# Patient Record
Sex: MALE | Race: WHITE | NOT HISPANIC OR LATINO | Employment: OTHER | URBAN - METROPOLITAN AREA
[De-identification: names, ages, dates, MRNs, and addresses within clinical notes are randomized per-mention and may not be internally consistent; named-entity substitution may affect disease eponyms.]

---

## 2017-02-16 ENCOUNTER — ALLSCRIPTS OFFICE VISIT (OUTPATIENT)
Dept: OTHER | Facility: OTHER | Age: 72
End: 2017-02-16

## 2017-02-16 DIAGNOSIS — M19.90 OSTEOARTHRITIS: ICD-10-CM

## 2017-02-16 DIAGNOSIS — D49.2 NEOPLASM OF UNSPECIFIED BEHAVIOR OF BONE, SOFT TISSUE, AND SKIN: ICD-10-CM

## 2017-02-16 DIAGNOSIS — G56.00 CARPAL TUNNEL SYNDROME: ICD-10-CM

## 2017-02-16 DIAGNOSIS — G89.29 OTHER CHRONIC PAIN: ICD-10-CM

## 2017-02-16 DIAGNOSIS — J30.9 ALLERGIC RHINITIS: ICD-10-CM

## 2017-02-16 DIAGNOSIS — M54.50 LOW BACK PAIN: ICD-10-CM

## 2017-02-16 DIAGNOSIS — R53.83 OTHER FATIGUE: ICD-10-CM

## 2017-02-16 DIAGNOSIS — L20.9 ATOPIC DERMATITIS: ICD-10-CM

## 2017-02-16 DIAGNOSIS — I10 ESSENTIAL (PRIMARY) HYPERTENSION: ICD-10-CM

## 2017-02-16 DIAGNOSIS — R73.9 HYPERGLYCEMIA: ICD-10-CM

## 2017-02-16 DIAGNOSIS — Z79.899 OTHER LONG TERM (CURRENT) DRUG THERAPY: ICD-10-CM

## 2017-02-16 DIAGNOSIS — G47.00 INSOMNIA: ICD-10-CM

## 2017-02-16 DIAGNOSIS — Z51.81 ENCOUNTER FOR THERAPEUTIC DRUG LEVEL MONITORING: ICD-10-CM

## 2017-02-16 DIAGNOSIS — E78.5 HYPERLIPIDEMIA: ICD-10-CM

## 2017-02-16 DIAGNOSIS — M25.50 PAIN IN JOINT: ICD-10-CM

## 2017-02-16 DIAGNOSIS — M76.60 ACHILLES TENDINITIS: ICD-10-CM

## 2017-02-16 DIAGNOSIS — Z47.89 ENCOUNTER FOR OTHER ORTHOPEDIC AFTERCARE: ICD-10-CM

## 2017-02-21 ENCOUNTER — TRANSCRIBE ORDERS (OUTPATIENT)
Dept: ADMINISTRATIVE | Facility: HOSPITAL | Age: 72
End: 2017-02-21

## 2017-02-21 DIAGNOSIS — R01.1 MURMUR: Primary | ICD-10-CM

## 2017-03-03 ENCOUNTER — GENERIC CONVERSION - ENCOUNTER (OUTPATIENT)
Dept: OTHER | Facility: OTHER | Age: 72
End: 2017-03-03

## 2017-03-08 ENCOUNTER — HOSPITAL ENCOUNTER (OUTPATIENT)
Dept: NON INVASIVE DIAGNOSTICS | Facility: HOSPITAL | Age: 72
Discharge: HOME | End: 2017-03-08
Payer: COMMERCIAL

## 2017-03-08 ENCOUNTER — TRANSCRIBE ORDERS (OUTPATIENT)
Dept: ADMINISTRATIVE | Facility: HOSPITAL | Age: 72
End: 2017-03-08

## 2017-03-08 ENCOUNTER — LAB (OUTPATIENT)
Dept: LAB | Facility: HOSPITAL | Age: 72
End: 2017-03-08
Payer: COMMERCIAL

## 2017-03-08 ENCOUNTER — HOSPITAL ENCOUNTER (OUTPATIENT)
Dept: RADIOLOGY | Facility: HOSPITAL | Age: 72
Discharge: HOME | End: 2017-03-08
Payer: COMMERCIAL

## 2017-03-08 DIAGNOSIS — Z79.899 OTHER LONG TERM (CURRENT) DRUG THERAPY: ICD-10-CM

## 2017-03-08 DIAGNOSIS — Z51.81 ENCOUNTER FOR THERAPEUTIC DRUG LEVEL MONITORING: ICD-10-CM

## 2017-03-08 DIAGNOSIS — R05.9 COUGH: Primary | ICD-10-CM

## 2017-03-08 DIAGNOSIS — R05.9 COUGH: ICD-10-CM

## 2017-03-08 DIAGNOSIS — M76.60 ACHILLES TENDINITIS: ICD-10-CM

## 2017-03-08 DIAGNOSIS — I10 ESSENTIAL (PRIMARY) HYPERTENSION: ICD-10-CM

## 2017-03-08 DIAGNOSIS — G47.00 INSOMNIA: ICD-10-CM

## 2017-03-08 DIAGNOSIS — L20.9 ATOPIC DERMATITIS: ICD-10-CM

## 2017-03-08 DIAGNOSIS — D49.2 NEOPLASM OF UNSPECIFIED BEHAVIOR OF BONE, SOFT TISSUE, AND SKIN: ICD-10-CM

## 2017-03-08 DIAGNOSIS — R73.9 HYPERGLYCEMIA: ICD-10-CM

## 2017-03-08 DIAGNOSIS — Z47.89 ENCOUNTER FOR OTHER ORTHOPEDIC AFTERCARE: ICD-10-CM

## 2017-03-08 DIAGNOSIS — M25.50 PAIN IN JOINT: ICD-10-CM

## 2017-03-08 DIAGNOSIS — R53.83 OTHER FATIGUE: ICD-10-CM

## 2017-03-08 DIAGNOSIS — M54.50 LOW BACK PAIN: ICD-10-CM

## 2017-03-08 DIAGNOSIS — M19.90 OSTEOARTHRITIS: ICD-10-CM

## 2017-03-08 DIAGNOSIS — G56.00 CARPAL TUNNEL SYNDROME: ICD-10-CM

## 2017-03-08 DIAGNOSIS — G89.29 OTHER CHRONIC PAIN: ICD-10-CM

## 2017-03-08 DIAGNOSIS — J30.9 ALLERGIC RHINITIS: ICD-10-CM

## 2017-03-08 DIAGNOSIS — R01.1 MURMUR: ICD-10-CM

## 2017-03-08 DIAGNOSIS — E78.5 HYPERLIPIDEMIA: ICD-10-CM

## 2017-03-08 LAB
ALBUMIN SERPL BCP-MCNC: 4 G/DL (ref 3.5–5)
ALP SERPL-CCNC: 57 U/L (ref 46–116)
ALT SERPL W P-5'-P-CCNC: 32 U/L (ref 12–78)
ANION GAP SERPL CALCULATED.3IONS-SCNC: 8 MMOL/L (ref 4–13)
AST SERPL W P-5'-P-CCNC: 18 U/L (ref 5–45)
BASOPHILS # BLD AUTO: 0.03 THOUSANDS/ΜL (ref 0–0.1)
BASOPHILS NFR BLD AUTO: 0 % (ref 0–1)
BILIRUB SERPL-MCNC: 0.4 MG/DL (ref 0.2–1)
BILIRUB UR QL STRIP: NEGATIVE
BUN SERPL-MCNC: 13 MG/DL (ref 5–25)
CALCIUM SERPL-MCNC: 8.7 MG/DL (ref 8.3–10.1)
CHLORIDE SERPL-SCNC: 101 MMOL/L (ref 100–108)
CHOLEST SERPL-MCNC: 182 MG/DL (ref 50–200)
CLARITY UR: CLEAR
CO2 SERPL-SCNC: 28 MMOL/L (ref 21–32)
COLOR UR: COLORLESS
CREAT SERPL-MCNC: 0.95 MG/DL (ref 0.6–1.3)
EOSINOPHIL # BLD AUTO: 0.34 THOUSAND/ΜL (ref 0–0.61)
EOSINOPHIL NFR BLD AUTO: 4 % (ref 0–6)
ERYTHROCYTE [DISTWIDTH] IN BLOOD BY AUTOMATED COUNT: 13.1 % (ref 11.6–15.1)
EST. AVERAGE GLUCOSE BLD GHB EST-MCNC: 131 MG/DL
GFR SERPL CREATININE-BSD FRML MDRD: >60 ML/MIN/1.73SQ M
GLUCOSE SERPL-MCNC: 102 MG/DL (ref 65–140)
GLUCOSE UR STRIP-MCNC: NEGATIVE MG/DL
HBA1C MFR BLD: 6.2 % (ref 4.2–6.3)
HCT VFR BLD AUTO: 42.7 % (ref 36.5–49.3)
HDLC SERPL-MCNC: 52 MG/DL (ref 40–60)
HGB BLD-MCNC: 14.2 G/DL (ref 12–17)
HGB UR QL STRIP.AUTO: NEGATIVE
KETONES UR STRIP-MCNC: NEGATIVE MG/DL
LDLC SERPL CALC-MCNC: 109 MG/DL (ref 0–100)
LEUKOCYTE ESTERASE UR QL STRIP: NEGATIVE
LYMPHOCYTES # BLD AUTO: 2.96 THOUSANDS/ΜL (ref 0.6–4.47)
LYMPHOCYTES NFR BLD AUTO: 35 % (ref 14–44)
MCH RBC QN AUTO: 30 PG (ref 26.8–34.3)
MCHC RBC AUTO-ENTMCNC: 33.3 G/DL (ref 31.4–37.4)
MCV RBC AUTO: 90 FL (ref 82–98)
MONOCYTES # BLD AUTO: 0.77 THOUSAND/ΜL (ref 0.17–1.22)
MONOCYTES NFR BLD AUTO: 9 % (ref 4–12)
NEUTROPHILS # BLD AUTO: 4.46 THOUSANDS/ΜL (ref 1.85–7.62)
NEUTS SEG NFR BLD AUTO: 52 % (ref 43–75)
NITRITE UR QL STRIP: NEGATIVE
PH UR STRIP.AUTO: 5.5 [PH] (ref 4.5–8)
PLATELET # BLD AUTO: 283 THOUSANDS/UL (ref 149–390)
PMV BLD AUTO: 9.1 FL (ref 8.9–12.7)
POTASSIUM SERPL-SCNC: 4.6 MMOL/L (ref 3.5–5.3)
PROT SERPL-MCNC: 6.8 G/DL (ref 6.4–8.2)
PROT UR STRIP-MCNC: NEGATIVE MG/DL
PSA SERPL-MCNC: 0.3 NG/ML (ref 0–4)
RBC # BLD AUTO: 4.73 MILLION/UL (ref 3.88–5.62)
SODIUM SERPL-SCNC: 137 MMOL/L (ref 136–145)
SP GR UR STRIP.AUTO: <=1.005 (ref 1–1.03)
TESTOST SERPL-MCNC: 179.1 NG/DL (ref 241–827)
TRIGL SERPL-MCNC: 107 MG/DL
UROBILINOGEN UR QL STRIP.AUTO: 0.2 E.U./DL
WBC # BLD AUTO: 8.56 THOUSAND/UL (ref 4.31–10.16)

## 2017-03-08 PROCEDURE — 93306 TTE W/DOPPLER COMPLETE: CPT

## 2017-03-08 PROCEDURE — G0103 PSA SCREENING: HCPCS

## 2017-03-08 PROCEDURE — 80061 LIPID PANEL: CPT

## 2017-03-08 PROCEDURE — 84403 ASSAY OF TOTAL TESTOSTERONE: CPT

## 2017-03-08 PROCEDURE — 36415 COLL VENOUS BLD VENIPUNCTURE: CPT

## 2017-03-08 PROCEDURE — 80053 COMPREHEN METABOLIC PANEL: CPT

## 2017-03-08 PROCEDURE — 81003 URINALYSIS AUTO W/O SCOPE: CPT

## 2017-03-08 PROCEDURE — 71020 HB CHEST X-RAY 2VW FRONTAL&LATL: CPT

## 2017-03-08 PROCEDURE — 85025 COMPLETE CBC W/AUTO DIFF WBC: CPT

## 2017-03-08 PROCEDURE — 83036 HEMOGLOBIN GLYCOSYLATED A1C: CPT

## 2017-03-10 ENCOUNTER — GENERIC CONVERSION - ENCOUNTER (OUTPATIENT)
Dept: OTHER | Facility: OTHER | Age: 72
End: 2017-03-10

## 2017-03-17 ENCOUNTER — ALLSCRIPTS OFFICE VISIT (OUTPATIENT)
Dept: OTHER | Facility: OTHER | Age: 72
End: 2017-03-17

## 2017-07-19 ENCOUNTER — ALLSCRIPTS OFFICE VISIT (OUTPATIENT)
Dept: OTHER | Facility: OTHER | Age: 72
End: 2017-07-19

## 2017-09-07 ENCOUNTER — GENERIC CONVERSION - ENCOUNTER (OUTPATIENT)
Dept: OTHER | Facility: OTHER | Age: 72
End: 2017-09-07

## 2017-09-11 ENCOUNTER — ALLSCRIPTS OFFICE VISIT (OUTPATIENT)
Dept: OTHER | Facility: OTHER | Age: 72
End: 2017-09-11

## 2017-09-11 DIAGNOSIS — I35.0 NONRHEUMATIC AORTIC VALVE STENOSIS: ICD-10-CM

## 2017-09-11 DIAGNOSIS — R06.09 OTHER FORMS OF DYSPNEA: ICD-10-CM

## 2017-09-12 ENCOUNTER — GENERIC CONVERSION - ENCOUNTER (OUTPATIENT)
Dept: OTHER | Facility: OTHER | Age: 72
End: 2017-09-12

## 2017-09-13 ENCOUNTER — APPOINTMENT (OUTPATIENT)
Dept: LAB | Facility: MEDICAL CENTER | Age: 72
End: 2017-09-13
Payer: COMMERCIAL

## 2017-09-13 ENCOUNTER — TRANSCRIBE ORDERS (OUTPATIENT)
Dept: LAB | Facility: MEDICAL CENTER | Age: 72
End: 2017-09-13

## 2017-09-13 ENCOUNTER — APPOINTMENT (OUTPATIENT)
Dept: RADIOLOGY | Facility: MEDICAL CENTER | Age: 72
End: 2017-09-13
Payer: COMMERCIAL

## 2017-09-13 DIAGNOSIS — L40.53 PSORIATIC SPONDYLITIS (HCC): Primary | ICD-10-CM

## 2017-09-13 DIAGNOSIS — L40.53 PSORIATIC SPONDYLITIS (HCC): ICD-10-CM

## 2017-09-13 DIAGNOSIS — I35.0 NONRHEUMATIC AORTIC VALVE STENOSIS: ICD-10-CM

## 2017-09-13 DIAGNOSIS — M54.5 LOW BACK PAIN, UNSPECIFIED BACK PAIN LATERALITY, UNSPECIFIED CHRONICITY, WITH SCIATICA PRESENCE UNSPECIFIED: ICD-10-CM

## 2017-09-13 DIAGNOSIS — R06.09 OTHER FORMS OF DYSPNEA: ICD-10-CM

## 2017-09-13 LAB
ALBUMIN SERPL BCP-MCNC: 4 G/DL (ref 3.5–5)
ALP SERPL-CCNC: 72 U/L (ref 46–116)
ALT SERPL W P-5'-P-CCNC: 34 U/L (ref 12–78)
ANION GAP SERPL CALCULATED.3IONS-SCNC: 8 MMOL/L (ref 4–13)
AST SERPL W P-5'-P-CCNC: 21 U/L (ref 5–45)
BASOPHILS # BLD AUTO: 0.03 THOUSANDS/ΜL (ref 0–0.1)
BASOPHILS NFR BLD AUTO: 0 % (ref 0–1)
BILIRUB SERPL-MCNC: 0.22 MG/DL (ref 0.2–1)
BUN SERPL-MCNC: 12 MG/DL (ref 5–25)
CALCIUM SERPL-MCNC: 9.1 MG/DL (ref 8.3–10.1)
CHLORIDE SERPL-SCNC: 105 MMOL/L (ref 100–108)
CO2 SERPL-SCNC: 26 MMOL/L (ref 21–32)
CREAT SERPL-MCNC: 1.01 MG/DL (ref 0.6–1.3)
EOSINOPHIL # BLD AUTO: 0.27 THOUSAND/ΜL (ref 0–0.61)
EOSINOPHIL NFR BLD AUTO: 3 % (ref 0–6)
ERYTHROCYTE [DISTWIDTH] IN BLOOD BY AUTOMATED COUNT: 13.4 % (ref 11.6–15.1)
ERYTHROCYTE [SEDIMENTATION RATE] IN BLOOD: 6 MM/HOUR (ref 0–10)
GFR SERPL CREATININE-BSD FRML MDRD: 75 ML/MIN/1.73SQ M
GLUCOSE P FAST SERPL-MCNC: 102 MG/DL (ref 65–99)
HCT VFR BLD AUTO: 44.7 % (ref 36.5–49.3)
HGB BLD-MCNC: 15 G/DL (ref 12–17)
INR PPP: 0.95 (ref 0.86–1.16)
LYMPHOCYTES # BLD AUTO: 2.78 THOUSANDS/ΜL (ref 0.6–4.47)
LYMPHOCYTES NFR BLD AUTO: 30 % (ref 14–44)
MCH RBC QN AUTO: 30.4 PG (ref 26.8–34.3)
MCHC RBC AUTO-ENTMCNC: 33.6 G/DL (ref 31.4–37.4)
MCV RBC AUTO: 91 FL (ref 82–98)
MONOCYTES # BLD AUTO: 0.96 THOUSAND/ΜL (ref 0.17–1.22)
MONOCYTES NFR BLD AUTO: 10 % (ref 4–12)
NEUTROPHILS # BLD AUTO: 5.32 THOUSANDS/ΜL (ref 1.85–7.62)
NEUTS SEG NFR BLD AUTO: 57 % (ref 43–75)
NRBC BLD AUTO-RTO: 0 /100 WBCS
PLATELET # BLD AUTO: 289 THOUSANDS/UL (ref 149–390)
PMV BLD AUTO: 9.1 FL (ref 8.9–12.7)
POTASSIUM SERPL-SCNC: 4.8 MMOL/L (ref 3.5–5.3)
PROT SERPL-MCNC: 7.6 G/DL (ref 6.4–8.2)
PROTHROMBIN TIME: 12.7 SECONDS (ref 12.1–14.4)
RBC # BLD AUTO: 4.93 MILLION/UL (ref 3.88–5.62)
SODIUM SERPL-SCNC: 139 MMOL/L (ref 136–145)
WBC # BLD AUTO: 9.38 THOUSAND/UL (ref 4.31–10.16)

## 2017-09-13 PROCEDURE — 36415 COLL VENOUS BLD VENIPUNCTURE: CPT

## 2017-09-13 PROCEDURE — 72100 X-RAY EXAM L-S SPINE 2/3 VWS: CPT

## 2017-09-13 PROCEDURE — 85610 PROTHROMBIN TIME: CPT

## 2017-09-13 PROCEDURE — 85652 RBC SED RATE AUTOMATED: CPT

## 2017-09-13 PROCEDURE — 80053 COMPREHEN METABOLIC PANEL: CPT

## 2017-09-13 PROCEDURE — 85025 COMPLETE CBC W/AUTO DIFF WBC: CPT

## 2017-09-28 ENCOUNTER — TELEPHONE (OUTPATIENT)
Dept: INPATIENT UNIT | Facility: HOSPITAL | Age: 72
End: 2017-09-28

## 2017-09-28 DIAGNOSIS — I35.0 NONRHEUMATIC AORTIC VALVE STENOSIS: ICD-10-CM

## 2017-09-28 DIAGNOSIS — Z79.899 OTHER LONG TERM (CURRENT) DRUG THERAPY: ICD-10-CM

## 2017-09-28 DIAGNOSIS — Z82.49 FAMILY HISTORY OF ISCHEMIC HEART DISEASE AND OTHER DISEASES OF THE CIRCULATORY SYSTEM: ICD-10-CM

## 2017-09-28 DIAGNOSIS — L40.9 PSORIASIS: ICD-10-CM

## 2017-09-28 DIAGNOSIS — R73.9 HYPERGLYCEMIA: ICD-10-CM

## 2017-09-28 RX ORDER — SODIUM CHLORIDE 9 MG/ML
100 INJECTION, SOLUTION INTRAVENOUS CONTINUOUS
Status: CANCELLED | OUTPATIENT
Start: 2017-09-28

## 2017-09-28 RX ORDER — ASPIRIN 81 MG/1
324 TABLET, CHEWABLE ORAL ONCE
Status: CANCELLED | OUTPATIENT
Start: 2017-09-28 | End: 2017-09-28

## 2017-09-29 ENCOUNTER — GENERIC CONVERSION - ENCOUNTER (OUTPATIENT)
Dept: OTHER | Facility: OTHER | Age: 72
End: 2017-09-29

## 2017-09-29 ENCOUNTER — ANESTHESIA (OUTPATIENT)
Dept: NON INVASIVE DIAGNOSTICS | Facility: HOSPITAL | Age: 72
End: 2017-09-29
Payer: COMMERCIAL

## 2017-09-29 ENCOUNTER — HOSPITAL ENCOUNTER (OUTPATIENT)
Dept: NON INVASIVE DIAGNOSTICS | Facility: HOSPITAL | Age: 72
Discharge: HOME | End: 2017-09-29
Attending: INTERNAL MEDICINE | Admitting: INTERNAL MEDICINE
Payer: COMMERCIAL

## 2017-09-29 ENCOUNTER — ANESTHESIA EVENT (OUTPATIENT)
Dept: NON INVASIVE DIAGNOSTICS | Facility: HOSPITAL | Age: 72
End: 2017-09-29
Payer: COMMERCIAL

## 2017-09-29 ENCOUNTER — APPOINTMENT (OUTPATIENT)
Dept: NON INVASIVE DIAGNOSTICS | Facility: HOSPITAL | Age: 72
End: 2017-09-29
Attending: INTERNAL MEDICINE
Payer: COMMERCIAL

## 2017-09-29 VITALS
DIASTOLIC BLOOD PRESSURE: 63 MMHG | HEIGHT: 68 IN | TEMPERATURE: 97.4 F | BODY MASS INDEX: 30.31 KG/M2 | RESPIRATION RATE: 18 BRPM | WEIGHT: 200 LBS | OXYGEN SATURATION: 96 % | HEART RATE: 57 BPM | SYSTOLIC BLOOD PRESSURE: 136 MMHG

## 2017-09-29 DIAGNOSIS — I35.0 NONRHEUMATIC AORTIC VALVE STENOSIS: ICD-10-CM

## 2017-09-29 DIAGNOSIS — R06.09 OTHER FORMS OF DYSPNEA: ICD-10-CM

## 2017-09-29 LAB
ALBUMIN SERPL BCP-MCNC: 3.8 G/DL (ref 3.5–5)
ALP SERPL-CCNC: 60 U/L (ref 46–116)
ALT SERPL W P-5'-P-CCNC: 30 U/L (ref 12–78)
ANION GAP SERPL CALCULATED.3IONS-SCNC: 8 MMOL/L (ref 4–13)
APTT PPP: 29 SECONDS (ref 23–35)
AST SERPL W P-5'-P-CCNC: 21 U/L (ref 5–45)
BASOPHILS # BLD AUTO: 0.02 THOUSANDS/ΜL (ref 0–0.1)
BASOPHILS NFR BLD AUTO: 0 % (ref 0–1)
BILIRUB SERPL-MCNC: 0.31 MG/DL (ref 0.2–1)
BUN SERPL-MCNC: 18 MG/DL (ref 5–25)
CALCIUM SERPL-MCNC: 8.8 MG/DL (ref 8.3–10.1)
CHLORIDE SERPL-SCNC: 109 MMOL/L (ref 100–108)
CHOLEST SERPL-MCNC: 145 MG/DL (ref 50–200)
CO2 SERPL-SCNC: 25 MMOL/L (ref 21–32)
CREAT SERPL-MCNC: 1.06 MG/DL (ref 0.6–1.3)
EOSINOPHIL # BLD AUTO: 0.22 THOUSAND/ΜL (ref 0–0.61)
EOSINOPHIL NFR BLD AUTO: 3 % (ref 0–6)
ERYTHROCYTE [DISTWIDTH] IN BLOOD BY AUTOMATED COUNT: 13.5 % (ref 11.6–15.1)
GFR SERPL CREATININE-BSD FRML MDRD: 71 ML/MIN/1.73SQ M
GLUCOSE P FAST SERPL-MCNC: 107 MG/DL (ref 65–99)
GLUCOSE SERPL-MCNC: 107 MG/DL (ref 65–140)
HCT VFR BLD AUTO: 40.6 % (ref 36.5–49.3)
HDLC SERPL-MCNC: 50 MG/DL (ref 40–60)
HGB BLD-MCNC: 13.6 G/DL (ref 12–17)
INR PPP: 1.05 (ref 0.86–1.16)
LDLC SERPL CALC-MCNC: 78 MG/DL (ref 0–100)
LYMPHOCYTES # BLD AUTO: 3.05 THOUSANDS/ΜL (ref 0.6–4.47)
LYMPHOCYTES NFR BLD AUTO: 39 % (ref 14–44)
MAGNESIUM SERPL-MCNC: 2.4 MG/DL (ref 1.6–2.6)
MCH RBC QN AUTO: 29.8 PG (ref 26.8–34.3)
MCHC RBC AUTO-ENTMCNC: 33.5 G/DL (ref 31.4–37.4)
MCV RBC AUTO: 89 FL (ref 82–98)
MONOCYTES # BLD AUTO: 0.76 THOUSAND/ΜL (ref 0.17–1.22)
MONOCYTES NFR BLD AUTO: 10 % (ref 4–12)
NEUTROPHILS # BLD AUTO: 3.74 THOUSANDS/ΜL (ref 1.85–7.62)
NEUTS SEG NFR BLD AUTO: 48 % (ref 43–75)
NRBC BLD AUTO-RTO: 0 /100 WBCS
PLATELET # BLD AUTO: 250 THOUSANDS/UL (ref 149–390)
PMV BLD AUTO: 8.8 FL (ref 8.9–12.7)
POTASSIUM SERPL-SCNC: 4.1 MMOL/L (ref 3.5–5.3)
PROT SERPL-MCNC: 7.5 G/DL (ref 6.4–8.2)
PROTHROMBIN TIME: 13.7 SECONDS (ref 12.1–14.4)
RBC # BLD AUTO: 4.56 MILLION/UL (ref 3.88–5.62)
SODIUM SERPL-SCNC: 142 MMOL/L (ref 136–145)
TRIGL SERPL-MCNC: 84 MG/DL
WBC # BLD AUTO: 7.81 THOUSAND/UL (ref 4.31–10.16)

## 2017-09-29 PROCEDURE — 85610 PROTHROMBIN TIME: CPT | Performed by: INTERNAL MEDICINE

## 2017-09-29 PROCEDURE — 80053 COMPREHEN METABOLIC PANEL: CPT | Performed by: INTERNAL MEDICINE

## 2017-09-29 PROCEDURE — 83735 ASSAY OF MAGNESIUM: CPT | Performed by: INTERNAL MEDICINE

## 2017-09-29 PROCEDURE — 76376 3D RENDER W/INTRP POSTPROCES: CPT

## 2017-09-29 PROCEDURE — C1769 GUIDE WIRE: HCPCS | Performed by: INTERNAL MEDICINE

## 2017-09-29 PROCEDURE — C1894 INTRO/SHEATH, NON-LASER: HCPCS | Performed by: INTERNAL MEDICINE

## 2017-09-29 PROCEDURE — 85730 THROMBOPLASTIN TIME PARTIAL: CPT | Performed by: INTERNAL MEDICINE

## 2017-09-29 PROCEDURE — C1887 CATHETER, GUIDING: HCPCS | Performed by: INTERNAL MEDICINE

## 2017-09-29 PROCEDURE — 93458 L HRT ARTERY/VENTRICLE ANGIO: CPT | Performed by: INTERNAL MEDICINE

## 2017-09-29 PROCEDURE — 93312 ECHO TRANSESOPHAGEAL: CPT

## 2017-09-29 PROCEDURE — 99152 MOD SED SAME PHYS/QHP 5/>YRS: CPT | Performed by: INTERNAL MEDICINE

## 2017-09-29 PROCEDURE — 80061 LIPID PANEL: CPT | Performed by: INTERNAL MEDICINE

## 2017-09-29 PROCEDURE — 85025 COMPLETE CBC W/AUTO DIFF WBC: CPT | Performed by: INTERNAL MEDICINE

## 2017-09-29 PROCEDURE — 99153 MOD SED SAME PHYS/QHP EA: CPT | Performed by: INTERNAL MEDICINE

## 2017-09-29 RX ORDER — TRAZODONE HYDROCHLORIDE 100 MG/1
50 TABLET ORAL
COMMUNITY
End: 2018-02-21 | Stop reason: SDUPTHER

## 2017-09-29 RX ORDER — SODIUM CHLORIDE 9 MG/ML
100 INJECTION, SOLUTION INTRAVENOUS CONTINUOUS
Status: DISCONTINUED | OUTPATIENT
Start: 2017-09-29 | End: 2017-09-29

## 2017-09-29 RX ORDER — ACETAMINOPHEN 325 MG/1
650 TABLET ORAL EVERY 4 HOURS PRN
Status: DISCONTINUED | OUTPATIENT
Start: 2017-09-29 | End: 2017-09-29 | Stop reason: HOSPADM

## 2017-09-29 RX ORDER — MIDAZOLAM HYDROCHLORIDE 1 MG/ML
INJECTION INTRAMUSCULAR; INTRAVENOUS CODE/TRAUMA/SEDATION MEDICATION
Status: COMPLETED | OUTPATIENT
Start: 2017-09-29 | End: 2017-09-29

## 2017-09-29 RX ORDER — LIDOCAINE HYDROCHLORIDE 10 MG/ML
INJECTION, SOLUTION INFILTRATION; PERINEURAL CODE/TRAUMA/SEDATION MEDICATION
Status: COMPLETED | OUTPATIENT
Start: 2017-09-29 | End: 2017-09-29

## 2017-09-29 RX ORDER — PROPOFOL 10 MG/ML
INJECTION, EMULSION INTRAVENOUS AS NEEDED
Status: DISCONTINUED | OUTPATIENT
Start: 2017-09-29 | End: 2017-09-29 | Stop reason: SURG

## 2017-09-29 RX ORDER — FUROSEMIDE 40 MG/1
40 TABLET ORAL DAILY
Qty: 30 TABLET | Refills: 5 | Status: SHIPPED | OUTPATIENT
Start: 2017-09-29 | End: 2017-10-20 | Stop reason: HOSPADM

## 2017-09-29 RX ORDER — ATORVASTATIN CALCIUM 80 MG/1
40 TABLET, FILM COATED ORAL DAILY
COMMUNITY
End: 2017-10-16 | Stop reason: ALTCHOICE

## 2017-09-29 RX ORDER — SODIUM CHLORIDE 9 MG/ML
100 INJECTION, SOLUTION INTRAVENOUS CONTINUOUS
Status: DISCONTINUED | OUTPATIENT
Start: 2017-09-29 | End: 2017-09-29 | Stop reason: HOSPADM

## 2017-09-29 RX ORDER — ASPIRIN 81 MG/1
324 TABLET, CHEWABLE ORAL ONCE
Status: COMPLETED | OUTPATIENT
Start: 2017-09-29 | End: 2017-09-29

## 2017-09-29 RX ORDER — TRAMADOL HYDROCHLORIDE 50 MG/1
50 TABLET ORAL EVERY 6 HOURS PRN
COMMUNITY
End: 2018-09-14 | Stop reason: ALTCHOICE

## 2017-09-29 RX ORDER — FUROSEMIDE 40 MG/1
40 TABLET ORAL DAILY
Qty: 30 TABLET | Refills: 5 | Status: CANCELLED | OUTPATIENT
Start: 2017-09-29

## 2017-09-29 RX ORDER — FENTANYL CITRATE 50 UG/ML
INJECTION, SOLUTION INTRAMUSCULAR; INTRAVENOUS CODE/TRAUMA/SEDATION MEDICATION
Status: COMPLETED | OUTPATIENT
Start: 2017-09-29 | End: 2017-09-29

## 2017-09-29 RX ORDER — ONDANSETRON 2 MG/ML
4 INJECTION INTRAMUSCULAR; INTRAVENOUS EVERY 8 HOURS PRN
Status: DISCONTINUED | OUTPATIENT
Start: 2017-09-29 | End: 2017-09-29 | Stop reason: HOSPADM

## 2017-09-29 RX ORDER — FLUOCINONIDE 0.5 MG/G
OINTMENT TOPICAL 2 TIMES DAILY
COMMUNITY

## 2017-09-29 RX ORDER — ASPIRIN 325 MG
325 TABLET ORAL DAILY
COMMUNITY
End: 2017-10-16 | Stop reason: ALTCHOICE

## 2017-09-29 RX ORDER — OXYCODONE AND ACETAMINOPHEN 10; 325 MG/1; MG/1
1 TABLET ORAL EVERY 4 HOURS PRN
COMMUNITY
End: 2018-02-21 | Stop reason: SDUPTHER

## 2017-09-29 RX ORDER — VERAPAMIL HYDROCHLORIDE 2.5 MG/ML
INJECTION, SOLUTION INTRAVENOUS CODE/TRAUMA/SEDATION MEDICATION
Status: COMPLETED | OUTPATIENT
Start: 2017-09-29 | End: 2017-09-29

## 2017-09-29 RX ORDER — OXYCODONE HCL 10 MG/1
10 TABLET, FILM COATED, EXTENDED RELEASE ORAL EVERY 12 HOURS SCHEDULED
COMMUNITY
End: 2018-02-21

## 2017-09-29 RX ORDER — LOPERAMIDE HYDROCHLORIDE 2 MG/1
2 CAPSULE ORAL 4 TIMES DAILY PRN
COMMUNITY
End: 2018-09-14 | Stop reason: ALTCHOICE

## 2017-09-29 RX ORDER — CARISOPRODOL 350 MG/1
350 TABLET ORAL 4 TIMES DAILY
COMMUNITY
End: 2018-02-01 | Stop reason: SDUPTHER

## 2017-09-29 RX ORDER — HEPARIN SODIUM 1000 [USP'U]/ML
INJECTION, SOLUTION INTRAVENOUS; SUBCUTANEOUS CODE/TRAUMA/SEDATION MEDICATION
Status: COMPLETED | OUTPATIENT
Start: 2017-09-29 | End: 2017-09-29

## 2017-09-29 RX ORDER — EPHEDRINE SULFATE 50 MG/ML
INJECTION, SOLUTION INTRAVENOUS AS NEEDED
Status: DISCONTINUED | OUTPATIENT
Start: 2017-09-29 | End: 2017-09-29 | Stop reason: SURG

## 2017-09-29 RX ORDER — NITROGLYCERIN 20 MG/100ML
INJECTION INTRAVENOUS CODE/TRAUMA/SEDATION MEDICATION
Status: COMPLETED | OUTPATIENT
Start: 2017-09-29 | End: 2017-09-29

## 2017-09-29 RX ADMIN — FENTANYL CITRATE 50 MCG: 50 INJECTION, SOLUTION INTRAMUSCULAR; INTRAVENOUS at 12:06

## 2017-09-29 RX ADMIN — EPHEDRINE SULFATE 10 MG: 50 INJECTION, SOLUTION INTRAMUSCULAR; INTRAVENOUS; SUBCUTANEOUS at 11:09

## 2017-09-29 RX ADMIN — VERAPAMIL HYDROCHLORIDE 2.5 MG: 2.5 INJECTION, SOLUTION INTRAVENOUS at 12:08

## 2017-09-29 RX ADMIN — FENTANYL CITRATE 50 MCG: 50 INJECTION, SOLUTION INTRAMUSCULAR; INTRAVENOUS at 12:13

## 2017-09-29 RX ADMIN — PROPOFOL 20 MG: 10 INJECTION, EMULSION INTRAVENOUS at 11:04

## 2017-09-29 RX ADMIN — PROPOFOL 30 MG: 10 INJECTION, EMULSION INTRAVENOUS at 11:02

## 2017-09-29 RX ADMIN — ASPIRIN 81 MG 324 MG: 81 TABLET ORAL at 09:12

## 2017-09-29 RX ADMIN — FENTANYL CITRATE 50 MCG: 50 INJECTION, SOLUTION INTRAMUSCULAR; INTRAVENOUS at 12:24

## 2017-09-29 RX ADMIN — MIDAZOLAM 1 MG: 1 INJECTION INTRAMUSCULAR; INTRAVENOUS at 12:13

## 2017-09-29 RX ADMIN — NITROGLYCERIN 200 MCG: 20 INJECTION INTRAVENOUS at 12:08

## 2017-09-29 RX ADMIN — TOPICAL ANESTHETIC 1 SPRAY: 200 SPRAY DENTAL; PERIODONTAL at 10:57

## 2017-09-29 RX ADMIN — FENTANYL CITRATE 50 MCG: 50 INJECTION, SOLUTION INTRAMUSCULAR; INTRAVENOUS at 12:36

## 2017-09-29 RX ADMIN — LIDOCAINE HYDROCHLORIDE 1 ML: 10 INJECTION, SOLUTION INFILTRATION; PERINEURAL at 12:06

## 2017-09-29 RX ADMIN — IOHEXOL 100 ML: 350 INJECTION, SOLUTION INTRAVENOUS at 12:41

## 2017-09-29 RX ADMIN — MIDAZOLAM 1 MG: 1 INJECTION INTRAMUSCULAR; INTRAVENOUS at 12:24

## 2017-09-29 RX ADMIN — MIDAZOLAM 1 MG: 1 INJECTION INTRAMUSCULAR; INTRAVENOUS at 12:06

## 2017-09-29 RX ADMIN — MIDAZOLAM 1 MG: 1 INJECTION INTRAMUSCULAR; INTRAVENOUS at 12:36

## 2017-09-29 RX ADMIN — HEPARIN SODIUM 4000 UNITS: 1000 INJECTION INTRAVENOUS; SUBCUTANEOUS at 12:07

## 2017-09-29 RX ADMIN — PROPOFOL 40 MG: 10 INJECTION, EMULSION INTRAVENOUS at 10:59

## 2017-09-29 RX ADMIN — PROPOFOL 100 MG: 10 INJECTION, EMULSION INTRAVENOUS at 10:58

## 2017-09-29 RX ADMIN — PROPOFOL 20 MG: 10 INJECTION, EMULSION INTRAVENOUS at 11:06

## 2017-09-29 RX ADMIN — FENTANYL CITRATE 50 MCG: 50 INJECTION, SOLUTION INTRAMUSCULAR; INTRAVENOUS at 12:02

## 2017-09-29 RX ADMIN — SODIUM CHLORIDE 100 ML/HR: 0.9 INJECTION, SOLUTION INTRAVENOUS at 09:13

## 2017-09-29 RX ADMIN — MIDAZOLAM 2 MG: 1 INJECTION INTRAMUSCULAR; INTRAVENOUS at 12:02

## 2017-09-29 NOTE — DISCHARGE INSTRUCTIONS
1  Please see the post cardiac catheterization dishcarge instructions  No heavy lifting, greater than 10 lbs  or strenuous  activity for 48 hrs  2 Remove band aid tomorrow  Shower and wash area- wrist gently with soap and water- beginning tomorrow  Rinse and pat dry  Apply new water seal band aid  Repeat this process for 5 days  No powders, creams lotions or antibiotic ointments  for 5 days  No tub baths, hot tubs or swimming for 5 days  3  Please call our office ([phone]) if you have any fever, redness, swelling, discharge from your wrist access site  4 No driving for 2 days    5  none       After Heart Catheterization   AMBULATORY CARE:   Call 911 for any of the following:   · You have any of the following signs of a heart attack:      ¨ Squeezing, pressure, or pain in your chest that lasts longer than 5 minutes or returns    ¨ Discomfort or pain in your back, neck, jaw, stomach, or arm     ¨ Trouble breathing    ¨ Nausea or vomiting    ¨ Lightheadedness or a sudden cold sweat, especially with chest pain or trouble breathing    · You have any of the following signs of a stroke:      ¨ Numbness or drooping on one side of your face     ¨ Weakness in an arm or leg    ¨ Confusion or difficulty speaking    ¨ Dizziness, a severe headache, or vision loss    · You feel lightheaded, short of breath, and have chest pain  · You cough up blood  · You have trouble breathing  · You cannot stop the bleeding from your wound even after you hold firm pressure for 10 minutes  Seek care immediately if:   · Blood soaks through your bandage  · Your stitches come apart  · Your arm or leg feels numb, cool, or looks pale  · Your wound gets swollen quickly  Contact your healthcare provider if:   · You have a fever or chills  · Your wound is red, swollen, or draining pus  · Your wound looks more bruised or you have new bruising on the side of your leg or arm       · You have nausea or are vomiting  · Your skin is itchy, swollen, or you have a rash  · You have questions or concerns about your condition or care  Medicines: You may need any of the following:  · Blood thinners    help prevent blood clots  Examples of blood thinners include heparin and warfarin  Clots can cause strokes, heart attacks, and death  The following are general safety guidelines to follow while you are taking a blood thinner:    ¨ Watch for bleeding and bruising while you take blood thinners  Watch for bleeding from your gums or nose  Watch for blood in your urine and bowel movements  Use a soft washcloth on your skin, and a soft toothbrush to brush your teeth  This can keep your skin and gums from bleeding  If you shave, use an electric shaver  Do not play contact sports  ¨ Tell your dentist and other healthcare providers that you take anticoagulants  Wear a bracelet or necklace that says you take this medicine  ¨ Do not start or stop any medicines unless your healthcare provider tells you to  Many medicines cannot be used with blood thinners  ¨ Tell your healthcare provider right away if you forget to take the medicine, or if you take too much  ¨ Warfarin  is a blood thinner that you may need to take  The following are things you should be aware of if you take warfarin  § Foods and medicines can affect the amount of warfarin in your blood  Do not make major changes to your diet while you take warfarin  Warfarin works best when you eat about the same amount of vitamin K every day  Vitamin K is found in green leafy vegetables and certain other foods  Ask for more information about what to eat when you are taking warfarin  § You will need to see your healthcare provider for follow-up visits when you are on warfarin  You will need regular blood tests  These tests are used to decide how much medicine you need  · Acetaminophen  helps decrease pain and fever   This medicine is available without a doctor's order  Ask how much medicine is safe to take, and how often to take it  Acetaminophen can cause liver damage if not taken correctly  · Take your medicine as directed  Contact your healthcare provider if you think your medicine is not helping or if you have side effects  Tell him or her if you are allergic to any medicine  Keep a list of the medicines, vitamins, and herbs you take  Include the amounts, and when and why you take them  Bring the list or the pill bottles to follow-up visits  Carry your medicine list with you in case of an emergency  Bathing: You may be able to shower the day after your procedure  Remove your pressure bandage before you shower  Do not take baths or go in hot tubs or pools  Carefully wash the wound with soap and water  Pat the area dry  Care for your wound as directed:  Change your bandage when it gets wet or dirty  A small bandage can be placed on your wound after you remove the pressure bandage  Do not put powders, lotions, or creams on your wound  They may cause your wound to get infected  Monitor your wound every day for signs of infection, such as redness, swelling, or pus  Mild bruising is normal and expected  If bleeding from your wound occurs:  Apply firm, steady pressure to stop the bleeding  Apply pressure with a clean gauze or towel for 5 to 10 minutes  Call 911 if bleeding becomes heavy or does not stop  Activity:  Do not lift anything heavier than 5 pounds until directed by your healthcare provider  Heavy lifting can put stress on your wound and cause bleeding  Do not push or pull with the arm that was used for the procedure  Do not do vigorous activity for at least 48 hours  Vigorous activity may cause bleeding from your wound  Rest and do quiet activities  Short walks to the bathroom and around the house are okay  Limit your stair climbing to prevent bleeding  Ask your healthcare provider when you can return to your normal activities     Do not strain when you have a bowel movement:  Your wound may bleed if you strain to have a bowel movement  Keep your legs flat on the floor and your hips at a 90° angle  Talk to your healthcare provider if you are constipated  You may need medicine to make it easier for you to have a bowel movement and to prevent straining  Drink liquids as directed:  Liquids will help flush the contrast liquid from your body  Ask how much liquid to drink each day and which liquids are best for you  Driving:  Ask your healthcare provider when it is okay for you to drive  He may tell you to wait 48 hours before you drive to decrease your risk for bleeding  Returning to work: You may not be able to return to work for at least 2 days after your procedure if your job involves heavy lifting  Ask your healthcare provider when it is okay for you to return to work  © 2017 [address] [facility] Information is for End User's use only and may not be sold, redistributed or otherwise used for commercial purposes  All illustrations and images included in CareNotes® are the copyrighted property of [employer]  or [address]  The above information is an  only  It is not intended as medical advice for individual conditions or treatments  Talk to your doctor, nurse or pharmacist before following any medical regimen to see if it is safe and effective for you

## 2017-10-05 ENCOUNTER — GENERIC CONVERSION - ENCOUNTER (OUTPATIENT)
Dept: OTHER | Facility: OTHER | Age: 72
End: 2017-10-05

## 2017-10-06 ENCOUNTER — TRANSCRIBE ORDERS (OUTPATIENT)
Dept: ADMINISTRATIVE | Facility: HOSPITAL | Age: 72
End: 2017-10-06

## 2017-10-06 ENCOUNTER — GENERIC CONVERSION - ENCOUNTER (OUTPATIENT)
Dept: OTHER | Facility: OTHER | Age: 72
End: 2017-10-06

## 2017-10-06 DIAGNOSIS — I35.0 NODULAR CALCIFIC AORTIC VALVE STENOSIS: Primary | ICD-10-CM

## 2017-10-10 ENCOUNTER — HOSPITAL ENCOUNTER (OUTPATIENT)
Dept: RADIOLOGY | Facility: HOSPITAL | Age: 72
Discharge: HOME | End: 2017-10-10
Payer: COMMERCIAL

## 2017-10-10 ENCOUNTER — APPOINTMENT (OUTPATIENT)
Dept: LAB | Facility: HOSPITAL | Age: 72
End: 2017-10-10
Payer: COMMERCIAL

## 2017-10-10 ENCOUNTER — LAB REQUISITION (OUTPATIENT)
Dept: LAB | Facility: HOSPITAL | Age: 72
End: 2017-10-10
Payer: COMMERCIAL

## 2017-10-10 ENCOUNTER — HOSPITAL ENCOUNTER (OUTPATIENT)
Dept: ULTRASOUND IMAGING | Facility: HOSPITAL | Age: 72
Discharge: HOME | End: 2017-10-10
Payer: COMMERCIAL

## 2017-10-10 DIAGNOSIS — I35.0 NONRHEUMATIC AORTIC VALVE STENOSIS: ICD-10-CM

## 2017-10-10 DIAGNOSIS — R73.9 HYPERGLYCEMIA: ICD-10-CM

## 2017-10-10 DIAGNOSIS — Z79.899 OTHER LONG TERM (CURRENT) DRUG THERAPY: ICD-10-CM

## 2017-10-10 DIAGNOSIS — Z82.49 FAMILY HISTORY OF ISCHEMIC HEART DISEASE AND OTHER DISEASES OF THE CIRCULATORY SYSTEM: ICD-10-CM

## 2017-10-10 DIAGNOSIS — I35.0 NODULAR CALCIFIC AORTIC VALVE STENOSIS: ICD-10-CM

## 2017-10-10 DIAGNOSIS — L40.9 PSORIASIS: ICD-10-CM

## 2017-10-10 LAB
ABO GROUP BLD: NORMAL
BILIRUB UR QL STRIP: NEGATIVE
BLD GP AB SCN SERPL QL: NEGATIVE
CLARITY UR: CLEAR
COLOR UR: NORMAL
EST. AVERAGE GLUCOSE BLD GHB EST-MCNC: 123 MG/DL
GLUCOSE UR STRIP-MCNC: NEGATIVE MG/DL
HBA1C MFR BLD: 5.9 % (ref 4.2–6.3)
HGB UR QL STRIP.AUTO: NEGATIVE
KETONES UR STRIP-MCNC: NEGATIVE MG/DL
LEUKOCYTE ESTERASE UR QL STRIP: NEGATIVE
NITRITE UR QL STRIP: NEGATIVE
PH UR STRIP.AUTO: 5.5 [PH] (ref 4.5–8)
PROT UR STRIP-MCNC: NEGATIVE MG/DL
RH BLD: NEGATIVE
SP GR UR STRIP.AUTO: <=1.005 (ref 1–1.03)
SPECIMEN EXPIRATION DATE: NORMAL
UROBILINOGEN UR QL STRIP.AUTO: 0.2 E.U./DL

## 2017-10-10 PROCEDURE — 36415 COLL VENOUS BLD VENIPUNCTURE: CPT

## 2017-10-10 PROCEDURE — 86901 BLOOD TYPING SEROLOGIC RH(D): CPT | Performed by: THORACIC SURGERY (CARDIOTHORACIC VASCULAR SURGERY)

## 2017-10-10 PROCEDURE — 71020 HB CHEST X-RAY 2VW FRONTAL&LATL: CPT

## 2017-10-10 PROCEDURE — 81003 URINALYSIS AUTO W/O SCOPE: CPT

## 2017-10-10 PROCEDURE — 86900 BLOOD TYPING SEROLOGIC ABO: CPT | Performed by: THORACIC SURGERY (CARDIOTHORACIC VASCULAR SURGERY)

## 2017-10-10 PROCEDURE — 87081 CULTURE SCREEN ONLY: CPT

## 2017-10-10 PROCEDURE — 83036 HEMOGLOBIN GLYCOSYLATED A1C: CPT

## 2017-10-10 PROCEDURE — 86850 RBC ANTIBODY SCREEN: CPT | Performed by: THORACIC SURGERY (CARDIOTHORACIC VASCULAR SURGERY)

## 2017-10-10 PROCEDURE — 93880 EXTRACRANIAL BILAT STUDY: CPT

## 2017-10-11 LAB — MRSA NOSE QL CULT: NORMAL

## 2017-10-16 ENCOUNTER — ANESTHESIA EVENT (INPATIENT)
Dept: PERIOP | Facility: HOSPITAL | Age: 72
DRG: 221 | End: 2017-10-16
Payer: COMMERCIAL

## 2017-10-16 RX ORDER — ASPIRIN 81 MG/1
81 TABLET ORAL DAILY
COMMUNITY
End: 2017-10-20 | Stop reason: HOSPADM

## 2017-10-16 RX ORDER — GABAPENTIN 600 MG/1
300 TABLET ORAL 3 TIMES DAILY
COMMUNITY
End: 2018-09-14 | Stop reason: SDUPTHER

## 2017-10-16 RX ORDER — ATORVASTATIN CALCIUM 40 MG/1
40 TABLET, FILM COATED ORAL DAILY
COMMUNITY
End: 2018-04-16 | Stop reason: SDUPTHER

## 2017-10-17 ENCOUNTER — ANESTHESIA (INPATIENT)
Dept: PERIOP | Facility: HOSPITAL | Age: 72
DRG: 221 | End: 2017-10-17
Payer: COMMERCIAL

## 2017-10-17 ENCOUNTER — HOSPITAL ENCOUNTER (INPATIENT)
Facility: HOSPITAL | Age: 72
LOS: 3 days | Discharge: HOME HEALTH | DRG: 221 | End: 2017-10-20
Attending: THORACIC SURGERY (CARDIOTHORACIC VASCULAR SURGERY) | Admitting: THORACIC SURGERY (CARDIOTHORACIC VASCULAR SURGERY)
Payer: COMMERCIAL

## 2017-10-17 ENCOUNTER — APPOINTMENT (INPATIENT)
Dept: NON INVASIVE DIAGNOSTICS | Facility: HOSPITAL | Age: 72
DRG: 221 | End: 2017-10-17
Attending: THORACIC SURGERY (CARDIOTHORACIC VASCULAR SURGERY)
Payer: COMMERCIAL

## 2017-10-17 ENCOUNTER — GENERIC CONVERSION - ENCOUNTER (OUTPATIENT)
Dept: OTHER | Facility: OTHER | Age: 72
End: 2017-10-17

## 2017-10-17 ENCOUNTER — APPOINTMENT (INPATIENT)
Dept: RADIOLOGY | Facility: HOSPITAL | Age: 72
DRG: 221 | End: 2017-10-17
Attending: THORACIC SURGERY (CARDIOTHORACIC VASCULAR SURGERY)
Payer: COMMERCIAL

## 2017-10-17 DIAGNOSIS — I35.0 AORTIC STENOSIS, SEVERE: ICD-10-CM

## 2017-10-17 DIAGNOSIS — I35.9 AORTIC VALVE DISORDER: ICD-10-CM

## 2017-10-17 DIAGNOSIS — I35.0 NONRHEUMATIC AORTIC VALVE STENOSIS: Primary | ICD-10-CM

## 2017-10-17 LAB
ANION GAP SERPL CALCULATED.3IONS-SCNC: 7 MMOL/L (ref 4–13)
ATRIAL RATE: 65 BPM
BASE EXCESS BLDA CALC-SCNC: 0 MMOL/L (ref -2–3)
BASE EXCESS BLDA CALC-SCNC: 2 MMOL/L (ref -2–3)
BASE EXCESS BLDA CALC-SCNC: 2 MMOL/L (ref -2–3)
BASE EXCESS BLDA CALC-SCNC: 3 MMOL/L (ref -2–3)
BUN SERPL-MCNC: 12 MG/DL (ref 5–25)
CA-I BLD-SCNC: 1 MMOL/L (ref 1.12–1.32)
CA-I BLD-SCNC: 1.04 MMOL/L (ref 1.12–1.32)
CA-I BLD-SCNC: 1.08 MMOL/L (ref 1.12–1.32)
CA-I BLD-SCNC: 1.09 MMOL/L (ref 1.12–1.32)
CA-I BLD-SCNC: 1.17 MMOL/L (ref 1.12–1.32)
CA-I BLD-SCNC: 1.2 MMOL/L (ref 1.12–1.32)
CALCIUM SERPL-MCNC: 7.5 MG/DL (ref 8.3–10.1)
CHLORIDE SERPL-SCNC: 108 MMOL/L (ref 100–108)
CO2 SERPL-SCNC: 25 MMOL/L (ref 21–32)
CREAT SERPL-MCNC: 0.84 MG/DL (ref 0.6–1.3)
DS:DELIVERY SYSTEM: AC
FIO2 GAS DIL.REBREATH: 50 L
GFR SERPL CREATININE-BSD FRML MDRD: 89 ML/MIN/1.73SQ M
GLUCOSE SERPL-MCNC: 117 MG/DL (ref 65–140)
GLUCOSE SERPL-MCNC: 118 MG/DL (ref 65–140)
GLUCOSE SERPL-MCNC: 119 MG/DL (ref 65–140)
GLUCOSE SERPL-MCNC: 125 MG/DL (ref 65–140)
GLUCOSE SERPL-MCNC: 127 MG/DL (ref 65–140)
GLUCOSE SERPL-MCNC: 128 MG/DL (ref 65–140)
GLUCOSE SERPL-MCNC: 133 MG/DL (ref 65–140)
GLUCOSE SERPL-MCNC: 144 MG/DL (ref 65–140)
GLUCOSE SERPL-MCNC: 148 MG/DL (ref 65–140)
GLUCOSE SERPL-MCNC: 163 MG/DL (ref 65–140)
GLUCOSE SERPL-MCNC: 167 MG/DL (ref 65–140)
GLUCOSE SERPL-MCNC: 172 MG/DL (ref 65–140)
GLUCOSE SERPL-MCNC: 204 MG/DL (ref 65–140)
HCO3 BLDA-SCNC: 25.4 MMOL/L (ref 22–28)
HCO3 BLDA-SCNC: 25.5 MMOL/L (ref 22–28)
HCO3 BLDA-SCNC: 26 MMOL/L (ref 22–28)
HCO3 BLDA-SCNC: 26.4 MMOL/L (ref 22–28)
HCO3 BLDA-SCNC: 27 MMOL/L (ref 22–28)
HCO3 BLDA-SCNC: 28.5 MMOL/L (ref 24–30)
HCT VFR BLD AUTO: 32.2 % (ref 36.5–49.3)
HCT VFR BLD AUTO: 32.9 % (ref 36.5–49.3)
HCT VFR BLD CALC: 26 % (ref 36.5–49.3)
HCT VFR BLD CALC: 27 % (ref 36.5–49.3)
HCT VFR BLD CALC: 29 % (ref 36.5–49.3)
HCT VFR BLD CALC: 29 % (ref 36.5–49.3)
HCT VFR BLD CALC: 30 % (ref 36.5–49.3)
HCT VFR BLD CALC: 35 % (ref 36.5–49.3)
HGB BLD-MCNC: 11 G/DL (ref 12–17)
HGB BLD-MCNC: 11 G/DL (ref 12–17)
HGB BLDA-MCNC: 10.2 G/DL (ref 12–17)
HGB BLDA-MCNC: 11.9 G/DL (ref 12–17)
HGB BLDA-MCNC: 8.8 G/DL (ref 12–17)
HGB BLDA-MCNC: 9.2 G/DL (ref 12–17)
HGB BLDA-MCNC: 9.9 G/DL (ref 12–17)
HGB BLDA-MCNC: 9.9 G/DL (ref 12–17)
KCT BLD-ACNC: 104 SEC (ref 89–137)
KCT BLD-ACNC: 121 SEC (ref 89–137)
KCT BLD-ACNC: 418 SEC (ref 89–137)
KCT BLD-ACNC: 437 SEC (ref 89–137)
KCT BLD-ACNC: 455 SEC (ref 89–137)
KCT BLD-ACNC: 491 SEC (ref 89–137)
P AXIS: 88 DEGREES
PCO2 BLD: 27 MMOL/L (ref 21–32)
PCO2 BLD: 28 MMOL/L (ref 21–32)
PCO2 BLD: 28 MMOL/L (ref 21–32)
PCO2 BLD: 30 MMOL/L (ref 21–32)
PCO2 BLD: 40.4 MM HG (ref 36–44)
PCO2 BLD: 41.2 MM HG (ref 36–44)
PCO2 BLD: 44.4 MM HG (ref 36–44)
PCO2 BLD: 45.1 MM HG (ref 36–44)
PCO2 BLD: 45.7 MM HG (ref 36–44)
PCO2 BLD: 56.2 MM HG (ref 42–50)
PH BLD: 7.31 [PH] (ref 7.3–7.4)
PH BLD: 7.35 [PH] (ref 7.35–7.45)
PH BLD: 7.37 [PH] (ref 7.35–7.45)
PH BLD: 7.37 [PH] (ref 7.35–7.45)
PH BLD: 7.41 [PH] (ref 7.35–7.45)
PH BLD: 7.43 [PH] (ref 7.35–7.45)
PLATELET # BLD AUTO: 137 THOUSANDS/UL (ref 149–390)
PLATELET # BLD AUTO: 218 THOUSANDS/UL (ref 149–390)
PMV BLD AUTO: 8.7 FL (ref 8.9–12.7)
PMV BLD AUTO: 8.8 FL (ref 8.9–12.7)
PO2 BLD: 114 MM HG (ref 75–129)
PO2 BLD: 248 MM HG (ref 75–129)
PO2 BLD: 305 MM HG (ref 75–129)
PO2 BLD: 330 MM HG (ref 75–129)
PO2 BLD: 47 MM HG (ref 35–45)
PO2 BLD: 71 MM HG (ref 75–129)
POTASSIUM BLD-SCNC: 4.1 MMOL/L (ref 3.5–5.3)
POTASSIUM BLD-SCNC: 4.1 MMOL/L (ref 3.5–5.3)
POTASSIUM BLD-SCNC: 4.3 MMOL/L (ref 3.5–5.3)
POTASSIUM BLD-SCNC: 4.7 MMOL/L (ref 3.5–5.3)
POTASSIUM BLD-SCNC: 4.7 MMOL/L (ref 3.5–5.3)
POTASSIUM BLD-SCNC: 4.8 MMOL/L (ref 3.5–5.3)
POTASSIUM SERPL-SCNC: 3.8 MMOL/L (ref 3.5–5.3)
POTASSIUM SERPL-SCNC: 4 MMOL/L (ref 3.5–5.3)
POTASSIUM SERPL-SCNC: 4.1 MMOL/L (ref 3.5–5.3)
PR INTERVAL: 142 MS
QRS AXIS: 84 DEGREES
QRSD INTERVAL: 83 MS
QT INTERVAL: 446 MS
QTC INTERVAL: 464 MS
RESPIRATORY RATE: 154
SAO2 % BLD FROM PO2: 100 % (ref 95–98)
SAO2 % BLD FROM PO2: 77 % (ref 95–98)
SAO2 % BLD FROM PO2: 93 % (ref 95–98)
SAO2 % BLD FROM PO2: 98 % (ref 95–98)
SODIUM BLD-SCNC: 137 MMOL/L (ref 136–145)
SODIUM BLD-SCNC: 138 MMOL/L (ref 136–145)
SODIUM BLD-SCNC: 138 MMOL/L (ref 136–145)
SODIUM BLD-SCNC: 139 MMOL/L (ref 136–145)
SODIUM BLD-SCNC: 141 MMOL/L (ref 136–145)
SODIUM BLD-SCNC: 141 MMOL/L (ref 136–145)
SODIUM SERPL-SCNC: 140 MMOL/L (ref 136–145)
SPECIMEN SOURCE: ABNORMAL
SPECIMEN SOURCE: NORMAL
SPECIMEN SOURCE: NORMAL
T WAVE AXIS: 61 DEGREES
VENT TYPE: 50
VENTILATION VALUE: 5
VENTRICULAR RATE: 65 BPM

## 2017-10-17 PROCEDURE — 88311 DECALCIFY TISSUE: CPT | Performed by: THORACIC SURGERY (CARDIOTHORACIC VASCULAR SURGERY)

## 2017-10-17 PROCEDURE — 85049 AUTOMATED PLATELET COUNT: CPT | Performed by: THORACIC SURGERY (CARDIOTHORACIC VASCULAR SURGERY)

## 2017-10-17 PROCEDURE — 02RF08Z REPLACEMENT OF AORTIC VALVE WITH ZOOPLASTIC TISSUE, OPEN APPROACH: ICD-10-PCS | Performed by: THORACIC SURGERY (CARDIOTHORACIC VASCULAR SURGERY)

## 2017-10-17 PROCEDURE — 80048 BASIC METABOLIC PNL TOTAL CA: CPT | Performed by: PHYSICIAN ASSISTANT

## 2017-10-17 PROCEDURE — 76376 3D RENDER W/INTRP POSTPROCES: CPT

## 2017-10-17 PROCEDURE — 93005 ELECTROCARDIOGRAM TRACING: CPT | Performed by: PHYSICIAN ASSISTANT

## 2017-10-17 PROCEDURE — 86920 COMPATIBILITY TEST SPIN: CPT

## 2017-10-17 PROCEDURE — 84295 ASSAY OF SERUM SODIUM: CPT

## 2017-10-17 PROCEDURE — 94002 VENT MGMT INPAT INIT DAY: CPT

## 2017-10-17 PROCEDURE — 85049 AUTOMATED PLATELET COUNT: CPT | Performed by: PHYSICIAN ASSISTANT

## 2017-10-17 PROCEDURE — 30243N0 TRANSFUSION OF AUTOLOGOUS RED BLOOD CELLS INTO CENTRAL VEIN, PERCUTANEOUS APPROACH: ICD-10-PCS | Performed by: THORACIC SURGERY (CARDIOTHORACIC VASCULAR SURGERY)

## 2017-10-17 PROCEDURE — 85347 COAGULATION TIME ACTIVATED: CPT

## 2017-10-17 PROCEDURE — 36600 WITHDRAWAL OF ARTERIAL BLOOD: CPT

## 2017-10-17 PROCEDURE — 82803 BLOOD GASES ANY COMBINATION: CPT

## 2017-10-17 PROCEDURE — 88305 TISSUE EXAM BY PATHOLOGIST: CPT | Performed by: THORACIC SURGERY (CARDIOTHORACIC VASCULAR SURGERY)

## 2017-10-17 PROCEDURE — 71010 HB CHEST X-RAY 1 VIEW FRONTAL: CPT

## 2017-10-17 PROCEDURE — 85018 HEMOGLOBIN: CPT | Performed by: PHYSICIAN ASSISTANT

## 2017-10-17 PROCEDURE — 85014 HEMATOCRIT: CPT | Performed by: PHYSICIAN ASSISTANT

## 2017-10-17 PROCEDURE — 84132 ASSAY OF SERUM POTASSIUM: CPT

## 2017-10-17 PROCEDURE — 82948 REAGENT STRIP/BLOOD GLUCOSE: CPT

## 2017-10-17 PROCEDURE — 93312 ECHO TRANSESOPHAGEAL: CPT

## 2017-10-17 PROCEDURE — 82330 ASSAY OF CALCIUM: CPT

## 2017-10-17 PROCEDURE — 94760 N-INVAS EAR/PLS OXIMETRY 1: CPT

## 2017-10-17 PROCEDURE — 88313 SPECIAL STAINS GROUP 2: CPT | Performed by: THORACIC SURGERY (CARDIOTHORACIC VASCULAR SURGERY)

## 2017-10-17 PROCEDURE — 5A1221Z PERFORMANCE OF CARDIAC OUTPUT, CONTINUOUS: ICD-10-PCS | Performed by: THORACIC SURGERY (CARDIOTHORACIC VASCULAR SURGERY)

## 2017-10-17 PROCEDURE — C1768 GRAFT, VASCULAR: HCPCS | Performed by: THORACIC SURGERY (CARDIOTHORACIC VASCULAR SURGERY)

## 2017-10-17 PROCEDURE — 5A1223Z PERFORMANCE OF CARDIAC PACING, CONTINUOUS: ICD-10-PCS | Performed by: THORACIC SURGERY (CARDIOTHORACIC VASCULAR SURGERY)

## 2017-10-17 PROCEDURE — 84132 ASSAY OF SERUM POTASSIUM: CPT | Performed by: PHYSICIAN ASSISTANT

## 2017-10-17 PROCEDURE — 82947 ASSAY GLUCOSE BLOOD QUANT: CPT

## 2017-10-17 PROCEDURE — 85014 HEMATOCRIT: CPT

## 2017-10-17 DEVICE — VALVE AORTIC MAGNA EASE 23MM: Type: IMPLANTABLE DEVICE | Site: HEART | Status: FUNCTIONAL

## 2017-10-17 RX ORDER — MAGNESIUM SULFATE HEPTAHYDRATE 40 MG/ML
2 INJECTION, SOLUTION INTRAVENOUS ONCE
Status: COMPLETED | OUTPATIENT
Start: 2017-10-17 | End: 2017-10-18

## 2017-10-17 RX ORDER — TRAZODONE HYDROCHLORIDE 50 MG/1
150 TABLET ORAL
Status: DISCONTINUED | OUTPATIENT
Start: 2017-10-18 | End: 2017-10-17

## 2017-10-17 RX ORDER — TRAZODONE HYDROCHLORIDE 50 MG/1
50 TABLET ORAL
Status: DISCONTINUED | OUTPATIENT
Start: 2017-10-17 | End: 2017-10-17

## 2017-10-17 RX ORDER — SODIUM CHLORIDE, SODIUM LACTATE, POTASSIUM CHLORIDE, CALCIUM CHLORIDE 600; 310; 30; 20 MG/100ML; MG/100ML; MG/100ML; MG/100ML
INJECTION, SOLUTION INTRAVENOUS CONTINUOUS PRN
Status: DISCONTINUED | OUTPATIENT
Start: 2017-10-17 | End: 2017-10-17 | Stop reason: SURG

## 2017-10-17 RX ORDER — SODIUM CHLORIDE 9 MG/ML
INJECTION, SOLUTION INTRAVENOUS CONTINUOUS PRN
Status: DISCONTINUED | OUTPATIENT
Start: 2017-10-17 | End: 2017-10-17 | Stop reason: SURG

## 2017-10-17 RX ORDER — GABAPENTIN 300 MG/1
300 CAPSULE ORAL 3 TIMES DAILY
Status: DISCONTINUED | OUTPATIENT
Start: 2017-10-17 | End: 2017-10-17

## 2017-10-17 RX ORDER — OXYCODONE HYDROCHLORIDE 10 MG/1
10 TABLET ORAL EVERY 4 HOURS PRN
Status: DISCONTINUED | OUTPATIENT
Start: 2017-10-17 | End: 2017-10-20 | Stop reason: HOSPADM

## 2017-10-17 RX ORDER — FONDAPARINUX SODIUM 2.5 MG/.5ML
2.5 INJECTION SUBCUTANEOUS DAILY
Status: DISCONTINUED | OUTPATIENT
Start: 2017-10-18 | End: 2017-10-20 | Stop reason: HOSPADM

## 2017-10-17 RX ORDER — FENTANYL CITRATE 50 UG/ML
INJECTION, SOLUTION INTRAMUSCULAR; INTRAVENOUS AS NEEDED
Status: DISCONTINUED | OUTPATIENT
Start: 2017-10-17 | End: 2017-10-17 | Stop reason: SURG

## 2017-10-17 RX ORDER — TRAZODONE HYDROCHLORIDE 100 MG/1
400 TABLET ORAL
Status: DISCONTINUED | OUTPATIENT
Start: 2017-10-18 | End: 2017-10-20 | Stop reason: HOSPADM

## 2017-10-17 RX ORDER — CHLORHEXIDINE GLUCONATE 0.12 MG/ML
15 RINSE ORAL ONCE
Status: COMPLETED | OUTPATIENT
Start: 2017-10-17 | End: 2017-10-17

## 2017-10-17 RX ORDER — ONDANSETRON 2 MG/ML
4 INJECTION INTRAMUSCULAR; INTRAVENOUS EVERY 6 HOURS PRN
Status: DISCONTINUED | OUTPATIENT
Start: 2017-10-17 | End: 2017-10-20 | Stop reason: HOSPADM

## 2017-10-17 RX ORDER — VANCOMYCIN HYDROCHLORIDE 1 G/200ML
1000 INJECTION, SOLUTION INTRAVENOUS ONCE
Status: COMPLETED | OUTPATIENT
Start: 2017-10-17 | End: 2017-10-17

## 2017-10-17 RX ORDER — CARISOPRODOL 350 MG/1
350 TABLET ORAL 4 TIMES DAILY
Status: DISCONTINUED | OUTPATIENT
Start: 2017-10-17 | End: 2017-10-18

## 2017-10-17 RX ORDER — ACETAMINOPHEN 325 MG/1
650 TABLET ORAL EVERY 4 HOURS PRN
Status: DISCONTINUED | OUTPATIENT
Start: 2017-10-17 | End: 2017-10-17

## 2017-10-17 RX ORDER — ROCURONIUM BROMIDE 10 MG/ML
INJECTION, SOLUTION INTRAVENOUS AS NEEDED
Status: DISCONTINUED | OUTPATIENT
Start: 2017-10-17 | End: 2017-10-17 | Stop reason: SURG

## 2017-10-17 RX ORDER — VANCOMYCIN HYDROCHLORIDE 1 G/200ML
1000 INJECTION, SOLUTION INTRAVENOUS EVERY 12 HOURS
Status: COMPLETED | OUTPATIENT
Start: 2017-10-17 | End: 2017-10-18

## 2017-10-17 RX ORDER — PROTAMINE SULFATE 10 MG/ML
INJECTION, SOLUTION INTRAVENOUS AS NEEDED
Status: DISCONTINUED | OUTPATIENT
Start: 2017-10-17 | End: 2017-10-17 | Stop reason: SURG

## 2017-10-17 RX ORDER — POTASSIUM CHLORIDE 14.9 MG/ML
20 INJECTION INTRAVENOUS
Status: DISCONTINUED | OUTPATIENT
Start: 2017-10-17 | End: 2017-10-18

## 2017-10-17 RX ORDER — CIPROFLOXACIN 2 MG/ML
400 INJECTION, SOLUTION INTRAVENOUS ONCE
Status: COMPLETED | OUTPATIENT
Start: 2017-10-17 | End: 2017-10-17

## 2017-10-17 RX ORDER — MAGNESIUM HYDROXIDE 1200 MG/15ML
LIQUID ORAL AS NEEDED
Status: DISCONTINUED | OUTPATIENT
Start: 2017-10-17 | End: 2017-10-17 | Stop reason: HOSPADM

## 2017-10-17 RX ORDER — PROPOFOL 10 MG/ML
INJECTION, EMULSION INTRAVENOUS CONTINUOUS PRN
Status: DISCONTINUED | OUTPATIENT
Start: 2017-10-17 | End: 2017-10-17 | Stop reason: SURG

## 2017-10-17 RX ORDER — FENTANYL CITRATE 50 UG/ML
50 INJECTION, SOLUTION INTRAMUSCULAR; INTRAVENOUS
Status: DISCONTINUED | OUTPATIENT
Start: 2017-10-17 | End: 2017-10-18

## 2017-10-17 RX ORDER — OXYCODONE HYDROCHLORIDE AND ACETAMINOPHEN 5; 325 MG/1; MG/1
1 TABLET ORAL EVERY 4 HOURS PRN
Status: DISCONTINUED | OUTPATIENT
Start: 2017-10-17 | End: 2017-10-17

## 2017-10-17 RX ORDER — POLYETHYLENE GLYCOL 3350 17 G/17G
17 POWDER, FOR SOLUTION ORAL DAILY
Status: DISCONTINUED | OUTPATIENT
Start: 2017-10-17 | End: 2017-10-20 | Stop reason: HOSPADM

## 2017-10-17 RX ORDER — CIPROFLOXACIN 2 MG/ML
400 INJECTION, SOLUTION INTRAVENOUS EVERY 12 HOURS
Status: COMPLETED | OUTPATIENT
Start: 2017-10-17 | End: 2017-10-18

## 2017-10-17 RX ORDER — HEPARIN SODIUM 1000 [USP'U]/ML
INJECTION, SOLUTION INTRAVENOUS; SUBCUTANEOUS AS NEEDED
Status: DISCONTINUED | OUTPATIENT
Start: 2017-10-17 | End: 2017-10-17 | Stop reason: SURG

## 2017-10-17 RX ORDER — AMIODARONE HYDROCHLORIDE 200 MG/1
200 TABLET ORAL EVERY 8 HOURS SCHEDULED
Status: DISCONTINUED | OUTPATIENT
Start: 2017-10-17 | End: 2017-10-18

## 2017-10-17 RX ORDER — OXYCODONE HYDROCHLORIDE AND ACETAMINOPHEN 5; 325 MG/1; MG/1
2 TABLET ORAL EVERY 6 HOURS PRN
Status: DISCONTINUED | OUTPATIENT
Start: 2017-10-17 | End: 2017-10-17

## 2017-10-17 RX ORDER — SODIUM CHLORIDE 450 MG/100ML
20 INJECTION, SOLUTION INTRAVENOUS CONTINUOUS
Status: DISCONTINUED | OUTPATIENT
Start: 2017-10-17 | End: 2017-10-18

## 2017-10-17 RX ORDER — CALCIUM CHLORIDE 100 MG/ML
1 INJECTION INTRAVENOUS; INTRAVENTRICULAR ONCE
Status: DISCONTINUED | OUTPATIENT
Start: 2017-10-17 | End: 2017-10-18

## 2017-10-17 RX ORDER — BISACODYL 10 MG
10 SUPPOSITORY, RECTAL RECTAL DAILY PRN
Status: DISCONTINUED | OUTPATIENT
Start: 2017-10-17 | End: 2017-10-20 | Stop reason: HOSPADM

## 2017-10-17 RX ORDER — ATORVASTATIN CALCIUM 40 MG/1
40 TABLET, FILM COATED ORAL DAILY
Status: DISCONTINUED | OUTPATIENT
Start: 2017-10-17 | End: 2017-10-20 | Stop reason: HOSPADM

## 2017-10-17 RX ORDER — ALBUMIN, HUMAN INJ 5% 5 %
SOLUTION INTRAVENOUS CONTINUOUS PRN
Status: DISCONTINUED | OUTPATIENT
Start: 2017-10-17 | End: 2017-10-17 | Stop reason: SURG

## 2017-10-17 RX ORDER — POTASSIUM CHLORIDE 14.9 MG/ML
20 INJECTION INTRAVENOUS
Status: COMPLETED | OUTPATIENT
Start: 2017-10-17 | End: 2017-10-18

## 2017-10-17 RX ORDER — CHLORHEXIDINE GLUCONATE 0.12 MG/ML
15 RINSE ORAL 2 TIMES DAILY
Status: DISCONTINUED | OUTPATIENT
Start: 2017-10-17 | End: 2017-10-17

## 2017-10-17 RX ORDER — ACETAMINOPHEN 325 MG/1
650 TABLET ORAL EVERY 6 HOURS SCHEDULED
Status: DISCONTINUED | OUTPATIENT
Start: 2017-10-17 | End: 2017-10-20 | Stop reason: HOSPADM

## 2017-10-17 RX ORDER — FENTANYL CITRATE 50 UG/ML
50 INJECTION, SOLUTION INTRAMUSCULAR; INTRAVENOUS ONCE
Status: COMPLETED | OUTPATIENT
Start: 2017-10-17 | End: 2017-10-17

## 2017-10-17 RX ORDER — PANTOPRAZOLE SODIUM 40 MG/1
40 TABLET, DELAYED RELEASE ORAL DAILY
Status: DISCONTINUED | OUTPATIENT
Start: 2017-10-17 | End: 2017-10-20 | Stop reason: HOSPADM

## 2017-10-17 RX ORDER — MIDAZOLAM HYDROCHLORIDE 1 MG/ML
INJECTION INTRAMUSCULAR; INTRAVENOUS AS NEEDED
Status: DISCONTINUED | OUTPATIENT
Start: 2017-10-17 | End: 2017-10-17 | Stop reason: SURG

## 2017-10-17 RX ORDER — FUROSEMIDE 10 MG/ML
40 INJECTION INTRAMUSCULAR; INTRAVENOUS EVERY 6 HOURS PRN
Status: DISCONTINUED | OUTPATIENT
Start: 2017-10-17 | End: 2017-10-18

## 2017-10-17 RX ORDER — POTASSIUM CHLORIDE 14.9 MG/ML
20 INJECTION INTRAVENOUS ONCE AS NEEDED
Status: DISCONTINUED | OUTPATIENT
Start: 2017-10-17 | End: 2017-10-18

## 2017-10-17 RX ORDER — ASPIRIN 325 MG
325 TABLET ORAL DAILY
Status: DISCONTINUED | OUTPATIENT
Start: 2017-10-17 | End: 2017-10-20 | Stop reason: HOSPADM

## 2017-10-17 RX ADMIN — CIPROFLOXACIN: 2 INJECTION INTRAVENOUS at 09:45

## 2017-10-17 RX ADMIN — ACETAMINOPHEN 650 MG: 325 TABLET, FILM COATED ORAL at 23:24

## 2017-10-17 RX ADMIN — SODIUM CHLORIDE: 0.9 INJECTION, SOLUTION INTRAVENOUS at 09:38

## 2017-10-17 RX ADMIN — SODIUM CHLORIDE 2 UNITS/HR: 9 INJECTION, SOLUTION INTRAVENOUS at 10:30

## 2017-10-17 RX ADMIN — ROCURONIUM BROMIDE 100 MG: 10 INJECTION, SOLUTION INTRAVENOUS at 09:13

## 2017-10-17 RX ADMIN — MIDAZOLAM HYDROCHLORIDE 3 MG: 1 INJECTION, SOLUTION INTRAMUSCULAR; INTRAVENOUS at 09:04

## 2017-10-17 RX ADMIN — FENTANYL CITRATE 750 MCG: 50 INJECTION, SOLUTION INTRAMUSCULAR; INTRAVENOUS at 09:13

## 2017-10-17 RX ADMIN — MUPIROCIN 1 APPLICATION: 20 OINTMENT TOPICAL at 21:07

## 2017-10-17 RX ADMIN — HEPARIN SODIUM 36000 UNITS: 1000 INJECTION INTRAVENOUS; SUBCUTANEOUS at 10:10

## 2017-10-17 RX ADMIN — SODIUM CHLORIDE, SODIUM LACTATE, POTASSIUM CHLORIDE, AND CALCIUM CHLORIDE: .6; .31; .03; .02 INJECTION, SOLUTION INTRAVENOUS at 09:38

## 2017-10-17 RX ADMIN — PROPOFOL 50 MCG/KG/MIN: 10 INJECTION, EMULSION INTRAVENOUS at 12:15

## 2017-10-17 RX ADMIN — OXYCODONE HYDROCHLORIDE AND ACETAMINOPHEN 2 TABLET: 5; 325 TABLET ORAL at 14:43

## 2017-10-17 RX ADMIN — HYDROMORPHONE HYDROCHLORIDE 1 MG: 1 INJECTION, SOLUTION INTRAMUSCULAR; INTRAVENOUS; SUBCUTANEOUS at 16:00

## 2017-10-17 RX ADMIN — ALBUMIN HUMAN: 0.05 INJECTION, SOLUTION INTRAVENOUS at 12:00

## 2017-10-17 RX ADMIN — MIDAZOLAM HYDROCHLORIDE 7 MG: 1 INJECTION, SOLUTION INTRAMUSCULAR; INTRAVENOUS at 09:13

## 2017-10-17 RX ADMIN — ACETAMINOPHEN 650 MG: 325 TABLET, FILM COATED ORAL at 18:23

## 2017-10-17 RX ADMIN — CHLORHEXIDINE GLUCONATE 15 ML: 1.2 RINSE ORAL at 06:26

## 2017-10-17 RX ADMIN — FENTANYL CITRATE 50 MCG: 50 INJECTION INTRAMUSCULAR; INTRAVENOUS at 14:45

## 2017-10-17 RX ADMIN — PROTAMINE SULFATE 300 MG: 10 INJECTION, SOLUTION INTRAVENOUS at 11:50

## 2017-10-17 RX ADMIN — VANCOMYCIN HYDROCHLORIDE 1000 MG: 1 INJECTION, SOLUTION INTRAVENOUS at 21:46

## 2017-10-17 RX ADMIN — SODIUM CHLORIDE 4 MG/HR: 0.9 INJECTION, SOLUTION INTRAVENOUS at 10:04

## 2017-10-17 RX ADMIN — VANCOMYCIN HYDROCHLORIDE 1000 MG: 1 INJECTION, SOLUTION INTRAVENOUS at 09:45

## 2017-10-17 RX ADMIN — AMIODARONE HYDROCHLORIDE 200 MG: 200 TABLET ORAL at 21:06

## 2017-10-17 RX ADMIN — ALBUMIN HUMAN: 0.05 INJECTION, SOLUTION INTRAVENOUS at 11:58

## 2017-10-17 RX ADMIN — HYDROMORPHONE HYDROCHLORIDE 1 MG: 1 INJECTION, SOLUTION INTRAMUSCULAR; INTRAVENOUS; SUBCUTANEOUS at 12:00

## 2017-10-17 RX ADMIN — CIPROFLOXACIN 400 MG: 2 INJECTION, SOLUTION INTRAVENOUS at 23:24

## 2017-10-17 RX ADMIN — SODIUM CHLORIDE 20 ML/HR: 0.45 INJECTION, SOLUTION INTRAVENOUS at 13:15

## 2017-10-17 RX ADMIN — MUPIROCIN 1 APPLICATION: 20 OINTMENT TOPICAL at 06:24

## 2017-10-17 RX ADMIN — METOPROLOL TARTRATE 12.5 MG: 25 TABLET ORAL at 06:25

## 2017-10-17 RX ADMIN — SODIUM CHLORIDE 3 UNITS/HR: 9 INJECTION, SOLUTION INTRAVENOUS at 16:00

## 2017-10-17 RX ADMIN — ROCURONIUM BROMIDE 50 MG: 10 INJECTION, SOLUTION INTRAVENOUS at 11:05

## 2017-10-17 RX ADMIN — FENTANYL CITRATE 50 MCG: 50 INJECTION INTRAMUSCULAR; INTRAVENOUS at 14:15

## 2017-10-17 RX ADMIN — HEPARIN SODIUM 5000 UNITS: 1000 INJECTION INTRAVENOUS; SUBCUTANEOUS at 10:28

## 2017-10-17 RX ADMIN — FENTANYL CITRATE 100 MCG: 50 INJECTION, SOLUTION INTRAMUSCULAR; INTRAVENOUS at 12:39

## 2017-10-17 RX ADMIN — HYDROMORPHONE HYDROCHLORIDE 1 MG: 1 INJECTION, SOLUTION INTRAMUSCULAR; INTRAVENOUS; SUBCUTANEOUS at 21:43

## 2017-10-17 RX ADMIN — CARISOPRODOL 350 MG: 350 TABLET ORAL at 18:55

## 2017-10-17 RX ADMIN — GABAPENTIN 1800 MG: 400 CAPSULE ORAL at 21:12

## 2017-10-17 RX ADMIN — ALBUMIN HUMAN: 0.05 INJECTION, SOLUTION INTRAVENOUS at 11:53

## 2017-10-17 RX ADMIN — SODIUM CHLORIDE 7.5 MG/HR: 0.9 INJECTION, SOLUTION INTRAVENOUS at 22:16

## 2017-10-17 RX ADMIN — FENTANYL CITRATE 50 MCG: 50 INJECTION INTRAMUSCULAR; INTRAVENOUS at 17:35

## 2017-10-17 RX ADMIN — TRAZODONE HYDROCHLORIDE 350 MG: 100 TABLET ORAL at 23:24

## 2017-10-17 RX ADMIN — SODIUM CHLORIDE, SODIUM LACTATE, POTASSIUM CHLORIDE, AND CALCIUM CHLORIDE: .6; .31; .03; .02 INJECTION, SOLUTION INTRAVENOUS at 07:30

## 2017-10-17 RX ADMIN — SODIUM CHLORIDE 5 MG/HR: 0.9 INJECTION, SOLUTION INTRAVENOUS at 16:10

## 2017-10-17 RX ADMIN — OXYCODONE HYDROCHLORIDE 10 MG: 10 TABLET ORAL at 18:23

## 2017-10-17 RX ADMIN — TRAZODONE HYDROCHLORIDE 50 MG: 50 TABLET ORAL at 21:06

## 2017-10-17 RX ADMIN — MAGNESIUM SULFATE HEPTAHYDRATE 2 G: 40 INJECTION, SOLUTION INTRAVENOUS at 13:30

## 2017-10-17 RX ADMIN — FENTANYL CITRATE 250 MCG: 50 INJECTION, SOLUTION INTRAMUSCULAR; INTRAVENOUS at 09:48

## 2017-10-17 RX ADMIN — SODIUM CHLORIDE 12.5 MG/HR: 0.9 INJECTION, SOLUTION INTRAVENOUS at 19:22

## 2017-10-17 NOTE — CONSULTS
Consult - Cardiothoracic Surgery Kj 81 y o  male MRN: [id number]  Unit/Bed#: [facility] 418-01 Encounter: [id number]      Physician Requesting Consult: Alanna Vasques MD    Reason for Consult / Principal Problem: Nonrheumatic aortic valve stenosis    HPI: Keely Islas is a 81 y o  male who presented to outpatient cardiology with progressive shortness of breath and occasional lightheadedness after a murmur was detected 6 months ago  ECHO showed progressive of AS to severe  He was referred to CT surgery who recommended AVR  PMHx: Testicular hypogonadism, BPH, psoriasis, Rheumatoid arthritis, osteoarthritis, carpal tunnel syndrome, chronic pain, HTN, insomnia  History obtained from chart review due to patient being intubated and sedated  PMH:   Past Medical History:   Diagnosis Date    Aortic stenosis     Aortic stenosis, severe     Arthritis     Carpal tunnel syndrome     Chronic pain     Chronic, continuous use of opioids     Hyperlipidemia     Hypertension     Insomnia     Peripheral neuropathy     [facility])        PSH:   Past Surgical History:   Procedure Laterality Date    BASAL CELL CARCINOMA EXCISION      CARPAL TUNNEL RELEASE      left    HERNIA REPAIR      inguinal bilateral -umbilical     LAMINECTOMY      TONSILLECTOMY         Family History: No family history on file  Social History:   History   Smoking Status    Never Smoker   Smokeless Tobacco    Never Used      History   Alcohol Use No      Marital Status: /Civil Union    ROS: ROS unable to be obtained secondary to intubation and sedation  Allergies: Allergies   Allergen Reactions    Penicillins Anaphylaxis    Quinine Derivatives Other (See Comments)     High fever       Home Medications:   Prior to Admission medications    Medication Sig Start Date End Date Taking?  Authorizing Provider   adalimumab (HUMIRA) 40 mg/0 8 mL PSKT Inject 40 mg under the skin once Biweekly stopped sept 3rd      Yes Historical Provider, MD   aspirin (ECOTRIN LOW STRENGTH) 81 mg EC tablet Take 81 mg by mouth daily   Yes Historical Provider, MD   atorvastatin (LIPITOR) 40 mg tablet Take 40 mg by mouth daily   Yes Historical Provider, MD   carisoprodol (SOMA) 350 mg tablet Take 350 mg by mouth 4 (four) times a day   Yes Historical Provider, MD   fluocinonide (LIDEX) 0 05 % ointment Apply topically 2 (two) times a day   Yes Historical Provider, MD   furosemide (LASIX) 40 mg tablet Take 1 tablet by mouth daily 9/29/17  Yes Toña Gayle MD   gabapentin (NEURONTIN) 600 MG tablet Take 300 mg by mouth 3 (three) times a day   Yes Historical Provider, MD   oxyCODONE (OxyCONTIN) 10 mg 12 hr tablet Take 10 mg by mouth every 12 (twelve) hours   Yes Historical Provider, MD   oxyCODONE-acetaminophen (PERCOCET)  mg per tablet Take 1 tablet by mouth every 4 (four) hours as needed for moderate pain   Yes Historical Provider, MD   traMADol (ULTRAM) 50 mg tablet Take 50 mg by mouth every 6 (six) hours as needed for moderate pain   Yes Historical Provider, MD   traZODone (DESYREL) 100 mg tablet Take 50 mg by mouth daily at bedtime   Yes Historical Provider, MD   loperamide (IMODIUM) 2 mg capsule Take 2 mg by mouth 4 (four) times a day as needed for diarrhea    Historical Provider, MD       Inpatient Medications:  Scheduled Meds:    amiodarone 200 mg Oral Q8H [address]   aspirin 325 mg Oral Daily   atorvastatin 40 mg Oral Daily   calcium chloride 1 g Intravenous Once   chlorhexidine 15 mL Swish & Spit BID   ciprofloxacin 400 mg Intravenous Q12H   [START ON 10/18/2017] fondaparinux 2 5 mg Subcutaneous Daily   gabapentin 300 mg Oral TID   magnesium sulfate 2 g Intravenous Once   mupirocin 1 application Nasal B96T [address]   pantoprazole 40 mg Oral Daily   polyethylene glycol 17 g Oral Daily   traZODone 50 mg Oral HS   vancomycin 1,000 mg Intravenous Q12H     Continuous Infusions:    insulin regular (HumuLIN R,NovoLIN R) infusion 0 3-21 Units/hr Last Rate: 1 5 Units/hr (10/17/17 1400)   niCARdipine 1-15 mg/hr Last Rate: 2 5 mg/hr (10/17/17 1400)   sodium chloride 20 mL/hr Last Rate: 20 mL/hr (10/17/17 1400)     PRN Meds:    acetaminophen 650 mg Q4H PRN   acetaminophen 650 mg Q4H PRN   bisacodyl 10 mg Daily PRN   fentanyl citrate (PF) 50 mcg Q1H PRN   furosemide 40 mg Q6H PRN   HYDROmorphone 0 5 mg Q1H PRN   HYDROmorphone 1 mg Q1H PRN   lactated ringers 500 mL Q30 Min PRN   lidocaine (cardiac) 100 mg Q30 Min PRN   ondansetron 4 mg Q6H PRN   oxyCODONE-acetaminophen 1 tablet Q4H PRN   oxyCODONE-acetaminophen 2 tablet Q6H PRN   potassium chloride 20 mEq Once PRN   potassium chloride 20 mEq Q1H PRN   potassium chloride 20 mEq Q30 Min PRN       VTE Pharmacologic Prophylaxis: To start tomorrow   VTE Mechanical Prophylaxis: sequential compression device    Invasive lines and devices: Invasive Devices     Central Venous Catheter Line            CVC Central Lines 10/17/17 Triple less than 1 day    Introducer 10/17/17 less than 1 day          Peripheral Intravenous Line            Peripheral IV 10/17/17 Left Hand less than 1 day          Arterial Line            Arterial Line 10/17/17 Right Radial less than 1 day          Line            Pacer Wires less than 1 day    Pacer Wires less than 1 day          Drain            Chest Tube 1 Posterior Mediastinal 32 Fr  less than 1 day    Chest Tube 2 Anterior Mediastinal 32 Fr  less than 1 day    NG/OG/Enteral Tube Orogastric Center mouth less than 1 day    Urethral Catheter Non-latex; Temperature probe 16 Fr  less than 1 day                Vitals:   Vitals:    10/17/17 1325 10/17/17 1330 10/17/17 1345 10/17/17 1424   BP:       Pulse: 56 (!) 52 (!) 50    Resp: 19 15 15    Temp:       TempSrc:       SpO2: 95% 96% 96% 96%   Weight:       Height:         Arterial Line BP: 124/44  Arterial Line MAP (mmHg): 62 mmHg    Temperature: Temp (24hrs), Av 1 °F (36 2 °C), Min:96 8 °F (36 °C), Max:97 4 °F (36 3 °C)  Current: Temperature: (!) 96 8 °F (36 °C)    Weights: IBW: 68 4 kg  Body mass index is 30 41 kg/m²  Hemodynamic Monitoring:  PAP: PAP: 25/11    CVP: CVP (mean): 10 mmHg   CO: CO (L/min): 4 3 L/min    CI: CI (L/min/m2): 2 1 L/min/m2   SVR: SVR (dyne*sec)/cm5: 1152 (dyne*sec)/cm5    Ventilator Settings:  Ventilator Settings:   Vent Mode: CPAP/PS Spont  Resp Rate (BPM): 15 BPM  Vt (mL): 450 mL  FIO2 (%): 50 %  PEEP (cmH2O): 5 cmH2O  SpO2: 96 %    Physical Exam:    Constitutional: Appears NAD  HENT: Atraumatic  Intubated  Neck: Neck supple  Patient intubated  Cardiovascular: Bradycardic rate and regular rhythm  No murmur heard  Exam reveals No rub  Pulmonary/Chest: Breath sounds CTA bilaterally  Abdominal: Soft  No distension  Musculoskeletal: No edema  Neurological: Patient is sedated  Skin: Skin is warm and dry  Psychiatric: Patient is sedated and intubated  Labs/Imaging:     Results from last 7 days  Lab Units 10/17/17  1324 10/17/17  1306 10/17/17  1219  10/17/17  1129   HEMOGLOBIN g/dL  --  11 0*  --   --   --    I STAT HEMOGLOBIN g/dl 10 2*  --  9 2*  < >  --    HEMATOCRIT %  --  32 2*  --   --   --    PLATELETS Thousands/uL  --  137*  --   --  218   < > = values in this interval not displayed  Results from last 7 days  Lab Units 10/17/17  1324 10/17/17  1306 10/17/17  1219   SODIUM mmol/L  --  140  --    POTASSIUM mmol/L  --  4 1  --    CHLORIDE mmol/L  --  108  --    CO2 mmol/L  --  25  --    BUN mg/dL  --  12  --    CREATININE mg/dL  --  0 84  --    CALCIUM mg/dL  --  7 5*  --    GLUCOSE RANDOM mg/dL  --  128  --    GLUCOSE, ISTAT mg/dl 125  --  133               Post-op AB 36/44/71/25 5/93%  Post-op CXR: Tubes/lines in good position, no PTX I have personally reviewed pertinent films in PACS  Post-op EKG: NSR This was personally reviewed by myself       Impression:  Patient Active Problem List    Diagnosis Date Noted    Chronic pain      Priority: High    Chronic, continuous use of opioids      Priority: High    Nonrheumatic aortic valve stenosis 09/29/2017     Priority: High    Aortic stenosis, severe      Priority: Medium    Hyperlipidemia      Priority: Medium    Hypertension      Priority: Medium    Arthritis      Priority: Low    Insomnia      Priority: Low    Peripheral neuropathy      Priority: Low    RA (rheumatoid arthritis) ([facility] [address] )      Priority: Low         Plan:    Neuro: D/C sedation  PRN dilaudid and percocet for pain  CV: MAP goal >65  SBP goal <130  CI>2 2  Post-op medications: None  Volume resuscitation as needed  Monitor rhythm  Epicardial pacing wires in place  Will pace as needed for bradycardia  Intra-op SHANDRA LVEF 65%    Lung: Check STAT post-op ABG and CXR  Wean vent to extubate  GI: GI prophylaxis/PPI  Bowel regimen  FEN: NPO  Replete K >4 0 and Mag >2 0  Replete calcium as needed  : Ambriz  Monitor UOP with goal >40/hour  Lasix prn  Volume resuscitate as needed  ID: Prophylactic post-op abx  Maintain normothermia  Heme: Check STAT post-op H/H and platelets  Send coags if CT outputs high  Monitor incision site, invasive lines, and chest tube outputs for bleeding  Endo: Insulin gtt for blood sugar control  Disposition: ICU Care    Counseling / Coordination of Care  Total Critical Care time spent 0 minutes excluding procedures, teaching and family updates            SIGNATURE: Liam Young PA-C  DATE: October 17, 2017  TIME: 3:27 PM

## 2017-10-17 NOTE — ANESTHESIA POSTPROCEDURE EVALUATION
Post-Op Assessment Note    Airway: intubated    Post Op Vitals Reviewed: Yes          Staff: Anesthesiologist       Comments: see intra-op quick note for details of ICU hand off          BP      Temp     Pulse     Resp      SpO2

## 2017-10-17 NOTE — ANESTHESIA PREPROCEDURE EVALUATION
Review of Systems/Medical History      No history of anesthetic complications     Cardiovascular  Hyperlipidemia, Hypertension , Valvular heart disease , aortic valve stenosis, CAD, ,   Comment: sev AS, mild AI, EF 60, RV nml systolic function    Non-obstructive CAD (pLAD 25%), LVEDP 32 mmHg    No significant GABBY,  Pulmonary  Negative pulmonary ROS ,   Comment: Non-smoker     GI/Hepatic  Negative GI/hepatic ROS          Negative  ROS        Endo/Other  Arthritis     GYN       Hematology  Negative hematology ROS      Musculoskeletal  Rheumatoid arthritis ,        Neurology      Comment: Peripheral neuropathy Psychology           Physical Exam    Airway    Mallampati score: II  TM Distance: >3 FB  Neck ROM: full     Dental       Cardiovascular      Pulmonary      Other Findings  No neck issues from RA      Anesthesia Plan  ASA Score- 4       Anesthesia Type- general with ASA Monitors  Additional Monitors: arterial line, central venous line and pulmonary artery catheter  Airway Plan: ETT  Comment: General anesthesia, endotracheal Intubation  Standard ASA monitors  Large bore peripheral IV  Pre-induction arterial line  CVP (Triple Lumen) and Cordis with PAC  SHANDRA for perioperative monitoring and diagnosis  Post-op ICU/vent  Risks and benefits discussed with patient; patient consented and agrees to proceed          Induction- intravenous  Informed Consent- Anesthetic plan and risks discussed with patient

## 2017-10-17 NOTE — CASE MANAGEMENT
[address] [place] [address] in the [facility] by [address] for 2017  Network Utilization Review Department  Phone: [phone]; Fax [phone]  ATTENTION: The Network Utilization Review Department is now centralized for our 7 Facilities  Make a note that we have a new phone and fax numbers for our Department  Please call with any questions or concerns to [phone] and carefully follow the prompts so that you are directed to the right person  All voicemails are confidential  Fax any determinations, approvals, denials, and requests for initial or continue stay review clinical to [phone]  Due to HIGH CALL volume, it would be easier if you could please send faxed requests to expedite your requests and in part, help us provide discharge notifications faster     =========================================================================    Initial Clinical Review    Age/Sex: 81 y o  male    Surgery Date: 10/17/2017    Procedure: Aortic valve replacement with a 23 mm Garcia Magna Ease bioprosthetic valve    Anesthesia: General endotracheal anesthesia with transesophageal echocardiogram guidance, Dr Edgar Rivas     Admission Orders: Date/Time/Statement: 10/17/17 @ [phone]  Orders Placed This Encounter   Procedures    Inpatient Admission     Standing Status:   Standing     Number of Occurrences:   1     Order Specific Question:   Admitting Physician     Answer: Obdulio Thompson [[id number]]     Order Specific Question:   Level of Care     Answer:   Critical Care [15]     Order Specific Question:   Estimated length of stay     Answer:   More than 2 Midnights     Order Specific Question:   Certification     Answer:   I certify that inpatient services are medically necessary for this patient for a duration of greater than two midnights  See H&P and MD Progress Notes for additional information about the patient's course of treatment         Vital Signs: /81   Pulse 66   Temp (!) 97 2 °F (36 2 °C) (Probe)   Resp (!) 10   Ht 5' 8" (1 727 m)   Wt 90 7 kg (200 lb)   SpO2 98%   BMI 30 41 kg/m²     Diet:        Diet Orders            Start     Ordered    10/18/17 0000  Diet Clear Liquid  Diet effective 0500     Question Answer Comment   Diet Type Clear Liquid    RD to adjust diet per protocol? Yes        10/17/17 1255    10/17/17 1256  Diet NPO; Sips with meds  Diet effective now     Question Answer Comment   Diet Type NPO    NPO Except: Sips with meds    RD to adjust diet per protocol? Yes        10/17/17 1255      A  Line right radial / Triple CVC / Introducer  Chest Tubes #1,2, (-20cm)  Epicardial pacing wires A/V  Accuchecks  Neurovascular checks q4h    Mobility: Up with assistance / Up as tolerated / POD#1 OOB to chair and for all meals    DVT Prophylaxis: Enrique sequential compression devices    Pain Control:   Pain Medications             adalimumab (HUMIRA) 40 mg/0 8 mL PSKT Inject 40 mg under the skin once Biweekly stopped sept 3rd       aspirin (ECOTRIN LOW STRENGTH) 81 mg EC tablet Take 81 mg by mouth daily    carisoprodol (SOMA) 350 mg tablet Take 350 mg by mouth 4 (four) times a day    gabapentin (NEURONTIN) 600 MG tablet Take 300 mg by mouth 3 (three) times a day    oxyCODONE (OxyCONTIN) 10 mg 12 hr tablet Take 10 mg by mouth every 12 (twelve) hours    oxyCODONE-acetaminophen (PERCOCET)  mg per tablet Take 1 tablet by mouth every 4 (four) hours as needed for moderate pain    traMADol (ULTRAM) 50 mg tablet Take 50 mg by mouth every 6 (six) hours as needed for moderate pain    traZODone (DESYREL) 100 mg tablet Take 50 mg by mouth daily at bedtime          Scheduled Meds:  amiodarone 200 mg Oral Q8H ANDREA   aspirin 325 mg Oral Daily   atorvastatin 40 mg Oral Daily   calcium chloride 1 g Intravenous Once   ciprofloxacin 400 mg Intravenous Q12H   [START ON 10/18/2017] fondaparinux 2 5 mg Subcutaneous Daily   gabapentin 300 mg Oral TID   mupirocin 1 application Nasal X[address]S [address] pantoprazole 40 mg Oral Daily   polyethylene glycol 17 g Oral Daily   traZODone 50 mg Oral HS   vancomycin 1,000 mg Intravenous Q12H     Continuous Infusions:  insulin regular (HumuLIN R,NovoLIN R) infusion 0 3-21 Units/hr Last Rate: 3 Units/hr (10/17/17 1600)   niCARdipine 1-15 mg/hr Last Rate: 5 mg/hr (10/17/17 1610)   sodium chloride 20 mL/hr Last Rate: 20 mL/hr (10/17/17 1500)     PRN Meds:   acetaminophen    bisacodyl    fentanyl citrate (PF)    furosemide   IV  HYDROmorphone x1    lactated ringers    lidocaine (cardiac)    ondansetron    PO oxyCODONE-acetaminophen x1    potassium chloride

## 2017-10-17 NOTE — ANESTHESIA PROCEDURE NOTES
Procedure Performed: SHANDRA Anesthesia  Start Time: 10/17/2017 9:40 AM  End Time:        Preanesthesia Checklist    Patient identified, IV assessed, risks and benefits discussed, monitors and equipment assessed, procedure being performed at surgeon's request and anesthesia consent obtained  Procedure    Diagnostic Indications for SHANDRA:  assessment of ascending aorta, assessment of surgical repair and hemodynamic monitoring  Type of SHANDRA: complete SHANDRA with interpretation  Images Saved: ultrasound permanent image saved  Physician Requesting Echo: Norm Javed Location performed: OR  Intubated  Bite block not placed  Heart visualized  Insertion of SHANDRA Probe:  Atraumatic  Probe Type:  Multiplane  Modalities:  3D, color flow mapping, continuous wave Doppler and pulse wave Doppler  Echocardiographic and Doppler Measurements    PREPROCEDURE    LEFT VENTRICLE:  Systolic Function: normal  Ejection Fraction: 60%  Cavity size: normal      RIGHT VENTRICLE:  Systolic Function: normal   Cavity size normal              AORTIC VALVE:  Leaflets: trileaflet  Leaflet motions restricted and calcified  Mean Gradient: 23 (cw off axis) mmHg  Peak Gradient: 36 mmHg  Maximum Velocity: 3 m/s  Area: 0 68 (AV VTI 84, LVOT VTI 11 7, LVOT area 4 87 cm^2) cm²  Regurgitation: mild  MITRAL VALVE:  Leaflets: normal  Leaflet Motions: normal  Regurgitation: trace  TRICUSPID VALVE:  Leaflets: normal  Leaflet Motions: normal  Regurgitation: trace  PULMONIC VALVE:  Leaflets: normal  Regurgitation: trace  ASCENDING AORTA:  Size:  normal  Diameter: 2 8 cm  AORTIC ARCH:  Size:  normal  Grade 2: severe intimal thickening without protruding atheroma  DESCENDING AORTA:  Size: normal  Grade 2: severe intimal thickening without protruding atheroma  LEFT ATRIAL APPENDAGE:  Size: normal  No spontaneous echo contrast Appendage velocity (cm/s): over 50          ATRIAL SEPTUM:  Intra-atrial septal morphology: normal              EPIAORTIC:  Plaque Thickness: 0-5 mm  POSTPROCEDURE    LEFT VENTRICLE: Unchanged   Systolic Function: normal  Ejection Fraction: 60 %  Cavity Size: normal      RIGHT VENTRICLE:   Systolic Function: normal  Cavity Size: normal         AORTIC VALVE:   Leaflets: bioprosthetic  Mean Gradient: 10 mmHg  Regurgitation: none  MITRAL VALVE:   Leaflets: native  Regurgitation: mild  Stenosis: none  TRICUSPID VALVE:   Leaflets: native  Regurgitation: none  Stenosis: mild  AORTA: Unchanged   Dissection: Dissection not present  REMOVAL:  Probe Removal: atraumatic  ECHOCARDIOGRAM COMMENTS:  Initial attempt to place probe difficult; jaw thrust and neck flexion, not successful; dl x1 with mac 3 to push oral contents to the side, joyce probe passed easily - I did not push against resistance at any point  Samy Dotson

## 2017-10-17 NOTE — RESPIRATORY THERAPY NOTE
RT Protocol Note  Tamy Ashley 81 y o  male MRN: [id number]  Unit/Bed#: [facility] 418-01 Encounter: [id number]    Assessment     Principal Problem:    Nonrheumatic aortic valve stenosis  Active Problems: Aortic stenosis, severe    Arthritis    Chronic pain    Chronic, continuous use of opioids    Hyperlipidemia    Hypertension    Insomnia    Peripheral neuropathy    RA (rheumatoid arthritis) ([facility] )      Home Pulmonary Medications: None      Past Medical History:   Diagnosis Date    Aortic stenosis     Aortic stenosis, severe     Arthritis     Carpal tunnel syndrome     Chronic pain     Chronic, continuous use of opioids     Hyperlipidemia     Hypertension     Insomnia     Peripheral neuropathy     RA (rheumatoid arthritis) ([facility] )      Social History     Social History    Marital status: /Civil Union     Spouse name: N/A    Number of children: N/A    Years of education: N/A     Social History Main Topics    Smoking status: Never Smoker    Smokeless tobacco: Never Used    Alcohol use No    Drug use: No    Sexual activity: Not Asked     Other Topics Concern    None     Social History Narrative    None       Subjective No c/o dyspnea/SOB  Objective Pt w/o pulmonary hx  S/P AVR  BS clear, strong dry cough  SpO2 98% on 6L NC  Physical Exam:   Assessment Type: Assess only  General Appearance: Alert, Awake  Respiratory Pattern: Normal  Chest Assessment: Chest expansion symmetrical  Bilateral Breath Sounds: Clear, Diminished  Cough: Strong    Vitals:  Blood pressure 157/81, pulse 62, temperature (!) 97 2 °F (36 2 °C), temperature source Probe, resp  rate 12, height 5' 8" (1 727 m), weight 90 7 kg (200 lb), SpO2 96 %  Imaging and other studies: I have personally reviewed pertinent reports  Plan Incentive spirometry       Airway Clearance Plan: Incentive Spirometer     Resp Comments: Protocols done  IS ordered

## 2017-10-17 NOTE — CONSULTS
Consultation - Anesthesia Acute Pain Management   Jone Solorio 81 y o  male MRN: [id number]  Unit/Bed#: [facility] 418-01 Encounter: [id number]               Admission Diagnosis:  Nonrheumatic aortic valve stenosis [I35 0]     Consult Time:  15 minutes    Consult for pain management per surgeon's request     Advanced Care Plan; Patient aged 72 years & older:  [address] [facility] was not discussed  DOS: 10/17/17    Assessment/Plan     Assessment:   81y o  year old male Status Post AVR on POD # 0 with uncontrolled postop pain in the setting of chronic opioid use  Plan:   1  PDMP reviewed and I spoke with the pt's PCP (Dr Geni Sharma, [phone]) to confirm  2  Recommend:   - Start Soma 350mg PO QID   - Change Gabapentin to 1800mg PO qHS   - Continue Trazodone   - D/C all Percocet orders   - Change Tylenol to scheduled   - Start Oxycodone 10-15mg PO q4 hours prn moderate - severe pain, respectively   - Start Dilaudid 1mg IV q3 hours prn breakthrough pain only  3  Continuous pulse oximetry if not in the ICU  4  APS will continue to follow  5  D/w cardiac surgery team    History of Present Illness    Admit Date:  10/17/2017  Hospital Day:  0 days  Primary Service:  Cardiothoracic Surgery  Attending Provider:  Elena Martell MD  Physician Requesting Consult: Elena Martell MD  Reason for Consult / Principal Problem: ACUTE POSTOPERATIVE PAIN CONTROL  Hx and PE limited by: None, wife present    HPI  80 yo man with a h/o chronic neck and back pain on chronic opioids now s/p AVR with acute postop pain that is currently not well controlled  He was just recently extubated  Pt reports uncontrolled pain in his neck and back; this pain is chronic, however, it is acutely worse than usual  Pt now also has new incisional pain in his chest from the surgery  He also c/o neuropathic type pain/numbness in his B/L hands that, while chronic in nature, is also acutely worsened postop      Review of Systems  Denies    Historical Information   Past Medical History:   Diagnosis Date    Aortic stenosis     Aortic stenosis, severe     Arthritis     Carpal tunnel syndrome     Chronic pain     Chronic, continuous use of opioids     Hyperlipidemia     Hypertension     Insomnia     Peripheral neuropathy     RA (rheumatoid arthritis) (HCC)      Past Surgical History:   Procedure Laterality Date    BASAL CELL CARCINOMA EXCISION      CARPAL TUNNEL RELEASE      left    HERNIA REPAIR      inguinal bilateral -umbilical     LAMINECTOMY      TONSILLECTOMY       Social History   History   Alcohol Use No     History   Drug Use No     History   Smoking Status    Never Smoker   Smokeless Tobacco    Never Used       Meds/Allergies   all current active meds have been reviewed    Allergies   Allergen Reactions    Penicillins Anaphylaxis    Quinine Derivatives Other (See Comments)     High fever       Objective   /81   Pulse 62   Temp (!) 97 2 °F (36 2 °C) (Probe)   Resp 12   Ht 5' 8" (1 727 m)   Wt 90 7 kg (200 lb)   SpO2 96%   BMI 30 41 kg/m²   Temp:  [96 8 °F (36 °C)-97 4 °F (36 3 °C)] 97 2 °F (36 2 °C)  HR:  [50-86] 62  Resp:  [8-22] 12  BP: (157)/(81) 157/81  Arterial Line BP: (108-156)/(44-60) 130/48  FiO2 (%):  [40-50] 50    Intake/Output Summary (Last 24 hours) at 10/17/17 1547  Last data filed at 10/17/17 1400   Gross per 24 hour   Intake             3506 ml   Output             1750 ml   Net             1756 ml       Physical Exam  Gen: NAD  CV: REg rate  Pulm: nonlabored  ABd: deferred  Ext: No C/C/E  Neuro: A&Ox3, CNs II-XII grossly intact, no focal deficits  Psych; Appropriate    Lab Results: I have personally reviewed pertinent labs  Imaging Studies: I have personally reviewed pertinent reports  EKG, Pathology, and Other Studies: I have personally reviewed pertinent reports  Counseling / Coordination of Care  Total floor / unit time spent today 15 minutes minutes   Greater than 50% of total time was spent with the patient and / or family counseling and / or coordination of care  A description of the counseling / coordination of care: Pain Medication      SIGNATURE: Penny Gregorio MD  DATE: October 17, 2017  TIME: 3:47 PM

## 2017-10-17 NOTE — ANESTHESIA PROCEDURE NOTES
Arterial Line Insertion  Date/Time: 10/17/2017 9:07 AM  Performed by: Jacob Hernández  Authorized by: Jacob Hernández   Consent: Verbal consent obtained  Written consent obtained  Risks and benefits: risks, benefits and alternatives were discussed  Consent given by: patient  Patient understanding: patient states understanding of the procedure being performed  Patient identity confirmed: verbally with patient  Time out: Immediately prior to procedure a "time out" was called to verify the correct patient, procedure, equipment, support staff and site/side marked as required  Preparation: Patient was prepped and draped in the usual sterile fashion    Indications: multiple ABGs and hemodynamic monitoring  Orientation:  RightLocation: radial artery  Anesthesia: local infiltration    Anesthesia:  Local Anesthetic: lidocaine 1% without epinephrine  Anesthetic total: 0 4 mL    Sedation:  Patient sedated: yes  Analgesia: see MAR for details  Needle gauge: 20  Seldinger technique: Seldinger technique used  Number of attempts: 2  Post-procedure: chlorhexidine patch applied and dressing applied  Post-procedure CNS: normal and unchanged  Patient tolerance: Patient tolerated the procedure well with no immediate complications  Comments: Pre-induction, 3 minutea

## 2017-10-17 NOTE — ADDENDUM NOTE
Addendum  created 10/17/17 1403 by Mari Gonzalez MD    Anesthesia Intra Blocks edited, Sign clinical note

## 2017-10-17 NOTE — ANESTHESIA PROCEDURE NOTES
Central Line Insertion  Performed by: Nadine Logan  Authorized by: Nadine Logan   Date/Time: 10/17/2017 9:30 AM  Catheter Type:  triple lumen  Indications: vascular access and central pressure monitoring  Location details: right internal jugular  Catheter size: 7 Fr  Patient position: flat  Anesthesia method: ga  Assessment: blood return through all ports and free fluid flow  Preparation: skin prepped with ChloraPrep  Skin prep agent dried: skin prep agent completely dried prior to procedure  Sterile barriers: all five maximum sterile barriers used - cap, mask, sterile gown, sterile gloves, and large sterile sheet  Hand hygiene: hand hygiene performed prior to central venous catheter insertion  Ultrasound guidance: yes  sterile gel and probe cover used in ultrasound-guided central venous catheter insertionultrasound permanent image saved  Consent: Verbal consent obtained  Written consent obtained  Risks and benefits: risks, benefits and alternatives were discussed  Consent given by: patient  Patient understanding: patient states understanding of the procedure being performed  Required items: required blood products, implants, devices, and special equipment available  Patient identity confirmed: arm band  Time out: Immediately prior to procedure a "time out" was called to verify the correct patient, procedure, equipment, support staff and site/side marked as required    Site selection rationale: avr  Vessel of Catheter Tip End: svc  Number of attempts: 1  Successful placement: yes  Post-procedure: chlorhexidine patch applied,  dressing applied and line sutured  Patient tolerance: Patient tolerated the procedure well with no immediate complications

## 2017-10-17 NOTE — OP NOTE
OPERATIVE REPORT  PATIENT NAME: Trish Christina    :  [birth year]  MRN: [id number]  Pt Location: BE OR ROOM 16    SURGERY DATE: 10/17/2017    SURGEON: Roselyn Zepeda MD    ASSISTANT: Cristiane Anglin PA-C    ADDITIONAL ASSISTANT: David Carter MD    PREOPERATIVE DIAGNOSIS: Symptomatic severe aortic stenosis    POSTOPERATIVE DIAGNOSIS:  Symptomatic severe aortic stenosis    NYHA Class: 2    CCS Class: 2    PROCEDURE: Aortic valve replacement with a 23 mm [facility] Ease bioprosthetic valve  ANESTHESIA: General endotracheal anesthesia with transesophageal echocardiogram guidance, Dr Bertrand Kearns: 72 minutes  CROSSCLAMP TIME: 57 minutes  PACKS/TUBES/DRAINS: Chest tubes x 2  MATERIALS: Pacing wires: A x1  V x1  TRANSFUSION: None  SPECIMENS: Aortic Valve  ESTIMATED BLOOD LOSS: 200 mL    OPERATIVE TECHNIQUE:    The patient was taken to the operating room and placed supine on the operating table  Following the satisfactory induction of general anesthesia and placement of monitoring lines, the patient was prepped and draped in the usual sterile fashion  A time-out procedure was performed  The patient underwent median sternotomy, pericardiotomy, and systemic heparinization  Epiaortic ultrasound was used to evaluate the ascending aorta, which was found to be free of significant atheromatous disease  The patient underwent aortic and right atrial cannulation and was initiated on bypass  An LV vent was placed through the right superior pulmonary vein  The ascending aorta was crossclamped  [place]te[place] cardioplegia was delivered with an excellent arrest      Aortotomy was performed and the aortic valve was exposed  It was found to be heavily calcified and severely stenotic  The leaflets were excised and the annulus was debrided of calcium  The annulus was sized to a 23 mm [facility] Ease bioprosthetic valve    Valve sutures were placed with pledgets on the ventricular side  The valve was brought to the field  The sutures were passed through the sewing ring of the valve, the valve was seated and the sutures were tied down  Clearance of the coronary ostia was confirmed  While de-airing the heart, the aortotomy was closed with two layers of running 4-0 Prolene suture  The heart was extensively  de-aired and the crossclamp was removed  Atrial and ventricular pacing wires were placed  Following a period of reperfusion, the patient was weaned from cardiopulmonary bypass and decannulated  Protamine was administered with normalization of the ACT  Hemostasis was confirmed in all fields  Thoracostomy tubes were placed  The sternum was closed with stainless steel wires  The fascia, subdermis and skin were closed with multiple layers of running absorbable suture  As the attending surgeon, I was present and scrubbed for all critical portions of this procedure  There was no qualified surgical resident available  Sponge, needle and instrument counts were reported as correct by the nursing staff  Final transesophageal echocardiogram demonstrated normal biventricular function, normal bioAVR function, and no PVL  The assistance of a PA was required to complete this case, specifically for assistance with cannulation, decannulation, and exposure of the aortic valve         Fernando Rudolph MD  DATE: October 17, 2017  TIME: 12:29 PM

## 2017-10-17 NOTE — ANESTHESIA PROCEDURE NOTES
Introducer/Cabot-Shirin  Performed by: Mariia Blas  Authorized by: Mariia Blas   Date/Time: 10/17/2017 9:31 AM  Indications: vascular access and central pressure monitoring  Catheter size: 9 Fr  Patient position: flat  Anesthesia method: ga  Assessment: blood return through all ports and free fluid flow  Preparation: skin prepped with ChloraPrep  Skin prep agent dried: skin prep agent completely dried prior to procedure  Sterile barriers: all five maximum sterile barriers used - cap, mask, sterile gown, sterile gloves, and large sterile sheet  Hand hygiene: hand hygiene performed prior to central venous catheter insertion  Ultrasound guidance: yes  ultrasound permanent image saved  Consent: Verbal consent obtained  Written consent obtained  Risks and benefits: risks, benefits and alternatives were discussed  Consent given by: patient  Patient understanding: patient states understanding of the procedure being performed  Required items: required blood products, implants, devices, and special equipment available  Patient identity confirmed: arm band  Time out: Immediately prior to procedure a "time out" was called to verify the correct patient, procedure, equipment, support staff and site/side marked as required    Site selection rationale: avr  Number of attempts: 1  Successful placement: yes  Post-procedure: line sutured and dressing applied  Patient tolerance: Patient tolerated the procedure well with no immediate complications

## 2017-10-18 ENCOUNTER — APPOINTMENT (INPATIENT)
Dept: RADIOLOGY | Facility: HOSPITAL | Age: 72
DRG: 221 | End: 2017-10-18
Payer: COMMERCIAL

## 2017-10-18 PROBLEM — J95.89 ACUTE POSTOPERATIVE RESPIRATORY INSUFFICIENCY: Status: ACTIVE | Noted: 2017-10-18

## 2017-10-18 LAB
ANION GAP SERPL CALCULATED.3IONS-SCNC: 8 MMOL/L (ref 4–13)
ATRIAL RATE: 78 BPM
BUN SERPL-MCNC: 10 MG/DL (ref 5–25)
CALCIUM SERPL-MCNC: 7.3 MG/DL (ref 8.3–10.1)
CHLORIDE SERPL-SCNC: 106 MMOL/L (ref 100–108)
CO2 SERPL-SCNC: 24 MMOL/L (ref 21–32)
CREAT SERPL-MCNC: 0.63 MG/DL (ref 0.6–1.3)
ERYTHROCYTE [DISTWIDTH] IN BLOOD BY AUTOMATED COUNT: 13 % (ref 11.6–15.1)
GFR SERPL CREATININE-BSD FRML MDRD: 100 ML/MIN/1.73SQ M
GLUCOSE SERPL-MCNC: 117 MG/DL (ref 65–140)
GLUCOSE SERPL-MCNC: 120 MG/DL (ref 65–140)
GLUCOSE SERPL-MCNC: 127 MG/DL (ref 65–140)
GLUCOSE SERPL-MCNC: 129 MG/DL (ref 65–140)
GLUCOSE SERPL-MCNC: 134 MG/DL (ref 65–140)
GLUCOSE SERPL-MCNC: 135 MG/DL (ref 65–140)
GLUCOSE SERPL-MCNC: 139 MG/DL (ref 65–140)
GLUCOSE SERPL-MCNC: 141 MG/DL (ref 65–140)
HCT VFR BLD AUTO: 31.2 % (ref 36.5–49.3)
HGB BLD-MCNC: 10.6 G/DL (ref 12–17)
MAGNESIUM SERPL-MCNC: 2.5 MG/DL (ref 1.6–2.6)
MCH RBC QN AUTO: 30.2 PG (ref 26.8–34.3)
MCHC RBC AUTO-ENTMCNC: 34 G/DL (ref 31.4–37.4)
MCV RBC AUTO: 89 FL (ref 82–98)
P AXIS: 26 DEGREES
PLATELET # BLD AUTO: 151 THOUSANDS/UL (ref 149–390)
PMV BLD AUTO: 9.1 FL (ref 8.9–12.7)
POTASSIUM SERPL-SCNC: 3.8 MMOL/L (ref 3.5–5.3)
PR INTERVAL: 138 MS
QRS AXIS: 61 DEGREES
QRSD INTERVAL: 83 MS
QT INTERVAL: 458 MS
QTC INTERVAL: 522 MS
RBC # BLD AUTO: 3.51 MILLION/UL (ref 3.88–5.62)
SODIUM SERPL-SCNC: 138 MMOL/L (ref 136–145)
T WAVE AXIS: 6 DEGREES
VENTRICULAR RATE: 78 BPM
WBC # BLD AUTO: 12.06 THOUSAND/UL (ref 4.31–10.16)

## 2017-10-18 PROCEDURE — 85027 COMPLETE CBC AUTOMATED: CPT | Performed by: THORACIC SURGERY (CARDIOTHORACIC VASCULAR SURGERY)

## 2017-10-18 PROCEDURE — 71010 HB CHEST X-RAY 1 VIEW FRONTAL: CPT

## 2017-10-18 PROCEDURE — 93005 ELECTROCARDIOGRAM TRACING: CPT | Performed by: PHYSICIAN ASSISTANT

## 2017-10-18 PROCEDURE — 80048 BASIC METABOLIC PNL TOTAL CA: CPT | Performed by: THORACIC SURGERY (CARDIOTHORACIC VASCULAR SURGERY)

## 2017-10-18 PROCEDURE — 94760 N-INVAS EAR/PLS OXIMETRY 1: CPT

## 2017-10-18 PROCEDURE — 83735 ASSAY OF MAGNESIUM: CPT | Performed by: THORACIC SURGERY (CARDIOTHORACIC VASCULAR SURGERY)

## 2017-10-18 PROCEDURE — 82948 REAGENT STRIP/BLOOD GLUCOSE: CPT

## 2017-10-18 RX ORDER — POTASSIUM CHLORIDE 20 MEQ/1
20 TABLET, EXTENDED RELEASE ORAL DAILY
Status: DISCONTINUED | OUTPATIENT
Start: 2017-10-18 | End: 2017-10-20 | Stop reason: HOSPADM

## 2017-10-18 RX ORDER — FUROSEMIDE 10 MG/ML
40 INJECTION INTRAMUSCULAR; INTRAVENOUS DAILY
Status: DISCONTINUED | OUTPATIENT
Start: 2017-10-18 | End: 2017-10-20 | Stop reason: HOSPADM

## 2017-10-18 RX ORDER — DOCUSATE SODIUM 100 MG/1
100 CAPSULE, LIQUID FILLED ORAL 2 TIMES DAILY
Status: DISCONTINUED | OUTPATIENT
Start: 2017-10-18 | End: 2017-10-20 | Stop reason: HOSPADM

## 2017-10-18 RX ORDER — CARISOPRODOL 350 MG/1
350 TABLET ORAL 4 TIMES DAILY
Status: DISCONTINUED | OUTPATIENT
Start: 2017-10-18 | End: 2017-10-20 | Stop reason: HOSPADM

## 2017-10-18 RX ORDER — TEMAZEPAM 7.5 MG/1
15 CAPSULE ORAL
Status: DISCONTINUED | OUTPATIENT
Start: 2017-10-18 | End: 2017-10-18

## 2017-10-18 RX ORDER — CARISOPRODOL 350 MG/1
350 TABLET ORAL ONCE
Status: COMPLETED | OUTPATIENT
Start: 2017-10-18 | End: 2017-10-18

## 2017-10-18 RX ADMIN — HYDROMORPHONE HYDROCHLORIDE 0.5 MG: 1 INJECTION, SOLUTION INTRAMUSCULAR; INTRAVENOUS; SUBCUTANEOUS at 03:34

## 2017-10-18 RX ADMIN — CARISOPRODOL 350 MG: 350 TABLET ORAL at 08:08

## 2017-10-18 RX ADMIN — OXYCODONE HYDROCHLORIDE 15 MG: 5 TABLET ORAL at 13:29

## 2017-10-18 RX ADMIN — ACETAMINOPHEN 650 MG: 325 TABLET, FILM COATED ORAL at 05:17

## 2017-10-18 RX ADMIN — AMIODARONE HYDROCHLORIDE 200 MG: 200 TABLET ORAL at 05:17

## 2017-10-18 RX ADMIN — OXYCODONE HYDROCHLORIDE 15 MG: 5 TABLET ORAL at 22:31

## 2017-10-18 RX ADMIN — FONDAPARINUX SODIUM 2.5 MG: 2.5 INJECTION, SOLUTION SUBCUTANEOUS at 08:08

## 2017-10-18 RX ADMIN — PANTOPRAZOLE SODIUM 40 MG: 40 TABLET, DELAYED RELEASE ORAL at 08:08

## 2017-10-18 RX ADMIN — OXYCODONE HYDROCHLORIDE 15 MG: 5 TABLET ORAL at 09:04

## 2017-10-18 RX ADMIN — MUPIROCIN 1 APPLICATION: 20 OINTMENT TOPICAL at 21:46

## 2017-10-18 RX ADMIN — CARISOPRODOL 350 MG: 350 TABLET ORAL at 17:53

## 2017-10-18 RX ADMIN — MUPIROCIN 1 APPLICATION: 20 OINTMENT TOPICAL at 08:07

## 2017-10-18 RX ADMIN — TRAZODONE HYDROCHLORIDE 400 MG: 100 TABLET ORAL at 21:42

## 2017-10-18 RX ADMIN — SODIUM CHLORIDE 5 MG/HR: 0.9 INJECTION, SOLUTION INTRAVENOUS at 08:09

## 2017-10-18 RX ADMIN — POLYETHYLENE GLYCOL 3350 17 G: 17 POWDER, FOR SOLUTION ORAL at 08:07

## 2017-10-18 RX ADMIN — ATORVASTATIN CALCIUM 40 MG: 40 TABLET, FILM COATED ORAL at 08:08

## 2017-10-18 RX ADMIN — FUROSEMIDE 40 MG: 10 INJECTION, SOLUTION INTRAMUSCULAR; INTRAVENOUS at 11:13

## 2017-10-18 RX ADMIN — HYDROMORPHONE HYDROCHLORIDE 1 MG: 1 INJECTION, SOLUTION INTRAMUSCULAR; INTRAVENOUS; SUBCUTANEOUS at 23:46

## 2017-10-18 RX ADMIN — DOCUSATE SODIUM 100 MG: 100 CAPSULE, LIQUID FILLED ORAL at 17:54

## 2017-10-18 RX ADMIN — HYDROMORPHONE HYDROCHLORIDE 1 MG: 1 INJECTION, SOLUTION INTRAMUSCULAR; INTRAVENOUS; SUBCUTANEOUS at 02:13

## 2017-10-18 RX ADMIN — GABAPENTIN 1800 MG: 400 CAPSULE ORAL at 21:44

## 2017-10-18 RX ADMIN — VANCOMYCIN HYDROCHLORIDE 1000 MG: 1 INJECTION, SOLUTION INTRAVENOUS at 11:18

## 2017-10-18 RX ADMIN — OXYCODONE HYDROCHLORIDE 10 MG: 10 TABLET ORAL at 00:15

## 2017-10-18 RX ADMIN — CIPROFLOXACIN 400 MG: 2 INJECTION, SOLUTION INTRAVENOUS at 11:23

## 2017-10-18 RX ADMIN — METOPROLOL TARTRATE 25 MG: 25 TABLET ORAL at 21:45

## 2017-10-18 RX ADMIN — CARISOPRODOL 350 MG: 350 TABLET ORAL at 11:13

## 2017-10-18 RX ADMIN — HYDROMORPHONE HYDROCHLORIDE 1 MG: 1 INJECTION, SOLUTION INTRAMUSCULAR; INTRAVENOUS; SUBCUTANEOUS at 19:01

## 2017-10-18 RX ADMIN — CARISOPRODOL 350 MG: 350 TABLET ORAL at 21:44

## 2017-10-18 RX ADMIN — DOCUSATE SODIUM 100 MG: 100 CAPSULE, LIQUID FILLED ORAL at 11:13

## 2017-10-18 RX ADMIN — ACETAMINOPHEN 650 MG: 325 TABLET, FILM COATED ORAL at 11:13

## 2017-10-18 RX ADMIN — ACETAMINOPHEN 650 MG: 325 TABLET, FILM COATED ORAL at 17:53

## 2017-10-18 RX ADMIN — HYDROMORPHONE HYDROCHLORIDE 1 MG: 1 INJECTION, SOLUTION INTRAMUSCULAR; INTRAVENOUS; SUBCUTANEOUS at 06:26

## 2017-10-18 RX ADMIN — HYDROMORPHONE HYDROCHLORIDE 1 MG: 1 INJECTION, SOLUTION INTRAMUSCULAR; INTRAVENOUS; SUBCUTANEOUS at 11:38

## 2017-10-18 RX ADMIN — METOPROLOL TARTRATE 25 MG: 25 TABLET ORAL at 08:08

## 2017-10-18 RX ADMIN — HYDROMORPHONE HYDROCHLORIDE 1 MG: 1 INJECTION, SOLUTION INTRAMUSCULAR; INTRAVENOUS; SUBCUTANEOUS at 15:09

## 2017-10-18 RX ADMIN — OXYCODONE HYDROCHLORIDE 15 MG: 5 TABLET ORAL at 04:32

## 2017-10-18 RX ADMIN — POTASSIUM CHLORIDE 20 MEQ: 200 INJECTION, SOLUTION INTRAVENOUS at 06:19

## 2017-10-18 RX ADMIN — OXYCODONE HYDROCHLORIDE 15 MG: 5 TABLET ORAL at 18:01

## 2017-10-18 RX ADMIN — ACETAMINOPHEN 650 MG: 325 TABLET, FILM COATED ORAL at 23:43

## 2017-10-18 RX ADMIN — ASPIRIN 325 MG: 325 TABLET ORAL at 08:08

## 2017-10-18 RX ADMIN — POTASSIUM CHLORIDE 20 MEQ: 1500 TABLET, EXTENDED RELEASE ORAL at 11:18

## 2017-10-18 NOTE — PROGRESS NOTES
Progress Note - Cardiothoracic Surgery   Junie Alexandre 81 y o  male MRN: [id number]  Unit/Bed#: [facility] 418-01 Encounter: [id number]      POD # 1 s/p AVR    Pt seen/examined  Interval history and data reviewed with critical care team   Pt doing well  No specific complaints  Nicardipine gtt  Medications:   Scheduled Meds:  acetaminophen 650 mg Oral Q6H [address]   amiodarone 200 mg Oral Q8H [address]   aspirin 325 mg Oral Daily   atorvastatin 40 mg Oral Daily   calcium chloride 1 g Intravenous Once   carisoprodol 350 mg Oral 4x Daily   ciprofloxacin 400 mg Intravenous Q12H   fondaparinux 2 5 mg Subcutaneous Daily   gabapentin 1,800 mg Oral HS   metoprolol tartrate 25 mg Oral Q12H [address]   mupirocin 1 application Nasal Q08R [address]   pantoprazole 40 mg Oral Daily   polyethylene glycol 17 g Oral Daily   traZODone 400 mg Oral HS   vancomycin 1,000 mg Intravenous Q12H     Continuous Infusions:  insulin regular (HumuLIN R,NovoLIN R) infusion 0 3-21 Units/hr Last Rate: 1 5 Units/hr (10/18/17 0000)   niCARdipine 1-15 mg/hr Last Rate: 5 mg/hr (10/18/17 0809)   sodium chloride 20 mL/hr Last Rate: 20 mL/hr (10/17/17 1800)     PRN Meds: bisacodyl    fentanyl citrate (PF)    furosemide    HYDROmorphone    lactated ringers    lidocaine (cardiac)    ondansetron    oxyCODONE    oxyCODONE    potassium chloride    potassium chloride    Vitals: Blood pressure 147/50, pulse 68, temperature 99 3 °F (37 4 °C), temperature source Probe, resp  rate 17, height 5' 8" (1 727 m), weight 76 2 kg (167 lb 15 9 oz), SpO2 96 %  ,Body mass index is 25 54 kg/m²  I/O last 24 hours:   In: 5064 3 [I V :2964 3; IV Piggyback:1350]  Out: 9857 [Urine:2005; Emesis/NG output:100; Blood:750; Chest Tube:710]  Invasive Devices     Central Venous Catheter Line            CVC Central Lines 10/17/17 Triple less than 1 day          Peripheral Intravenous Line            Peripheral IV 10/17/17 Left Hand 1 day          Arterial Line            Arterial Line 10/17/17 Right Radial less than 1 day          Line            Pacer Wires less than 1 day    Pacer Wires less than 1 day          Drain            Chest Tube 1 Posterior Mediastinal 32 Fr  less than 1 day    Chest Tube 2 Anterior Mediastinal 32 Fr  less than 1 day    Urethral Catheter Non-latex; Temperature probe 16 Fr  less than 1 day                  Lab, Imaging and other studies:     Results from last 7 days  Lab Units 10/18/17  0401 10/17/17  2116 10/17/17  1324 10/17/17  1306  10/17/17  1129   WBC Thousand/uL 12 06*  --   --   --   --   --    HEMOGLOBIN g/dL 10 6* 11 0*  --  11 0*  --   --    I STAT HEMOGLOBIN g/dl  --   --  10 2*  --   < >  --    HEMATOCRIT % 31 2* 32 9*  --  32 2*  --   --    PLATELETS Thousands/uL 151  --   --  137*  --  218   < > = values in this interval not displayed  Results from last 7 days  Lab Units 10/18/17  0401 10/17/17  2116 10/17/17  1748  10/17/17  1306   SODIUM mmol/L 138  --   --   --  140   POTASSIUM mmol/L 3 8 4 0 3 8  --  4 1   CHLORIDE mmol/L 106  --   --   --  108   CO2 mmol/L 24  --   --   --  25   BUN mg/dL 10  --   --   --  12   CREATININE mg/dL 0 63  --   --   --  0 84   GLUCOSE RANDOM mg/dL 120  --   --   --  128   GLUCOSE, ISTAT   --   --   --   < >  --    CALCIUM mg/dL 7 3*  --   --   --  7 5*   < > = values in this interval not displayed  No results for input(s): PHART, CDQ2OEG, PO2ART, DUW2QQX, A0EYVQLW, BEART in the last 72 hours  Plan:    DC [place]/Ambriz  Continue chest tubes, pacing wires  Transfer to floor  Ambulate  Incentive spirometry  Diuresis  PO ASA/Statin/B blocker  Wean Nicardipine off  Pain service to assist with acute pain mgmt          Britni Perez MD  DATE: October 18, 2017  TIME: 8:26 AM

## 2017-10-18 NOTE — PROGRESS NOTES
Progress Note - Anesthesia Acute Pain Management    Zaheer Esrtada 81 y o  male MRN: [id number]  Unit/Bed#: [facility] 418-01 Encounter: [id number]      Assessment:   Principal Problem:    Nonrheumatic aortic valve stenosis  Active Problems: Aortic stenosis, severe    Arthritis    Chronic pain    Chronic, continuous use of opioids    Hyperlipidemia    Hypertension    Insomnia    Peripheral neuropathy    RA (rheumatoid arthritis) ([address] )    Acute postoperative respiratory insufficiency      Plan:   - Pt is reporting very high pain scores, however, he notes that it's his chronic neck/back/hand pain that is bothering him  His surgical pain is not the problem  He also understands that we will not be able to fix his chronic pain  - Pt also agrees with the plan that he be d/c'd with his usual chronic pain regimen, no more  He knows that the breakthrough pain medication will be weaned in the near future    - Having said that, pt is essentially on his home pain regimen and has received very little breakthrough pain medication overnight   - He did request premed for pain prior to PT and line removal   - No change to regimen at this time  - APS will sign off; please call with questions or re-consult or changes in pt status    Pain History  Current pain location(s): neck/back/B/L hands  Pain Scale: 8/10    Meds/Allergies   all current active meds have been reviewed    Allergies   Allergen Reactions    Penicillins Anaphylaxis    Quinine Derivatives Other (See Comments)     High fever       Objective     Temp:  [96 8 °F (36 °C)-99 3 °F (37 4 °C)] 97 5 °F (36 4 °C)  HR:  [50-86] 60  Resp:  [8-26] 20  BP: (113-150)/(40-57) 116/52  Arterial Line BP: (108-160)/(40-60) 120/50  FiO2 (%):  [40-50] 50      Intake/Output Summary (Last 24 hours) at 10/18/17 1105  Last data filed at 10/18/17 1000   Gross per 24 hour   Intake          4047 33 ml   Output             3540 ml   Net           507 33 ml                 Physical Exam: /52 Pulse 60   Temp 97 5 °F (36 4 °C) (Oral)   Resp 20   Ht 5' 8" (1 727 m)   Wt 76 2 kg (167 lb 15 9 oz)   SpO2 98%   BMI 25 54 kg/m²     Gen: NAD, OOBTC  HEENT:  PER, EOMI  CV: Reg rate  Pul: Nonlabored  Abd: Deferred  Ext:  No cyanosis; + B/L hand edema  Neuro: AAOx3; CN II-XII grossly intact; moving all extremities   Psych: Appropriate    Lab Results:  Lab Results   Component Value Date    WBC 12 06 (H) 10/18/2017    HGB 10 6 (L) 10/18/2017    HCT 31 2 (L) 10/18/2017    MCV 89 10/18/2017     10/18/2017     Lab Results   Component Value Date    GLUCOSE 120 10/18/2017    CALCIUM 7 3 (L) 10/18/2017     10/18/2017    K 3 8 10/18/2017    CO2 24 10/18/2017     10/18/2017    BUN 10 10/18/2017    CREATININE 0 63 10/18/2017     Imaging Studies: I have personally reviewed pertinent reports  EKG, Pathology, and Other Studies: I have personally reviewed pertinent reports        SIGNATURE: Cesar Colon MD  DATE: October 18, 2017  TIME: 11:05 AM

## 2017-10-18 NOTE — CONSULTS
Consultation - Cardiology Team One  Ravinder Hernandez 81 y o  male MRN: [id number]  Unit/Bed#: [facility] 423-01 Encounter: [id number]    Consults    Physician Requesting Consult: Kelly Nj MD     Reason for Consult / Principal Problem:  s/p AVR    History of Present Illness      HPI: Ravinder Hernandez is a 81y o  year old male who has a history of aortic valve stenosis, hypertension, hyperlipidemia, rheumatoid arthritis, chronic pain  He fells of cardiologist Dr Lynda Augustin  Patient has a history of aortic stenosis; followed by Dr Lynda Augustin  Last seen 09/02 017 with complaints of progressive exertional dyspnea associated with some lightheadedness  Patient consulted with CT surgery for aortic valve replacement  Preop workup included SHANDRA that showed normal LV function with moderate to severe aortic stenosis with aortic valve area 0 96 centimeters squared with mean gradient of 27 mmHg  Preoperative cardiac catheterization without occlusive CAD  Patient underwent aortic valve replacement with 23 mm [place] ease bioprosthetic valve on 10/17  Procedure was uncomplicated per operative notes  He was extubated yesterday without difficulty  Recurrent Cardene for blood pressure control which has since been weaned off  He was transferred out of the intensive care unit today  Cardiology consultation requested for further management  Patient's only complaint is diffuse pain related to his arthritis  He denies any dizziness lightheadedness or palpitations  Was able to ambulate in the halls today without difficulty  Review of Systems   Constitution: Negative for decreased appetite, fever and weakness  Cardiovascular: Negative for chest pain, dyspnea on exertion, irregular heartbeat, leg swelling, orthopnea, palpitations and syncope  Respiratory: Negative for cough, shortness of breath and wheezing  Musculoskeletal: Positive for arthritis and joint pain     Gastrointestinal: Negative for abdominal pain, nausea and vomiting  Genitourinary: Negative for dysuria  Neurological: Negative for dizziness and light-headedness  Psychiatric/Behavioral: Negative for altered mental status  Historical Information   Past Medical History:   Diagnosis Date    Aortic stenosis     Aortic stenosis, severe     Arthritis     Carpal tunnel syndrome     Chronic pain     Chronic, continuous use of opioids     Hyperlipidemia     Hypertension     Insomnia     Peripheral neuropathy     RA [facility]rheumatoid arthritis) ([facility] [address] )      Past Surgical History:   Procedure Laterality Date    BASAL CELL CARCINOMA EXCISION      CARPAL TUNNEL RELEASE      left    HERNIA REPAIR      inguinal bilateral -umbilical     LAMINECTOMY      AR RPLCMT AORTIC VALVE OPN W/STENTLESS TISSUE VALVE N/A 10/17/2017    Procedure: AORTIC VALVE REPLACEMENT with 23MM MAGNAEASE TISSUE VALVE; INTRA-OP SHANDRA;  Surgeon: Kinga Branch MD;  Location: [facility];  Service: Cardiac Surgery    TONSILLECTOMY       History   Alcohol Use No     History   Drug Use No     History   Smoking Status    Never Smoker   Smokeless Tobacco    Never Used     Family History: No family history on file      Meds/Allergies   current meds:   Current Facility-Administered Medications   Medication Dose Route Frequency    acetaminophen (TYLENOL) tablet 650 mg  650 mg Oral Q6H [address]    aspirin tablet 325 mg  325 mg Oral Daily    atorvastatin (LIPITOR) tablet 40 mg  40 mg Oral Daily    bisacodyl (DULCOLAX) rectal suppository 10 mg  10 mg Rectal Daily PRN    carisoprodol (SOMA) tablet 350 mg  350 mg Oral 4x Daily    docusate sodium (COLACE) capsule 100 mg  100 mg Oral BID    fondaparinux (ARIXTRA) subcutaneous injection 2 5 mg  2 5 mg Subcutaneous Daily    furosemide (LASIX) injection 40 mg  40 mg Intravenous Daily    gabapentin (NEURONTIN) capsule 1,800 mg  1,800 mg Oral HS    HYDROmorphone (DILAUDID) 1 mg/mL injection 1 mg  1 mg Intravenous Q3H PRN    insulin lispro (HumaLOG) 100 units/mL subcutaneous injection 1-5 Units  1-5 Units Subcutaneous TID AC    insulin lispro (HumaLOG) 100 units/mL subcutaneous injection 1-5 Units  1-5 Units Subcutaneous HS    metoprolol tartrate (LOPRESSOR) tablet 25 mg  25 mg Oral Q12H [address]    mupirocin (BACTROBAN) 2 % nasal ointment 1 application  1 application Nasal T43W [address]    ondansetron (ZOFRAN) injection 4 mg  4 mg Intravenous Q6H PRN    oxyCODONE (ROXICODONE) immediate release tablet 10 mg  10 mg Oral Q4H PRN    oxyCODONE (ROXICODONE) IR tablet 15 mg  15 mg Oral Q4H PRN    pantoprazole (PROTONIX) EC tablet 40 mg  40 mg Oral Daily    polyethylene glycol (MIRALAX) packet 17 g  17 g Oral Daily    potassium chloride (K-DUR,KLOR-CON) CR tablet 20 mEq  20 mEq Oral Daily    traZODone (DESYREL) tablet 400 mg  400 mg Oral HS       Allergies   Allergen Reactions    Penicillins Anaphylaxis    Quinine Derivatives Other (See Comments)     High fever     Objective   Vitals: Blood pressure 124/65, pulse 74, temperature 97 8 °F (36 6 °C), temperature source Oral, resp  rate 20, height 5' 8" (1 727 m), weight 76 2 kg (167 lb 15 9 oz), SpO2 96 %  ,     Body mass index is 25 54 kg/m²  ,     Systolic (08QKG), ISW:433 , Min:113 , KVF:788     Diastolic (43RJE), JASSON:46, Min:40, Max:65      Intake/Output Summary (Last 24 hours) at 10/18/17 1534  Last data filed at 10/18/17 1401   Gross per 24 hour   Intake          1924 83 ml   Output             2405 ml   Net          -480 17 ml     Weight (last 2 days)     Date/Time   Weight    10/18/17 0600  76 2 (167 99)    10/17/17 0558  90 7 (200)            Invasive Devices     Central Venous Catheter Line            CVC Central Lines 10/17/17 Triple 1 day          Peripheral Intravenous Line            Peripheral IV 10/17/17 Left Hand 1 day          Line            Pacer Wires 1 day    Pacer Wires 1 day          Drain            Chest Tube 1 Posterior Mediastinal 32 Fr  1 day    Chest Tube 2 Anterior Mediastinal 32 Fr  1 day              Physical Exam   Constitutional: He is oriented to person, place, and time  He appears well-developed and well-nourished  No distress  Pt sitting up in chair in NAD   HENT:   Head: Atraumatic  Neck: Normal range of motion  RIJ TLC intact   Cardiovascular: Normal rate, regular rhythm, S1 normal and S2 normal     No murmur heard  Mild NP generalized edema   Pulmonary/Chest: Effort normal and breath sounds normal  No respiratory distress  He has no wheezes  Decreased at bases   Abdominal: Soft  Musculoskeletal: He exhibits no edema  Neurological: He is alert and oriented to person, place, and time  Skin: Skin is warm and dry  He is not diaphoretic  Psychiatric: He has a normal mood and affect  His behavior is normal    Nursing note and vitals reviewed      LABORATORY RESULTS:      CBC with diff:   Results from last 7 days  Lab Units 10/18/17  0401 10/17/17  2116 10/17/17  1324 10/17/17  1306 10/17/17  1219 10/17/17  1133 10/17/17  1129 10/17/17  1110   WBC Thousand/uL 12 06*  --   --   --   --   --   --   --    HEMOGLOBIN g/dL 10 6* 11 0*  --  11 0*  --   --   --   --    I STAT HEMOGLOBIN g/dl  --   --  10 2*  --  9 2* 9 9*  --  9 9*   HEMATOCRIT % 31 2* 32 9*  --  32 2*  --   --   --   --    MCV fL 89  --   --   --   --   --   --   --    PLATELETS Thousands/uL 151  --   --  137*  --   --  218  --    MCH pg 30 2  --   --   --   --   --   --   --    MCHC g/dL 34 0  --   --   --   --   --   --   --    RDW % 13 0  --   --   --   --   --   --   --    MPV fL 9 1  --   --  8 8*  --   --  8 7*  --      CMP:  Results from last 7 days  Lab Units 10/18/17  0401 10/17/17  2116 10/17/17  1748 10/17/17  1324 10/17/17  1306 10/17/17  1219 10/17/17  1133 10/17/17  1110 10/17/17  1052   SODIUM mmol/L 138  --   --   --  140  --   --   --   --    POTASSIUM mmol/L 3 8 4 0 3 8  --  4 1  --   --   --   --    CHLORIDE mmol/L 106  --   --   --  108  --   --   --   --    CO2 mmol/L 24  --   --   --  25 --   --   --   --    ANION GAP mmol/L 8  --   --   --  7  --   --   --   --    BUN mg/dL 10  --   --   --  12  --   --   --   --    CREATININE mg/dL 0 63  --   --   --  0 84  --   --   --   --    GLUCOSE RANDOM mg/dL 120  --   --   --  128  --   --   --   --    GLUCOSE, ISTAT mg/dl  --   --   --  125  --  133 163* 167* 172*   CALCIUM mg/dL 7 3*  --   --   --  7 5*  --   --   --   --    EGFR ml/min/1 73sq m 100  --   --   --  89  --   --   --   --      BMP:  Results from last 7 days  Lab Units 10/18/17  0401 10/17/17  2116 10/17/17  1748  10/17/17  1306   SODIUM mmol/L 138  --   --   --  140   POTASSIUM mmol/L 3 8 4 0 3 8  --  4 1   CHLORIDE mmol/L 106  --   --   --  108   CO2 mmol/L 24  --   --   --  25   BUN mg/dL 10  --   --   --  12   CREATININE mg/dL 0 63  --   --   --  0 84   GLUCOSE RANDOM mg/dL 120  --   --   --  128   GLUCOSE, ISTAT   --   --   --   < >  --    CALCIUM mg/dL 7 3*  --   --   --  7 5*   < > = values in this interval not displayed       Results from last 7 days  Lab Units 10/18/17  0401   MAGNESIUM mg/dL 2 5         Lipid Profile:   Lab Results   Component Value Date    CHOL 145 2017    CHOL 182 2017     Lab Results   Component Value Date    HDL 50 2017    HDL 52 2017     Lab Results   Component Value Date    LDLCALC 78 2017    LDLCALC 109 (H) 2017     Lab Results   Component Value Date    TRIG 84 2017    TRIG 107 2017         Cardiac testing:   Results for orders placed during the hospital encounter of 17   Echo complete with contrast if indicated    Narrative [address] [facility] [address] [facility], [facility] [address]  [phone]    Transthoracic Echocardiogram  2D, M-mode, Doppler, and Color Doppler    Study date:  08-Mar-2017    Patient: Joby William  MR number: [id number]  Account number: [de-identified]  : 1945  Age: 73 years  Gender: Male  Status: Outpatient  Location: Echo lab  Height: 67 in  Weight: 214 5 lb  BP: 146/ 80 mmHg    Diagnoses: 785 2 - CARDIAC MURMURS NEC    Sonographer:  Vida Serrano RDCS  Primary Physician:  Bogdan Leone DO  Referring Physician:  Bogdan Leone DO  Group:  [id number]  Interpreting Physician:  Corinne Oh MD    SUMMARY    LEFT VENTRICLE:  Size was normal   Systolic function was normal  Ejection fraction was estimated to be 60 %  There were no regional wall motion abnormalities  Wall thickness was increased  There was mild concentric hypertrophy  LEFT ATRIUM:  The atrium was mildly dilated  MITRAL VALVE:  There was mild annular calcification  There was mild regurgitation  AORTIC VALVE:  The valve was probably trileaflet  Leaflets exhibited moderate calcification and markedly reduced cuspal separation  There was severe stenosis  There was mild regurgitation  Valve peak gradient was 62 mmHg  Valve mean gradient was 35 mmHg  Aortic valve area was 0 6 cm squared by the continuity equation  TRICUSPID VALVE:  There was mild regurgitation  Estimated peak PA pressure was 40 mmHg  HISTORY: PRIOR HISTORY: HYPERLIPIDEMIA, CHRONIC BACK PAIN, MURMUR, AORTIC STENOSIS    PROCEDURE: The procedure was performed in the echo lab  This was a routine study  The transthoracic approach was used  The study included complete 2D imaging, M-mode, complete spectral Doppler, and color Doppler  Images were obtained from  the parasternal, apical, subcostal, and suprasternal notch acoustic windows  Image quality was adequate  LEFT VENTRICLE: Size was normal  Systolic function was normal  Ejection fraction was estimated to be 60 %  There were no regional wall motion abnormalities  Wall thickness was increased  There was mild concentric hypertrophy  RIGHT VENTRICLE: The size was normal  Systolic function was normal  Wall thickness was normal     LEFT ATRIUM: The atrium was mildly dilated      RIGHT ATRIUM: Size was normal     MITRAL VALVE: There was mild annular calcification  DOPPLER: There was mild regurgitation  AORTIC VALVE: The valve was probably trileaflet  Leaflets exhibited moderate calcification and markedly reduced cuspal separation  DOPPLER: There was severe stenosis  There was mild regurgitation  TRICUSPID VALVE: DOPPLER: There was mild regurgitation  Estimated peak PA pressure was 40 mmHg  PULMONIC VALVE: Not well visualized  PERICARDIUM: There was no pericardial effusion  The pericardium was normal in appearance  AORTA: The root exhibited normal size      MEASUREMENT TABLES    DOPPLER MEASUREMENTS  Aortic valve   (Reference normals)  Peak gradient   62 mmHg   (--)  Mean gradient   35 mmHg   (--)  Valve area, cont   0 6 cm squared   (--)    SYSTEM MEASUREMENT TABLES    2D  %FS: 41 41 %  AV Diam: 3 19 cm  EDV(Teich): 154 63 ml  EF(Teich): 71 67 %  ESV(Teich): 43 81 ml  IVSd: 0 99 cm  LA Area: 21 71 cm2  LA Diam: 4 42 cm  LVEDV MOD A4C: 133 34 ml  LVEF MOD A4C: 33 55 %  LVESV MOD A4C: 88 61 ml  LVIDd: 5 62 cm  LVIDs: 3 29 cm  LVLd A4C: 7 54 cm  LVLs A4C: 6 64 cm  LVOT Diam: 2 06 cm  LVPWd: 1 15 cm  RA Area: 11 96 cm2  RV DIAM: 3 35 cm  SV MOD A4C: 44 74 ml  SV(Teich): 110 82 ml    CW  AR Dec [place]: 2 21 m/s2  AR Dec Time: 2293 88 ms  AR PHT: 665 23 ms  AR Vmax: 4 61 m/s  AR maxP 11 mmHg  AV VTI: 86 81 cm  AV Vmax: 3 81 m/s  AV Vmax: 3 94 m/s  AV Vmean: 2 8 m/s  AV maxP 99 mmHg  AV maxP 23 mmHg  AV meanP 69 mmHg  PV Vmax: 1 19 m/s  PV maxP 67 mmHg  TR Vmax: 3 07 m/s  TR maxP 58 mmHg    MM  TAPSE: 2 41 cm    PW  PHILLY (VTI): 0 61 cm2  PHILLY Vmax: 0 55 cm2  PHILLY Vmax: 0 57 cm2  PHILLY Vmax, Pt: 0 54 cm2  PHILLY Vmax, Pt: 0 56 cm2  E': 0 09 m/s  E/E': 9 8  LVOT VTI: 15 74 cm  LVOT Vmax: 0 64 m/s  LVOT Vmax: 0 65 m/s  LVOT Vmean: 0 48 m/s  LVOT maxP 65 mmHg  LVOT maxP 71 mmHg  LVOT meanP 02 mmHg  MV A Anirudh: 0 69 m/s  MV Dec [place]: 5 17 m/s2  MV DecT: 178 9 ms  MV E Anirudh: 0 93 m/s  MV E/A Ratio: 1 35  RVOT Vmax: 0 49 m/s  RVOT maxP 98 mmHg    [facility]    Prepared and electronically signed by    Masoud Valadez MD  Signed 08-Mar-2017 14:11:26       No results found for this or any previous visit  No procedure found  No results found for this or any previous visit  Imaging: I have personally reviewed pertinent reports  Xr Chest Portable    Result Date: 10/18/2017  Narrative: CHEST INDICATION:  Open heart surgery  COMPARISON:  10/17/2017 VIEWS:   AP frontal IMAGES:  1 FINDINGS:     Cardiomediastinal silhouette appears unremarkable  A median sternotomy has been performed  Prosthetic aortic valve  Right IJ line extends to the junction of the SVC with the right atrium  No infiltrates  No evidence of heart failure  Minimal blunting of the costophrenic angles  No pneumothorax  Visualized osseous structures appear within normal limits for the patient's age  Impression: Right IJ line as described  No pneumothorax  Minimal blunting of the costophrenic angles  Workstation performed: [id number]     Xr Chest Pa & Lateral    Result Date: 10/10/2017  Narrative: CHEST INDICATION: Preop, open heart surgery  Psoriasis [L40 9 (ICD-10-CM)] COMPARISON: 2017 VIEWS:  PA and lateral IMAGES:  2 FINDINGS: The cardiomediastinal silhouette is unremarkable  The lungs are clear  No pleural effusions  Mild to moderate degenerative changes, thoracic spine  Impression: No active pulmonary disease    Workstation performed: [id number]     Vas Carotid Complete Study    Result Date: 10/10/2017  Narrative:  [facility] REPORT CLINICAL: Indications:  Pre-op for valve replacement  Patient is asymptomatic  Risk Factors The patient has no history of HTN, Diabetes or hyperlipidemia  Clinical Right Brachial Pressure:  119/80 mmHg, Left Brachial Pressure:  119/79 mmHg    FINDINGS:  Right        Impression  PSV  EDV (cm/s)  Direction of Flow  Ratio  Dist  ICA                 50 15                      0 65  Mid  ICA                  50          14                      0 65  Prox  ICA    Normal       73          12                      0 95  Dist CCA                  77          14                            Mid CCA                   77          16                      1 13  Prox CCA                  68           9                            Ext Carotid               77           0                      1 00  Prox Vert                 30           8  Antegrade                 Subclavian                97           0                             Left         Impression  PSV  EDV (cm/s)  Direction of Flow  Ratio  Dist  ICA                 53          16                      0 64  Mid  ICA                  59          16                      0 70  Prox  ICA    1 - 49%      57          12                      0 68  Dist CCA                  79           9                            Mid CCA                   83          18                      0 90  Prox CCA                  93          21                            Ext Carotid               56           0                      0 68  Prox Vert                 52          17  Antegrade                 Subclavian                96           0                               CONCLUSION: Impression RIGHT: There is no evidence of significant stenosis noted  Vertebral artery flow is antegrade  There is no significant subclavian artery disease  LEFT: There is <50% stenosis noted in the internal carotid artery  Plaque is heterogenous and smooth  Vertebral artery flow is antegrade  There is no significant subclavian artery disease  There is no previous exam for comparison  Internal carotid artery stenosis determination by consensus criteria from: Raquel Sharif , et al  Carotid Artery Stenosis: Gray-Scale and Doppler US Diagnosis - Society of Radiologists in [address] [place] [address], Radiology 2003; [phone]    SIGNATURE: Electronically Signed by: Barbara Matson MD, RPVI on 2017-10-10 02:38:32 PM    Xr Chest Portable Icu    Result Date: 10/17/2017  Narrative: CHEST portable INDICATION: Status post open heart surgery  COMPARISON: None VIEWS:  AP semierect; IMAGES:  1 FINDINGS: The patient is rotated to the left  Endotracheal tube tip 3 5 cm above the kal  Right internal jugular catheter tip terminates overlying the atrial caval junction  Right internal jugular [place] catheter overlies the pulmonary outflow tract  Nasogastric tube tip in stomach  Midline sternotomy wires are present from previous cardiac valve replacement surgery  The cardiomediastinal silhouette is unremarkable  Minimal density at the medial right lung base likely atelectatic change  Remainder of the lungs are well aerated without opacities  No pleural effusions  No pneumothorax  Bony thorax is otherwise unremarkable  Impression: Lines and tubes, as described above  Medial right lung bases likely postop atelectasis (versus early infiltrate)  No pneumothorax    Workstation performed: [id number]       EKG reviewed personally:   10/17/17  Sinus rhythm  Nonspecific T-wave abnormality  Prolonged QT    Telemetry reviewed personally:   Sinus rhythm, no significant events    Assessment  Principal Problem:    Nonrheumatic aortic valve stenosis  Active Problems: Aortic stenosis, severe    Arthritis    Chronic pain    Chronic, continuous use of opioids    Hyperlipidemia    Hypertension    Insomnia    Peripheral neuropathy    RA (rheumatoid arthritis) (HCC)    Acute postoperative respiratory insufficiency      Assessment/ Plan    Severe symptomatic aortic stenosis:  Status post bioprosthetic AVR  POD #1  Continue routine postoperative care per CT surgery    Patient maintaining sinus rhythm  On aspirin, statin, beta-blocker  Lasix 40 mg IV daily for postop volume overload  Pain control is being addressed  Encourage IS, ambulation  Chest tubes and pacing wires remain  Hypertension:  BP control adequate on current regimen    Hyperlipidemia:  Continue statin    Acute pain  Rheumatoid arthritis  Patient's pain is being managed by pain management services  Thank you for allowing us to participate in this patient's care  This pt will follow up with Dr Luisito Ignacio  once discharged  Counseling / Coordination of Care  Total floor / unit time spent today 30 minutes  Greater than 50% of total time was spent with the patient and / or family counseling and / or coordination of care  A description of the counseling / coordination of care: Review of history, current assessment, development of a plan  Code Status: Level 1 - Full Code    ** Please Note: Dragon 360 Dictation voice to text software may have been used in the creation of this document   **

## 2017-10-18 NOTE — SOCIAL WORK
71yo male is POD#1 AVR  Has rheumatoid arthritis  He is alert and oriented, oob chair, in severe pain from RA  He is normally independent ADLs, retired, drives  He resides in [facility] [place] [facility] with his wife Grant Velarde, phone [phone] or cell [phone]  They live in one story home with ramp  Only DME is cane  No hx HHC or rehab  Pt  Has no preference for HHC in [place], referral sent to [facility] in [place]  Uses [employer] on [address]  In [place] [facility] [place], [phone]  Wife will transport home at discharge  Anticipate d/c approx  10/22/17  CM reviewed d/c planning process including the following: identifying help at home, patient preference for d/c planning needs, Discharge Lounge, [facility] Meds to Bed program, availability of treatment team to discuss questions or concerns patient and/or family may have regarding understanding medications and recognizing signs and symptoms once discharged  CM also encouraged patient to follow up with all recommended appointments after discharge  Patient advised of importance for patient and family to participate in managing patients medical well being

## 2017-10-18 NOTE — PROGRESS NOTES
Progress Note - Cardiothoracic Surgery Critical Care   Prudence Gentle 81 y o  male MRN: [id number]  Unit/Bed#: [facility] 418-01 Encounter: [id number]      Attending Physician: Roselyn Zepeda MD    24 Hour Events: POD # 1 s/p AVR with bioprosthetic valve  Cardene started overnight for elevated BP and is being weaned to off  Acute pain services consulted  Dumped 110cc from mediastinal chest tube when getting out of bed this AM      Allergies:    Allergies   Allergen Reactions    Penicillins Anaphylaxis    Quinine Derivatives Other (See Comments)     High fever       Medications:   Scheduled Meds:  acetaminophen 650 mg Oral Q6H [address]   amiodarone 200 mg Oral Q8H [address]   aspirin 325 mg Oral Daily   atorvastatin 40 mg Oral Daily   calcium chloride 1 g Intravenous Once   carisoprodol 350 mg Oral 4x Daily   ciprofloxacin 400 mg Intravenous Q12H   fondaparinux 2 5 mg Subcutaneous Daily   gabapentin 1,800 mg Oral HS   mupirocin 1 application Nasal E01N [address]   pantoprazole 40 mg Oral Daily   polyethylene glycol 17 g Oral Daily   traZODone 400 mg Oral HS   vancomycin 1,000 mg Intravenous Q12H       VTE Pharmacologic Prophylaxis: Fondaparinux (Arixtra)  VTE Mechanical Prophylaxis: sequential compression device    Continuous Infusions:  insulin regular (HumuLIN R,NovoLIN R) infusion 0 3-21 Units/hr Last Rate: 1 5 Units/hr (10/18/17 0000)   niCARdipine 1-15 mg/hr Last Rate: 2 5 mg/hr (10/18/17 0340)   sodium chloride 20 mL/hr Last Rate: 20 mL/hr (10/17/17 1800)     PRN Meds:    bisacodyl 10 mg Daily PRN   fentanyl citrate (PF) 50 mcg Q1H PRN   furosemide 40 mg Q6H PRN   HYDROmorphone 1 mg Q3H PRN   lactated ringers 500 mL Q30 Min PRN   lidocaine (cardiac) 100 mg Q30 Min PRN   ondansetron 4 mg Q6H PRN   oxyCODONE 10 mg Q4H PRN   oxyCODONE 15 mg Q4H PRN   potassium chloride 20 mEq Once PRN   potassium chloride 20 mEq Q1H PRN   potassium chloride 20 mEq Q30 Min PRN       Home Medications:   Prior to Admission medications    Medication Sig Start Date End Date Taking? Authorizing Provider   adalimumab (HUMIRA) 40 mg/0 8 mL PSKT Inject 40 mg under the skin once Biweekly stopped sept 3rd  Yes Historical Provider, MD   aspirin (ECOTRIN LOW STRENGTH) 81 mg EC tablet Take 81 mg by mouth daily   Yes Historical Provider, MD   atorvastatin (LIPITOR) 40 mg tablet Take 40 mg by mouth daily   Yes Historical Provider, MD   carisoprodol (SOMA) 350 mg tablet Take 350 mg by mouth 4 (four) times a day   Yes Historical Provider, MD   fluocinonide (LIDEX) 0 05 % ointment Apply topically 2 (two) times a day   Yes Historical Provider, MD   furosemide (LASIX) 40 mg tablet Take 1 tablet by mouth daily 9/29/17  Yes Lashawn Irwin MD   gabapentin (NEURONTIN) 600 MG tablet Take 300 mg by mouth 3 (three) times a day   Yes Historical Provider, MD   oxyCODONE (OxyCONTIN) 10 mg 12 hr tablet Take 10 mg by mouth every 12 (twelve) hours   Yes Historical Provider, MD   oxyCODONE-acetaminophen (PERCOCET)  mg per tablet Take 1 tablet by mouth every 4 (four) hours as needed for moderate pain   Yes Historical Provider, MD   traMADol (ULTRAM) 50 mg tablet Take 50 mg by mouth every 6 (six) hours as needed for moderate pain   Yes Historical Provider, MD   traZODone (DESYREL) 100 mg tablet Take 50 mg by mouth daily at bedtime   Yes Historical Provider, MD   loperamide (IMODIUM) 2 mg capsule Take 2 mg by mouth 4 (four) times a day as needed for diarrhea    Historical Provider, MD       Vitals:   [place]:    10/18/17 0100 10/18/17 0300 10/18/17 0400 10/18/17 0500   BP: 127/51 141/57 (!) 130/49 (!) 133/49   Pulse: 66 68 70 68   Resp: (!) 11 18 21 19   Temp: 98 8 °F (37 1 °C) 99 1 °F (37 3 °C) 99 3 °F (37 4 °C)    TempSrc: Probe Probe Probe    SpO2: 97% 96% 95% 95%   Weight:       Height:         Arterial Line BP: 134/44  Arterial Line MAP (mmHg): 66 mmHg    Tele Rhythm: NSR This was personally reviewed by myself      Respiratory:  SpO2: SpO2: 96 %  O2 Flow Rate (L/min): 2 L/min    Temperature: Temp (24hrs), Av 2 °F (36 8 °C), Min:96 8 °F (36 °C), Max:99 3 °F (37 4 °C)  Current: Temperature: 99 3 °F (37 4 °C)    Weights:   Weight (last 2 days)     Date/Time   Weight    10/17/17 0558  90 7 (200)            IBW: 68 4 kg  Body mass index is 30 41 kg/m²  Hemodynamic Monitoring:  SVR: SVR (dyne*sec)/cm5: 749 (dyne*sec)/cm5  PAP: (25-51)/(7-24) 26/9  CO:  [4 3 L/min-7 9 L/min] 6 4 L/min  CI:  [2 1 L/min/m2-3 9 L/min/m2] 3 1 L/min/m2    Intake and Outputs:  Intake/Output Summary (Last 24 hours) at 10/18/17 0603  Last data filed at 10/18/17 0400   Gross per 24 hour   Intake          4971 33 ml   Output             3365 ml   Net         + 1606 33 ml     I/O last 24 hours: In: 4971 3 [I V :2871 3; IV Piggyback:1350]  Out: 3365 [IDQPC:0588; Emesis/NG output:100; Blood:750; Chest Tube:600]    UOP: /hour     Chest tube Output:  Mediastinal tubes: 300 mL/8 hours  710 mL/24 hours          Labs:    Results from last 7 days  Lab Units 10/18/17  0401 10/17/17  2116 10/17/17  1324 10/17/17  1306  10/17/17  1129   WBC Thousand/uL 12 06*  --   --   --   --   --    HEMOGLOBIN g/dL 10 6* 11 0*  --  11 0*  --   --    I STAT HEMOGLOBIN g/dl  --   --  10 2*  --   < >  --    HEMATOCRIT % 31 2* 32 9*  --  32 2*  --   --    PLATELETS Thousands/uL 151  --   --  137*  --  218   < > = values in this interval not displayed      Results from last 7 days  Lab Units 10/18/17  0401 10/17/17  2116 10/17/17  1748 10/17/17  1324 10/17/17  1306   SODIUM mmol/L 138  --   --   --  140   POTASSIUM mmol/L 3 8 4 0 3 8  --  4 1   CHLORIDE mmol/L 106  --   --   --  108   CO2 mmol/L 24  --   --   --  25   BUN mg/dL 10  --   --   --  12   CREATININE mg/dL 0 63  --   --   --  0 84   CALCIUM mg/dL 7 3*  --   --   --  7 5*   GLUCOSE RANDOM mg/dL 120  --   --   --  128   GLUCOSE, ISTAT mg/dl  --   --   --  125  --        Results from last 7 days  Lab Units 10/18/17  0401   MAGNESIUM mg/dL 2 5         CXR: Lines good position, no PTX This was personally reviewed by myself in PACS  EKG: NSR This was personally reviewed by myself  Physical Exam:     Neck:  Supple  Cardiac: Regular rate and rhythm   + rubs  No murmur  Pulmonary:  Breath sounds CTA  Abdomen:  Soft  Non distension  Nontender to palpation  Incisions: Sternum is stable  Incision is clean, dry, and intact           Lower extremities: Trace edema B/L  Neuro: Alert  Follows commands  Screening neurologic exam reveals no obvious deficits  Skin: Warm and dry  Invasive lines and devices: Invasive Devices     Central Venous Catheter Line            CVC Central Lines 10/17/17 Triple less than 1 day    Introducer 10/17/17 less than 1 day          Peripheral Intravenous Line            Peripheral IV 10/17/17 Left Hand less than 1 day          Arterial Line            Arterial Line 10/17/17 Right Radial less than 1 day          Line            Pacer Wires less than 1 day    Pacer Wires less than 1 day          Drain            Chest Tube 1 Posterior Mediastinal 32 Fr  less than 1 day    Chest Tube 2 Anterior Mediastinal 32 Fr  less than 1 day    Urethral Catheter Non-latex; Temperature probe 16 Fr  less than 1 day                Assessment:  Patient Active Problem List    Diagnosis Date Noted    Chronic pain      Priority: High    Chronic, continuous use of opioids      Priority: High    Nonrheumatic aortic valve stenosis 09/29/2017     Priority: High    Acute postoperative respiratory insufficiency 10/18/2017     Priority: Medium    Aortic stenosis, severe      Priority: Medium    Hyperlipidemia      Priority: Medium    Hypertension      Priority: Medium    Arthritis      Priority: Low    Insomnia      Priority: Low    Peripheral neuropathy      Priority: Low    RA (rheumatoid arthritis) ([facility])      Priority: Low       Plan:    Cardiac:   Cardiac infusions: Cardene, 4 mg/hour  Hemodynamic goals:   MAP >65     D/C as able arterial line  NSR; HR/BP well-controlled  Start lopressor 25 mg PO BID  Continue ASA, PO amiodarone, and statin therapy  Maintain epicardial pacing wires for    Continue DVT prophylaxis  Consult cardiology for postoperative medical management    Pulmonary:   Extubated  Chest tube output remains persistently high; Continue chest tubes to suction today  Acute post-op hypoxic respiratory insufficiency:  Continue incentive spirometry/coughing/deep breathing exercises  Wean supplemental oxygen as tolerated  Renal:   Discontinue IV potassium replacement scales  Post-op volume overload: Add Lasix 40 mg IV q D  Add K-dur 20 mEq q D  D/C song catheter     Neuro:  Neurologically intact with no active issues  Incisional pain well-controlled  Continue acute pain care service recommendations     GI:  Initiate TLC 2 3 gm sodium diet with consistent carbohydrate modifier and 1800 mL fluid restriction  Continue stool softeners and prn suppository  Continue GI prophylaxis with PPI          Endo: No history of diabetes: Glucose well-controlled  Discontinue continuous insulin infusion and add Insulin sliding scale coverage    Hematology:   Post-operative acute blood loss anemia:   Trend hemoglobin and platelets  Transfuse as needed       Disposition:   Transfer from ICU to telemetry today    SIGNATURE: Kt Alexandre PA-C  DATE: October 18, 2017  TIME: 6:03 AM

## 2017-10-19 LAB
ABO GROUP BLD BPU: NORMAL
ANION GAP SERPL CALCULATED.3IONS-SCNC: 7 MMOL/L (ref 4–13)
BPU ID: NORMAL
BUN SERPL-MCNC: 14 MG/DL (ref 5–25)
CALCIUM SERPL-MCNC: 8.2 MG/DL (ref 8.3–10.1)
CHLORIDE SERPL-SCNC: 105 MMOL/L (ref 100–108)
CO2 SERPL-SCNC: 26 MMOL/L (ref 21–32)
CREAT SERPL-MCNC: 0.86 MG/DL (ref 0.6–1.3)
CROSSMATCH: NORMAL
ERYTHROCYTE [DISTWIDTH] IN BLOOD BY AUTOMATED COUNT: 13.5 % (ref 11.6–15.1)
GFR SERPL CREATININE-BSD FRML MDRD: 88 ML/MIN/1.73SQ M
GLUCOSE SERPL-MCNC: 126 MG/DL (ref 65–140)
GLUCOSE SERPL-MCNC: 127 MG/DL (ref 65–140)
GLUCOSE SERPL-MCNC: 127 MG/DL (ref 65–140)
GLUCOSE SERPL-MCNC: 140 MG/DL (ref 65–140)
GLUCOSE SERPL-MCNC: 141 MG/DL (ref 65–140)
HCT VFR BLD AUTO: 30.1 % (ref 36.5–49.3)
HGB BLD-MCNC: 9.8 G/DL (ref 12–17)
MAGNESIUM SERPL-MCNC: 2.5 MG/DL (ref 1.6–2.6)
MCH RBC QN AUTO: 29.8 PG (ref 26.8–34.3)
MCHC RBC AUTO-ENTMCNC: 32.6 G/DL (ref 31.4–37.4)
MCV RBC AUTO: 92 FL (ref 82–98)
PLATELET # BLD AUTO: 154 THOUSANDS/UL (ref 149–390)
PMV BLD AUTO: 9.4 FL (ref 8.9–12.7)
POTASSIUM SERPL-SCNC: 4.3 MMOL/L (ref 3.5–5.3)
RBC # BLD AUTO: 3.29 MILLION/UL (ref 3.88–5.62)
SODIUM SERPL-SCNC: 138 MMOL/L (ref 136–145)
UNIT DISPENSE STATUS: NORMAL
UNIT PRODUCT CODE: NORMAL
UNIT RH: NORMAL
WBC # BLD AUTO: 12.81 THOUSAND/UL (ref 4.31–10.16)

## 2017-10-19 PROCEDURE — 83735 ASSAY OF MAGNESIUM: CPT | Performed by: PHYSICIAN ASSISTANT

## 2017-10-19 PROCEDURE — 82948 REAGENT STRIP/BLOOD GLUCOSE: CPT

## 2017-10-19 PROCEDURE — 85027 COMPLETE CBC AUTOMATED: CPT | Performed by: PHYSICIAN ASSISTANT

## 2017-10-19 PROCEDURE — 80048 BASIC METABOLIC PNL TOTAL CA: CPT | Performed by: PHYSICIAN ASSISTANT

## 2017-10-19 RX ADMIN — POTASSIUM CHLORIDE 20 MEQ: 1500 TABLET, EXTENDED RELEASE ORAL at 08:52

## 2017-10-19 RX ADMIN — TRAZODONE HYDROCHLORIDE 400 MG: 100 TABLET ORAL at 22:15

## 2017-10-19 RX ADMIN — OXYCODONE HYDROCHLORIDE 15 MG: 5 TABLET ORAL at 08:51

## 2017-10-19 RX ADMIN — ATORVASTATIN CALCIUM 40 MG: 40 TABLET, FILM COATED ORAL at 08:52

## 2017-10-19 RX ADMIN — HYDROMORPHONE HYDROCHLORIDE 1 MG: 1 INJECTION, SOLUTION INTRAMUSCULAR; INTRAVENOUS; SUBCUTANEOUS at 11:24

## 2017-10-19 RX ADMIN — CARISOPRODOL 350 MG: 350 TABLET ORAL at 16:13

## 2017-10-19 RX ADMIN — ACETAMINOPHEN 650 MG: 325 TABLET, FILM COATED ORAL at 18:08

## 2017-10-19 RX ADMIN — DOCUSATE SODIUM 100 MG: 100 CAPSULE, LIQUID FILLED ORAL at 18:08

## 2017-10-19 RX ADMIN — METOPROLOL TARTRATE 25 MG: 25 TABLET ORAL at 20:26

## 2017-10-19 RX ADMIN — OXYCODONE HYDROCHLORIDE 15 MG: 5 TABLET ORAL at 14:07

## 2017-10-19 RX ADMIN — PANTOPRAZOLE SODIUM 40 MG: 40 TABLET, DELAYED RELEASE ORAL at 08:51

## 2017-10-19 RX ADMIN — METOPROLOL TARTRATE 25 MG: 25 TABLET ORAL at 08:52

## 2017-10-19 RX ADMIN — GABAPENTIN 1800 MG: 400 CAPSULE ORAL at 22:16

## 2017-10-19 RX ADMIN — POLYETHYLENE GLYCOL 3350 17 G: 17 POWDER, FOR SOLUTION ORAL at 08:51

## 2017-10-19 RX ADMIN — CARISOPRODOL 350 MG: 350 TABLET ORAL at 22:15

## 2017-10-19 RX ADMIN — ASPIRIN 325 MG: 325 TABLET ORAL at 08:52

## 2017-10-19 RX ADMIN — OXYCODONE HYDROCHLORIDE 15 MG: 5 TABLET ORAL at 18:08

## 2017-10-19 RX ADMIN — MUPIROCIN 1 APPLICATION: 20 OINTMENT TOPICAL at 09:47

## 2017-10-19 RX ADMIN — CARISOPRODOL 350 MG: 350 TABLET ORAL at 11:51

## 2017-10-19 RX ADMIN — HYDROMORPHONE HYDROCHLORIDE 1 MG: 1 INJECTION, SOLUTION INTRAMUSCULAR; INTRAVENOUS; SUBCUTANEOUS at 06:28

## 2017-10-19 RX ADMIN — CARISOPRODOL 350 MG: 350 TABLET ORAL at 06:27

## 2017-10-19 RX ADMIN — FONDAPARINUX SODIUM 2.5 MG: 2.5 INJECTION, SOLUTION SUBCUTANEOUS at 08:51

## 2017-10-19 RX ADMIN — OXYCODONE HYDROCHLORIDE 15 MG: 5 TABLET ORAL at 04:52

## 2017-10-19 RX ADMIN — ACETAMINOPHEN 650 MG: 325 TABLET, FILM COATED ORAL at 06:27

## 2017-10-19 RX ADMIN — MUPIROCIN 1 APPLICATION: 20 OINTMENT TOPICAL at 20:26

## 2017-10-19 RX ADMIN — OXYCODONE HYDROCHLORIDE 15 MG: 5 TABLET ORAL at 22:14

## 2017-10-19 RX ADMIN — DOCUSATE SODIUM 100 MG: 100 CAPSULE, LIQUID FILLED ORAL at 08:52

## 2017-10-19 RX ADMIN — ACETAMINOPHEN 650 MG: 325 TABLET, FILM COATED ORAL at 11:51

## 2017-10-19 RX ADMIN — HYDROMORPHONE HYDROCHLORIDE 1 MG: 1 INJECTION, SOLUTION INTRAMUSCULAR; INTRAVENOUS; SUBCUTANEOUS at 19:58

## 2017-10-19 RX ADMIN — FUROSEMIDE 40 MG: 10 INJECTION, SOLUTION INTRAMUSCULAR; INTRAVENOUS at 08:52

## 2017-10-19 NOTE — PROGRESS NOTES
Attempted to have pt walk in [place]  Pt refused and stated that he could not walk today due to pain  Pt has been receiving scheduled pain medication and PRN pain medication  Educated on the benefits of walking post op  Pt still refusing walks at this time  Will continue to educate and encourage walking

## 2017-10-19 NOTE — PROGRESS NOTES
Cardiology Progress Note - Roger Anne 81 y o  male MRN: [id number]    Unit/Bed#: [facility] 423-01 Encounter: [id number]    Assessment and plan  1  Status post bioprosthetic AVR  2  Hypertension  3  Rheumatoid arthritis  4  Hyperlipidemia    Recommendations:  Patient currently doing well postop AVR  Blood pressure well controlled  No significant events on telemetry  Continue current cardiac medications  Continue diuresis  Low-dose beta-blocker  Subjective:    No significant events overnight  Chest pain well controlled  He does have chronic back pain which seems to be worse than his chest pain  Breathing is comfortable  ROS    Objective:   Vitals: Blood pressure 111/51, pulse 77, temperature 98 7 °F (37 1 °C), temperature source Oral, resp  rate 18, height 5' 8" (1 727 m), weight 94 8 kg (208 lb 15 9 oz), SpO2 91 % , Body mass index is 31 78 kg/m² , Orthostatic Blood Pressures    Flowsheet Row Most Recent Value   Blood Pressure  111/51 filed at 10/19/2017 0851   Patient Position - Orthostatic VS  Sitting filed at 10/19/2017 0649         Systolic (56BEV), REC:647 , Min:111 , CGK:549     Diastolic (69NFQ), BZT:33, Min:44, Max:68      Intake/Output Summary (Last 24 hours) at 10/19/17 0958  Last data filed at 10/19/17 0909   Gross per 24 hour   Intake            901 5 ml   Output             3110 ml   Net          -2208 5 ml     Weight (last 2 days)     Date/Time   Weight    10/19/17 0657  94 8 (209)    10/18/17 0600  76 2 (167 99)    10/17/17 0558  90 7 (200)                Telemetry Review: No significant arrhythmias seen on telemetry review  EKG personally reviewed by Lam Weiss DO  Physical Exam   Constitutional: He is oriented to person, place, and time  He appears well-nourished  No distress  HENT:   Head: Normocephalic and atraumatic  Eyes: Conjunctivae are normal  Pupils are equal, round, and reactive to light  Neck: Neck supple  Cardiovascular: Normal rate      No murmur heard   Pulmonary/Chest: Effort normal and breath sounds normal  No respiratory distress  He has no wheezes  He has no rales  Abdominal: Soft  Bowel sounds are normal  He exhibits no distension  There is no tenderness  There is no rebound  Musculoskeletal: He exhibits edema  Neurological: He is oriented to person, place, and time  No cranial nerve deficit  Skin: Skin is warm and dry  No erythema           Laboratory Results:        CBC with diff:   Results from last 7 days  Lab Units 10/19/17  0451 10/18/17  0401 10/17/17  2116 10/17/17  1324 10/17/17  1306 10/17/17  1219 10/17/17  1133 10/17/17  1129   WBC Thousand/uL 12 81* 12 06*  --   --   --   --   --   --    HEMOGLOBIN g/dL 9 8* 10 6* 11 0*  --  11 0*  --   --   --    I STAT HEMOGLOBIN g/dl  --   --   --  10 2*  --  9 2* 9 9*  --    HEMATOCRIT % 30 1* 31 2* 32 9*  --  32 2*  --   --   --    MCV fL 92 89  --   --   --   --   --   --    PLATELETS Thousands/uL 154 151  --   --  137*  --   --  218   MCH pg 29 8 30 2  --   --   --   --   --   --    MCHC g/dL 32 6 34 0  --   --   --   --   --   --    RDW % 13 5 13 0  --   --   --   --   --   --    MPV fL 9 4 9 1  --   --  8 8*  --   --  8 7*         CMP:  Results from last 7 days  Lab Units 10/19/17  0451 10/18/17  0401 10/17/17  2116 10/17/17  1748 10/17/17  1324 10/17/17  1306 10/17/17  1219 10/17/17  1133 10/17/17  1110   SODIUM mmol/L 138 138  --   --   --  140  --   --   --    POTASSIUM mmol/L 4 3 3 8 4 0 3 8  --  4 1  --   --   --    CHLORIDE mmol/L 105 106  --   --   --  108  --   --   --    CO2 mmol/L 26 24  --   --   --  25  --   --   --    ANION GAP mmol/L 7 8  --   --   --  7  --   --   --    BUN mg/dL 14 10  --   --   --  12  --   --   --    CREATININE mg/dL 0 86 0 63  --   --   --  0 84  --   --   --    GLUCOSE RANDOM mg/dL 127 120  --   --   --  128  --   --   --    GLUCOSE, ISTAT mg/dl  --   --   --   --  125  --  133 163* 167*   CALCIUM mg/dL 8 2* 7 3*  --   --   --  7 5*  --   --   --    EGFR ml/min/1 73sq m 88 100  --   --   --  89  --   --   --          BMP:  Results from last 7 days  Lab Units 10/19/17  0451 10/18/17  0401 10/17/17  2116 10/17/17  1748  10/17/17  1306   SODIUM mmol/L 138 138  --   --   --  140   POTASSIUM mmol/L 4 3 3 8 4 0 3 8  --  4 1   CHLORIDE mmol/L 105 106  --   --   --  108   CO2 mmol/L 26 24  --   --   --  25   BUN mg/dL 14 10  --   --   --  12   CREATININE mg/dL 0 86 0 63  --   --   --  0 84   GLUCOSE RANDOM mg/dL 127 120  --   --   --  128   GLUCOSE, ISTAT   --   --   --   --   < >  --    CALCIUM mg/dL 8 2* 7 3*  --   --   --  7 5*   < > = values in this interval not displayed  BNP: No results for input(s): BNP in the last 72 hours  Magnesium:   Results from last 7 days  Lab Units 10/19/17  0451 10/18/17  0401   MAGNESIUM mg/dL 2 5 2 5       Coags:       TSH:       Invalid input(s): TSH     Hemoglobin A1C       Lipid Profile:       Cardiac testing:   Results for orders placed during the hospital encounter of 17   Echo complete with contrast if indicated    Narrative [address] [facility] [address] [facility], [facility]  [phone]    Transthoracic Echocardiogram  2D, M-mode, Doppler, and Color Doppler    Study date:  08-Mar-2017    Patient: Jody Lawson  MR number: [id number]  Account number: [de-identified]  : [id number]  Age: 73 years  Gender: Male  Status: Outpatient  Location: Echo lab  Height: 67 in  Weight: 214 5 lb  BP: 146/ 80 mmHg    Diagnoses: 785 2 - CARDIAC MURMURS NEC    Sonographer:  Leann Castillo RDCS  Primary Physician:  Elmira Webb DO  Referring Physician:  Elmira Webb DO  Group:  [id number]  Interpreting Physician:  Mary Ann Taylor MD    SUMMARY    LEFT VENTRICLE:  Size was normal   Systolic function was normal  Ejection fraction was estimated to be 60 %  There were no regional wall motion abnormalities  Wall thickness was increased  There was mild concentric hypertrophy      LEFT ATRIUM:  The atrium was mildly dilated  MITRAL VALVE:  There was mild annular calcification  There was mild regurgitation  AORTIC VALVE:  The valve was probably trileaflet  Leaflets exhibited moderate calcification and markedly reduced cuspal separation  There was severe stenosis  There was mild regurgitation  Valve peak gradient was 62 mmHg  Valve mean gradient was 35 mmHg  Aortic valve area was 0 6 cm squared by the continuity equation  TRICUSPID VALVE:  There was mild regurgitation  Estimated peak PA pressure was 40 mmHg  HISTORY: PRIOR HISTORY: HYPERLIPIDEMIA, CHRONIC BACK PAIN, MURMUR, AORTIC STENOSIS    PROCEDURE: The procedure was performed in the echo lab  This was a routine study  The transthoracic approach was used  The study included complete 2D imaging, M-mode, complete spectral Doppler, and color Doppler  Images were obtained from  the parasternal, apical, subcostal, and suprasternal notch acoustic windows  Image quality was adequate  LEFT VENTRICLE: Size was normal  Systolic function was normal  Ejection fraction was estimated to be 60 %  There were no regional wall motion abnormalities  Wall thickness was increased  There was mild concentric hypertrophy  RIGHT VENTRICLE: The size was normal  Systolic function was normal  Wall thickness was normal     LEFT ATRIUM: The atrium was mildly dilated  RIGHT ATRIUM: Size was normal     MITRAL VALVE: There was mild annular calcification  DOPPLER: There was mild regurgitation  AORTIC VALVE: The valve was probably trileaflet  Leaflets exhibited moderate calcification and markedly reduced cuspal separation  DOPPLER: There was severe stenosis  There was mild regurgitation  TRICUSPID VALVE: DOPPLER: There was mild regurgitation  Estimated peak PA pressure was 40 mmHg  PULMONIC VALVE: Not well visualized  PERICARDIUM: There was no pericardial effusion  The pericardium was normal in appearance      AORTA: The root exhibited normal size     MEASUREMENT TABLES    DOPPLER MEASUREMENTS  Aortic valve   (Reference normals)  Peak gradient   62 mmHg   (--)  Mean gradient   35 mmHg   (--)  Valve area, cont   0 6 cm squared   (--)    SYSTEM MEASUREMENT TABLES    2D  %FS: 41 41 %  AV Diam: 3 19 cm  EDV(Teich): 154 63 ml  EF(Teich): 71 67 %  ESV(Teich): 43 81 ml  IVSd: 0 99 cm  LA Area: 21 71 cm2  LA Diam: 4 42 cm  LVEDV MOD A4C: 133 34 ml  LVEF MOD A4C: 33 55 %  LVESV MOD A4C: 88 61 ml  LVIDd: 5 62 cm  LVIDs: 3 29 cm  LVLd A4C: 7 54 cm  LVLs A4C: 6 64 cm  LVOT Diam: 2 06 cm  LVPWd: 1 15 cm  RA Area: 11 96 cm2  RV DIAM: 3 35 cm  SV MOD A4C: 44 74 ml  SV(Teich): 110 82 ml    CW  AR Dec [place]: 2 21 m/s2  AR Dec Time: 2293 88 ms  AR PHT: 665 23 ms  AR Vmax: 4 61 m/s  AR maxP 11 mmHg  AV VTI: 86 81 cm  AV Vmax: 3 81 m/s  AV Vmax: 3 94 m/s  AV Vmean: 2 8 m/s  AV maxP 99 mmHg  AV maxP 23 mmHg  AV meanP 69 mmHg  PV Vmax: 1 19 m/s  PV maxP 67 mmHg  TR Vmax: 3 07 m/s  TR maxP 58 mmHg    MM  TAPSE: 2 41 cm    PW  PHILLY (VTI): 0 61 cm2  PHILLY Vmax: 0 55 cm2  PHILLY Vmax: 0 57 cm2  PHILLY Vmax, Pt: 0 54 cm2  PHILLY Vmax, Pt: 0 56 cm2  E': 0 09 m/s  E/E': 9 8  LVOT VTI: 15 74 cm  LVOT Vmax: 0 64 m/s  LVOT Vmax: 0 65 m/s  LVOT Vmean: 0 48 m/s  LVOT maxP 65 mmHg  LVOT maxP 71 mmHg  LVOT meanP 02 mmHg  MV A Anirudh: 0 69 m/s  MV Dec [place]: 5 17 m/s2  MV DecT: 178 9 ms  MV E Anirudh: 0 93 m/s  MV E/A Ratio: 1 35  RVOT Vmax: 0 49 m/s  RVOT maxP 98 mmHg    [facility] Accredited [facility] Laboratory    Prepared and electronically signed by    Court Davalos MD  Signed 08-Mar-2017 14:11:26       No results found for this or any previous visit  No results found for this or any previous visit  No results found for this or any previous visit      Meds/Allergies   all current active meds have been reviewed  Prescriptions Prior to Admission   Medication    adalimumab (HUMIRA) 40 mg/0 8 mL PSKT    aspirin (ECOTRIN LOW STRENGTH) 81 mg EC tablet    atorvastatin (LIPITOR) 40 mg tablet    carisoprodol (SOMA) 350 mg tablet    fluocinonide (LIDEX) 0 05 % ointment    furosemide (LASIX) 40 mg tablet    gabapentin (NEURONTIN) 600 MG tablet    oxyCODONE (OxyCONTIN) 10 mg 12 hr tablet    oxyCODONE-acetaminophen (PERCOCET)  mg per tablet    traMADol (ULTRAM) 50 mg tablet    traZODone (DESYREL) 100 mg tablet    loperamide (IMODIUM) 2 mg capsule          Assessment:  Principal Problem:    Nonrheumatic aortic valve stenosis  Active Problems:     Aortic stenosis, severe    Arthritis    Chronic pain    Chronic, continuous use of opioids    Hyperlipidemia    Hypertension    Insomnia    Peripheral neuropathy    [facility]    Acute postoperative respiratory insufficiency

## 2017-10-19 NOTE — PROGRESS NOTES
Progress Note - Cardiothoracic Surgery   Macie Soares 81 y o  male MRN: [id number]  Unit/Bed#: [facility] 423-01 Encounter: [id number]  Aortic stenosis  S/P aortic valve replacement; POD # 2      24 Hour Events: C/O moderate pain  Weaned to room air  No events overnight  Medications:   Scheduled Meds:  acetaminophen 650 mg Oral Q6H [address]   aspirin 325 mg Oral Daily   atorvastatin 40 mg Oral Daily   carisoprodol 350 mg Oral 4x Daily   docusate sodium 100 mg Oral BID   fondaparinux 2 5 mg Subcutaneous Daily   furosemide 40 mg Intravenous Daily   gabapentin 1,800 mg Oral HS   insulin lispro 1-5 Units Subcutaneous TID AC   insulin lispro 1-5 Units Subcutaneous HS   metoprolol tartrate 25 mg Oral Q12H [address]   mupirocin 1 application Nasal S11O [address]   pantoprazole 40 mg Oral Daily   polyethylene glycol 17 g Oral Daily   potassium chloride 20 mEq Oral Daily   traZODone 400 mg Oral HS     Continuous Infusions:   PRN Meds: bisacodyl    HYDROmorphone    ondansetron    oxyCODONE    oxyCODONE    Vitals:   Vitals:    10/18/17 2340 10/19/17 0310 10/19/17 0657 10/19/17 0827   BP: 153/68 121/56     Pulse: 77 78  76   Resp: 18 18  18   Temp: 97 7 °F (36 5 °C) 98 7 °F (37 1 °C)     TempSrc: Oral Oral     SpO2: 90% 92%  95%   Weight:   94 8 kg (208 lb 15 9 oz)    Height:           Telemetry: NSR; Heart Rate: 76    Respiratory:   SpO2: SpO2: 95 %, SpO2 Activity: SpO2 Activity: At Rest; Room Air    Intake/Output:   I/O       10/17 0701 - 10/18 0700 10/18 0701 - 10/19 0700 10/19 0701 - 10/20 0700    P  O   660     I V  (mL/kg) 2964 3 (38 9) 133 (1 4)     NG/GT 0      IV Piggyback 1350 300     Cell Saver 750      Total Intake(mL/kg) 5064 3 (66 5) 1093 (11 5)     Urine (mL/kg/hr) 2005 (1 1) 2650 (1 2) 425 (2 1)    Emesis/NG output 100 (0 1)      Stool 0 (0)      Blood 750 (0 4)      Chest Tube 710 (0 4) 160 (0 1)     Total Output 3565 2810 425    Net +1499 3 -1717 -425           Unmeasured Stool Occurrence 0 x      Unmeasured Emesis Occurrence 0 x            Chest tube Output:    Mediastinal tubes: 50 mL/8 hours  160 mL/24 hours          Weights:   Weight (last 2 days)     Date/Time   Weight    10/19/17 0657  94 8 (209)    10/18/17 0600  76 2 (167 99)    10/17/17 0558  90 7 (200)            Results:     Results from last 7 days  Lab Units 10/19/17  0451 10/18/17  0401 10/17/17  2116  10/17/17  1306   WBC Thousand/uL 12 81* 12 06*  --   --   --    HEMOGLOBIN g/dL 9 8* 10 6* 11 0*  --  11 0*   I STAT HEMOGLOBIN   --   --   --   < >  --    HEMATOCRIT % 30 1* 31 2* 32 9*  --  32 2*   PLATELETS Thousands/uL 154 151  --   --  137*   < > = values in this interval not displayed  Results from last 7 days  Lab Units 10/19/17  0451 10/18/17  0401 10/17/17  2116  10/17/17  1306   SODIUM mmol/L 138 138  --   --  140   POTASSIUM mmol/L 4 3 3 8 4 0  < > 4 1   CHLORIDE mmol/L 105 106  --   --  108   CO2 mmol/L 26 24  --   --  25   BUN mg/dL 14 10  --   --  12   CREATININE mg/dL 0 86 0 63  --   --  0 84   GLUCOSE RANDOM mg/dL 127 120  --   --  128   GLUCOSE, ISTAT   --   --   --   < >  --    CALCIUM mg/dL 8 2* 7 3*  --   --  7 5*   < > = values in this interval not displayed  Point of care glucose: 140-141      Invasive Lines/Tubes:  Invasive Devices     Central Venous Catheter Line            CVC Central Lines 10/17/17 Triple 1 day          Peripheral Intravenous Line            Peripheral IV 10/17/17 Left Hand 2 days          Line            Pacer Wires 1 day    Pacer Wires 1 day          Drain            Chest Tube 1 Posterior Mediastinal 32 Fr  1 day    Chest Tube 2 Anterior Mediastinal 32 Fr  1 day                Physical Exam:    HEENT/NECK:  PERRLA  No jugular venous distention  Cardiac: Regular rate and rhythm  No rubs/murmurs/gallops  Pulmonary:  Breath sounds slightly diminished at the bases bilaterally  Abdomen:  Non-tender, Non-distended  Positive bowel sounds  Incisions: Sternum is stable  Incision dressed with Acticoat    No erythema or drainage    Lower extremities: Extremities warm/dry  Radial/PT/DP pulses 2+ bilaterally  Trace edema B/L  Neuro: Alert and oriented X 3  Sensation is grossly intact  No focal deficits  Skin: Warm/Dry, without rashes or lesions  Assessment:  Patient Active Problem List   Diagnosis    Nonrheumatic aortic valve stenosis    Aortic stenosis, severe    Arthritis    Chronic pain    Chronic, continuous use of opioids    Hyperlipidemia    Hypertension    Insomnia    Peripheral neuropathy    RA (rheumatoid arthritis) ([address] )    Acute postoperative respiratory insufficiency       Aortic stenosis  S/P aortic valve replacement; POD # 2    Plan:    1  Cardiac:   NSR; HR/BP well-controlled  Lopressor 25 mg PO BID  Continue ASA and Statin therapy  Epicardial pacing wires no longer required  Remove today  Maintain central IV access today for medications  Continue DVT prophylaxis    2  Pulmonary:   Good Room air oxygen saturation: Continue incentive spirometry/Coughing/Deep breathing exercises  Chest tube drainage diminished; D/C today    3  Renal:   Intake/Output net: -1700 mL/24 hours  Continue diuresis   Lasix 40 mg IV Daily  Potassium Chloride 20 mEq PO Daily  Post op Creatinine stable; Follow up labs prn    4  Neuro:  Neurologically intact; No active issues  Incisional pain well-controlled; Continue prn Percocet    5  GI:  Tolerating TLC 2 3 gm sodium diet  Maintain 1800 mL daily fluid restriction   Continue stool softeners and prn suppository  Continue GI prophylaxis    6  Endo:    No history of diabetes; Glucose well-controlled with sliding scale coverage    7  Hematology:   Post-operative acute blood loss anemia; Hemoglobin and hematocrit stable; trend prn    8  Disposition:  Ambulating independently, Anticipate discharge to home tomorrow       VTE Pharmacologic Prophylaxis: Sequential compression device (Venodyne)  and Fondaparinux (Arixtra)  VTE Mechanical Prophylaxis: sequential compression device    Collaborative rounds completed with JULIA Briseno , and Angelina Elaine RN    SIGNATURE: Claude Apley, PA-C  DATE: October 19, 2017  TIME: 9:09 AM

## 2017-10-19 NOTE — PROGRESS NOTES
10/19/17    Procedure: Chest tube removal    Chest tubes removed in routine fashion without incident  Michelle Saldana tolerated the procedure well  Nurse notified  Osmani Mcclain PA-C  DATE: October 19, 2017  TIME: 12:03 PM  10/19/17    Procedure: Epicardial Pacing Wire removal    Al Carrera was returned to bed and informed of mandatory one hour post-procedure bed rest   The assigned nurse was notified  Epicardial pacing wires removed in routine fashion, without incident  The patient tolerated the procedure well  Vital signs ordered  q 15 minutes for one hour, as per protocol      Osmani Mcclain PA-C  DATE: October 19, 2017  TIME: 12:04 PM

## 2017-10-20 VITALS
SYSTOLIC BLOOD PRESSURE: 117 MMHG | HEIGHT: 68 IN | DIASTOLIC BLOOD PRESSURE: 53 MMHG | RESPIRATION RATE: 18 BRPM | WEIGHT: 206.35 LBS | TEMPERATURE: 98.7 F | HEART RATE: 66 BPM | OXYGEN SATURATION: 96 % | BODY MASS INDEX: 31.27 KG/M2

## 2017-10-20 LAB
GLUCOSE SERPL-MCNC: 127 MG/DL (ref 65–140)
GLUCOSE SERPL-MCNC: 131 MG/DL (ref 65–140)

## 2017-10-20 PROCEDURE — G8978 MOBILITY CURRENT STATUS: HCPCS

## 2017-10-20 PROCEDURE — 82948 REAGENT STRIP/BLOOD GLUCOSE: CPT

## 2017-10-20 PROCEDURE — 97165 OT EVAL LOW COMPLEX 30 MIN: CPT

## 2017-10-20 PROCEDURE — 97162 PT EVAL MOD COMPLEX 30 MIN: CPT

## 2017-10-20 PROCEDURE — G8979 MOBILITY GOAL STATUS: HCPCS

## 2017-10-20 PROCEDURE — 97530 THERAPEUTIC ACTIVITIES: CPT

## 2017-10-20 RX ORDER — POTASSIUM CHLORIDE 20 MEQ/1
20 TABLET, EXTENDED RELEASE ORAL DAILY
Qty: 10 TABLET | Refills: 2 | Status: SHIPPED | OUTPATIENT
Start: 2017-10-20 | End: 2018-02-27 | Stop reason: ALTCHOICE

## 2017-10-20 RX ORDER — POLYETHYLENE GLYCOL 3350 17 G/17G
17 POWDER, FOR SOLUTION ORAL DAILY
Qty: 510 G | Refills: 0 | Status: SHIPPED | OUTPATIENT
Start: 2017-10-20 | End: 2018-02-27 | Stop reason: ALTCHOICE

## 2017-10-20 RX ORDER — TORSEMIDE 20 MG/1
20 TABLET ORAL DAILY
Qty: 10 TABLET | Refills: 2 | Status: SHIPPED | OUTPATIENT
Start: 2017-10-20 | End: 2018-02-27 | Stop reason: ALTCHOICE

## 2017-10-20 RX ORDER — OMEPRAZOLE 20 MG/1
20 CAPSULE, DELAYED RELEASE ORAL DAILY
Qty: 30 CAPSULE | Refills: 0 | Status: SHIPPED | OUTPATIENT
Start: 2017-10-20 | End: 2018-02-27 | Stop reason: ALTCHOICE

## 2017-10-20 RX ORDER — ASPIRIN 325 MG
325 TABLET ORAL DAILY
Qty: 30 TABLET | Refills: 2 | Status: SHIPPED | OUTPATIENT
Start: 2017-10-21 | End: 2018-02-27 | Stop reason: SDUPTHER

## 2017-10-20 RX ORDER — SENNOSIDES 8.6 MG
650 CAPSULE ORAL EVERY 8 HOURS PRN
Qty: 30 TABLET | Refills: 0 | Status: SHIPPED | OUTPATIENT
Start: 2017-10-20 | End: 2017-11-19

## 2017-10-20 RX ORDER — DOCUSATE SODIUM 100 MG/1
100 CAPSULE, LIQUID FILLED ORAL 2 TIMES DAILY
Qty: 10 CAPSULE | Refills: 0 | Status: SHIPPED | OUTPATIENT
Start: 2017-10-20 | End: 2018-09-14 | Stop reason: ALTCHOICE

## 2017-10-20 RX ADMIN — CARISOPRODOL 350 MG: 350 TABLET ORAL at 05:59

## 2017-10-20 RX ADMIN — FUROSEMIDE 40 MG: 10 INJECTION, SOLUTION INTRAMUSCULAR; INTRAVENOUS at 08:58

## 2017-10-20 RX ADMIN — OXYCODONE HYDROCHLORIDE 15 MG: 5 TABLET ORAL at 05:58

## 2017-10-20 RX ADMIN — FONDAPARINUX SODIUM 2.5 MG: 2.5 INJECTION, SOLUTION SUBCUTANEOUS at 08:58

## 2017-10-20 RX ADMIN — POTASSIUM CHLORIDE 20 MEQ: 1500 TABLET, EXTENDED RELEASE ORAL at 08:58

## 2017-10-20 RX ADMIN — METOPROLOL TARTRATE 25 MG: 25 TABLET ORAL at 08:58

## 2017-10-20 RX ADMIN — MUPIROCIN 1 APPLICATION: 20 OINTMENT TOPICAL at 08:58

## 2017-10-20 RX ADMIN — ACETAMINOPHEN 650 MG: 325 TABLET, FILM COATED ORAL at 05:59

## 2017-10-20 RX ADMIN — ACETAMINOPHEN 650 MG: 325 TABLET, FILM COATED ORAL at 12:01

## 2017-10-20 RX ADMIN — PANTOPRAZOLE SODIUM 40 MG: 40 TABLET, DELAYED RELEASE ORAL at 08:58

## 2017-10-20 RX ADMIN — CARISOPRODOL 350 MG: 350 TABLET ORAL at 12:01

## 2017-10-20 RX ADMIN — OXYCODONE HYDROCHLORIDE 10 MG: 10 TABLET ORAL at 12:31

## 2017-10-20 RX ADMIN — DOCUSATE SODIUM 100 MG: 100 CAPSULE, LIQUID FILLED ORAL at 08:58

## 2017-10-20 RX ADMIN — ACETAMINOPHEN 650 MG: 325 TABLET, FILM COATED ORAL at 00:05

## 2017-10-20 RX ADMIN — ATORVASTATIN CALCIUM 40 MG: 40 TABLET, FILM COATED ORAL at 08:58

## 2017-10-20 RX ADMIN — POLYETHYLENE GLYCOL 3350 17 G: 17 POWDER, FOR SOLUTION ORAL at 08:58

## 2017-10-20 RX ADMIN — ASPIRIN 325 MG: 325 TABLET ORAL at 08:58

## 2017-10-20 RX ADMIN — HYDROMORPHONE HYDROCHLORIDE 1 MG: 1 INJECTION, SOLUTION INTRAMUSCULAR; INTRAVENOUS; SUBCUTANEOUS at 09:12

## 2017-10-20 NOTE — PROGRESS NOTES
Progress Note - Cardiothoracic Surgery   Gt Crowley 81 y o  male MRN: [id number]  Unit/Bed#: [facility] 423-01 Encounter: [id number]  Aortic stenosis  S/P aortic valve replacement; POD # 3      24 Hour Events: Pain control better with CT out  Weaned to room air  No events overnight  Medications:   Scheduled Meds:    acetaminophen 650 mg Oral Q6H [address]   aspirin 325 mg Oral Daily   atorvastatin 40 mg Oral Daily   carisoprodol 350 mg Oral 4x Daily   docusate sodium 100 mg Oral BID   fondaparinux 2 5 mg Subcutaneous Daily   furosemide 40 mg Intravenous Daily   gabapentin 1,800 mg Oral HS   insulin lispro 1-5 Units Subcutaneous TID AC   insulin lispro 1-5 Units Subcutaneous HS   metoprolol tartrate 25 mg Oral Q12H [address]   mupirocin 1 application Nasal L25R [address]   pantoprazole 40 mg Oral Daily   polyethylene glycol 17 g Oral Daily   potassium chloride 20 mEq Oral Daily   traZODone 400 mg Oral HS     Continuous Infusions:   PRN Meds: bisacodyl    HYDROmorphone    ondansetron    oxyCODONE    oxyCODONE    Vitals:   Vitals:    10/19/17 2257 10/20/17 0349 10/20/17 0631 10/20/17 0738   BP: 102/50 104/57  109/53   Pulse: 65 72  68   Resp: 17 18  18   Temp: 99 °F (37 2 °C) 98 6 °F (37 °C)  98 6 °F (37 °C)   TempSrc: Oral Oral  Oral   SpO2: 91% 94%  96%   Weight:   93 6 kg (206 lb 5 6 oz)    Height:           Telemetry: NSR; Heart Rate: 71    Respiratory:   SpO2: SpO2: 96 %, SpO2 Activity: SpO2 Activity: At Rest; Room Air    Intake/Output:   I/O       10/17 0701 - 10/18 0700 10/18 0701 - 10/19 0700 10/19 0701 - 10/20 0700    P  O   660     I V  (mL/kg) 2964 3 (38 9) 133 (1 4)     NG/GT 0      IV Piggyback 1350 300     Cell Saver 750      Total Intake(mL/kg) 5064 3 (66 5) 1093 (11 5)     Urine (mL/kg/hr) 2005 (1 1) 2650 (1 2) 425 (2 1)    Emesis/NG output 100 (0 1)      Stool 0 (0)      Blood 750 (0 4)      Chest Tube 710 (0 4) 160 (0 1)     Total Output 3565 2810 425    Net +1499 3 -1717 -425           Unmeasured Stool Occurrence 0 x      Unmeasured Emesis Occurrence 0 x          I/O: -1 3 L/24 hours        Weights:   Weight (last 2 days)     Date/Time   Weight    10/20/17 0631  93 6 (206 35)    10/19/17 0657  94 8 (209)    10/18/17 0600  76 2 (167 99)            Results:     Results from last 7 days  Lab Units 10/19/17  0451 10/18/17  0401 10/17/17  2116  10/17/17  1306   WBC Thousand/uL 12 81* 12 06*  --   --   --    HEMOGLOBIN g/dL 9 8* 10 6* 11 0*  --  11 0*   I STAT HEMOGLOBIN   --   --   --   < >  --    HEMATOCRIT % 30 1* 31 2* 32 9*  --  32 2*   PLATELETS Thousands/uL 154 151  --   --  137*   < > = values in this interval not displayed  Results from last 7 days  Lab Units 10/19/17  0451 10/18/17  0401 10/17/17  2116  10/17/17  1306   SODIUM mmol/L 138 138  --   --  140   POTASSIUM mmol/L 4 3 3 8 4 0  < > 4 1   CHLORIDE mmol/L 105 106  --   --  108   CO2 mmol/L 26 24  --   --  25   BUN mg/dL 14 10  --   --  12   CREATININE mg/dL 0 86 0 63  --   --  0 84   GLUCOSE RANDOM mg/dL 127 120  --   --  128   GLUCOSE, ISTAT   --   --   --   < >  --    CALCIUM mg/dL 8 2* 7 3*  --   --  7 5*   < > = values in this interval not displayed  Invasive Lines/Tubes:  Invasive Devices     Central Venous Catheter Line            CVC Central Lines 10/17/17 Triple 2 days          Peripheral Intravenous Line            Peripheral IV 10/17/17 Left Hand 3 days                Physical Exam:    HEENT/NECK:  PERRLA  No jugular venous distention  Cardiac: Regular rate and rhythm  No rubs/murmurs/gallops  Pulmonary:  Breath sounds slightly diminished at the bases bilaterally  Abdomen:  Non-tender, Non-distended  Positive bowel sounds  Incisions: Sternum is stable  Incision dressed with Acticoat  No erythema or drainage    Lower extremities: Extremities warm/dry  Radial/PT/DP pulses 2+ bilaterally  Trace edema B/L  Neuro: Alert and oriented X 3  Sensation is grossly intact  No focal deficits    Skin: Warm/Dry, without rashes or lesions  Assessment:  Patient Active Problem List   Diagnosis    Nonrheumatic aortic valve stenosis    Aortic stenosis, severe    Arthritis    Chronic pain    Chronic, continuous use of opioids    Hyperlipidemia    Hypertension    Insomnia    Peripheral neuropathy    RA (rheumatoid arthritis) ([address] )    Acute postoperative respiratory insufficiency       Aortic stenosis  S/P aortic valve replacement; POD # 3    Plan:    1  Cardiac:   NSR; HR/BP well-controlled  Lopressor 25 mg PO BID  Continue ASA and Statin therapy  D/C central IV access today   Continue DVT prophylaxis    2  Pulmonary:   Good Room air oxygen saturation: Continue incentive spirometry/Coughing/Deep breathing exercises    3  Renal:   Intake/Output net: -1300 mL/24 hours  Continue diuresis   Lasix 40 mg IV Daily  Potassium Chloride 20 mEq PO Daily  Post op Creatinine stable; Follow up labs prn    4  Neuro:  Neurologically intact; No active issues  Incisional pain well-controlled; Continue prn Percocet    5  GI:  Tolerating TLC 2 3 gm sodium diet  Maintain 1800 mL daily fluid restriction   Continue stool softeners and prn suppository  Continue GI prophylaxis    6  Endo:    No history of diabetes; Glucose well-controlled with sliding scale coverage    7  Hematology:   Post-operative acute blood loss anemia; Hemoglobin and hematocrit stable; trend prn    8  Disposition:  Ambulating independently, Anticipate discharge to home today       VTE Pharmacologic Prophylaxis: Sequential compression device (Venodyne)  and Fondaparinux (Arixtra)  VTE Mechanical Prophylaxis: sequential compression device    Collaborative rounds completed with JULIA Wallace , and RONNA Frank    SIGNATURE: Emily Helton PA-C  DATE: October 20, 2017  TIME: 8:11 AM

## 2017-10-20 NOTE — DISCHARGE INSTRUCTIONS
Sternal Precautions   AMBULATORY CARE:   Sternal precautions  are used to help protect your sternum (breastbone) after open chest surgery  Wires are placed during surgery to hold the sternum together as it heals  Sternal precautions help prevent the wires from cutting through the sternum  The precautions also help prevent the sternum from coming apart from an injury, and prevent pain and bleeding  You may need to use the precautions for up to 12 weeks after surgery  Your surgeon will give you specific instructions based on the type of surgery you had  It is important to follow the instructions carefully  An injury to the healing sternum can be life-threatening  General sternal precautions:  Start slowly and do more as you get stronger  Pain medicine might make it harder for you to know when to slow down or be careful  Stop immediately if you hear a crunch or pop in your sternum  · Protect your sternum  Hug a pillow to your chest or cross your arms over your chest when you laugh, sneeze, or cough  · Be careful when you get into or out of a chair or bed  Hug a pillow or cross your arms when you stand or sit  Do not twist as you move  Use only your legs to sit and stand  You may need to use a raised toilet seat if you have trouble standing up without using your arms  Your healthcare provider may teach you to use your elbow for support as you move from lying to sitting  · Ask when you may take a bath or shower  You may need to use a bath chair if you have trouble getting into or out of the tub  Do not use a grab bar  · Do not lift or carry anything heavier than 10 pounds  For example, a gallon of milk weighs 8 pounds  · Keep your arms down as much as possible  Do not let anyone pull your arms to help you move or dress  Do not reach for items  · Do not push or pull anything  Examples include a car door or a vacuum   · Do not drive while you are healing    Your surgeon will tell you when it is safe for you to start driving again  Call 911 for any of the following:   · You have sudden pain in your sternum and hear a crunch or pop  · You have bleeding that does not stop even after you apply pressure for 5 minutes  Seek care immediately if:   · You hear crunching or grinding in your sternum  · You have signs of an infection, such as a fever, red or warm skin, or pus in the surgery wound  Contact your healthcare provider if:   · You continue to feel pain, even after you take your pain medicine  · You have new or worsening pain, or any pain with movement  · You have questions or concerns about your condition or care  Care for your surgery wound:  Always wash your hands before you care for your wound  Wash your wound as directed  Do not rub the wound as you wash or dry the area  Pat the area dry with a clean towel  Change the bandages as directed and when they get wet or dirty  Do not smoke:  Nicotine can damage blood vessels and make it more difficult to heal  Do not use e-cigarettes or smokeless tobacco in place of cigarettes or to help you quit  They still contain nicotine  Ask your healthcare provider for information if you currently smoke and need help quitting  Follow up with your healthcare provider as directed:  Write down your questions so you remember to ask them during your visits  © 2017 [facility] Information is for End User's use only and may not be sold, redistributed or otherwise used for commercial purposes  All illustrations and images included in CareNotes® are the copyrighted property of [employer]  or [employer]  The above information is an  only  It is not intended as medical advice for individual conditions or treatments  Talk to your doctor, nurse or pharmacist before following any medical regimen to see if it is safe and effective for you    Aortic Valve Replacement   WHAT YOU NEED TO KNOW:   Aortic valve replacement is surgery to put a new aortic valve in your heart  Your aortic valve separates the lower section of your heart from your aorta  The aorta is the large blood vessel that carries blood from your heart to your body  Your aortic valve opens and closes to let blood flow from your heart  When your aortic valve does not open or close as it should, the amount of blood that your heart can pump to your body decreases  DISCHARGE INSTRUCTIONS:   Medicines:   · Antiplatelets  help prevent blood clots  This medicine makes it more likely for you to bleed or bruise  · Blood thinners    help prevent blood clots  Examples of blood thinners include heparin and warfarin  Clots can cause strokes, heart attacks, and death  The following are general safety guidelines to follow while you are taking a blood thinner:    ¨ Watch for bleeding and bruising while you take blood thinners  Watch for bleeding from your gums or nose  Watch for blood in your urine and bowel movements  Use a soft washcloth on your skin, and a soft toothbrush to brush your teeth  This can keep your skin and gums from bleeding  If you shave, use an electric shaver  Do not play contact sports  ¨ Tell your dentist and other healthcare providers that you take anticoagulants  Wear a bracelet or necklace that says you take this medicine  ¨ Do not start or stop any medicines unless your healthcare provider tells you to  Many medicines cannot be used with blood thinners  ¨ Tell your healthcare provider right away if you forget to take the medicine, or if you take too much  ¨ Warfarin  is a blood thinner that you may need to take  The following are things you should be aware of if you take warfarin  § Foods and medicines can affect the amount of warfarin in your blood  Do not make major changes to your diet while you take warfarin  Warfarin works best when you eat about the same amount of vitamin K every day   Vitamin K is found in green leafy vegetables and certain other foods  Ask for more information about what to eat when you are taking warfarin  § You will need to see your healthcare provider for follow-up visits when you are on warfarin  You will need regular blood tests  These tests are used to decide how much medicine you need  · Heart medicine: This medicine is given to strengthen or regulate your heartbeat  It also may help your heart in other ways  Talk with your caregiver to find out what your heart medicine is and why you are taking it  · Blood pressure medicine: This is given to lower your blood pressure  A controlled blood pressure helps protect your organs, such as your heart, lungs, brain, and kidneys  Take your blood pressure medicine exactly as directed  · Antibiotics: This medicine will help kill germs that may get into your blood and cause an infection in your heart  Healthcare providers may tell you to take antibiotics before and after dental work, surgery, and some procedures  This is important after heart valve disease  · Take your medicine as directed  Contact your healthcare provider if you think your medicine is not helping or if you have side effects  Tell him or her if you are allergic to any medicine  Keep a list of the medicines, vitamins, and herbs you take  Include the amounts, and when and why you take them  Bring the list or the pill bottles to follow-up visits  Carry your medicine list with you in case of an emergency  Follow up with your healthcare provider as directed:  Write down your questions so you remember to ask them during your visits  Care for your wound:  Ask healthcare providers how to take care of your incision wound  Check your wound, clean it, and change the bandages each day  If the bandage gets dirty or falls off, put on a new one  Mouth care: Take care of your teeth and gums  Brush and floss your teeth, and see your dentist regularly   You may help prevent an infection in your heart if you do this  Tell your dentist that you have had heart valve surgery  Cardiac rehabilitation:  Your cardiologist or heart surgeon may recommend that you attend cardiac rehabilitation (rehab)  This is a program run by specialists who will help you safely strengthen your heart and prevent more heart disease  The plan includes exercise, relaxation, stress management, and heart-healthy nutrition  Healthcare providers will also check to make sure any medicines you are taking are working  The plan may also include instructions for when you can drive, return to work, and do other normal daily activities  Good nutrition for your heart:  Get enough calories, protein, vitamins, and minerals to help prevent poor nutrition and muscle wasting  You may be told to eat foods low in cholesterol or sodium (salt)  You also may be told to limit saturated and trans fats  Do eat foods that contain healthy fats, such as walnuts, salmon, and canola and soybean oils  Eat foods that help protect the heart, including plenty of fruits and vegetables, nuts, and sources of fiber  Ask what a healthy weight is for you  Set goals to reach and stay at that weight  Contact your healthcare provider if:   · You have a fever  · Your wound area is painful, red, or oozing fluid  · You feel too tired for normal activities weeks after your surgery  · Your hands, ankles, or feet are swollen  · You urinate less, or not at all  · You feel tired or weak and are short of breath  · Your arm or leg feels warm, tender, and painful  It may look swollen and red  · You have questions or concerns about your condition or care  Seek care immediately or call 911 if:   · You have blood in your bowel movements or urine  You are bleeding from your nose, mouth, or incision wound  · You have a severe headache  This may feel like the worst headache of your life  · You have weakness or numbness in your arm, leg, or face   This may happen on only 1 side of your body  · You feel lightheaded, dizzy, or sick to your stomach  You may have cold sweats and bluish skin, lips, or nail beds  · You have trouble breathing or are coughing up blood  You may have more pain when you take deep breaths or cough  · You have chest pain, or discomfort that spreads to your arms, jaw, or back, or sudden back pain  · You are confused or have problems speaking or understanding speech  You have vision changes or loss of vision  © 2017 [facility] Information is for End User's use only and may not be sold, redistributed or otherwise used for commercial purposes  All illustrations and images included in CareNotes® are the copyrighted property of [employer]  or [employer]  The above information is an  only  It is not intended as medical advice for individual conditions or treatments  Talk to your doctor, nurse or pharmacist before following any medical regimen to see if it is safe and effective for you

## 2017-10-20 NOTE — PROGRESS NOTES
Cardiology Progress Note - Roger Anne 81 y o  male MRN: [id number]    Unit/Bed#: [facility] 423-01 Encounter: [id number]    Assessment and plan  1  Status post bioprosthetic AVR  2  Hypertension  3  Rheumatoid arthritis  4  Hyperlipidemia     Recommendations:  Patient currently doing well postop AVR  Blood pressure well controlled  No significant events on telemetry  Continue current cardiac medications  Continue diuresis  Low-dose beta-blocker  F/U with Linda Sparks on Tuesday  Subjective:    No significant events overnight  Doing well pain issues have been better controlled  No events on telemetry  ROS    Objective:   Vitals: Blood pressure 109/53, pulse 68, temperature 98 6 °F (37 °C), temperature source Oral, resp  rate 18, height 5' 8" (1 727 m), weight 93 6 kg (206 lb 5 6 oz), SpO2 96 %  , Body mass index is 31 38 kg/m² , Orthostatic Blood Pressures    Flowsheet Row Most Recent Value   Blood Pressure  109/53 [Map76] filed at 10/20/2017 0738   Patient Position - Orthostatic VS  Lying filed at 10/20/2017 5730         Systolic (42XND), PETER:576 , Min:92 , YQH:077     Diastolic (54MTN), AMBROSIO:35, Min:50, Max:63      Intake/Output Summary (Last 24 hours) at 10/20/17 0904  Last data filed at 10/20/17 0631   Gross per 24 hour   Intake             1010 ml   Output             2325 ml   Net            -1315 ml     Weight (last 2 days)     Date/Time   Weight    10/20/17 0631  93 6 (206 35)    10/19/17 0657  94 8 (209)    10/18/17 0600  76 2 (167 99)                Telemetry Review: No significant arrhythmias seen on telemetry review  EKG personally reviewed by Lma Weiss DO  Physical Exam   Constitutional: He is oriented to person, place, and time  He appears well-nourished  No distress  HENT:   Head: Atraumatic  Eyes: Conjunctivae are normal  Pupils are equal, round, and reactive to light  Neck: Neck supple  Cardiovascular: Normal rate and regular rhythm      No murmur heard   Pulmonary/Chest: Effort normal  No respiratory distress  He has no wheezes  He has no rales  Abdominal: Soft  Bowel sounds are normal  He exhibits no distension  There is no tenderness  There is no rebound  Musculoskeletal: He exhibits no edema  Neurological: He is alert and oriented to person, place, and time  No cranial nerve deficit  Skin: Skin is warm and dry  No erythema           Laboratory Results:        CBC with diff:   Results from last 7 days  Lab Units 10/19/17  0451 10/18/17  0401 10/17/17  2116 10/17/17  1324 10/17/17  1306 10/17/17  1219 10/17/17  1133 10/17/17  1129   WBC Thousand/uL 12 81* 12 06*  --   --   --   --   --   --    HEMOGLOBIN g/dL 9 8* 10 6* 11 0*  --  11 0*  --   --   --    I STAT HEMOGLOBIN g/dl  --   --   --  10 2*  --  9 2* 9 9*  --    HEMATOCRIT % 30 1* 31 2* 32 9*  --  32 2*  --   --   --    MCV fL 92 89  --   --   --   --   --   --    PLATELETS Thousands/uL 154 151  --   --  137*  --   --  218   MCH pg 29 8 30 2  --   --   --   --   --   --    MCHC g/dL 32 6 34 0  --   --   --   --   --   --    RDW % 13 5 13 0  --   --   --   --   --   --    MPV fL 9 4 9 1  --   --  8 8*  --   --  8 7*         CMP:  Results from last 7 days  Lab Units 10/19/17  0451 10/18/17  0401 10/17/17  2116 10/17/17  1748 10/17/17  1324 10/17/17  1306 10/17/17  1219 10/17/17  1133 10/17/17  1110   SODIUM mmol/L 138 138  --   --   --  140  --   --   --    POTASSIUM mmol/L 4 3 3 8 4 0 3 8  --  4 1  --   --   --    CHLORIDE mmol/L 105 106  --   --   --  108  --   --   --    CO2 mmol/L 26 24  --   --   --  25  --   --   --    ANION GAP mmol/L 7 8  --   --   --  7  --   --   --    BUN mg/dL 14 10  --   --   --  12  --   --   --    CREATININE mg/dL 0 86 0 63  --   --   --  0 84  --   --   --    GLUCOSE RANDOM mg/dL 127 120  --   --   --  128  --   --   --    GLUCOSE, ISTAT mg/dl  --   --   --   --  125  --  133 163* 167*   CALCIUM mg/dL 8 2* 7 3*  --   --   --  7 5*  --   --   --    EGFR ml/min/1 73sq m 88 100  --   --   --  89  --   --   --          BMP:  Results from last 7 days  Lab Units 10/19/17  0451 10/18/17  0401 10/17/17  2116 10/17/17  1748  10/17/17  1306   SODIUM mmol/L 138 138  --   --   --  140   POTASSIUM mmol/L 4 3 3 8 4 0 3 8  --  4 1   CHLORIDE mmol/L 105 106  --   --   --  108   CO2 mmol/L 26 24  --   --   --  25   BUN mg/dL 14 10  --   --   --  12   CREATININE mg/dL 0 86 0 63  --   --   --  0 84   GLUCOSE RANDOM mg/dL 127 120  --   --   --  128   GLUCOSE, ISTAT   --   --   --   --   < >  --    CALCIUM mg/dL 8 2* 7 3*  --   --   --  7 5*   < > = values in this interval not displayed  BNP: No results for input(s): BNP in the last 72 hours  Magnesium:   Results from last 7 days  Lab Units 10/19/17  0451 10/18/17  0401   MAGNESIUM mg/dL 2 5 2 5       Coags:       TSH:       Invalid input(s): TSH     Hemoglobin A1C       Lipid Profile:       Cardiac testing:   Results for orders placed during the hospital encounter of 17   Echo complete with contrast if indicated    Narrative  [facility]  [address]  [phone]    Transthoracic Echocardiogram  2D, M-mode, Doppler, and Color Doppler    Study date:  08-Mar-2017    Patient: Demarcus Manley  MR number: [id number]  Account number: [de-identified]  : [id number]  Age: 73 years  Gender: Male  Status: Outpatient  Location: Echo lab  Height: 67 in  Weight: 214 5 lb  BP: 146/ 80 mmHg    Diagnoses: 785 2 - CARDIAC MURMURS NEC    Sonographer:  Andreea Cates RDCS  Primary Physician:  Maureen Srivastava DO  Referring Physician:  Maureen Srivastava DO  Group:  [id number]  Interpreting Physician:  Aubrie Domínguez MD    SUMMARY    LEFT VENTRICLE:  Size was normal   Systolic function was normal  Ejection fraction was estimated to be 60 %  There were no regional wall motion abnormalities  Wall thickness was increased  There was mild concentric hypertrophy      LEFT ATRIUM:  The atrium was mildly dilated  MITRAL VALVE:  There was mild annular calcification  There was mild regurgitation  AORTIC VALVE:  The valve was probably trileaflet  Leaflets exhibited moderate calcification and markedly reduced cuspal separation  There was severe stenosis  There was mild regurgitation  Valve peak gradient was 62 mmHg  Valve mean gradient was 35 mmHg  Aortic valve area was 0 6 cm squared by the continuity equation  TRICUSPID VALVE:  There was mild regurgitation  Estimated peak PA pressure was 40 mmHg  HISTORY: PRIOR HISTORY: HYPERLIPIDEMIA, CHRONIC BACK PAIN, MURMUR, AORTIC STENOSIS    PROCEDURE: The procedure was performed in the echo lab  This was a routine study  The transthoracic approach was used  The study included complete 2D imaging, M-mode, complete spectral Doppler, and color Doppler  Images were obtained from  the parasternal, apical, subcostal, and suprasternal notch acoustic windows  Image quality was adequate  LEFT VENTRICLE: Size was normal  Systolic function was normal  Ejection fraction was estimated to be 60 %  There were no regional wall motion abnormalities  Wall thickness was increased  There was mild concentric hypertrophy  RIGHT VENTRICLE: The size was normal  Systolic function was normal  Wall thickness was normal     LEFT ATRIUM: The atrium was mildly dilated  RIGHT ATRIUM: Size was normal     MITRAL VALVE: There was mild annular calcification  DOPPLER: There was mild regurgitation  AORTIC VALVE: The valve was probably trileaflet  Leaflets exhibited moderate calcification and markedly reduced cuspal separation  DOPPLER: There was severe stenosis  There was mild regurgitation  TRICUSPID VALVE: DOPPLER: There was mild regurgitation  Estimated peak PA pressure was 40 mmHg  PULMONIC VALVE: Not well visualized  PERICARDIUM: There was no pericardial effusion  The pericardium was normal in appearance      AORTA: The root exhibited normal size     MEASUREMENT TABLES    DOPPLER MEASUREMENTS  Aortic valve   (Reference normals)  Peak gradient   62 mmHg   (--)  Mean gradient   35 mmHg   (--)  Valve area, cont   0 6 cm squared   (--)    SYSTEM MEASUREMENT TABLES    2D  %FS: 41 41 %  AV Diam: 3 19 cm  EDV(Teich): 154 63 ml  EF(Teich): 71 67 %  ESV(Teich): 43 81 ml  IVSd: 0 99 cm  LA Area: 21 71 cm2  LA Diam: 4 42 cm  LVEDV MOD A4C: 133 34 ml  LVEF MOD A4C: 33 55 %  LVESV MOD A4C: 88 61 ml  LVIDd: 5 62 cm  LVIDs: 3 29 cm  LVLd A4C: 7 54 cm  LVLs A4C: 6 64 cm  LVOT Diam: 2 06 cm  LVPWd: 1 15 cm  RA Area: 11 96 cm2  RV DIAM: 3 35 cm  SV MOD A4C: 44 74 ml  SV(Teich): 110 82 ml    CW  AR Dec Turner: 2 21 m/s2  AR Dec Time: 2293 88 ms  AR PHT: 665 23 ms  AR Vmax: 4 61 m/s  AR maxP 11 mmHg  AV VTI: 86 81 cm  AV Vmax: 3 81 m/s  AV Vmax: 3 94 m/s  AV Vmean: 2 8 m/s  AV maxP 99 mmHg  AV maxP 23 mmHg  AV meanP 69 mmHg  PV Vmax: 1 19 m/s  PV maxP 67 mmHg  TR Vmax: 3 07 m/s  TR maxP 58 mmHg    MM  TAPSE: 2 41 cm    PW  PHILLY (VTI): 0 61 cm2  PHILLY Vmax: 0 55 cm2  PHILLY Vmax: 0 57 cm2  PHILLY Vmax, Pt: 0 54 cm2  PHILLY Vmax, Pt: 0 56 cm2  E': 0 09 m/s  E/E': 9 8  LVOT VTI: 15 74 cm  LVOT Vmax: 0 64 m/s  LVOT Vmax: 0 65 m/s  LVOT Vmean: 0 48 m/s  LVOT maxP 65 mmHg  LVOT maxP 71 mmHg  LVOT meanP 02 mmHg  MV A Anirudh: 0 69 m/s  MV Dec Turner: 5 17 m/s2  MV DecT: 178 9 ms  MV E Anirudh: 0 93 m/s  MV E/A Ratio: 1 35  RVOT Vmax: 0 49 m/s  RVOT maxP 98 mmHg    [facility] Accredited Echocardiography Laboratory    Prepared and electronically signed by    Mikki De Anda MD  Signed 08-Mar-2017 14:11:26       No results found for this or any previous visit  No results found for this or any previous visit  No results found for this or any previous visit      Meds/Allergies   all current active meds have been reviewed  Prescriptions Prior to Admission   Medication    adalimumab (HUMIRA) 40 mg/0 8 mL PSKT    aspirin (ECOTRIN LOW STRENGTH) 81 mg EC tablet    atorvastatin (LIPITOR) 40 mg tablet    carisoprodol (SOMA) 350 mg tablet    fluocinonide (LIDEX) 0 05 % ointment    furosemide (LASIX) 40 mg tablet    gabapentin (NEURONTIN) 600 MG tablet    oxyCODONE (OxyCONTIN) 10 mg 12 hr tablet    oxyCODONE-acetaminophen (PERCOCET)  mg per tablet    traMADol (ULTRAM) 50 mg tablet    traZODone (DESYREL) 100 mg tablet    loperamide (IMODIUM) 2 mg capsule          Assessment:  Principal Problem:    Nonrheumatic aortic valve stenosis  Active Problems:     Aortic stenosis, severe    Arthritis    Chronic pain    Chronic, continuous use of opioids    Hyperlipidemia    Hypertension    Insomnia    Peripheral neuropathy    [facility]    Acute postoperative respiratory insufficiency

## 2017-10-20 NOTE — PHYSICAL THERAPY NOTE
Physical Therapy Evaluation    Patient's Name: Jannette Kinney    Admitting Diagnosis  Nonrheumatic aortic valve stenosis [I35 0]    Problem List  Patient Active Problem List   Diagnosis    Nonrheumatic aortic valve stenosis    Aortic stenosis, severe    Arthritis    Chronic pain    Chronic, continuous use of opioids    Hyperlipidemia    Hypertension    Insomnia    Peripheral neuropathy    [facility]he[facility]) [facility] )    Acute postoperative respiratory insufficiency       Past Medical History  Past Medical History:   Diagnosis Date    Aortic stenosis     Aortic stenosis, severe     Arthritis     Carpal tunnel syndrome     Chronic pain     Chronic, continuous use of opioids     Hyperlipidemia     Hypertension     Insomnia     Peripheral neuropathy     RA (rheumatoid arthritis) ([facility] )        Past Surgical History  Past Surgical History:   Procedure Laterality Date    BASAL CELL CARCINOMA EXCISION      CARPAL TUNNEL RELEASE      left    HERNIA REPAIR      inguinal bilateral -umbilical     LAMINECTOMY      SD RPLCMT AORTIC VALVE OPN W/STENTLESS TISSUE VALVE N/A 10/17/2017    Procedure: AORTIC VALVE REPLACEMENT with 23MM MAGNAEASE TISSUE VALVE; INTRA-OP SHANDRA;  Surgeon: Jacek Chavez MD;  [facility]: [facility];  Service: Cardiac Surgery    TONSILLECTOMY            10/20/17 1130   Note Type   Note type Eval only   Pain Assessment   Pain Assessment FLACC   Pain Location Incision   Pain Descriptors Aching   Pain Frequency Constant/continuous   Pain Onset Ongoing   Clinical Progression Gradually improving   Effect of Pain on Daily Activities no increased discomfort during the session   Patient's Stated Pain Goal No pain   Hospital Pain Intervention(s) Repositioned; Ambulation/increased activity; Distraction; Emotional support   Response to Interventions appears to be comfortable post session   Pain Rating: FLACC (Rest) - Face 0   Pain Rating: FLACC (Rest) - Legs 0   Pain Rating: FLACC (Rest) - Activity 0   Pain Rating: FLACC (Rest) - Cry 0   Pain Rating: FLACC (Rest) - Consolability 0   Score: FLACC (Rest) 0   Pain Rating: FLACC (Activity) - Face 1   Pain Rating: FLACC (Activity) - Legs 0   Pain Rating: FLACC (Activity) - Activity 0   Pain Rating: FLACC (Activity) - Cry 0   Pain Rating: FLACC (Activity) - Consolability 1   Score: FLACC (Activity) 2   Home Living   Type of Home House   Home Layout One level;Ramped entrance   [address]   Prior Function   Level of [place] Independent with ADLs and functional mobility  (amb w/ occasional use of SPC)   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)   [address] Help From Family   Restrictions/Precautions   Braces or Orthoses (denies)   Other Precautions Cardiac/sternal;Fall Risk;Impulsive   General   Additional Pertinent History cleared for assessment (spoke to nsg)   Family/Caregiver Present Yes  (spouse)   Cognition   Overall Cognitive Status WFL   Arousal/Participation Alert   Attention Attends with cues to redirect   Orientation Level Oriented to person;Oriented to place;Oriented to situation   Memory Decreased recall of precautions   Following Commands Follows one step commands without difficulty   Comments Pt is in the chair; reports he will be going home soon today; agreeable to demonstrate mobility skills     RUE Assessment   RUE Assessment WFL  (AROM w/in sternal precautions)   LUE Assessment   LUE Assessment WFL  (AROM w/in sternal precautions)   RLE Assessment   RLE Assessment WFL  (AROM)   Strength RLE   RLE Overall Strength 4-/5   LLE Assessment   LLE Assessment WFL  (AROM)   Strength LLE   LLE Overall Strength 3+/5  (reports premorbid issues of (L) LE)   Transfers   Sit to Stand 6  Modified independent   Additional items Impulsive;Verbal cues  (reviewed appropriate technique and pacing)   Stand to Sit 6  Modified independent   Additional items Verbal cues  (reviewed appropriate technique and pacing)   Ambulation/Elevation Gait pattern Inconsistent pao  (increased pao; reviewed proper pacing and safety )   Gait Assistance 5  Supervision   Additional items Assist x 1   Assistive Device Rolling walker   Distance 340 ft   Balance   Static Sitting Good   Static Standing Fair +  (supported)   Ambulatory Fair  (w/ rw)   Activity Tolerance   Activity Tolerance Patient tolerated treatment well   Medical Staff Made Aware spoke to Sofía Nearing w/ CT sx   Nurse Made Aware spoke to Monika RN and aDvid Lau RN   Assessment   Prognosis Good   Problem List Decreased endurance;Decreased safety awareness   Assessment Pt is 81 y o  admitted with Dx of Symptomatic severe aortic stenosis and underwent AVR on 10/17/2017  Pt 's comorbidities affecting POC include: RA, peripheral neuropathy, HTN, CTS, and spinal cord issues (reported by the pt) and personal factors of: occasional use of SPC for amb  Pt's clinical presentation is currently evolving which is evident in need for input for task focus and mobility technique/safety in light of postop precautions, noted occasional impulsivity, and use of rw which is new to the pt  Pt presents w/ min generalized weakness, min decreased functional endurance, inconsistent gait patterns and inconsistent safety awareness/patterns during mobilization  Will cont to follow pt in PT for progressive mobilization to max level of (I), endurance, and safety  Otherwise, pt demonstrates mod (I)/(S) level w/ observed mobility skills on level surfaces  --> anticipate pt will return home w/ available family support upon D/C when medically cleared; home PT follow up is recommended; rw for amb at this time  This was discussed w/ pt and family; CM aware  Will cont to follow until then  Goals   Patient Goals to go home   STG Expiration Date 10/23/17   Short Term Goal #1 1-3 days  Pt will amb 400 ft w/ rw <--> SPC, mod (I)  Pt will demonstrate (I) level w/ bed mob  Pt will sustain good standing supported balance   Pt will be (I) w/ HEP    Plan   Treatment/Interventions LE strengthening/ROM; Therapeutic exercise; Endurance training;Equipment eval/education; Bed mobility;Gait training;Spoke to nursing;Spoke to case management;Spoke to advanced practitioner;OT   PT Frequency 5x/wk   Recommendation   Recommendation Home PT; Home with family support   Equipment Recommended Walker   Modified [place] Scale   Modified [place] Scale 2   Barthel Index   Feeding 10   Bathing 0   Grooming Score 5   Dressing Score 10   Bladder Score 10   Bowels Score 10   Toilet Use Score 10   Transfers (Bed/Chair) Score 15   Mobility (Level Surface) Score 10   Stairs Score 0   Barthel Index Score 80     Abraham Gonzalez, PT

## 2017-10-20 NOTE — OCCUPATIONAL THERAPY NOTE
[address]  Evaluation     Patient Name: Ravinder Hernandez  SMVRR'K Date: 10/20/2017  Problem List  Patient Active Problem List   Diagnosis    Nonrheumatic aortic valve stenosis    Aortic stenosis, severe    Arthritis    Chronic pain    Chronic, continuous use of opioids    Hyperlipidemia    Hypertension    Insomnia    Peripheral neuropathy    [facility]) [facility] )    Acute postoperative respiratory insufficiency     Past Medical History  Past Medical History:   Diagnosis Date    Aortic stenosis     Aortic stenosis, severe     Arthritis     Carpal tunnel syndrome     Chronic pain     Chronic, continuous use of opioids     Hyperlipidemia     Hypertension     Insomnia     Peripheral neuropathy     RA (rheumatoid arthritis) ([facility] )      Past Surgical History  Past Surgical History:   Procedure Laterality Date    BASAL CELL CARCINOMA EXCISION      CARPAL TUNNEL RELEASE      left    HERNIA REPAIR      inguinal bilateral -umbilical     LAMINECTOMY      IN RPLCMT AORTIC VALVE OPN W/STENTLESS TISSUE VALVE N/A 10/17/2017    Procedure: AORTIC VALVE REPLACEMENT with 23MM MAGNAEASE TISSUE VALVE; INTRA-OP SHANDRA;  Surgeon: Kelly Nj MD;  [facility];  Service: Cardiac Surgery    TONSILLECTOMY           10/20/17 1202   Note Type   Note type Eval/Treat   Restrictions/Precautions   Other Precautions Cardiac/sternal   Pain Assessment   Pain Score 7   Pain Location Chest   Home Living   Type of Home House   Home Layout Multi-level;Performs ADLs on one level;Ramped entrance   Bathroom Shower/Tub Walk-in shower   Bathroom Toilet Raised   Bathroom Equipment Grab bars in shower   Bathroom Accessibility Accessible   Home Equipment Cane   Additional Comments Pt reports use of cane for community mobility    Prior Function   Level of [place] Independent with ADLs and functional mobility   Lives With Spouse   Receives Help From Family   ADL Assistance Independent   IADLs Independent Vocational Retired   Lifestyle   Autonomy At baseline pt reports independence in ADLS/IADLS/driving    Reciprocal Relationships Pt lives with supportive wife    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   Intrinsic Gratification Pt enjoys being outside    [facility] (WDL) [address] "I am doing well, all things considered"    ADL   Eating Assistance 7  Independent   Grooming Assistance 7  Independent   UB Bathing Assistance 7  Independent   LB [address] 6  Modified Independent   LB Bathing Deficit Increased time to complete   [address] 7  Independent   LB Dressing Assistance 6  Modified independent   LB Dressing Deficit Increased time to complete   [address]  7  [address] 6  Modified independent   Functional Deficit Increased time to complete   Bed Mobility   Additional Comments Pt OOB upon arrival    Transfers   Sit to Stand 7  Independent   Stand to Sit 7  Independent   Toilet transfer 6  Modified independent   Additional items Increased time required;Standard toilet   Functional Mobility   Functional Mobility 7  Independent   Additional Comments Pt completes functional mobility within room/bathroom independently   Pt able to complete high/low reaching during simulated item retrieval with no LOB   Balance   Static Sitting Normal   Static Standing Good   Activity Tolerance   Activity Tolerance Patient tolerated treatment well   Medical Staff Made Aware Pt appropriate to see per RN    RUE Assessment   RUE Assessment WFL   LUE Assessment   LUE Assessment WFL   Hand Function   Gross Motor Coordination Functional   Fine Motor Coordination Functional   Cognition   Overall Cognitive Status WFL   Arousal/Participation Alert   Attention Within functional limits   Orientation Level Oriented X4   Memory Within functional limits   Following Commands Follows all commands and directions without difficulty   Comments Pt engages in appropriate conversation  Assessment   Prognosis Good   Assessment Pt is a 81 y o  male who was [facility] to [facility] 10/17/2017 for AVR  Pt's current problem list includes: severe aortic stenosis  At baseline pt was completing ADLS/IADLS independently and lives at home with supportive wife  Currently pt completing overall ADLS with mod independence and functional mobility/transfers independently  Pt currently presents with expected limitations s/p cardiac sx, ie: pain, decreased activity tolerance  Anticipate pt will continue to progress and OT recommends D/C home with family support when medically stable  OT will continue to follow 1x to address the below stated goal     Goals   Patient Goals to go home    STG Time Frame 1-3   Short Term Goal #1 1  Pt will be attentive 100% of the time during cardiac education to increase safe participation in ADLS/IADLS s/p sx    Plan   Treatment Interventions Cardiac education   Goal Expiration Date 10/23/17   OT Frequency Other (comment)  (1x)   Additional Treatment Session   Start Time 1153   End Time 1201   Treatment Assessment Pt seen for skilled OT tx session focused on cardiac education  Provided pt with cardiac education packet  Reviewed energy conservation strategies, cardiac/sternal precautions, when to resume in normal activities s/p cardiac sx, deep breathing, management of stress/emotions, introduction to cardiac rehab  Pt and pt's wife verbalize understanding of the above information and present with no further questions from OT standpoint  Recommend D/C home with family when medically stable  No further immediate, skilled OT needs at this time  D/C OT      Additional Treatment Day 1   Recommendation   OT Discharge Recommendation Home with family support   Barthel Index   Feeding 10   Bathing 5   Grooming Score 5   Dressing Score 10   Bladder Score 10   Bowels Score 10   Toilet Use Score 10   Transfers (Bed/Chair) Score 15   Mobility (Level Surface) Score 15   Stairs Score 0   Barthel Index Score 90   Modified [place] Scale   Modified [place] Scale 2     Lila Alexander MS, OTR/L , CBIS

## 2017-10-20 NOTE — PLAN OF CARE
Problem: PHYSICAL THERAPY ADULT  Goal: Performs mobility at highest level of function for planned discharge setting  See evaluation for individualized goals  Treatment/Interventions: LE strengthening/ROM, Therapeutic exercise, Endurance training, Equipment eval/education, Bed mobility, Gait training, Spoke to nursing, Spoke to case management, Spoke to advanced practitioner, OT  Equipment Recommended: Obie Castleman       See flowsheet documentation for full assessment, interventions and recommendations  Prognosis: Good  Problem List: Decreased endurance, Decreased safety awareness  Assessment: Pt is 81 y o  admitted with Dx of Symptomatic severe aortic stenosis and underwent AVR on 10/17/2017  Pt 's comorbidities affecting POC include: RA, peripheral neuropathy, HTN, CTS, and spinal cord issues (reported by the pt) and personal factors of: occasional use of SPC for amb  Pt's clinical presentation is currently evolving which is evident in need for input for task focus and mobility technique/safety in light of postop precautions, noted occasional impulsivity, and use of rw which is new to the pt  Pt presents w/ min generalized weakness, min decreased functional endurance, inconsistent gait patterns and inconsistent safety awareness/patterns during mobilization  Will cont to follow pt in PT for progressive mobilization to max level of (I), endurance, and safety  Otherwise, pt demonstrates mod (I)/(S) level w/ observed mobility skills on level surfaces  --> anticipate pt will return home w/ available family support upon D/C when medically cleared; home PT follow up is recommended; rw for amb at this time  This was discussed w/ pt and family; CM aware  Will cont to follow until then  Recommendation: Home PT, Home with family support          See flowsheet documentation for full assessment

## 2017-10-20 NOTE — DISCHARGE SUMMARY
Discharge Summary - Cardiothoracic Surgery   Tamy Ashley 81 y o  male MRN: [id number]  Unit/Bed#: [facility] 423-01 Encounter: [id number]    Admission Date: 10/17/2017     Discharge Date: 10/20/17    Admitting Diagnosis: Nonrheumatic aortic valve stenosis [I35 0]    Primary Discharge Diagnosis:   Aortic stenosis  S/P aortic valve replacement;    Secondary Discharge Diagnosis:   Rheumatoid arthritis (on Humira- held pre-op), chronic back pan s/p laminectomy on chronic opioids (Oxycontin XR 10 mg q 12, and Percocet 10/325 q 4), peripheral neuropathy, Insomnia, HTN, HLD    Attending: NEIDA Bustos  Consulting Physician(s):   Cardiology  Medical/Critical Care  Pain management    Procedures Performed:   10/17: AVR (#23 [facility] Course: The patient was seen in consultation prior to this admission for evaluation of Aortic stenosis  Risks and benefits of aortic valve replacement were discussed in detail, and patient was agreeable  Routine preoperative evaluation was completed and informed consent was obtained prior to admission  10/17: AVR (#23 Magna Ease)  Patient tolerated procedure well  Transferred to ICU hemodynamically stable  Not bleeding  Wean to Extubate  Consult Acute pain service for hx of Opiod use at home (Oxycontin XR 10 mg q 12, and Percocet 10/325 q 4)  PM: Extubated  Pain services on board  10/18: Started on cardene for HTN, on 5 this AM, start Lopressor 25mg BID  Delined  On 2LNC, wean as tolerated  (+) pericardial rub  Start Lasix 40mg IV QDay, discontinue song catheter  Disconitnue insulin gtt, transition to [facility]  Transfer to telem  Amiodarone held for prolonged QTc   10/19: Chest tubes removed  Maintain central IV today  Continue pain medications, per pain management recommendations  10/20: Pain control improved  Ambulating independently  Fit for discharge to home  Condition at Discharge:   good     Discharge Physical Exam:  HEENT/NECK:  PERRLA    No jugular venous distention  Cardiac: Regular rate and rhythm  No rubs/murmurs/gallops  Pulmonary:  Breath sounds slightly diminished at the bases bilaterally  Abdomen:  Non-tender, Non-distended  Positive bowel sounds  Incisions: Sternum is stable  Incision is clean, dry, and intact  Lower extremities: Extremities warm/dry  Radial/PT/DP pulses 2+ bilaterally  Trace edema B/L  Neuro: Alert and oriented X 3  Sensation is grossly intact  No focal deficits  Skin: Warm/Dry, without rashes or lesions  Discharge Data:    Results from last 7 days  Lab Units 10/19/17  0451 10/18/17  0401 10/17/17  2116  10/17/17  1306   WBC Thousand/uL 12 81* 12 06*  --   --   --    HEMOGLOBIN g/dL 9 8* 10 6* 11 0*  --  11 0*   I STAT HEMOGLOBIN   --   --   --   < >  --    HEMATOCRIT % 30 1* 31 2* 32 9*  --  32 2*   PLATELETS Thousands/uL 154 151  --   --  137*   < > = values in this interval not displayed  Results from last 7 days  Lab Units 10/19/17  0451 10/18/17  0401 10/17/17  2116  10/17/17  1306   SODIUM mmol/L 138 138  --   --  140   POTASSIUM mmol/L 4 3 3 8 4 0  < > 4 1   CHLORIDE mmol/L 105 106  --   --  108   CO2 mmol/L 26 24  --   --  25   BUN mg/dL 14 10  --   --  12   CREATININE mg/dL 0 86 0 63  --   --  0 84   GLUCOSE RANDOM mg/dL 127 120  --   --  128   GLUCOSE, ISTAT   --   --   --   < >  --    CALCIUM mg/dL 8 2* 7 3*  --   --  7 5*   < > = values in this interval not displayed  Discharge instructions/Information to patient and family:   See after visit summary for information provided to patient and family  Chuyita Flores was educated on restrictions regarding driving and lifting, and techniques of proper incisional care  They were specifically counselled on signs and symptoms of a sternal wound infection, and advised to contact our service immediately should they develop fevers, sweats, chill, redness or drainage at the site of any incisions      Provisions for Follow-Up Care:  See after visit summary for information related to follow-up care and any pertinent home health orders  Disposition:  Home    Planned Readmission:   No    Discharge Medications:  See after visit summary for reconciled discharge medications provided to patient and family  Marcha Hamman was provided contact information and scheduled a follow up appointment with NEIDA Choi  Additionally, they were advised to schedule follow up appointments to be seen by their primary care physician within 7-10 days, and their cardiologist within 2-3 weeks  Contact information was provided  The patient was discharged on ongoing diuretic therapy with Torsemide 20 mg, PO Daily and Potassium Chloride 20 mEq, PO Daily  They were advised to continue these medications for 10 days, unless otherwise directed  Narcotic pain medication was advised to be resumed, as he took prior to admission for chronic pain  No new narcotics were prescribed  The patient was informed that following their postoperative surgical evaluation, they will be referred to outpatient cardiac rehabilitation  They were counseled that this program is run by specialists who will help them safely strengthen their heart and prevent more heart disease  Cardiac rehabilitation will include exercise, relaxation, stress management, and heart-healthy nutrition  Caregivers will also check to make sure their medication regimen is working  I spent 20 minutes discharging the patient  This time was spent on the day of discharge  I had direct contact with the patient on the day of discharge  Additional documentation is required if more than 30 minutes were spent on discharge       SIGNATURE: Epifanio Pabon PA-C  DATE: October 20, 2017  TIME: 9:56 AM

## 2017-10-20 NOTE — NURSING NOTE
Pt  Left unit via wheelchair with pca and spouse  Discharge instructions reviewed with pt  And wife-each verbalize understanding  Pt  Stable

## 2017-10-24 ENCOUNTER — GENERIC CONVERSION - ENCOUNTER (OUTPATIENT)
Dept: OTHER | Facility: OTHER | Age: 72
End: 2017-10-24

## 2017-10-30 ENCOUNTER — ALLSCRIPTS OFFICE VISIT (OUTPATIENT)
Dept: OTHER | Facility: OTHER | Age: 72
End: 2017-10-30

## 2017-10-30 ENCOUNTER — GENERIC CONVERSION - ENCOUNTER (OUTPATIENT)
Dept: OTHER | Facility: OTHER | Age: 72
End: 2017-10-30

## 2017-11-17 ENCOUNTER — GENERIC CONVERSION - ENCOUNTER (OUTPATIENT)
Dept: OTHER | Facility: OTHER | Age: 72
End: 2017-11-17

## 2017-11-21 ENCOUNTER — GENERIC CONVERSION - ENCOUNTER (OUTPATIENT)
Dept: OTHER | Facility: OTHER | Age: 72
End: 2017-11-21

## 2017-12-04 ENCOUNTER — GENERIC CONVERSION - ENCOUNTER (OUTPATIENT)
Dept: CARDIOLOGY CLINIC | Facility: CLINIC | Age: 72
End: 2017-12-04

## 2017-12-04 ENCOUNTER — GENERIC CONVERSION - ENCOUNTER (OUTPATIENT)
Dept: CARDIAC SURGERY | Facility: CLINIC | Age: 72
End: 2017-12-04

## 2018-01-11 NOTE — MISCELLANEOUS
Assessment    1  Aortic stenosis (424 1) (I35 0)   2  Arthralgia (719 40) (M25 50)   3  Pain syndrome, chronic (338 4) (G89 4)    Plan  Lower back pain    · Oxycodone-Acetaminophen  MG Oral Tablet (Percocet); TAKE 1 TABLET 4  TIMES DAILY   Rx By: Dionisio Mcdermott; Dispense: 30 Days ; #:120 Tablet; Refill: 0; For: Lower back pain; LULY = N; Print Rx    Discussion/Summary  Discussion Summary:   Recovering from recent open heart surgery to replace aortic valve  Recovering very well    wounds are healing well  Needs to follow up with Rheumatology for Humira as this is his biggest issue at this point with the pain from his arthritis  Refilled his pain medicine today  Follow up with me in 3 months  History of Present Illness  TCM Communication [place]: The patient is being contacted for follow-up after hospitalization  Hospital records were reviewed  He was hospitalized at [address] [place] [address]  The date of admission: 10/17/17, date of discharge: 10/20/17  Diagnosis: Aortic Stenosis: S/P Aortic Valve Replacement  He was discharged to home, with home health services, VNA services are checking patient's dressings and taking vital signs and weights  Medications reviewed and updated today  He scheduled a follow up appointment  Follow-up appointments with other specialists: Seeing Dr Nellie Marshall on 10/30/17 and Dr Juan Miguel Robertson on 11/17/17  Will start cardiac rehab after sees Dr Juan Miguel Robertson  Wife and patient state he is feeling quite well  Denies any pain or discomfort  Counseling was provided to the patient and patient's family  Spoke with both patient and his wife Raymond Flatten  Topics counseled included instructions for management and importance of compliance with treatment  Communication performed and completed by ASTRID Salinas    HPI: here for sally from aortic heart valve replacement  having pain issues with arthritis and not being humira    no other complaints   feels heart is healing well  wounds are healing well       Review of Systems  Complete-Male:   Constitutional: No fever or chills, feels well, no tiredness, no recent weight gain or weight loss  Eyes: No complaints of eye pain, no red eyes, no discharge from eyes, no itchy eyes  ENT: no complaints of earache, no hearing loss, no nosebleeds, no nasal discharge, no sore throat, no hoarseness  Cardiovascular: No complaints of slow heart rate, no fast heart rate, no chest pain, no palpitations, no leg claudication, no lower extremity  Respiratory: No complaints of shortness of breath, no wheezing, no cough, no SOB on exertion, no orthopnea or PND  Gastrointestinal: No complaints of abdominal pain, no constipation, no nausea or vomiting, no diarrhea or bloody stools  Genitourinary: No complaints of dysuria, no incontinence, no hesitancy, no nocturia, no genital lesion, no testicular pain  Musculoskeletal: No complaints of arthralgia, no myalgias, no joint swelling or stiffness, no limb pain or swelling  Integumentary: No complaints of skin rash or skin lesions, no itching, no skin wound, no dry skin  Neurological: No compliants of headache, no confusion, no convulsions, no numbness or tingling, no dizziness or fainting, no limb weakness, no difficulty walking  Psychiatric: Is not suicidal, no sleep disturbances, no anxiety or depression, no change in personality, no emotional problems  Endocrine: No complaints of proptosis, no hot flashes, no muscle weakness, no erectile dysfunction, no deepening of the voice, no feelings of weakness  Hematologic/Lymphatic: No complaints of swollen glands, no swollen glands in the neck, does not bleed easily, no easy bruising  Active Problems     1  Achilles tendinitis, unspecified laterality (726 71) (M76 60)   2  Allergic rhinitis (477 9) (J30 9)   3  Arthralgia (719 40) (M25 50)   4  Atopic dermatitis (691 8) (L20 9)   5  Carpal tunnel syndrome (354 0) (G56 00)   6   Chronic pain (338 29) (G89 29)   7  Hyperglycemia (790 29) (R73 9)   8  Insomnia (780 52) (G47 00)   9  Localized Primary Osteoarthritis Of Left Shoulder Glenohumeral Joint (715 11)   10  Lower back pain (724 2) (M54 5)   11  Neoplasm of skin (239 2) (D49 2)   12  Osteoarthritis (715 90) (M19 90)   13  Pain syndrome, chronic (338 4) (G89 4)   14  Peripheral neuropathy (356 9) (G62 9)   15  Psoriasis (696 1) (L40 9)   16  Rheumatoid arthritis (714 0) (M06 9)   17  S/P AVR (V43 3) (Z95 2)   18  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)   19  Trigger index finger of left hand (727 03) (M65 322)    Hyperlipidemia (272 4) (E78 5)       Hypertension (401 9) (I10)       High risk medication use (V58 69) (Z79 899)       Fatigue (780 79) (R53 83)       Aortic stenosis (424 1) (I35 0)       JOHNSON (dyspnea on exertion) (786 09) (R06 09)          Past Medical History    1  History of acute bronchitis (V12 69) (Z87 09)   2  History of acute sinusitis (V12 69) (Z87 09)   3  History of benign prostatic hypertrophy (V13 89) (Z87 438)   4  History of Screen for colon cancer (V76 51) (Z12 11)   5  History of Screen for colon cancer (V76 51) (Z12 11)   6  History of Strain Of Biceps Tendon (840 8)   7  History of Umbilical hernia (996 9) (K42 9)    Surgical History     1  History of Cardiac Cath Procedure Summary   2  History of Cataract Surgery   3  History of Hand Surgery   4  History of Hernia Repair Inguinal Bilateral   5  History of Laminectomy Decompress, Facetectomy, Foraminotomy Lumbar Seg   6  History of Neuroplasty Decompression Median Nerve At Carpal Tunnel   7  History of Tonsillectomy With Adenoidectomy   8  History of Umbilical Hernia Repair  Surgical History Reviewed: The surgical history was reviewed and updated today  History of Aortic Valve Replacement             Family History  Mother    1  Family history of cerebrovascular accident (CVA) (V17 1) (Z82 3)   2  Family history of diabetes mellitus (V18 0) (Z83 3)   3   Family history of hypertension (V17 49) (Z82 49)  Father    4  Family history of cardiac disorder (V17 49) (Z82 49)   5  Family history of hypertension (V17 49) (Z82 49)   6  Family history of myocardial infarction (V17 3) (Z82 49)  Family History    7  Family history of Coronary Artery Disease (V17 49)  Family History Reviewed: The family history was reviewed and updated today  Social History    · Does not use illicit drugs (C88 36) (Z78 9)   ·    · Never A Smoker   · Retired  Social History Reviewed: The social history was reviewed and updated today  The social history was reviewed and is unchanged  Current Meds   1  Aspirin 325 MG Oral Tablet; Take 1 tablet daily Recorded   2  Carisoprodol 350 MG Oral Tablet; TAKE 1 TABLET 4 TIMES DAILY; Therapy: 06XXK7505 to (Evaluate:53Dlm4519)  Requested for: 24Oct2017; Last   Rx:24Oct2017 Ordered   3  Colace 100 MG Oral Capsule; TAKE 1 CAPSULE TWICE DAILY; Therapy: (YKUFNTJN:97QWE8596) to Recorded   4  Fluocinonide 0 05 % External Cream;   Therapy: (Recorded:06Oct2017) to Recorded   5  Gralise 600 MG Oral Tablet; 3 tab at night Recorded   6  Humira 40 MG/0 8ML Subcutaneous Prefilled Syringe Kit; 1 pen every other week   Recorded   7  Lasix 40 MG Oral Tablet; TAKE 1 TABLET DAILY; Therapy: (Recorded:06Oct2017) to Recorded   8  Lipitor 20 MG Oral Tablet; TAKE 1 TABLET EVERY DAY; Therapy: 83UHL9321 to (Evaluate:77Egw6324)  Requested for: 22OJI8837; Last   Rx:95Hyz7977 Ordered   9  Lipitor 40 MG Oral Tablet; TAKE 1 TABLET AT BEDTIME; Therapy: (CKYDXNCE:67UPR9244) to Recorded   10  Metoprolol Tartrate 25 MG Oral Tablet; TAKE 1 TABLET TWICE DAILY; Therapy: (CTDOQNES:46TSG2048) to Recorded   11  Milk Thistle CAPS; take 1 capsule daily; Therapy: (Recorded:06Oct2017) to Recorded   12  OxyCODONE HCl - 10 MG Oral Tablet; Take 1 tablet 4 times daily; Therapy: 24MYD0159 to (Evaluate:19Nov2017); Last Rx:20Oct2017 Ordered   13   Oxycodone-Acetaminophen  MG Oral Tablet; TAKE 1 TABLET 4 TIMES DAILY; Therapy: 81LJE3910 to (Evaluate:01Nov2017); Last Rx:02Oct2017 Ordered   14  TraZODone HCl - 100 MG Oral Tablet; TAKE 4 TABLET Bedtime; Therapy: 91XKU1184 to ()  Requested for: 66RVI1569; Last    Rx:02Oct2017 Ordered   15  Vitamin D3 5000 UNIT Oral Tablet; Take 1 tablet daily; Therapy: (Recorded:06Oct2017) to Recorded  Medication List Reviewed: The medication list was reviewed and updated today  Allergies    1  Penicillins   2  Quinine Derivatives    Vitals  Signs   Recorded: 95ZAQ3951 11:41AM   Temperature: 97 2 F, Tympanic  Heart Rate: 55  Respiration: 16  Systolic: 101, RUE, Sitting  Diastolic: 76, RUE, Sitting  Height: 5 ft 7 in  Weight: 194 lb   BMI Calculated: 30 38  BSA Calculated: 2    Physical Exam    Constitutional   General appearance: No acute distress, well appearing and well nourished  Eyes   Conjunctiva and lids: No swelling, erythema, or discharge  Ears, Nose, Mouth, and Throat   Otoscopic examination: Tympanic membrance translucent with normal light reflex  Canals patent without erythema  Oropharynx: Normal with no erythema, edema, exudate or lesions  Pulmonary   Respiratory effort: No increased work of breathing or signs of respiratory distress  Auscultation of lungs: Clear to auscultation, equal breath sounds bilaterally, no wheezes, no rales, no rhonci  Cardiovascular   Auscultation of heart: Normal rate and rhythm, normal S1 and S2, without murmurs  Carotid pulses: Normal     Abdomen   Abdomen: Non-tender, no masses  Liver and spleen: No hepatomegaly or splenomegaly  Musculoskeletal   Gait and station: Abnormal   Uses cane  Inspection/palpation of joints, bones, and muscles: Abnormal   Arthritic changes  Skin   Skin and subcutaneous tissue: Normal without rashes or lesions  Well healing surgical wounds on chest    Neurologic   Cranial nerves: Cranial nerves 2-12 intact           Health Management  History of Screen for colon cancer   COLONOSCOPY; every 10 years; Next Due: 35QSY1918; Overdue  Health Maintenance   Medicare Annual Wellness Visit; every 1 year; Next Due: 92VJM4764; Overdue    Future Appointments    Date/Time Provider Specialty Site   11/17/2017 01:30 PM NEIDA Byrne   Cardiac Surgery CT SURGERY [facility]   01/23/2018 11:30 AM Harsh Sharma DO [facility] Medicine [facility], [place], operated by [facility]     Signatures   Electronically signed by : Catherine Doe DO; Oct 30 2017  4:45PM EST                       (Author)

## 2018-01-11 NOTE — MISCELLANEOUS
History of Present Illness  Cardiac Surgery Phone Follow-up:    This phone call was in regards to post-operative care  Procedure: AVR  10/17/17   Hospital Discharge Date: 10/20/17  Surgeon: Milagro Rodriguez MD    Office Visit Pending: Yes   11/17/17  The patient has an appointment with their cardiologist on: 10/30/17  Date of Call: 10/24/2017, follow-up call after discharge  Symptom review with the patient is as follows: no shortness of breath, no chest pain, no leg swelling, pain is controlled, incision stable, no bowel problems, stable appetite, no lightheaded/dizziness, blood pressure stable, heart rate stable, activity: tolerating frequent short walks, patient's reported pre-op weight was 200 lbs, patient's reported weight at discharge was 206 lbs, patient's reported current weight is 195 5 lbs and medication review  Comments: Spoke to patient today  He states he is doing well  Tolerating activity  Weight decreasing and no edema  No Angina, SOB or lightheadedness  Incisions dry and no redness  Seen by VNA twice who report his VS are stable  Active Problems    1  Achilles tendinitis, unspecified laterality (726 71) (M76 60)   2  Allergic rhinitis (477 9) (J30 9)   3  Aortic stenosis (424 1) (I35 0)   4  Arthralgia (719 40) (M25 50)   5  Atopic dermatitis (691 8) (L20 9)   6  Carpal tunnel syndrome (354 0) (G56 00)   7  Chronic pain (338 29) (G89 29)   8  JOHNSON (dyspnea on exertion) (786 09) (R06 09)   9  Fatigue (780 79) (R53 83)   10  High risk medication use (V58 69) (Z79 899)   11  Hyperglycemia (790 29) (R73 9)   12  Hyperlipidemia (272 4) (E78 5)   13  Hypertension (401 9) (I10)   14  Insomnia (780 52) (G47 00)   15  Localized Primary Osteoarthritis Of Left Shoulder Glenohumeral Joint (715 11)   16  Lower back pain (724 2) (M54 5)   17  Neoplasm of skin (239 2) (D49 2)   18  Osteoarthritis (715 90) (M19 90)   19  Pain syndrome, chronic (338 4) (G89 4)   20   Peripheral neuropathy (356 9) (G62 9)   21  Psoriasis (696 1) (L40 9)   22  Rheumatoid arthritis (714 0) (M06 9)   23  S/P AVR (V43 3) (Z95 2)   24  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)   25  Trigger index finger of left hand (727 03) (F54 082)    Past Medical History    1  History of acute bronchitis (V12 69) (Z87 09)   2  History of acute sinusitis (V12 69) (Z87 09)   3  History of benign prostatic hypertrophy (V13 89) (Z87 438)   4  History of Screen for colon cancer (V76 51) (Z12 11)   5  History of Screen for colon cancer (V76 51) (Z12 11)   6  History of Strain Of Biceps Tendon (840 8)   7  History of Umbilical hernia (838 6) (K42 9)    Surgical History    1  History of Aortic Valve Replacement   2  History of Cardiac Cath Procedure Summary   3  History of Cataract Surgery   4  History of Hand Surgery   5  History of Hernia Repair Inguinal Bilateral   6  History of Laminectomy Decompress, Facetectomy, Foraminotomy Lumbar Seg   7  History of Neuroplasty Decompression Median Nerve At Carpal Tunnel   8  History of Tonsillectomy With Adenoidectomy   9  History of Umbilical Hernia Repair    Family History  Problems    1  Family history of Coronary Artery Disease (V17 49)   2  Family history of cardiac disorder (V17 49) (Z82 49) : Father   3  Family history of cerebrovascular accident (CVA) (V17 1) (Z82 3) : Mother   4  Family history of diabetes mellitus (V18 0) (Z83 3) : Mother   5  Family history of hypertension (V17 49) (Z82 49) : Mother, Father   10  Family history of myocardial infarction (V17 3) (Z82 49) : Father    Social History    · Does not use illicit drugs (V06 90) (Z78 9)   ·    · Never A Smoker   · Retired    Current Meds    1  Mupirocin 2 % External Ointment; 1 application each nares BID for 5 days before surgery; Therapy: 92WJH9636 to (Last Rx:06Oct2017)  Requested for: 59WNU6883 Ordered    2  Gralise 600 MG Oral Tablet; 3 tab at night Recorded    3  OxyCODONE HCl - 10 MG Oral Tablet;  Take 1 tablet 4 times daily; Therapy: 98GFC4010 to (Evaluate:19Nov2017); Last Rx:20Oct2017 Ordered    4  Aspirin 325 MG Oral Tablet; Take 1 tablet daily Recorded    5  Lipitor 20 MG Oral Tablet (Atorvastatin Calcium); TAKE 1 TABLET EVERY DAY; Therapy: 27UFE8922 to (Ovidiojeff Dumas)  Requested for: 44KSM8015; Last   Rx:36Rwo1848 Ordered    6  TraZODone HCl - 100 MG Oral Tablet; TAKE 4 TABLET Bedtime; Therapy: 11EJK2124 to [phone])  Requested for: 36UVQ2550; Last   Rx:02Oct2017 Ordered    7  Oxycodone-Acetaminophen  MG Oral Tablet (Percocet); TAKE 1 TABLET 4 TIMES   DAILY; Therapy: 01KKO4604 to (Evaluate:01Nov2017); Last Rx:02Oct2017 Ordered    8  Humira 40 MG/0 8ML Subcutaneous Prefilled Syringe Kit; 1 pen every other week   Recorded    9  Fluocinonide 0 05 % External Cream;   Therapy: (Recorded:06Oct2017) to Recorded   10  Lasix 40 MG Oral Tablet (Furosemide); TAKE 1 TABLET DAILY; Therapy: (Recorded:06Oct2017) to Recorded   11  Milk Thistle CAPS; take 1 capsule daily; Therapy: (Recorded:06Oct2017) to Recorded   12  Vitamin D3 5000 UNIT Oral Tablet; Take 1 tablet daily;     Therapy: (Recorded:06Oct2017) to Recorded    Signatures   Electronically signed by : PEDRO Nobles; Oct 24 2017 12:14PM EST                       (Author)

## 2018-01-12 VITALS
WEIGHT: 208.44 LBS | HEIGHT: 67 IN | BODY MASS INDEX: 32.72 KG/M2 | SYSTOLIC BLOOD PRESSURE: 136 MMHG | HEART RATE: 60 BPM | DIASTOLIC BLOOD PRESSURE: 78 MMHG

## 2018-01-12 VITALS
BODY MASS INDEX: 32.96 KG/M2 | WEIGHT: 210 LBS | DIASTOLIC BLOOD PRESSURE: 72 MMHG | SYSTOLIC BLOOD PRESSURE: 140 MMHG | HEART RATE: 59 BPM | HEIGHT: 67 IN

## 2018-01-12 NOTE — MISCELLANEOUS
Message  called and discussed   labs are good  echo shows severe as  will send to cardiology for eval      Signatures   Electronically signed by : Caterina Bolton DO; Mar 10 2017  3:31PM EST                       (Author)

## 2018-01-13 VITALS
RESPIRATION RATE: 16 BRPM | WEIGHT: 194 LBS | SYSTOLIC BLOOD PRESSURE: 128 MMHG | BODY MASS INDEX: 30.45 KG/M2 | HEART RATE: 55 BPM | HEIGHT: 67 IN | TEMPERATURE: 97.2 F | DIASTOLIC BLOOD PRESSURE: 76 MMHG

## 2018-01-14 VITALS
HEIGHT: 67 IN | DIASTOLIC BLOOD PRESSURE: 90 MMHG | SYSTOLIC BLOOD PRESSURE: 160 MMHG | BODY MASS INDEX: 33.27 KG/M2 | WEIGHT: 212 LBS | HEART RATE: 72 BPM

## 2018-01-14 VITALS
SYSTOLIC BLOOD PRESSURE: 140 MMHG | HEART RATE: 72 BPM | HEIGHT: 67 IN | BODY MASS INDEX: 32.49 KG/M2 | WEIGHT: 207 LBS | DIASTOLIC BLOOD PRESSURE: 80 MMHG

## 2018-01-17 NOTE — MISCELLANEOUS
Message  patient notified of labs  all stable  f/u as directed      Signatures   Electronically signed by : Gemma Lamb DO; May 24 2016  5:10PM EST                       (Author)

## 2018-01-22 VITALS
HEIGHT: 67 IN | WEIGHT: 199 LBS | HEART RATE: 60 BPM | BODY MASS INDEX: 31.23 KG/M2 | DIASTOLIC BLOOD PRESSURE: 66 MMHG | SYSTOLIC BLOOD PRESSURE: 126 MMHG

## 2018-01-22 VITALS
DIASTOLIC BLOOD PRESSURE: 68 MMHG | BODY MASS INDEX: 32.65 KG/M2 | RESPIRATION RATE: 14 BRPM | WEIGHT: 208 LBS | HEART RATE: 76 BPM | HEIGHT: 67 IN | OXYGEN SATURATION: 94 % | TEMPERATURE: 98.6 F | SYSTOLIC BLOOD PRESSURE: 128 MMHG

## 2018-01-22 VITALS
TEMPERATURE: 96.7 F | RESPIRATION RATE: 12 BRPM | HEART RATE: 62 BPM | HEIGHT: 67 IN | BODY MASS INDEX: 31.91 KG/M2 | OXYGEN SATURATION: 96 % | WEIGHT: 203.31 LBS | DIASTOLIC BLOOD PRESSURE: 58 MMHG | SYSTOLIC BLOOD PRESSURE: 118 MMHG

## 2018-01-22 VITALS — DIASTOLIC BLOOD PRESSURE: 58 MMHG | SYSTOLIC BLOOD PRESSURE: 118 MMHG

## 2018-01-22 VITALS — DIASTOLIC BLOOD PRESSURE: 52 MMHG | SYSTOLIC BLOOD PRESSURE: 112 MMHG

## 2018-02-01 DIAGNOSIS — M54.9 CHRONIC BACK PAIN, UNSPECIFIED BACK LOCATION, UNSPECIFIED BACK PAIN LATERALITY: Primary | ICD-10-CM

## 2018-02-01 DIAGNOSIS — G89.29 CHRONIC BACK PAIN, UNSPECIFIED BACK LOCATION, UNSPECIFIED BACK PAIN LATERALITY: Primary | ICD-10-CM

## 2018-02-01 RX ORDER — CARISOPRODOL 350 MG/1
350 TABLET ORAL 4 TIMES DAILY
Qty: 120 TABLET | Refills: 3 | Status: SHIPPED | OUTPATIENT
Start: 2018-02-01 | End: 2018-02-21 | Stop reason: SDUPTHER

## 2018-02-13 ENCOUNTER — TELEPHONE (OUTPATIENT)
Dept: FAMILY MEDICINE CLINIC | Facility: CLINIC | Age: 73
End: 2018-02-13

## 2018-02-19 DIAGNOSIS — G89.29 CHRONIC BACK PAIN, UNSPECIFIED BACK LOCATION, UNSPECIFIED BACK PAIN LATERALITY: ICD-10-CM

## 2018-02-19 DIAGNOSIS — M54.9 CHRONIC BACK PAIN, UNSPECIFIED BACK LOCATION, UNSPECIFIED BACK PAIN LATERALITY: ICD-10-CM

## 2018-02-19 RX ORDER — CARISOPRODOL 350 MG/1
TABLET ORAL
Qty: 120 TABLET | Refills: 1 | OUTPATIENT
Start: 2018-02-19

## 2018-02-21 ENCOUNTER — OFFICE VISIT (OUTPATIENT)
Dept: FAMILY MEDICINE CLINIC | Facility: CLINIC | Age: 73
End: 2018-02-21
Payer: COMMERCIAL

## 2018-02-21 VITALS
DIASTOLIC BLOOD PRESSURE: 88 MMHG | BODY MASS INDEX: 31.16 KG/M2 | HEART RATE: 60 BPM | HEIGHT: 67 IN | SYSTOLIC BLOOD PRESSURE: 150 MMHG | WEIGHT: 198.5 LBS

## 2018-02-21 DIAGNOSIS — M54.9 CHRONIC BACK PAIN, UNSPECIFIED BACK LOCATION, UNSPECIFIED BACK PAIN LATERALITY: ICD-10-CM

## 2018-02-21 DIAGNOSIS — G89.29 CHRONIC BACK PAIN, UNSPECIFIED BACK LOCATION, UNSPECIFIED BACK PAIN LATERALITY: ICD-10-CM

## 2018-02-21 DIAGNOSIS — M06.00 RHEUMATOID ARTHRITIS WITH NEGATIVE RHEUMATOID FACTOR, INVOLVING UNSPECIFIED SITE (HCC): Primary | ICD-10-CM

## 2018-02-21 DIAGNOSIS — G89.4 CHRONIC PAIN SYNDROME: ICD-10-CM

## 2018-02-21 DIAGNOSIS — G47.00 INSOMNIA, UNSPECIFIED TYPE: ICD-10-CM

## 2018-02-21 DIAGNOSIS — M48.061 SPINAL STENOSIS OF LUMBAR REGION, UNSPECIFIED WHETHER NEUROGENIC CLAUDICATION PRESENT: ICD-10-CM

## 2018-02-21 PROCEDURE — 99214 OFFICE O/P EST MOD 30 MIN: CPT | Performed by: FAMILY MEDICINE

## 2018-02-21 RX ORDER — TRAZODONE HYDROCHLORIDE 100 MG/1
TABLET ORAL
Qty: 120 TABLET | Refills: 1 | Status: SHIPPED | OUTPATIENT
Start: 2018-02-21 | End: 2018-04-16 | Stop reason: SDUPTHER

## 2018-02-21 RX ORDER — OXYCODONE AND ACETAMINOPHEN 10; 325 MG/1; MG/1
1 TABLET ORAL EVERY 4 HOURS PRN
Qty: 120 TABLET | Refills: 0 | Status: SHIPPED | OUTPATIENT
Start: 2018-02-21 | End: 2018-04-06 | Stop reason: SDUPTHER

## 2018-02-21 RX ORDER — CARISOPRODOL 350 MG/1
350 TABLET ORAL 4 TIMES DAILY
Qty: 120 TABLET | Refills: 1 | Status: SHIPPED | OUTPATIENT
Start: 2018-02-21 | End: 2018-04-16 | Stop reason: SDUPTHER

## 2018-02-21 RX ORDER — OXYCODONE HYDROCHLORIDE 10 MG/1
10 TABLET ORAL EVERY 4 HOURS PRN
Qty: 120 TABLET | Refills: 0 | Status: SHIPPED | OUTPATIENT
Start: 2018-02-21 | End: 2018-03-20 | Stop reason: SDUPTHER

## 2018-02-21 NOTE — PROGRESS NOTES
Assessment/Plan:    Rheumatoid arthritis with negative rheumatoid factor, involving unspecified site [facility])  -     Ambulatory referral to Rheumatology; Future    Chronic back pain, unspecified back location, unspecified back pain laterality  -     carisoprodol (SOMA) 350 mg tablet; Take 1 tablet (350 mg total) by mouth 4 (four) times a day    Spinal stenosis of lumbar region, unspecified whether neurogenic claudication present  -     MRI lumbar spine wo contrast; Future  -     oxyCODONE (ROXICODONE) 10 MG TABS; Take 1 tablet (10 mg total) by mouth every 4 (four) hours as needed for moderate pain Max Daily Amount: 60 mg  -     oxyCODONE-acetaminophen (PERCOCET)  mg per tablet; Take 1 tablet by mouth every 4 (four) hours as needed for moderate pain Max Daily Amount: 6 tablets    Insomnia, unspecified type  -     traZODone (DESYREL) 100 mg tablet; Take 4 tablets at bedtime     f/u cardiology   Will need to see if can have mri for back from cardiothoracic surgery  F/u in 4 months for awv or  As needed          Subjective:     Chief Complaint   Patient presents with    Follow-up     Check up 3 months Hypertension/Hyperlipidemia    Back Pain     C/O lower back pain radiates into B/L legs  Patient ID: Gt Crowley is a 81 y o  male  Here for checkup of chrinic condtions  States has been having worsening back pain down both legs  Its better with sitting   But has been getting worse over the past few days  Also can no longer see his rheum due to insurance issues and will need new rheum  Discussed recent heart valve replacemnt -- and this is going well  And recovery has been uneventful  The following portions of the patient's history were reviewed and updated as appropriate: allergies, current medications, past family history, past medical history, past social history, past surgical history and problem list     Review of Systems   Constitutional: Negative  HENT: Negative  Eyes: Negative  Respiratory: Negative  Cardiovascular: Negative  Gastrointestinal: Negative  Endocrine: Negative  Genitourinary: Negative  Musculoskeletal: Positive for arthralgias, back pain, gait problem, joint swelling and myalgias  Skin: Negative  Allergic/Immunologic: Negative  Hematological: Negative  Psychiatric/Behavioral: Negative  All other systems reviewed and are negative  Objective:    Vitals:    02/21/18 1036   BP: 150/88   Pulse: 60        Physical Exam   Constitutional: He is oriented to person, place, and time  He appears well-developed and well-nourished  HENT:   Head: Normocephalic  Right Ear: External ear normal    Left Ear: External ear normal    Nose: Nose normal    Mouth/Throat: Oropharynx is clear and moist    Eyes: EOM are normal  Pupils are equal, round, and reactive to light  Neck: Normal range of motion  Cardiovascular: Normal rate, regular rhythm and normal heart sounds  Pulmonary/Chest: Effort normal and breath sounds normal    Abdominal: Soft  Bowel sounds are normal  He exhibits no distension  There is no tenderness  Musculoskeletal: Normal range of motion  Arthritic changes throughout all joints  Neurological: He is alert and oriented to person, place, and time  No cranial nerve deficit  Skin: Skin is warm and dry  No rash noted  Psychiatric: He has a normal mood and affect  His behavior is normal  Judgment and thought content normal    Nursing note and vitals reviewed

## 2018-02-27 ENCOUNTER — OFFICE VISIT (OUTPATIENT)
Dept: CARDIOLOGY CLINIC | Facility: CLINIC | Age: 73
End: 2018-02-27
Payer: COMMERCIAL

## 2018-02-27 VITALS
HEART RATE: 60 BPM | SYSTOLIC BLOOD PRESSURE: 138 MMHG | WEIGHT: 198 LBS | BODY MASS INDEX: 31.01 KG/M2 | DIASTOLIC BLOOD PRESSURE: 72 MMHG

## 2018-02-27 DIAGNOSIS — I10 ESSENTIAL HYPERTENSION: ICD-10-CM

## 2018-02-27 DIAGNOSIS — E78.49 OTHER HYPERLIPIDEMIA: Primary | ICD-10-CM

## 2018-02-27 DIAGNOSIS — Z09 FOLLOW-UP VISIT FOR AORTIC VALVE REPLACEMENT WITH BIOPROSTHETIC VALVE: ICD-10-CM

## 2018-02-27 DIAGNOSIS — Z95.3 FOLLOW-UP VISIT FOR AORTIC VALVE REPLACEMENT WITH BIOPROSTHETIC VALVE: ICD-10-CM

## 2018-02-27 PROBLEM — I35.0 NONRHEUMATIC AORTIC VALVE STENOSIS: Status: RESOLVED | Noted: 2017-09-29 | Resolved: 2018-02-27

## 2018-02-27 PROCEDURE — 99214 OFFICE O/P EST MOD 30 MIN: CPT | Performed by: INTERNAL MEDICINE

## 2018-02-27 RX ORDER — BIOTIN 1 MG
1 TABLET ORAL DAILY
COMMUNITY

## 2018-02-27 NOTE — PATIENT INSTRUCTIONS
Take BP 2 x weekly for the next 2-3 weeks and if less than 130/80 consistently, then stop metoprolol and continune assessing BP for another couple weeks  If BP rises, then call me and we will discuss a better BP medication than metoprolol

## 2018-02-27 NOTE — PROGRESS NOTES
Follow-up - Cardiology   Kamlesh Roberts 81 y o  male MRN: [id number]        Problems    Problem List Items Addressed This Visit        Cardiovascular and Mediastinum    Hypertension       Other    Hyperlipidemia - Primary    Relevant Medications    aspirin 81 MG tablet    Follow-up visit for aortic valve replacement with bioprosthetic valve          Plan     doing quite well following aortic valve replacement  He is safe to undergo MRI for his back and possible stenosis  His hypertension is a little uncontrolled today, my repeat blood pressure 138/72  Currently on low-dose metoprolol which is not an ideal agent but this was started post AVR  Recommended checking blood pressures a couple times a week for the next few weeks and if less than 130/80 he can stop taking the metoprolol, but will require further surveillance blood pressure checks following that  If they start to elevate, call my office and we will initiate losartan 25 mg daily  Hyperlipidemia -  Well controlled on atorvastatin  Preop cardiac catheterization proved normal coronary arteries  But he does have rheumatoid arthritis, and therefore at risk for vascular disease  HPI: Kamlesh Roberts is a 81y o  year old male  With a history of bioprosthetic AVR done last year, this was done for symptomatic severe aortic stenosis  He has done well, has occasional shortness of the left chest, slightly decreased range of motion of the left arm  But no actual chest discomfort, no exertional discomfort, he has hypertension with blood pressure slightly elevated today  Blood pressures were never really elevated in did not have a prior history of hypertension but beta-blocker therapy was started empirically following his aortic valve replacement  He has a long history of hyperlipidemia, continues on atorvastatin with good control          Review of Systems      Past Medical History:   Diagnosis Date    Aortic stenosis     Aortic stenosis, severe  Arthritis     Carpal tunnel syndrome     Chronic pain     Chronic, continuous use of opioids     Hyperlipidemia     Hypertension     Hypertrophy of prostate     Insomnia     Peripheral neuropathy     [facility] [address] [facility]     Umbilical hernia      History   Alcohol Use No     History   Drug Use No     History   Smoking Status    Never Smoker   Smokeless Tobacco    Never Used       Allergies:   Allergies   Allergen Reactions    Penicillins Anaphylaxis    Quinine Derivatives Other (See Comments)     High fever       Medications:     Current Outpatient Prescriptions:     adalimumab (HUMIRA) 40 mg/0 8 mL PSKT, Inject 40 mg under the skin once Biweekly stopped sept 3rd  , Disp: , Rfl:     aspirin 81 MG tablet, Take 1 tablet (81 mg total) by mouth daily, Disp: 90 tablet, Rfl: 2    atorvastatin (LIPITOR) 40 mg tablet, Take 40 mg by mouth daily, Disp: , Rfl:     carisoprodol (SOMA) 350 mg tablet, Take 1 tablet (350 mg total) by mouth 4 (four) times a day, Disp: 120 tablet, Rfl: 1    Cholecalciferol (VITAMIN D3) 1000 units CAPS, Take 1 tablet by mouth daily, Disp: , Rfl:     fluocinonide (LIDEX) 0 05 % ointment, Apply topically 2 (two) times a day, Disp: , Rfl:     gabapentin (NEURONTIN) 600 MG tablet, Take 300 mg by mouth 3 (three) times a day, Disp: , Rfl:     loperamide (IMODIUM) 2 mg capsule, Take 2 mg by mouth 4 (four) times a day as needed for diarrhea, Disp: , Rfl:     metoprolol tartrate (LOPRESSOR) 25 mg tablet, Take 1 tablet by mouth every 12 (twelve) hours (Patient taking differently: Take 12 5 mg by mouth every 12 (twelve) hours  ), Disp: 60 tablet, Rfl: 2    Milk Thistle 1000 MG CAPS, Take 1 capsule by mouth daily, Disp: , Rfl:     oxyCODONE (ROXICODONE) 10 MG TABS, Take 1 tablet (10 mg total) by mouth every 4 (four) hours as needed for moderate pain Max Daily Amount: 60 mg, Disp: 120 tablet, Rfl: 0    oxyCODONE-acetaminophen (PERCOCET)  mg per tablet, Take 1 tablet by mouth every 4 (four) hours as needed for moderate pain Max Daily Amount: 6 tablets, Disp: 120 tablet, Rfl: 0    traMADol (ULTRAM) 50 mg tablet, Take 50 mg by mouth every 6 (six) hours as needed for moderate pain, Disp: , Rfl:     traZODone (DESYREL) 100 mg tablet, Take 4 tablets at bedtime, Disp: 120 tablet, Rfl: 1    docusate sodium (COLACE) 100 mg capsule, Take 1 capsule by mouth 2 (two) times a day for 31 days, Disp: 10 capsule, Rfl: 0      Physical Exam   Constitutional: He is oriented to person, place, and time  He appears well-developed and well-nourished  No distress  HENT:   Head: Normocephalic and atraumatic  Mouth/Throat: Oropharynx is clear and moist    Eyes: Conjunctivae and EOM are normal  Pupils are equal, round, and reactive to light  No scleral icterus  Neck: Normal range of motion  Neck supple  No JVD present  No thyromegaly present  Cardiovascular: Normal rate, regular rhythm, normal heart sounds and intact distal pulses  Exam reveals no gallop and no friction rub  No murmur heard  Pulmonary/Chest: Effort normal and breath sounds normal  No respiratory distress  He has no wheezes  He has no rales  Abdominal: Soft  Bowel sounds are normal  He exhibits no distension  There is no tenderness  Musculoskeletal: Normal range of motion  He exhibits no edema or deformity  Neurological: He is alert and oriented to person, place, and time  No cranial nerve deficit  Skin: Skin is warm and dry  No rash noted  He is not diaphoretic  No erythema  Psychiatric: He has a normal mood and affect           Laboratory Studies:  CMP:  Lab Results   Component Value Date    CREATININE 0 86 10/19/2017    BUN 14 10/19/2017     10/19/2017    K 4 3 10/19/2017     10/19/2017    CO2 26 10/19/2017    GLUCOSE 127 10/19/2017    GLUCOSE 125 10/17/2017    PROT 7 5 09/29/2017    ALKPHOS 60 09/29/2017    ALT 30 09/29/2017    AST 21 09/29/2017     Lipid Profile:   Lab Results   Component Value Date    CHOL 145 09/29/2017    CHOL 182 03/08/2017     Lab Results   Component Value Date    HDL 50 09/29/2017    HDL 52 03/08/2017     Lab Results   Component Value Date    LDLCALC 78 09/29/2017    LDLCALC 109 (H) 03/08/2017     Lab Results   Component Value Date    TRIG 84 09/29/2017    TRIG 107 03/08/2017         Cardiac testing:     EKG reviewed personally:       Sujata Crowley MD    Portions of the record may have been created with voice recognition software   Occasional wrong word or "sound a like" substitutions may have occurred due to the inherent limitations of voice recognition software   Read the chart carefully and recognize, using context, where substitutions have occurred

## 2018-03-06 ENCOUNTER — TELEPHONE (OUTPATIENT)
Dept: CARDIOLOGY CLINIC | Facility: CLINIC | Age: 73
End: 2018-03-06

## 2018-03-08 ENCOUNTER — TELEPHONE (OUTPATIENT)
Dept: CARDIOLOGY CLINIC | Facility: CLINIC | Age: 73
End: 2018-03-08

## 2018-03-08 NOTE — TELEPHONE ENCOUNTER
Patient called on 3/6/18 stating his BP was increasingly getting higher and he was feeling tired  He did not have any symptoms except he states he was tired and onrma as though he was getting a cold  Patient was seen in [facility] office on  2/27/18 at 11:00am   He was taking Metoprolol Tartrate 12 5 BID and in encounter notes there are instructions: if BP starts to elevate to call office and we will initiate losartan 25 mg daily        3/5/18: BP - 182/90  3/6/18: BP - 190/98    Patient today stated he had open heart surgery with a new valve replacement in October and was on Metoprolol Tartrate 25 BID  Since the visit on 2/27/18 he has been taking 12 5 mg of the Metoprolol BID  He states since we spoke on 3/6/18 his blood pressure has been running:  160/80 in the am  182/90 afternoon  190/98 in pm  Patient states he has no symptoms but is feeling anxious about this  Patient took 25 mg of the Metoprolol in the am yesterday 3/7/18 and 12 5 mg in the pm   Doing this brought his BP in the afternoon to 165/80 but the evening reading went back up  Patient stated he is going to take 25 mg BID starting today or until he gets instructions  A nurse visit was offered however, he stated it would take him 50 mins just to get to office and he would rather not       Please advise

## 2018-03-09 NOTE — TELEPHONE ENCOUNTER
Spoke with patient and relayed instructions to continue with taking the Metoprolol 25 mg BID and to start Losartan 25 mg daily  Prescription was called in to [facility]

## 2018-03-09 NOTE — TELEPHONE ENCOUNTER
Metoprolol 25mg BID fur sure, and add Losartan 25mg please as metoprolol will likely not solve his BP

## 2018-03-20 DIAGNOSIS — M48.061 SPINAL STENOSIS OF LUMBAR REGION, UNSPECIFIED WHETHER NEUROGENIC CLAUDICATION PRESENT: ICD-10-CM

## 2018-03-20 RX ORDER — OXYCODONE HYDROCHLORIDE 10 MG/1
TABLET ORAL
Qty: 120 TABLET | Refills: 0 | Status: SHIPPED | OUTPATIENT
Start: 2018-03-20 | End: 2018-04-16 | Stop reason: SDUPTHER

## 2018-04-04 ENCOUNTER — HOSPITAL ENCOUNTER (OUTPATIENT)
Dept: MRI IMAGING | Facility: HOSPITAL | Age: 73
Discharge: HOME | End: 2018-04-04
Payer: COMMERCIAL

## 2018-04-04 DIAGNOSIS — M48.061 SPINAL STENOSIS OF LUMBAR REGION, UNSPECIFIED WHETHER NEUROGENIC CLAUDICATION PRESENT: ICD-10-CM

## 2018-04-04 PROCEDURE — 72148 MRI LUMBAR SPINE W/O DYE: CPT

## 2018-04-06 DIAGNOSIS — M48.061 SPINAL STENOSIS OF LUMBAR REGION, UNSPECIFIED WHETHER NEUROGENIC CLAUDICATION PRESENT: ICD-10-CM

## 2018-04-06 RX ORDER — OXYCODONE AND ACETAMINOPHEN 10; 325 MG/1; MG/1
1 TABLET ORAL EVERY 4 HOURS PRN
Qty: 120 TABLET | Refills: 0 | Status: SHIPPED | OUTPATIENT
Start: 2018-04-06 | End: 2018-04-16 | Stop reason: SDUPTHER

## 2018-04-09 DIAGNOSIS — Z95.2 S/P AVR: ICD-10-CM

## 2018-04-09 DIAGNOSIS — I25.10 CORONARY ARTERY DISEASE INVOLVING NATIVE CORONARY ARTERY OF NATIVE HEART WITHOUT ANGINA PECTORIS: Primary | ICD-10-CM

## 2018-04-12 ENCOUNTER — TELEPHONE (OUTPATIENT)
Dept: FAMILY MEDICINE CLINIC | Facility: CLINIC | Age: 73
End: 2018-04-12

## 2018-04-13 DIAGNOSIS — Z95.2 S/P AVR: ICD-10-CM

## 2018-04-13 RX ORDER — LOSARTAN POTASSIUM 25 MG/1
TABLET ORAL
Refills: 0 | COMMUNITY
Start: 2018-04-08 | End: 2018-04-13 | Stop reason: SDUPTHER

## 2018-04-16 DIAGNOSIS — G89.29 CHRONIC BACK PAIN, UNSPECIFIED BACK LOCATION, UNSPECIFIED BACK PAIN LATERALITY: ICD-10-CM

## 2018-04-16 DIAGNOSIS — E78.5 HYPERLIPIDEMIA, UNSPECIFIED HYPERLIPIDEMIA TYPE: Primary | ICD-10-CM

## 2018-04-16 DIAGNOSIS — M54.9 CHRONIC BACK PAIN, UNSPECIFIED BACK LOCATION, UNSPECIFIED BACK PAIN LATERALITY: ICD-10-CM

## 2018-04-16 DIAGNOSIS — M48.061 SPINAL STENOSIS OF LUMBAR REGION, UNSPECIFIED WHETHER NEUROGENIC CLAUDICATION PRESENT: ICD-10-CM

## 2018-04-16 DIAGNOSIS — G47.00 INSOMNIA, UNSPECIFIED TYPE: ICD-10-CM

## 2018-04-16 RX ORDER — CARISOPRODOL 350 MG/1
350 TABLET ORAL 4 TIMES DAILY
Qty: 120 TABLET | Refills: 0 | Status: SHIPPED | OUTPATIENT
Start: 2018-04-16 | End: 2018-04-16 | Stop reason: SDUPTHER

## 2018-04-16 RX ORDER — LOSARTAN POTASSIUM 25 MG/1
25 TABLET ORAL DAILY
Qty: 30 TABLET | Refills: 0 | Status: SHIPPED | OUTPATIENT
Start: 2018-04-16 | End: 2018-04-26

## 2018-04-16 RX ORDER — OXYCODONE AND ACETAMINOPHEN 10; 325 MG/1; MG/1
1 TABLET ORAL EVERY 4 HOURS PRN
Qty: 120 TABLET | Refills: 0 | Status: SHIPPED | OUTPATIENT
Start: 2018-04-16 | End: 2018-04-16 | Stop reason: SDUPTHER

## 2018-04-16 RX ORDER — OXYCODONE HYDROCHLORIDE 10 MG/1
TABLET ORAL
Qty: 120 TABLET | Refills: 0 | Status: SHIPPED | OUTPATIENT
Start: 2018-04-16 | End: 2018-04-16 | Stop reason: SDUPTHER

## 2018-04-16 RX ORDER — TRAZODONE HYDROCHLORIDE 100 MG/1
TABLET ORAL
Qty: 360 TABLET | Refills: 1 | Status: SHIPPED | OUTPATIENT
Start: 2018-04-16 | End: 2018-04-20 | Stop reason: SDUPTHER

## 2018-04-16 RX ORDER — OXYCODONE HYDROCHLORIDE 10 MG/1
TABLET ORAL
Qty: 120 TABLET | Refills: 0 | Status: SHIPPED | OUTPATIENT
Start: 2018-04-16 | End: 2018-06-26 | Stop reason: SDUPTHER

## 2018-04-16 RX ORDER — CARISOPRODOL 350 MG/1
350 TABLET ORAL 4 TIMES DAILY
Qty: 120 TABLET | Refills: 0 | Status: SHIPPED | OUTPATIENT
Start: 2018-04-16 | End: 2018-06-26 | Stop reason: SDUPTHER

## 2018-04-16 RX ORDER — OXYCODONE AND ACETAMINOPHEN 10; 325 MG/1; MG/1
1 TABLET ORAL EVERY 4 HOURS PRN
Qty: 120 TABLET | Refills: 0 | Status: SHIPPED | OUTPATIENT
Start: 2018-04-16 | End: 2018-06-26 | Stop reason: SDUPTHER

## 2018-04-16 RX ORDER — ATORVASTATIN CALCIUM 40 MG/1
40 TABLET, FILM COATED ORAL DAILY
Qty: 90 TABLET | Refills: 1 | Status: SHIPPED | OUTPATIENT
Start: 2018-04-16 | End: 2018-06-26 | Stop reason: SDUPTHER

## 2018-04-19 DIAGNOSIS — G47.00 INSOMNIA, UNSPECIFIED TYPE: ICD-10-CM

## 2018-04-19 RX ORDER — TRAZODONE HYDROCHLORIDE 100 MG/1
TABLET ORAL
Qty: 120 TABLET | Refills: 1 | OUTPATIENT
Start: 2018-04-19

## 2018-04-20 DIAGNOSIS — G47.00 INSOMNIA, UNSPECIFIED TYPE: ICD-10-CM

## 2018-04-20 RX ORDER — TRAZODONE HYDROCHLORIDE 100 MG/1
TABLET ORAL
Qty: 16 TABLET | Refills: 0 | Status: SHIPPED | OUTPATIENT
Start: 2018-04-20 | End: 2018-07-11 | Stop reason: SDUPTHER

## 2018-04-20 NOTE — PROGRESS NOTES
The patient called the office  He did not receive his written RX's in the mail yet and he is out of the Trazodone- I will send in a 4 day RX for the medication to the pharmacy for him

## 2018-04-26 ENCOUNTER — OFFICE VISIT (OUTPATIENT)
Dept: CARDIOLOGY CLINIC | Facility: CLINIC | Age: 73
End: 2018-04-26
Payer: COMMERCIAL

## 2018-04-26 VITALS
HEIGHT: 68 IN | SYSTOLIC BLOOD PRESSURE: 128 MMHG | WEIGHT: 203.3 LBS | OXYGEN SATURATION: 97 % | BODY MASS INDEX: 30.81 KG/M2 | DIASTOLIC BLOOD PRESSURE: 62 MMHG | HEART RATE: 60 BPM

## 2018-04-26 DIAGNOSIS — E78.49 OTHER HYPERLIPIDEMIA: Primary | ICD-10-CM

## 2018-04-26 DIAGNOSIS — Z95.3 FOLLOW-UP VISIT FOR AORTIC VALVE REPLACEMENT WITH BIOPROSTHETIC VALVE: ICD-10-CM

## 2018-04-26 DIAGNOSIS — I10 BENIGN ESSENTIAL HYPERTENSION: ICD-10-CM

## 2018-04-26 DIAGNOSIS — Z95.2 S/P AVR: ICD-10-CM

## 2018-04-26 DIAGNOSIS — Z09 FOLLOW-UP VISIT FOR AORTIC VALVE REPLACEMENT WITH BIOPROSTHETIC VALVE: ICD-10-CM

## 2018-04-26 PROCEDURE — 99213 OFFICE O/P EST LOW 20 MIN: CPT | Performed by: INTERNAL MEDICINE

## 2018-04-26 RX ORDER — LOSARTAN POTASSIUM AND HYDROCHLOROTHIAZIDE 12.5; 5 MG/1; MG/1
1 TABLET ORAL DAILY
Qty: 90 TABLET | Refills: 3 | Status: SHIPPED | OUTPATIENT
Start: 2018-04-26 | End: 2018-09-14

## 2018-04-26 NOTE — PROGRESS NOTES
Follow-up - Cardiology   Deborah Louise 81 y o  male MRN: [id number]        Problems    1  Other hyperlipidemia     2  Follow-up visit for aortic valve replacement with bioprosthetic valve     3  Benign essential hypertension       Impression:    While blood pressure is normal in my office, he brings in multiple home recordings that does show significant fluctuation with blood pressures as high as 181 at times  Clearly blood pressure needs some work  At this time I think a low-dose diuretic regimen would be ideal considering he is a salt avid individual        Plan     stop losartan  Start losartan / HCTZ 50/12 5 mg, BMP in 1 week   Stop metoprolol for now as I am not sure it is benefiting her blood pressure, take her blood pressure with you to [place], please call and let me know if the blood pressure is persistently above 150/90 in which case I will probably have you restart the metoprolol at that point  I will ask my nurses/ medical assistance to reach out to me if you do call back with any questions      HPI: Deborah Louise is a 81y o  year old male  With a recent history of bioprosthetic aortic valve replacement for severe aortic stenosis, with no real prior hypertension history has developed significant hypertension as of late, blood pressure is quite uncontrolled despite recent introduction of losartan 25 mg on top of his metoprolol tartrate 25 mg twice a day  His blood pressure recordings at home range anywhere from 91/34 up to 663 systolic  He has associated headaches at times  He has rheumatoid arthritis, he takes Humira  He has some ambulatory dysfunction, he ambulates with a cane  He does have chronic pain which may influence his blood pressures  Review of Systems   Musculoskeletal: Positive for arthralgias  All other systems reviewed and are negative          Past Medical History:   Diagnosis Date    Aortic stenosis     Aortic stenosis, severe     Arthritis     Carpal tunnel syndrome     Chronic pain     Chronic, continuous use of opioids     Hyperlipidemia     Hypertension     Hypertrophy of prostate     Insomnia     Peripheral neuropathy     RA [facility]he[facility]oid [facility]) ([facility] [address]     Umbilical hernia      History   Alcohol Use No     History   Drug Use No     History   Smoking Status    Never Smoker   Smokeless Tobacco    Never Used       Allergies:   Allergies   Allergen Reactions    Penicillins Anaphylaxis    Quinine Derivatives Other (See Comments)     High fever       Medications:     Current Outpatient Prescriptions:     adalimumab (HUMIRA) 40 mg/0 8 mL PSKT, Inject 40 mg under the skin once Biweekly stopped sept 3rd  , Disp: , Rfl:     aspirin 81 MG tablet, Take 1 tablet (81 mg total) by mouth daily, Disp: 90 tablet, Rfl: 2    atorvastatin (LIPITOR) 40 mg tablet, Take 1 tablet (40 mg total) by mouth daily (Patient taking differently: Take 20 mg by mouth daily  ), Disp: 90 tablet, Rfl: 1    carisoprodol (SOMA) 350 mg tablet, Take 1 tablet (350 mg total) by mouth 4 (four) times a day, Disp: 120 tablet, Rfl: 0    Cholecalciferol (VITAMIN D3) 1000 units CAPS, Take 1 tablet by mouth daily, Disp: , Rfl:     fluocinonide (LIDEX) 0 05 % ointment, Apply topically 2 (two) times a day, Disp: , Rfl:     gabapentin (NEURONTIN) 600 MG tablet, Take 300 mg by mouth 3 (three) times a day, Disp: , Rfl:     losartan (COZAAR) 25 mg tablet, Take 1 tablet (25 mg total) by mouth daily, Disp: 30 tablet, Rfl: 0    metoprolol tartrate (LOPRESSOR) 25 mg tablet, Take 1 tablet (25 mg total) by mouth 2 (two) times a day, Disp: 30 tablet, Rfl: 0    Milk Thistle 1000 MG CAPS, Take 1 capsule by mouth daily, Disp: , Rfl:     oxyCODONE-acetaminophen (PERCOCET)  mg per tablet, Take 1 tablet by mouth every 4 (four) hours as needed for moderate pain Max Daily Amount: 6 tablets, Disp: 120 tablet, Rfl: 0    traMADol (ULTRAM) 50 mg tablet, Take 50 mg by mouth every 6 (six) hours as needed for moderate pain, Disp: , Rfl:     traZODone (DESYREL) 100 mg tablet, Take 4 tablets at bedtime, Disp: 16 tablet, Rfl: 0    docusate sodium (COLACE) 100 mg capsule, Take 1 capsule by mouth 2 (two) times a day for 31 days, Disp: 10 capsule, Rfl: 0    loperamide (IMODIUM) 2 mg capsule, Take 2 mg by mouth 4 (four) times a day as needed for diarrhea, Disp: , Rfl:     oxyCODONE (ROXICODONE) 10 MG TABS, Take 1 tablet 4 times daily, Disp: 120 tablet, Rfl: 0      Vitals:    04/26/18 1258   BP: 128/62   Pulse: 60   SpO2: 97%     Weight (last 2 days)     Date/Time   Weight    04/26/18 1258  92 2 (203 3)            Physical Exam   Constitutional: He is oriented to person, place, and time  He appears well-developed and well-nourished  No distress  HENT:   Head: Normocephalic and atraumatic  Mouth/Throat: Oropharynx is clear and moist    Eyes: Conjunctivae and EOM are normal  Pupils are equal, round, and reactive to light  No scleral icterus  Neck: Normal range of motion  Neck supple  No JVD present  No thyromegaly present  Cardiovascular: Normal rate, regular rhythm, normal heart sounds and intact distal pulses  Exam reveals no gallop and no friction rub  No murmur heard  Pulmonary/Chest: Effort normal and breath sounds normal  No respiratory distress  He has no wheezes  He has no rales  Abdominal: Soft  Bowel sounds are normal  He exhibits no distension  There is no tenderness  Musculoskeletal: Normal range of motion  He exhibits no edema or deformity  Neurological: He is alert and oriented to person, place, and time  No cranial nerve deficit  Skin: Skin is warm and dry  No rash noted  He is not diaphoretic  No erythema  Psychiatric: He has a normal mood and affect           Laboratory Studies:  CMP:  Lab Results   Component Value Date    CREATININE 0 86 10/19/2017    BUN 14 10/19/2017     10/19/2017    K 4 3 10/19/2017     10/19/2017    CO2 26 10/19/2017    GLUCOSE 127 10/19/2017    GLUCOSE 125 10/17/2017    PROT 7 5 09/29/2017    ALKPHOS 60 09/29/2017    ALT 30 09/29/2017    AST 21 09/29/2017     Lipid Profile:   Lab Results   Component Value Date    CHOL 145 09/29/2017    CHOL 182 03/08/2017     Lab Results   Component Value Date    HDL 50 09/29/2017    HDL 52 03/08/2017     Lab Results   Component Value Date    LDLCALC 78 09/29/2017    LDLCALC 109 (H) 03/08/2017     Lab Results   Component Value Date    TRIG 84 09/29/2017    TRIG 107 03/08/2017           Janine Valentine MD    Portions of the record may have been created with voice recognition software   Occasional wrong word or "sound a like" substitutions may have occurred due to the inherent limitations of voice recognition software   Read the chart carefully and recognize, using context, where substitutions have occurred

## 2018-04-26 NOTE — PATIENT INSTRUCTIONS
stop losartan  Start losartan / HCTZ 50/12 5 mg, BMP in 1 week   Stop metoprolol for now as I am not sure it is benefiting her blood pressure, take her blood pressure with you to [place], please call and let me know if the blood pressure is persistently above 150/90 in which case I will probably have you restart the metoprolol at that point

## 2018-05-24 ENCOUNTER — TELEPHONE (OUTPATIENT)
Dept: FAMILY MEDICINE CLINIC | Facility: CLINIC | Age: 73
End: 2018-05-24

## 2018-06-07 DIAGNOSIS — I10 ESSENTIAL HYPERTENSION: Primary | ICD-10-CM

## 2018-06-07 DIAGNOSIS — Z12.5 SCREENING PSA (PROSTATE SPECIFIC ANTIGEN): ICD-10-CM

## 2018-06-07 DIAGNOSIS — E78.5 DYSLIPIDEMIA: ICD-10-CM

## 2018-06-26 ENCOUNTER — OFFICE VISIT (OUTPATIENT)
Dept: FAMILY MEDICINE CLINIC | Facility: CLINIC | Age: 73
End: 2018-06-26
Payer: COMMERCIAL

## 2018-06-26 VITALS
HEIGHT: 68 IN | OXYGEN SATURATION: 96 % | HEART RATE: 69 BPM | BODY MASS INDEX: 30.16 KG/M2 | WEIGHT: 199 LBS | SYSTOLIC BLOOD PRESSURE: 136 MMHG | DIASTOLIC BLOOD PRESSURE: 82 MMHG

## 2018-06-26 DIAGNOSIS — M54.9 CHRONIC BACK PAIN, UNSPECIFIED BACK LOCATION, UNSPECIFIED BACK PAIN LATERALITY: ICD-10-CM

## 2018-06-26 DIAGNOSIS — M48.061 SPINAL STENOSIS OF LUMBAR REGION, UNSPECIFIED WHETHER NEUROGENIC CLAUDICATION PRESENT: ICD-10-CM

## 2018-06-26 DIAGNOSIS — B36.9 FUNGAL DERMATITIS: ICD-10-CM

## 2018-06-26 DIAGNOSIS — Z00.00 MEDICARE ANNUAL WELLNESS VISIT, SUBSEQUENT: Primary | ICD-10-CM

## 2018-06-26 DIAGNOSIS — G89.29 CHRONIC BACK PAIN, UNSPECIFIED BACK LOCATION, UNSPECIFIED BACK PAIN LATERALITY: ICD-10-CM

## 2018-06-26 DIAGNOSIS — E78.5 HYPERLIPIDEMIA, UNSPECIFIED HYPERLIPIDEMIA TYPE: ICD-10-CM

## 2018-06-26 PROCEDURE — G0439 PPPS, SUBSEQ VISIT: HCPCS | Performed by: FAMILY MEDICINE

## 2018-06-26 RX ORDER — KETOCONAZOLE 20 MG/G
CREAM TOPICAL 2 TIMES DAILY
Qty: 30 G | Refills: 0 | Status: SHIPPED | OUTPATIENT
Start: 2018-06-26 | End: 2020-06-05 | Stop reason: SDUPTHER

## 2018-06-26 RX ORDER — CARISOPRODOL 350 MG/1
350 TABLET ORAL 4 TIMES DAILY
Qty: 120 TABLET | Refills: 0 | Status: SHIPPED | OUTPATIENT
Start: 2018-06-26 | End: 2018-07-11 | Stop reason: SDUPTHER

## 2018-06-26 RX ORDER — ATORVASTATIN CALCIUM 20 MG/1
20 TABLET, FILM COATED ORAL DAILY
Start: 2018-06-26 | End: 2019-01-16 | Stop reason: SDUPTHER

## 2018-06-26 RX ORDER — OXYCODONE HYDROCHLORIDE 10 MG/1
TABLET ORAL
Qty: 120 TABLET | Refills: 0 | Status: SHIPPED | OUTPATIENT
Start: 2018-06-26 | End: 2018-07-11 | Stop reason: SDUPTHER

## 2018-06-26 RX ORDER — ATORVASTATIN CALCIUM 40 MG/1
20 TABLET, FILM COATED ORAL DAILY
Qty: 90 TABLET | Refills: 1
Start: 2018-06-26 | End: 2018-06-26 | Stop reason: SDUPTHER

## 2018-06-26 RX ORDER — OXYCODONE AND ACETAMINOPHEN 10; 325 MG/1; MG/1
1 TABLET ORAL EVERY 4 HOURS PRN
Qty: 120 TABLET | Refills: 0 | Status: SHIPPED | OUTPATIENT
Start: 2018-06-26 | End: 2018-07-11 | Stop reason: SDUPTHER

## 2018-06-26 NOTE — PROGRESS NOTES
Assessment/Plan:     Diagnoses and all orders for this visit:    Medicare annual wellness visit, subsequent    Hyperlipidemia, unspecified hyperlipidemia type  -     Discontinue: atorvastatin (LIPITOR) 40 mg tablet; Take 0 5 tablets (20 mg total) by mouth daily  -     atorvastatin (LIPITOR) 20 mg tablet; Take 1 tablet (20 mg total) by mouth daily    Spinal stenosis of lumbar region, unspecified whether neurogenic claudication present  -     oxyCODONE-acetaminophen (PERCOCET)  mg per tablet; Take 1 tablet by mouth every 4 (four) hours as needed for moderate pain Max Daily Amount: 6 tablets  -     oxyCODONE (ROXICODONE) 10 MG TABS; Take 1 tablet 4 times daily    Chronic back pain, unspecified back location, unspecified back pain laterality  -     carisoprodol (SOMA) 350 mg tablet; Take 1 tablet (350 mg total) by mouth 4 (four) times a day    Fungal dermatitis  -     ketoconazole (NIZORAL) 2 % cream; Apply topically 2 (two) times a day      meds refilled  Patient up-to-date on health maintenance  Patient declined Prevnar vaccine today  Will do ketoconazole for rash on neck  Patient has follow-up with Rheumatology  Follow up with me in 6 months or as needed      Subjective:     Chief Complaint   Patient presents with    Medicare Wellness Visit     72year old (sub)        Patient ID: Isaac Villegas is a 72 y o  male  Here for Medicare wellness visit  No acute physical complaints  States his pain is chronically getting worse since seeing a new rheumatologist in his going back to his old rheumatologist in the very near future  Needs refills of his current medications        The following portions of the patient's history were reviewed and updated as appropriate: allergies, current medications, past family history, past medical history, past social history, past surgical history and problem list     Review of Systems   Constitutional: Negative  HENT: Negative  Eyes: Negative  Respiratory: Negative  Cardiovascular: Negative  Gastrointestinal: Negative  Endocrine: Negative  Genitourinary: Negative  Musculoskeletal: Negative  Skin: Negative  Allergic/Immunologic: Negative  Neurological: Negative  Hematological: Negative  Psychiatric/Behavioral: Negative  All other systems reviewed and are negative  Objective:    Vitals:    06/26/18 1314   BP: 136/82   BP Location: Right arm   Patient Position: Sitting   Cuff Size: Standard   Pulse: 69   SpO2: 96%   Weight: 90 3 kg (199 lb)   Height: 5' 8" (1 727 m)          Physical Exam   Constitutional: He is oriented to person, place, and time  He appears well-developed and well-nourished  No distress  HENT:   Head: Normocephalic  Right Ear: External ear normal    Left Ear: External ear normal    Nose: Nose normal    Mouth/Throat: Oropharynx is clear and moist    Eyes: Conjunctivae and EOM are normal  Pupils are equal, round, and reactive to light  Right eye exhibits no discharge  Left eye exhibits no discharge  Neck: Normal range of motion  Cardiovascular: Normal rate, regular rhythm and normal heart sounds  Pulmonary/Chest: Effort normal and breath sounds normal    Abdominal: Soft  Bowel sounds are normal  He exhibits no distension  There is no tenderness  Musculoskeletal: Normal range of motion  Uses cane  Severe arthritic changes to the hands and major joints   Neurological: He is alert and oriented to person, place, and time  No cranial nerve deficit  Skin: Skin is warm and dry  No rash noted  Psychiatric: He has a normal mood and affect  His behavior is normal  Judgment and thought content normal    Nursing note and vitals reviewed

## 2018-07-11 DIAGNOSIS — M54.9 CHRONIC BACK PAIN, UNSPECIFIED BACK LOCATION, UNSPECIFIED BACK PAIN LATERALITY: ICD-10-CM

## 2018-07-11 DIAGNOSIS — G47.00 INSOMNIA, UNSPECIFIED TYPE: ICD-10-CM

## 2018-07-11 DIAGNOSIS — M48.061 SPINAL STENOSIS OF LUMBAR REGION, UNSPECIFIED WHETHER NEUROGENIC CLAUDICATION PRESENT: ICD-10-CM

## 2018-07-11 DIAGNOSIS — G89.29 CHRONIC BACK PAIN, UNSPECIFIED BACK LOCATION, UNSPECIFIED BACK PAIN LATERALITY: ICD-10-CM

## 2018-07-11 RX ORDER — OXYCODONE HYDROCHLORIDE 10 MG/1
TABLET ORAL
Qty: 120 TABLET | Refills: 0 | Status: SHIPPED | OUTPATIENT
Start: 2018-07-11 | End: 2018-07-12 | Stop reason: SDUPTHER

## 2018-07-11 RX ORDER — CARISOPRODOL 350 MG/1
350 TABLET ORAL 4 TIMES DAILY
Qty: 360 TABLET | Refills: 1 | Status: SHIPPED | OUTPATIENT
Start: 2018-07-11 | End: 2018-07-12 | Stop reason: SDUPTHER

## 2018-07-11 RX ORDER — TRAZODONE HYDROCHLORIDE 100 MG/1
TABLET ORAL
Qty: 360 TABLET | Refills: 1 | Status: SHIPPED | OUTPATIENT
Start: 2018-07-11 | End: 2018-10-05 | Stop reason: SDUPTHER

## 2018-07-11 RX ORDER — OXYCODONE AND ACETAMINOPHEN 10; 325 MG/1; MG/1
1 TABLET ORAL EVERY 4 HOURS PRN
Qty: 120 TABLET | Refills: 0 | Status: SHIPPED | OUTPATIENT
Start: 2018-07-11 | End: 2018-07-12 | Stop reason: SDUPTHER

## 2018-07-12 DIAGNOSIS — M54.9 CHRONIC BACK PAIN, UNSPECIFIED BACK LOCATION, UNSPECIFIED BACK PAIN LATERALITY: ICD-10-CM

## 2018-07-12 DIAGNOSIS — G89.29 CHRONIC BACK PAIN, UNSPECIFIED BACK LOCATION, UNSPECIFIED BACK PAIN LATERALITY: ICD-10-CM

## 2018-07-12 DIAGNOSIS — M48.061 SPINAL STENOSIS OF LUMBAR REGION, UNSPECIFIED WHETHER NEUROGENIC CLAUDICATION PRESENT: ICD-10-CM

## 2018-07-12 RX ORDER — OXYCODONE AND ACETAMINOPHEN 10; 325 MG/1; MG/1
1 TABLET ORAL EVERY 4 HOURS PRN
Qty: 120 TABLET | Refills: 0 | Status: SHIPPED | OUTPATIENT
Start: 2018-07-12 | End: 2018-10-05 | Stop reason: SDUPTHER

## 2018-07-12 RX ORDER — CARISOPRODOL 350 MG/1
350 TABLET ORAL 4 TIMES DAILY
Qty: 360 TABLET | Refills: 0 | Status: SHIPPED | OUTPATIENT
Start: 2018-07-12 | End: 2018-10-05 | Stop reason: SDUPTHER

## 2018-07-12 RX ORDER — OXYCODONE HYDROCHLORIDE 10 MG/1
TABLET ORAL
Qty: 120 TABLET | Refills: 0 | Status: SHIPPED | OUTPATIENT
Start: 2018-07-12 | End: 2018-10-05 | Stop reason: SDUPTHER

## 2018-08-10 ENCOUNTER — TELEPHONE (OUTPATIENT)
Dept: FAMILY MEDICINE CLINIC | Facility: CLINIC | Age: 73
End: 2018-08-10

## 2018-08-10 NOTE — TELEPHONE ENCOUNTER
[facility] [place] CALL TO VERIFY RX FOR Brittny ALEX FOR PERCOSET SINCE IT IS OUT OF STATE AND A CONTROL SUBSTANCE CALL [facility] [address] [phone]

## 2018-09-14 ENCOUNTER — OFFICE VISIT (OUTPATIENT)
Dept: CARDIOLOGY CLINIC | Facility: CLINIC | Age: 73
End: 2018-09-14
Payer: COMMERCIAL

## 2018-09-14 VITALS
DIASTOLIC BLOOD PRESSURE: 68 MMHG | HEIGHT: 67 IN | HEART RATE: 61 BPM | WEIGHT: 197.4 LBS | BODY MASS INDEX: 30.98 KG/M2 | SYSTOLIC BLOOD PRESSURE: 118 MMHG

## 2018-09-14 DIAGNOSIS — R53.82 CHRONIC FATIGUE: ICD-10-CM

## 2018-09-14 DIAGNOSIS — E78.49 OTHER HYPERLIPIDEMIA: ICD-10-CM

## 2018-09-14 DIAGNOSIS — I10 BENIGN ESSENTIAL HYPERTENSION: ICD-10-CM

## 2018-09-14 DIAGNOSIS — Z95.3 STATUS POST AORTIC VALVE REPLACEMENT WITH BIOPROSTHETIC VALVE: ICD-10-CM

## 2018-09-14 DIAGNOSIS — R25.2 LEG CRAMPS: ICD-10-CM

## 2018-09-14 DIAGNOSIS — I10 HYPERTENSION, UNSPECIFIED TYPE: Primary | ICD-10-CM

## 2018-09-14 PROCEDURE — 99214 OFFICE O/P EST MOD 30 MIN: CPT | Performed by: INTERNAL MEDICINE

## 2018-09-14 PROCEDURE — 4040F PNEUMOC VAC/ADMIN/RCVD: CPT | Performed by: INTERNAL MEDICINE

## 2018-09-14 PROCEDURE — 93000 ELECTROCARDIOGRAM COMPLETE: CPT | Performed by: INTERNAL MEDICINE

## 2018-09-14 RX ORDER — BETAMETHASONE DIPROPIONATE 0.5 MG/G
CREAM TOPICAL
COMMUNITY

## 2018-09-14 RX ORDER — CARISOPRODOL 350 MG/1
TABLET ORAL
COMMUNITY
End: 2018-09-14 | Stop reason: SDUPTHER

## 2018-09-14 RX ORDER — GABAPENTIN 600 MG/1
3 TABLET, FILM COATED ORAL
Refills: 2 | COMMUNITY
Start: 2018-09-05 | End: 2019-07-02

## 2018-09-14 RX ORDER — LOSARTAN POTASSIUM 50 MG/1
25 TABLET ORAL DAILY
Qty: 30 TABLET | Refills: 11 | Status: SHIPPED | OUTPATIENT
Start: 2018-09-14 | End: 2018-10-04 | Stop reason: SDUPTHER

## 2018-09-14 NOTE — PATIENT INSTRUCTIONS
STOP  LOSARTAN/HCTZ  START LOSARTAN 50 MG DAILY  IF FATIGUE/CRAMPS/ LIBIDO DO NOT IMPROVED, MAY NOT BE RELATED TO YOUR ANTIHYPERTENSIVE REGIMEN

## 2018-09-14 NOTE — PROGRESS NOTES
[facility]  Follow up note  Ewa Brower 72 y o  male MRN: [id number]        Problems    1  Hypertension, unspecified type  POCT ECG   2  Benign essential hypertension     3  Other hyperlipidemia     4  Leg cramps     5  Chronic fatigue     6  Status post aortic valve replacement with bioprosthetic valve         Impression:    Bioprosthetic AVR   normal functioning by examination, no concerns about relationship to current symptoms     fatigue/decreased libido   seems to have occurred following starting HCTZ,  But with rheumatoid arthritis, recent trip to [place], other factors may be playing a role  He does have back pain that awakens him during the night  Question the  Efficiency of his sleep    Plan:     will stop HCTZ for now  Resume losartan 50 mg daily  HPI:   Ewa Brower is a 72y o  year old male  With rheumatoid arthritis, recent bioprosthetic AVR for severe aortic stenosis, and as of April this year significant fluctuation of his blood pressures, with multiple hypertensive blood pressures  HCTZ was added to his losartan, in the form of losartan / HCTZ 50/12 5 mg daily  Metoprolol was stopped  Blood pressure today under excellent control  He has been complaining of significant fatigue, exhaustion, decreased libido since we added the HCTZ  He does have rheumatoid arthritis, does awaken during the night due to back pain on occasion  No significant snoring per his significant other  Review of Systems   All other systems reviewed and are negative          Past Medical History:   Diagnosis Date    Aortic stenosis     Aortic stenosis, severe     Arthritis     Carpal tunnel syndrome     Chronic pain     Chronic, continuous use of opioids     Hyperlipidemia     Hypertension     Hypertrophy of prostate     Insomnia     Peripheral neuropathy     RA [facility]rhe[facility]oid [facility]) ([facility] [address] [facility]     Umbilical hernia      History   Alcohol Use No     History   Drug Use No History   Smoking Status    Never Smoker   Smokeless Tobacco    Never Used       Allergies: Allergies   Allergen Reactions    Penicillins Anaphylaxis    Quinine Derivatives Other (See Comments)     High fever       Medications:     Current Outpatient Prescriptions:     adalimumab (HUMIRA) 40 mg/0 8 mL PSKT, Inject 40 mg under the skin once Biweekly stopped sept 3rd  , Disp: , Rfl:     aspirin 81 MG tablet, Take 1 tablet (81 mg total) by mouth daily, Disp: 90 tablet, Rfl: 2    atorvastatin (LIPITOR) 20 mg tablet, Take 1 tablet (20 mg total) by mouth daily, Disp: , Rfl:     betamethasone dipropionate (DIPROSONE) 0 05 % cream, Apply topically, Disp: , Rfl:     carisoprodol (SOMA) 350 mg tablet, Take 1 tablet (350 mg total) by mouth 4 (four) times a day, Disp: 360 tablet, Rfl: 0    Cholecalciferol (VITAMIN D3) 1000 units CAPS, Take 1 tablet by mouth daily, Disp: , Rfl:     fluocinonide (LIDEX) 0 05 % ointment, Apply topically 2 (two) times a day, Disp: , Rfl:     GRALISE 600 MG TABS, Take 3 tablets by mouth daily at bedtime, Disp: , Rfl: 2    ketoconazole (NIZORAL) 2 % cream, Apply topically 2 (two) times a day, Disp: 30 g, Rfl: 0    losartan-hydrochlorothiazide (HYZAAR) 50-12 5 mg per tablet, Take 1 tablet by mouth daily, Disp: 90 tablet, Rfl: 3    Milk Thistle 1000 MG CAPS, Take 1 capsule by mouth daily, Disp: , Rfl:     oxyCODONE (ROXICODONE) 10 MG TABS, Take 1 tablet 4 times daily, Disp: 120 tablet, Rfl: 0    oxyCODONE-acetaminophen (PERCOCET)  mg per tablet, Take 1 tablet by mouth every 4 (four) hours as needed for moderate pain Max Daily Amount: 6 tablets, Disp: 120 tablet, Rfl: 0    traZODone (DESYREL) 100 mg tablet, Take 4 tablets at bedtime, Disp: 360 tablet, Rfl: 1      Vitals:    09/14/18 1021   BP: 118/68   Pulse: 61     Weight (last 2 days)     Date/Time   Weight    09/14/18 1021  89 5 (197 4)            Physical Exam   Constitutional: He is oriented to person, place, and time  No distress  HENT:   Head: Normocephalic and atraumatic  Eyes: Conjunctivae are normal  No scleral icterus  Neck: Normal range of motion  No JVD present  Cardiovascular: Normal rate, regular rhythm, normal heart sounds and intact distal pulses  No murmur heard  Pulmonary/Chest: Effort normal and breath sounds normal  He has no wheezes  He has no rales  Musculoskeletal: He exhibits no edema  Neurological: He is alert and oriented to person, place, and time  Skin: Skin is warm and dry  He is not diaphoretic           Laboratory Studies:  Lab Results   Component Value Date    HGBA1C 5 9 10/10/2017    HGBA1C 6 2 03/08/2017     10/19/2017     10/18/2017     10/17/2017    K 4 3 10/19/2017    K 3 8 10/18/2017    K 4 0 10/17/2017     10/19/2017     10/18/2017     10/17/2017    CO2 26 10/19/2017    CO2 24 10/18/2017    CO2 25 10/17/2017    GLUCOSE 125 10/17/2017    GLUCOSE 133 10/17/2017    GLUCOSE 163 (H) 10/17/2017    CREATININE 0 86 10/19/2017    CREATININE 0 63 10/18/2017    CREATININE 0 84 10/17/2017    BUN 14 10/19/2017    BUN 10 10/18/2017    BUN 12 10/17/2017    MG 2 5 10/19/2017    MG 2 5 10/18/2017    MG 2 4 09/29/2017     Lab Results   Component Value Date    WBC 12 81 (H) 10/19/2017    RBC 3 29 (L) 10/19/2017    HGB 9 8 (L) 10/19/2017    HCT 30 1 (L) 10/19/2017    MCV 92 10/19/2017    MCH 29 8 10/19/2017    RDW 13 5 10/19/2017     10/19/2017     Lipid Profile:   No results found for: CHOL  Lab Results   Component Value Date    HDL 50 09/29/2017    HDL 52 03/08/2017     Lab Results   Component Value Date    LDLCALC 78 09/29/2017    LDLCALC 109 (H) 03/08/2017     Lab Results   Component Value Date    TRIG 84 09/29/2017    TRIG 107 03/08/2017         Cardiac testing:     EKG reviewed personally:   Normal sinus rhythm, normal EKG      Job Officer, MD    Portions of the record may have been created with voice recognition software   Occasional wrong word or "sound a like" substitutions may have occurred due to the inherent limitations of voice recognition software   Read the chart carefully and recognize, using context, where substitutions have occurred

## 2018-09-20 ENCOUNTER — APPOINTMENT (OUTPATIENT)
Dept: LAB | Facility: HOSPITAL | Age: 73
End: 2018-09-20
Payer: COMMERCIAL

## 2018-09-20 ENCOUNTER — TRANSCRIBE ORDERS (OUTPATIENT)
Dept: ADMINISTRATIVE | Facility: HOSPITAL | Age: 73
End: 2018-09-20

## 2018-09-20 DIAGNOSIS — Z12.5 SPECIAL SCREENING FOR MALIGNANT NEOPLASM OF PROSTATE: ICD-10-CM

## 2018-09-20 DIAGNOSIS — I10 ESSENTIAL HYPERTENSION, MALIGNANT: Primary | ICD-10-CM

## 2018-09-20 DIAGNOSIS — E78.5 HYPERLIPIDEMIA, UNSPECIFIED HYPERLIPIDEMIA TYPE: ICD-10-CM

## 2018-09-20 DIAGNOSIS — I10 HYPERTENSION, ESSENTIAL: ICD-10-CM

## 2018-09-20 DIAGNOSIS — Z12.5 SPECIAL SCREENING FOR MALIGNANT NEOPLASM OF PROSTATE: Primary | ICD-10-CM

## 2018-09-20 LAB
ALBUMIN SERPL BCP-MCNC: 4.1 G/DL (ref 3.5–5)
ALP SERPL-CCNC: 80 U/L (ref 46–116)
ALT SERPL W P-5'-P-CCNC: 36 U/L (ref 12–78)
ANION GAP SERPL CALCULATED.3IONS-SCNC: 7 MMOL/L (ref 4–13)
AST SERPL W P-5'-P-CCNC: 22 U/L (ref 5–45)
BILIRUB SERPL-MCNC: 0.2 MG/DL (ref 0.2–1)
BUN SERPL-MCNC: 13 MG/DL (ref 5–25)
CALCIUM SERPL-MCNC: 9.4 MG/DL (ref 8.3–10.1)
CHLORIDE SERPL-SCNC: 103 MMOL/L (ref 100–108)
CHOLEST SERPL-MCNC: 164 MG/DL (ref 50–200)
CO2 SERPL-SCNC: 27 MMOL/L (ref 21–32)
CREAT SERPL-MCNC: 1.2 MG/DL (ref 0.6–1.3)
GFR SERPL CREATININE-BSD FRML MDRD: 60 ML/MIN/1.73SQ M
GLUCOSE P FAST SERPL-MCNC: 98 MG/DL (ref 65–99)
HDLC SERPL-MCNC: 52 MG/DL (ref 40–60)
LDLC SERPL CALC-MCNC: 88 MG/DL (ref 0–100)
POTASSIUM SERPL-SCNC: 5.2 MMOL/L (ref 3.5–5.3)
PROT SERPL-MCNC: 7.5 G/DL (ref 6.4–8.2)
SODIUM SERPL-SCNC: 137 MMOL/L (ref 136–145)
TRIGL SERPL-MCNC: 122 MG/DL

## 2018-09-20 PROCEDURE — 80061 LIPID PANEL: CPT

## 2018-09-20 PROCEDURE — 36415 COLL VENOUS BLD VENIPUNCTURE: CPT

## 2018-09-20 PROCEDURE — 84153 ASSAY OF PSA TOTAL: CPT

## 2018-09-20 PROCEDURE — 80053 COMPREHEN METABOLIC PANEL: CPT

## 2018-09-21 LAB — PSA SERPL DL<=0.01 NG/ML-MCNC: 0.42 NG/ML (ref 0–4)

## 2018-10-04 ENCOUNTER — TELEPHONE (OUTPATIENT)
Dept: FAMILY MEDICINE CLINIC | Facility: CLINIC | Age: 73
End: 2018-10-04

## 2018-10-04 DIAGNOSIS — I10 BENIGN ESSENTIAL HYPERTENSION: ICD-10-CM

## 2018-10-04 RX ORDER — LOSARTAN POTASSIUM 50 MG/1
50 TABLET ORAL DAILY
Qty: 90 TABLET | Refills: 3 | Status: SHIPPED | OUTPATIENT
Start: 2018-10-04 | End: 2019-01-28 | Stop reason: SDUPTHER

## 2018-10-04 NOTE — TELEPHONE ENCOUNTER
oxyCODONE (ROXICODONE) 10 MG TABS  #120    WRITTEN FOR OCT, NOV & DEC      GOING TO [place] AGAIN        carisoprodol (SOMA) 350 mg tablet  #360  WRITTEN  oxyCODONE-acetaminophen (PERCOCET)  mg per tablet    NOV & DEC  WRITTEN    traZODone (DESYREL) 100 mg tablet #360  WRITTEN    [phone] (C)  [phone]

## 2018-10-05 DIAGNOSIS — G89.29 CHRONIC BACK PAIN, UNSPECIFIED BACK LOCATION, UNSPECIFIED BACK PAIN LATERALITY: ICD-10-CM

## 2018-10-05 DIAGNOSIS — M54.9 CHRONIC BACK PAIN, UNSPECIFIED BACK LOCATION, UNSPECIFIED BACK PAIN LATERALITY: ICD-10-CM

## 2018-10-05 DIAGNOSIS — G47.00 INSOMNIA, UNSPECIFIED TYPE: ICD-10-CM

## 2018-10-05 DIAGNOSIS — M48.061 SPINAL STENOSIS OF LUMBAR REGION, UNSPECIFIED WHETHER NEUROGENIC CLAUDICATION PRESENT: ICD-10-CM

## 2018-10-05 RX ORDER — TRAZODONE HYDROCHLORIDE 100 MG/1
TABLET ORAL
Qty: 360 TABLET | Refills: 3 | Status: SHIPPED | OUTPATIENT
Start: 2018-10-05 | End: 2018-10-05 | Stop reason: SDUPTHER

## 2018-10-05 RX ORDER — OXYCODONE HYDROCHLORIDE 10 MG/1
TABLET ORAL
Qty: 120 TABLET | Refills: 0 | Status: SHIPPED | OUTPATIENT
Start: 2018-10-05 | End: 2018-10-05 | Stop reason: SDUPTHER

## 2018-10-05 RX ORDER — OXYCODONE AND ACETAMINOPHEN 10; 325 MG/1; MG/1
1 TABLET ORAL EVERY 4 HOURS PRN
Qty: 120 TABLET | Refills: 0 | Status: SHIPPED | OUTPATIENT
Start: 2018-10-05 | End: 2019-01-16 | Stop reason: SDUPTHER

## 2018-10-05 RX ORDER — OXYCODONE AND ACETAMINOPHEN 10; 325 MG/1; MG/1
1 TABLET ORAL EVERY 4 HOURS PRN
Qty: 120 TABLET | Refills: 0 | Status: SHIPPED | OUTPATIENT
Start: 2018-10-05 | End: 2018-10-05 | Stop reason: SDUPTHER

## 2018-10-05 RX ORDER — OXYCODONE HYDROCHLORIDE 10 MG/1
TABLET ORAL
Qty: 120 TABLET | Refills: 0 | Status: SHIPPED | OUTPATIENT
Start: 2018-10-05 | End: 2019-01-16 | Stop reason: SDUPTHER

## 2018-10-05 RX ORDER — CARISOPRODOL 350 MG/1
350 TABLET ORAL 4 TIMES DAILY
Qty: 360 TABLET | Refills: 1 | Status: SHIPPED | OUTPATIENT
Start: 2018-10-05 | End: 2019-01-16 | Stop reason: SDUPTHER

## 2018-10-05 RX ORDER — TRAZODONE HYDROCHLORIDE 100 MG/1
TABLET ORAL
Qty: 360 TABLET | Refills: 1 | Status: SHIPPED | OUTPATIENT
Start: 2018-10-05 | End: 2019-03-27 | Stop reason: SDUPTHER

## 2018-10-05 RX ORDER — CARISOPRODOL 350 MG/1
350 TABLET ORAL 4 TIMES DAILY
Qty: 360 TABLET | Refills: 1 | Status: SHIPPED | OUTPATIENT
Start: 2018-10-05 | End: 2018-10-05 | Stop reason: SDUPTHER

## 2018-10-31 ENCOUNTER — EVALUATION (OUTPATIENT)
Dept: PHYSICAL THERAPY | Facility: CLINIC | Age: 73
End: 2018-10-31
Payer: COMMERCIAL

## 2018-10-31 ENCOUNTER — TRANSCRIBE ORDERS (OUTPATIENT)
Dept: PHYSICAL THERAPY | Facility: CLINIC | Age: 73
End: 2018-10-31

## 2018-10-31 DIAGNOSIS — M19.071 ARTHRITIS OF RIGHT FOOT: Primary | ICD-10-CM

## 2018-10-31 DIAGNOSIS — M19.072 ARTHRITIS OF LEFT ANKLE: ICD-10-CM

## 2018-10-31 DIAGNOSIS — R29.898 WEAKNESS OF LEFT LOWER EXTREMITY: ICD-10-CM

## 2018-10-31 PROCEDURE — 97140 MANUAL THERAPY 1/> REGIONS: CPT | Performed by: PHYSICAL THERAPIST

## 2018-10-31 PROCEDURE — 97110 THERAPEUTIC EXERCISES: CPT | Performed by: PHYSICAL THERAPIST

## 2018-10-31 PROCEDURE — G8979 MOBILITY GOAL STATUS: HCPCS | Performed by: PHYSICAL THERAPIST

## 2018-10-31 PROCEDURE — 97162 PT EVAL MOD COMPLEX 30 MIN: CPT | Performed by: PHYSICAL THERAPIST

## 2018-10-31 PROCEDURE — G8978 MOBILITY CURRENT STATUS: HCPCS | Performed by: PHYSICAL THERAPIST

## 2018-10-31 NOTE — PROGRESS NOTES
PT Evaluation     Today's date: 10/31/2018  Patient name: Kenn Bettencourt  : 2074  MRN: [id number]  Referring provider: Rl Gómez DPM  Dx:   Encounter Diagnosis     ICD-10-CM    1  Arthritis of right foot M19 071    2  Arthritis of left ankle M19 072    3  Weakness of left lower extremity R29 898        Start Time: 1030  Stop Time: 1135  Total time in clinic (min): 65 minutes    Assessment    Assessment details: Kenn Bettencourt 72 y o  male  presents a chronic history of bilateral foot and ankle pain related to moderate to severe arthritis  Patient notes limitations in weight bearing and ambulation due to bilateral foot and ankle pain, but also demonstrates left dorsiflexion weakness secondary to lumbar radiculopathy  Right foot pain rated 4/10 with weight bearing  Left foot pain rated 2/41 but complicated with apparent neurogenic neuropathy 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 and DJD  Right Ankle AROM: DF 12  PF 50  IV 30 EV 15   Left Ankle AROM: DF -2  PF 40  IV 20 EV 12  Right Ankle Strength: DF= 5 PF= 4, IV=5, EV= +4  Left Ankle Strength: DF= -3, PF= -4, IV=4, EV= 4  Left gastrocnemius and heel cord tightness   No significant swelling in both feet and ankles  Gait pattern is painful cautious with decreased step length and speed  He has experienced lack of heel strike on left        Barriers to therapy: Chronic left L/S sciatica with left foot dorsiflexion weakness  RA    Goals  STG   Reduce bilateral foot pain 20% during weight bearing  Increase left ankle flexibility - day and ongoing  Increase left ankle dorsiflexion 1/2 grade 3-4 weeks  LTG  Establish effective HEP  Maximize ROM and strength both LE and ankle  Establish base core stability exercises to address chronic lumbar DDD/DJD  Improve gait pattern and ambulation tolerance      Plan  Plan details: 2 X per week 4-6 weeks  MT ankle joint mobs, passive stretching  TE- stretching strengthening, HEP        Subjective Patient presents chronic ankle and foot pain     Objective   DF= 5, PF= 4, IV= 5, EV= +4       Manual  10/31       LE stretching                    Exercise Diary  10/31       Stap stretch (seated)        Pelvic tilt        Supine march        Supine Rotation        Quad sets        SAQ / LAQ        T-band hams        T-band DF                T-band ROWS        T-band CARLNEE                                                    Flowsheet Rows      Most Recent Value   PT/OT G-Codes   FOTO information reviewed  No   Assessment Type  Evaluation   G code set  Mobility: Walking & Moving Around   Mobility: Walking and Moving Around Current Status ()  CK   Mobility: Walking and Moving Around Goal Status ()  CI

## 2018-10-31 NOTE — LETTER
2018    [address] [place] [id number]    Patient: Roseanne Barth   YOB: 1945   Date of Visit: 10/31/2018     Encounter Diagnosis     ICD-10-CM    1  Arthritis of right foot M19 071    2  Arthritis of left ankle M19 072    3  Weakness of left lower extremity R29 898        Dear Dr De La Garza Mediate:    Please review the attached Plan of Care from [facility] recent visit  Please verify that you agree therapy should continue by signing the attached document and sending it back to our office  If you have any questions or concerns, please don't hesitate to call  Sincerely,    Shelli Torres, PT      Referring Provider:      I certify that I have read the below Plan of Care and certify the need for these services furnished under this plan of treatment while under my care  [address] [place] [id number]  VIA Facsimile: [phone]          PT Evaluation     Today's date: 10/31/2018  Patient name: Roseanne Barth  : [birth year]  MRN: [id number]  Referring provider: Corrine Ayers DPM  Dx:   Encounter Diagnosis     ICD-10-CM    1  Arthritis of right foot M19 071    2  Arthritis of left ankle M19 072    3  Weakness of left lower extremity R29 898        Start Time: 1030  Stop Time: 1135  Total time in clinic (min): 65 minutes    Assessment    Assessment details: Roseanne Barth 72 y o  male  presents a chronic history of bilateral foot and ankle pain related to moderate to severe arthritis  Patient notes limitations in weight bearing and ambulation due to bilateral foot and ankle pain, but also demonstrates left dorsiflexion weakness secondary to lumbar radiculopathy  Right foot pain rated 4/10 with weight bearing  Left foot pain rated 4/89 but complicated with apparent neurogenic neuropathy 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 and DJD  Right Ankle AROM: DF 12  PF 50  IV 30 EV 15   Left Ankle AROM: DF -2  PF 40  IV 20 EV 12     Right Ankle Strength: DF= 5 PF= 4, IV=5, EV= +4  Left Ankle Strength: DF= -3, PF= -4, IV=4, EV= 4  Left gastrocnemius and heel cord tightness   No significant swelling in both feet and ankles  Gait pattern is painful cautious with decreased step length and speed  He has experienced lack of heel strike on left  Barriers to therapy: Chronic left L/S sciatica with left foot dorsiflexion weakness  RA    Goals  STG   Reduce bilateral foot pain 20% during weight bearing  Increase left ankle flexibility - day and ongoing  Increase left ankle dorsiflexion 1/2 grade 3-4 weeks  LTG  Establish effective HEP  Maximize ROM and strength both LE and ankle  Establish base core stability exercises to address chronic lumbar DDD/DJD  Improve gait pattern and ambulation tolerance      Plan  Plan details: 2 X per week 4-6 weeks  MT ankle joint mobs, passive stretching  TE- stretching strengthening, HEP        Subjective Patient presents chronic ankle and foot pain      Objective   DF= 5, PF= 4, IV= 5, EV= +4       Flowsheet Rows      Most Recent Value   PT/OT G-Codes   FOTO information reviewed  No   Assessment Type  Evaluation   G code set  Mobility: Walking & Moving Around   Mobility: Walking and Moving Around Current Status ()  CK   Mobility: Walking and Moving Around Goal Status ()  CI

## 2018-11-05 ENCOUNTER — APPOINTMENT (OUTPATIENT)
Dept: PHYSICAL THERAPY | Facility: CLINIC | Age: 73
End: 2018-11-05
Payer: COMMERCIAL

## 2018-11-07 RX ORDER — ATORVASTATIN CALCIUM 40 MG/1
TABLET, FILM COATED ORAL
Qty: 90 TABLET | Refills: 1 | OUTPATIENT
Start: 2018-11-07

## 2018-11-08 ENCOUNTER — OFFICE VISIT (OUTPATIENT)
Dept: PHYSICAL THERAPY | Facility: CLINIC | Age: 73
End: 2018-11-08
Payer: COMMERCIAL

## 2018-11-08 DIAGNOSIS — M19.072 ARTHRITIS OF LEFT ANKLE: ICD-10-CM

## 2018-11-08 DIAGNOSIS — M19.071 ARTHRITIS OF RIGHT FOOT: Primary | ICD-10-CM

## 2018-11-08 DIAGNOSIS — R29.898 WEAKNESS OF LEFT LOWER EXTREMITY: ICD-10-CM

## 2018-11-08 PROCEDURE — 97110 THERAPEUTIC EXERCISES: CPT

## 2018-11-08 PROCEDURE — 97140 MANUAL THERAPY 1/> REGIONS: CPT

## 2018-11-08 NOTE — PROGRESS NOTES
Daily Note     Today's date: 2018  Patient name: Trish Christina  :   MRN: [id number]  Referring provider: Nona Lennox, DPM  Dx:   Encounter Diagnosis     ICD-10-CM    1  Arthritis of right foot M19 071    2  Arthritis of left ankle M19 072    3  Weakness of left lower extremity R29 898        Start Time: 1000  Stop Time: 1050  Total time in clinic (min): 50 minutes    Subjective: Patient reported test results from his MD shows gaut in bilateral anterior feet, causing increased discomfort wearing shoes while walking  Objective: PTA reviewed HEP and added to TE program  See treatment diary below  Assessment: Tolerated treatment fair  Patient had increased pain in LB with seated and supine TE, frequent standing breaks required to complete TE  Plan: Will resume program when patient returns from family trip,  Continue per plan of care         Manual  10/31 11/8      LE stretching  10                  Exercise Diary  10/31 11/8      Stap stretch (seated)  5x 10"      Pelvic tilt  2/10      Supine march        Supine Rotation        Quad sets        SAQ / LAQ  LAQ 2/10      T-band hams  L2 2/10      T-band DF  L2 R 2/10  No res L 2/10      Seated ankle EV  2/10      T-band ROWS  L2 2/10      T-band CARLENE  L2 2/10              Runners stretch   5x 10"

## 2018-12-07 ENCOUNTER — TELEPHONE (OUTPATIENT)
Dept: FAMILY MEDICINE CLINIC | Facility: CLINIC | Age: 73
End: 2018-12-07

## 2019-01-16 ENCOUNTER — OFFICE VISIT (OUTPATIENT)
Dept: FAMILY MEDICINE CLINIC | Facility: CLINIC | Age: 74
End: 2019-01-16
Payer: COMMERCIAL

## 2019-01-16 VITALS
HEIGHT: 67 IN | BODY MASS INDEX: 32.77 KG/M2 | WEIGHT: 208.8 LBS | TEMPERATURE: 96.9 F | HEART RATE: 97 BPM | SYSTOLIC BLOOD PRESSURE: 120 MMHG | DIASTOLIC BLOOD PRESSURE: 80 MMHG

## 2019-01-16 DIAGNOSIS — M48.061 SPINAL STENOSIS OF LUMBAR REGION, UNSPECIFIED WHETHER NEUROGENIC CLAUDICATION PRESENT: Primary | ICD-10-CM

## 2019-01-16 DIAGNOSIS — G89.29 CHRONIC BACK PAIN, UNSPECIFIED BACK LOCATION, UNSPECIFIED BACK PAIN LATERALITY: ICD-10-CM

## 2019-01-16 DIAGNOSIS — E78.5 HYPERLIPIDEMIA, UNSPECIFIED HYPERLIPIDEMIA TYPE: ICD-10-CM

## 2019-01-16 DIAGNOSIS — M54.9 CHRONIC BACK PAIN, UNSPECIFIED BACK LOCATION, UNSPECIFIED BACK PAIN LATERALITY: ICD-10-CM

## 2019-01-16 DIAGNOSIS — M05.9 RHEUMATOID ARTHRITIS WITH POSITIVE RHEUMATOID FACTOR, INVOLVING UNSPECIFIED SITE (HCC): ICD-10-CM

## 2019-01-16 PROCEDURE — 3008F BODY MASS INDEX DOCD: CPT | Performed by: FAMILY MEDICINE

## 2019-01-16 PROCEDURE — 99214 OFFICE O/P EST MOD 30 MIN: CPT | Performed by: FAMILY MEDICINE

## 2019-01-16 PROCEDURE — 1160F RVW MEDS BY RX/DR IN RCRD: CPT | Performed by: FAMILY MEDICINE

## 2019-01-16 RX ORDER — OXYCODONE AND ACETAMINOPHEN 10; 325 MG/1; MG/1
1 TABLET ORAL EVERY 4 HOURS PRN
Qty: 120 TABLET | Refills: 0 | Status: SHIPPED | OUTPATIENT
Start: 2019-01-16 | End: 2019-02-12 | Stop reason: SDUPTHER

## 2019-01-16 RX ORDER — ATORVASTATIN CALCIUM 20 MG/1
20 TABLET, FILM COATED ORAL DAILY
Qty: 90 TABLET | Refills: 1 | Status: SHIPPED | OUTPATIENT
Start: 2019-01-16 | End: 2019-07-02 | Stop reason: SDUPTHER

## 2019-01-16 RX ORDER — CARISOPRODOL 350 MG/1
350 TABLET ORAL 4 TIMES DAILY
Qty: 360 TABLET | Refills: 0 | Status: SHIPPED | OUTPATIENT
Start: 2019-01-16 | End: 2019-02-12 | Stop reason: SDUPTHER

## 2019-01-16 RX ORDER — OXYCODONE HYDROCHLORIDE 10 MG/1
TABLET ORAL
Qty: 120 TABLET | Refills: 0 | Status: SHIPPED | OUTPATIENT
Start: 2019-01-16 | End: 2019-02-15 | Stop reason: SDUPTHER

## 2019-01-16 NOTE — PROGRESS NOTES
Assessment/Plan:     Diagnoses and all orders for this visit:    Spinal stenosis of lumbar region, unspecified whether neurogenic claudication present  -     oxyCODONE-acetaminophen (PERCOCET)  mg per tablet; Take 1 tablet by mouth every 4 (four) hours as needed for moderate pain Max Daily Amount: 6 tablets  -     oxyCODONE (ROXICODONE) 10 MG TABS; Take 1 tablet 4 times daily    Chronic back pain, unspecified back location, unspecified back pain laterality  -     carisoprodol (SOMA) 350 mg tablet; Take 1 tablet (350 mg total) by mouth 4 (four) times a day    Hyperlipidemia, unspecified hyperlipidemia type  -     atorvastatin (LIPITOR) 20 mg tablet; Take 1 tablet (20 mg total) by mouth daily    Rheumatoid arthritis with positive rheumatoid factor, involving unspecified site ([facility] )       overall patient is doing well and is stable  We refilled his medications  He will follow up with Cardiology  He will follow up with Dermatology  He will follow up with me in 6 months or sooner if need      Subjective:     Chief Complaint   Patient presents with    Follow-up     Check up 6 months Hyperlipidemia/Hypertension        Patient ID: Kenn Bettencourt is a 72 y o  male  Patient here for six-month checkup chronic conditions  He has no acute physical complaints today other than his chronic musculoskeletal joint issues  He had stop Humira due to side effects  He is appointment upcoming with Rheumatology  He sees cardiology next week for his recent valve replacement  But overall he is stable and doing well  He he needs refills        The following portions of the patient's history were reviewed and updated as appropriate: allergies, current medications, past family history, past medical history, past social history, past surgical history and problem list     Review of Systems   Constitutional: Negative  HENT: Negative  Eyes: Negative  Respiratory: Negative  Cardiovascular: Negative      Gastrointestinal: Negative  Endocrine: Negative  Genitourinary: Negative  Musculoskeletal: Positive for arthralgias, back pain and myalgias  Skin: Negative  Allergic/Immunologic: Negative  Neurological: Negative  Hematological: Negative  Psychiatric/Behavioral: Negative  All other systems reviewed and are negative  Objective:    Vitals:    01/16/19 0948   BP: 120/80   BP Location: Right arm   Patient Position: Sitting   Cuff Size: Standard   Pulse: 97   Temp: (!) 96 9 °F (36 1 °C)   TempSrc: Tympanic   Weight: 94 7 kg (208 lb 12 8 oz)   Height: 5' 7" (1 702 m)          Physical Exam   Constitutional: He is oriented to person, place, and time  He appears well-developed and well-nourished  No distress  HENT:   Head: Normocephalic  Right Ear: External ear normal    Left Ear: External ear normal    Nose: Nose normal    Mouth/Throat: Oropharynx is clear and moist    Eyes: Pupils are equal, round, and reactive to light  Conjunctivae and EOM are normal  Right eye exhibits no discharge  Left eye exhibits no discharge  Neck: Normal range of motion  Cardiovascular: Normal rate, regular rhythm and normal heart sounds  Pulmonary/Chest: Effort normal and breath sounds normal    Abdominal: Soft  Bowel sounds are normal  He exhibits no distension  There is no tenderness  Musculoskeletal: Normal range of motion  Arthritic changes to all joints   Neurological: He is alert and oriented to person, place, and time  No cranial nerve deficit  Skin: Skin is warm and dry  No rash noted  Psychiatric: He has a normal mood and affect  His behavior is normal  Judgment and thought content normal    Nursing note and vitals reviewed

## 2019-01-28 ENCOUNTER — OFFICE VISIT (OUTPATIENT)
Dept: CARDIOLOGY CLINIC | Facility: CLINIC | Age: 74
End: 2019-01-28
Payer: COMMERCIAL

## 2019-01-28 VITALS
SYSTOLIC BLOOD PRESSURE: 130 MMHG | WEIGHT: 207 LBS | HEART RATE: 64 BPM | DIASTOLIC BLOOD PRESSURE: 74 MMHG | BODY MASS INDEX: 32.49 KG/M2 | HEIGHT: 67 IN

## 2019-01-28 DIAGNOSIS — Z95.3 STATUS POST AORTIC VALVE REPLACEMENT WITH BIOPROSTHETIC VALVE: ICD-10-CM

## 2019-01-28 DIAGNOSIS — E78.49 OTHER HYPERLIPIDEMIA: Primary | ICD-10-CM

## 2019-01-28 DIAGNOSIS — I10 BENIGN ESSENTIAL HYPERTENSION: ICD-10-CM

## 2019-01-28 DIAGNOSIS — M05.9 RHEUMATOID ARTHRITIS WITH POSITIVE RHEUMATOID FACTOR, INVOLVING UNSPECIFIED SITE (HCC): ICD-10-CM

## 2019-01-28 DIAGNOSIS — I25.10 ATHEROSCLEROSIS OF NATIVE CORONARY ARTERY OF NATIVE HEART WITHOUT ANGINA PECTORIS: ICD-10-CM

## 2019-01-28 PROCEDURE — 99214 OFFICE O/P EST MOD 30 MIN: CPT | Performed by: INTERNAL MEDICINE

## 2019-01-28 RX ORDER — LOSARTAN POTASSIUM 50 MG/1
50 TABLET ORAL DAILY
Qty: 90 TABLET | Refills: 3 | Status: SHIPPED | OUTPATIENT
Start: 2019-01-28 | End: 2020-03-09 | Stop reason: SDUPTHER

## 2019-01-28 RX ORDER — CLINDAMYCIN HYDROCHLORIDE 300 MG/1
CAPSULE ORAL
Refills: 0 | COMMUNITY
Start: 2018-12-13 | End: 2020-01-07

## 2019-01-28 NOTE — PROGRESS NOTES
[facility]  Follow up note  Sheridan Sandra 72 y o  male MRN: [id number]        Problems    1  Other hyperlipidemia     2  Benign essential hypertension     3  Status post aortic valve replacement with bioprosthetic valve     4  Rheumatoid arthritis with positive rheumatoid factor, involving unspecified site [facility])         Impression:    Bioprosthetic AVR  Normal functioning valve by symptoms and clinical exam   No need for additional imaging at this time    Hyperlipidemia  Continues on atorvastatin 20 mg daily  LDL 88, HDL 52, triglycerides 122, very well controlled    Fatigue/decreased libido  Hydrochlorothiazide was discontinued, losartan was resumed    Hypertension  On losartan, we had discontinued his hydrochlorothiazide for the above reason    CAD  Nonobstructive, 25% disease of the LAD at the time of his catheterization pre AVR  No active cardiac symptoms currently  Considering rheumatoid arthritis, aggressive risk factor modification needed, intermittent ischemic assessments will be needed    Plan: Will need follow-up cholesterol, already ordered  Will need to monitor this closely with the addition of Rl Gonzales improved symptoms following discontinuation of HCTZ  Tolerating losartan, although does notice his blood pressures are probably little bit higher since being off of HCTZ  We will continue to monitor  Can follow up on a 6 month interval basis  HPI:   Sheridan Sandra is a 72y o  year old male  With rheumatoid arthritis, bioprosthetic AVR for severe aortic stenosis, hypertension, hyperlipidemia, decreased libido/fatigue who follows up in my office  We discontinued his HCTZ due to decreased libido, and fatigue, started him on losartan 50 mg daily, he is currently tolerating it  His blood pressure is well controlled    His hyperlipidemia is under excellent control currently, he is taking atorvastatin 20 mg daily, continues to need aggressive risk factor modification from a CAD standpoint due to his rheumatoid arthritis  Denies any chest pain, ischemic symptoms  He is doing well from an AVR standpoint, denies any lightheadedness or shortness of breath  He is being started on [place], he has reported issues with lipid increases, but he is already on atorvastatin with well-controlled cholesterol currently  He already has surveillance labs ordered post starting [place]  Review of Systems   Musculoskeletal: Positive for arthralgias and back pain  All other systems reviewed and are negative  Past Medical History:   Diagnosis Date    Aortic stenosis     Aortic stenosis, severe     Arthritis     Carpal tunnel syndrome     Chronic pain     Chronic, continuous use of opioids     Hyperlipidemia     Hypertension     Hypertrophy of prostate     Insomnia     Peripheral neuropathy     RA [facility]rheumatoid arthritis) ([facility] [address] )     Umbilical hernia      History   Alcohol Use No     History   Drug Use No     History   Smoking Status    Never Smoker   Smokeless Tobacco    Never Used       Allergies:   Allergies   Allergen Reactions    Penicillins Anaphylaxis    Quinine Derivatives Other (See Comments)     High fever       Medications:     Current Outpatient Prescriptions:     adalimumab (HUMIRA) 40 mg/0 8 mL PSKT, Inject 40 mg under the skin once Biweekly stopped sept 3rd  , Disp: , Rfl:     aspirin 81 MG tablet, Take 1 tablet (81 mg total) by mouth daily, Disp: 90 tablet, Rfl: 2    atorvastatin (LIPITOR) 20 mg tablet, Take 1 tablet (20 mg total) by mouth daily, Disp: 90 tablet, Rfl: 1    betamethasone dipropionate (DIPROSONE) 0 05 % cream, Apply topically, Disp: , Rfl:     carisoprodol (SOMA) 350 mg tablet, Take 1 tablet (350 mg total) by mouth 4 (four) times a day, Disp: 360 tablet, Rfl: 0    Cholecalciferol (VITAMIN D3) 1000 units CAPS, Take 1 tablet by mouth daily, Disp: , Rfl:     fluocinonide (LIDEX) 0 05 % ointment, Apply topically 2 (two) times a day, Disp: , Rfl:   GRALISE 600 MG TABS, Take 3 tablets by mouth daily at bedtime, Disp: , Rfl: 2    ketoconazole (NIZORAL) 2 % cream, Apply topically 2 (two) times a day (Patient not taking: Reported on 1/16/2019 ), Disp: 30 g, Rfl: 0    losartan (COZAAR) 50 mg tablet, Take 1 tablet (50 mg total) by mouth daily, Disp: 90 tablet, Rfl: 3    Milk Thistle 1000 MG CAPS, Take 1 capsule by mouth daily, Disp: , Rfl:     oxyCODONE (ROXICODONE) 10 MG TABS, Take 1 tablet 4 times daily, Disp: 120 tablet, Rfl: 0    oxyCODONE-acetaminophen (PERCOCET)  mg per tablet, Take 1 tablet by mouth every 4 (four) hours as needed for moderate pain Max Daily Amount: 6 tablets, Disp: 120 tablet, Rfl: 0    traZODone (DESYREL) 100 mg tablet, Take 4 tablets at bedtime (Patient not taking: Reported on 1/16/2019 ), Disp: 360 tablet, Rfl: 1      There were no vitals filed for this visit  Weight (last 2 days)     None        Physical Exam   Constitutional: He is oriented to person, place, and time  No distress  HENT:   Head: Normocephalic and atraumatic  Eyes: Conjunctivae are normal  No scleral icterus  Neck: Normal range of motion  No JVD present  Cardiovascular: Normal rate, regular rhythm, normal heart sounds and intact distal pulses  No murmur heard  Pulmonary/Chest: Effort normal and breath sounds normal  He has no wheezes  He has no rales  Musculoskeletal: He exhibits no edema  Neurological: He is alert and oriented to person, place, and time  Skin: Skin is warm and dry  He is not diaphoretic           Laboratory Studies:  Lab Results   Component Value Date    HGBA1C 5 9 10/10/2017    HGBA1C 6 2 03/08/2017    K 5 2 09/20/2018    K 4 3 10/19/2017    K 3 8 10/18/2017     09/20/2018     10/19/2017     10/18/2017    CO2 27 09/20/2018    CO2 26 10/19/2017    CO2 24 10/18/2017    CO2 27 10/17/2017    CO2 27 10/17/2017    CO2 28 10/17/2017    GLUCOSE 125 10/17/2017    GLUCOSE 133 10/17/2017    GLUCOSE 163 (H) 10/17/2017    CREATININE 1 20 09/20/2018    CREATININE 0 86 10/19/2017    CREATININE 0 63 10/18/2017    BUN 13 09/20/2018    BUN 14 10/19/2017    BUN 10 10/18/2017    MG 2 5 10/19/2017    MG 2 5 10/18/2017    MG 2 4 09/29/2017     Lab Results   Component Value Date    WBC 12 81 (H) 10/19/2017    RBC 3 29 (L) 10/19/2017    HGB 9 8 (L) 10/19/2017    HCT 30 1 (L) 10/19/2017    MCV 92 10/19/2017    MCH 29 8 10/19/2017    RDW 13 5 10/19/2017     10/19/2017     Lipid Profile:   No results found for: CHOL  Lab Results   Component Value Date    HDL 52 09/20/2018    HDL 50 09/29/2017    HDL 52 03/08/2017     Lab Results   Component Value Date    LDLCALC 88 09/20/2018    LDLCALC 78 09/29/2017    LDLCALC 109 (H) 03/08/2017     Lab Results   Component Value Date    TRIG 122 09/20/2018    TRIG 84 09/29/2017    TRIG 107 03/08/2017         Cardiac testing:     EKG reviewed personally:         Vandana Almanzar MD    Portions of the record may have been created with voice recognition software   Occasional wrong word or "sound a like" substitutions may have occurred due to the inherent limitations of voice recognition software   Read the chart carefully and recognize, using context, where substitutions have occurred

## 2019-02-12 DIAGNOSIS — M48.061 SPINAL STENOSIS OF LUMBAR REGION, UNSPECIFIED WHETHER NEUROGENIC CLAUDICATION PRESENT: ICD-10-CM

## 2019-02-12 DIAGNOSIS — M54.9 CHRONIC BACK PAIN, UNSPECIFIED BACK LOCATION, UNSPECIFIED BACK PAIN LATERALITY: ICD-10-CM

## 2019-02-12 DIAGNOSIS — G47.00 INSOMNIA, UNSPECIFIED TYPE: ICD-10-CM

## 2019-02-12 DIAGNOSIS — G89.29 CHRONIC BACK PAIN, UNSPECIFIED BACK LOCATION, UNSPECIFIED BACK PAIN LATERALITY: ICD-10-CM

## 2019-02-12 RX ORDER — CARISOPRODOL 350 MG/1
350 TABLET ORAL 4 TIMES DAILY
Qty: 360 TABLET | Refills: 0 | Status: SHIPPED | OUTPATIENT
Start: 2019-02-12 | End: 2019-02-13 | Stop reason: SDUPTHER

## 2019-02-12 RX ORDER — OXYCODONE AND ACETAMINOPHEN 10; 325 MG/1; MG/1
1 TABLET ORAL EVERY 4 HOURS PRN
Qty: 120 TABLET | Refills: 0 | Status: SHIPPED | OUTPATIENT
Start: 2019-02-12 | End: 2019-02-14 | Stop reason: SDUPTHER

## 2019-02-13 DIAGNOSIS — G89.29 CHRONIC BACK PAIN, UNSPECIFIED BACK LOCATION, UNSPECIFIED BACK PAIN LATERALITY: ICD-10-CM

## 2019-02-13 DIAGNOSIS — M54.9 CHRONIC BACK PAIN, UNSPECIFIED BACK LOCATION, UNSPECIFIED BACK PAIN LATERALITY: ICD-10-CM

## 2019-02-14 DIAGNOSIS — M48.061 SPINAL STENOSIS OF LUMBAR REGION, UNSPECIFIED WHETHER NEUROGENIC CLAUDICATION PRESENT: ICD-10-CM

## 2019-02-14 RX ORDER — OXYCODONE AND ACETAMINOPHEN 10; 325 MG/1; MG/1
1 TABLET ORAL EVERY 4 HOURS PRN
Qty: 120 TABLET | Refills: 0 | Status: SHIPPED | OUTPATIENT
Start: 2019-02-14 | End: 2019-03-12 | Stop reason: SDUPTHER

## 2019-02-14 RX ORDER — CARISOPRODOL 350 MG/1
350 TABLET ORAL 4 TIMES DAILY
Qty: 360 TABLET | Refills: 0 | Status: SHIPPED | OUTPATIENT
Start: 2019-02-14 | End: 2019-05-07 | Stop reason: SDUPTHER

## 2019-02-15 ENCOUNTER — TELEPHONE (OUTPATIENT)
Dept: FAMILY MEDICINE CLINIC | Facility: CLINIC | Age: 74
End: 2019-02-15

## 2019-02-15 DIAGNOSIS — M48.061 SPINAL STENOSIS OF LUMBAR REGION, UNSPECIFIED WHETHER NEUROGENIC CLAUDICATION PRESENT: ICD-10-CM

## 2019-02-15 NOTE — TELEPHONE ENCOUNTER
[facility] in [place] called and said the script for patient's Parvez Quant needs a prior Ottis Bosworth

## 2019-02-15 NOTE — TELEPHONE ENCOUNTER
Patient is aware his script is not up until 2/24/18, but is leaving Saturday for [place] to care for father in law and just wanted to make sure everything was sent to the correct pharmacy before leaving as there was a mix up in the past  MG

## 2019-02-20 RX ORDER — OXYCODONE HYDROCHLORIDE 10 MG/1
TABLET ORAL
Qty: 120 TABLET | Refills: 0 | Status: SHIPPED | OUTPATIENT
Start: 2019-02-20 | End: 2019-03-21 | Stop reason: SDUPTHER

## 2019-03-12 DIAGNOSIS — M48.061 SPINAL STENOSIS OF LUMBAR REGION, UNSPECIFIED WHETHER NEUROGENIC CLAUDICATION PRESENT: ICD-10-CM

## 2019-03-12 RX ORDER — OXYCODONE AND ACETAMINOPHEN 10; 325 MG/1; MG/1
1 TABLET ORAL EVERY 4 HOURS PRN
Qty: 120 TABLET | Refills: 0 | Status: SHIPPED | OUTPATIENT
Start: 2019-03-12 | End: 2019-04-10 | Stop reason: SDUPTHER

## 2019-03-21 DIAGNOSIS — M48.061 SPINAL STENOSIS OF LUMBAR REGION, UNSPECIFIED WHETHER NEUROGENIC CLAUDICATION PRESENT: ICD-10-CM

## 2019-03-21 RX ORDER — OXYCODONE HYDROCHLORIDE 10 MG/1
TABLET ORAL
Qty: 120 TABLET | Refills: 0 | Status: SHIPPED | OUTPATIENT
Start: 2019-03-21 | End: 2019-04-22 | Stop reason: SDUPTHER

## 2019-03-21 NOTE — TELEPHONE ENCOUNTER
[id number], [address], [facility] [address] [place]

## 2019-03-27 DIAGNOSIS — G47.00 INSOMNIA, UNSPECIFIED TYPE: ICD-10-CM

## 2019-03-27 RX ORDER — TRAZODONE HYDROCHLORIDE 100 MG/1
TABLET ORAL
Qty: 360 TABLET | Refills: 1 | Status: SHIPPED | OUTPATIENT
Start: 2019-03-27 | End: 2019-06-20 | Stop reason: SDUPTHER

## 2019-04-10 DIAGNOSIS — M48.061 SPINAL STENOSIS OF LUMBAR REGION, UNSPECIFIED WHETHER NEUROGENIC CLAUDICATION PRESENT: ICD-10-CM

## 2019-04-10 RX ORDER — OXYCODONE AND ACETAMINOPHEN 10; 325 MG/1; MG/1
1 TABLET ORAL EVERY 4 HOURS PRN
Qty: 120 TABLET | Refills: 0 | Status: SHIPPED | OUTPATIENT
Start: 2019-04-10 | End: 2019-05-07 | Stop reason: SDUPTHER

## 2019-04-22 DIAGNOSIS — M48.061 SPINAL STENOSIS OF LUMBAR REGION, UNSPECIFIED WHETHER NEUROGENIC CLAUDICATION PRESENT: ICD-10-CM

## 2019-04-22 RX ORDER — OXYCODONE HYDROCHLORIDE 10 MG/1
TABLET ORAL
Qty: 120 TABLET | Refills: 0 | Status: SHIPPED | OUTPATIENT
Start: 2019-04-22 | End: 2019-05-20 | Stop reason: SDUPTHER

## 2019-05-07 DIAGNOSIS — G89.29 CHRONIC BACK PAIN, UNSPECIFIED BACK LOCATION, UNSPECIFIED BACK PAIN LATERALITY: ICD-10-CM

## 2019-05-07 DIAGNOSIS — M54.9 CHRONIC BACK PAIN, UNSPECIFIED BACK LOCATION, UNSPECIFIED BACK PAIN LATERALITY: ICD-10-CM

## 2019-05-07 DIAGNOSIS — M48.061 SPINAL STENOSIS OF LUMBAR REGION, UNSPECIFIED WHETHER NEUROGENIC CLAUDICATION PRESENT: ICD-10-CM

## 2019-05-07 RX ORDER — CARISOPRODOL 350 MG/1
350 TABLET ORAL 4 TIMES DAILY
Qty: 360 TABLET | Refills: 0 | Status: SHIPPED | OUTPATIENT
Start: 2019-05-07 | End: 2019-08-01 | Stop reason: SDUPTHER

## 2019-05-07 RX ORDER — OXYCODONE AND ACETAMINOPHEN 10; 325 MG/1; MG/1
1 TABLET ORAL EVERY 4 HOURS PRN
Qty: 120 TABLET | Refills: 0 | Status: SHIPPED | OUTPATIENT
Start: 2019-05-07 | End: 2019-06-07 | Stop reason: SDUPTHER

## 2019-05-20 DIAGNOSIS — M48.061 SPINAL STENOSIS OF LUMBAR REGION, UNSPECIFIED WHETHER NEUROGENIC CLAUDICATION PRESENT: ICD-10-CM

## 2019-05-20 RX ORDER — OXYCODONE HYDROCHLORIDE 10 MG/1
TABLET ORAL
Qty: 120 TABLET | Refills: 0 | Status: SHIPPED | OUTPATIENT
Start: 2019-05-20 | End: 2019-06-19 | Stop reason: SDUPTHER

## 2019-06-07 DIAGNOSIS — M48.061 SPINAL STENOSIS OF LUMBAR REGION, UNSPECIFIED WHETHER NEUROGENIC CLAUDICATION PRESENT: ICD-10-CM

## 2019-06-08 RX ORDER — OXYCODONE AND ACETAMINOPHEN 10; 325 MG/1; MG/1
1 TABLET ORAL EVERY 4 HOURS PRN
Qty: 120 TABLET | Refills: 0 | Status: SHIPPED | OUTPATIENT
Start: 2019-06-08 | End: 2019-07-02 | Stop reason: SDUPTHER

## 2019-06-19 ENCOUNTER — TELEPHONE (OUTPATIENT)
Dept: CARDIOLOGY CLINIC | Facility: CLINIC | Age: 74
End: 2019-06-19

## 2019-06-19 DIAGNOSIS — M48.061 SPINAL STENOSIS OF LUMBAR REGION, UNSPECIFIED WHETHER NEUROGENIC CLAUDICATION PRESENT: ICD-10-CM

## 2019-06-19 RX ORDER — OXYCODONE HYDROCHLORIDE 10 MG/1
TABLET ORAL
Qty: 120 TABLET | Refills: 0 | Status: SHIPPED | OUTPATIENT
Start: 2019-06-19 | End: 2019-07-16 | Stop reason: SDUPTHER

## 2019-06-20 DIAGNOSIS — G47.00 INSOMNIA, UNSPECIFIED TYPE: ICD-10-CM

## 2019-06-20 RX ORDER — TRAZODONE HYDROCHLORIDE 100 MG/1
TABLET ORAL
Qty: 360 TABLET | Refills: 1 | Status: SHIPPED | OUTPATIENT
Start: 2019-06-20 | End: 2019-12-16 | Stop reason: SDUPTHER

## 2019-07-02 ENCOUNTER — OFFICE VISIT (OUTPATIENT)
Dept: FAMILY MEDICINE CLINIC | Facility: CLINIC | Age: 74
End: 2019-07-02
Payer: COMMERCIAL

## 2019-07-02 VITALS
HEART RATE: 71 BPM | HEIGHT: 67 IN | TEMPERATURE: 97.9 F | DIASTOLIC BLOOD PRESSURE: 76 MMHG | WEIGHT: 201 LBS | OXYGEN SATURATION: 97 % | SYSTOLIC BLOOD PRESSURE: 140 MMHG | BODY MASS INDEX: 31.55 KG/M2 | RESPIRATION RATE: 15 BRPM

## 2019-07-02 DIAGNOSIS — Z12.11 SCREENING FOR COLON CANCER: ICD-10-CM

## 2019-07-02 DIAGNOSIS — Z23 NEED FOR VACCINATION AGAINST STREPTOCOCCUS PNEUMONIAE: ICD-10-CM

## 2019-07-02 DIAGNOSIS — E78.5 HYPERLIPIDEMIA, UNSPECIFIED HYPERLIPIDEMIA TYPE: ICD-10-CM

## 2019-07-02 DIAGNOSIS — Z00.00 MEDICARE ANNUAL WELLNESS VISIT, SUBSEQUENT: Primary | ICD-10-CM

## 2019-07-02 DIAGNOSIS — E66.9 OBESITY (BMI 30.0-34.9): ICD-10-CM

## 2019-07-02 DIAGNOSIS — M48.061 SPINAL STENOSIS OF LUMBAR REGION, UNSPECIFIED WHETHER NEUROGENIC CLAUDICATION PRESENT: ICD-10-CM

## 2019-07-02 DIAGNOSIS — M19.90 ARTHRITIS: ICD-10-CM

## 2019-07-02 PROCEDURE — 4040F PNEUMOC VAC/ADMIN/RCVD: CPT | Performed by: FAMILY MEDICINE

## 2019-07-02 PROCEDURE — 1170F FXNL STATUS ASSESSED: CPT | Performed by: FAMILY MEDICINE

## 2019-07-02 PROCEDURE — 1101F PT FALLS ASSESS-DOCD LE1/YR: CPT | Performed by: FAMILY MEDICINE

## 2019-07-02 PROCEDURE — G0009 ADMIN PNEUMOCOCCAL VACCINE: HCPCS | Performed by: FAMILY MEDICINE

## 2019-07-02 PROCEDURE — G0439 PPPS, SUBSEQ VISIT: HCPCS | Performed by: FAMILY MEDICINE

## 2019-07-02 PROCEDURE — 3725F SCREEN DEPRESSION PERFORMED: CPT | Performed by: FAMILY MEDICINE

## 2019-07-02 PROCEDURE — 1036F TOBACCO NON-USER: CPT | Performed by: FAMILY MEDICINE

## 2019-07-02 PROCEDURE — 1160F RVW MEDS BY RX/DR IN RCRD: CPT | Performed by: FAMILY MEDICINE

## 2019-07-02 PROCEDURE — 3008F BODY MASS INDEX DOCD: CPT | Performed by: FAMILY MEDICINE

## 2019-07-02 PROCEDURE — 90670 PCV13 VACCINE IM: CPT | Performed by: FAMILY MEDICINE

## 2019-07-02 PROCEDURE — 1125F AMNT PAIN NOTED PAIN PRSNT: CPT | Performed by: FAMILY MEDICINE

## 2019-07-02 RX ORDER — ATORVASTATIN CALCIUM 20 MG/1
20 TABLET, FILM COATED ORAL DAILY
Qty: 90 TABLET | Refills: 1 | Status: SHIPPED | OUTPATIENT
Start: 2019-07-02 | End: 2020-01-07 | Stop reason: SDUPTHER

## 2019-07-02 RX ORDER — OXYCODONE AND ACETAMINOPHEN 10; 325 MG/1; MG/1
1 TABLET ORAL EVERY 4 HOURS PRN
Qty: 120 TABLET | Refills: 0 | Status: SHIPPED | OUTPATIENT
Start: 2019-07-02 | End: 2019-07-30 | Stop reason: SDUPTHER

## 2019-07-02 NOTE — PATIENT INSTRUCTIONS
Obesity   AMBULATORY CARE:   Obesity  is when your body mass index (BMI) is greater than 30  Your healthcare provider will use your height and weight to measure your BMI  The risks of obesity include  many health problems, such as injuries or physical disability  You may need tests to check for the following:  · Diabetes     · High blood pressure or high cholesterol     · Heart disease     · Gallbladder or liver disease     · Cancer of the colon, breast, prostate, liver, or kidney     · Sleep apnea     · Arthritis or gout  Seek care immediately if:   · You have a severe headache, confusion, or difficulty speaking  · You have weakness on one side of your body  · You have chest pain, sweating, or shortness of breath  Contact your healthcare provider if:   · You have symptoms of gallbladder or liver disease, such as pain in your upper abdomen  · You have knee or hip pain and discomfort while walking  · You have symptoms of diabetes, such as intense hunger and thirst, and frequent urination  · You have symptoms of sleep apnea, such as snoring or daytime sleepiness  · You have questions or concerns about your condition or care  Treatment for obesity  focuses on helping you lose weight to improve your health  Even a small decrease in BMI can reduce the risk for many health problems  Your healthcare provider will help you set a weight-loss goal   · Lifestyle changes  are the first step in treating obesity  These include making healthy food choices and getting regular physical activity  Your healthcare provider may suggest a weight-loss program that involves coaching, education, and therapy  · Medicine  may help you lose weight when it is used with a healthy diet and physical activity  · Surgery  can help you lose weight if you are very obese and have other health problems  There are several types of weight-loss surgery  Ask your healthcare provider for more information    Be successful losing weight:   · Set small, realistic goals  An example of a small goal is to walk for 20 minutes 5 days a week  Anther goal is to lose 5% of your body weight  · Tell friends, family members, and coworkers about your goals  and ask for their support  Ask a friend to lose weight with you, or join a weight-loss support group  · Identify foods or triggers that may cause you to overeat , and find ways to avoid them  Remove tempting high-calorie foods from your home and workplace  Place a bowl of fresh fruit on your kitchen counter  If stress causes you to eat, then find other ways to cope with stress  · Keep a diary to track what you eat and drink  Also write down how many minutes of physical activity you do each day  Weigh yourself once a week and record it in your diary  Eating changes: You will need to eat 500 to 1,000 fewer calories each day than you currently eat to lose 1 to 2 pounds a week  The following changes will help you cut calories:  · Eat smaller portions  Use small plates, no larger than 9 inches in diameter  Fill your plate half full of fruits and vegetables  Measure your food using measuring cups until you know what a serving size looks like  · Eat 3 meals and 1 or 2 snacks each day  Plan your meals in advance  Elsa Stalls and eat at home most of the time  Eat slowly  · Eat fruits and vegetables at every meal   They are low in calories and high in fiber, which makes you feel full  Do not add butter, margarine, or cream sauce to vegetables  Use herbs to season steamed vegetables  · Eat less fat and fewer fried foods  Eat more baked or grilled chicken and fish  These protein sources are lower in calories and fat than red meat  Limit fast food  Dress your salads with olive oil and vinegar instead of bottled dressing  · Limit the amount of sugar you eat  Do not drink sugary beverages  Limit alcohol  Activity changes:  Physical activity is good for your body in many ways   It helps you burn calories and build strong muscles  It decreases stress and depression, and improves your mood  It can also help you sleep better  Talk to your healthcare provider before you begin an exercise program   · Exercise for at least 30 minutes 5 days a week  Start slowly  Set aside time each day for physical activity that you enjoy and that is convenient for you  It is best to do both weight training and an activity that increases your heart rate, such as walking, bicycling, or swimming  · Find ways to be more active  Do yard work and housecleaning  Walk up the stairs instead of using elevators  Spend your leisure time going to events that require walking, such as outdoor festivals or fairs  This extra physical activity can help you lose weight and keep it off  Follow up with your healthcare provider as directed: You may need to meet with a dietitian  Write down your questions so you remember to ask them during your visits  © 2017 [employer] [employer] Information is for End User's use only and may not be sold, redistributed or otherwise used for commercial purposes  All illustrations and images included in CareNotes® are the copyrighted property of [employer]  or Zacarias Landa  The above information is an  only  It is not intended as medical advice for individual conditions or treatments  Talk to your doctor, nurse or pharmacist before following any medical regimen to see if it is safe and effective for you

## 2019-07-02 NOTE — PROGRESS NOTES
Assessment/Plan:     Diagnoses and all orders for this visit:    Medicare annual wellness visit, subsequent    Arthritis  -     diclofenac sodium (VOLTAREN) 1 %; Apply 2 g topically 4 (four) times a day    Hyperlipidemia, unspecified hyperlipidemia type  -     atorvastatin (LIPITOR) 20 mg tablet; Take 1 tablet (20 mg total) by mouth daily    Spinal stenosis of lumbar region, unspecified whether neurogenic claudication present  -     oxyCODONE-acetaminophen (PERCOCET)  mg per tablet; Take 1 tablet by mouth every 4 (four) hours as needed for moderate painMax Daily Amount: 6 tablets    Need for vaccination against Streptococcus pneumoniae  -     PNEUMOCOCCAL CONJUGATE VACCINE 13-VALENT GREATER THAN 6 MONTHS    Screening for colon cancer  -     Lars; Future      70-year-old male Prevnar given today  Script:  Given today  Patient is declining other vaccines  Labs are up-to-date  He is up-to-date on rest health maintenance  He can follow up in 6 months or sooner if needed  We will start Voltaren gel for his arthritis of his hands  He will follow up with Rheumatology for his chronic arthritis issues and chronic pain      Subjective:     Chief Complaint   Patient presents with    Medicare Wellness Visit     subsequent exam 69 yr old         Patient ID: Stephy Posada is a 69 y o  male  Here for Medicare wellness visit  States that his pain in his joints is getting worse since stopping the Lesli Kanaris but he could not afford it  He is currently in the Medicare donut hole  Otherwise he has no other complaints today      The following portions of the patient's history were reviewed and updated as appropriate: allergies, current medications, past family history, past medical history, past social history, past surgical history and problem list     Review of Systems   Constitutional: Negative  HENT: Negative  Eyes: Negative  Respiratory: Negative  Cardiovascular: Negative  Gastrointestinal: Negative  Negative for bowel incontinence  Endocrine: Negative  Genitourinary: Negative  Musculoskeletal: Negative  Skin: Negative  Allergic/Immunologic: Negative  Neurological: Negative  Hematological: Negative  Psychiatric/Behavioral: Negative  The patient is not nervous/anxious  All other systems reviewed and are negative  Objective:    Vitals:    07/02/19 1313   BP: 140/76   BP Location: Left arm   Patient Position: Sitting   Cuff Size: Standard   Pulse: 71   Resp: 15   Temp: 97 9 °F (36 6 °C)   TempSrc: Tympanic   SpO2: 97%   Weight: 91 2 kg (201 lb)   Height: 5' 7" (1 702 m)          Physical Exam   Constitutional: He is oriented to person, place, and time  He appears well-developed and well-nourished  No distress  HENT:   Head: Normocephalic  Right Ear: External ear normal    Left Ear: External ear normal    Nose: Nose normal    Mouth/Throat: Oropharynx is clear and moist    Eyes: Pupils are equal, round, and reactive to light  Conjunctivae and EOM are normal  Right eye exhibits no discharge  Left eye exhibits no discharge  Neck: Normal range of motion  Cardiovascular: Normal rate, regular rhythm and normal heart sounds  Pulmonary/Chest: Effort normal and breath sounds normal    Abdominal: Soft  Bowel sounds are normal  He exhibits no distension  There is no tenderness  Musculoskeletal: Normal range of motion  Uses cane  Arthritic changes all major joints   Neurological: He is alert and oriented to person, place, and time  No cranial nerve deficit  Skin: Skin is warm and dry  No rash noted  Psychiatric: He has a normal mood and affect  His behavior is normal  Judgment and thought content normal    Nursing note and vitals reviewed         Assessment and Plan:     Problem List Items Addressed This Visit        Musculoskeletal and Integument    Arthritis    Relevant Medications    diclofenac sodium (VOLTAREN) 1 %       Other    Hyperlipidemia    Relevant Medications atorvastatin (LIPITOR) 20 mg tablet      Other Visit Diagnoses     Medicare annual wellness visit, subsequent    -  Primary    Spinal stenosis of lumbar region, unspecified whether neurogenic claudication present        Relevant Medications    oxyCODONE-acetaminophen (PERCOCET)  mg per tablet    Need for vaccination against Streptococcus pneumoniae        Relevant Orders    PNEUMOCOCCAL CONJUGATE VACCINE 13-VALENT GREATER THAN 6 MONTHS (Completed)    Screening for colon cancer        Relevant Orders    Cologuard         History of Present Illness:     Patient presents for Medicare Annual Wellness visit    Patient Care Team:  Bennett Mcleod DO as PCP - MD Kishore Maldonado MD Christen Lye, MD     Problem List:     Patient Active Problem List   Diagnosis    Arthritis    Chronic pain    Chronic, continuous use of opioids    Hyperlipidemia    Benign essential hypertension    Insomnia    Peripheral neuropathy    RA [facility]umatoid arthritis) ([facility] [address]    Acute postoperative respiratory insufficiency    Status post aortic valve replacement with bioprosthetic valve    Chronic fatigue    Atherosclerosis of native coronary artery of native heart without angina pectoris      Past Medical and Surgical History:     Past Medical History:   Diagnosis Date    Aortic stenosis     Aortic stenosis, severe     Arthritis     Carpal tunnel syndrome     Chronic pain     Chronic, continuous use of opioids     Hyperlipidemia     Hypertension     Hypertrophy of prostate     Insomnia     Peripheral neuropathy     RA (rheumatoid arthritis) ([address]     Umbilical hernia      Past Surgical History:   Procedure Laterality Date    ADENOIDECTOMY      AORTIC VALVE REPLACEMENT  10/17/2017    BASAL CELL CARCINOMA EXCISION      CARDIAC CATHETERIZATION      LAST ASSESSED: [id number]    CARPAL TUNNEL RELEASE      left    CATARACT EXTRACTION      HERNIA REPAIR      inguinal bilateral -umbilical     LAMINECTOMY      DECOMPRESS, FACETECTOMY, FORAMINOTOMY LUMBAR SEG    NEUROPLASTY / TRANSPOSITION MEDIAN NERVE AT CARPAL TUNNEL      [place] RPLCMT AORTIC VALVE OPN W/STENTLESS TISSUE VALVE N/A 10/17/2017    Procedure: AORTIC VALVE REPLACEMENT with 23MM MAGNAEASE TISSUE VALVE; INTRA-OP SHANDRA;  Surgeon: Rockwell Hammans, MD;  Location: [facility];  Service: Cardiac Surgery    TONSILLECTOMY        Family History:     Family History   Problem Relation Age of Onset    Stroke Mother     Diabetes Mother     Hypertension Mother     Heart disease Father     Hypertension Father     Heart attack Father     Coronary artery disease Family       Social History:     Social History     Tobacco Use   Smoking Status Never Smoker   Smokeless Tobacco Never Used     Social History     Substance and Sexual Activity   Alcohol Use No     Social History     Substance and Sexual Activity   Drug Use No      Medications and Allergies:     Current Outpatient Medications   Medication Sig Dispense Refill    aspirin 81 MG tablet Take 1 tablet (81 mg total) by mouth daily 90 tablet 2    atorvastatin (LIPITOR) 20 mg tablet Take 1 tablet (20 mg total) by mouth daily 90 tablet 1    betamethasone dipropionate (DIPROSONE) 0 05 % cream Apply topically      carisoprodol (SOMA) 350 mg tablet Take 1 tablet (350 mg total) by mouth 4 (four) times a day 360 tablet 0    Cholecalciferol (VITAMIN D3) 1000 units CAPS Take 1 tablet by mouth daily      clindamycin (CLEOCIN) 300 MG capsule TAKE 2 CAPSULES NOW, THEN 1 CAPSULE EVERY 6 HOURS FOR 7 DAYS  0    fluocinonide (LIDEX) 0 05 % ointment Apply topically 2 (two) times a day      Gabapentin, Once-Daily, (GRALISE) 600 MG TABS TAKE 3 TABLETS BY MOUTH AT BEDTIME      ketoconazole (NIZORAL) 2 % cream Apply topically 2 (two) times a day 30 g 0    losartan (COZAAR) 50 mg tablet Take 1 tablet (50 mg total) by mouth daily 90 tablet 3    Milk Thistle 1000 MG CAPS Take 1 capsule by mouth daily      oxyCODONE (ROXICODONE) 10 MG TABS Take 1 tablet 4 times daily 120 tablet 0    oxyCODONE-acetaminophen (PERCOCET)  mg per tablet Take 1 tablet by mouth every 4 (four) hours as needed for moderate painMax Daily Amount: 6 tablets 120 tablet 0    traZODone (DESYREL) 100 mg tablet Take 4 tablets at bedtime 360 tablet 1    diclofenac sodium (VOLTAREN) 1 % Apply 2 g topically 4 (four) times a day 5 Tube 3     No current facility-administered medications for this visit  Allergies   Allergen Reactions    Penicillins Anaphylaxis    Quinine Derivatives Other (See Comments)     High fever      Immunizations:     Immunization History   Administered Date(s) Administered    Influenza Split High Dose Preservative Free IM 10/09/2014    Pneumococcal Conjugate 13-Valent 07/02/2019    Pneumococcal Polysaccharide PPV23 05/22/2014    Zoster 03/23/2013      Medicare Screening Tests and Risk Assessments:     Malik Briggs is here for his Subsequent Wellness visit  Last Medicare Wellness visit information reviewed, patient interviewed and updates made to the record today  Health Risk Assessment:  Patient rates overall health as good  Patient feels that their physical health rating is Same  Eyesight was rated as Same  Hearing was rated as Same  Patient feels that their emotional and mental health rating is Same  Pain experienced by patient in the last 7 days has been A lot  Patient's pain rating has been 9/10  Patient states that he has experienced no weight loss or gain in last 6 months  Emotional/Mental Health:  Patient has not been feeling nervous/anxious  PHQ-9 Depression Screening:    Frequency of the following problems over the past two weeks:      1  Little interest or pleasure in doing things: 0 - not at all      2  Feeling down, depressed, or hopeless: 0 - not at all  PHQ-2 Score: 0          Broken Bones/Falls:     Fall Risk Assessment:    In the past year, patient has experienced: No history of falling in past year          Bladder/Bowel:  Patient has not leaked urine accidently in the last six months  Patient reports no loss of bowel control  Immunizations:  Patient has not had a flu vaccination within the last year  Patient has received a pneumonia shot  Patient has not received a shingles shot  Patient has not received tetanus/diphtheria shot  Home Safety:  Patient has trouble with stairs inside or outside of their home  Patient currently reports that there are no safety hazards present in home, working smoke alarms, working carbon monoxide detectors  Preventative Screenings:   prostate cancer screen performed, no colon cancer screen completed, cholesterol screen completed, glaucoma eye exam completed,     Nutrition:  Current diet: Regular with servings of the following:    Medications:  Patient is not currently taking any over-the-counter supplements  Patient is able to manage medications  Lifestyle Choices:  Patient reports no tobacco use  Patient has not smoked or used tobacco in the past   Patient reports no alcohol use  Patient drives a vehicle  Patient wears seat belt  Activities of Daily Living:  Can get out of bed by his or her self, able to dress self, able to make own meals, able to do own shopping, able to bathe self, can do own laundry/housekeeping, can manage own money, pay bills and track expenses    Previous Hospitalizations:  No hospitalization or ED visit in past 12 months        Advanced Directives:  Patient has not decided on power of   Patient has not completed advanced directive          Preventative Screening/Counseling:      Cardiovascular:      General: Screening Current          Diabetes:      General: Screening Current          Colorectal Cancer:      General: Risks and Benefits Discussed      Comments: :  Guarded ordered        Prostate Cancer:      General: Screening Current          Osteoporosis:      General: Screening Not Indicated          AAA: General: Screening Not Indicated          Glaucoma:      General: Screening Current          HIV:      General: Screening Not Indicated          Hepatitis C:      General: Risks and Benefits Discussed      Additional Comments: Will order hepatitis C at next blood work    Advanced Directives:   Patient has no living will for healthcare, does not have durable POA for healthcare, patient does not have an advanced directive  Information on ACP and/or AD not provided  No 5 wishes given  End of life assessment reviewed with patient  Provider agrees with end of life decisions   Immunizations:      Influenza: Patient Declines and Risks & Benefits Discussed      Pneumococcal: Lifetime Vaccine Completed      Shingrix: Patient Declines and Risks & Benefits Discussed      Hepatitis B (Low risk patients): Series Not Indicated      Zostavax: Patient Declines and Risks & Benefits Discussed      TD: Risks & Benefits Discussed and Patient Declines      TDAP: Risks & Benefits Discussed and Patient Declines      Other Preventative Counseling (Non-Medicare): Fall Prevention, Nutrition Counseling and Car/seat belt/driving safety reviewed          BMI Counseling: Body mass index is 31 48 kg/m²  Discussed the patient's BMI with him  The BMI is above average  No BMI follow-up plan is appropriate  Patient is 74 years old and weight reduction/weight gain would further complicate their underlying physical disability

## 2019-07-08 DIAGNOSIS — E78.5 HYPERLIPIDEMIA, UNSPECIFIED HYPERLIPIDEMIA TYPE: ICD-10-CM

## 2019-07-08 RX ORDER — ATORVASTATIN CALCIUM 20 MG/1
TABLET, FILM COATED ORAL
Qty: 90 TABLET | Refills: 0 | OUTPATIENT
Start: 2019-07-08

## 2019-07-16 DIAGNOSIS — M48.061 SPINAL STENOSIS OF LUMBAR REGION, UNSPECIFIED WHETHER NEUROGENIC CLAUDICATION PRESENT: ICD-10-CM

## 2019-07-16 RX ORDER — OXYCODONE HYDROCHLORIDE 10 MG/1
TABLET ORAL
Qty: 120 TABLET | Refills: 0 | Status: SHIPPED | OUTPATIENT
Start: 2019-07-16 | End: 2019-08-15 | Stop reason: SDUPTHER

## 2019-07-17 ENCOUNTER — TELEPHONE (OUTPATIENT)
Dept: FAMILY MEDICINE CLINIC | Facility: CLINIC | Age: 74
End: 2019-07-17

## 2019-07-17 NOTE — TELEPHONE ENCOUNTER
Telly from [facility] in [facility] called to question if patient is on both Percocet and Roxicodone  Or if the Roxicodone is replacing the percocet   He would like a call back before he can fill this @ [phone]

## 2019-07-30 DIAGNOSIS — M48.061 SPINAL STENOSIS OF LUMBAR REGION, UNSPECIFIED WHETHER NEUROGENIC CLAUDICATION PRESENT: ICD-10-CM

## 2019-07-30 RX ORDER — OXYCODONE AND ACETAMINOPHEN 10; 325 MG/1; MG/1
1 TABLET ORAL EVERY 4 HOURS PRN
Qty: 120 TABLET | Refills: 0 | Status: SHIPPED | OUTPATIENT
Start: 2019-07-30 | End: 2019-08-28 | Stop reason: SDUPTHER

## 2019-08-01 DIAGNOSIS — G89.29 CHRONIC BACK PAIN, UNSPECIFIED BACK LOCATION, UNSPECIFIED BACK PAIN LATERALITY: ICD-10-CM

## 2019-08-01 DIAGNOSIS — M54.9 CHRONIC BACK PAIN, UNSPECIFIED BACK LOCATION, UNSPECIFIED BACK PAIN LATERALITY: ICD-10-CM

## 2019-08-01 RX ORDER — CARISOPRODOL 350 MG/1
350 TABLET ORAL 4 TIMES DAILY
Qty: 360 TABLET | Refills: 0 | Status: SHIPPED | OUTPATIENT
Start: 2019-08-01 | End: 2019-10-29 | Stop reason: SDUPTHER

## 2019-08-15 DIAGNOSIS — M48.061 SPINAL STENOSIS OF LUMBAR REGION, UNSPECIFIED WHETHER NEUROGENIC CLAUDICATION PRESENT: ICD-10-CM

## 2019-08-15 RX ORDER — OXYCODONE HYDROCHLORIDE 10 MG/1
TABLET ORAL
Qty: 120 TABLET | Refills: 0 | Status: SHIPPED | OUTPATIENT
Start: 2019-08-15 | End: 2019-09-12 | Stop reason: SDUPTHER

## 2019-08-28 DIAGNOSIS — M48.061 SPINAL STENOSIS OF LUMBAR REGION, UNSPECIFIED WHETHER NEUROGENIC CLAUDICATION PRESENT: ICD-10-CM

## 2019-08-28 RX ORDER — OXYCODONE AND ACETAMINOPHEN 10; 325 MG/1; MG/1
1 TABLET ORAL EVERY 4 HOURS PRN
Qty: 120 TABLET | Refills: 0 | Status: SHIPPED | OUTPATIENT
Start: 2019-08-28 | End: 2019-09-26 | Stop reason: SDUPTHER

## 2019-09-12 DIAGNOSIS — M48.061 SPINAL STENOSIS OF LUMBAR REGION, UNSPECIFIED WHETHER NEUROGENIC CLAUDICATION PRESENT: ICD-10-CM

## 2019-09-12 RX ORDER — OXYCODONE HYDROCHLORIDE 10 MG/1
TABLET ORAL
Qty: 120 TABLET | Refills: 0 | Status: SHIPPED | OUTPATIENT
Start: 2019-09-12 | End: 2019-10-15 | Stop reason: SDUPTHER

## 2019-09-26 DIAGNOSIS — M48.061 SPINAL STENOSIS OF LUMBAR REGION, UNSPECIFIED WHETHER NEUROGENIC CLAUDICATION PRESENT: ICD-10-CM

## 2019-09-26 RX ORDER — OXYCODONE AND ACETAMINOPHEN 10; 325 MG/1; MG/1
1 TABLET ORAL EVERY 4 HOURS PRN
Qty: 120 TABLET | Refills: 0 | Status: SHIPPED | OUTPATIENT
Start: 2019-09-26 | End: 2019-10-25 | Stop reason: SDUPTHER

## 2019-10-15 DIAGNOSIS — M48.061 SPINAL STENOSIS OF LUMBAR REGION, UNSPECIFIED WHETHER NEUROGENIC CLAUDICATION PRESENT: ICD-10-CM

## 2019-10-15 RX ORDER — OXYCODONE HYDROCHLORIDE 10 MG/1
TABLET ORAL
Qty: 120 TABLET | Refills: 0 | Status: SHIPPED | OUTPATIENT
Start: 2019-10-15 | End: 2019-11-18 | Stop reason: SDUPTHER

## 2019-10-25 DIAGNOSIS — M48.061 SPINAL STENOSIS OF LUMBAR REGION, UNSPECIFIED WHETHER NEUROGENIC CLAUDICATION PRESENT: ICD-10-CM

## 2019-10-25 RX ORDER — OXYCODONE AND ACETAMINOPHEN 10; 325 MG/1; MG/1
1 TABLET ORAL EVERY 4 HOURS PRN
Qty: 120 TABLET | Refills: 0 | Status: SHIPPED | OUTPATIENT
Start: 2019-10-25 | End: 2019-11-25 | Stop reason: SDUPTHER

## 2019-10-28 ENCOUNTER — OFFICE VISIT (OUTPATIENT)
Dept: CARDIOLOGY CLINIC | Facility: CLINIC | Age: 74
End: 2019-10-28
Payer: COMMERCIAL

## 2019-10-28 VITALS
HEART RATE: 59 BPM | DIASTOLIC BLOOD PRESSURE: 70 MMHG | BODY MASS INDEX: 31.23 KG/M2 | WEIGHT: 199 LBS | SYSTOLIC BLOOD PRESSURE: 100 MMHG | HEIGHT: 67 IN

## 2019-10-28 DIAGNOSIS — I10 BENIGN ESSENTIAL HYPERTENSION: Primary | ICD-10-CM

## 2019-10-28 DIAGNOSIS — E78.49 OTHER HYPERLIPIDEMIA: ICD-10-CM

## 2019-10-28 DIAGNOSIS — Z95.3 STATUS POST AORTIC VALVE REPLACEMENT WITH BIOPROSTHETIC VALVE: ICD-10-CM

## 2019-10-28 DIAGNOSIS — I25.10 ATHEROSCLEROSIS OF NATIVE CORONARY ARTERY OF NATIVE HEART WITHOUT ANGINA PECTORIS: ICD-10-CM

## 2019-10-28 PROCEDURE — 93000 ELECTROCARDIOGRAM COMPLETE: CPT | Performed by: INTERNAL MEDICINE

## 2019-10-28 PROCEDURE — 3078F DIAST BP <80 MM HG: CPT | Performed by: INTERNAL MEDICINE

## 2019-10-28 PROCEDURE — 99214 OFFICE O/P EST MOD 30 MIN: CPT | Performed by: INTERNAL MEDICINE

## 2019-10-28 PROCEDURE — 3074F SYST BP LT 130 MM HG: CPT | Performed by: INTERNAL MEDICINE

## 2019-10-28 NOTE — PATIENT INSTRUCTIONS
Try magnesium from the pharmacy for cramping  Pharmacist can help with which formulation would be best   Please have your blood work done, you had a very minimally elevated potassium in 2018, if this is still the case or worse, we may need to reduce her losartan dose/altered your blood pressure regimen  Statins can cause cramping as well  If the cramps do not improve with the magnesium, try coenzyme Q10, and if none of these regimens work, stop taking your Lipitor for 2 weeks and see if the cramps resolve    And if they do, please call me to discuss alternative statin therapy

## 2019-10-28 NOTE — PROGRESS NOTES
[facility]  Follow up note  Michael Harris 69 y o  male MRN: [id number]        Problems    1  Benign essential hypertension  POCT ECG   2  Atherosclerosis of native coronary artery of native heart without angina pectoris     3  Other hyperlipidemia     4  Status post aortic valve replacement with bioprosthetic valve         Impression:    Bioprosthetic AVR  Normal functioning valve by exam and symptoms  No additional testing needed    Hyperlipidemia  Well controlled, LDL 88 in 2018, has not had follow-up blood work done, takes Lipitor 20 mg    Fatigue/decreased libido  Hydrochlorothiazide was discontinued, slight improvement, but still has fatigue    Hypertension  Blood pressure well controlled  Potassium 5 2 in 2018, needs updated blood work done  [place] los[place] 50 mg daily    CAD  Nonobstructive, 25% lad at the time of catheterization pre AVR  No new symptom  Continue risk factor modification    Plan:    Six-month follow-up  Magnesium supplementation for cramping  Consider coenzyme Q10 because on Lipitor  If none of these help, stop Lipitor for 2 weeks, although he has been on it for 30 years, it seems unlikely that is the cause of his cramping  Six-month follow-up      HPI:   Michael Harris is a 69y o  year old male  With rheumatoid arthritis, bioprosthetic AVR for severe aortic stenosis, hypertension, hyperlipidemia, decreased libido/fatigue who follows up in my office  Blood pressure is well controlled, he takes losartan, had a potassium of 5 2 in 2018, has still not had is 2019 blood work done in assures me he will do this  Cramping is a big complaint, has been going on for couple months, he does take Lipitor but has been on it for 30 years, seems doubtful it is the cause  Could be due to his electrolyte issues, and again urged the blood work to be done    Lipids are excellent, on Lipitor 20 mg LDL 88, and minimal nonobstructive plaque noted on catheterization pre AVR  A does rheumatoid arthritis, no longer on Jose Yin  Review of Systems   Constitutional: Negative for appetite change, diaphoresis, fatigue and fever  Respiratory: Negative for chest tightness, shortness of breath and wheezing  Cardiovascular: Negative for chest pain, palpitations and leg swelling  Gastrointestinal: Negative for abdominal pain and blood in stool  Musculoskeletal: Positive for arthralgias, back pain and joint swelling  Skin: Negative for rash  Neurological: Negative for dizziness, syncope and light-headedness  Past Medical History:   Diagnosis Date    Aortic stenosis     Aortic stenosis, severe     Arthritis     Carpal tunnel syndrome     Chronic pain     Chronic, continuous use of opioids     Hyperlipidemia     Hypertension     Hypertrophy of prostate     Insomnia     Peripheral neuropathy     RA (rheumatoid arthritis) ([facility] [address] [facility]     Umbilical hernia      Social History     Substance and Sexual Activity   Alcohol Use No     Social History     Substance and Sexual Activity   Drug Use No     Social History     Tobacco Use   Smoking Status Never Smoker   Smokeless Tobacco Never Used       Allergies:   Allergies   Allergen Reactions    Penicillins Anaphylaxis    Quinine Derivatives Other (See Comments)     High fever       Medications:     Current Outpatient Medications:     aspirin 81 MG tablet, Take 1 tablet (81 mg total) by mouth daily, Disp: 90 tablet, Rfl: 2    atorvastatin (LIPITOR) 20 mg tablet, Take 1 tablet (20 mg total) by mouth daily, Disp: 90 tablet, Rfl: 1    betamethasone dipropionate (DIPROSONE) 0 05 % cream, Apply topically, Disp: , Rfl:     carisoprodol (SOMA) 350 mg tablet, Take 1 tablet (350 mg total) by mouth 4 (four) times a day, Disp: 360 tablet, Rfl: 0    Cholecalciferol (VITAMIN D3) 1000 units CAPS, Take 1 tablet by mouth daily, Disp: , Rfl:     diclofenac sodium (VOLTAREN) 1 %, Apply 2 g topically 4 (four) times a day, Disp: 5 Tube, Rfl: 3    fluocinonide (LIDEX) 0 05 % ointment, Apply topically 2 (two) times a day, Disp: , Rfl:     Gabapentin, Once-Daily, (GRALISE) 600 MG TABS, TAKE 3 TABLETS BY MOUTH AT BEDTIME, Disp: , Rfl:     ketoconazole (NIZORAL) 2 % cream, Apply topically 2 (two) times a day, Disp: 30 g, Rfl: 0    losartan (COZAAR) 50 mg tablet, Take 1 tablet (50 mg total) by mouth daily, Disp: 90 tablet, Rfl: 3    Milk Thistle 1000 MG CAPS, Take 1 capsule by mouth daily, Disp: , Rfl:     oxyCODONE (ROXICODONE) 10 MG TABS, Take 1 tablet 4 times daily, Disp: 120 tablet, Rfl: 0    oxyCODONE-acetaminophen (PERCOCET)  mg per tablet, Take 1 tablet by mouth every 4 (four) hours as needed for moderate painMax Daily Amount: 6 tablets, Disp: 120 tablet, Rfl: 0    Secukinumab 150 MG/ML SOAJ, One injection subcutaneously at weeks 1,2,3,4 and 5 for loading dose then every 4 weeks thereafter, Disp: , Rfl:     traZODone (DESYREL) 100 mg tablet, Take 4 tablets at bedtime, Disp: 360 tablet, Rfl: 1    clindamycin (CLEOCIN) 300 MG capsule, TAKE 2 CAPSULES NOW, THEN 1 CAPSULE EVERY 6 HOURS FOR 7 DAYS, Disp: , Rfl: 0      Vitals:    10/28/19 1055   BP: 100/70   Pulse: 59     Weight (last 2 days)     Date/Time   Weight    10/28/19 1055   90 3 (199)            Physical Exam   Constitutional: He is oriented to person, place, and time  No distress  HENT:   Head: Normocephalic and atraumatic  Eyes: Conjunctivae are normal  No scleral icterus  Neck: Normal range of motion  No JVD present  Cardiovascular: Normal rate, regular rhythm, normal heart sounds and intact distal pulses  No murmur heard  Pulmonary/Chest: Effort normal and breath sounds normal  No respiratory distress  He has no decreased breath sounds  He has no wheezes  He has no rhonchi  He has no rales  Musculoskeletal: He exhibits no edema  Right lower leg: Normal  He exhibits no edema  Left lower leg: Normal  He exhibits no edema     Neurological: He is alert and oriented to person, place, and time  Skin: Skin is warm and dry  He is not diaphoretic  Laboratory Studies:  Lab Results   Component Value Date    HGBA1C 5 9 10/10/2017    HGBA1C 6 2 03/08/2017    K 5 2 09/20/2018    K 4 3 10/19/2017    K 3 8 10/18/2017     09/20/2018     10/19/2017     10/18/2017    CO2 27 09/20/2018    CO2 26 10/19/2017    CO2 24 10/18/2017    CO2 27 10/17/2017    CO2 27 10/17/2017    CO2 28 10/17/2017    GLUCOSE 125 10/17/2017    GLUCOSE 133 10/17/2017    GLUCOSE 163 (H) 10/17/2017    CREATININE 1 20 09/20/2018    CREATININE 0 86 10/19/2017    CREATININE 0 63 10/18/2017    BUN 13 09/20/2018    BUN 14 10/19/2017    BUN 10 10/18/2017    MG 2 5 10/19/2017    MG 2 5 10/18/2017    MG 2 4 09/29/2017     Lab Results   Component Value Date    WBC 12 81 (H) 10/19/2017    RBC 3 29 (L) 10/19/2017    HGB 9 8 (L) 10/19/2017    HCT 30 1 (L) 10/19/2017    MCV 92 10/19/2017    MCH 29 8 10/19/2017    RDW 13 5 10/19/2017     10/19/2017     Lipid Profile:   No results found for: CHOL  Lab Results   Component Value Date    HDL 52 09/20/2018    HDL 50 09/29/2017    HDL 52 03/08/2017     Lab Results   Component Value Date    LDLCALC 88 09/20/2018    LDLCALC 78 09/29/2017    LDLCALC 109 (H) 03/08/2017     Lab Results   Component Value Date    TRIG 122 09/20/2018    TRIG 84 09/29/2017    TRIG 107 03/08/2017         Cardiac testing:     EKG reviewed personally:   Sinus bradycardia, premature atrial contraction, heart rate 59      Rachel Watson MD    Portions of the record may have been created with voice recognition software   Occasional wrong word or "sound a like" substitutions may have occurred due to the inherent limitations of voice recognition software   Read the chart carefully and recognize, using context, where substitutions have occurred

## 2019-10-29 DIAGNOSIS — M54.9 CHRONIC BACK PAIN, UNSPECIFIED BACK LOCATION, UNSPECIFIED BACK PAIN LATERALITY: ICD-10-CM

## 2019-10-29 DIAGNOSIS — G89.29 CHRONIC BACK PAIN, UNSPECIFIED BACK LOCATION, UNSPECIFIED BACK PAIN LATERALITY: ICD-10-CM

## 2019-10-29 RX ORDER — CARISOPRODOL 350 MG/1
350 TABLET ORAL 4 TIMES DAILY
Qty: 360 TABLET | Refills: 0 | Status: SHIPPED | OUTPATIENT
Start: 2019-10-29 | End: 2020-02-06 | Stop reason: SDUPTHER

## 2019-11-18 DIAGNOSIS — M48.061 SPINAL STENOSIS OF LUMBAR REGION, UNSPECIFIED WHETHER NEUROGENIC CLAUDICATION PRESENT: ICD-10-CM

## 2019-11-18 RX ORDER — OXYCODONE HYDROCHLORIDE 10 MG/1
TABLET ORAL
Qty: 120 TABLET | Refills: 0 | Status: SHIPPED | OUTPATIENT
Start: 2019-11-18 | End: 2019-12-17 | Stop reason: SDUPTHER

## 2019-11-25 DIAGNOSIS — M48.061 SPINAL STENOSIS OF LUMBAR REGION, UNSPECIFIED WHETHER NEUROGENIC CLAUDICATION PRESENT: ICD-10-CM

## 2019-11-25 RX ORDER — OXYCODONE AND ACETAMINOPHEN 10; 325 MG/1; MG/1
1 TABLET ORAL EVERY 4 HOURS PRN
Qty: 120 TABLET | Refills: 0 | Status: SHIPPED | OUTPATIENT
Start: 2019-11-25 | End: 2019-12-23 | Stop reason: SDUPTHER

## 2019-12-04 ENCOUNTER — TELEPHONE (OUTPATIENT)
Dept: FAMILY MEDICINE CLINIC | Facility: CLINIC | Age: 74
End: 2019-12-04

## 2019-12-16 DIAGNOSIS — G47.00 INSOMNIA, UNSPECIFIED TYPE: ICD-10-CM

## 2019-12-16 RX ORDER — TRAZODONE HYDROCHLORIDE 100 MG/1
TABLET ORAL
Qty: 360 TABLET | Refills: 1 | Status: SHIPPED | OUTPATIENT
Start: 2019-12-16 | End: 2020-06-10 | Stop reason: SDUPTHER

## 2019-12-17 DIAGNOSIS — M48.061 SPINAL STENOSIS OF LUMBAR REGION, UNSPECIFIED WHETHER NEUROGENIC CLAUDICATION PRESENT: ICD-10-CM

## 2019-12-17 RX ORDER — OXYCODONE HYDROCHLORIDE 10 MG/1
TABLET ORAL
Qty: 120 TABLET | Refills: 0 | Status: SHIPPED | OUTPATIENT
Start: 2019-12-17 | End: 2020-01-14 | Stop reason: SDUPTHER

## 2019-12-23 DIAGNOSIS — M48.061 SPINAL STENOSIS OF LUMBAR REGION, UNSPECIFIED WHETHER NEUROGENIC CLAUDICATION PRESENT: ICD-10-CM

## 2019-12-23 RX ORDER — OXYCODONE AND ACETAMINOPHEN 10; 325 MG/1; MG/1
1 TABLET ORAL EVERY 4 HOURS PRN
Qty: 120 TABLET | Refills: 0 | Status: SHIPPED | OUTPATIENT
Start: 2019-12-23 | End: 2020-01-22 | Stop reason: SDUPTHER

## 2020-01-07 ENCOUNTER — OFFICE VISIT (OUTPATIENT)
Dept: FAMILY MEDICINE CLINIC | Facility: CLINIC | Age: 75
End: 2020-01-07
Payer: COMMERCIAL

## 2020-01-07 VITALS
WEIGHT: 198 LBS | SYSTOLIC BLOOD PRESSURE: 122 MMHG | OXYGEN SATURATION: 94 % | HEIGHT: 67 IN | TEMPERATURE: 97.7 F | HEART RATE: 78 BPM | DIASTOLIC BLOOD PRESSURE: 70 MMHG | RESPIRATION RATE: 15 BRPM | BODY MASS INDEX: 31.08 KG/M2

## 2020-01-07 DIAGNOSIS — I10 BENIGN ESSENTIAL HYPERTENSION: ICD-10-CM

## 2020-01-07 DIAGNOSIS — E78.5 HYPERLIPIDEMIA, UNSPECIFIED HYPERLIPIDEMIA TYPE: ICD-10-CM

## 2020-01-07 DIAGNOSIS — L40.50 PSORIATIC ARTHRITIS (HCC): ICD-10-CM

## 2020-01-07 DIAGNOSIS — G89.4 CHRONIC PAIN SYNDROME: Primary | ICD-10-CM

## 2020-01-07 PROCEDURE — 1101F PT FALLS ASSESS-DOCD LE1/YR: CPT | Performed by: FAMILY MEDICINE

## 2020-01-07 PROCEDURE — 1160F RVW MEDS BY RX/DR IN RCRD: CPT | Performed by: FAMILY MEDICINE

## 2020-01-07 PROCEDURE — 3008F BODY MASS INDEX DOCD: CPT | Performed by: FAMILY MEDICINE

## 2020-01-07 PROCEDURE — 3288F FALL RISK ASSESSMENT DOCD: CPT | Performed by: FAMILY MEDICINE

## 2020-01-07 PROCEDURE — 3074F SYST BP LT 130 MM HG: CPT | Performed by: FAMILY MEDICINE

## 2020-01-07 PROCEDURE — 1036F TOBACCO NON-USER: CPT | Performed by: FAMILY MEDICINE

## 2020-01-07 PROCEDURE — 3078F DIAST BP <80 MM HG: CPT | Performed by: FAMILY MEDICINE

## 2020-01-07 PROCEDURE — 99214 OFFICE O/P EST MOD 30 MIN: CPT | Performed by: FAMILY MEDICINE

## 2020-01-07 PROCEDURE — 3725F SCREEN DEPRESSION PERFORMED: CPT | Performed by: FAMILY MEDICINE

## 2020-01-07 RX ORDER — ATORVASTATIN CALCIUM 20 MG/1
20 TABLET, FILM COATED ORAL DAILY
Qty: 90 TABLET | Refills: 1 | Status: SHIPPED | OUTPATIENT
Start: 2020-01-07 | End: 2020-07-07 | Stop reason: SDUPTHER

## 2020-01-07 NOTE — PROGRESS NOTES
Assessment/Plan:     Diagnoses and all orders for this visit:    Chronic pain syndrome    Psoriatic arthritis ([address] )    Benign essential hypertension    Hyperlipidemia, unspecified hyperlipidemia type  -     atorvastatin (LIPITOR) 20 mg tablet; Take 1 tablet (20 mg total) by mouth daily      patient had a long conversation about his pain management  His pain is worsening despite heavy doses of narcotic pain medicine that he has been on for decades  I will discuss with his rheumatologist any ideas for better managing his pain but will keep his current regimen stable at this time  His other chronic conditions are stable and his labs were reviewed and are quite good  He can follow up with me in 6 months or sooner if      Subjective:     Chief Complaint   Patient presents with    Follow-up        Patient ID: Delicia Cervantes is a 69 y o  male  Here for 6 month checkup  He has no acute complaints today  He does state that his pain is getting worse  Is mostly in his joints  He started consent ticks and that has helped minimally but he has shown some improvement with that  He brought lab work with him that was      The following portions of the patient's history were reviewed and updated as appropriate: allergies, current medications, past family history, past medical history, past social history, past surgical history and problem list     Review of Systems   Constitutional: Negative  HENT: Negative  Eyes: Negative  Respiratory: Negative  Cardiovascular: Negative  Gastrointestinal: Negative  Endocrine: Negative  Genitourinary: Negative  Musculoskeletal: Positive for arthralgias and myalgias  Skin: Negative  Allergic/Immunologic: Negative  Neurological: Negative  Hematological: Negative  Psychiatric/Behavioral: Negative  All other systems reviewed and are negative          Objective:    Vitals:    01/07/20 1321   BP: 122/70   BP Location: Left arm   Patient Position: Sitting Cuff Size: Standard   Pulse: 78   Resp: 15   Temp: 97 7 °F (36 5 °C)   TempSrc: Tympanic   SpO2: 94%   Weight: 89 8 kg (198 lb)   Height: 5' 7" (1 702 m)          Physical Exam   Constitutional: He is oriented to person, place, and time  He appears well-developed and well-nourished  No distress  HENT:   Head: Normocephalic  Right Ear: External ear normal    Left Ear: External ear normal    Nose: Nose normal    Mouth/Throat: Oropharynx is clear and moist    Eyes: Pupils are equal, round, and reactive to light  Conjunctivae and EOM are normal  Right eye exhibits no discharge  Left eye exhibits no discharge  Neck: Normal range of motion  Cardiovascular: Normal rate, regular rhythm and normal heart sounds  Pulmonary/Chest: Effort normal and breath sounds normal    Abdominal: Soft  Bowel sounds are normal  He exhibits no distension  There is no tenderness  Musculoskeletal: Normal range of motion  He exhibits no tenderness (o major joints)  Arthritic changes to major joints   Neurological: He is alert and oriented to person, place, and time  No cranial nerve deficit  Skin: Skin is warm and dry  No rash noted  Psychiatric: He has a normal mood and affect  His behavior is normal  Judgment and thought content normal    Nursing note and vitals reviewed

## 2020-01-14 DIAGNOSIS — R19.5 POSITIVE COLORECTAL CANCER SCREENING USING COLOGUARD TEST: Primary | ICD-10-CM

## 2020-01-14 DIAGNOSIS — M48.061 SPINAL STENOSIS OF LUMBAR REGION, UNSPECIFIED WHETHER NEUROGENIC CLAUDICATION PRESENT: ICD-10-CM

## 2020-01-14 RX ORDER — OXYCODONE HYDROCHLORIDE 10 MG/1
TABLET ORAL
Qty: 120 TABLET | Refills: 0 | Status: SHIPPED | OUTPATIENT
Start: 2020-01-14 | End: 2020-02-13 | Stop reason: SDUPTHER

## 2020-01-22 DIAGNOSIS — M48.061 SPINAL STENOSIS OF LUMBAR REGION, UNSPECIFIED WHETHER NEUROGENIC CLAUDICATION PRESENT: ICD-10-CM

## 2020-01-22 RX ORDER — OXYCODONE AND ACETAMINOPHEN 10; 325 MG/1; MG/1
1 TABLET ORAL EVERY 4 HOURS PRN
Qty: 120 TABLET | Refills: 0 | Status: SHIPPED | OUTPATIENT
Start: 2020-01-22 | End: 2020-02-25 | Stop reason: SDUPTHER

## 2020-02-06 DIAGNOSIS — M54.9 CHRONIC BACK PAIN, UNSPECIFIED BACK LOCATION, UNSPECIFIED BACK PAIN LATERALITY: ICD-10-CM

## 2020-02-06 DIAGNOSIS — G89.29 CHRONIC BACK PAIN, UNSPECIFIED BACK LOCATION, UNSPECIFIED BACK PAIN LATERALITY: ICD-10-CM

## 2020-02-06 RX ORDER — CARISOPRODOL 350 MG/1
350 TABLET ORAL 4 TIMES DAILY
Qty: 360 TABLET | Refills: 0 | Status: SHIPPED | OUTPATIENT
Start: 2020-02-06 | End: 2020-05-04 | Stop reason: SDUPTHER

## 2020-02-13 DIAGNOSIS — M48.061 SPINAL STENOSIS OF LUMBAR REGION, UNSPECIFIED WHETHER NEUROGENIC CLAUDICATION PRESENT: ICD-10-CM

## 2020-02-13 RX ORDER — OXYCODONE HYDROCHLORIDE 10 MG/1
TABLET ORAL
Qty: 30 TABLET | Refills: 0 | Status: SHIPPED | OUTPATIENT
Start: 2020-02-13 | End: 2020-02-20 | Stop reason: SDUPTHER

## 2020-02-20 DIAGNOSIS — M48.061 SPINAL STENOSIS OF LUMBAR REGION, UNSPECIFIED WHETHER NEUROGENIC CLAUDICATION PRESENT: ICD-10-CM

## 2020-02-20 RX ORDER — OXYCODONE HYDROCHLORIDE 10 MG/1
TABLET ORAL
Qty: 120 TABLET | Refills: 0 | Status: SHIPPED | OUTPATIENT
Start: 2020-02-20 | End: 2020-03-18 | Stop reason: SDUPTHER

## 2020-02-25 DIAGNOSIS — M48.061 SPINAL STENOSIS OF LUMBAR REGION, UNSPECIFIED WHETHER NEUROGENIC CLAUDICATION PRESENT: ICD-10-CM

## 2020-02-25 RX ORDER — OXYCODONE AND ACETAMINOPHEN 10; 325 MG/1; MG/1
1 TABLET ORAL EVERY 4 HOURS PRN
Qty: 120 TABLET | Refills: 0 | Status: SHIPPED | OUTPATIENT
Start: 2020-02-25 | End: 2020-03-24 | Stop reason: SDUPTHER

## 2020-02-28 ENCOUNTER — TELEPHONE (OUTPATIENT)
Dept: FAMILY MEDICINE CLINIC | Facility: CLINIC | Age: 75
End: 2020-02-28

## 2020-02-28 NOTE — TELEPHONE ENCOUNTER
PT WIFE IS SICK WITH COUGH AND FEVER REBECCA BUT HE IS CALLING FOR HIMSELF HE CALLED [facility] RHEUMATOLOGIST AND IS WAITING FOR HIS CALL IS THE THESE SOMETHING HE CAN DO SO HE DOES NOT GET SICK CALL [phone] ANY TIME

## 2020-03-09 DIAGNOSIS — I10 BENIGN ESSENTIAL HYPERTENSION: ICD-10-CM

## 2020-03-09 RX ORDER — LOSARTAN POTASSIUM 50 MG/1
50 TABLET ORAL DAILY
Qty: 90 TABLET | Refills: 3 | Status: SHIPPED | OUTPATIENT
Start: 2020-03-09 | End: 2020-06-08

## 2020-03-09 NOTE — TELEPHONE ENCOUNTER
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

## 2020-03-10 ENCOUNTER — TELEPHONE (OUTPATIENT)
Dept: FAMILY MEDICINE CLINIC | Facility: CLINIC | Age: 75
End: 2020-03-10

## 2020-03-10 NOTE — TELEPHONE ENCOUNTER
Patient called to let you know that he has a cough  I offered him an appt, but he declined since he is on an antiinflammatory medication that lowers his immune system  He asked me to let you know, and if you want to see him, then he will make an appt  [phone]  Thank you

## 2020-03-11 ENCOUNTER — TELEPHONE (OUTPATIENT)
Dept: FAMILY MEDICINE CLINIC | Facility: CLINIC | Age: 75
End: 2020-03-11

## 2020-03-18 DIAGNOSIS — M48.061 SPINAL STENOSIS OF LUMBAR REGION, UNSPECIFIED WHETHER NEUROGENIC CLAUDICATION PRESENT: ICD-10-CM

## 2020-03-18 RX ORDER — OXYCODONE HYDROCHLORIDE 10 MG/1
TABLET ORAL
Qty: 120 TABLET | Refills: 0 | Status: SHIPPED | OUTPATIENT
Start: 2020-03-18 | End: 2020-04-20 | Stop reason: SDUPTHER

## 2020-03-24 DIAGNOSIS — M48.061 SPINAL STENOSIS OF LUMBAR REGION, UNSPECIFIED WHETHER NEUROGENIC CLAUDICATION PRESENT: ICD-10-CM

## 2020-03-24 RX ORDER — OXYCODONE AND ACETAMINOPHEN 10; 325 MG/1; MG/1
1 TABLET ORAL EVERY 4 HOURS PRN
Qty: 120 TABLET | Refills: 0 | Status: SHIPPED | OUTPATIENT
Start: 2020-03-24 | End: 2020-04-24 | Stop reason: SDUPTHER

## 2020-04-01 DIAGNOSIS — B36.9 FUNGAL DERMATITIS: ICD-10-CM

## 2020-04-01 RX ORDER — KETOCONAZOLE 20 MG/G
CREAM TOPICAL
Qty: 30 G | Refills: 0 | OUTPATIENT
Start: 2020-04-01

## 2020-04-20 DIAGNOSIS — M48.061 SPINAL STENOSIS OF LUMBAR REGION, UNSPECIFIED WHETHER NEUROGENIC CLAUDICATION PRESENT: ICD-10-CM

## 2020-04-20 RX ORDER — OXYCODONE HYDROCHLORIDE 10 MG/1
TABLET ORAL
Qty: 120 TABLET | Refills: 0 | Status: SHIPPED | OUTPATIENT
Start: 2020-04-20 | End: 2020-05-20 | Stop reason: SDUPTHER

## 2020-04-24 DIAGNOSIS — M48.061 SPINAL STENOSIS OF LUMBAR REGION, UNSPECIFIED WHETHER NEUROGENIC CLAUDICATION PRESENT: ICD-10-CM

## 2020-04-24 RX ORDER — OXYCODONE AND ACETAMINOPHEN 10; 325 MG/1; MG/1
1 TABLET ORAL EVERY 4 HOURS PRN
Qty: 120 TABLET | Refills: 0 | Status: SHIPPED | OUTPATIENT
Start: 2020-04-24 | End: 2020-05-29 | Stop reason: SDUPTHER

## 2020-05-04 DIAGNOSIS — M54.9 CHRONIC BACK PAIN, UNSPECIFIED BACK LOCATION, UNSPECIFIED BACK PAIN LATERALITY: ICD-10-CM

## 2020-05-04 DIAGNOSIS — G89.29 CHRONIC BACK PAIN, UNSPECIFIED BACK LOCATION, UNSPECIFIED BACK PAIN LATERALITY: ICD-10-CM

## 2020-05-04 RX ORDER — CARISOPRODOL 350 MG/1
350 TABLET ORAL 4 TIMES DAILY
Qty: 360 TABLET | Refills: 1 | Status: SHIPPED | OUTPATIENT
Start: 2020-05-04 | End: 2020-10-30 | Stop reason: SDUPTHER

## 2020-05-13 ENCOUNTER — OFFICE VISIT (OUTPATIENT)
Dept: GASTROENTEROLOGY | Facility: CLINIC | Age: 75
End: 2020-05-13
Payer: COMMERCIAL

## 2020-05-13 VITALS
WEIGHT: 194 LBS | DIASTOLIC BLOOD PRESSURE: 82 MMHG | BODY MASS INDEX: 30.45 KG/M2 | SYSTOLIC BLOOD PRESSURE: 126 MMHG | HEIGHT: 67 IN

## 2020-05-13 DIAGNOSIS — L40.50 PSORIATIC ARTHRITIS (HCC): ICD-10-CM

## 2020-05-13 DIAGNOSIS — Z79.899 LONG TERM CURRENT USE OF IMMUNOSUPPRESSIVE DRUG: ICD-10-CM

## 2020-05-13 DIAGNOSIS — Z95.3 STATUS POST AORTIC VALVE REPLACEMENT WITH BIOPROSTHETIC VALVE: Primary | ICD-10-CM

## 2020-05-13 DIAGNOSIS — R19.5 POSITIVE COLORECTAL CANCER SCREENING USING COLOGUARD TEST: ICD-10-CM

## 2020-05-13 DIAGNOSIS — Z79.891 LONG TERM (CURRENT) USE OF OPIATE ANALGESIC: ICD-10-CM

## 2020-05-13 PROCEDURE — 1160F RVW MEDS BY RX/DR IN RCRD: CPT | Performed by: INTERNAL MEDICINE

## 2020-05-13 PROCEDURE — 99203 OFFICE O/P NEW LOW 30 MIN: CPT | Performed by: INTERNAL MEDICINE

## 2020-05-14 DIAGNOSIS — Z12.11 COLON CANCER SCREENING: Primary | ICD-10-CM

## 2020-05-20 DIAGNOSIS — M48.061 SPINAL STENOSIS OF LUMBAR REGION, UNSPECIFIED WHETHER NEUROGENIC CLAUDICATION PRESENT: ICD-10-CM

## 2020-05-20 RX ORDER — OXYCODONE HYDROCHLORIDE 10 MG/1
TABLET ORAL
Qty: 120 TABLET | Refills: 0 | Status: SHIPPED | OUTPATIENT
Start: 2020-05-20 | End: 2020-06-22 | Stop reason: SDUPTHER

## 2020-05-26 ENCOUNTER — TELEPHONE (OUTPATIENT)
Dept: GASTROENTEROLOGY | Facility: CLINIC | Age: 75
End: 2020-05-26

## 2020-05-26 DIAGNOSIS — Z11.59 SCREENING FOR VIRAL DISEASE: ICD-10-CM

## 2020-05-26 PROCEDURE — U0003 INFECTIOUS AGENT DETECTION BY NUCLEIC ACID (DNA OR RNA); SEVERE ACUTE RESPIRATORY SYNDROME CORONAVIRUS 2 (SARS-COV-2) (CORONAVIRUS DISEASE [COVID-19]), AMPLIFIED PROBE TECHNIQUE, MAKING USE OF HIGH THROUGHPUT TECHNOLOGIES AS DESCRIBED BY CMS-2020-01-R: HCPCS

## 2020-05-29 DIAGNOSIS — M48.061 SPINAL STENOSIS OF LUMBAR REGION, UNSPECIFIED WHETHER NEUROGENIC CLAUDICATION PRESENT: ICD-10-CM

## 2020-05-29 LAB — SARS-COV-2 RNA SPEC QL NAA+PROBE: NOT DETECTED

## 2020-05-29 RX ORDER — OXYCODONE AND ACETAMINOPHEN 10; 325 MG/1; MG/1
1 TABLET ORAL EVERY 4 HOURS PRN
Qty: 120 TABLET | Refills: 0 | Status: SHIPPED | OUTPATIENT
Start: 2020-05-29 | End: 2020-06-29 | Stop reason: SDUPTHER

## 2020-06-02 ENCOUNTER — ANESTHESIA EVENT (OUTPATIENT)
Dept: GASTROENTEROLOGY | Facility: AMBULATORY SURGERY CENTER | Age: 75
End: 2020-06-02

## 2020-06-02 ENCOUNTER — ANESTHESIA (OUTPATIENT)
Dept: GASTROENTEROLOGY | Facility: AMBULATORY SURGERY CENTER | Age: 75
End: 2020-06-02

## 2020-06-02 ENCOUNTER — HOSPITAL ENCOUNTER (OUTPATIENT)
Dept: GASTROENTEROLOGY | Facility: AMBULATORY SURGERY CENTER | Age: 75
Discharge: HOME | End: 2020-06-02
Payer: COMMERCIAL

## 2020-06-02 VITALS
HEART RATE: 59 BPM | RESPIRATION RATE: 16 BRPM | TEMPERATURE: 97.1 F | OXYGEN SATURATION: 96 % | DIASTOLIC BLOOD PRESSURE: 63 MMHG | SYSTOLIC BLOOD PRESSURE: 125 MMHG

## 2020-06-02 DIAGNOSIS — R19.5 POSITIVE COLORECTAL CANCER SCREENING USING COLOGUARD TEST: ICD-10-CM

## 2020-06-02 PROCEDURE — 45378 DIAGNOSTIC COLONOSCOPY: CPT | Performed by: INTERNAL MEDICINE

## 2020-06-02 RX ORDER — SODIUM CHLORIDE 9 MG/ML
50 INJECTION, SOLUTION INTRAVENOUS CONTINUOUS
Status: DISCONTINUED | OUTPATIENT
Start: 2020-06-02 | End: 2020-06-06 | Stop reason: HOSPADM

## 2020-06-02 RX ORDER — PROPOFOL 10 MG/ML
INJECTION, EMULSION INTRAVENOUS AS NEEDED
Status: DISCONTINUED | OUTPATIENT
Start: 2020-06-02 | End: 2020-06-02 | Stop reason: SURG

## 2020-06-02 RX ADMIN — PROPOFOL 50 MG: 10 INJECTION, EMULSION INTRAVENOUS at 14:05

## 2020-06-02 RX ADMIN — SODIUM CHLORIDE 50 ML/HR: 9 INJECTION, SOLUTION INTRAVENOUS at 13:51

## 2020-06-02 RX ADMIN — PROPOFOL 50 MG: 10 INJECTION, EMULSION INTRAVENOUS at 14:10

## 2020-06-02 RX ADMIN — PROPOFOL 50 MG: 10 INJECTION, EMULSION INTRAVENOUS at 14:17

## 2020-06-02 RX ADMIN — PROPOFOL 100 MG: 10 INJECTION, EMULSION INTRAVENOUS at 13:57

## 2020-06-02 RX ADMIN — PROPOFOL 50 MG: 10 INJECTION, EMULSION INTRAVENOUS at 14:08

## 2020-06-02 RX ADMIN — PROPOFOL 50 MG: 10 INJECTION, EMULSION INTRAVENOUS at 14:14

## 2020-06-05 DIAGNOSIS — B36.9 FUNGAL DERMATITIS: ICD-10-CM

## 2020-06-05 DIAGNOSIS — I10 BENIGN ESSENTIAL HYPERTENSION: ICD-10-CM

## 2020-06-05 RX ORDER — KETOCONAZOLE 20 MG/G
CREAM TOPICAL 2 TIMES DAILY
Qty: 30 G | Refills: 1 | Status: SHIPPED | OUTPATIENT
Start: 2020-06-05 | End: 2022-04-25 | Stop reason: SDUPTHER

## 2020-06-05 RX ORDER — KETOCONAZOLE 20 MG/G
CREAM TOPICAL
Qty: 30 G | Refills: 0 | OUTPATIENT
Start: 2020-06-05

## 2020-06-08 RX ORDER — LOSARTAN POTASSIUM 50 MG/1
TABLET ORAL
Qty: 90 TABLET | Refills: 3 | Status: SHIPPED | OUTPATIENT
Start: 2020-06-08 | End: 2021-06-07

## 2020-06-10 DIAGNOSIS — G47.00 INSOMNIA, UNSPECIFIED TYPE: ICD-10-CM

## 2020-06-10 RX ORDER — TRAZODONE HYDROCHLORIDE 100 MG/1
TABLET ORAL
Qty: 360 TABLET | Refills: 1 | Status: SHIPPED | OUTPATIENT
Start: 2020-06-10 | End: 2020-12-01 | Stop reason: SDUPTHER

## 2020-06-15 ENCOUNTER — TELEPHONE (OUTPATIENT)
Dept: CARDIOLOGY CLINIC | Facility: CLINIC | Age: 75
End: 2020-06-15

## 2020-06-16 ENCOUNTER — OFFICE VISIT (OUTPATIENT)
Dept: CARDIOLOGY CLINIC | Facility: CLINIC | Age: 75
End: 2020-06-16
Payer: COMMERCIAL

## 2020-06-16 VITALS
HEIGHT: 67 IN | SYSTOLIC BLOOD PRESSURE: 120 MMHG | HEART RATE: 60 BPM | BODY MASS INDEX: 30.92 KG/M2 | TEMPERATURE: 97.4 F | DIASTOLIC BLOOD PRESSURE: 72 MMHG | WEIGHT: 197 LBS

## 2020-06-16 DIAGNOSIS — E78.2 MIXED HYPERLIPIDEMIA: ICD-10-CM

## 2020-06-16 DIAGNOSIS — I25.10 ATHEROSCLEROSIS OF NATIVE CORONARY ARTERY OF NATIVE HEART WITHOUT ANGINA PECTORIS: Primary | ICD-10-CM

## 2020-06-16 DIAGNOSIS — I10 BENIGN ESSENTIAL HYPERTENSION: ICD-10-CM

## 2020-06-16 DIAGNOSIS — Z95.3 STATUS POST AORTIC VALVE REPLACEMENT WITH BIOPROSTHETIC VALVE: ICD-10-CM

## 2020-06-16 PROBLEM — J95.89 ACUTE POSTOPERATIVE RESPIRATORY INSUFFICIENCY: Status: RESOLVED | Noted: 2017-10-18 | Resolved: 2020-06-16

## 2020-06-16 PROCEDURE — 3008F BODY MASS INDEX DOCD: CPT | Performed by: INTERNAL MEDICINE

## 2020-06-16 PROCEDURE — 99215 OFFICE O/P EST HI 40 MIN: CPT | Performed by: INTERNAL MEDICINE

## 2020-06-16 PROCEDURE — 1160F RVW MEDS BY RX/DR IN RCRD: CPT | Performed by: INTERNAL MEDICINE

## 2020-06-16 PROCEDURE — 4040F PNEUMOC VAC/ADMIN/RCVD: CPT | Performed by: INTERNAL MEDICINE

## 2020-06-16 PROCEDURE — 1036F TOBACCO NON-USER: CPT | Performed by: INTERNAL MEDICINE

## 2020-06-16 PROCEDURE — 3078F DIAST BP <80 MM HG: CPT | Performed by: INTERNAL MEDICINE

## 2020-06-16 PROCEDURE — 3074F SYST BP LT 130 MM HG: CPT | Performed by: INTERNAL MEDICINE

## 2020-06-22 DIAGNOSIS — M48.061 SPINAL STENOSIS OF LUMBAR REGION, UNSPECIFIED WHETHER NEUROGENIC CLAUDICATION PRESENT: ICD-10-CM

## 2020-06-22 RX ORDER — OXYCODONE HYDROCHLORIDE 10 MG/1
TABLET ORAL
Qty: 120 TABLET | Refills: 0 | Status: SHIPPED | OUTPATIENT
Start: 2020-06-22 | End: 2020-07-16 | Stop reason: SDUPTHER

## 2020-06-29 DIAGNOSIS — M48.061 SPINAL STENOSIS OF LUMBAR REGION, UNSPECIFIED WHETHER NEUROGENIC CLAUDICATION PRESENT: ICD-10-CM

## 2020-06-29 RX ORDER — OXYCODONE AND ACETAMINOPHEN 10; 325 MG/1; MG/1
1 TABLET ORAL EVERY 4 HOURS PRN
Qty: 120 TABLET | Refills: 0 | Status: SHIPPED | OUTPATIENT
Start: 2020-06-29 | End: 2020-07-16 | Stop reason: SDUPTHER

## 2020-07-07 DIAGNOSIS — E78.5 HYPERLIPIDEMIA, UNSPECIFIED HYPERLIPIDEMIA TYPE: ICD-10-CM

## 2020-07-07 RX ORDER — ATORVASTATIN CALCIUM 20 MG/1
20 TABLET, FILM COATED ORAL DAILY
Qty: 90 TABLET | Refills: 1 | Status: SHIPPED | OUTPATIENT
Start: 2020-07-07 | End: 2021-01-05 | Stop reason: SDUPTHER

## 2020-07-14 PROBLEM — M05.9 RHEUMATOID ARTHRITIS WITH RHEUMATOID FACTOR, UNSPECIFIED (HCC): Status: ACTIVE | Noted: 2020-07-14

## 2020-07-16 ENCOUNTER — OFFICE VISIT (OUTPATIENT)
Dept: FAMILY MEDICINE CLINIC | Facility: CLINIC | Age: 75
End: 2020-07-16
Payer: COMMERCIAL

## 2020-07-16 VITALS
SYSTOLIC BLOOD PRESSURE: 130 MMHG | HEART RATE: 63 BPM | DIASTOLIC BLOOD PRESSURE: 84 MMHG | BODY MASS INDEX: 30.07 KG/M2 | OXYGEN SATURATION: 98 % | WEIGHT: 191.6 LBS | HEIGHT: 67 IN | TEMPERATURE: 96.3 F

## 2020-07-16 DIAGNOSIS — I73.9 CLAUDICATION (HCC): ICD-10-CM

## 2020-07-16 DIAGNOSIS — Z00.00 MEDICARE ANNUAL WELLNESS VISIT, SUBSEQUENT: Primary | ICD-10-CM

## 2020-07-16 DIAGNOSIS — M48.061 SPINAL STENOSIS OF LUMBAR REGION, UNSPECIFIED WHETHER NEUROGENIC CLAUDICATION PRESENT: ICD-10-CM

## 2020-07-16 DIAGNOSIS — R73.9 HYPERGLYCEMIA: ICD-10-CM

## 2020-07-16 DIAGNOSIS — Z12.5 SCREENING FOR PROSTATE CANCER: ICD-10-CM

## 2020-07-16 DIAGNOSIS — R53.83 FATIGUE, UNSPECIFIED TYPE: ICD-10-CM

## 2020-07-16 DIAGNOSIS — E78.2 MIXED HYPERLIPIDEMIA: ICD-10-CM

## 2020-07-16 PROBLEM — Z79.899 HIGH RISK MEDICATION USE: Status: ACTIVE | Noted: 2020-07-16

## 2020-07-16 PROCEDURE — 1125F AMNT PAIN NOTED PAIN PRSNT: CPT | Performed by: FAMILY MEDICINE

## 2020-07-16 PROCEDURE — 3079F DIAST BP 80-89 MM HG: CPT | Performed by: FAMILY MEDICINE

## 2020-07-16 PROCEDURE — 1160F RVW MEDS BY RX/DR IN RCRD: CPT | Performed by: FAMILY MEDICINE

## 2020-07-16 PROCEDURE — 3008F BODY MASS INDEX DOCD: CPT | Performed by: FAMILY MEDICINE

## 2020-07-16 PROCEDURE — 1036F TOBACCO NON-USER: CPT | Performed by: FAMILY MEDICINE

## 2020-07-16 PROCEDURE — 4040F PNEUMOC VAC/ADMIN/RCVD: CPT | Performed by: FAMILY MEDICINE

## 2020-07-16 PROCEDURE — G0439 PPPS, SUBSEQ VISIT: HCPCS | Performed by: FAMILY MEDICINE

## 2020-07-16 PROCEDURE — 1170F FXNL STATUS ASSESSED: CPT | Performed by: FAMILY MEDICINE

## 2020-07-16 PROCEDURE — 99214 OFFICE O/P EST MOD 30 MIN: CPT | Performed by: FAMILY MEDICINE

## 2020-07-16 PROCEDURE — 3075F SYST BP GE 130 - 139MM HG: CPT | Performed by: FAMILY MEDICINE

## 2020-07-16 RX ORDER — OXYCODONE HYDROCHLORIDE 10 MG/1
TABLET ORAL
Qty: 120 TABLET | Refills: 0 | Status: SHIPPED | OUTPATIENT
Start: 2020-07-16 | End: 2020-08-21 | Stop reason: SDUPTHER

## 2020-07-16 RX ORDER — OXYCODONE AND ACETAMINOPHEN 10; 325 MG/1; MG/1
1 TABLET ORAL EVERY 4 HOURS PRN
Qty: 120 TABLET | Refills: 0 | Status: SHIPPED | OUTPATIENT
Start: 2020-07-16 | End: 2020-08-19 | Stop reason: SDUPTHER

## 2020-07-16 NOTE — PATIENT INSTRUCTIONS
Obesity   AMBULATORY CARE:   Obesity  is when your body mass index (BMI) is greater than 30  Your healthcare provider will use your height and weight to measure your BMI  The risks of obesity include  many health problems, such as injuries or physical disability  You may need tests to check for the following:  · Diabetes     · High blood pressure or high cholesterol     · Heart disease     · Gallbladder or liver disease     · Cancer of the colon, breast, prostate, liver, or kidney     · Sleep apnea     · Arthritis or gout  Seek care immediately if:   · You have a severe headache, confusion, or difficulty speaking  · You have weakness on one side of your body  · You have chest pain, sweating, or shortness of breath  Contact your healthcare provider if:   · You have symptoms of gallbladder or liver disease, such as pain in your upper abdomen  · You have knee or hip pain and discomfort while walking  · You have symptoms of diabetes, such as intense hunger and thirst, and frequent urination  · You have symptoms of sleep apnea, such as snoring or daytime sleepiness  · You have questions or concerns about your condition or care  Treatment for obesity  focuses on helping you lose weight to improve your health  Even a small decrease in BMI can reduce the risk for many health problems  Your healthcare provider will help you set a weight-loss goal   · Lifestyle changes  are the first step in treating obesity  These include making healthy food choices and getting regular physical activity  Your healthcare provider may suggest a weight-loss program that involves coaching, education, and therapy  · Medicine  may help you lose weight when it is used with a healthy diet and physical activity  · Surgery  can help you lose weight if you are very obese and have other health problems  There are several types of weight-loss surgery  Ask your healthcare provider for more information    Be successful losing weight:   · Set small, realistic goals  An example of a small goal is to walk for 20 minutes 5 days a week  Anther goal is to lose 5% of your body weight  · Tell friends, family members, and coworkers about your goals  and ask for their support  Ask a friend to lose weight with you, or join a weight-loss support group  · Identify foods or triggers that may cause you to overeat , and find ways to avoid them  Remove tempting high-calorie foods from your home and workplace  Place a bowl of fresh fruit on your kitchen counter  If stress causes you to eat, then find other ways to cope with stress  · Keep a diary to track what you eat and drink  Also write down how many minutes of physical activity you do each day  Weigh yourself once a week and record it in your diary  Eating changes: You will need to eat 500 to 1,000 fewer calories each day than you currently eat to lose 1 to 2 pounds a week  The following changes will help you cut calories:  · Eat smaller portions  Use small plates, no larger than 9 inches in diameter  Fill your plate half full of fruits and vegetables  Measure your food using measuring cups until you know what a serving size looks like  · Eat 3 meals and 1 or 2 snacks each day  Plan your meals in advance  Nya Raymond and eat at home most of the time  Eat slowly  · Eat fruits and vegetables at every meal   They are low in calories and high in fiber, which makes you feel full  Do not add butter, margarine, or cream sauce to vegetables  Use herbs to season steamed vegetables  · Eat less fat and fewer fried foods  Eat more baked or grilled chicken and fish  These protein sources are lower in calories and fat than red meat  Limit fast food  Dress your salads with olive oil and vinegar instead of bottled dressing  · Limit the amount of sugar you eat  Do not drink sugary beverages  Limit alcohol  Activity changes:  Physical activity is good for your body in many ways   It helps you burn calories and build strong muscles  It decreases stress and depression, and improves your mood  It can also help you sleep better  Talk to your healthcare provider before you begin an exercise program   · Exercise for at least 30 minutes 5 days a week  Start slowly  Set aside time each day for physical activity that you enjoy and that is convenient for you  It is best to do both weight training and an activity that increases your heart rate, such as walking, bicycling, or swimming  · Find ways to be more active  Do yard work and housecleaning  Walk up the stairs instead of using elevators  Spend your leisure time going to events that require walking, such as outdoor festivals or fairs  This extra physical activity can help you lose weight and keep it off  Follow up with your healthcare provider as directed: You may need to meet with a dietitian  Write down your questions so you remember to ask them during your visits  © 2017 [address] [employer] Information is for End User's use only and may not be sold, redistributed or otherwise used for commercial purposes  All illustrations and images included in CareNotes® are the copyrighted property of [employer]  or [employer]  The above information is an  only  It is not intended as medical advice for individual conditions or treatments  Talk to your doctor, nurse or pharmacist before following any medical regimen to see if it is safe and effective for you  Weight Management   AMBULATORY CARE:   Why it is important to manage your weight:  Being overweight increases your risk of health conditions such as heart disease, high blood pressure, type 2 diabetes, and certain types of cancer  It can also increase your risk for osteoarthritis, sleep apnea, and other respiratory problems  Aim for a slow, steady weight loss  Even a small amount of weight loss can lower your risk of health problems    How to lose weight safely:  A safe and healthy way to lose weight is to eat fewer calories and get regular exercise  You can lose up about 1 pound a week by decreasing the number of calories you eat by 500 calories each day  You can decrease calories by eating smaller portion sizes or by cutting out high-calorie foods  Read labels to find out how many calories are in the foods you eat  You can also burn calories with exercise such as walking, swimming, or biking  You will be more likely to keep weight off if you make these changes part of your lifestyle  Healthy meal plan for weight management:  A healthy meal plan includes a variety of foods, contains fewer calories, and helps you stay healthy  A healthy meal plan includes the following:  · Eat whole-grain foods more often  A healthy meal plan should contain fiber  Fiber is the part of grains, fruits, and vegetables that is not broken down by your body  Whole-grain foods are healthy and provide extra fiber in your diet  Some examples of whole-grain foods are whole-wheat breads and pastas, oatmeal, brown rice, and bulgur  · Eat a variety of vegetables every day  Include dark, leafy greens such as spinach, kale, marquez greens, and mustard greens  Eat yellow and orange vegetables such as carrots, sweet potatoes, and winter squash  · Eat a variety of fruits every day  Choose fresh or canned fruit (canned in its own juice or light syrup) instead of juice  Fruit juice has very little or no fiber  · Eat low-fat dairy foods  Drink fat-free (skim) milk or 1% milk  Eat fat-free yogurt and low-fat cottage cheese  Try low-fat cheeses such as mozzarella and other reduced-fat cheeses  · Choose meat and other protein foods that are low in fat  Choose beans or other legumes such as split peas or lentils  Choose fish, skinless poultry (chicken or turkey), or lean cuts of red meat (beef or pork)  Before you cook meat or poultry, cut off any visible fat  · Use less fat and oil  Try baking foods instead of frying them  Add less fat, such as margarine, sour cream, regular salad dressing and mayonnaise to foods  Eat fewer high-fat foods  Some examples of high-fat foods include french fries, doughnuts, ice cream, and cakes  · Eat fewer sweets  Limit foods and drinks that are high in sugar  This includes candy, cookies, regular soda, and sweetened drinks  Ways to decrease calories:   · Eat smaller portions  ¨ Use a small plate with smaller servings  ¨ Do not eat second helpings  ¨ When you eat at a restaurant, ask for a box and place half of your meal in the box before you eat  ¨ Share an entrée with someone else  · Replace high-calorie snacks with healthy, low-calorie snacks  ¨ Choose fresh fruit, vegetables, fat-free rice cakes, or air-popped popcorn instead of potato chips, nuts, or chocolate  ¨ Choose water or calorie-free drinks instead of soda or sweetened drinks  · Eat regular meals  Skipping meals can lead to overeating later in the day  Eat a healthy snack in place of a meal if you do not have time to eat a regular meal      · Do not shop for groceries when you are hungry  You may be more likely to make unhealthy food choices  Take a grocery list of healthy foods and shop after you have eaten  Exercise:  Exercise at least 30 minutes per day on most days of the week  Some examples of exercise include walking, biking, dancing, and swimming  You can also fit in more physical activity by taking the stairs instead of the elevator or parking farther away from stores  Ask your healthcare provider about the best exercise plan for you  Other things to consider as you try to lose weight:   · Be aware of situations that may give you the urge to overeat, such as eating while watching television  Find ways to avoid these situations  For example, read a book, go for a walk, or do crafts      · Meet with a weight loss support group or friends who are also trying to lose weight  This may help you stay motivated to continue working on your weight loss goals  © 2017 [facility] Information is for End User's use only and may not be sold, redistributed or otherwise used for commercial purposes  All illustrations and images included in CareNotes® are the copyrighted property of [employer]  or [employer]  The above information is an  only  It is not intended as medical advice for individual conditions or treatments  Talk to your doctor, nurse or pharmacist before following any medical regimen to see if it is safe and effective for you

## 2020-07-16 NOTE — PROGRESS NOTES
Assessment/Plan:       Diagnoses and all orders for this visit:    Hyperglycemia  -     CBC and differential; Future  -     Comprehensive metabolic panel; Future  -     Hemoglobin A1c (w/out EAG); Future    Claudication (HCC)  -     VAS lower limb arterial duplex, complete bilateral; Future    Mixed hyperlipidemia    Fatigue, unspecified type  -     TSH, 3rd generation with Free T4 reflex; Future    Spinal stenosis of lumbar region, unspecified whether neurogenic claudication present  -     oxyCODONE (ROXICODONE) 10 MG TABS; Take 1 tablet 4 times daily  -     oxyCODONE-acetaminophen (PERCOCET)  mg per tablet; Take 1 tablet by mouth every 4 (four) hours as needed for moderate painMax Daily Amount: 6 tablets    Screening for prostate cancer  -     PSA, Total Screen; Future      overall patient is doing well  Will order arterial Dopplers  Meds refilled  Labs ordered  He will continue his chronic therapy  Will follow-up with rheumatology  He he follow-up with me in 6 months or sooner if needed      Subjective:     Chief Complaint   Patient presents with    Follow-up     Check up 6 months Hypertension        Patient ID: Chuyita Flores is a 70 y o  male  Patient is here for six-month checkup chronic conditions he reports having some pain in his legs and his legs are very very cold most the time even in the summer time  He has some cramping  With exertion  He follows with rheumatology and is on Cosentyx for his psoriatic arthritis  His pain is under control but still significant on his current pain medication  He needs refills      The following portions of the patient's history were reviewed and updated as appropriate: allergies, current medications, past family history, past medical history, past social history, past surgical history and problem list     Review of Systems   Constitutional: Negative  HENT: Negative  Eyes: Negative  Respiratory: Negative  Cardiovascular: Negative  Gastrointestinal: Negative  Endocrine: Negative  Genitourinary: Negative  Musculoskeletal: Positive for arthralgias, back pain, gait problem and myalgias  Skin: Negative  Allergic/Immunologic: Negative  Neurological: Positive for numbness  Hematological: Negative  Psychiatric/Behavioral: Negative  All other systems reviewed and are negative  Objective:    Vitals:    07/16/20 1001   BP: 130/84   BP Location: Left arm   Patient Position: Sitting   Cuff Size: Large   Pulse: 63   Temp: (!) 96 3 °F (35 7 °C)   TempSrc: Tympanic   SpO2: 98%   Weight: 86 9 kg (191 lb 9 6 oz)   Height: 5' 7" (1 702 m)          Physical Exam   Constitutional: He is oriented to person, place, and time  He appears well-developed and well-nourished  No distress  HENT:   Head: Normocephalic  Right Ear: External ear normal    Left Ear: External ear normal    Nose: Nose normal    Mouth/Throat: Oropharynx is clear and moist    Eyes: Pupils are equal, round, and reactive to light  Conjunctivae and EOM are normal  Right eye exhibits no discharge  Left eye exhibits no discharge  Neck: Normal range of motion  Cardiovascular: Normal rate, regular rhythm and normal heart sounds  Pulmonary/Chest: Effort normal and breath sounds normal    Abdominal: Soft  Bowel sounds are normal  He exhibits no distension  There is no tenderness  Musculoskeletal: Normal range of motion  Neurological: He is alert and oriented to person, place, and time  No cranial nerve deficit  Skin: Skin is warm and dry  No rash noted  Psychiatric: He has a normal mood and affect  His behavior is normal  Judgment and thought content normal    Nursing note and vitals reviewed  BMI Counseling: Body mass index is 30 01 kg/m²   The BMI is above normal  Nutrition recommendations include reducing portion sizes, decreasing overall calorie intake, 3-5 servings of fruits/vegetables daily, reducing fast food intake, consuming healthier snacks, decreasing soda and/or juice intake, moderation in carbohydrate intake, increasing intake of lean protein, reducing intake of saturated fat and trans fat and reducing intake of cholesterol  Exercise recommendations include exercising 3-5 times per week

## 2020-07-16 NOTE — PROGRESS NOTES
Assessment/Plan:     Diagnoses and all orders for this visit:    Medicare annual wellness visit, subsequent    Medicare wellness visit completed  See other note for acute chronic issues          Subjective:     CC: Medicare Wellness Visit       Patient ID: Zaheer Estrada is a 70 y o  male  Patient is here for Medicare wellness visit  See other note for acute and chronic issues      The following portions of the patient's history were reviewed and updated as appropriate: allergies, current medications, past family history, past medical history, past social history, past surgical history and problem list     Review of Systems   Constitutional: Negative  HENT: Negative  Eyes: Negative  Respiratory: Negative  Cardiovascular: Negative  Gastrointestinal: Negative  Endocrine: Negative  Genitourinary: Negative  Musculoskeletal: Negative  Skin: Negative  Allergic/Immunologic: Negative  Neurological: Negative  Hematological: Negative  Psychiatric/Behavioral: Negative  All other systems reviewed and are negative  Objective:    Vitals:    07/16/20 1001   BP: 130/84   BP Location: Left arm   Patient Position: Sitting   Cuff Size: Large   Pulse: 63   Temp: (!) 96 3 °F (35 7 °C)   TempSrc: Tympanic   SpO2: 98%   Weight: 86 9 kg (191 lb 9 6 oz)   Height: 5' 7" (1 702 m)          Physical Exam   Constitutional: He is oriented to person, place, and time  He appears well-developed and well-nourished  No distress  HENT:   Head: Normocephalic  Right Ear: External ear normal    Left Ear: External ear normal    Nose: Nose normal    Mouth/Throat: Oropharynx is clear and moist    Eyes: Pupils are equal, round, and reactive to light  Conjunctivae and EOM are normal  Right eye exhibits no discharge  Left eye exhibits no discharge  Neck: Normal range of motion  Cardiovascular: Normal rate, regular rhythm and normal heart sounds     Pulmonary/Chest: Effort normal and breath sounds normal    Abdominal: Soft  Bowel sounds are normal  He exhibits no distension  There is no tenderness  Musculoskeletal: Normal range of motion  Neurological: He is alert and oriented to person, place, and time  No cranial nerve deficit  Skin: Skin is warm and dry  No rash noted  Psychiatric: He has a normal mood and affect  His behavior is normal  Judgment and thought content normal    Nursing note and vitals reviewed  Assessment and Plan:     Problem List Items Addressed This Visit        Other    Hyperglycemia    Relevant Orders    CBC and differential    Comprehensive metabolic panel    Hemoglobin A1c (w/out EAG)    Hyperlipidemia      Other Visit Diagnoses     Medicare annual wellness visit, subsequent    -  Primary    Claudication ([facility] [address] )        Relevant Orders    VAS lower limb arterial duplex, complete bilateral    Fatigue, unspecified type        Relevant Orders    TSH, 3rd generation with Free T4 reflex    Spinal stenosis of lumbar region, unspecified whether neurogenic claudication present        Relevant Medications    oxyCODONE (ROXICODONE) 10 MG TABS    oxyCODONE-acetaminophen (PERCOCET)  mg per tablet    Screening for prostate cancer        Relevant Orders    PSA, Total Screen    BMI 30 0-30 9,adult            BMI Counseling: Body mass index is 30 01 kg/m²  The BMI is above normal  Nutrition recommendations include decreasing portion sizes, encouraging healthy choices of fruits and vegetables, decreasing fast food intake, consuming healthier snacks, limiting drinks that contain sugar, moderation in carbohydrate intake, increasing intake of lean protein, reducing intake of saturated and trans fat and reducing intake of cholesterol  Exercise recommendations include exercising 3-5 times per week  Preventive health issues were discussed with patient, and age appropriate screening tests were ordered as noted in patient's After Visit Summary    Personalized health advice and appropriate referrals for health education or preventive services given if needed, as noted in patient's After Visit Summary       History of Present Illness:     Patient presents for Medicare Annual Wellness visit    Patient Care Team:  Stanislaw Puente DO as PCP - MD Ace Armstrong MD Gaither Antu, MD     Problem List:     Patient Active Problem List   Diagnosis    Arthritis    Chronic pain    Chronic, continuous use of opioids    Hyperlipidemia    Benign essential hypertension    Insomnia    Peripheral neuropathy    Status post aortic valve replacement with bioprosthetic valve    Chronic fatigue    Atherosclerosis of native coronary artery of native heart without angina pectoris    Rheumatoid arthritis with rheumatoid factor, unspecified (HCC)    Allergic rhinitis    Myalgia    Arthralgia of lower leg    Atopic dermatitis    Carpal tunnel syndrome    H/O steroid therapy    High risk medication use    Hyperglycemia    Hypertension    Low back pain    Primary localized osteoarthrosis of shoulder region    Psoriasis    Testicular hypogonadism      Past Medical and Surgical History:     Past Medical History:   Diagnosis Date    Aortic stenosis     Aortic stenosis, severe     Arthritis     Carpal tunnel syndrome     Chronic back pain     Chronic pain     Chronic, continuous use of opioids     Hyperlipidemia     Hypertension     Hypertrophy of prostate     Insomnia     Peripheral neuropathy     RA (rheumatoid arthritis) ([address] )     Umbilical hernia      Past Surgical History:   Procedure Laterality Date    ADENOIDECTOMY      AORTIC VALVE REPLACEMENT  10/17/2017    BASAL CELL CARCINOMA EXCISION      CARDIAC CATHETERIZATION      LAST ASSESSED: 20OCT2017    CARPAL TUNNEL RELEASE Bilateral     left    CATARACT EXTRACTION      HERNIA REPAIR      inguinal bilateral -umbilical     LAMINECTOMY      DECOMPRESS, FACETECTOMY, FORAMINOTOMY LUMBAR SEG    NEUROPLASTY / TRANSPOSITION MEDIAN NERVE AT CARPAL TUNNEL      [place] RPLCMT AORTIC VALVE OPN W/STENTLESS TISSUE VALVE N/A 10/17/2017    Procedure: AORTIC VALVE REPLACEMENT with 23MM MAGNAEASE TISSUE VALVE; INTRA-OP SHANDRA;  Surgeon: Roselyn Zepeda MD;  [facility]: [facility];  Service: Cardiac Surgery    TONSILLECTOMY        Family History:     Family History   Problem Relation Age of Onset    Stroke Mother     Diabetes Mother     Hypertension Mother     Heart disease Father     Hypertension Father     Heart attack Father     Coronary artery disease Family     Colon polyps Neg Hx     Colon cancer Neg Hx       Social History:     E-Cigarette/Vaping    E-Cigarette Use Never User      E-Cigarette/Vaping Substances    Nicotine No     THC No     CBD No     Flavoring No     Other No     Unknown No      Social History     Socioeconomic History    Marital status: /Civil Union     Spouse name: None    Number of children: None    Years of education: None    Highest education level: None   Occupational History    Occupation: Retired   Social Needs    Financial resource strain: Not very hard    Food insecurity:     Worry: Never true     Inability: Never true    Transportation needs:     Medical: No     Non-medical: No   Tobacco Use    Smoking status: Never Smoker    Smokeless tobacco: Never Used   Substance and Sexual Activity    Alcohol use: Not Currently     Frequency: Never     Binge frequency: Never    Drug use: No    Sexual activity: Yes   Lifestyle    Physical activity:     Days per week: 2 days     Minutes per session: 30 min    Stress: Not at all   Relationships    Social connections:     Talks on phone: More than three times a week     Gets together: Once a week     Attends [employer] service: More than 4 times per year     Active member of club or organization: No     Attends meetings of clubs or organizations: Never     Relationship status:     Intimate partner violence: Fear of current or ex partner: No     Emotionally abused: No     Physically abused: No     Forced sexual activity: No   Other Topics Concern    None   Social History Narrative    None      Medications and Allergies:     Current Outpatient Medications   Medication Sig Dispense Refill    aspirin 81 MG tablet Take 1 tablet (81 mg total) by mouth daily 90 tablet 2    atorvastatin (LIPITOR) 20 mg tablet Take 1 tablet (20 mg total) by mouth daily 90 tablet 1    betamethasone dipropionate (DIPROSONE) 0 05 % cream Apply topically      carisoprodol (SOMA) 350 mg tablet Take 1 tablet (350 mg total) by mouth 4 (four) times a day 360 tablet 1    Cholecalciferol (VITAMIN D3) 1000 units CAPS Take 1 tablet by mouth daily      diclofenac sodium (VOLTAREN) 1 % Apply 2 g topically 4 (four) times a day 5 Tube 3    fluocinonide (LIDEX) 0 05 % ointment Apply topically 2 (two) times a day      Gabapentin, Once-Daily, (GRALISE) 600 MG TABS TAKE 3 TABLETS BY MOUTH AT BEDTIME      ketoconazole (NIZORAL) 2 % cream Apply topically 2 (two) times a day 30 g 1    losartan (COZAAR) 50 mg tablet TAKE 1 TABLET BY MOUTH EVERY DAY 90 tablet 3    Milk Thistle 1000 MG CAPS Take 1 capsule by mouth daily      oxyCODONE (ROXICODONE) 10 MG TABS Take 1 tablet 4 times daily 120 tablet 0    oxyCODONE-acetaminophen (PERCOCET)  mg per tablet Take 1 tablet by mouth every 4 (four) hours as needed for moderate painMax Daily Amount: 6 tablets 120 tablet 0    Secukinumab 150 MG/ML SOAJ One injection subcutaneously at weeks 1,2,3,4 and 5 for loading dose then every 4 weeks thereafter      traZODone (DESYREL) 100 mg tablet Take 4 tablets at bedtime 360 tablet 1     No current facility-administered medications for this visit        Allergies   Allergen Reactions    Penicillins Anaphylaxis    Quinine Derivatives Other (See Comments)     High fever      Immunizations:     Immunization History   Administered Date(s) Administered    Influenza Split High Dose Preservative Free IM 10/09/2014    Pneumococcal Conjugate 13-Valent 07/02/2019    Pneumococcal Polysaccharide PPV23 05/22/2014    Zoster 03/23/2013      Health Maintenance:         Topic Date Due    Hepatitis C Screening  01/07/2019 (Originally 1945)    CRC Screening: Cologuard  01/06/2023         Topic Date Due    Influenza Vaccine  07/01/2020      Medicare Health Risk Assessment:     /84 (BP Location: Left arm, Patient Position: Sitting, Cuff Size: Large)   Pulse 63   Temp (!) 96 3 °F (35 7 °C) (Tympanic)   Ht 5' 7" (1 702 m)   Wt 86 9 kg (191 lb 9 6 oz)   SpO2 98%   BMI 30 01 kg/m²      Zuri Ibanez is here for his Subsequent Wellness visit  Last Medicare Wellness visit information reviewed, patient interviewed and updates made to the record today  Health Risk Assessment:   Patient rates overall health as good  Patient feels that their physical health rating is same  Eyesight was rated as same  Hearing was rated as same  Patient feels that their emotional and mental health rating is same  Pain experienced in the last 7 days has been a lot  Patient's pain rating has been 7/10  Patient states that he has experienced no weight loss or gain in last 6 months  Depression Screening:   PHQ-2 Score: 0      Fall Risk Screening: In the past year, patient has experienced: no history of falling in past year      Home Safety:  Patient does not have trouble with stairs inside or outside of their home  Patient has working smoke alarms and has working carbon monoxide detector  Home safety hazards include: none  Nutrition:   Current diet is Regular  Medications:   Patient is currently taking over-the-counter supplements  OTC medications include: see medication list  Patient is able to manage medications       Activities of Daily Living (ADLs)/Instrumental Activities of Daily Living (IADLs):   Walk and transfer into and out of bed and chair?: Yes  Dress and groom yourself?: Yes    Bathe or shower yourself?: Yes    Feed yourself? Yes  Do your laundry/housekeeping?: Yes  Manage your money, pay your bills and track your expenses?: Yes  Make your own meals?: Yes    Do your own shopping?: Yes    Previous Hospitalizations:   Any hospitalizations or ED visits within the last 12 months?: No      Advance Care Planning:   Living will: Yes    Durable POA for healthcare: Yes    Advanced directive: Yes    Advanced directive counseling given: Yes    Five wishes given: No    End of Life Decisions reviewed with patient: Yes    Provider agrees with end of life decisions: Yes      Cognitive Screening:   Provider or family/friend/caregiver concerned regarding cognition?: No    PREVENTIVE SCREENINGS      Cardiovascular Screening:    General: History Lipid Disorder and Screening Current      Diabetes Screening:     General: Screening Current and Screening Not Indicated      Colorectal Cancer Screening:     General: Screening Current      Prostate Cancer Screening:    General: Screening Current      Osteoporosis Screening:    General: Screening Not Indicated      Abdominal Aortic Aneurysm (AAA) Screening:    Risk factors include: age between 73-67 yo        General: Screening Not Indicated      Lung Cancer Screening:     General: Screening Not Indicated      Hepatitis C Screening:    General: Screening Current    Other Counseling Topics:   Alcohol use counseling, car/seat belt/driving safety, skin self-exam, sunscreen and calcium and vitamin D intake and regular weightbearing exercise         Telly Page, DO

## 2020-08-19 DIAGNOSIS — M48.061 SPINAL STENOSIS OF LUMBAR REGION, UNSPECIFIED WHETHER NEUROGENIC CLAUDICATION PRESENT: ICD-10-CM

## 2020-08-19 RX ORDER — OXYCODONE AND ACETAMINOPHEN 10; 325 MG/1; MG/1
1 TABLET ORAL EVERY 4 HOURS PRN
Qty: 120 TABLET | Refills: 0 | Status: SHIPPED | OUTPATIENT
Start: 2020-08-19 | End: 2020-09-17 | Stop reason: SDUPTHER

## 2020-08-21 DIAGNOSIS — M48.061 SPINAL STENOSIS OF LUMBAR REGION, UNSPECIFIED WHETHER NEUROGENIC CLAUDICATION PRESENT: ICD-10-CM

## 2020-08-21 NOTE — TELEPHONE ENCOUNTER
Patient calling to report that he was calling to refill "the other Oxycodone", as the wrong one was filled when he last called       [facility], [place]

## 2020-08-23 RX ORDER — OXYCODONE HYDROCHLORIDE 10 MG/1
TABLET ORAL
Qty: 120 TABLET | Refills: 0 | Status: SHIPPED | OUTPATIENT
Start: 2020-08-23 | End: 2020-09-21 | Stop reason: SDUPTHER

## 2020-09-17 DIAGNOSIS — M48.061 SPINAL STENOSIS OF LUMBAR REGION, UNSPECIFIED WHETHER NEUROGENIC CLAUDICATION PRESENT: ICD-10-CM

## 2020-09-18 RX ORDER — OXYCODONE AND ACETAMINOPHEN 10; 325 MG/1; MG/1
1 TABLET ORAL EVERY 4 HOURS PRN
Qty: 120 TABLET | Refills: 0 | Status: SHIPPED | OUTPATIENT
Start: 2020-09-18 | End: 2020-10-19 | Stop reason: SDUPTHER

## 2020-09-21 DIAGNOSIS — M48.061 SPINAL STENOSIS OF LUMBAR REGION, UNSPECIFIED WHETHER NEUROGENIC CLAUDICATION PRESENT: ICD-10-CM

## 2020-09-21 RX ORDER — OXYCODONE HYDROCHLORIDE 10 MG/1
TABLET ORAL
Qty: 120 TABLET | Refills: 0 | Status: SHIPPED | OUTPATIENT
Start: 2020-09-21 | End: 2020-10-22 | Stop reason: SDUPTHER

## 2020-10-15 ENCOUNTER — HOSPITAL ENCOUNTER (OUTPATIENT)
Dept: NON INVASIVE DIAGNOSTICS | Facility: HOSPITAL | Age: 75
Discharge: HOME | End: 2020-10-15
Payer: COMMERCIAL

## 2020-10-15 ENCOUNTER — TRANSCRIBE ORDERS (OUTPATIENT)
Dept: ADMINISTRATIVE | Facility: HOSPITAL | Age: 75
End: 2020-10-15

## 2020-10-15 ENCOUNTER — LAB (OUTPATIENT)
Dept: LAB | Facility: HOSPITAL | Age: 75
End: 2020-10-15
Payer: COMMERCIAL

## 2020-10-15 DIAGNOSIS — L40.50 PSORIATIC ARTHRITIS (HCC): ICD-10-CM

## 2020-10-15 DIAGNOSIS — I73.9 CLAUDICATION (HCC): ICD-10-CM

## 2020-10-15 DIAGNOSIS — R73.9 HYPERGLYCEMIA: ICD-10-CM

## 2020-10-15 DIAGNOSIS — R53.83 FATIGUE, UNSPECIFIED TYPE: ICD-10-CM

## 2020-10-15 DIAGNOSIS — Z79.899 ENCOUNTER FOR LONG-TERM (CURRENT) USE OF OTHER MEDICATIONS: ICD-10-CM

## 2020-10-15 DIAGNOSIS — Z79.899 ENCOUNTER FOR LONG-TERM (CURRENT) USE OF OTHER MEDICATIONS: Primary | ICD-10-CM

## 2020-10-15 DIAGNOSIS — Z12.5 SCREENING FOR PROSTATE CANCER: ICD-10-CM

## 2020-10-15 LAB
ALBUMIN SERPL BCP-MCNC: 4 G/DL (ref 3.5–5)
ALP SERPL-CCNC: 59 U/L (ref 46–116)
ALT SERPL W P-5'-P-CCNC: 32 U/L (ref 12–78)
ANION GAP SERPL CALCULATED.3IONS-SCNC: 9 MMOL/L (ref 4–13)
AST SERPL W P-5'-P-CCNC: 22 U/L (ref 5–45)
BASOPHILS # BLD AUTO: 0.04 THOUSANDS/ΜL (ref 0–0.1)
BASOPHILS NFR BLD AUTO: 0 % (ref 0–1)
BILIRUB SERPL-MCNC: 0.48 MG/DL (ref 0.2–1)
BUN SERPL-MCNC: 22 MG/DL (ref 5–25)
CALCIUM SERPL-MCNC: 9.1 MG/DL (ref 8.3–10.1)
CHLORIDE SERPL-SCNC: 99 MMOL/L (ref 100–108)
CO2 SERPL-SCNC: 26 MMOL/L (ref 21–32)
CREAT SERPL-MCNC: 1.16 MG/DL (ref 0.6–1.3)
CRP SERPL QL: 0.8 MG/L
EOSINOPHIL # BLD AUTO: 0.12 THOUSAND/ΜL (ref 0–0.61)
EOSINOPHIL NFR BLD AUTO: 1 % (ref 0–6)
ERYTHROCYTE [DISTWIDTH] IN BLOOD BY AUTOMATED COUNT: 12.9 % (ref 11.6–15.1)
ERYTHROCYTE [SEDIMENTATION RATE] IN BLOOD: 20 MM/HOUR (ref 0–19)
GFR SERPL CREATININE-BSD FRML MDRD: 62 ML/MIN/1.73SQ M
GLUCOSE P FAST SERPL-MCNC: 109 MG/DL (ref 65–99)
HCT VFR BLD AUTO: 44.3 % (ref 36.5–49.3)
HGB BLD-MCNC: 14.5 G/DL (ref 12–17)
IMM GRANULOCYTES # BLD AUTO: 0.02 THOUSAND/UL (ref 0–0.2)
IMM GRANULOCYTES NFR BLD AUTO: 0 % (ref 0–2)
LYMPHOCYTES # BLD AUTO: 2.24 THOUSANDS/ΜL (ref 0.6–4.47)
LYMPHOCYTES NFR BLD AUTO: 25 % (ref 14–44)
MCH RBC QN AUTO: 29.4 PG (ref 26.8–34.3)
MCHC RBC AUTO-ENTMCNC: 32.7 G/DL (ref 31.4–37.4)
MCV RBC AUTO: 90 FL (ref 82–98)
MONOCYTES # BLD AUTO: 0.76 THOUSAND/ΜL (ref 0.17–1.22)
MONOCYTES NFR BLD AUTO: 9 % (ref 4–12)
NEUTROPHILS # BLD AUTO: 5.81 THOUSANDS/ΜL (ref 1.85–7.62)
NEUTS SEG NFR BLD AUTO: 65 % (ref 43–75)
NRBC BLD AUTO-RTO: 0 /100 WBCS
PLATELET # BLD AUTO: 260 THOUSANDS/UL (ref 149–390)
PMV BLD AUTO: 9.3 FL (ref 8.9–12.7)
POTASSIUM SERPL-SCNC: 4.4 MMOL/L (ref 3.5–5.3)
PROT SERPL-MCNC: 7.9 G/DL (ref 6.4–8.2)
PSA SERPL-MCNC: 0.4 NG/ML (ref 0–4)
RBC # BLD AUTO: 4.93 MILLION/UL (ref 3.88–5.62)
SODIUM SERPL-SCNC: 134 MMOL/L (ref 136–145)
TSH SERPL DL<=0.05 MIU/L-ACNC: 1.9 UIU/ML (ref 0.36–3.74)
WBC # BLD AUTO: 8.99 THOUSAND/UL (ref 4.31–10.16)

## 2020-10-15 PROCEDURE — 85652 RBC SED RATE AUTOMATED: CPT

## 2020-10-15 PROCEDURE — 93925 LOWER EXTREMITY STUDY: CPT | Performed by: SURGERY

## 2020-10-15 PROCEDURE — 93922 UPR/L XTREMITY ART 2 LEVELS: CPT | Performed by: SURGERY

## 2020-10-15 PROCEDURE — 93925 LOWER EXTREMITY STUDY: CPT

## 2020-10-15 PROCEDURE — G0103 PSA SCREENING: HCPCS

## 2020-10-15 PROCEDURE — 80053 COMPREHEN METABOLIC PANEL: CPT

## 2020-10-15 PROCEDURE — 84443 ASSAY THYROID STIM HORMONE: CPT

## 2020-10-15 PROCEDURE — 36415 COLL VENOUS BLD VENIPUNCTURE: CPT

## 2020-10-15 PROCEDURE — 93923 UPR/LXTR ART STDY 3+ LVLS: CPT

## 2020-10-15 PROCEDURE — 83036 HEMOGLOBIN GLYCOSYLATED A1C: CPT

## 2020-10-15 PROCEDURE — 86140 C-REACTIVE PROTEIN: CPT

## 2020-10-15 PROCEDURE — 85025 COMPLETE CBC W/AUTO DIFF WBC: CPT

## 2020-10-16 LAB
EST. AVERAGE GLUCOSE BLD GHB EST-MCNC: 117 MG/DL
HBA1C MFR BLD: 5.7 %

## 2020-10-19 DIAGNOSIS — M48.061 SPINAL STENOSIS OF LUMBAR REGION, UNSPECIFIED WHETHER NEUROGENIC CLAUDICATION PRESENT: ICD-10-CM

## 2020-10-19 RX ORDER — OXYCODONE AND ACETAMINOPHEN 10; 325 MG/1; MG/1
1 TABLET ORAL EVERY 4 HOURS PRN
Qty: 120 TABLET | Refills: 0 | Status: SHIPPED | OUTPATIENT
Start: 2020-10-19 | End: 2020-11-18 | Stop reason: SDUPTHER

## 2020-10-22 DIAGNOSIS — M48.061 SPINAL STENOSIS OF LUMBAR REGION, UNSPECIFIED WHETHER NEUROGENIC CLAUDICATION PRESENT: ICD-10-CM

## 2020-10-22 RX ORDER — OXYCODONE HYDROCHLORIDE 10 MG/1
TABLET ORAL
Qty: 120 TABLET | Refills: 0 | Status: SHIPPED | OUTPATIENT
Start: 2020-10-22 | End: 2020-11-23 | Stop reason: SDUPTHER

## 2020-10-30 DIAGNOSIS — M54.9 CHRONIC BACK PAIN, UNSPECIFIED BACK LOCATION, UNSPECIFIED BACK PAIN LATERALITY: ICD-10-CM

## 2020-10-30 DIAGNOSIS — G89.29 CHRONIC BACK PAIN, UNSPECIFIED BACK LOCATION, UNSPECIFIED BACK PAIN LATERALITY: ICD-10-CM

## 2020-10-30 RX ORDER — CARISOPRODOL 350 MG/1
350 TABLET ORAL 4 TIMES DAILY
Qty: 360 TABLET | Refills: 1 | Status: SHIPPED | OUTPATIENT
Start: 2020-10-30 | End: 2021-01-27 | Stop reason: SDUPTHER

## 2020-11-18 DIAGNOSIS — M48.061 SPINAL STENOSIS OF LUMBAR REGION, UNSPECIFIED WHETHER NEUROGENIC CLAUDICATION PRESENT: ICD-10-CM

## 2020-11-18 RX ORDER — OXYCODONE AND ACETAMINOPHEN 10; 325 MG/1; MG/1
1 TABLET ORAL EVERY 4 HOURS PRN
Qty: 120 TABLET | Refills: 0 | Status: SHIPPED | OUTPATIENT
Start: 2020-11-18 | End: 2020-12-14 | Stop reason: SDUPTHER

## 2020-11-23 DIAGNOSIS — M48.061 SPINAL STENOSIS OF LUMBAR REGION, UNSPECIFIED WHETHER NEUROGENIC CLAUDICATION PRESENT: ICD-10-CM

## 2020-11-23 RX ORDER — OXYCODONE HYDROCHLORIDE 10 MG/1
TABLET ORAL
Qty: 120 TABLET | Refills: 0 | Status: SHIPPED | OUTPATIENT
Start: 2020-11-23 | End: 2020-12-21 | Stop reason: SDUPTHER

## 2020-12-01 DIAGNOSIS — G47.00 INSOMNIA, UNSPECIFIED TYPE: ICD-10-CM

## 2020-12-01 RX ORDER — TRAZODONE HYDROCHLORIDE 100 MG/1
TABLET ORAL
Qty: 360 TABLET | Refills: 1 | Status: SHIPPED | OUTPATIENT
Start: 2020-12-01 | End: 2021-02-24 | Stop reason: SDUPTHER

## 2020-12-07 ENCOUNTER — TELEPHONE (OUTPATIENT)
Dept: CARDIOLOGY CLINIC | Facility: CLINIC | Age: 75
End: 2020-12-07

## 2020-12-14 DIAGNOSIS — M48.061 SPINAL STENOSIS OF LUMBAR REGION, UNSPECIFIED WHETHER NEUROGENIC CLAUDICATION PRESENT: ICD-10-CM

## 2020-12-14 RX ORDER — OXYCODONE AND ACETAMINOPHEN 10; 325 MG/1; MG/1
1 TABLET ORAL EVERY 4 HOURS PRN
Qty: 120 TABLET | Refills: 0 | Status: SHIPPED | OUTPATIENT
Start: 2020-12-14 | End: 2021-01-12 | Stop reason: SDUPTHER

## 2020-12-21 DIAGNOSIS — M48.061 SPINAL STENOSIS OF LUMBAR REGION, UNSPECIFIED WHETHER NEUROGENIC CLAUDICATION PRESENT: ICD-10-CM

## 2020-12-21 RX ORDER — OXYCODONE HYDROCHLORIDE 10 MG/1
TABLET ORAL
Qty: 120 TABLET | Refills: 0 | Status: SHIPPED | OUTPATIENT
Start: 2020-12-21 | End: 2021-01-20 | Stop reason: SDUPTHER

## 2021-01-05 DIAGNOSIS — E78.5 HYPERLIPIDEMIA, UNSPECIFIED HYPERLIPIDEMIA TYPE: ICD-10-CM

## 2021-01-05 RX ORDER — ATORVASTATIN CALCIUM 20 MG/1
20 TABLET, FILM COATED ORAL DAILY
Qty: 90 TABLET | Refills: 1 | Status: SHIPPED | OUTPATIENT
Start: 2021-01-05 | End: 2021-06-30 | Stop reason: SDUPTHER

## 2021-01-12 DIAGNOSIS — M48.061 SPINAL STENOSIS OF LUMBAR REGION, UNSPECIFIED WHETHER NEUROGENIC CLAUDICATION PRESENT: ICD-10-CM

## 2021-01-12 RX ORDER — OXYCODONE AND ACETAMINOPHEN 10; 325 MG/1; MG/1
1 TABLET ORAL EVERY 4 HOURS PRN
Qty: 120 TABLET | Refills: 0 | Status: SHIPPED | OUTPATIENT
Start: 2021-01-12 | End: 2021-02-11 | Stop reason: SDUPTHER

## 2021-01-20 DIAGNOSIS — M48.061 SPINAL STENOSIS OF LUMBAR REGION, UNSPECIFIED WHETHER NEUROGENIC CLAUDICATION PRESENT: ICD-10-CM

## 2021-01-20 RX ORDER — OXYCODONE HYDROCHLORIDE 10 MG/1
TABLET ORAL
Qty: 120 TABLET | Refills: 0 | Status: SHIPPED | OUTPATIENT
Start: 2021-01-20 | End: 2021-02-19 | Stop reason: SDUPTHER

## 2021-01-27 DIAGNOSIS — M54.9 CHRONIC BACK PAIN, UNSPECIFIED BACK LOCATION, UNSPECIFIED BACK PAIN LATERALITY: ICD-10-CM

## 2021-01-27 DIAGNOSIS — G89.29 CHRONIC BACK PAIN, UNSPECIFIED BACK LOCATION, UNSPECIFIED BACK PAIN LATERALITY: ICD-10-CM

## 2021-01-27 RX ORDER — CARISOPRODOL 350 MG/1
350 TABLET ORAL 4 TIMES DAILY
Qty: 360 TABLET | Refills: 1 | Status: SHIPPED | OUTPATIENT
Start: 2021-01-27 | End: 2021-02-24 | Stop reason: SDUPTHER

## 2021-01-27 NOTE — TELEPHONE ENCOUNTER
carisoprodol (SOMA) 350 mg tablet      [facility]  ANY QUESTIONS      PHARMACY said that they need to hear from you, I'm thinking it's because it's a new year

## 2021-01-29 ENCOUNTER — TELEPHONE (OUTPATIENT)
Dept: FAMILY MEDICINE CLINIC | Facility: CLINIC | Age: 76
End: 2021-01-29

## 2021-01-29 NOTE — TELEPHONE ENCOUNTER
Spoke with the patient; We're waiting on his insurance to approve the prior auth that was sent by our office on 01/28/2021  Patient is out of his Merlinda Graces and has been for a few days  He's unable to pay for the medication out-of-pocket, but does not wish to wait any longer for pain relief  He's requesting that Dr Earnest Campuzano send another medication for him until the Merlinda Graces is approved by his insurance  (He already has Oxycodone and Percocet) Routing to Dr Earnest Campuzano for review

## 2021-01-29 NOTE — TELEPHONE ENCOUNTER
Patient called back; Please disregard the previous message  He called the pharmacy and was able to get a 10-day supply for $15  It'll give the insurance time to approve the auth

## 2021-02-11 DIAGNOSIS — M48.061 SPINAL STENOSIS OF LUMBAR REGION, UNSPECIFIED WHETHER NEUROGENIC CLAUDICATION PRESENT: ICD-10-CM

## 2021-02-11 RX ORDER — OXYCODONE AND ACETAMINOPHEN 10; 325 MG/1; MG/1
1 TABLET ORAL EVERY 4 HOURS PRN
Qty: 120 TABLET | Refills: 0 | Status: SHIPPED | OUTPATIENT
Start: 2021-02-11 | End: 2021-03-11 | Stop reason: SDUPTHER

## 2021-02-19 DIAGNOSIS — M48.061 SPINAL STENOSIS OF LUMBAR REGION, UNSPECIFIED WHETHER NEUROGENIC CLAUDICATION PRESENT: ICD-10-CM

## 2021-02-19 RX ORDER — OXYCODONE HYDROCHLORIDE 10 MG/1
TABLET ORAL
Qty: 120 TABLET | Refills: 0 | Status: SHIPPED | OUTPATIENT
Start: 2021-02-19 | End: 2021-03-18 | Stop reason: SDUPTHER

## 2021-02-24 ENCOUNTER — OFFICE VISIT (OUTPATIENT)
Dept: FAMILY MEDICINE CLINIC | Facility: CLINIC | Age: 76
End: 2021-02-24
Payer: COMMERCIAL

## 2021-02-24 VITALS
OXYGEN SATURATION: 96 % | BODY MASS INDEX: 31.92 KG/M2 | DIASTOLIC BLOOD PRESSURE: 80 MMHG | TEMPERATURE: 97.4 F | HEART RATE: 71 BPM | SYSTOLIC BLOOD PRESSURE: 130 MMHG | WEIGHT: 203.4 LBS | HEIGHT: 67 IN

## 2021-02-24 DIAGNOSIS — L40.50 PSA (PSORIATIC ARTHRITIS) (HCC): ICD-10-CM

## 2021-02-24 DIAGNOSIS — G89.29 CHRONIC BACK PAIN, UNSPECIFIED BACK LOCATION, UNSPECIFIED BACK PAIN LATERALITY: ICD-10-CM

## 2021-02-24 DIAGNOSIS — G47.00 INSOMNIA, UNSPECIFIED TYPE: ICD-10-CM

## 2021-02-24 DIAGNOSIS — M54.9 CHRONIC BACK PAIN, UNSPECIFIED BACK LOCATION, UNSPECIFIED BACK PAIN LATERALITY: ICD-10-CM

## 2021-02-24 DIAGNOSIS — G89.4 CHRONIC PAIN SYNDROME: Primary | ICD-10-CM

## 2021-02-24 PROCEDURE — 99214 OFFICE O/P EST MOD 30 MIN: CPT | Performed by: FAMILY MEDICINE

## 2021-02-24 PROCEDURE — 3725F SCREEN DEPRESSION PERFORMED: CPT | Performed by: FAMILY MEDICINE

## 2021-02-24 PROCEDURE — 1101F PT FALLS ASSESS-DOCD LE1/YR: CPT | Performed by: FAMILY MEDICINE

## 2021-02-24 PROCEDURE — 1036F TOBACCO NON-USER: CPT | Performed by: FAMILY MEDICINE

## 2021-02-24 PROCEDURE — 3008F BODY MASS INDEX DOCD: CPT | Performed by: FAMILY MEDICINE

## 2021-02-24 PROCEDURE — 3288F FALL RISK ASSESSMENT DOCD: CPT | Performed by: FAMILY MEDICINE

## 2021-02-24 PROCEDURE — 1160F RVW MEDS BY RX/DR IN RCRD: CPT | Performed by: FAMILY MEDICINE

## 2021-02-24 RX ORDER — TRAZODONE HYDROCHLORIDE 300 MG/1
TABLET ORAL
Qty: 180 TABLET | Refills: 1 | Status: SHIPPED | OUTPATIENT
Start: 2021-02-24 | End: 2021-07-15 | Stop reason: SDUPTHER

## 2021-02-24 RX ORDER — CARISOPRODOL 350 MG/1
350 TABLET ORAL 4 TIMES DAILY
Qty: 360 TABLET | Refills: 1 | Status: SHIPPED | OUTPATIENT
Start: 2021-02-24 | End: 2021-08-23 | Stop reason: SDUPTHER

## 2021-02-24 NOTE — PROGRESS NOTES
Assessment/Plan:     Diagnoses and all orders for this visit:    Chronic pain syndrome    Insomnia, unspecified type  -     traZODone (DESYREL) 300 MG tablet; 2 tablets at bedtime    Chronic back pain, unspecified back location, unspecified back pain laterality  -     carisoprodol (SOMA) 350 mg tablet; Take 1 tablet (350 mg total) by mouth 4 (four) times a day    PSA (psoriatic arthritis) ([facility] [address] )   patient follows with Rheumatology           Not exactly sure what is going on from an insurance standpoint with his Odalys Pena   I reordered the script to see if we can figure out what insurance problems there are   Patient will increase his trazodone is having hard time sleeping  He will continue his other current medications  He is up-to-date on health maintenance  He is to follow up with Rheumatology  He follow-up with me in 6 months or sooner if needed his Medicare wellness exam      Subjective:     Chief Complaint   Patient presents with    Follow-up     Check up 6 months Hypertension        Patient ID: Can Feliz is a 70 y o  male  Patient is here for multiple complaints   He states that he needs med refills  He is not sleeping well due to a lot of family situations would like an increase on his trazodone  He is also having problems pain for his some   Which is new   He needs refills of other medications   Otherwise he is up-to-date on health maintenance  We did answer his COVID questions today as well      The following portions of the patient's history were reviewed and updated as appropriate: allergies, current medications, past family history, past medical history, past social history, past surgical history and problem list     Review of Systems   Constitutional: Negative  HENT: Negative  Eyes: Negative  Respiratory: Negative  Cardiovascular: Negative  Gastrointestinal: Negative  Endocrine: Negative  Genitourinary: Negative      Musculoskeletal: Positive for arthralgias, back pain and myalgias  Skin: Negative  Allergic/Immunologic: Negative  Neurological: Negative  Hematological: Negative  Psychiatric/Behavioral: Positive for sleep disturbance  All other systems reviewed and are negative  Objective:    Vitals:    02/24/21 1052   BP: 130/80   BP Location: Left arm   Patient Position: Sitting   Cuff Size: Large   Pulse: 71   Temp: (!) 97 4 °F (36 3 °C)   TempSrc: Tympanic   SpO2: 96%   Weight: 92 3 kg (203 lb 6 4 oz)   Height: 5' 7" (1 702 m)          Physical Exam  Vitals signs and nursing note reviewed  Constitutional:       General: He is not in acute distress  Appearance: He is well-developed  HENT:      Head: Normocephalic  Right Ear: External ear normal       Left Ear: External ear normal       Nose: Nose normal    Eyes:      General:         Right eye: No discharge  Left eye: No discharge  Conjunctiva/sclera: Conjunctivae normal       Pupils: Pupils are equal, round, and reactive to light  Neck:      Musculoskeletal: Normal range of motion  Cardiovascular:      Rate and Rhythm: Normal rate and regular rhythm  Heart sounds: Normal heart sounds  Pulmonary:      Effort: Pulmonary effort is normal       Breath sounds: Normal breath sounds  Abdominal:      General: Bowel sounds are normal  There is no distension  Palpations: Abdomen is soft  Tenderness: There is no abdominal tenderness  Musculoskeletal: Normal range of motion  Comments:  Arthritic changes to joints   Uses cane   Skin:     General: Skin is warm and dry  Findings: No rash  Neurological:      Mental Status: He is alert and oriented to person, place, and time  Cranial Nerves: No cranial nerve deficit  Psychiatric:         Behavior: Behavior normal          Thought Content: Thought content normal          Judgment: Judgment normal          BMI Counseling: Body mass index is 31 86 kg/m²   The BMI is above normal  Nutrition recommendations include reducing portion sizes, decreasing overall calorie intake, 3-5 servings of fruits/vegetables daily, reducing fast food intake, consuming healthier snacks, decreasing soda and/or juice intake, moderation in carbohydrate intake, increasing intake of lean protein, reducing intake of saturated fat and trans fat and reducing intake of cholesterol  Exercise recommendations include exercising 3-5 times per week

## 2021-02-24 NOTE — PATIENT INSTRUCTIONS
Obesity   AMBULATORY CARE:   Obesity  is when your body mass index (BMI) is greater than 30  Your healthcare provider will use your height and weight to measure your BMI  The risks of obesity include  many health problems, such as injuries or physical disability  You may need tests to check for the following:  · Diabetes    · High blood pressure or high cholesterol    · Heart disease    · Gallbladder or liver disease    · Cancer of the colon, breast, prostate, liver, or kidney    · Sleep apnea    · Arthritis or gout    Seek care immediately if:   · You have a severe headache, confusion, or difficulty speaking  · You have weakness on one side of your body  · You have chest pain, sweating, or shortness of breath  Contact your healthcare provider if:   · You have symptoms of gallbladder or liver disease, such as pain in your upper abdomen  · You have knee or hip pain and discomfort while walking  · You have symptoms of diabetes, such as intense hunger and thirst, and frequent urination  · You have symptoms of sleep apnea, such as snoring or daytime sleepiness  · You have questions or concerns about your condition or care  Treatment for obesity  focuses on helping you lose weight to improve your health  Even a small decrease in BMI can reduce the risk for many health problems  Your healthcare provider will help you set a weight-loss goal   · Lifestyle changes  are the first step in treating obesity  These include making healthy food choices and getting regular physical activity  Your healthcare provider may suggest a weight-loss program that involves coaching, education, and therapy  · Medicine  may help you lose weight when it is used with a healthy diet and physical activity  · Surgery  can help you lose weight if you are very obese and have other health problems  There are several types of weight-loss surgery  Ask your healthcare provider for more information      Be successful losing weight:   · Set small, realistic goals  An example of a small goal is to walk for 20 minutes 5 days a week  Anther goal is to lose 5% of your body weight  · Tell friends, family members, and coworkers about your goals  and ask for their support  Ask a friend to lose weight with you, or join a weight-loss support group  · Identify foods or triggers that may cause you to overeat , and find ways to avoid them  Remove tempting high-calorie foods from your home and workplace  Place a bowl of fresh fruit on your kitchen counter  If stress causes you to eat, then find other ways to cope with stress  · Keep a diary to track what you eat and drink  Also write down how many minutes of physical activity you do each day  Weigh yourself once a week and record it in your diary  Eating changes: You will need to eat 500 to 1,000 fewer calories each day than you currently eat to lose 1 to 2 pounds a week  The following changes will help you cut calories:  · Eat smaller portions  Use small plates, no larger than 9 inches in diameter  Fill your plate half full of fruits and vegetables  Measure your food using measuring cups until you know what a serving size looks like  · Eat 3 meals and 1 or 2 snacks each day  Plan your meals in advance  Wendy Kauffman and eat at home most of the time  Eat slowly  Do not skip meals  Skipping meals can lead to overeating later in the day  This can make it harder for you to lose weight  Talk with a dietitian to help you make a meal plan and schedule that is right for you  · Eat fruits and vegetables at every meal   They are low in calories and high in fiber, which makes you feel full  Do not add butter, margarine, or cream sauce to vegetables  Use herbs to season steamed vegetables  · Eat less fat and fewer fried foods  Eat more baked or grilled chicken and fish  These protein sources are lower in calories and fat than red meat  Limit fast food   Dress your salads with olive oil and vinegar instead of bottled dressing  · Limit the amount of sugar you eat  Do not drink sugary beverages  Limit alcohol  Activity changes:  Physical activity is good for your body in many ways  It helps you burn calories and build strong muscles  It decreases stress and depression, and improves your mood  It can also help you sleep better  Talk to your healthcare provider before you begin an exercise program   · Exercise for at least 30 minutes 5 days a week  Start slowly  Set aside time each day for physical activity that you enjoy and that is convenient for you  It is best to do both weight training and an activity that increases your heart rate, such as walking, bicycling, or swimming  · Find ways to be more active  Do yard work and housecleaning  Walk up the stairs instead of using elevators  Spend your leisure time going to events that require walking, such as outdoor festivals or fairs  This extra physical activity can help you lose weight and keep it off  Follow up with your healthcare provider as directed: You may need to meet with a dietitian  Write down your questions so you remember to ask them during your visits  © Copyright [address] [place] [address] Drive Information is for End User's use only and may not be sold, redistributed or otherwise used for commercial purposes  All illustrations and images included in CareNotes® are the copyrighted property of A D A M , Inc  or [facility] Lalito Powell   The above information is an  only  It is not intended as medical advice for individual conditions or treatments  Talk to your doctor, nurse or pharmacist before following any medical regimen to see if it is safe and effective for you  Weight Management   AMBULATORY CARE:   Why it is important to manage your weight:  Being overweight increases your risk of health conditions such as heart disease, high blood pressure, type 2 diabetes, and certain types of cancer   It can also increase your risk for osteoarthritis, sleep apnea, and other respiratory problems  Aim for a slow, steady weight loss  Even a small amount of weight loss can lower your risk of health problems  How to lose weight safely:  A safe and healthy way to lose weight is to eat fewer calories and get regular exercise  · You can lose up about 1 pound a week by decreasing the number of calories you eat by 500 calories each day  You can decrease calories by eating smaller portion sizes or by cutting out high-calorie foods  Read labels to find out how many calories are in the foods you eat  · You can also burn calories with exercise such as walking, swimming, or biking  You will be more likely to keep weight off if you make these changes part of your lifestyle  Exercise at least 30 minutes per day on most days of the week  You can also fit in more physical activity by taking the stairs instead of the elevator or parking farther away from stores  Ask your healthcare provider about the best exercise plan for you  Healthy meal plan for weight management:  A healthy meal plan includes a variety of foods, contains fewer calories, and helps you stay healthy  A healthy meal plan includes the following:     · Eat whole-grain foods more often  A healthy meal plan should contain fiber  Fiber is the part of grains, fruits, and vegetables that is not broken down by your body  Whole-grain foods are healthy and provide extra fiber in your diet  Some examples of whole-grain foods are whole-wheat breads and pastas, oatmeal, brown rice, and bulgur  · Eat a variety of vegetables every day  Include dark, leafy greens such as spinach, kale, marquez greens, and mustard greens  Eat yellow and orange vegetables such as carrots, sweet potatoes, and winter squash  · Eat a variety of fruits every day  Choose fresh or canned fruit (canned in its own juice or light syrup) instead of juice  Fruit juice has very little or no fiber      · Eat low-fat dairy foods  Drink fat-free (skim) milk or 1% milk  Eat fat-free yogurt and low-fat cottage cheese  Try low-fat cheeses such as mozzarella and other reduced-fat cheeses  · Choose meat and other protein foods that are low in fat  Choose beans or other legumes such as split peas or lentils  Choose fish, skinless poultry (chicken or turkey), or lean cuts of red meat (beef or pork)  Before you cook meat or poultry, cut off any visible fat  · Use less fat and oil  Try baking foods instead of frying them  Add less fat, such as margarine, sour cream, regular salad dressing and mayonnaise to foods  Eat fewer high-fat foods  Some examples of high-fat foods include french fries, doughnuts, ice cream, and cakes  · Eat fewer sweets  Limit foods and drinks that are high in sugar  This includes candy, cookies, regular soda, and sweetened drinks  Ways to decrease calories:   · Eat smaller portions  ? Use a small plate with smaller servings  ? Do not eat second helpings  ? When you eat at a restaurant, ask for a box and place half of your meal in the box before you eat  ? Share an entrée with someone else  · Replace high-calorie snacks with healthy, low-calorie snacks  ? Choose fresh fruit, vegetables, fat-free rice cakes, or air-popped popcorn instead of potato chips, nuts, or chocolate  ? Choose water or calorie-free drinks instead of soda or sweetened drinks  · Do not shop for groceries when you are hungry  You may be more likely to make unhealthy food choices  Take a grocery list of healthy foods and shop after you have eaten  · Eat regular meals  Do not skip meals  Skipping meals can lead to overeating later in the day  This can make it harder for you to lose weight  Eat a healthy snack in place of a meal if you do not have time to eat a regular meal  Talk with a dietitian to help you create a meal plan and schedule that is right for you      Other things to consider as you try to lose weight:   · Be aware of situations that may give you the urge to overeat, such as eating while watching television  Find ways to avoid these situations  For example, read a book, go for a walk, or do crafts  · Meet with a weight loss support group or friends who are also trying to lose weight  This may help you stay motivated to continue working on your weight loss goals  © Copyright [address] Information is for End User's use only and may not be sold, redistributed or otherwise used for commercial purposes  All illustrations and images included in CareNotes® are the copyrighted property of A [employer]  or [facility] Lalito Powell   The above information is an  only  It is not intended as medical advice for individual conditions or treatments  Talk to your doctor, nurse or pharmacist before following any medical regimen to see if it is safe and effective for you

## 2021-02-26 DIAGNOSIS — G89.29 CHRONIC BACK PAIN, UNSPECIFIED BACK LOCATION, UNSPECIFIED BACK PAIN LATERALITY: ICD-10-CM

## 2021-02-26 DIAGNOSIS — M54.9 CHRONIC BACK PAIN, UNSPECIFIED BACK LOCATION, UNSPECIFIED BACK PAIN LATERALITY: ICD-10-CM

## 2021-02-26 RX ORDER — CARISOPRODOL 350 MG/1
350 TABLET ORAL 4 TIMES DAILY
Qty: 360 TABLET | Refills: 1 | OUTPATIENT
Start: 2021-02-26

## 2021-02-26 NOTE — TELEPHONE ENCOUNTER
Needs prior authorization   He is out      carisoprodol (SOMA) 350 mg tablet [[id number]   3 mo supply please     Original Order:  carisoprodol (SOMA) 350 mg tablet [[id number]]      Pharmacy:  [address] [place] [address] [address] [facility] [address]   Phone:  [phone]   Fax:  [phone]

## 2021-03-11 DIAGNOSIS — M48.061 SPINAL STENOSIS OF LUMBAR REGION, UNSPECIFIED WHETHER NEUROGENIC CLAUDICATION PRESENT: ICD-10-CM

## 2021-03-11 RX ORDER — OXYCODONE AND ACETAMINOPHEN 10; 325 MG/1; MG/1
1 TABLET ORAL EVERY 4 HOURS PRN
Qty: 120 TABLET | Refills: 0 | Status: SHIPPED | OUTPATIENT
Start: 2021-03-11 | End: 2021-04-09 | Stop reason: SDUPTHER

## 2021-03-18 DIAGNOSIS — M48.061 SPINAL STENOSIS OF LUMBAR REGION, UNSPECIFIED WHETHER NEUROGENIC CLAUDICATION PRESENT: ICD-10-CM

## 2021-03-18 RX ORDER — OXYCODONE HYDROCHLORIDE 10 MG/1
TABLET ORAL
Qty: 120 TABLET | Refills: 0 | Status: SHIPPED | OUTPATIENT
Start: 2021-03-18 | End: 2021-04-20 | Stop reason: SDUPTHER

## 2021-04-09 ENCOUNTER — OFFICE VISIT (OUTPATIENT)
Dept: CARDIOLOGY CLINIC | Facility: CLINIC | Age: 76
End: 2021-04-09
Payer: COMMERCIAL

## 2021-04-09 VITALS
HEIGHT: 67 IN | DIASTOLIC BLOOD PRESSURE: 74 MMHG | HEART RATE: 61 BPM | SYSTOLIC BLOOD PRESSURE: 126 MMHG | WEIGHT: 199 LBS | BODY MASS INDEX: 31.23 KG/M2

## 2021-04-09 DIAGNOSIS — Z95.3 STATUS POST AORTIC VALVE REPLACEMENT WITH BIOPROSTHETIC VALVE: ICD-10-CM

## 2021-04-09 DIAGNOSIS — I25.10 ATHEROSCLEROSIS OF NATIVE CORONARY ARTERY OF NATIVE HEART WITHOUT ANGINA PECTORIS: ICD-10-CM

## 2021-04-09 DIAGNOSIS — I10 BENIGN ESSENTIAL HYPERTENSION: Primary | ICD-10-CM

## 2021-04-09 DIAGNOSIS — M48.061 SPINAL STENOSIS OF LUMBAR REGION, UNSPECIFIED WHETHER NEUROGENIC CLAUDICATION PRESENT: ICD-10-CM

## 2021-04-09 DIAGNOSIS — E78.2 MIXED HYPERLIPIDEMIA: ICD-10-CM

## 2021-04-09 PROCEDURE — 93000 ELECTROCARDIOGRAM COMPLETE: CPT | Performed by: INTERNAL MEDICINE

## 2021-04-09 PROCEDURE — 3078F DIAST BP <80 MM HG: CPT | Performed by: INTERNAL MEDICINE

## 2021-04-09 PROCEDURE — 3074F SYST BP LT 130 MM HG: CPT | Performed by: INTERNAL MEDICINE

## 2021-04-09 PROCEDURE — 99214 OFFICE O/P EST MOD 30 MIN: CPT | Performed by: INTERNAL MEDICINE

## 2021-04-09 PROCEDURE — 1160F RVW MEDS BY RX/DR IN RCRD: CPT | Performed by: INTERNAL MEDICINE

## 2021-04-09 PROCEDURE — 3008F BODY MASS INDEX DOCD: CPT | Performed by: INTERNAL MEDICINE

## 2021-04-09 PROCEDURE — 1036F TOBACCO NON-USER: CPT | Performed by: INTERNAL MEDICINE

## 2021-04-09 RX ORDER — OXYCODONE AND ACETAMINOPHEN 10; 325 MG/1; MG/1
1 TABLET ORAL EVERY 4 HOURS PRN
Qty: 30 TABLET | Refills: 0 | Status: SHIPPED | OUTPATIENT
Start: 2021-04-09 | End: 2021-04-15 | Stop reason: SDUPTHER

## 2021-04-09 NOTE — PROGRESS NOTES
[facility]  Follow up note  Ravinder Hernandez 70 y o  male MRN: [id number]        Problems    1  Benign essential hypertension  POCT ECG   2  Atherosclerosis of native coronary artery of native heart without angina pectoris     3  Mixed hyperlipidemia     4  Status post aortic valve replacement with bioprosthetic valve         Impression:    Bioprosthetic AVR  · Normal functioning valve by examination and lack of symptoms  · No additional testing needed    Hyperlipidemia  · Reasonable control the setting of nonobstructive CAD on low-dose statin due to sensitivity especially with diffuse rheumatoid arthritis symptom    Hypertension  · Blood pressure is excellent    CAD  · Nonobstructive, 25% lad at the time of catheterization pre AVR  · He has no current anginal symptoms    Plan:    Routine labs recommended  No additional cardiac testing needed  1 year follow-up          HPI:   Ravinder Hernandez is a 70y o  year old male  with rheumatoid arthritis, bioprosthetic AVR for severe aortic stenosis, hypertension, hyperlipidemia, decreased libido/fatigue who follows up in my office  His blood pressure remains excellent  He has not had routine labs from a lipid standpoint recently, but he is CMP and CBC were unremarkable  He has had updated visits by his rheumatologist, he is been on Cosentyx for about a year with some improvement, but still using steroid bursts intermittently  Lipids under reasonable control for nonobstructive CAD, on low-dose statin but difficulty with titration due to cramping rheumatoid symptoms  He has no current cardiac symptoms, he denies any fevers or chills  His mother in all came to live with him for about 3 months last year during [place] pandemic, but passed away quickly after return to the nursing home from aspiration pneumonia  He and his wife are grateful for the 3 months that they were able to care for her and be with her          Review of Systems   Constitutional: Negative for appetite change, diaphoresis, fatigue and fever  Respiratory: Negative for chest tightness, shortness of breath and wheezing  Cardiovascular: Negative for chest pain, palpitations and leg swelling  Gastrointestinal: Negative for abdominal pain and blood in stool  Musculoskeletal: Positive for arthralgias, back pain and joint swelling  Skin: Negative for rash  Neurological: Negative for dizziness, syncope and light-headedness  Past Medical History:   Diagnosis Date    Aortic stenosis     Aortic stenosis, severe     Arthritis     Carpal tunnel syndrome     Chronic back pain     Chronic pain     Chronic, continuous use of opioids     Hyperlipidemia     Hypertension     Hypertrophy of prostate     Insomnia     Peripheral neuropathy     RA (rheumatoid arthritis) ([address] )     Umbilical hernia      Social History     Substance and Sexual Activity   Alcohol Use Not Currently    Frequency: Never    Binge frequency: Never     Social History     Substance and Sexual Activity   Drug Use No     Social History     Tobacco Use   Smoking Status Never Smoker   Smokeless Tobacco Never Used       Allergies:   Allergies   Allergen Reactions    Penicillins Anaphylaxis    Quinine Derivatives Other (See Comments)     High fever       Medications:     Current Outpatient Medications:     aspirin 81 MG tablet, Take 1 tablet (81 mg total) by mouth daily, Disp: 90 tablet, Rfl: 2    atorvastatin (LIPITOR) 20 mg tablet, Take 1 tablet (20 mg total) by mouth daily, Disp: 90 tablet, Rfl: 1    betamethasone dipropionate (DIPROSONE) 0 05 % cream, Apply topically, Disp: , Rfl:     carisoprodol (SOMA) 350 mg tablet, Take 1 tablet (350 mg total) by mouth 4 (four) times a day, Disp: 360 tablet, Rfl: 1    Cholecalciferol (VITAMIN D3) 1000 units CAPS, Take 1 tablet by mouth daily, Disp: , Rfl:     diclofenac sodium (VOLTAREN) 1 %, Apply 2 g topically 4 (four) times a day, Disp: 5 Tube, Rfl: 3    fluocinonide (LIDEX) 0 05 % ointment, Apply topically 2 (two) times a day, Disp: , Rfl:     Gabapentin, Once-Daily, (GRALISE) 600 MG TABS, TAKE 3 TABLETS BY MOUTH AT BEDTIME, Disp: , Rfl:     ketoconazole (NIZORAL) 2 % cream, Apply topically 2 (two) times a day, Disp: 30 g, Rfl: 1    losartan (COZAAR) 50 mg tablet, TAKE 1 TABLET BY MOUTH EVERY DAY, Disp: 90 tablet, Rfl: 3    oxyCODONE (ROXICODONE) 10 MG TABS, Take 1 tablet 4 times daily, Disp: 120 tablet, Rfl: 0    oxyCODONE-acetaminophen (PERCOCET)  mg per tablet, Take 1 tablet by mouth every 4 (four) hours as needed for moderate painMax Daily Amount: 6 tablets, Disp: 30 tablet, Rfl: 0    Secukinumab 150 MG/ML SOAJ, One injection subcutaneously at weeks 1,2,3,4 and 5 for loading dose then every 4 weeks thereafter, Disp: , Rfl:     traZODone (DESYREL) 300 MG tablet, 2 tablets at bedtime, Disp: 180 tablet, Rfl: 1      Vitals:    04/09/21 1032   BP: 126/74   Pulse: 61     Weight (last 2 days)     Date/Time   Weight    04/09/21 1032   90 3 (199)            Physical Exam  Constitutional:       General: He is not in acute distress  Appearance: He is not diaphoretic  HENT:      Head: Normocephalic and atraumatic  Eyes:      General: No scleral icterus  Conjunctiva/sclera: Conjunctivae normal    Neck:      Musculoskeletal: Normal range of motion  Vascular: No JVD  Cardiovascular:      Rate and Rhythm: Normal rate and regular rhythm  Heart sounds: Normal heart sounds  No murmur  Pulmonary:      Effort: Pulmonary effort is normal  No respiratory distress  Breath sounds: Normal breath sounds  No decreased breath sounds, wheezing, rhonchi or rales  Musculoskeletal:      Right lower leg: Normal  No edema  Left lower leg: Normal  No edema  Skin:     General: Skin is warm and dry  Neurological:      Mental Status: He is alert and oriented to person, place, and time             Laboratory Studies:  Lab Results   Component Value Date HGBA1C 5 7 (H) 10/15/2020    HGBA1C 5 9 10/10/2017    HGBA1C 6 2 03/08/2017    K 4 4 10/15/2020    K 5 2 09/20/2018    K 4 3 10/19/2017    CL 99 (L) 10/15/2020     09/20/2018     10/19/2017    CO2 26 10/15/2020    CO2 27 09/20/2018    CO2 26 10/19/2017    CO2 27 10/17/2017    CO2 27 10/17/2017    CO2 28 10/17/2017    GLUCOSE 125 10/17/2017    GLUCOSE 133 10/17/2017    GLUCOSE 163 (H) 10/17/2017    CREATININE 1 16 10/15/2020    CREATININE 1 20 09/20/2018    CREATININE 0 86 10/19/2017    BUN 22 10/15/2020    BUN 13 09/20/2018    BUN 14 10/19/2017    MG 2 5 10/19/2017    MG 2 5 10/18/2017    MG 2 4 09/29/2017     Lab Results   Component Value Date    WBC 8 99 10/15/2020    RBC 4 93 10/15/2020    HGB 14 5 10/15/2020    HCT 44 3 10/15/2020    MCV 90 10/15/2020    MCH 29 4 10/15/2020    RDW 12 9 10/15/2020     10/15/2020     Lipid Profile:   No results found for: CHOL  Lab Results   Component Value Date    HDL 52 09/20/2018    HDL 50 09/29/2017    HDL 52 03/08/2017     Lab Results   Component Value Date    LDLCALC 88 09/20/2018    LDLCALC 78 09/29/2017    LDLCALC 109 (H) 03/08/2017     Lab Results   Component Value Date    TRIG 122 09/20/2018    TRIG 84 09/29/2017    TRIG 107 03/08/2017         Cardiac testing:     EKG reviewed personally:     Sinus bradycardia, premature atrial contraction, heart rate 59  4/21-sinus rhythm, Pac      Cielo Fried MD    Portions of the record may have been created with voice recognition software   Occasional wrong word or "sound a like" substitutions may have occurred due to the inherent limitations of voice recognition software   Read the chart carefully and recognize, using context, where substitutions have occurred

## 2021-04-15 DIAGNOSIS — M48.061 SPINAL STENOSIS OF LUMBAR REGION, UNSPECIFIED WHETHER NEUROGENIC CLAUDICATION PRESENT: ICD-10-CM

## 2021-04-15 RX ORDER — OXYCODONE AND ACETAMINOPHEN 10; 325 MG/1; MG/1
1 TABLET ORAL EVERY 4 HOURS PRN
Qty: 90 TABLET | Refills: 0 | Status: SHIPPED | OUTPATIENT
Start: 2021-04-15 | End: 2021-05-13 | Stop reason: SDUPTHER

## 2021-04-20 DIAGNOSIS — M48.061 SPINAL STENOSIS OF LUMBAR REGION, UNSPECIFIED WHETHER NEUROGENIC CLAUDICATION PRESENT: ICD-10-CM

## 2021-04-20 RX ORDER — OXYCODONE HYDROCHLORIDE 10 MG/1
TABLET ORAL
Qty: 120 TABLET | Refills: 0 | Status: SHIPPED | OUTPATIENT
Start: 2021-04-20 | End: 2021-05-19 | Stop reason: SDUPTHER

## 2021-05-13 DIAGNOSIS — M48.061 SPINAL STENOSIS OF LUMBAR REGION, UNSPECIFIED WHETHER NEUROGENIC CLAUDICATION PRESENT: ICD-10-CM

## 2021-05-13 RX ORDER — OXYCODONE AND ACETAMINOPHEN 10; 325 MG/1; MG/1
1 TABLET ORAL EVERY 4 HOURS PRN
Qty: 90 TABLET | Refills: 0 | Status: SHIPPED | OUTPATIENT
Start: 2021-05-13 | End: 2021-06-09 | Stop reason: SDUPTHER

## 2021-05-19 DIAGNOSIS — M48.061 SPINAL STENOSIS OF LUMBAR REGION, UNSPECIFIED WHETHER NEUROGENIC CLAUDICATION PRESENT: ICD-10-CM

## 2021-05-19 RX ORDER — OXYCODONE HYDROCHLORIDE 10 MG/1
TABLET ORAL
Qty: 120 TABLET | Refills: 0 | Status: SHIPPED | OUTPATIENT
Start: 2021-05-19 | End: 2021-06-18 | Stop reason: SDUPTHER

## 2021-06-05 DIAGNOSIS — I10 BENIGN ESSENTIAL HYPERTENSION: ICD-10-CM

## 2021-06-07 RX ORDER — LOSARTAN POTASSIUM 50 MG/1
TABLET ORAL
Qty: 90 TABLET | Refills: 3 | Status: SHIPPED | OUTPATIENT
Start: 2021-06-07 | End: 2022-06-01

## 2021-06-09 DIAGNOSIS — M48.061 SPINAL STENOSIS OF LUMBAR REGION, UNSPECIFIED WHETHER NEUROGENIC CLAUDICATION PRESENT: ICD-10-CM

## 2021-06-09 RX ORDER — OXYCODONE AND ACETAMINOPHEN 10; 325 MG/1; MG/1
1 TABLET ORAL EVERY 4 HOURS PRN
Qty: 90 TABLET | Refills: 0 | Status: SHIPPED | OUTPATIENT
Start: 2021-06-09 | End: 2021-07-06 | Stop reason: SDUPTHER

## 2021-06-18 DIAGNOSIS — M48.061 SPINAL STENOSIS OF LUMBAR REGION, UNSPECIFIED WHETHER NEUROGENIC CLAUDICATION PRESENT: ICD-10-CM

## 2021-06-18 RX ORDER — OXYCODONE HYDROCHLORIDE 10 MG/1
TABLET ORAL
Qty: 120 TABLET | Refills: 0 | Status: SHIPPED | OUTPATIENT
Start: 2021-06-18 | End: 2021-07-16 | Stop reason: SDUPTHER

## 2021-06-30 DIAGNOSIS — E78.5 HYPERLIPIDEMIA, UNSPECIFIED HYPERLIPIDEMIA TYPE: ICD-10-CM

## 2021-06-30 RX ORDER — ATORVASTATIN CALCIUM 20 MG/1
20 TABLET, FILM COATED ORAL DAILY
Qty: 90 TABLET | Refills: 1 | Status: SHIPPED | OUTPATIENT
Start: 2021-06-30 | End: 2021-12-28 | Stop reason: SDUPTHER

## 2021-07-06 DIAGNOSIS — M48.061 SPINAL STENOSIS OF LUMBAR REGION, UNSPECIFIED WHETHER NEUROGENIC CLAUDICATION PRESENT: ICD-10-CM

## 2021-07-06 RX ORDER — OXYCODONE AND ACETAMINOPHEN 10; 325 MG/1; MG/1
1 TABLET ORAL EVERY 4 HOURS PRN
Qty: 90 TABLET | Refills: 0 | Status: SHIPPED | OUTPATIENT
Start: 2021-07-06 | End: 2021-08-04 | Stop reason: SDUPTHER

## 2021-07-15 DIAGNOSIS — G47.00 INSOMNIA, UNSPECIFIED TYPE: ICD-10-CM

## 2021-07-15 RX ORDER — TRAZODONE HYDROCHLORIDE 300 MG/1
TABLET ORAL
Qty: 180 TABLET | Refills: 1 | Status: SHIPPED | OUTPATIENT
Start: 2021-07-15 | End: 2021-12-21 | Stop reason: SDUPTHER

## 2021-07-16 DIAGNOSIS — M48.061 SPINAL STENOSIS OF LUMBAR REGION, UNSPECIFIED WHETHER NEUROGENIC CLAUDICATION PRESENT: ICD-10-CM

## 2021-07-16 RX ORDER — OXYCODONE HYDROCHLORIDE 10 MG/1
TABLET ORAL
Qty: 120 TABLET | Refills: 0 | Status: SHIPPED | OUTPATIENT
Start: 2021-07-16 | End: 2021-08-16 | Stop reason: SDUPTHER

## 2021-08-04 DIAGNOSIS — M48.061 SPINAL STENOSIS OF LUMBAR REGION, UNSPECIFIED WHETHER NEUROGENIC CLAUDICATION PRESENT: ICD-10-CM

## 2021-08-04 RX ORDER — OXYCODONE AND ACETAMINOPHEN 10; 325 MG/1; MG/1
1 TABLET ORAL EVERY 4 HOURS PRN
Qty: 90 TABLET | Refills: 0 | Status: SHIPPED | OUTPATIENT
Start: 2021-08-04 | End: 2021-09-07 | Stop reason: SDUPTHER

## 2021-08-16 DIAGNOSIS — M48.061 SPINAL STENOSIS OF LUMBAR REGION, UNSPECIFIED WHETHER NEUROGENIC CLAUDICATION PRESENT: ICD-10-CM

## 2021-08-17 DIAGNOSIS — M48.061 SPINAL STENOSIS OF LUMBAR REGION, UNSPECIFIED WHETHER NEUROGENIC CLAUDICATION PRESENT: ICD-10-CM

## 2021-08-17 RX ORDER — OXYCODONE HYDROCHLORIDE 10 MG/1
TABLET ORAL
Qty: 120 TABLET | Refills: 0 | Status: SHIPPED | OUTPATIENT
Start: 2021-08-17 | End: 2021-11-19 | Stop reason: SDUPTHER

## 2021-08-17 RX ORDER — OXYCODONE HYDROCHLORIDE 10 MG/1
TABLET ORAL
Qty: 120 TABLET | Refills: 0 | Status: SHIPPED | OUTPATIENT
Start: 2021-08-17 | End: 2021-09-21 | Stop reason: SDUPTHER

## 2021-08-23 DIAGNOSIS — G89.29 CHRONIC BACK PAIN, UNSPECIFIED BACK LOCATION, UNSPECIFIED BACK PAIN LATERALITY: ICD-10-CM

## 2021-08-23 DIAGNOSIS — M54.9 CHRONIC BACK PAIN, UNSPECIFIED BACK LOCATION, UNSPECIFIED BACK PAIN LATERALITY: ICD-10-CM

## 2021-08-23 RX ORDER — CARISOPRODOL 350 MG/1
350 TABLET ORAL 4 TIMES DAILY
Qty: 360 TABLET | Refills: 1 | Status: SHIPPED | OUTPATIENT
Start: 2021-08-23 | End: 2022-02-22 | Stop reason: SDUPTHER

## 2021-09-07 DIAGNOSIS — M48.061 SPINAL STENOSIS OF LUMBAR REGION, UNSPECIFIED WHETHER NEUROGENIC CLAUDICATION PRESENT: ICD-10-CM

## 2021-09-07 RX ORDER — OXYCODONE AND ACETAMINOPHEN 10; 325 MG/1; MG/1
1 TABLET ORAL EVERY 4 HOURS PRN
Qty: 90 TABLET | Refills: 0 | Status: SHIPPED | OUTPATIENT
Start: 2021-09-07 | End: 2021-10-06 | Stop reason: SDUPTHER

## 2021-09-20 ENCOUNTER — TELEPHONE (OUTPATIENT)
Dept: FAMILY MEDICINE CLINIC | Facility: CLINIC | Age: 76
End: 2021-09-20

## 2021-09-20 PROCEDURE — U0005 INFEC AGEN DETEC AMPLI PROBE: HCPCS | Performed by: FAMILY MEDICINE

## 2021-09-20 PROCEDURE — U0003 INFECTIOUS AGENT DETECTION BY NUCLEIC ACID (DNA OR RNA); SEVERE ACUTE RESPIRATORY SYNDROME CORONAVIRUS 2 (SARS-COV-2) (CORONAVIRUS DISEASE [COVID-19]), AMPLIFIED PROBE TECHNIQUE, MAKING USE OF HIGH THROUGHPUT TECHNOLOGIES AS DESCRIBED BY CMS-2020-01-R: HCPCS | Performed by: FAMILY MEDICINE

## 2021-09-20 NOTE — TELEPHONE ENCOUNTER
Patient called  He said his wife tested positive for covid yesterday  He doesn't have any symptoms yet, but he wanted to know if he should be tested? I told him I would check with you and we would call him back  He was a little concerned since he was told not to get the vaccine due to his medical conditions  [phone]  Thank you

## 2021-09-20 NOTE — TELEPHONE ENCOUNTER
Called and discussed   His wife tested positive a few days ago  He is develops symptoms today  Will get him a test today that was ordered   And if positive he will be a monoclonal antibody candidate will try to get him schedule as soon as possible

## 2021-09-21 DIAGNOSIS — M48.061 SPINAL STENOSIS OF LUMBAR REGION, UNSPECIFIED WHETHER NEUROGENIC CLAUDICATION PRESENT: ICD-10-CM

## 2021-09-21 RX ORDER — OXYCODONE HYDROCHLORIDE 10 MG/1
TABLET ORAL
Qty: 120 TABLET | Refills: 0 | Status: SHIPPED | OUTPATIENT
Start: 2021-09-21 | End: 2021-10-20 | Stop reason: SDUPTHER

## 2021-09-22 ENCOUNTER — TELEPHONE (OUTPATIENT)
Dept: FAMILY MEDICINE CLINIC | Facility: CLINIC | Age: 76
End: 2021-09-22

## 2021-09-22 ENCOUNTER — TELEMEDICINE (OUTPATIENT)
Dept: FAMILY MEDICINE CLINIC | Facility: CLINIC | Age: 76
End: 2021-09-22
Payer: COMMERCIAL

## 2021-09-22 DIAGNOSIS — Z79.899 HIGH RISK MEDICATION USE: Primary | ICD-10-CM

## 2021-09-22 DIAGNOSIS — Z92.241 H/O STEROID THERAPY: ICD-10-CM

## 2021-09-22 DIAGNOSIS — M05.9 RHEUMATOID ARTHRITIS WITH RHEUMATOID FACTOR, UNSPECIFIED (HCC): ICD-10-CM

## 2021-09-22 DIAGNOSIS — I25.10 ATHEROSCLEROSIS OF NATIVE CORONARY ARTERY OF NATIVE HEART WITHOUT ANGINA PECTORIS: ICD-10-CM

## 2021-09-22 PROCEDURE — 99213 OFFICE O/P EST LOW 20 MIN: CPT | Performed by: FAMILY MEDICINE

## 2021-09-22 RX ORDER — ONDANSETRON 2 MG/ML
4 INJECTION INTRAMUSCULAR; INTRAVENOUS ONCE AS NEEDED
Status: CANCELLED | OUTPATIENT
Start: 2021-09-22

## 2021-09-22 RX ORDER — ALBUTEROL SULFATE 90 UG/1
3 AEROSOL, METERED RESPIRATORY (INHALATION) ONCE AS NEEDED
Status: CANCELLED | OUTPATIENT
Start: 2021-09-22

## 2021-09-22 RX ORDER — ACETAMINOPHEN 325 MG/1
650 TABLET ORAL ONCE AS NEEDED
Status: CANCELLED | OUTPATIENT
Start: 2021-09-22

## 2021-09-22 RX ORDER — SODIUM CHLORIDE 9 MG/ML
20 INJECTION, SOLUTION INTRAVENOUS ONCE
Status: CANCELLED | OUTPATIENT
Start: 2021-09-22

## 2021-09-22 NOTE — TELEPHONE ENCOUNTER
Patient called to see if we got the results from his covid test   I told him we haven't  He was concerned that he only had a certain time for the monoclonal antibodies  I just told him I would leave a message for you, but we usually cannot rush covid tests

## 2021-09-22 NOTE — PROGRESS NOTES
COVID-19 Outpatient Progress Note    Assessment/Plan:    Problem List Items Addressed This Visit     None         Disposition:     Patient is at increased risk of progressing towards severe COVID-19 due to the following high risk criteria:   - Older age (age >= 72years old)  - Obesity or being overweight  - Cardiovascular disease    Patient meets criteria for Bamlanivimab/Etesevimab infusion  They were counseled in regards to risks, benefits, and side effects of this infusion  Bamlanivimab and etesevimab are investigational medicines used to treat mild to moderate symptoms of COVID-19 in non-hospitalized adults and adolescents (15years of age and older who weigh at least 80 pounds (40 kg)), and who are at high risk for developing severe COVID-19 symptoms or the need for hospitalization  Bamlanivimab and etesevimab are investigational because they are still being studied  There is limited information known about the safety and effectiveness of using bamlanivimab and etesevimab to treat people with COVID-19  The FDA has authorized the emergency use of bamlanivimab and etesevimab for the treatment of COVID-19 under an Emergency Use Authorization (EUA)  How will I receive bamlanivimab and etesevimab? Bamlanivimab and etesevimab are given at the same time through a vein (intravenous or IV)    You will receive one dose of bamlanivimab and etesevimab by IV infusion  The infusion will take 21-60 minutes or longer  Possible side effects of bamlanivimab and etesevimab: Allergic reactions can happen during and after infusion with bamlanivimab and etesevimab  Possible reactions include: fever, chills, nausea, headache, shortness of breath, low or high blood pressure, rapid or slow heart rate, chest discomfort or pain, weakness, confusion, feeling tired, wheezing, swelling of your lips, face, or throat, rash including hives, itching, muscle aches, dizziness, and sweating   These reactions may be severe or life threatening  Worsening symptoms after treatment: You may experience new or worsening symptoms after infusion, including fever, difficulty breathing, rapid or slow heart rate, tiredness, weakness or confusion  If these occur, contact your healthcare provider or seek immediate medical attention as some of these events have required hospitalization  It is unknown if these events are related to treatment or are due to the progression of COVID19  The side effects of getting any medicine by vein may include brief pain, bleeding, bruising of the skin, soreness, swelling, and possible infection at the infusion site  These are not all the possible side effects of bamlanivimab and etesevimab  Not a lot of people have been given bamlanivimab and etesevimab  Serious and unexpected side effects may happen  Bamlanivimab and etesevimab are still being studied so it is possible that all of the risks are not known at this time  It is possible that bamlanivimab and etesevimab could interfere with your body's own ability to fight off a future infection of SARS-CoV-2  Similarly, bamlanivimab and etesevimab may reduce your bodys immune response to a vaccine for SARS-CoV-2  Specific studies have not been conducted to address these possible risks  Talk to your healthcare provider if you have any questions  Emergency Use Authorization:    The [facility] FDA has made bamlanivimab and etesevimab available under an emergency access mechanism called an EUA  The EUA is supported by a  of Health and Human Service (HHS) declaration that circumstances exist to justify the emergency use of drugs and biological products during the COVID-19 pandemic  Bamlanivimab and etesevimab have not undergone the same type of review as an FDA-approved or cleared product  The FDA may issue an EUA when certain criteria are met, which includes that there are no adequate, approved, and available alternatives   In addition, the FDA decision is based on the totality of scientific evidence available showing that it is reasonable to believe that the product meets certain criteria for safety, performance, and labeling and may be effective in treatment of patients during the COVID-19 pandemic  All of these criteria must be met to allow for the product to be used in the treatment of patients during the COVID-19 pandemic  The EUA for bamlanivimab and etesevimab together is in effect for the duration of the COVID-19 declaration justifying emergency use of these products, unless terminated or revoked (after which the product may no longer be used)  What if I am pregnant or breastfeeding? There is limited experience treating pregnant women or breastfeeding mothers with bamlanivimab and etesevimab  For a mother and unborn baby, the benefit of receiving bamlanivimab and etesevimab may be greater than the risk from the treatment  If you are pregnant or breastfeeding, discuss your options and specific situation with your healthcare provider  Regarding COVID-19 Vaccination:    Currently there is no data or safety or efficacy of COVID-19 vaccination in persons who received monoclonal antibodies as part of COVID-19 treatment  Treatment should be deferred for at least 90 days to avoid interference of the treatment with vaccine-induced immune responses (this is based on estimated half-life of therapies and evidence suggesting reinfection is uncommon within 90 days of initial infection)  Full fact sheet document for patients can be found at: [facility] pt    The patient consents to proceed with bamlanivimab and etesevimab infusion  I have spent 20 minutes directly with the patient  Greater than 50% of this time was spent in counseling/coordination of care regarding: risks and benefits of treatment options, instructions for management and impressions          Verification of patient location:    Patient is located in the following state in which I hold an active license [place]    Encounter provider Ravindra Mcgee DO    Provider located at [address] [address] [phone]    Recent Visits  Date Type Provider Dept   09/20/21 Telephone Telly Sharma DO [facility]   Showing recent visits within past 7 days and meeting all other requirements  Today's Visits  Date Type Provider Dept   09/22/21 Telephone Telly Page, DO [facility]   Showing today's visits and meeting all other requirements  Future Appointments  No visits were found meeting these conditions  Showing future appointments within next 150 days and meeting all other requirements     This virtual check-in was done via [employer] and patient was informed that this is a secure, HIPAA-compliant platform  He agrees to proceed  Patient agrees to participate in a virtual check in via telephone or video visit instead of presenting to the office to address urgent/immediate medical needs  Patient is aware this is a billable service  After connecting through [facility], the patient was identified by name and date of birth  Braxton Grajeda was informed that this was a telemedicine visit and that the exam was being conducted confidentially over secure lines  Braxton Grajeda acknowledged consent and understanding of privacy and security of the telemedicine visit  I informed the patient that I have reviewed his record in Epic and presented the opportunity for him to ask any questions regarding the visit today  The patient agreed to participate  Subjective:   Braxton Grajeda is a 78 y o  male who is concerned about COVID-19  Patient's symptoms include fever, chills, fatigue, malaise, nasal congestion, anosmia, loss of taste and shortness of breath       Date of symptom onset: 9/20/2021    Exposure:   Contact with a person who is under investigation (PUI) for or who is positive for COVID-19 within the last 14 days?: Yes    Hospitalized recently for fever and/or lower respiratory symptoms?: No      Currently a healthcare worker that is involved in direct patient care?: No      Works in a special setting where the risk of COVID-19 transmission may be high? (this may include long-term care, correctional and half-way facilities; homeless shelters; assisted-living facilities and group homes ): No      Resident in a special setting where the risk of COVID-19 transmission may be high? (this may include long-term care, correctional and half-way facilities; homeless shelters; assisted-living facilities and group homes ): No       Patient developed symptoms a few days after his wife tested positive   Symptoms are as above in the ROS   Patient  is on immunotherapy for rheumatoid arthritis    Lab Results   Component Value Date    SARSCOV2 Positive (A) 09/20/2021    [facility] 98 Not Detected 05/26/2020     Past Medical History:   Diagnosis Date    Aortic stenosis     Aortic stenosis, severe     Arthritis     Carpal tunnel syndrome     Chronic back pain     Chronic pain     Chronic, continuous use of opioids     Hyperlipidemia     Hypertension     Hypertrophy of prostate     Insomnia     Peripheral neuropathy     RA [facility]rheumatoid arthritis) ([facility] [address] )     Umbilical hernia      Past Surgical History:   Procedure Laterality Date    ADENOIDECTOMY      AORTIC VALVE REPLACEMENT  10/17/2017    BASAL CELL CARCINOMA EXCISION      CARDIAC CATHETERIZATION      LAST ASSESSED: 20OCT2017    CARPAL TUNNEL RELEASE Bilateral     left    CATARACT EXTRACTION      HERNIA REPAIR      inguinal bilateral -umbilical     LAMINECTOMY      DECOMPRESS, FACETECTOMY, FORAMINOTOMY LUMBAR SEG    NEUROPLASTY / TRANSPOSITION MEDIAN NERVE AT CARPAL TUNNEL      SD RPLCMT AORTIC VALVE OPN W/STENTLESS TISSUE VALVE N/A 10/17/2017    Procedure: AORTIC VALVE REPLACEMENT with 23MM MAGNAEASE TISSUE VALVE; INTRA-OP SHANDRA;  Surgeon: Elier Chicas Niecy Quezada MD;  [facility];  Service: Cardiac Surgery    TONSILLECTOMY       Current Outpatient Medications   Medication Sig Dispense Refill    aspirin 81 MG tablet Take 1 tablet (81 mg total) by mouth daily 90 tablet 2    atorvastatin (LIPITOR) 20 mg tablet Take 1 tablet (20 mg total) by mouth daily 90 tablet 1    betamethasone dipropionate (DIPROSONE) 0 05 % cream Apply topically      carisoprodol (SOMA) 350 mg tablet Take 1 tablet (350 mg total) by mouth 4 (four) times a day 360 tablet 1    Cholecalciferol (VITAMIN D3) 1000 units CAPS Take 1 tablet by mouth daily      diclofenac sodium (VOLTAREN) 1 % Apply 2 g topically 4 (four) times a day 5 Tube 3    fluocinonide (LIDEX) 0 05 % ointment Apply topically 2 (two) times a day      Gabapentin, Once-Daily, (GRALISE) 600 MG TABS TAKE 3 TABLETS BY MOUTH AT BEDTIME      ketoconazole (NIZORAL) 2 % cream Apply topically 2 (two) times a day 30 g 1    losartan (COZAAR) 50 mg tablet TAKE 1 TABLET BY MOUTH EVERY DAY 90 tablet 3    oxyCODONE (ROXICODONE) 10 MG TABS Take 1 tablet 4 times daily 120 tablet 0    oxyCODONE (ROXICODONE) 10 MG TABS Take 1 tablet 4 times daily 120 tablet 0    oxyCODONE-acetaminophen (PERCOCET)  mg per tablet Take 1 tablet by mouth every 4 (four) hours as needed for moderate painMax Daily Amount: 6 tablets 90 tablet 0    Secukinumab 150 MG/ML SOAJ One injection subcutaneously at weeks 1,2,3,4 and 5 for loading dose then every 4 weeks thereafter      traZODone (DESYREL) 300 MG tablet 2 tablets at bedtime 180 tablet 1     No current facility-administered medications for this visit  Allergies   Allergen Reactions    Penicillins Anaphylaxis    Quinine Derivatives Other (See Comments)     High fever       Review of Systems   Constitutional: Positive for chills, fatigue and fever  HENT: Positive for congestion  Eyes: Negative  Respiratory: Positive for shortness of breath  Cardiovascular: Negative  Gastrointestinal: Negative  Endocrine: Negative  Genitourinary: Negative  Musculoskeletal: Negative  Skin: Negative  Allergic/Immunologic: Negative  Neurological: Negative  Hematological: Negative  Psychiatric/Behavioral: Negative  All other systems reviewed and are negative  Objective: There were no vitals filed for this visit  Physical Exam  Constitutional:       Appearance: He is well-developed  HENT:      Head: Normocephalic  Right Ear: External ear normal       Left Ear: External ear normal       Nose: Nose normal    Eyes:      Conjunctiva/sclera: Conjunctivae normal    Pulmonary:      Effort: Pulmonary effort is normal    Musculoskeletal:         General: Normal range of motion  Cervical back: Normal range of motion  Skin:     General: Skin is warm and dry  Neurological:      Mental Status: He is alert and oriented to person, place, and time  Psychiatric:         Behavior: Behavior normal          Thought Content: Thought content normal          Judgment: Judgment normal          VIRTUAL VISIT [address] verbally agrees to participate in [employer]  Pt is aware that [employer] could be limited without vital signs or the ability to perform a full hands-on physical exam  Julio Sandoval understands he or the provider may request at any time to terminate the video visit and request the patient to seek care or treatment in person

## 2021-09-23 ENCOUNTER — HOSPITAL ENCOUNTER (OUTPATIENT)
Dept: INFUSION CENTER | Facility: HOSPITAL | Age: 76
Discharge: HOME | End: 2021-09-23
Payer: COMMERCIAL

## 2021-09-23 VITALS
OXYGEN SATURATION: 96 % | TEMPERATURE: 98.3 F | RESPIRATION RATE: 18 BRPM | HEART RATE: 72 BPM | SYSTOLIC BLOOD PRESSURE: 150 MMHG | DIASTOLIC BLOOD PRESSURE: 71 MMHG

## 2021-09-23 DIAGNOSIS — I25.10 ATHEROSCLEROSIS OF NATIVE CORONARY ARTERY OF NATIVE HEART WITHOUT ANGINA PECTORIS: ICD-10-CM

## 2021-09-23 DIAGNOSIS — Z92.241 H/O STEROID THERAPY: ICD-10-CM

## 2021-09-23 DIAGNOSIS — M05.9 RHEUMATOID ARTHRITIS WITH RHEUMATOID FACTOR, UNSPECIFIED (HCC): ICD-10-CM

## 2021-09-23 DIAGNOSIS — Z79.899 HIGH RISK MEDICATION USE: Primary | ICD-10-CM

## 2021-09-23 PROCEDURE — M0245 HB BAMLAN AND ETESEV INF ADMIN: HCPCS | Performed by: FAMILY MEDICINE

## 2021-09-23 RX ORDER — ALBUTEROL SULFATE 90 UG/1
3 AEROSOL, METERED RESPIRATORY (INHALATION) ONCE AS NEEDED
Status: DISCONTINUED | OUTPATIENT
Start: 2021-09-23 | End: 2021-09-26 | Stop reason: HOSPADM

## 2021-09-23 RX ORDER — ALBUTEROL SULFATE 90 UG/1
3 AEROSOL, METERED RESPIRATORY (INHALATION) ONCE AS NEEDED
Status: CANCELLED | OUTPATIENT
Start: 2021-09-23

## 2021-09-23 RX ORDER — ACETAMINOPHEN 325 MG/1
650 TABLET ORAL ONCE AS NEEDED
Status: DISCONTINUED | OUTPATIENT
Start: 2021-09-23 | End: 2021-09-26 | Stop reason: HOSPADM

## 2021-09-23 RX ORDER — ONDANSETRON 2 MG/ML
4 INJECTION INTRAMUSCULAR; INTRAVENOUS ONCE AS NEEDED
Status: CANCELLED | OUTPATIENT
Start: 2021-09-23

## 2021-09-23 RX ORDER — SODIUM CHLORIDE 9 MG/ML
20 INJECTION, SOLUTION INTRAVENOUS ONCE
Status: COMPLETED | OUTPATIENT
Start: 2021-09-23 | End: 2021-09-23

## 2021-09-23 RX ORDER — SODIUM CHLORIDE 9 MG/ML
20 INJECTION, SOLUTION INTRAVENOUS ONCE
Status: CANCELLED | OUTPATIENT
Start: 2021-09-23

## 2021-09-23 RX ORDER — ONDANSETRON 2 MG/ML
4 INJECTION INTRAMUSCULAR; INTRAVENOUS ONCE AS NEEDED
Status: DISCONTINUED | OUTPATIENT
Start: 2021-09-23 | End: 2021-09-26 | Stop reason: HOSPADM

## 2021-09-23 RX ORDER — ACETAMINOPHEN 325 MG/1
650 TABLET ORAL ONCE AS NEEDED
Status: CANCELLED | OUTPATIENT
Start: 2021-09-23

## 2021-09-23 RX ADMIN — SODIUM CHLORIDE 2100 MG COMBINED: 9 INJECTION, SOLUTION INTRAVENOUS at 14:14

## 2021-09-23 RX ADMIN — SODIUM CHLORIDE 20 ML/HR: 0.9 INJECTION, SOLUTION INTRAVENOUS at 14:10

## 2021-09-23 NOTE — PROGRESS NOTES
Patient tolerated infusion without issue  Vital signs stable, will continue to monitor for one hour

## 2021-09-23 NOTE — PROGRESS NOTES
Patient completed one hour observation time without issue  Discharge instructions given to patient, patient escorted to parking area by nursing

## 2021-09-23 NOTE — PROGRESS NOTES
Patient here today for monoclonal antibody infusion  Vital signs stable, IV placed without issue  Copy of EUA given to patient and patient verbally agrees to treatment today  Will continue to monitor

## 2021-10-06 DIAGNOSIS — M48.061 SPINAL STENOSIS OF LUMBAR REGION, UNSPECIFIED WHETHER NEUROGENIC CLAUDICATION PRESENT: ICD-10-CM

## 2021-10-06 RX ORDER — OXYCODONE AND ACETAMINOPHEN 10; 325 MG/1; MG/1
1 TABLET ORAL EVERY 4 HOURS PRN
Qty: 90 TABLET | Refills: 0 | Status: SHIPPED | OUTPATIENT
Start: 2021-10-06 | End: 2021-10-25 | Stop reason: SDUPTHER

## 2021-10-14 NOTE — TELEPHONE ENCOUNTER
Can you resend these 
PATIENT WILL CALL WHEN HE NEEDS THE PRESCRIPTIONS SENT TO [place]      BUT, NOW THE PATIENT NEEDS JUST THE 2 ORDERS
None

## 2021-10-20 DIAGNOSIS — M48.061 SPINAL STENOSIS OF LUMBAR REGION, UNSPECIFIED WHETHER NEUROGENIC CLAUDICATION PRESENT: ICD-10-CM

## 2021-10-20 RX ORDER — OXYCODONE HYDROCHLORIDE 10 MG/1
TABLET ORAL
Qty: 120 TABLET | Refills: 0 | Status: SHIPPED | OUTPATIENT
Start: 2021-10-20 | End: 2021-11-19 | Stop reason: SDUPTHER

## 2021-10-25 ENCOUNTER — OFFICE VISIT (OUTPATIENT)
Dept: FAMILY MEDICINE CLINIC | Facility: CLINIC | Age: 76
End: 2021-10-25
Payer: COMMERCIAL

## 2021-10-25 VITALS
HEIGHT: 66 IN | HEART RATE: 75 BPM | DIASTOLIC BLOOD PRESSURE: 70 MMHG | OXYGEN SATURATION: 94 % | RESPIRATION RATE: 18 BRPM | SYSTOLIC BLOOD PRESSURE: 118 MMHG | TEMPERATURE: 96.9 F | WEIGHT: 188 LBS | BODY MASS INDEX: 30.22 KG/M2

## 2021-10-25 DIAGNOSIS — Z12.5 SCREENING FOR PROSTATE CANCER: ICD-10-CM

## 2021-10-25 DIAGNOSIS — E78.5 HYPERLIPIDEMIA, UNSPECIFIED HYPERLIPIDEMIA TYPE: ICD-10-CM

## 2021-10-25 DIAGNOSIS — M05.9 RHEUMATOID ARTHRITIS WITH RHEUMATOID FACTOR, UNSPECIFIED (HCC): ICD-10-CM

## 2021-10-25 DIAGNOSIS — M48.061 SPINAL STENOSIS OF LUMBAR REGION, UNSPECIFIED WHETHER NEUROGENIC CLAUDICATION PRESENT: ICD-10-CM

## 2021-10-25 DIAGNOSIS — R73.9 HYPERGLYCEMIA: ICD-10-CM

## 2021-10-25 DIAGNOSIS — Z79.899 HIGH RISK MEDICATION USE: ICD-10-CM

## 2021-10-25 DIAGNOSIS — E78.2 MIXED HYPERLIPIDEMIA: ICD-10-CM

## 2021-10-25 DIAGNOSIS — L40.50 PSORIATIC ARTHRITIS (HCC): ICD-10-CM

## 2021-10-25 DIAGNOSIS — Z00.00 MEDICARE ANNUAL WELLNESS VISIT, SUBSEQUENT: Primary | ICD-10-CM

## 2021-10-25 PROCEDURE — 1125F AMNT PAIN NOTED PAIN PRSNT: CPT | Performed by: FAMILY MEDICINE

## 2021-10-25 PROCEDURE — 1101F PT FALLS ASSESS-DOCD LE1/YR: CPT | Performed by: FAMILY MEDICINE

## 2021-10-25 PROCEDURE — 3288F FALL RISK ASSESSMENT DOCD: CPT | Performed by: FAMILY MEDICINE

## 2021-10-25 PROCEDURE — 1160F RVW MEDS BY RX/DR IN RCRD: CPT | Performed by: FAMILY MEDICINE

## 2021-10-25 PROCEDURE — 3725F SCREEN DEPRESSION PERFORMED: CPT | Performed by: FAMILY MEDICINE

## 2021-10-25 PROCEDURE — 1036F TOBACCO NON-USER: CPT | Performed by: FAMILY MEDICINE

## 2021-10-25 PROCEDURE — 3008F BODY MASS INDEX DOCD: CPT | Performed by: FAMILY MEDICINE

## 2021-10-25 PROCEDURE — G0439 PPPS, SUBSEQ VISIT: HCPCS | Performed by: FAMILY MEDICINE

## 2021-10-25 RX ORDER — OXYCODONE AND ACETAMINOPHEN 10; 325 MG/1; MG/1
1 TABLET ORAL EVERY 4 HOURS PRN
Qty: 120 TABLET | Refills: 0
Start: 2021-10-25 | End: 2021-11-04 | Stop reason: SDUPTHER

## 2021-11-04 DIAGNOSIS — M48.061 SPINAL STENOSIS OF LUMBAR REGION, UNSPECIFIED WHETHER NEUROGENIC CLAUDICATION PRESENT: ICD-10-CM

## 2021-11-05 DIAGNOSIS — M48.061 SPINAL STENOSIS OF LUMBAR REGION, UNSPECIFIED WHETHER NEUROGENIC CLAUDICATION PRESENT: ICD-10-CM

## 2021-11-05 RX ORDER — OXYCODONE AND ACETAMINOPHEN 10; 325 MG/1; MG/1
1 TABLET ORAL EVERY 4 HOURS PRN
Qty: 120 TABLET | Refills: 0
Start: 2021-11-05 | End: 2021-11-05 | Stop reason: SDUPTHER

## 2021-11-05 RX ORDER — OXYCODONE AND ACETAMINOPHEN 10; 325 MG/1; MG/1
1 TABLET ORAL EVERY 4 HOURS PRN
Qty: 120 TABLET | Refills: 0 | Status: SHIPPED | OUTPATIENT
Start: 2021-11-05 | End: 2021-11-05 | Stop reason: SDUPTHER

## 2021-11-05 RX ORDER — OXYCODONE AND ACETAMINOPHEN 10; 325 MG/1; MG/1
1 TABLET ORAL EVERY 4 HOURS PRN
Qty: 120 TABLET | Refills: 0 | Status: SHIPPED | OUTPATIENT
Start: 2021-11-05 | End: 2021-12-28 | Stop reason: SDUPTHER

## 2021-11-19 DIAGNOSIS — M48.061 SPINAL STENOSIS OF LUMBAR REGION, UNSPECIFIED WHETHER NEUROGENIC CLAUDICATION PRESENT: ICD-10-CM

## 2021-11-19 RX ORDER — OXYCODONE HYDROCHLORIDE 10 MG/1
TABLET ORAL
Qty: 120 TABLET | Refills: 0 | Status: SHIPPED | OUTPATIENT
Start: 2021-11-19 | End: 2021-12-21 | Stop reason: SDUPTHER

## 2021-12-21 DIAGNOSIS — G47.00 INSOMNIA, UNSPECIFIED TYPE: ICD-10-CM

## 2021-12-21 DIAGNOSIS — M48.061 SPINAL STENOSIS OF LUMBAR REGION, UNSPECIFIED WHETHER NEUROGENIC CLAUDICATION PRESENT: ICD-10-CM

## 2021-12-21 RX ORDER — OXYCODONE HYDROCHLORIDE 10 MG/1
TABLET ORAL
Qty: 120 TABLET | Refills: 0 | Status: SHIPPED | OUTPATIENT
Start: 2021-12-21 | End: 2022-01-24 | Stop reason: SDUPTHER

## 2021-12-21 RX ORDER — TRAZODONE HYDROCHLORIDE 300 MG/1
TABLET ORAL
Qty: 180 TABLET | Refills: 1 | Status: SHIPPED | OUTPATIENT
Start: 2021-12-21 | End: 2022-06-20 | Stop reason: SDUPTHER

## 2021-12-28 DIAGNOSIS — M48.061 SPINAL STENOSIS OF LUMBAR REGION, UNSPECIFIED WHETHER NEUROGENIC CLAUDICATION PRESENT: ICD-10-CM

## 2021-12-28 DIAGNOSIS — E78.5 HYPERLIPIDEMIA, UNSPECIFIED HYPERLIPIDEMIA TYPE: ICD-10-CM

## 2021-12-28 RX ORDER — ATORVASTATIN CALCIUM 20 MG/1
20 TABLET, FILM COATED ORAL DAILY
Qty: 90 TABLET | Refills: 1 | Status: SHIPPED | OUTPATIENT
Start: 2021-12-28 | End: 2022-06-30

## 2021-12-28 RX ORDER — OXYCODONE AND ACETAMINOPHEN 10; 325 MG/1; MG/1
1 TABLET ORAL EVERY 4 HOURS PRN
Qty: 120 TABLET | Refills: 0 | Status: SHIPPED | OUTPATIENT
Start: 2021-12-28 | End: 2022-01-31 | Stop reason: SDUPTHER

## 2022-01-24 DIAGNOSIS — M48.061 SPINAL STENOSIS OF LUMBAR REGION, UNSPECIFIED WHETHER NEUROGENIC CLAUDICATION PRESENT: ICD-10-CM

## 2022-01-24 RX ORDER — OXYCODONE HYDROCHLORIDE 10 MG/1
TABLET ORAL
Qty: 120 TABLET | Refills: 0 | Status: SHIPPED | OUTPATIENT
Start: 2022-01-24 | End: 2022-02-22 | Stop reason: SDUPTHER

## 2022-01-31 DIAGNOSIS — M48.061 SPINAL STENOSIS OF LUMBAR REGION, UNSPECIFIED WHETHER NEUROGENIC CLAUDICATION PRESENT: ICD-10-CM

## 2022-01-31 RX ORDER — OXYCODONE AND ACETAMINOPHEN 10; 325 MG/1; MG/1
1 TABLET ORAL EVERY 4 HOURS PRN
Qty: 120 TABLET | Refills: 0 | Status: SHIPPED | OUTPATIENT
Start: 2022-01-31 | End: 2022-03-03 | Stop reason: SDUPTHER

## 2022-02-22 DIAGNOSIS — G89.29 CHRONIC BACK PAIN, UNSPECIFIED BACK LOCATION, UNSPECIFIED BACK PAIN LATERALITY: ICD-10-CM

## 2022-02-22 DIAGNOSIS — M54.9 CHRONIC BACK PAIN, UNSPECIFIED BACK LOCATION, UNSPECIFIED BACK PAIN LATERALITY: ICD-10-CM

## 2022-02-22 DIAGNOSIS — M48.061 SPINAL STENOSIS OF LUMBAR REGION, UNSPECIFIED WHETHER NEUROGENIC CLAUDICATION PRESENT: ICD-10-CM

## 2022-02-22 RX ORDER — CARISOPRODOL 350 MG/1
350 TABLET ORAL 4 TIMES DAILY
Qty: 360 TABLET | Refills: 1 | Status: SHIPPED | OUTPATIENT
Start: 2022-02-22

## 2022-02-22 RX ORDER — OXYCODONE HYDROCHLORIDE 10 MG/1
TABLET ORAL
Qty: 120 TABLET | Refills: 0 | Status: SHIPPED | OUTPATIENT
Start: 2022-02-22 | End: 2022-03-25 | Stop reason: SDUPTHER

## 2022-02-22 NOTE — TELEPHONE ENCOUNTER
Patient requesting refill for oxycodone 10mg to be sent to [facility]  He can be reached at [phone]

## 2022-03-03 DIAGNOSIS — M48.061 SPINAL STENOSIS OF LUMBAR REGION, UNSPECIFIED WHETHER NEUROGENIC CLAUDICATION PRESENT: ICD-10-CM

## 2022-03-03 RX ORDER — OXYCODONE AND ACETAMINOPHEN 10; 325 MG/1; MG/1
1 TABLET ORAL EVERY 4 HOURS PRN
Qty: 120 TABLET | Refills: 0 | Status: SHIPPED | OUTPATIENT
Start: 2022-03-03 | End: 2022-04-04 | Stop reason: SDUPTHER

## 2022-03-03 NOTE — TELEPHONE ENCOUNTER
Patient is requesting a refill for percocet to be sent to [facility]  He can be reached at [phone]

## 2022-03-25 DIAGNOSIS — M48.061 SPINAL STENOSIS OF LUMBAR REGION, UNSPECIFIED WHETHER NEUROGENIC CLAUDICATION PRESENT: ICD-10-CM

## 2022-03-28 DIAGNOSIS — M48.061 SPINAL STENOSIS OF LUMBAR REGION, UNSPECIFIED WHETHER NEUROGENIC CLAUDICATION PRESENT: ICD-10-CM

## 2022-03-28 RX ORDER — OXYCODONE HYDROCHLORIDE 10 MG/1
TABLET ORAL
Qty: 120 TABLET | Refills: 0 | Status: SHIPPED | OUTPATIENT
Start: 2022-03-28 | End: 2022-04-25 | Stop reason: SDUPTHER

## 2022-03-28 RX ORDER — OXYCODONE HYDROCHLORIDE 10 MG/1
TABLET ORAL
Qty: 120 TABLET | Refills: 0 | OUTPATIENT
Start: 2022-03-28

## 2022-03-29 ENCOUNTER — HOSPITAL ENCOUNTER (OUTPATIENT)
Dept: BONE DENSITY | Facility: HOSPITAL | Age: 77
Discharge: HOME | End: 2022-03-29
Payer: COMMERCIAL

## 2022-03-29 DIAGNOSIS — R29.890 HEIGHT LOSS: ICD-10-CM

## 2022-03-29 DIAGNOSIS — L40.50 PSORIATIC ARTHRITIS (HCC): ICD-10-CM

## 2022-03-29 PROCEDURE — 77080 DXA BONE DENSITY AXIAL: CPT

## 2022-03-30 ENCOUNTER — APPOINTMENT (OUTPATIENT)
Dept: LAB | Facility: MEDICAL CENTER | Age: 77
End: 2022-03-30
Payer: COMMERCIAL

## 2022-03-30 DIAGNOSIS — Z79.899 HIGH RISK MEDICATION USE: ICD-10-CM

## 2022-03-30 DIAGNOSIS — M76.60 ACHILLES TENDINITIS: ICD-10-CM

## 2022-03-30 DIAGNOSIS — M25.50 PAIN IN JOINT: ICD-10-CM

## 2022-03-30 DIAGNOSIS — L40.50 PSA (PSORIATIC ARTHRITIS) (HCC): Primary | ICD-10-CM

## 2022-03-30 DIAGNOSIS — Z12.5 SCREENING FOR PROSTATE CANCER: ICD-10-CM

## 2022-03-30 DIAGNOSIS — Z47.89 ORTHOPEDIC AFTERCARE: ICD-10-CM

## 2022-03-30 DIAGNOSIS — R73.9 HYPERGLYCEMIA: ICD-10-CM

## 2022-03-30 DIAGNOSIS — G89.4 CHRONIC PAIN SYNDROME: ICD-10-CM

## 2022-03-30 DIAGNOSIS — M19.90 OSTEOARTHRITIS: ICD-10-CM

## 2022-03-30 DIAGNOSIS — G89.29 OTHER CHRONIC PAIN: ICD-10-CM

## 2022-03-30 DIAGNOSIS — I25.10 ATHEROSCLEROSIS OF NATIVE CORONARY ARTERY OF NATIVE HEART WITHOUT ANGINA PECTORIS: ICD-10-CM

## 2022-03-30 DIAGNOSIS — G56.00 CARPAL TUNNEL SYNDROME: ICD-10-CM

## 2022-03-30 DIAGNOSIS — Z79.899 ENCOUNTER FOR LONG-TERM (CURRENT) USE OF OTHER MEDICATIONS: ICD-10-CM

## 2022-03-30 DIAGNOSIS — M05.9 RHEUMATOID ARTHRITIS WITH RHEUMATOID FACTOR, UNSPECIFIED (HCC): ICD-10-CM

## 2022-03-30 DIAGNOSIS — M06.9 RHEUMATOID ARTHRITIS (HCC): ICD-10-CM

## 2022-03-30 DIAGNOSIS — G62.9 POLYNEUROPATHY: ICD-10-CM

## 2022-03-30 DIAGNOSIS — D49.2 NEOPLASM OF UNSPECIFIED BEHAVIOR OF BONE, SOFT TISSUE, AND SKIN: ICD-10-CM

## 2022-03-30 DIAGNOSIS — J30.9 ALLERGIC RHINITIS: ICD-10-CM

## 2022-03-30 DIAGNOSIS — E78.5 HYPERLIPIDEMIA: ICD-10-CM

## 2022-03-30 DIAGNOSIS — M54.50 LOW BACK PAIN: ICD-10-CM

## 2022-03-30 DIAGNOSIS — G47.00 INSOMNIA: ICD-10-CM

## 2022-03-30 DIAGNOSIS — L20.9 ATOPIC DERMATITIS: ICD-10-CM

## 2022-03-30 DIAGNOSIS — I10 ESSENTIAL (PRIMARY) HYPERTENSION: ICD-10-CM

## 2022-03-30 DIAGNOSIS — L40.50 PSORIATIC ARTHRITIS (HCC): ICD-10-CM

## 2022-03-30 DIAGNOSIS — E78.5 HYPERLIPIDEMIA, UNSPECIFIED HYPERLIPIDEMIA TYPE: ICD-10-CM

## 2022-03-30 LAB
ALBUMIN SERPL BCP-MCNC: 4.2 G/DL (ref 3.5–5)
ALP SERPL-CCNC: 63 U/L (ref 46–116)
ALT SERPL W P-5'-P-CCNC: 31 U/L (ref 12–78)
ANION GAP SERPL CALCULATED.3IONS-SCNC: 8 MMOL/L (ref 4–13)
AST SERPL W P-5'-P-CCNC: 27 U/L (ref 5–45)
BACTERIA UR QL AUTO: ABNORMAL /HPF
BACTERIA UR QL AUTO: ABNORMAL /HPF
BASOPHILS # BLD AUTO: 0.04 THOUSANDS/ΜL (ref 0–0.1)
BASOPHILS NFR BLD AUTO: 1 % (ref 0–1)
BILIRUB SERPL-MCNC: 0.47 MG/DL (ref 0.2–1)
BILIRUB UR QL STRIP: NEGATIVE
BILIRUB UR QL STRIP: NEGATIVE
BUN SERPL-MCNC: 22 MG/DL (ref 5–25)
CALCIUM SERPL-MCNC: 9.4 MG/DL (ref 8.3–10.1)
CHLORIDE SERPL-SCNC: 99 MMOL/L (ref 100–108)
CHOLEST SERPL-MCNC: 151 MG/DL
CLARITY UR: CLEAR
CLARITY UR: CLEAR
CO2 SERPL-SCNC: 25 MMOL/L (ref 21–32)
COLOR UR: ABNORMAL
COLOR UR: ABNORMAL
CREAT SERPL-MCNC: 1.33 MG/DL (ref 0.6–1.3)
EOSINOPHIL # BLD AUTO: 0.19 THOUSAND/ΜL (ref 0–0.61)
EOSINOPHIL NFR BLD AUTO: 3 % (ref 0–6)
ERYTHROCYTE [DISTWIDTH] IN BLOOD BY AUTOMATED COUNT: 13.1 % (ref 11.6–15.1)
EST. AVERAGE GLUCOSE BLD GHB EST-MCNC: 126 MG/DL
GFR SERPL CREATININE-BSD FRML MDRD: 52 ML/MIN/1.73SQ M
GLUCOSE P FAST SERPL-MCNC: 113 MG/DL (ref 65–99)
GLUCOSE UR STRIP-MCNC: NEGATIVE MG/DL
GLUCOSE UR STRIP-MCNC: NEGATIVE MG/DL
HBA1C MFR BLD: 6 %
HCT VFR BLD AUTO: 45.8 % (ref 36.5–49.3)
HDLC SERPL-MCNC: 46 MG/DL
HGB BLD-MCNC: 14.7 G/DL (ref 12–17)
HGB UR QL STRIP.AUTO: NEGATIVE
HGB UR QL STRIP.AUTO: NEGATIVE
HYALINE CASTS #/AREA URNS LPF: ABNORMAL /LPF
HYALINE CASTS #/AREA URNS LPF: ABNORMAL /LPF
IMM GRANULOCYTES # BLD AUTO: 0.02 THOUSAND/UL (ref 0–0.2)
IMM GRANULOCYTES NFR BLD AUTO: 0 % (ref 0–2)
KETONES UR STRIP-MCNC: NEGATIVE MG/DL
KETONES UR STRIP-MCNC: NEGATIVE MG/DL
LDLC SERPL CALC-MCNC: 86 MG/DL (ref 0–100)
LEUKOCYTE ESTERASE UR QL STRIP: NEGATIVE
LEUKOCYTE ESTERASE UR QL STRIP: NEGATIVE
LYMPHOCYTES # BLD AUTO: 1.73 THOUSANDS/ΜL (ref 0.6–4.47)
LYMPHOCYTES NFR BLD AUTO: 28 % (ref 14–44)
MCH RBC QN AUTO: 29.1 PG (ref 26.8–34.3)
MCHC RBC AUTO-ENTMCNC: 32.1 G/DL (ref 31.4–37.4)
MCV RBC AUTO: 91 FL (ref 82–98)
MONOCYTES # BLD AUTO: 0.52 THOUSAND/ΜL (ref 0.17–1.22)
MONOCYTES NFR BLD AUTO: 8 % (ref 4–12)
MUCOUS THREADS UR QL AUTO: ABNORMAL
MUCOUS THREADS UR QL AUTO: ABNORMAL
NEUTROPHILS # BLD AUTO: 3.8 THOUSANDS/ΜL (ref 1.85–7.62)
NEUTS SEG NFR BLD AUTO: 60 % (ref 43–75)
NITRITE UR QL STRIP: NEGATIVE
NITRITE UR QL STRIP: NEGATIVE
NON-SQ EPI CELLS URNS QL MICRO: ABNORMAL /HPF
NON-SQ EPI CELLS URNS QL MICRO: ABNORMAL /HPF
NONHDLC SERPL-MCNC: 105 MG/DL
NRBC BLD AUTO-RTO: 0 /100 WBCS
PH UR STRIP.AUTO: 6 [PH]
PH UR STRIP.AUTO: 6 [PH]
PLATELET # BLD AUTO: 251 THOUSANDS/UL (ref 149–390)
PMV BLD AUTO: 9.4 FL (ref 8.9–12.7)
POTASSIUM SERPL-SCNC: 4.5 MMOL/L (ref 3.5–5.3)
PROT SERPL-MCNC: 8 G/DL (ref 6.4–8.2)
PROT UR STRIP-MCNC: ABNORMAL MG/DL
PROT UR STRIP-MCNC: ABNORMAL MG/DL
PSA SERPL-MCNC: 0.4 NG/ML (ref 0–4)
RBC # BLD AUTO: 5.05 MILLION/UL (ref 3.88–5.62)
RBC #/AREA URNS AUTO: ABNORMAL /HPF
RBC #/AREA URNS AUTO: ABNORMAL /HPF
SODIUM SERPL-SCNC: 132 MMOL/L (ref 136–145)
SP GR UR STRIP.AUTO: 1.02 (ref 1–1.03)
SP GR UR STRIP.AUTO: 1.02 (ref 1–1.03)
TRIGL SERPL-MCNC: 95 MG/DL
UROBILINOGEN UR STRIP-ACNC: <2 MG/DL
UROBILINOGEN UR STRIP-ACNC: <2 MG/DL
WBC # BLD AUTO: 6.3 THOUSAND/UL (ref 4.31–10.16)
WBC #/AREA URNS AUTO: ABNORMAL /HPF
WBC #/AREA URNS AUTO: ABNORMAL /HPF

## 2022-03-30 PROCEDURE — 83036 HEMOGLOBIN GLYCOSYLATED A1C: CPT

## 2022-03-30 PROCEDURE — 81001 URINALYSIS AUTO W/SCOPE: CPT | Performed by: FAMILY MEDICINE

## 2022-03-30 PROCEDURE — 80061 LIPID PANEL: CPT

## 2022-03-30 PROCEDURE — G0103 PSA SCREENING: HCPCS

## 2022-03-30 PROCEDURE — 36415 COLL VENOUS BLD VENIPUNCTURE: CPT

## 2022-03-30 PROCEDURE — 80053 COMPREHEN METABOLIC PANEL: CPT

## 2022-03-30 PROCEDURE — 85025 COMPLETE CBC W/AUTO DIFF WBC: CPT

## 2022-03-30 PROCEDURE — 81001 URINALYSIS AUTO W/SCOPE: CPT

## 2022-04-04 DIAGNOSIS — M48.061 SPINAL STENOSIS OF LUMBAR REGION, UNSPECIFIED WHETHER NEUROGENIC CLAUDICATION PRESENT: ICD-10-CM

## 2022-04-04 RX ORDER — OXYCODONE AND ACETAMINOPHEN 10; 325 MG/1; MG/1
1 TABLET ORAL EVERY 4 HOURS PRN
Qty: 120 TABLET | Refills: 0 | Status: SHIPPED | OUTPATIENT
Start: 2022-04-04 | End: 2022-05-03 | Stop reason: SDUPTHER

## 2022-04-18 ENCOUNTER — RA CDI HCC (OUTPATIENT)
Dept: OTHER | Facility: HOSPITAL | Age: 77
End: 2022-04-18

## 2022-04-18 NOTE — PROGRESS NOTES
[facility]  coding opportunities          Chart Reviewed number of suggestions sent to Provider: 1   M05 9   Patients Insurance     Medicare Insurance: [employer]

## 2022-04-25 ENCOUNTER — OFFICE VISIT (OUTPATIENT)
Dept: FAMILY MEDICINE CLINIC | Facility: CLINIC | Age: 77
End: 2022-04-25
Payer: COMMERCIAL

## 2022-04-25 VITALS
HEART RATE: 74 BPM | WEIGHT: 193.4 LBS | TEMPERATURE: 97.2 F | DIASTOLIC BLOOD PRESSURE: 82 MMHG | HEIGHT: 66 IN | BODY MASS INDEX: 31.08 KG/M2 | OXYGEN SATURATION: 96 % | RESPIRATION RATE: 18 BRPM | SYSTOLIC BLOOD PRESSURE: 128 MMHG

## 2022-04-25 DIAGNOSIS — I10 BENIGN ESSENTIAL HYPERTENSION: ICD-10-CM

## 2022-04-25 DIAGNOSIS — G89.4 CHRONIC PAIN SYNDROME: ICD-10-CM

## 2022-04-25 DIAGNOSIS — F11.20 CONTINUOUS OPIOID DEPENDENCE (HCC): ICD-10-CM

## 2022-04-25 DIAGNOSIS — M05.9 RHEUMATOID ARTHRITIS WITH RHEUMATOID FACTOR, UNSPECIFIED (HCC): Primary | ICD-10-CM

## 2022-04-25 DIAGNOSIS — N18.30 STAGE 3 CHRONIC KIDNEY DISEASE, UNSPECIFIED WHETHER STAGE 3A OR 3B CKD (HCC): ICD-10-CM

## 2022-04-25 DIAGNOSIS — Z13.88 RADIATION EXPOSURE SCREEN: ICD-10-CM

## 2022-04-25 DIAGNOSIS — B36.9 FUNGAL DERMATITIS: ICD-10-CM

## 2022-04-25 DIAGNOSIS — M48.061 SPINAL STENOSIS OF LUMBAR REGION, UNSPECIFIED WHETHER NEUROGENIC CLAUDICATION PRESENT: ICD-10-CM

## 2022-04-25 PROCEDURE — 1003F LEVEL OF ACTIVITY ASSESS: CPT | Performed by: FAMILY MEDICINE

## 2022-04-25 PROCEDURE — 3725F SCREEN DEPRESSION PERFORMED: CPT | Performed by: FAMILY MEDICINE

## 2022-04-25 PROCEDURE — 1036F TOBACCO NON-USER: CPT | Performed by: FAMILY MEDICINE

## 2022-04-25 PROCEDURE — 1160F RVW MEDS BY RX/DR IN RCRD: CPT | Performed by: FAMILY MEDICINE

## 2022-04-25 PROCEDURE — 99214 OFFICE O/P EST MOD 30 MIN: CPT | Performed by: FAMILY MEDICINE

## 2022-04-25 PROCEDURE — 3008F BODY MASS INDEX DOCD: CPT | Performed by: FAMILY MEDICINE

## 2022-04-25 RX ORDER — OXYCODONE HYDROCHLORIDE 10 MG/1
TABLET ORAL
Qty: 120 TABLET | Refills: 0 | Status: SHIPPED | OUTPATIENT
Start: 2022-04-25 | End: 2022-05-26 | Stop reason: SDUPTHER

## 2022-04-25 RX ORDER — KETOCONAZOLE 20 MG/G
CREAM TOPICAL 2 TIMES DAILY
Qty: 30 G | Refills: 1 | Status: SHIPPED | OUTPATIENT
Start: 2022-04-25

## 2022-04-25 RX ORDER — NALOXONE HYDROCHLORIDE 4 MG/.1ML
SPRAY NASAL
Qty: 1 EACH | Refills: 1 | Status: SHIPPED | OUTPATIENT
Start: 2022-04-25

## 2022-04-25 NOTE — PROGRESS NOTES
Assessment/Plan:     Diagnoses and all orders for this visit:    Rheumatoid arthritis with rheumatoid factor, unspecified (HCC)    Continuous opioid dependence (HCC)  -     naloxone (NARCAN) 4 mg/0 1 mL nasal spray; Administer 1 spray into a nostril  If no response after 2-3 minutes, give another dose in the other nostril using a new spray    -     [facility] All Prescribed Meds  -     Amphetamines, Methamphetamines  -     Butalbital  -     Phenobarbital  -     Secobarbital  -     Temazepam  -     Alprazolam  -     Clonazepam  -     Diazepam  -     Lorazepam  -     Oxazepam  -     Gabapentin  -     Pregabalin  -     Cocaine  -     Heroin  -     Buprenorphine  -     Levorphanol  -     Meperidine  -     Naltrexone  -     Fentanyl  -     Methadone  -     Oxycodone  -     Oxymorphone  -     Tapentadol  -     Tramadol  -     Codeine, Hydrocodone, Hydropmorphone, Morphine  -     Bath Salts  -     Kratom  -     Spice  -     Methylphenidate  -     Phentermine  -     Validity Oxidant  -     Validity Creatinine  -     Validity pH  -     Validity Specific    Stage 3 chronic kidney disease, unspecified whether stage 3a or 3b CKD ([facility])    Benign essential hypertension    Spinal stenosis of lumbar region, unspecified whether neurogenic claudication present  -     oxyCODONE (ROXICODONE) 10 MG TABS; Take 1 tablet 4 times daily    Chronic pain syndrome  -     [facility] All Prescribed Meds  -     Amphetamines, Methamphetamines  -     Butalbital  -     Phenobarbital  -     Secobarbital  -     Temazepam  -     Alprazolam  -     Clonazepam  -     Diazepam  -     Lorazepam  -     Oxazepam  -     Gabapentin  -     Pregabalin  -     Cocaine  -     Heroin  -     Buprenorphine  -     Levorphanol  -     Meperidine  -     Naltrexone  -     Fentanyl  -     Methadone  -     Oxycodone  -     Oxymorphone  -     Tapentadol  -     Tramadol  -     Codeine, Hydrocodone, Hydropmorphone, Morphine  -     Bath Salts  -     Kratom  -     Spice  - Methylphenidate  -     Phentermine  -     Validity Oxidant  -     Validity Creatinine  -     Validity pH  -     Validity Specific    Radiation exposure screen  -     Potassium Iodide, Antidote, 130 MG TABS; Take 1 tablet (130 mg total) by mouth in the morning    Fungal dermatitis  -     ketoconazole (NIZORAL) 2 % cream; Apply topically 2 (two) times a day      overall patient doing well   His recent labs are normal   He is still following with Rheumatology recent as shot his shoulder which is helped shoulder   We did UDS him and have him sign updated narcotic contract due to his chronic pain medicine usage   Patient requested a refill of his potassium iodide pills cause [place] a very [place]  Ketoconazole refilled      Subjective:     Chief Complaint   Patient presents with    6 month follow-up     Patient reports that he accidently ran into his doorframe and hit his right shoulder about 5 weeks ago  He's had a steroid shot and felt good, but hurt it again opening a window    Medication Refill     Ketoconazole cream, Potassium iodine        Patient ID: Delicia Cervantes is a 78 y o  male  Patient is here for six-month checkup chronic conditions  He has no acute complaints other than some shoulder pain  He has seen Rheumatology and had an injection in his shoulder  Otherwise needs refills on medications and is doing well      The following portions of the patient's history were reviewed and updated as appropriate: allergies, current medications, past family history, past medical history, past social history, past surgical history and problem list     Review of Systems   Constitutional: Negative  HENT: Negative  Eyes: Negative  Respiratory: Negative  Cardiovascular: Negative  Gastrointestinal: Negative  Endocrine: Negative  Genitourinary: Negative  Musculoskeletal: Positive for arthralgias  Skin: Negative  Allergic/Immunologic: Negative  Neurological: Negative  Hematological: Negative  Psychiatric/Behavioral: Negative  All other systems reviewed and are negative  Objective:    Vitals:    04/25/22 1037   BP: 128/82   BP Location: Left arm   Patient Position: Sitting   Cuff Size: Standard   Pulse: 74   Resp: 18   Temp: (!) 97 2 °F (36 2 °C)   TempSrc: Axillary   SpO2: 96%   Weight: 87 7 kg (193 lb 6 4 oz)   Height: 5' 5 5" (1 664 m)          Physical Exam  Vitals and nursing note reviewed  Constitutional:       General: He is not in acute distress  Appearance: He is well-developed  HENT:      Head: Normocephalic  Right Ear: External ear normal       Left Ear: External ear normal       Nose: Nose normal    Eyes:      General:         Right eye: No discharge  Left eye: No discharge  Conjunctiva/sclera: Conjunctivae normal       Pupils: Pupils are equal, round, and reactive to light  Cardiovascular:      Rate and Rhythm: Normal rate and regular rhythm  Heart sounds: Normal heart sounds  Pulmonary:      Effort: Pulmonary effort is normal       Breath sounds: Normal breath sounds  Abdominal:      General: Bowel sounds are normal  There is no distension  Palpations: Abdomen is soft  Tenderness: There is no abdominal tenderness  Musculoskeletal:         General: Normal range of motion  Cervical back: Normal range of motion  Skin:     General: Skin is warm and dry  Findings: No rash  Neurological:      Mental Status: He is alert and oriented to person, place, and time  Cranial Nerves: No cranial nerve deficit  Psychiatric:         Behavior: Behavior normal          Thought Content: Thought content normal          Judgment: Judgment normal          BMI Counseling: Body mass index is 31 69 kg/m²   The BMI is above normal  Nutrition recommendations include reducing portion sizes, decreasing overall calorie intake, 3-5 servings of fruits/vegetables daily, reducing fast food intake, consuming healthier snacks, decreasing soda and/or juice intake, moderation in carbohydrate intake, increasing intake of lean protein, reducing intake of saturated fat and trans fat and reducing intake of cholesterol  Exercise recommendations include exercising 3-5 times per week  BMI Counseling: Body mass index is 31 69 kg/m²  The BMI is above normal  Nutrition recommendations include decreasing portion sizes, encouraging healthy choices of fruits and vegetables, decreasing fast food intake, consuming healthier snacks, limiting drinks that contain sugar, moderation in carbohydrate intake, increasing intake of lean protein, reducing intake of saturated and trans fat and reducing intake of cholesterol  Exercise recommendations include exercising 3-5 times per week  Rationale for BMI follow-up plan is due to patient being overweight or obese  Depression Screening and Follow-up Plan: Patient was screened for depression during today's encounter  They screened negative with a PHQ-2 score of 0

## 2022-04-25 NOTE — PATIENT INSTRUCTIONS
Low Fat Diet   AMBULATORY CARE:   A low-fat diet  is an eating plan that is low in total fat, unhealthy fat, and cholesterol  You may need to follow a low-fat diet if you have trouble digesting or absorbing fat  You may also need to follow this diet if you have high cholesterol  You can also lower your cholesterol by increasing the amount of fiber in your diet  Soluble fiber is a type of fiber that helps to decrease cholesterol levels  Different types of fat in food:   · Limit unhealthy fats  A diet that is high in cholesterol, saturated fat, and trans fat may cause unhealthy cholesterol levels  Unhealthy cholesterol levels increase your risk of heart disease  ? Cholesterol:  Limit intake of cholesterol to less than 200 mg per day  Cholesterol is found in meat, eggs, and dairy  ? Saturated fat:  Limit saturated fat to less than 7% of your total daily calories  Ask your dietitian how many calories you need each day  Saturated fat is found in butter, cheese, ice cream, whole milk, and palm oil  Saturated fat is also found in meat, such as beef, pork, chicken skin, and processed meats  Processed meats include sausage, hot dogs, and bologna  ? Trans fat:  Avoid trans fat as much as possible  Trans fat is used in fried and baked foods  Foods that say trans fat free on the label may still have up to 0 5 grams of trans fat per serving  · Include healthy fats  Replace foods that are high in saturated and trans fat with foods high in healthy fats  This may help to decrease high cholesterol levels  ? Monounsaturated fats: These are found in avocados, nuts, and vegetable oils, such as olive, canola, and sunflower oil  ? Polyunsaturated fats: These can be found in vegetable oils, such as soybean or corn oil  Omega-3 fats can help to decrease the risk of heart disease  Omega-3 fats are found in fish, such as salmon, herring, trout, and tuna   Omega-3 fats can also be found in plant foods, such as walnuts, flaxseed, soybeans, and canola oil  Foods to limit or avoid:   · Grains:      ? Snacks that are made with partially hydrogenated oils, such as chips, regular crackers, and butter-flavored popcorn    ? High-fat baked goods, such as biscuits, croissants, doughnuts, pies, cookies, and pastries    · Dairy:      ? Whole milk, 2% milk, and yogurt and ice cream made with whole milk    ? Half and half creamer, heavy cream, and whipping cream    ? Cheese, cream cheese, and sour cream    · Meats and proteins:      ? High-fat cuts of meat (T-bone steak, regular hamburger, and ribs)    ? Fried meat, poultry (turkey and chicken), and fish    ? Poultry (chicken and turkey) with skin    ? Cold cuts (salami or bologna), hot dogs, ward, and sausage    ? Whole eggs and egg yolks    · Vegetables and fruits with added fat:      ? Fried vegetables or vegetables in butter or high-fat sauces, such as cream or cheese sauces    ? Fried fruit or fruit served with butter or cream    · Fats:      ? Butter, stick margarine, and shortening    ? Coconut, palm oil, and palm kernel oil    Foods to include:   · Grains:      ? Whole-grain breads, cereals, pasta, and brown rice    ? Low-fat crackers and pretzels    · Vegetables and fruits:      ? Fresh, frozen, or canned vegetables (no salt or low-sodium)    ? Fresh, frozen, dried, or canned fruit (canned in light syrup or fruit juice)    ? Avocado    · Low-fat dairy products:      ? Nonfat (skim) or 1% milk    ? Nonfat or low-fat cheese, yogurt, and cottage cheese    · Meats and proteins:      ? Chicken or turkey with no skin    ? Baked or broiled fish    ? Lean beef and pork (loin, round, extra lean hamburger)    ? Beans and peas, unsalted nuts, soy products    ? Egg whites and substitutes    ? Seeds and nuts    · Fats:      ? Unsaturated oil, such as canola, olive, peanut, soybean, or sunflower oil    ? Soft or liquid margarine and vegetable oil spread    ?  Low-fat salad dressing    Other ways to decrease fat:   · Read food labels before you buy foods  Choose foods that have less than 30% of calories from fat  Choose low-fat or fat-free dairy products  Remember that fat free does not mean calorie free  These foods still contain calories, and too many calories can lead to weight gain  · Trim fat from meat and avoid fried food  Trim all visible fat from meat before you cook it  Remove the skin from poultry  Do not hawley meat, fish, or poultry  Bake, roast, boil, or broil these foods instead  Avoid fried foods  Eat a baked potato instead of [place] fries  Steam vegetables instead of sautéing them in butter  · Add less fat to foods  Use imitation ward bits on salads and baked potatoes instead of regular ward bits  Use fat-free or low-fat salad dressings instead of regular dressings  Use low-fat or nonfat butter-flavored topping instead of regular butter or margarine on popcorn and other foods  Ways to decrease fat in recipes:  Replace high-fat ingredients with low-fat or nonfat ones  This may cause baked goods to be drier than usual  You may need to use nonfat cooking spray on pans to prevent food from sticking  You also may need to change the amount of other ingredients, such as water, in the recipe  Try the following:  · Use low-fat or light margarine instead of regular margarine or shortening  · Use lean ground turkey breast or chicken, or lean ground beef (less than 5% fat) instead of hamburger  · Add 1 teaspoon of canola oil to 8 ounces of skim milk instead of using cream or half and half  · Use grated zucchini, carrots, or apples in breads instead of coconut  · Use blenderized, low-fat cottage cheese, plain tofu, or low-fat ricotta cheese instead of cream cheese  · Use 1 egg white and 1 teaspoon of canola oil, or use ¼ cup (2 ounces) of fat-free egg substitute instead of a whole egg       · Replace half of the oil that is called for in a recipe with applesauce when you bake  Use 3 tablespoons of cocoa powder and 1 tablespoon of canola oil instead of a square of baking chocolate  How to increase fiber:  Eat enough high-fiber foods to get 20 to 30 grams of fiber every day  Slowly increase your fiber intake to avoid stomach cramps, gas, and other problems  · Eat 3 ounces of whole-grain foods each day  An ounce is about 1 slice of bread  Eat whole-grain breads, such as whole-wheat bread  Whole wheat, whole-wheat flour, or other whole grains should be listed as the first ingredient on the food label  Replace white flour with whole-grain flour or use half of each in recipes  Whole-grain flour is heavier than white flour, so you may have to add more yeast or baking powder  · Eat a high-fiber cereal for breakfast   Oatmeal is a good source of soluble fiber  Look for cereals that have bran or fiber in the name  Choose whole-grain products, such as brown rice, barley, and whole-wheat pasta  · Eat more beans, peas, and lentils  For example, add beans to soups or salads  Eat at least 5 cups of fruits and vegetables each day  Eat fruits and vegetables with the peel because the peel is high in fiber  © Copyright [address] 2022 Information is for End User's use only and may not be sold, redistributed or otherwise used for commercial purposes  All illustrations and images included in CareNotes® are the copyrighted property of A D [employer] M [employer]  or [address] [place], [place] [address]   The above information is an  only  It is not intended as medical advice for individual conditions or treatments  Talk to your doctor, nurse or pharmacist before following any medical regimen to see if it is safe and effective for you      Goals of care:  Maximize your health and quality of life by:   · Increasing your level of function and activity  · Decreasing the negative effects of pain on your life  · Minimizing the risks and side effects of medications and ensuring safe use of opioid medication     Ways for you to help meet your goals:  Maintain a healthy lifestyle  This includes proper nutrition, regular physical activity as able, try for 8 hours of sleep per night, use stress reduction strategies, avoid triggers  Risks and side effects of opioid use:  Prescription opioids carry serious risks of addiction and  overdose, especially with prolonged use  An opioid overdose,  often marked by slowed breathing, can cause sudden death  The  use of prescription opioids can have a number of side effects as  well, even when taken as directed:  · Tolerance--meaning you might need to take more of a medication for the same pain relief  · Physical dependence--meaning you have symptoms of withdrawal when a medication is stopped  · Increased sensitivity to pain  · Constipation  · Nausea, vomiting, dry mouth  · Sleepiness and dizziness   · Confusion  · Depression  · Low levels of testosterone that can result in lower sex drive, energy, and strength  · Itching and sweating    If you are prescribed opioids for pain:  · Never take opioids in greater amounts or more often than prescribed  · Help prevent misuse and abuse  - Never sell or share prescription opioids         - Never use another persons prescription opioids  · Store prescription opioids in a secure place and out of reach of others (this may include visitors, children, friends, and family)  · Safely dispose of unused prescription opioids: Find your community drug take-back program or your pharmacy mail-back program, or flush them down the toilet, following guidance from the Food and Drug Administration (www fda gov/Drugs/ResourcesForYou)  · Visit www cdc gov/drugoverdose to learn about the risks of opioid abuse and overdose  · If you believe you may be struggling with addiction, tell your health care provider and ask for guidance or call [address] at 0-291-167-HELP     Goals of care:  Maximize your health and quality of life by: Increasing your level of function and activity  Decreasing the negative effects of pain on your life  Minimizing the risks and side effects of medications and ensuring safe use of opioid medication     Ways for you to help meet your goals:  Maintain a healthy lifestyle  This includes proper nutrition, regular physical activity as able, try for 8 hours of sleep per night, use stress reduction strategies, avoid triggers  Risks and side effects of opioid use:  Prescription opioids carry serious risks of addiction and  overdose, especially with prolonged use  An opioid overdose,  often marked by slowed breathing, can cause sudden death  The  use of prescription opioids can have a number of side effects as  well, even when taken as directed: Tolerance--meaning you might need to take more of a medication for the same pain relief  Physical dependence--meaning you have symptoms of withdrawal when a medication is stopped  Increased sensitivity to pain  Constipation  Nausea, vomiting, dry mouth  Sleepiness and dizziness   Confusion  Depression  Low levels of testosterone that can result in lower sex drive, energy, and strength  Itching and sweating    If you are prescribed opioids for pain:  Never take opioids in greater amounts or more often than prescribed  Help prevent misuse and abuse  - Never sell or share prescription opioids         - Never use another persons prescription opioids  Store prescription opioids in a secure place and out of reach of others (this may include visitors, children, friends, and family)  Safely dispose of unused prescription opioids: Find your community drug take-back program or your pharmacy mail-back program, or flush them down the toilet, following guidance from the Food and Drug Administration (www fda gov/Drugs/ResourcesForYou)  Visit www cdc gov/drugoverdose to learn about the risks of opioid abuse and overdose    If you believe you may be struggling with addiction, tell your health care provider and ask for guidance or call [address] at 3-779-233-HVTC

## 2022-04-27 LAB
6MAM UR QL CFM: NEGATIVE NG/ML
7AMINOCLONAZEPAM UR QL CFM: NEGATIVE NG/ML
A-OH ALPRAZ UR QL CFM: NEGATIVE NG/ML
ACCEPTABLE CREAT UR QL: NORMAL MG/DL
ACCEPTIBLE SP GR UR QL: NORMAL
AMPHET UR QL CFM: NEGATIVE NG/ML
BUPRENORPHINE UR QL CFM: NEGATIVE NG/ML
BUTALBITAL UR QL CFM: NEGATIVE NG/ML
BZE UR QL CFM: NEGATIVE NG/ML
CODEINE UR QL CFM: NEGATIVE NG/ML
EDDP UR QL CFM: NEGATIVE NG/ML
EUTYLONE UR QL: NEGATIVE NG/ML
FENTANYL UR QL CFM: NEGATIVE NG/ML
GLIADIN IGG SER IA-ACNC: NEGATIVE NG/ML
HYDROCODONE UR QL CFM: NEGATIVE NG/ML
HYDROMORPHONE UR QL CFM: NEGATIVE NG/ML
LORAZEPAM UR QL CFM: NEGATIVE NG/ML
ME-PHENIDATE UR QL CFM: NEGATIVE NG/ML
MEPERIDINE UR QL CFM: NEGATIVE NG/ML
METHADONE UR QL CFM: NEGATIVE NG/ML
METHAMPHET UR QL CFM: NEGATIVE NG/ML
MORPHINE UR QL CFM: NEGATIVE NG/ML
NALTREXONE UR QL CFM: NEGATIVE NG/ML
NITRITE UR QL: NORMAL UG/ML
NORBUPRENORPHINE UR QL CFM: NEGATIVE NG/ML
NORDIAZEPAM UR QL CFM: NEGATIVE NG/ML
NORFENTANYL UR QL CFM: NEGATIVE NG/ML
NORHYDROCODONE UR QL CFM: NEGATIVE NG/ML
NORMEPERIDINE UR QL CFM: NEGATIVE NG/ML
NOROXYCODONE UR QL CFM: NORMAL NG/ML
OXAZEPAM UR QL CFM: NEGATIVE NG/ML
OXYCODONE UR QL CFM: NORMAL NG/ML
OXYMORPHONE UR QL CFM: NORMAL NG/ML
OXYMORPHONE UR QL CFM: NORMAL NG/ML
PHENOBARB UR QL CFM: NEGATIVE NG/ML
RESULT ALL_PRESCRIBED MEDS AND SPECIAL INSTRUCTIONS: NORMAL
SECOBARBITAL UR QL CFM: NEGATIVE NG/ML
SL AMB 3-METHYL-FENTANYL QUANTIFICATION: NORMAL NG/ML
SL AMB 4-ANPP QUANTIFICATION: NORMAL NG/ML
SL AMB 4-FIBF QUANTIFICATION: NORMAL NG/ML
SL AMB 5F-ADB-M7 METABOLITE QUANTIFICATION: NEGATIVE NG/ML
SL AMB 7-OH-MITRAGYNINE (KRATOM ALKALOID) QUANTIFICATION: NEGATIVE NG/ML
SL AMB AB-FUBINACA-M3 METABOLITE QUANTIFICATION: NEGATIVE NG/ML
SL AMB ACETYL FENTANYL QUANTIFICATION: NORMAL NG/ML
SL AMB ACETYL NORFENTANYL QUANTIFICATION: NORMAL NG/ML
SL AMB ACRYL FENTANYL QUANTIFICATION: NORMAL NG/ML
SL AMB BUTRYL FENTANYL QUANTIFICATION: NORMAL NG/ML
SL AMB CARFENTANIL QUANTIFICATION: NORMAL NG/ML
SL AMB CYCLOPROPYL FENTANYL QUANTIFICATION: NORMAL NG/ML
SL AMB DEXTRORPHAN (DEXTROMETHORPHAN METABOLITE) QUANT: NEGATIVE NG/ML
SL AMB FURANYL FENTANYL QUANTIFICATION: NORMAL NG/ML
SL AMB GABAPENTIN QUANTIFICATION: NORMAL
SL AMB JWH018 METABOLITE QUANTIFICATION: NEGATIVE NG/ML
SL AMB JWH073 METABOLITE QUANTIFICATION: NEGATIVE NG/ML
SL AMB MDMB-FUBINACA-M1 METABOLITE QUANTIFICATION: NEGATIVE NG/ML
SL AMB METHOXYACETYL FENTANYL QUANTIFICATION: NORMAL NG/ML
SL AMB METHYLONE QUANTIFICATION: NORMAL NG/ML
SL AMB N-DESMETHYL U-47700 QUANTIFICATION: NORMAL NG/ML
SL AMB N-DESMETHYL-TRAMADOL QUANTIFICATION: NEGATIVE NG/ML
SL AMB PHENTERMINE QUANTIFICATION: NEGATIVE NG/ML
SL AMB PREGABALIN QUANTIFICATION: NEGATIVE
SL AMB RCS4 METABOLITE QUANTIFICATION: NEGATIVE NG/ML
SL AMB RITALINIC ACID QUANTIFICATION: NEGATIVE NG/ML
SL AMB U-47700 QUANTIFICATION: NORMAL NG/ML
SMOOTH MUSCLE AB TITR SER IF: NEGATIVE NG/ML
SPECIMEN DRAWN SERPL: NEGATIVE NG/ML
SPECIMEN PH ACCEPTABLE UR: NORMAL
TAPENTADOL UR QL CFM: NEGATIVE NG/ML
TEMAZEPAM UR QL CFM: NEGATIVE NG/ML
TEMAZEPAM UR QL CFM: NEGATIVE NG/ML
TRAMADOL UR QL CFM: NEGATIVE NG/ML
URATE/CREAT 24H UR: NEGATIVE NG/ML

## 2022-05-03 DIAGNOSIS — M48.061 SPINAL STENOSIS OF LUMBAR REGION, UNSPECIFIED WHETHER NEUROGENIC CLAUDICATION PRESENT: ICD-10-CM

## 2022-05-03 RX ORDER — OXYCODONE AND ACETAMINOPHEN 10; 325 MG/1; MG/1
1 TABLET ORAL EVERY 4 HOURS PRN
Qty: 120 TABLET | Refills: 0 | Status: SHIPPED | OUTPATIENT
Start: 2022-05-03 | End: 2022-06-06 | Stop reason: SDUPTHER

## 2022-05-26 DIAGNOSIS — M48.061 SPINAL STENOSIS OF LUMBAR REGION, UNSPECIFIED WHETHER NEUROGENIC CLAUDICATION PRESENT: ICD-10-CM

## 2022-05-26 RX ORDER — OXYCODONE HYDROCHLORIDE 10 MG/1
TABLET ORAL
Qty: 120 TABLET | Refills: 0 | Status: SHIPPED | OUTPATIENT
Start: 2022-05-26 | End: 2022-06-28 | Stop reason: SDUPTHER

## 2022-05-29 DIAGNOSIS — I10 BENIGN ESSENTIAL HYPERTENSION: ICD-10-CM

## 2022-05-31 ENCOUNTER — TELEPHONE (OUTPATIENT)
Dept: FAMILY MEDICINE CLINIC | Facility: CLINIC | Age: 77
End: 2022-05-31

## 2022-06-01 DIAGNOSIS — M25.511 CHRONIC RIGHT SHOULDER PAIN: Primary | ICD-10-CM

## 2022-06-01 DIAGNOSIS — G89.29 CHRONIC RIGHT SHOULDER PAIN: Primary | ICD-10-CM

## 2022-06-01 RX ORDER — LOSARTAN POTASSIUM 50 MG/1
TABLET ORAL
Qty: 90 TABLET | Refills: 3 | Status: SHIPPED | OUTPATIENT
Start: 2022-06-01

## 2022-06-01 NOTE — TELEPHONE ENCOUNTER
I ordered an x-ray of his right shoulder      However most likely he will likely need orthopedic evaluation

## 2022-06-01 NOTE — TELEPHONE ENCOUNTER
Spoke with patient and relayed information about the order placed for his shoulder, noted that he may need to see ortho  Patient would like to get the results of the xray first  Has no further needs to be addressed at this time

## 2022-06-06 DIAGNOSIS — M48.061 SPINAL STENOSIS OF LUMBAR REGION, UNSPECIFIED WHETHER NEUROGENIC CLAUDICATION PRESENT: ICD-10-CM

## 2022-06-06 RX ORDER — OXYCODONE AND ACETAMINOPHEN 10; 325 MG/1; MG/1
1 TABLET ORAL EVERY 4 HOURS PRN
Qty: 120 TABLET | Refills: 0 | Status: SHIPPED | OUTPATIENT
Start: 2022-06-06 | End: 2022-07-08 | Stop reason: SDUPTHER

## 2022-06-10 ENCOUNTER — HOSPITAL ENCOUNTER (OUTPATIENT)
Dept: RADIOLOGY | Facility: HOSPITAL | Age: 77
Discharge: HOME | End: 2022-06-10
Payer: COMMERCIAL

## 2022-06-10 DIAGNOSIS — M25.511 CHRONIC RIGHT SHOULDER PAIN: ICD-10-CM

## 2022-06-10 DIAGNOSIS — G89.29 CHRONIC RIGHT SHOULDER PAIN: ICD-10-CM

## 2022-06-10 PROCEDURE — 73030 X-RAY EXAM OF SHOULDER: CPT

## 2022-06-20 DIAGNOSIS — G47.00 INSOMNIA, UNSPECIFIED TYPE: ICD-10-CM

## 2022-06-20 RX ORDER — TRAZODONE HYDROCHLORIDE 300 MG/1
TABLET ORAL
Qty: 180 TABLET | Refills: 1 | Status: SHIPPED | OUTPATIENT
Start: 2022-06-20

## 2022-06-28 DIAGNOSIS — M48.061 SPINAL STENOSIS OF LUMBAR REGION, UNSPECIFIED WHETHER NEUROGENIC CLAUDICATION PRESENT: ICD-10-CM

## 2022-06-28 RX ORDER — OXYCODONE HYDROCHLORIDE 10 MG/1
TABLET ORAL
Qty: 120 TABLET | Refills: 0 | Status: SHIPPED | OUTPATIENT
Start: 2022-06-28 | End: 2022-07-27 | Stop reason: SDUPTHER

## 2022-06-30 DIAGNOSIS — E78.5 HYPERLIPIDEMIA, UNSPECIFIED HYPERLIPIDEMIA TYPE: ICD-10-CM

## 2022-06-30 RX ORDER — ATORVASTATIN CALCIUM 20 MG/1
TABLET, FILM COATED ORAL
Qty: 90 TABLET | Refills: 1 | Status: SHIPPED | OUTPATIENT
Start: 2022-06-30

## 2022-07-08 DIAGNOSIS — M48.061 SPINAL STENOSIS OF LUMBAR REGION, UNSPECIFIED WHETHER NEUROGENIC CLAUDICATION PRESENT: ICD-10-CM

## 2022-07-08 RX ORDER — OXYCODONE AND ACETAMINOPHEN 10; 325 MG/1; MG/1
1 TABLET ORAL EVERY 4 HOURS PRN
Qty: 120 TABLET | Refills: 0 | Status: SHIPPED | OUTPATIENT
Start: 2022-07-08 | End: 2022-08-10 | Stop reason: SDUPTHER

## 2022-07-18 ENCOUNTER — OFFICE VISIT (OUTPATIENT)
Dept: FAMILY MEDICINE CLINIC | Facility: CLINIC | Age: 77
End: 2022-07-18
Payer: COMMERCIAL

## 2022-07-18 VITALS
DIASTOLIC BLOOD PRESSURE: 54 MMHG | TEMPERATURE: 98.6 F | HEIGHT: 66 IN | SYSTOLIC BLOOD PRESSURE: 102 MMHG | BODY MASS INDEX: 32.05 KG/M2 | RESPIRATION RATE: 18 BRPM | HEART RATE: 55 BPM | WEIGHT: 199.4 LBS | OXYGEN SATURATION: 93 %

## 2022-07-18 DIAGNOSIS — M05.9 RHEUMATOID ARTHRITIS WITH RHEUMATOID FACTOR, UNSPECIFIED (HCC): ICD-10-CM

## 2022-07-18 DIAGNOSIS — G89.29 CHRONIC RIGHT SHOULDER PAIN: Primary | ICD-10-CM

## 2022-07-18 DIAGNOSIS — M25.511 CHRONIC RIGHT SHOULDER PAIN: Primary | ICD-10-CM

## 2022-07-18 PROCEDURE — 99213 OFFICE O/P EST LOW 20 MIN: CPT | Performed by: FAMILY MEDICINE

## 2022-07-18 PROCEDURE — 3725F SCREEN DEPRESSION PERFORMED: CPT | Performed by: FAMILY MEDICINE

## 2022-07-18 RX ORDER — PREDNISONE 10 MG/1
TABLET ORAL
Qty: 30 TABLET | Refills: 0 | Status: SHIPPED | OUTPATIENT
Start: 2022-07-18 | End: 2022-09-12 | Stop reason: ALTCHOICE

## 2022-07-18 NOTE — PROGRESS NOTES
Assessment/Plan:     Diagnoses and all orders for this visit:    Chronic right shoulder pain  -     predniSONE 10 mg tablet; 4 for 3 days, 3 for 3 days, 2 for 3 days, 1 for 3 days  -     Ambulatory Referral to Physical Therapy; Future  -     Ambulatory Referral to Orthopedic Surgery; Future    Rheumatoid arthritis with rheumatoid factor, unspecified ([facility] [address] )  -     Ambulatory Referral to Rheumatology; Future      patient is losing his current rheumatologist when needed new 1 disorder was placed  Refer to orthopedics for shoulder pain  Prednisone ordered as well as physical therapy  Reviewed his x-ray which does show mild-to-moderate glenohumeral arthritis      Subjective:     Chief Complaint   Patient presents with    Shoulder Pain     Right shoulder pain        Patient ID: Junie Alexandre is a 78 y o  male  Patient is here for right shoulder pain   He states that he had a shot placed in his right shoulder that is injured it twice   since then is now having decreased range of motion as well much worsening pain     He has a lot of crepitus in grinding in his shoulder with movement      The following portions of the patient's history were reviewed and updated as appropriate: allergies, current medications, past family history, past medical history, past social history, past surgical history and problem list     Review of Systems   Constitutional: Negative  HENT: Negative  Eyes: Negative  Respiratory: Negative  Cardiovascular: Negative  Gastrointestinal: Negative  Endocrine: Negative  Genitourinary: Negative  Musculoskeletal: Positive for arthralgias  Skin: Negative  Allergic/Immunologic: Negative  Neurological: Negative  Hematological: Negative  Psychiatric/Behavioral: Negative  All other systems reviewed and are negative          Objective:    Vitals:    07/18/22 1436   BP: 102/54   BP Location: Left arm   Patient Position: Sitting   Cuff Size: Large   Pulse: 55   Resp: 18 Temp: 98 6 °F (37 °C)   TempSrc: Tympanic   SpO2: 93%   Weight: 90 4 kg (199 lb 6 4 oz)   Height: 5' 5 5" (1 664 m)          Physical Exam  Vitals and nursing note reviewed  Constitutional:       General: He is not in acute distress  Appearance: He is well-developed  HENT:      Head: Normocephalic  Right Ear: External ear normal       Left Ear: External ear normal       Nose: Nose normal    Eyes:      General:         Right eye: No discharge  Left eye: No discharge  Conjunctiva/sclera: Conjunctivae normal       Pupils: Pupils are equal, round, and reactive to light  Cardiovascular:      Rate and Rhythm: Normal rate and regular rhythm  Heart sounds: Normal heart sounds  Pulmonary:      Effort: Pulmonary effort is normal       Breath sounds: Normal breath sounds  Abdominal:      General: Bowel sounds are normal  There is no distension  Palpations: Abdomen is soft  Tenderness: There is no abdominal tenderness  Musculoskeletal:         General: Normal range of motion  Cervical back: Normal range of motion  Skin:     General: Skin is warm and dry  Findings: No rash  Neurological:      Mental Status: He is alert and oriented to person, place, and time  Cranial Nerves: No cranial nerve deficit  Psychiatric:         Behavior: Behavior normal          Thought Content:  Thought content normal          Judgment: Judgment normal

## 2022-07-20 ENCOUNTER — TELEPHONE (OUTPATIENT)
Dept: FAMILY MEDICINE CLINIC | Facility: CLINIC | Age: 77
End: 2022-07-20

## 2022-07-20 NOTE — TELEPHONE ENCOUNTER
Ktalin Quinones wanted to thank you for your help in getting into the Dr   He said they are getting him in on Monday and they want to know if he should stop his steroids so they can get a better picture?   Please advise  Thank you

## 2022-07-25 ENCOUNTER — OFFICE VISIT (OUTPATIENT)
Dept: OBGYN CLINIC | Facility: OTHER | Age: 77
End: 2022-07-25
Payer: COMMERCIAL

## 2022-07-25 VITALS
BODY MASS INDEX: 33.32 KG/M2 | HEIGHT: 65 IN | WEIGHT: 200 LBS | DIASTOLIC BLOOD PRESSURE: 81 MMHG | SYSTOLIC BLOOD PRESSURE: 118 MMHG | HEART RATE: 75 BPM

## 2022-07-25 DIAGNOSIS — G89.29 CHRONIC RIGHT SHOULDER PAIN: ICD-10-CM

## 2022-07-25 DIAGNOSIS — M19.011 PRIMARY OSTEOARTHRITIS OF RIGHT SHOULDER: Primary | ICD-10-CM

## 2022-07-25 DIAGNOSIS — M25.511 CHRONIC RIGHT SHOULDER PAIN: ICD-10-CM

## 2022-07-25 PROCEDURE — 99204 OFFICE O/P NEW MOD 45 MIN: CPT | Performed by: ORTHOPAEDIC SURGERY

## 2022-07-25 PROCEDURE — 1160F RVW MEDS BY RX/DR IN RCRD: CPT | Performed by: ORTHOPAEDIC SURGERY

## 2022-07-25 RX ORDER — GABAPENTIN 600 MG/1
TABLET ORAL
COMMUNITY
Start: 2022-07-15

## 2022-07-25 NOTE — PROGRESS NOTES
Assessment  Diagnoses and all orders for this visit:    Primary osteoarthritis of right shoulder    Chronic right shoulder pain      Discussion and Plan:    The patient has an examination and x-rays consistent with right shoulder osteoarthritis  I have discussed with the patient the pathophysiology of this diagnosis and reviewed how the examination correlates with this diagnosis  Surgical vs conservative treatment options were discussed at length and after discussing these treatment options the patient will finish his Prednisone taper prescribed by Dr Sharma  Patient will start a course of PT  We can consider CS injection in a few months if needed  Explained the definitive treatment would be total shoulder arthoplasty  Subjective:   Patient ID: Argenis Fierro is a 78 y o  male      HPI  The patient presents with a chief complaint of right shoulder pain  The pain began 3 month(s) ago and is associated with an acute injury  Patient reports he ran into a door jam  Patient received a CS injection by his rheumatologist on 4/05/22  The patient describes the pain as aching and dull in intensity,  intermittent in timing, and localizes the pain to the  right glenohumeral joint  The pain is worse with movement and relieved by rest   The pain is not associated with numbness and tingling  The pain is not associated with constitutional symptoms  The patient is awoken at night by the pain  The patient was seen by Dr Malvina Severe Page on 7/18/22 prescribed a Prednisone Taper and referred the patient here today for orthopedic consultation  The following portions of the patient's history were reviewed and updated as appropriate: allergies, current medications, past family history, past medical history, past social history, past surgical history and problem list     Review of Systems   Constitutional: Negative for chills and fever  HENT: Negative for drooling and hearing loss  Eyes: Negative for visual disturbance  Respiratory: Negative for cough and shortness of breath  Cardiovascular: Negative for chest pain  Gastrointestinal: Negative for abdominal pain  Skin: Negative for rash  Psychiatric/Behavioral: Negative for agitation  Objective:  /81 (BP Location: Left arm, Patient Position: Sitting, Cuff Size: Adult)   Pulse 75   Ht 5' 5" (1 651 m)   Wt 90 7 kg (200 lb)   BMI 33 28 kg/m²       Right Shoulder Exam     Tenderness   The patient is experiencing no tenderness  Range of Motion   External rotation: 60   Forward flexion: 170   Internal rotation 0 degrees: Lumbar     Muscle Strength   Abduction: 5/5   External rotation: 5/5     Other   Erythema: absent  Sensation: normal  Pulse: present            Physical Exam  Vitals reviewed  Constitutional:       Appearance: He is well-developed  HENT:      Head: Normocephalic  Eyes:      Pupils: Pupils are equal, round, and reactive to light  Pulmonary:      Effort: Pulmonary effort is normal    Skin:     General: Skin is warm and dry  I have personally reviewed pertinent films in PACS and my interpretation is as follows  Right shoulder x-rays demonstrates no fracture or dislocation, moderate degenerative changes        I also reviewed the notes in the electronic medical record from the patient's rheumatologist and primary care physician, Dr Marianela Madden of rheumatology documented providing a corticosteroid injection for the right shoulder and April 5, 2020 to    Scribe Attestation    I,:  [employer] am acting as a scribe while in the presence of the attending physician :       I,:  Amanda Wall MD personally performed the services described in this documentation    as scribed in my presence :

## 2022-07-27 DIAGNOSIS — M48.061 SPINAL STENOSIS OF LUMBAR REGION, UNSPECIFIED WHETHER NEUROGENIC CLAUDICATION PRESENT: ICD-10-CM

## 2022-07-27 RX ORDER — OXYCODONE HYDROCHLORIDE 10 MG/1
TABLET ORAL
Qty: 120 TABLET | Refills: 0 | Status: ON HOLD | OUTPATIENT
Start: 2022-07-27 | End: 2022-08-29 | Stop reason: SDUPTHER

## 2022-08-10 DIAGNOSIS — M48.061 SPINAL STENOSIS OF LUMBAR REGION, UNSPECIFIED WHETHER NEUROGENIC CLAUDICATION PRESENT: ICD-10-CM

## 2022-08-10 RX ORDER — OXYCODONE AND ACETAMINOPHEN 10; 325 MG/1; MG/1
1 TABLET ORAL EVERY 4 HOURS PRN
Qty: 120 TABLET | Refills: 0 | Status: SHIPPED | OUTPATIENT
Start: 2022-08-10 | End: 2022-09-07 | Stop reason: SDUPTHER

## 2022-08-20 ENCOUNTER — APPOINTMENT (EMERGENCY)
Dept: CT IMAGING | Facility: HOSPITAL | Age: 77
End: 2022-08-20
Payer: COMMERCIAL

## 2022-08-20 ENCOUNTER — HOSPITAL ENCOUNTER (INPATIENT)
Facility: HOSPITAL | Age: 77
LOS: 10 days | Discharge: HOME HEALTH | DRG: 330 | End: 2022-08-30
Attending: SPECIALIST | Admitting: SPECIALIST
Payer: COMMERCIAL

## 2022-08-20 ENCOUNTER — ANESTHESIA (INPATIENT)
Dept: PERIOP | Facility: HOSPITAL | Age: 77
DRG: 330 | End: 2022-08-20
Payer: COMMERCIAL

## 2022-08-20 ENCOUNTER — TELEPHONE (OUTPATIENT)
Dept: OTHER | Facility: OTHER | Age: 77
End: 2022-08-20

## 2022-08-20 ENCOUNTER — APPOINTMENT (OUTPATIENT)
Dept: RADIOLOGY | Facility: HOSPITAL | Age: 77
DRG: 330 | End: 2022-08-20
Payer: COMMERCIAL

## 2022-08-20 ENCOUNTER — HOSPITAL ENCOUNTER (EMERGENCY)
Facility: HOSPITAL | Age: 77
End: 2022-08-20
Attending: EMERGENCY MEDICINE
Payer: COMMERCIAL

## 2022-08-20 ENCOUNTER — ANESTHESIA EVENT (INPATIENT)
Dept: PERIOP | Facility: HOSPITAL | Age: 77
DRG: 330 | End: 2022-08-20
Payer: COMMERCIAL

## 2022-08-20 VITALS
WEIGHT: 188 LBS | TEMPERATURE: 97.9 F | RESPIRATION RATE: 18 BRPM | BODY MASS INDEX: 29.51 KG/M2 | HEART RATE: 91 BPM | OXYGEN SATURATION: 93 % | DIASTOLIC BLOOD PRESSURE: 74 MMHG | SYSTOLIC BLOOD PRESSURE: 131 MMHG | HEIGHT: 67 IN

## 2022-08-20 DIAGNOSIS — K56.609 SBO (SMALL BOWEL OBSTRUCTION) (HCC): Primary | ICD-10-CM

## 2022-08-20 DIAGNOSIS — K65.1 ABDOMINOPELVIC ABSCESS (HCC): ICD-10-CM

## 2022-08-20 DIAGNOSIS — K56.609 SMALL BOWEL OBSTRUCTION (HCC): Primary | ICD-10-CM

## 2022-08-20 DIAGNOSIS — N32.1 COLOVESICAL FISTULA: ICD-10-CM

## 2022-08-20 LAB
ALBUMIN SERPL BCP-MCNC: 3.3 G/DL (ref 3.5–5)
ALP SERPL-CCNC: 75 U/L (ref 46–116)
ALT SERPL W P-5'-P-CCNC: 41 U/L (ref 12–78)
ANION GAP SERPL CALCULATED.3IONS-SCNC: 15 MMOL/L (ref 4–13)
AST SERPL W P-5'-P-CCNC: 43 U/L (ref 5–45)
BASOPHILS # BLD MANUAL: 0 THOUSAND/UL (ref 0–0.1)
BASOPHILS NFR MAR MANUAL: 0 % (ref 0–1)
BILIRUB SERPL-MCNC: 0.54 MG/DL (ref 0.2–1)
BUN SERPL-MCNC: 24 MG/DL (ref 5–25)
CALCIUM ALBUM COR SERPL-MCNC: 9.8 MG/DL (ref 8.3–10.1)
CALCIUM SERPL-MCNC: 9.2 MG/DL (ref 8.3–10.1)
CHLORIDE SERPL-SCNC: 91 MMOL/L (ref 96–108)
CO2 SERPL-SCNC: 27 MMOL/L (ref 21–32)
CREAT SERPL-MCNC: 1.31 MG/DL (ref 0.6–1.3)
EOSINOPHIL # BLD MANUAL: 0 THOUSAND/UL (ref 0–0.4)
EOSINOPHIL NFR BLD MANUAL: 0 % (ref 0–6)
ERYTHROCYTE [DISTWIDTH] IN BLOOD BY AUTOMATED COUNT: 12 % (ref 11.6–15.1)
FLUAV RNA RESP QL NAA+PROBE: NEGATIVE
FLUBV RNA RESP QL NAA+PROBE: NEGATIVE
GFR SERPL CREATININE-BSD FRML MDRD: 52 ML/MIN/1.73SQ M
GLUCOSE SERPL-MCNC: 142 MG/DL (ref 65–140)
HCT VFR BLD AUTO: 50.1 % (ref 36.5–49.3)
HGB BLD-MCNC: 16.5 G/DL (ref 12–17)
LACTATE SERPL-SCNC: 1.6 MMOL/L (ref 0.5–2)
LG PLATELETS BLD QL SMEAR: PRESENT
LIPASE SERPL-CCNC: 143 U/L (ref 73–393)
LYMPHOCYTES # BLD AUTO: 1.31 THOUSAND/UL (ref 0.6–4.47)
LYMPHOCYTES # BLD AUTO: 13 % (ref 14–44)
MCH RBC QN AUTO: 29.2 PG (ref 26.8–34.3)
MCHC RBC AUTO-ENTMCNC: 32.9 G/DL (ref 31.4–37.4)
MCV RBC AUTO: 89 FL (ref 82–98)
METAMYELOCYTES NFR BLD MANUAL: 1 % (ref 0–1)
MONOCYTES # BLD AUTO: 1.41 THOUSAND/UL (ref 0–1.22)
MONOCYTES NFR BLD: 14 % (ref 4–12)
NEUTROPHILS # BLD MANUAL: 7.27 THOUSAND/UL (ref 1.85–7.62)
NEUTS BAND NFR BLD MANUAL: 20 % (ref 0–8)
NEUTS SEG NFR BLD AUTO: 52 % (ref 43–75)
PLATELET # BLD AUTO: 509 THOUSANDS/UL (ref 149–390)
PLATELET BLD QL SMEAR: ABNORMAL
PMV BLD AUTO: 8.9 FL (ref 8.9–12.7)
POTASSIUM SERPL-SCNC: 3.9 MMOL/L (ref 3.5–5.3)
PROT SERPL-MCNC: 8.7 G/DL (ref 6.4–8.4)
RBC # BLD AUTO: 5.66 MILLION/UL (ref 3.88–5.62)
RBC MORPH BLD: NORMAL
RSV RNA RESP QL NAA+PROBE: NEGATIVE
SARS-COV-2 RNA RESP QL NAA+PROBE: NEGATIVE
SODIUM SERPL-SCNC: 133 MMOL/L (ref 135–147)
WBC # BLD AUTO: 10.1 THOUSAND/UL (ref 4.31–10.16)

## 2022-08-20 PROCEDURE — G1004 CDSM NDSC: HCPCS

## 2022-08-20 PROCEDURE — 85007 BL SMEAR W/DIFF WBC COUNT: CPT | Performed by: EMERGENCY MEDICINE

## 2022-08-20 PROCEDURE — 88307 TISSUE EXAM BY PATHOLOGIST: CPT | Performed by: PATHOLOGY

## 2022-08-20 PROCEDURE — 0DN80ZZ RELEASE SMALL INTESTINE, OPEN APPROACH: ICD-10-PCS | Performed by: SPECIALIST

## 2022-08-20 PROCEDURE — 0241U HB NFCT DS VIR RESP RNA 4 TRGT: CPT | Performed by: EMERGENCY MEDICINE

## 2022-08-20 PROCEDURE — 83690 ASSAY OF LIPASE: CPT | Performed by: EMERGENCY MEDICINE

## 2022-08-20 PROCEDURE — 87186 SC STD MICRODIL/AGAR DIL: CPT | Performed by: SPECIALIST

## 2022-08-20 PROCEDURE — 87075 CULTR BACTERIA EXCEPT BLOOD: CPT | Performed by: SPECIALIST

## 2022-08-20 PROCEDURE — 85027 COMPLETE CBC AUTOMATED: CPT | Performed by: EMERGENCY MEDICINE

## 2022-08-20 PROCEDURE — 87205 SMEAR GRAM STAIN: CPT | Performed by: SPECIALIST

## 2022-08-20 PROCEDURE — 96368 THER/DIAG CONCURRENT INF: CPT

## 2022-08-20 PROCEDURE — 74176 CT ABD & PELVIS W/O CONTRAST: CPT

## 2022-08-20 PROCEDURE — 96376 TX/PRO/DX INJ SAME DRUG ADON: CPT

## 2022-08-20 PROCEDURE — 36415 COLL VENOUS BLD VENIPUNCTURE: CPT | Performed by: EMERGENCY MEDICINE

## 2022-08-20 PROCEDURE — 44120 REMOVAL OF SMALL INTESTINE: CPT

## 2022-08-20 PROCEDURE — 0D1M0Z4 BYPASS DESCENDING COLON TO CUTANEOUS, OPEN APPROACH: ICD-10-PCS | Performed by: SPECIALIST

## 2022-08-20 PROCEDURE — 44143 PARTIAL REMOVAL OF COLON: CPT | Performed by: SPECIALIST

## 2022-08-20 PROCEDURE — 87070 CULTURE OTHR SPECIMN AEROBIC: CPT | Performed by: SPECIALIST

## 2022-08-20 PROCEDURE — 44143 PARTIAL REMOVAL OF COLON: CPT

## 2022-08-20 PROCEDURE — NC001 PR NO CHARGE: Performed by: SPECIALIST

## 2022-08-20 PROCEDURE — 83605 ASSAY OF LACTIC ACID: CPT | Performed by: EMERGENCY MEDICINE

## 2022-08-20 PROCEDURE — 80053 COMPREHEN METABOLIC PANEL: CPT | Performed by: EMERGENCY MEDICINE

## 2022-08-20 PROCEDURE — 74018 RADEX ABDOMEN 1 VIEW: CPT

## 2022-08-20 PROCEDURE — 0DB80ZZ EXCISION OF SMALL INTESTINE, OPEN APPROACH: ICD-10-PCS | Performed by: SPECIALIST

## 2022-08-20 PROCEDURE — 96366 THER/PROPH/DIAG IV INF ADDON: CPT

## 2022-08-20 PROCEDURE — 87076 CULTURE ANAEROBE IDENT EACH: CPT | Performed by: SPECIALIST

## 2022-08-20 PROCEDURE — 87077 CULTURE AEROBIC IDENTIFY: CPT | Performed by: SPECIALIST

## 2022-08-20 PROCEDURE — 44120 REMOVAL OF SMALL INTESTINE: CPT | Performed by: SPECIALIST

## 2022-08-20 PROCEDURE — 96365 THER/PROPH/DIAG IV INF INIT: CPT

## 2022-08-20 PROCEDURE — 99285 EMERGENCY DEPT VISIT HI MDM: CPT | Performed by: EMERGENCY MEDICINE

## 2022-08-20 PROCEDURE — 0DTN0ZZ RESECTION OF SIGMOID COLON, OPEN APPROACH: ICD-10-PCS | Performed by: SPECIALIST

## 2022-08-20 PROCEDURE — 99285 EMERGENCY DEPT VISIT HI MDM: CPT

## 2022-08-20 PROCEDURE — C9113 INJ PANTOPRAZOLE SODIUM, VIA: HCPCS | Performed by: SPECIALIST

## 2022-08-20 PROCEDURE — 96375 TX/PRO/DX INJ NEW DRUG ADDON: CPT

## 2022-08-20 PROCEDURE — 87040 BLOOD CULTURE FOR BACTERIA: CPT | Performed by: EMERGENCY MEDICINE

## 2022-08-20 PROCEDURE — 74177 CT ABD & PELVIS W/CONTRAST: CPT

## 2022-08-20 PROCEDURE — 87181 SC STD AGAR DILUTION PER AGT: CPT | Performed by: SPECIALIST

## 2022-08-20 PROCEDURE — 87185 SC STD ENZYME DETCJ PER NZM: CPT | Performed by: SPECIALIST

## 2022-08-20 RX ORDER — HYDROMORPHONE HCL/PF 1 MG/ML
SYRINGE (ML) INJECTION
Status: COMPLETED
Start: 2022-08-20 | End: 2022-08-20

## 2022-08-20 RX ORDER — CARISOPRODOL 350 MG/1
350 TABLET ORAL ONCE
Status: COMPLETED | OUTPATIENT
Start: 2022-08-20 | End: 2022-08-20

## 2022-08-20 RX ORDER — CIPROFLOXACIN 2 MG/ML
400 INJECTION, SOLUTION INTRAVENOUS ONCE
Status: COMPLETED | OUTPATIENT
Start: 2022-08-20 | End: 2022-08-20

## 2022-08-20 RX ORDER — ONDANSETRON 2 MG/ML
4 INJECTION INTRAMUSCULAR; INTRAVENOUS ONCE
Status: COMPLETED | OUTPATIENT
Start: 2022-08-20 | End: 2022-08-20

## 2022-08-20 RX ORDER — ROCURONIUM BROMIDE 10 MG/ML
INJECTION, SOLUTION INTRAVENOUS AS NEEDED
Status: DISCONTINUED | OUTPATIENT
Start: 2022-08-20 | End: 2022-08-20

## 2022-08-20 RX ORDER — ENOXAPARIN SODIUM 100 MG/ML
40 INJECTION SUBCUTANEOUS DAILY
Status: DISCONTINUED | OUTPATIENT
Start: 2022-08-20 | End: 2022-08-20

## 2022-08-20 RX ORDER — SUCCINYLCHOLINE/SOD CL,ISO/PF 100 MG/5ML
SYRINGE (ML) INTRAVENOUS AS NEEDED
Status: DISCONTINUED | OUTPATIENT
Start: 2022-08-20 | End: 2022-08-20

## 2022-08-20 RX ORDER — FENTANYL CITRATE 50 UG/ML
INJECTION, SOLUTION INTRAMUSCULAR; INTRAVENOUS AS NEEDED
Status: DISCONTINUED | OUTPATIENT
Start: 2022-08-20 | End: 2022-08-20

## 2022-08-20 RX ORDER — MAGNESIUM HYDROXIDE 1200 MG/15ML
LIQUID ORAL AS NEEDED
Status: DISCONTINUED | OUTPATIENT
Start: 2022-08-20 | End: 2022-08-20 | Stop reason: HOSPADM

## 2022-08-20 RX ORDER — DEXMEDETOMIDINE HYDROCHLORIDE 100 UG/ML
INJECTION, SOLUTION INTRAVENOUS AS NEEDED
Status: DISCONTINUED | OUTPATIENT
Start: 2022-08-20 | End: 2022-08-20

## 2022-08-20 RX ORDER — GLYCOPYRROLATE 0.2 MG/ML
INJECTION INTRAMUSCULAR; INTRAVENOUS AS NEEDED
Status: DISCONTINUED | OUTPATIENT
Start: 2022-08-20 | End: 2022-08-20

## 2022-08-20 RX ORDER — METRONIDAZOLE 500 MG/100ML
500 INJECTION, SOLUTION INTRAVENOUS ONCE
Status: COMPLETED | OUTPATIENT
Start: 2022-08-20 | End: 2022-08-20

## 2022-08-20 RX ORDER — HYDROMORPHONE HCL/PF 1 MG/ML
0.5 SYRINGE (ML) INJECTION
Status: COMPLETED | OUTPATIENT
Start: 2022-08-20 | End: 2022-08-20

## 2022-08-20 RX ORDER — NEOSTIGMINE METHYLSULFATE 1 MG/ML
INJECTION INTRAVENOUS AS NEEDED
Status: DISCONTINUED | OUTPATIENT
Start: 2022-08-20 | End: 2022-08-20

## 2022-08-20 RX ORDER — LIDOCAINE HYDROCHLORIDE 10 MG/ML
INJECTION, SOLUTION EPIDURAL; INFILTRATION; INTRACAUDAL; PERINEURAL AS NEEDED
Status: DISCONTINUED | OUTPATIENT
Start: 2022-08-20 | End: 2022-08-20

## 2022-08-20 RX ORDER — MORPHINE SULFATE 4 MG/ML
4 INJECTION, SOLUTION INTRAMUSCULAR; INTRAVENOUS EVERY 6 HOURS PRN
Status: DISCONTINUED | OUTPATIENT
Start: 2022-08-20 | End: 2022-08-21

## 2022-08-20 RX ORDER — HYDROMORPHONE HCL/PF 1 MG/ML
SYRINGE (ML) INJECTION AS NEEDED
Status: DISCONTINUED | OUTPATIENT
Start: 2022-08-20 | End: 2022-08-20

## 2022-08-20 RX ORDER — PROPOFOL 10 MG/ML
INJECTION, EMULSION INTRAVENOUS AS NEEDED
Status: DISCONTINUED | OUTPATIENT
Start: 2022-08-20 | End: 2022-08-20

## 2022-08-20 RX ORDER — MORPHINE SULFATE 10 MG/ML
8 INJECTION, SOLUTION INTRAMUSCULAR; INTRAVENOUS ONCE
Status: COMPLETED | OUTPATIENT
Start: 2022-08-20 | End: 2022-08-20

## 2022-08-20 RX ORDER — SODIUM CHLORIDE, SODIUM LACTATE, POTASSIUM CHLORIDE, CALCIUM CHLORIDE 600; 310; 30; 20 MG/100ML; MG/100ML; MG/100ML; MG/100ML
INJECTION, SOLUTION INTRAVENOUS CONTINUOUS PRN
Status: DISCONTINUED | OUTPATIENT
Start: 2022-08-20 | End: 2022-08-20

## 2022-08-20 RX ORDER — CIPROFLOXACIN 2 MG/ML
400 INJECTION, SOLUTION INTRAVENOUS EVERY 8 HOURS
Status: DISCONTINUED | OUTPATIENT
Start: 2022-08-21 | End: 2022-08-21

## 2022-08-20 RX ORDER — HYDROMORPHONE HCL/PF 1 MG/ML
0.5 SYRINGE (ML) INJECTION EVERY 4 HOURS PRN
Status: DISCONTINUED | OUTPATIENT
Start: 2022-08-20 | End: 2022-08-21

## 2022-08-20 RX ORDER — ENOXAPARIN SODIUM 100 MG/ML
40 INJECTION SUBCUTANEOUS DAILY
Status: DISCONTINUED | OUTPATIENT
Start: 2022-08-21 | End: 2022-08-30 | Stop reason: HOSPADM

## 2022-08-20 RX ORDER — PANTOPRAZOLE SODIUM 40 MG/10ML
40 INJECTION, POWDER, LYOPHILIZED, FOR SOLUTION INTRAVENOUS
Status: DISCONTINUED | OUTPATIENT
Start: 2022-08-20 | End: 2022-08-30 | Stop reason: HOSPADM

## 2022-08-20 RX ORDER — METRONIDAZOLE 500 MG/100ML
500 INJECTION, SOLUTION INTRAVENOUS EVERY 8 HOURS
Status: DISCONTINUED | OUTPATIENT
Start: 2022-08-20 | End: 2022-08-26

## 2022-08-20 RX ORDER — ONDANSETRON 2 MG/ML
4 INJECTION INTRAMUSCULAR; INTRAVENOUS EVERY 6 HOURS PRN
Status: DISCONTINUED | OUTPATIENT
Start: 2022-08-20 | End: 2022-08-30 | Stop reason: HOSPADM

## 2022-08-20 RX ORDER — SODIUM CHLORIDE, SODIUM LACTATE, POTASSIUM CHLORIDE, CALCIUM CHLORIDE 600; 310; 30; 20 MG/100ML; MG/100ML; MG/100ML; MG/100ML
75 INJECTION, SOLUTION INTRAVENOUS CONTINUOUS
Status: DISCONTINUED | OUTPATIENT
Start: 2022-08-20 | End: 2022-08-26

## 2022-08-20 RX ORDER — LEVOFLOXACIN 5 MG/ML
750 INJECTION, SOLUTION INTRAVENOUS ONCE
Status: COMPLETED | OUTPATIENT
Start: 2022-08-20 | End: 2022-08-20

## 2022-08-20 RX ORDER — ONDANSETRON 2 MG/ML
4 INJECTION INTRAMUSCULAR; INTRAVENOUS EVERY 6 HOURS PRN
Status: DISCONTINUED | OUTPATIENT
Start: 2022-08-20 | End: 2022-08-20

## 2022-08-20 RX ORDER — ALBUMIN, HUMAN INJ 5% 5 %
SOLUTION INTRAVENOUS CONTINUOUS PRN
Status: DISCONTINUED | OUTPATIENT
Start: 2022-08-20 | End: 2022-08-20

## 2022-08-20 RX ORDER — HYDROMORPHONE HCL/PF 1 MG/ML
0.5 SYRINGE (ML) INJECTION ONCE
Status: COMPLETED | OUTPATIENT
Start: 2022-08-20 | End: 2022-08-20

## 2022-08-20 RX ADMIN — ROCURONIUM BROMIDE 20 MG: 50 INJECTION, SOLUTION INTRAVENOUS at 15:38

## 2022-08-20 RX ADMIN — HYDROCORTISONE SODIUM SUCCINATE 50 MG: 100 INJECTION, POWDER, FOR SOLUTION INTRAMUSCULAR; INTRAVENOUS at 21:51

## 2022-08-20 RX ADMIN — Medication 20 MG: at 16:35

## 2022-08-20 RX ADMIN — NEOSTIGMINE METHYLSULFATE 3 MG: 1 INJECTION, SOLUTION INTRAVENOUS at 17:53

## 2022-08-20 RX ADMIN — Medication 100 MG: at 15:10

## 2022-08-20 RX ADMIN — IOHEXOL 70 ML: 350 INJECTION, SOLUTION INTRAVENOUS at 11:14

## 2022-08-20 RX ADMIN — LIDOCAINE HYDROCHLORIDE 20 MG: 10 INJECTION, SOLUTION EPIDURAL; INFILTRATION; INTRACAUDAL; PERINEURAL at 15:10

## 2022-08-20 RX ADMIN — ROCURONIUM BROMIDE 30 MG: 50 INJECTION, SOLUTION INTRAVENOUS at 15:19

## 2022-08-20 RX ADMIN — PANTOPRAZOLE SODIUM 40 MG: 40 INJECTION, POWDER, FOR SOLUTION INTRAVENOUS at 14:14

## 2022-08-20 RX ADMIN — ALBUMIN (HUMAN): 12.5 INJECTION, SOLUTION INTRAVENOUS at 15:31

## 2022-08-20 RX ADMIN — SODIUM CHLORIDE, SODIUM LACTATE, POTASSIUM CHLORIDE, AND CALCIUM CHLORIDE 125 ML/HR: .6; .31; .03; .02 INJECTION, SOLUTION INTRAVENOUS at 14:50

## 2022-08-20 RX ADMIN — ONDANSETRON 4 MG: 2 INJECTION INTRAMUSCULAR; INTRAVENOUS at 14:06

## 2022-08-20 RX ADMIN — GLYCOPYRROLATE 0.4 MG: 0.2 INJECTION, SOLUTION INTRAMUSCULAR; INTRAVENOUS at 17:53

## 2022-08-20 RX ADMIN — HYDROCORTISONE SODIUM SUCCINATE 100 MG: 100 INJECTION, POWDER, FOR SOLUTION INTRAMUSCULAR; INTRAVENOUS at 14:14

## 2022-08-20 RX ADMIN — HYDROMORPHONE HYDROCHLORIDE 0.5 MG: 1 INJECTION, SOLUTION INTRAMUSCULAR; INTRAVENOUS; SUBCUTANEOUS at 19:21

## 2022-08-20 RX ADMIN — ONDANSETRON 4 MG: 2 INJECTION INTRAMUSCULAR; INTRAVENOUS at 21:13

## 2022-08-20 RX ADMIN — SODIUM CHLORIDE, SODIUM LACTATE, POTASSIUM CHLORIDE, AND CALCIUM CHLORIDE 1000 ML: .6; .31; .03; .02 INJECTION, SOLUTION INTRAVENOUS at 07:46

## 2022-08-20 RX ADMIN — MORPHINE SULFATE 4 MG: 4 INJECTION, SOLUTION INTRAMUSCULAR; INTRAVENOUS at 21:35

## 2022-08-20 RX ADMIN — METRONIDAZOLE 500 MG: 500 INJECTION, SOLUTION INTRAVENOUS at 23:21

## 2022-08-20 RX ADMIN — FENTANYL CITRATE 50 MCG: 50 INJECTION INTRAMUSCULAR; INTRAVENOUS at 15:29

## 2022-08-20 RX ADMIN — HYDROMORPHONE HYDROCHLORIDE 0.5 MG: 1 INJECTION, SOLUTION INTRAMUSCULAR; INTRAVENOUS; SUBCUTANEOUS at 16:05

## 2022-08-20 RX ADMIN — HYDROMORPHONE HYDROCHLORIDE 0.5 MG: 1 INJECTION, SOLUTION INTRAMUSCULAR; INTRAVENOUS; SUBCUTANEOUS at 23:18

## 2022-08-20 RX ADMIN — FENTANYL CITRATE 50 MCG: 50 INJECTION INTRAMUSCULAR; INTRAVENOUS at 15:10

## 2022-08-20 RX ADMIN — CIPROFLOXACIN: 2 INJECTION, SOLUTION INTRAVENOUS at 15:09

## 2022-08-20 RX ADMIN — DEXMEDETOMIDINE HCL 12 MCG: 100 INJECTION INTRAVENOUS at 16:38

## 2022-08-20 RX ADMIN — DEXMEDETOMIDINE HCL 8 MCG: 100 INJECTION INTRAVENOUS at 17:49

## 2022-08-20 RX ADMIN — PROPOFOL 200 MG: 10 INJECTION, EMULSION INTRAVENOUS at 15:10

## 2022-08-20 RX ADMIN — HYDROMORPHONE HYDROCHLORIDE 0.5 MG: 1 INJECTION, SOLUTION INTRAMUSCULAR; INTRAVENOUS; SUBCUTANEOUS at 19:31

## 2022-08-20 RX ADMIN — METRONIDAZOLE 500 MG: 500 INJECTION, SOLUTION INTRAVENOUS at 14:54

## 2022-08-20 RX ADMIN — SODIUM CHLORIDE, SODIUM LACTATE, POTASSIUM CHLORIDE, AND CALCIUM CHLORIDE: .6; .31; .03; .02 INJECTION, SOLUTION INTRAVENOUS at 14:56

## 2022-08-20 RX ADMIN — HYDROMORPHONE HYDROCHLORIDE 0.5 MG: 1 INJECTION, SOLUTION INTRAMUSCULAR; INTRAVENOUS; SUBCUTANEOUS at 16:09

## 2022-08-20 RX ADMIN — ROCURONIUM BROMIDE 10 MG: 50 INJECTION, SOLUTION INTRAVENOUS at 16:18

## 2022-08-20 RX ADMIN — CARISOPRODOL 350 MG: 350 TABLET ORAL at 10:37

## 2022-08-20 RX ADMIN — ONDANSETRON 4 MG: 2 INJECTION INTRAMUSCULAR; INTRAVENOUS at 07:22

## 2022-08-20 RX ADMIN — MORPHINE SULFATE 8 MG: 10 INJECTION INTRAVENOUS at 08:55

## 2022-08-20 RX ADMIN — SODIUM CHLORIDE, SODIUM LACTATE, POTASSIUM CHLORIDE, AND CALCIUM CHLORIDE 125 ML/HR: .6; .31; .03; .02 INJECTION, SOLUTION INTRAVENOUS at 21:39

## 2022-08-20 RX ADMIN — ONDANSETRON 4 MG: 2 INJECTION INTRAMUSCULAR; INTRAVENOUS at 10:36

## 2022-08-20 RX ADMIN — LEVOFLOXACIN 750 MG: 750 INJECTION, SOLUTION INTRAVENOUS at 07:47

## 2022-08-20 RX ADMIN — ENOXAPARIN SODIUM 40 MG: 100 INJECTION SUBCUTANEOUS at 14:23

## 2022-08-20 RX ADMIN — CIPROFLOXACIN 400 MG: 2 INJECTION, SOLUTION INTRAVENOUS at 14:14

## 2022-08-20 RX ADMIN — HYDROMORPHONE HYDROCHLORIDE 0.5 MG: 1 INJECTION, SOLUTION INTRAMUSCULAR; INTRAVENOUS; SUBCUTANEOUS at 19:50

## 2022-08-20 NOTE — ED NOTES
Provider at bedside for rectal exam with myself at bedside        Jovon Sharma, RONNA  08/20/22 9502

## 2022-08-20 NOTE — ED PROVIDER NOTES
History  Chief Complaint   Patient presents with    Vomiting     Vomited once this morning  States he has not been feeling well since Monday  79-year-old male with a past medical history of hypertension, hyperlipidemia, aortic stenosis, who presents for abdominal distention and nausea  Patient states that since Monday he has become increasingly nausea  States that 2 days later he began noticing abdominal distension  He states that this was new for him, this has never happened in the past   He also reports that since the abdominal distention, he has not had a bowel movement  He does not report any blood with bowel movements  He does report nausea and vomiting, and states that he noticed that his last episode of vomiting was unusual, says that it was a dark brown color  He denies any urinary changes  Denies fevers or chills  No chest pain, no shortness of breath, no weakness  ROS otherwise negative  Prior to Admission Medications   Prescriptions Last Dose Informant Patient Reported? Taking?    Cholecalciferol (VITAMIN D3) 1000 units CAPS  Self Yes No   Sig: Take 1 tablet by mouth daily   Gabapentin, Once-Daily, 600 MG TABS  Self Yes No   Sig: TAKE 3 TABLETS BY MOUTH AT BEDTIME   Potassium Iodide, Antidote, 130 MG TABS   No No   Sig: Take 1 tablet (130 mg total) by mouth in the morning   Secukinumab 150 MG/ML SOAJ  Self Yes No   Sig: One injection subcutaneously at weeks 1,2,3,4 and 5 for loading dose then every 4 weeks thereafter   aspirin 81 MG tablet  Self No No   Sig: Take 1 tablet (81 mg total) by mouth daily   atorvastatin (LIPITOR) 20 mg tablet   No No   Sig: take 1 tablet by mouth once daily   betamethasone dipropionate (DIPROSONE) 0 05 % cream  Self Yes No   Sig: Apply topically   carisoprodol (SOMA) 350 mg tablet  Self No No   Sig: Take 1 tablet (350 mg total) by mouth 4 (four) times a day   diclofenac sodium (VOLTAREN) 1 %  Self No No   Sig: Apply 2 g topically 4 (four) times a day fluocinonide (LIDEX) 0 05 % ointment  Self Yes No   Sig: Apply topically 2 (two) times a day   gabapentin (NEURONTIN) 600 MG tablet   Yes No   Sig: take 1 tablet by mouth three times a day (MORNING, NOON, AND BEDTIME)   ketoconazole (NIZORAL) 2 % cream   No No   Sig: Apply topically 2 (two) times a day   losartan (COZAAR) 50 mg tablet   No No   Sig: TAKE 1 TABLET BY MOUTH EVERY DAY   naloxone (NARCAN) 4 mg/0 1 mL nasal spray   No No   Sig: Administer 1 spray into a nostril  If no response after 2-3 minutes, give another dose in the other nostril using a new spray     oxyCODONE (ROXICODONE) 10 MG TABS   No No   Sig: Take 1 tablet 4 times daily   oxyCODONE-acetaminophen (PERCOCET)  mg per tablet   No No   Sig: Take 1 tablet by mouth every 4 (four) hours as needed for moderate pain Max Daily Amount: 6 tablets   predniSONE 10 mg tablet   No No   Si for 3 days, 3 for 3 days, 2 for 3 days, 1 for 3 days   traZODone (DESYREL) 300 MG tablet   No No   Si tablets at bedtime      Facility-Administered Medications: None       Past Medical History:   Diagnosis Date    Aortic stenosis     Aortic stenosis, severe     Arthritis     Carpal tunnel syndrome     Chronic back pain     Chronic pain     Chronic, continuous use of opioids     Hyperlipidemia     Hypertension     Hypertrophy of prostate     Insomnia     Large bowel obstruction (HCC)     Peripheral neuropathy     RA [facility]rheumatoid arthritis) ([facility] [address] )     Umbilical hernia        Past Surgical History:   Procedure Laterality Date    ADENOIDECTOMY      AORTIC VALVE REPLACEMENT  10/17/2017    BASAL CELL CARCINOMA EXCISION      CARDIAC CATHETERIZATION      LAST ASSESSED: 2017    CARPAL TUNNEL RELEASE Bilateral     left    CATARACT EXTRACTION      HERNIA REPAIR      inguinal bilateral -umbilical     LAMINECTOMY      DECOMPRESS, FACETECTOMY, FORAMINOTOMY LUMBAR SEG    NEUROPLASTY / TRANSPOSITION MEDIAN NERVE AT CARPAL TUNNEL      SD RPLCMT AORTIC VALVE OPN W/STENTLESS TISSUE VALVE N/A 10/17/2017    Procedure: AORTIC VALVE REPLACEMENT with 23MM MAGNAEASE TISSUE VALVE; INTRA-OP SHANDRA;  Surgeon: Ankur Soares MD;  Location: [facility];  Service: Cardiac Surgery    TONSILLECTOMY         Family History   Problem Relation Age of Onset    Stroke Mother     Diabetes Mother     Hypertension Mother     Heart disease Father     Hypertension Father     Heart attack Father     Coronary artery disease Family     Colon polyps Neg Hx     Colon cancer Neg Hx      I have reviewed and agree with the history as documented  E-Cigarette/Vaping    E-Cigarette Use Never User      E-Cigarette/Vaping Substances    Nicotine No     THC No     CBD No     Flavoring No     Other No     Unknown No      Social History     Tobacco Use    Smoking status: Never Smoker    Smokeless tobacco: Never Used   Vaping Use    Vaping Use: Never used   Substance Use Topics    Alcohol use: Not Currently    Drug use: No       Review of Systems   Constitutional: Negative for chills, diaphoresis, fatigue and fever  HENT: Negative for congestion and sore throat  Eyes: Negative for visual disturbance  Respiratory: Negative for cough, chest tightness and shortness of breath  Cardiovascular: Negative for chest pain, palpitations and leg swelling  Gastrointestinal: Positive for abdominal distention, abdominal pain, nausea and vomiting  Negative for constipation and diarrhea  Genitourinary: Negative for difficulty urinating and dysuria  Musculoskeletal: Negative for arthralgias and myalgias  Skin: Negative for rash  Neurological: Negative for dizziness, weakness, light-headedness, numbness and headaches  Psychiatric/Behavioral: Negative for agitation, behavioral problems and confusion  The patient is not nervous/anxious  All other systems reviewed and are negative  Physical Exam  Physical Exam  Constitutional:       Appearance: He is well-developed  HENT:      Head: Normocephalic and atraumatic  Cardiovascular:      Rate and Rhythm: Normal rate and regular rhythm  Heart sounds: Normal heart sounds  No murmur heard  Pulmonary:      Effort: Pulmonary effort is normal  No respiratory distress  Breath sounds: Normal breath sounds  Abdominal:      General: Bowel sounds are normal  There is distension  Palpations: Abdomen is soft  Tenderness: There is abdominal tenderness  Comments: Abdomen is significantly distended  Has minor generalized tenderness without guarding or rigidity  Musculoskeletal:         General: No deformity  Skin:     General: Skin is warm  Findings: No rash  Neurological:      Mental Status: He is alert and oriented to person, place, and time  Psychiatric:         Behavior: Behavior normal          Thought Content:  Thought content normal          Judgment: Judgment normal          Vital Signs  ED Triage Vitals   Temperature Pulse Respirations Blood Pressure SpO2   08/20/22 0632 08/20/22 0632 08/20/22 0632 08/20/22 0638 08/20/22 0632   97 9 °F (36 6 °C) 98 20 166/79 97 %      Temp Source Heart Rate Source Patient Position - Orthostatic VS BP Location FiO2 (%)   08/20/22 0632 08/20/22 0632 08/20/22 0800 08/20/22 0800 --   Temporal Monitor Lying Left arm       Pain Score       08/20/22 0632       8           Vitals:    08/20/22 1000 08/20/22 1030 08/20/22 1130 08/20/22 1200   BP: 131/77 132/81 136/69 131/74   Pulse: 85 84 91 91   Patient Position - Orthostatic VS: Sitting Sitting Sitting Sitting         Visual Acuity      ED Medications  Medications   ondansetron (ZOFRAN) injection 4 mg (4 mg Intravenous Given 8/20/22 0722)   lactated ringers bolus 1,000 mL (0 mL Intravenous Stopped 8/20/22 0917)   levofloxacin (LEVAQUIN) IVPB (premix in dextrose) 750 mg 150 mL (0 mg Intravenous Stopped 8/20/22 0917)   morphine injection 8 mg (8 mg Intravenous Given 8/20/22 0855)   carisoprodol (SOMA) tablet 350 mg (350 mg Per NG Tube Given 8/20/22 1037)   ondansetron (ZOFRAN) injection 4 mg (4 mg Intravenous Given 8/20/22 1036)   iohexol (OMNIPAQUE) 350 MG/ML injection (MULTI-DOSE) 100 mL (70 mL Intravenous Given 8/20/22 1114)       Diagnostic Studies  Results Reviewed     Procedure Component Value Units Date/Time    Blood culture #1 [[id number]] Collected: 08/20/22 0736    Lab Status: Preliminary result Specimen: Blood from Arm, Right Updated: 08/20/22 1703     Blood Culture Received in Microbiology Lab  Culture in Progress  Blood culture #2 [[id number]] Collected: 08/20/22 0739    Lab Status: Preliminary result Specimen: Blood from Hand, Left Updated: 08/20/22 1703     Blood Culture Received in Microbiology Lab  Culture in Progress  FLU/RSV/COVID - if FLU/RSV clinically relevant [[id number]]  (Normal) Collected: 08/20/22 0739    Lab Status: Final result Specimen: Nasopharyngeal from Nose Updated: 08/20/22 0829     SARS-CoV-2 Negative     INFLUENZA A PCR Negative     INFLUENZA B PCR Negative     RSV PCR Negative    Narrative:      FOR PEDIATRIC PATIENTS - copy/paste COVID Guidelines URL to browser: [email or web address]  [email or web address]    SARS-CoV-2 assay is a Nucleic Acid Amplification assay intended for the  qualitative detection of nucleic acid from SARS-CoV-2 in nasopharyngeal  swabs  Results are for the presumptive identification of SARS-CoV-2 RNA  Positive results are indicative of infection with SARS-CoV-2, the virus  causing COVID-19, but do not rule out bacterial infection or co-infection  with other viruses  Laboratories within the [place] and its  territories are required to report all positive results to the appropriate  public health authorities  Negative results do not preclude SARS-CoV-2  infection and should not be used as the sole basis for treatment or other  patient management decisions   Negative results must be combined with  clinical observations, patient history, and epidemiological information  This test has not been FDA cleared or approved  This test has been authorized by FDA under an Emergency Use Authorization  (EUA)  This test is only authorized for the duration of time the  declaration that circumstances exist justifying the authorization of the  emergency use of an in vitro diagnostic tests for detection of SARS-CoV-2  virus and/or diagnosis of COVID-19 infection under section 564(b)(1) of  the Act, 21 U  S C  358TNK-2(J)(5), unless the authorization is terminated  or revoked sooner  The test has been validated but independent review by FDA  and CLIA is pending  Test performed using "[employer]" GeneXpert: This RT-PCR assay targets N2,  a region unique to SARS-CoV-2  A conserved region in the E-gene was chosen  for pan-Sarbecovirus detection which includes SARS-CoV-2  Lactic acid, plasma [[id number]]  (Normal) Collected: 08/20/22 0723    Lab Status: Final result Specimen: Blood from Arm, Right Updated: 08/20/22 0752     LACTIC ACID 1 6 mmol/L     Narrative:      Result may be elevated if tourniquet was used during collection      Comprehensive metabolic panel [[id number]]  (Abnormal) Collected: 08/20/22 0723    Lab Status: Final result Specimen: Blood from Arm, Right Updated: 08/20/22 0748     Sodium 133 mmol/L      Potassium 3 9 mmol/L      Chloride 91 mmol/L      CO2 27 mmol/L      ANION GAP 15 mmol/L      BUN 24 mg/dL      Creatinine 1 31 mg/dL      Glucose 142 mg/dL      Calcium 9 2 mg/dL      Corrected Calcium 9 8 mg/dL      AST 43 U/L      ALT 41 U/L      Alkaline Phosphatase 75 U/L      Total Protein 8 7 g/dL      Albumin 3 3 g/dL      Total Bilirubin 0 54 mg/dL      eGFR 52 ml/min/1 73sq m     Narrative:      [place] guidelines for Chronic Kidney Disease (CKD):     Stage 1 with normal or high GFR (GFR > 90 mL/min/1 73 square meters)    Stage 2 Mild CKD (GFR = 60-89 mL/min/1 73 square meters)    Stage 3A Moderate CKD (GFR = 45-59 mL/min/1 73 square meters)    Stage 3B Moderate CKD (GFR = 30-44 mL/min/1 73 square meters)    Stage 4 Severe CKD (GFR = 15-29 mL/min/1 73 square meters)    Stage 5 End Stage CKD (GFR <15 mL/min/1 73 square meters)  Note: GFR calculation is accurate only with a steady state creatinine    Lipase [[id number]]  (Normal) Collected: 08/20/22 0723    Lab Status: Final result Specimen: Blood from Arm, Right Updated: 08/20/22 0748     Lipase 143 u/L     CBC and differential [[id number]]  (Abnormal) Collected: 08/20/22 0657    Lab Status: Final result Specimen: Blood from Arm, Right Updated: 08/20/22 0715     WBC 10 10 Thousand/uL      RBC 5 66 Million/uL      Hemoglobin 16 5 g/dL      Hematocrit 50 1 %      MCV 89 fL      MCH 29 2 pg      MCHC 32 9 g/dL      RDW 12 0 %      MPV 8 9 fL      Platelets 858 Thousands/uL     Narrative: This is an appended report  These results have been appended to a previously verified report  Manual Differential(PHLEBS Do Not Order) [[id number]]  (Abnormal) Collected: 08/20/22 0657    Lab Status: Final result Specimen: Blood from Arm, Right Updated: 08/20/22 0715     Segmented % 52 %      Bands % 20 %      Lymphocytes % 13 %      Monocytes % 14 %      Eosinophils, % 0 %      Basophils % 0 %      Metamyelocytes% 1 %      Absolute Neutrophils 7 27 Thousand/uL      Lymphocytes Absolute 1 31 Thousand/uL      Monocytes Absolute 1 41 Thousand/uL      Eosinophils Absolute 0 00 Thousand/uL      Basophils Absolute 0 00 Thousand/uL      Total Counted --     RBC Morphology Normal     Platelet Estimate Increased     Large Platelet Present                 CT abdomen pelvis with contrast   Final Result by Yogi Hudson MD (08/20 1211)         1  High-grade small bowel obstruction secondary to an interloop abscess in the central pelvis  The etiology of the abscess maybe sequela of prior perforated sigmoid diverticulitis or perhaps a perforated Meckel's diverticulitis    Nonetheless surgical assessment is advised  2   Small amount of pneumoperitoneum along the superior margin of the interloop abscess  3   Soft tissue induration along the margins of the central pelvic collection/interloop abscess possibly indurated phlegmon/fistulas to the sigmoid colon and dome of the bladder  4   Area of underdistention versus annular thickening of the mid sigmoid colon on series 2 image 75  An apple core lesion secondary to adenocarcinoma is difficult to exclude  Follow-up nonemergent colonoscopy is advised after the patient's small bowel    obstruction is resolved/treated  I personally discussed this study with Mikki Hernandez on 8/20/2022 at 12:04 PM                      Workstation performed: [id number]         CT abdomen pelvis wo contrast   Final Result by Mio Lord DO (08/20 5334)      Although limited without use of intravenous and oral contrast there appears to be an abscess in the central pelvis as described causing proximal small bowel obstruction  Repeat examination with the use of intravenous and oral contrast may provide    additional information  Recommend surgical consultation  The study was marked in [facility] for immediate notification  Workstation performed: [id number]                    Procedures  Procedures         ED Course                               SBIRT 20yo+    Flowsheet Row Most Recent Value   SBIRT (23 yo +)    In order to provide better care to our patients, we are screening all of our patients for alcohol and drug use  Would it be okay to ask you these screening questions? No Filed at: 08/20/2022 7602                    MDM  Number of Diagnoses or Management Options  Abdominopelvic abscess ([address] )  Small bowel obstruction [facility])  Diagnosis management comments: Patient's presentation was concerning for intra-abdominal pathology  CT abdomen pelvis ordered, labs ordered    Patient was signed out to Dr Estrellita Joseph with anticipated admission regardless of CT findings, however do suspect surgical pathology  Disposition  Final diagnoses:   Small bowel obstruction ([facility] [address] )   Abdominopelvic abscess ([facility] [address] )     Time reflects when diagnosis was documented in both MDM as applicable and the Disposition within this note     Time User Action Codes Description Comment    8/20/2022  9:22 AM Aretta Bills Add [N78 315] Small bowel obstruction ([facility] [address] )     8/20/2022  9:22 AM Aretta Bills Add [K65 1] Abdominopelvic abscess [facility])       ED Disposition     ED Disposition   Transfer to Another Facility-In Network    Condition   --    Date/Time   Sat Aug 20, 2022  9:22 AM    Comment   Ravinder Hernandez should be transferred out to [employer]             MD Documentation    [address] Most Recent Value   Accepting Physician [address] Name, Mirian Lloyd   Sending MD Faiza Iverson      RN Documentation    [address] Name, Mirian Lloyd   Bed Assignment ER holding   Report Given to Chitosharla   Medications Reviewed with Next Provider of Service Yes   Transport Mode Ambulance   Level of Care Basic life support   Copies of Medical Records Sent History and Physical, Orders, Progress note, Transfer form, Nursing note, Radiology, EKG, Labs, Med Rec form   Patient Belongings Disposition Sent with patient   Transfer Date 08/20/22   Transfer Time [id number]      Follow-up Information    None         Discharge Medication List as of 8/20/2022 12:33 PM      CONTINUE these medications which have NOT CHANGED    Details   oxyCODONE-acetaminophen (PERCOCET)  mg per tablet Take 1 tablet by mouth every 4 (four) hours as needed for moderate pain Max Daily Amount: 6 tablets, Starting Wed 8/10/2022, Normal      aspirin 81 MG tablet Take 1 tablet (81 mg total) by mouth daily, Starting Tue 2/27/2018, Normal      atorvastatin (LIPITOR) 20 mg tablet take 1 tablet by mouth once daily, Normal betamethasone dipropionate (DIPROSONE) 0 05 % cream Apply topically, Historical Med      carisoprodol (SOMA) 350 mg tablet Take 1 tablet (350 mg total) by mouth 4 (four) times a day, Starting Tue 2/22/2022, Normal      Cholecalciferol (VITAMIN D3) 1000 units CAPS Take 1 tablet by mouth daily, Historical Med      diclofenac sodium (VOLTAREN) 1 % Apply 2 g topically 4 (four) times a day, Starting Tue 7/2/2019, Normal      fluocinonide (LIDEX) 0 05 % ointment Apply topically 2 (two) times a day, Historical Med      gabapentin (NEURONTIN) 600 MG tablet take 1 tablet by mouth three times a day (MORNING, NOON, AND BEDTIME), Historical Med      Gabapentin, Once-Daily, 600 MG TABS TAKE 3 TABLETS BY MOUTH AT BEDTIME, Historical Med      ketoconazole (NIZORAL) 2 % cream Apply topically 2 (two) times a day, Starting Mon 4/25/2022, Normal      losartan (COZAAR) 50 mg tablet TAKE 1 TABLET BY MOUTH EVERY DAY, Normal      naloxone (NARCAN) 4 mg/0 1 mL nasal spray Administer 1 spray into a nostril  If no response after 2-3 minutes, give another dose in the other nostril using a new spray , Normal      oxyCODONE (ROXICODONE) 10 MG TABS Take 1 tablet 4 times daily, Normal      Potassium Iodide, Antidote, 130 MG TABS Take 1 tablet (130 mg total) by mouth in the morning, Starting Mon 4/25/2022, Normal      predniSONE 10 mg tablet 4 for 3 days, 3 for 3 days, 2 for 3 days, 1 for 3 days, Normal      Secukinumab 150 MG/ML SOAJ One injection subcutaneously at weeks 1,2,3,4 and 5 for loading dose then every 4 weeks thereafter, Historical Med      traZODone (DESYREL) 300 MG tablet 2 tablets at bedtime, Normal             No discharge procedures on file      PDMP Review       Value Time User    PDMP Reviewed  Yes 6/28/2022  4:49 PM Vanda Dalton, [facility] Provider  Electronically Signed by           Elliott Mcghee MD  08/20/22 2059

## 2022-08-20 NOTE — ANESTHESIA POSTPROCEDURE EVALUATION
Post-Op Assessment Note    CV Status:  Stable  Pain Score: 0    Pain management: adequate     Mental Status:  Alert   Hydration Status:  Stable   PONV Controlled:  Controlled   Airway Patency:  Patent      Post Op Vitals Reviewed: Yes      Staff: CRNA         No complications documented      BP   154/73   Temp  98 3   Pulse  93   Resp   20   SpO2   96

## 2022-08-20 NOTE — ANESTHESIA PREPROCEDURE EVALUATION
Procedure:  LAPAROTOMY EXPLORATORY (N/A Abdomen)    Relevant Problems   CARDIO   (+) Atherosclerosis of native coronary artery of native heart without angina pectoris   (+) Benign essential hypertension   (+) Hyperlipidemia   (+) Status post aortic valve replacement with bioprosthetic valve      /RENAL   (+) Stage 3 chronic kidney disease, unspecified whether stage 3a or 3b CKD (HCC)      MUSCULOSKELETAL   (+) Arthritis   (+) Low back pain   (+) Primary localized osteoarthrosis of shoulder region   (+) Primary osteoarthritis of right shoulder   (+) Psoriasis with arthropathy (HCC)   (+) Rheumatoid arthritis with rheumatoid factor, unspecified (HCC)      NEURO/PSYCH   (+) Chronic pain   (+) Continuous opioid dependence (HCC)        Physical Exam    Airway    Mallampati score: II  TM Distance: >3 FB  Neck ROM: limited     Dental       Cardiovascular  Rhythm: regular, Rate: normal, Cardiovascular exam normal    Pulmonary  Pulmonary exam normal     Other Findings        Anesthesia Plan  ASA Score- 3 Emergent    Anesthesia Type- general with ASA Monitors  Additional Monitors:   Airway Plan: ETT  Plan Factors-Exercise tolerance (METS): >4 METS  Chart reviewed  EKG reviewed  Imaging results reviewed  Existing labs reviewed  Patient summary reviewed  Patient is not a current smoker  Induction- intravenous and rapid sequence induction  Postoperative Plan- Plan for postoperative opioid use  Planned trial extubation    Informed Consent- Anesthetic plan and risks discussed with patient and spouse  I personally reviewed this patient with the CRNA  Discussed and agreed on the Anesthesia Plan with the CRNA  Gerhardt Sep Macie Soares is a 78 y o  male with PMH of: AVR, HTN, HLD, RA on immunosuppressives here for ex lap of high grade bowel obstruction and questionable abscess in pelvis  NPO since last night   Took following medications none, did not take losartan and is not on beta blocker (as noted in chart)  Discussed risk and benefits of general which include and are not limited to: MI, stroke, sore throat, PONV, post- op pain  No new symptoms of  CP, SOB, F, chills,dizziness,  or tingling, cough, and other symptoms except as noted  + abd pain, abd bloating, N/V  + numbness that is persistent in BLE from RA  AQA  Consented  Agreesto blood and blood products  History of anesthesia:  no personal issues/family issues  Additional IV will be placed once under anesthesia, will hook up NGT to suction and RSI  Very limited neck motion due to RA, would use video layrngoscope for intubation due to small mouth opening, large tongue to oral opening and limited neck motion  Also has a  Beard  Strict RSI with no ventilation

## 2022-08-20 NOTE — EMTALA/ACUTE CARE TRANSFER
[facility] EMERGENCY DEPARTMENT  [address] [place] [id number]  Dept: [address] Cory TRANSFER CONSENT    NAME Lisha Biswas [birth year]                              MRN [id number]    I have been informed of my rights regarding examination, treatment, and transfer   by Dr Tereso Mejía,     Benefits:   benefits of interventions not available this facility (general surgery, operative interventions)    Risks:   risks of delays in care due to transfer times, injury sustained during transport, decompensated illness during transport  Consent for Transfer:  I acknowledge that my medical condition has been evaluated and explained to me by the emergency department physician or other qualified medical person and/or my attending physician, who has recommended that I be transferred to the service of    at    The above potential benefits of such transfer, the potential risks associated with such transfer, and the probable risks of not being transferred have been explained to me, and I fully understand them  The doctor has explained that, in my case, the benefits of transfer outweigh the risks  I agree to be transferred  I authorize the performance of emergency medical procedures and treatments upon me in both transit and upon arrival at the receiving facility  Additionally, I authorize the release of any and all medical records to the receiving facility and request they be transported with me, if possible  I understand that the safest mode of transportation during a medical emergency is an ambulance and that the Hospital advocates the use of this mode of transport  Risks of traveling to the receiving facility by car, including absence of medical control, life sustaining equipment, such as oxygen, and medical personnel has been explained to me and I fully understand them      (LETICIA CORRECT BOX BELOW)  [x  ]  I consent to the stated transfer and to be transported by ambulance/helicopter  [  ]  I consent to the stated transfer, but refuse transportation by ambulance and accept full responsibility for my transportation by car  I understand the risks of non-ambulance transfers and I exonerate the Hospital and its staff from any deterioration in my condition that results from this refusal     X___________________________________________    DATE  22  TIME________  Signature of patient or legally responsible individual signing on patient behalf           RELATIONSHIP TO PATIENT_________________________          Provider Certification    NAME Chuyita Flores                                         1945                              MRN [id number]    A medical screening exam was performed on the above named patient  Based on the examination:    Condition Necessitating Transfer The primary encounter diagnosis was Small bowel obstruction ([facility] [address] )  A diagnosis of Abdominopelvic abscess ([facility] [address] ) was also pertinent to this visit  Patient Condition:   stable    Reason for Transfer:   abscess within central pelvis causing small-bowel obstruction due to compression needs transfer for general surgery, operative intervention  Transfer Requirements: [facility] available for treatment as acknowledged by   PACS  · Agreed to accept transfer and to provide appropriate medical treatment as acknowledged by Dr Miramontes Covert general surgery          · Appropriate medical records of the examination and treatment of the patient are provided at the time of transfer   [facility], [address] _______  · Transfer will be performed by qualified personnel from    and appropriate transfer equipment as required, including the use of necessary and appropriate life support measures      Provider Certification: I have examined the patient and explained the following risks and benefits of being transferred/refusing transfer to the patient/family:         Based on these reasonable risks and benefits to the patient and/or the unborn child(renan), and based upon the information available at the time of the patients examination, I certify that the medical benefits reasonably to be expected from the provision of appropriate medical treatments at another medical facility outweigh the increasing risks, if any, to the individuals medical condition, and in the case of labor to the unborn child, from effecting the transfer      X____________________________________________ DATE 08/20/22        TIME_______      ORIGINAL - SEND TO MEDICAL RECORDS   COPY - SEND WITH PATIENT DURING TRANSFER

## 2022-08-20 NOTE — ADDENDUM NOTE
Addendum  created 08/20/22 1955 by Alf Rodriguez DO    Order list changed, Pharmacy for encounter modified

## 2022-08-20 NOTE — ED CARE HANDOFF
Emergency Department Sign Out Note        Sign out and transfer of care from Dr Dennis Jaramillo  See Separate Emergency Department note  The patient, Jone Solorio, was evaluated by the previous provider for 6 days of progressive nausea, abdominal distension, decreased oral intake, vomiting  Workup Completed:  Initial blood work was obtained a CBC resulted other blood work was hemolyzed and was resend at the time of sign out  CT scan was pending and images were not obtained at this time  There is suspicion for small-bowel obstruction and patient would likely need to be admitted or transferred for intervention  At the time of sign-out we also discussed that there appeared to be a bandemia without leukocytosis and given concerns for possible infection proceeded with IV fluids and antibiotics  ED Course / Workup Pending (followup):  CT scan went on to demonstrate what appears to be an abscess within the central pelvis which is causing compression of the adjacent small bowel causing a proximal small-bowel obstruction  I discussed the case general surgery Dr David Stanford  I also does get us the case with Interventional Radiology on-call Dr Víctor Gómez  Based on the location and that it is surrounded by bowel the collection is not amenable to IR percutaneous intervention and therefore in discussion with surgery it was arrange that the patient would be transported to [place] [id number] for operative intervention/general surgery  Patient was updated on this throughout the ED course  He did have an NG tube placed successfully emergency department underwent a repeat scan at the suggestion of the reading radiologist to better characterize collection  ED Course as of 08/20/22 1128   Sat Aug 20, 2022   0725 Received signout on this patient from Dr Dennis Jaramillo pending results of labs and CT imaging   Briefly, this 77M p/w 6 days nausea, progressing to abd distension, vomiting, decreased oral intake last 4 days, with abd distension on exam  Concern for obstruction  Otherwise well appearing no significant pain  0731 Bands Relative(!): 20   0731 Blood cultures and antibiotics ordered  0873 CT scan with concerns for an abscess in the central pelvis causing a proximal small-bowel obstruction  Recommending surgical consultation  I did discuss the case via tiger text with General surgery on-call Dr Humza Mcmanus  Also recommending possibility rescanning with p o  Plus IV contrast to better characterize  Repeat scan ordered at this time  Patient updated on findings will await surgical input  May need speak with IR I am not sure at this point  He is on antibiotics I did discuss bandemia with surgery  2143 Dr Humza Mcmanus requesting I reach out to IR at this time to assess for possible IR intervention  4240 IR dr Catherine Samson reviewed images, does not think amenable to perc drainage due to surrounding bowel  Updated surgery  Will obtain repeat scan  NGT at this time  0941 NGT placed  Several hundred Ccs output  Tolerated  Will take to CT        Procedures  MDM  Number of Diagnoses or Management Options  Abdominopelvic abscess [facility]): new and requires workup  Small bowel obstruction [facility]): new and requires workup     Amount and/or Complexity of Data Reviewed  Clinical lab tests: ordered and reviewed  Tests in the radiology section of CPT®: ordered and reviewed  Tests in the medicine section of CPT®: reviewed and ordered  Discussion of test results with the performing providers: yes  Decide to obtain previous medical records or to obtain history from someone other than the patient: yes  Review and summarize past medical records: yes  Discuss the patient with other providers: yes  Independent visualization of images, tracings, or specimens: yes    Risk of Complications, Morbidity, and/or Mortality  Presenting problems: moderate  Diagnostic procedures: moderate  Management options: moderate    Patient Progress  Patient progress: stable          Disposition  Final diagnoses:   Small bowel obstruction ([address] )   Abdominopelvic abscess ([address] )     Time reflects when diagnosis was documented in both MDM as applicable and the Disposition within this note     Time User Action Codes Description Comment    8/20/2022  9:22 AM Iris Low Add [Y78 718] Small bowel obstruction ([address] )     8/20/2022  9:22 AM Iris Santacruz Add [K65 1] Abdominopelvic abscess [facility])       ED Disposition     ED Disposition   Transfer to Another Facility-In Network    Condition   --    Date/Time   Sat Aug 20, 2022  9:22 AM    Comment   Jimbo Moore should be transferred out to [facility]  Follow-up Information    None       Patient's Medications   Discharge Prescriptions    No medications on file     No discharge procedures on file         ED Provider  Electronically Signed by     Lesly Mccarthy DO  08/20/22 7889

## 2022-08-20 NOTE — ED NOTES
[facility] via [address] [facility] Edith, RN  08/20/22 1093

## 2022-08-20 NOTE — PLAN OF CARE
Problem: Potential for Falls  Goal: Patient will remain free of falls  Description: INTERVENTIONS:  - Educate patient/family on patient safety including physical limitations  - Instruct patient to call for assistance with activity   - Consult OT/PT to assist with strengthening/mobility   - Keep Call bell within reach  - Keep bed low and locked with side rails adjusted as appropriate  - Keep care items and personal belongings within reach  - Initiate and maintain comfort rounds  - Make Fall Risk Sign visible to staff  - Offer Toileting every 2 Hours, in advance of need  - Initiate/Maintain bed alarm  - Obtain necessary fall risk management equipment  - Apply yellow socks and bracelet for high fall risk patients  - Consider moving patient to room near nurses station  Outcome: Progressing     Problem: PAIN - ADULT  Goal: Verbalizes/displays adequate comfort level or baseline comfort level  Description: Interventions:  - Encourage patient to monitor pain and request assistance  - Assess pain using appropriate pain scale  - Administer analgesics based on type and severity of pain and evaluate response  - Implement non-pharmacological measures as appropriate and evaluate response  - Consider cultural and social influences on pain and pain management  - Notify physician/advanced practitioner if interventions unsuccessful or patient reports new pain  Outcome: Progressing     Problem: INFECTION - ADULT  Goal: Absence or prevention of progression during hospitalization  Description: INTERVENTIONS:  - Assess and monitor for signs and symptoms of infection  - Monitor lab/diagnostic results  - Monitor all insertion sites, i e  indwelling lines, tubes, and drains  - Monitor endotracheal if appropriate and nasal secretions for changes in amount and color  - [place] appropriate cooling/warming therapies per order  - Administer medications as ordered  - Instruct and encourage patient and family to use good hand hygiene technique  - Identify and instruct in appropriate isolation precautions for identified infection/condition  Outcome: Progressing  Goal: Absence of fever/infection during neutropenic period  Description: INTERVENTIONS:  - Monitor WBC    Outcome: Progressing     Problem: SAFETY ADULT  Goal: Patient will remain free of falls  Description: INTERVENTIONS:  - Educate patient/family on patient safety including physical limitations  - Instruct patient to call for assistance with activity   - Consult OT/PT to assist with strengthening/mobility   - Keep Call bell within reach  - Keep bed low and locked with side rails adjusted as appropriate  - Keep care items and personal belongings within reach  - Initiate and maintain comfort rounds  - Make Fall Risk Sign visible to staff  - Offer Toileting every 2 Hours, in advance of need  - Initiate/Maintain bed alarm  - Obtain necessary fall risk management equipment  - Apply yellow socks and bracelet for high fall risk patients  - Consider moving patient to room near nurses station  Outcome: Progressing  Goal: Maintain or return to baseline ADL function  Description: INTERVENTIONS:  -  Assess patient's ability to carry out ADLs; assess patient's baseline for ADL function and identify physical deficits which impact ability to perform ADLs (bathing, care of mouth/teeth, toileting, grooming, dressing, etc )  - Assess/evaluate cause of self-care deficits   - Assess range of motion  - Assess patient's mobility; develop plan if impaired  - Assess patient's need for assistive devices and provide as appropriate  - Encourage maximum independence but intervene and supervise when necessary  - Involve family in performance of ADLs  - Assess for home care needs following discharge   - Consider OT consult to assist with ADL evaluation and planning for discharge  - Provide patient education as appropriate  Outcome: Progressing  Goal: Maintains/Returns to pre admission functional level  Description: INTERVENTIONS:  - Perform BMAT or MOVE assessment daily    - Set and communicate daily mobility goal to care team and patient/family/caregiver  - Collaborate with rehabilitation services on mobility goals if consulted  - Perform Range of Motion 2 times a day  - Reposition patient every 2 hours  - Dangle patient 2 times a day  - Stand patient 2 times a day  - Ambulate patient 2 times a day  - Out of bed to chair 3 times a day   - Out of bed for meals 3 times a day  - Out of bed for toileting  - Record patient progress and toleration of activity level   Outcome: Progressing     Problem: DISCHARGE PLANNING  Goal: Discharge to home or other facility with appropriate resources  Description: INTERVENTIONS:  - Identify barriers to discharge w/patient and caregiver  - Arrange for needed discharge resources and transportation as appropriate  - Identify discharge learning needs (meds, wound care, etc )  - Arrange for interpretive services to assist at discharge as neede- Refer to Case Management Department for coordinating discharge planning if the patient needs post-hospital services based on physician/advanced practitioner order or complex needs related to functional status, cognitive ability, or social support system  Outcome: Progressing     Problem: Knowledge Deficit  Goal: Patient/family/caregiver demonstrates understanding of disease process, treatment plan, medications, and discharge instructions  Description: Complete learning assessment and assess knowledge base    Interventions:  - Provide teaching at level of understanding  - Provide teaching via preferred learning methods  Outcome: Progressing     Problem: GASTROINTESTINAL - ADULT  Goal: Minimal or absence of nausea and/or vomiting  Description: INTERVENTIONS:  - Administer IV fluids if ordered to ensure adequate hydration  - Maintain NPO status until nausea and vomiting are resolved  - Nasogastric tube if ordered  - Administer ordered antiemetic medications as needed  - Provide nonpharmacologic comfort measures as appropriate  - Advance diet as tolerated, if ordered  - Consider nutrition services referral to assist patient with adequate nutrition and appropriate food choices  Outcome: Progressing  Goal: Maintains or returns to baseline bowel function  Description: INTERVENTIONS:  - Assess bowel function  - Encourage oral fluids to ensure adequate hydration  - Administer IV fluids if ordered to ensure adequate hydration  - Administer ordered medications as needed  - Encourage mobilization and activity  - Consider nutritional services referral to assist patient with adequate nutrition and appropriate food choices  Outcome: Progressing  Goal: Oral mucous membranes remain intact  Description: INTERVENTIONS  - Assess oral mucosa and hygiene practices  - Implement preventative oral hygiene regimen  - Implement oral medicated treatments as ordered  - Initiate Nutrition services referral as needed  Outcome: Progressing

## 2022-08-20 NOTE — ANESTHESIA POSTPROCEDURE EVALUATION
Post-Op Assessment Note    CV Status:  Stable  Pain Score: 9    Pain management: inadequate     Mental Status:  Awake   Hydration Status:  Stable   PONV Controlled:  None      Post Op Vitals Reviewed: Yes      Staff: Anesthesiologist   Reason for prolonged intubation > 24 hours:  N/aReason for prolonged intubation > 48 hours:  N/a    States he is on chronic pain medications and has a tolerance  Given total of 2 mg dilauidd, 100 fentanyl, 50 ketamine, 20 precedex, Spoke to him about waiting for the pain medications to reach peak effect so he does not go into brain failure aka delirum, respiratory failure which then would lengthen his stay  His blood pressure 593K systolic and HR 85 which seems to be within his baseline  Awake, talking, no wincing  Will monitor and order more pain medications as tolerated

## 2022-08-20 NOTE — H&P
H&P Exam - General Surgery   Amee Krueger 78 y o  male MRN: [id number]  Unit/Bed#: -01 Encounter: [id number]    Assessment/Plan     Assessment:  The patient is a 68-year-old white male with history of arthritis and spinal stenosis presenting to [facility] ER with high-grade small-bowel obstruction, on imaging with contrast found to be due to an abscess in the central abdomen at the pelvic inlet involving bladder sigmoid colon and small bowel  The patient had colonoscopy 2 years ago which demonstrated mild diverticulosis  The patient was on a pulse of steroids 30 days ago  Plan:  Exploratory laparotomy with drainage of intra-abdominal abscess with possible bowel resection including possible sigmoid colostomy  Stress dose steroids    History of Present Illness     HPI:  Amee Krueger is a 78 y o  male who presents with high-grade small-bowel obstruction  Patient states 4 days ago he began to experience abdominal distension and a vague abdominal discomfort  He denies fever, says that he always feels cold but did not have rigors  The symptoms progressed, he had flatus yesterday, and a small ineffective bowel movement , and no bowel movement since  He did pass some gas this morning, however this morning he began to experience intractable vomiting of a tan feculent material   He proceeded to the emergency room where CT scan without contrast demonstrated a high-grade small-bowel obstruction, and suggested an intra-abdominal abscess  Repeat scanning with contrast confirms intra-abdominal abscess, and a point of obstruction being at the can fluids of the sigmoid colon, dome of the bladder, and small bowel  The films were discussed with Dr Luis Enrique Molina from Interventional Radiology, who states that the abscess is enveloped by bowel and there is no way to percutaneously drain it  The patient is now scheduled for exploratory laparotomy with drainage of intra-abdominal abscess and possible bowel resection    He is aware that a sigmoid colostomy may be necessary  Review of Systems   Constitutional: Positive for appetite change, chills and fatigue  Negative for fever  HENT: Negative for trouble swallowing  Respiratory: Positive for shortness of breath (due to inability to take a full breath due to abdominal distention and pain)  Cardiovascular: Negative for chest pain and palpitations  Gastrointestinal: Positive for abdominal distention, abdominal pain, constipation, nausea and vomiting  Negative for anal bleeding, blood in stool and diarrhea  Endocrine: Negative  Genitourinary: Negative for difficulty urinating and dysuria  Musculoskeletal: Positive for arthralgias (particularly Right Shoulder), back pain and joint swelling  Skin: Negative  Wound: Right hand due to dog bite  Hematological: Negative  Psychiatric/Behavioral: Negative          Historical Information   Past Medical History:   Diagnosis Date    Aortic stenosis     Aortic stenosis, severe     Arthritis     Carpal tunnel syndrome     Chronic back pain     Chronic pain     Chronic, continuous use of opioids     Hyperlipidemia     Hypertension     Hypertrophy of prostate     Insomnia     Large bowel obstruction (HCC)     Peripheral neuropathy     RA (rheumatoid arthritis) ([address] )     Umbilical hernia      Past Surgical History:   Procedure Laterality Date    ADENOIDECTOMY      AORTIC VALVE REPLACEMENT  10/17/2017    BASAL CELL CARCINOMA EXCISION      CARDIAC CATHETERIZATION      LAST ASSESSED: 20OCT2017    CARPAL TUNNEL RELEASE Bilateral     left    CATARACT EXTRACTION      HERNIA REPAIR      inguinal bilateral -umbilical     LAMINECTOMY      DECOMPRESS, FACETECTOMY, FORAMINOTOMY LUMBAR SEG    NEUROPLASTY / TRANSPOSITION MEDIAN NERVE AT CARPAL TUNNEL      NM RPLCMT AORTIC VALVE OPN W/STENTLESS TISSUE VALVE N/A 10/17/2017    Procedure: AORTIC VALVE REPLACEMENT with 23MM MAGNAEASE TISSUE VALVE; INTRA-OP SHANDRA;  Surgeon: Deep [place] Ethan Mclaughlin MD;  [facility];  Service: Cardiac Surgery    TONSILLECTOMY       Social History   Social History     Substance and Sexual Activity   Alcohol Use Not Currently     Social History     Substance and Sexual Activity   Drug Use No     Social History     Tobacco Use   Smoking Status Never Smoker   Smokeless Tobacco Never Used     E-Cigarette/Vaping    E-Cigarette Use Never User      E-Cigarette/Vaping Substances    Nicotine No     THC No     CBD No     Flavoring No     Other No     Unknown No      Family History:   Family History   Problem Relation Age of Onset    Stroke Mother     Diabetes Mother     Hypertension Mother     Heart disease Father     Hypertension Father     Heart attack Father     Coronary artery disease Family     Colon polyps Neg Hx     Colon cancer Neg Hx        Meds/Allergies   PTA meds:   Prior to Admission Medications   Prescriptions Last Dose Informant Patient Reported? Taking?    Cholecalciferol (VITAMIN D3) 1000 units CAPS  Self Yes No   Sig: Take 1 tablet by mouth daily   Gabapentin, Once-Daily, 600 MG TABS  Self Yes No   Sig: TAKE 3 TABLETS BY MOUTH AT BEDTIME   Potassium Iodide, Antidote, 130 MG TABS   No No   Sig: Take 1 tablet (130 mg total) by mouth in the morning   Secukinumab 150 MG/ML SOAJ  Self Yes No   Sig: One injection subcutaneously at weeks 1,2,3,4 and 5 for loading dose then every 4 weeks thereafter   aspirin 81 MG tablet  Self No No   Sig: Take 1 tablet (81 mg total) by mouth daily   atorvastatin (LIPITOR) 20 mg tablet   No No   Sig: take 1 tablet by mouth once daily   betamethasone dipropionate (DIPROSONE) 0 05 % cream  Self Yes No   Sig: Apply topically   carisoprodol (SOMA) 350 mg tablet  Self No No   Sig: Take 1 tablet (350 mg total) by mouth 4 (four) times a day   diclofenac sodium (VOLTAREN) 1 %  Self No No   Sig: Apply 2 g topically 4 (four) times a day   fluocinonide (LIDEX) 0 05 % ointment  Self Yes No   Sig: Apply topically 2 (two) times a day   gabapentin (NEURONTIN) 600 MG tablet   Yes No   Sig: take 1 tablet by mouth three times a day (MORNING, NOON, AND BEDTIME)   ketoconazole (NIZORAL) 2 % cream   No No   Sig: Apply topically 2 (two) times a day   losartan (COZAAR) 50 mg tablet   No No   Sig: TAKE 1 TABLET BY MOUTH EVERY DAY   naloxone (NARCAN) 4 mg/0 1 mL nasal spray   No No   Sig: Administer 1 spray into a nostril  If no response after 2-3 minutes, give another dose in the other nostril using a new spray  oxyCODONE (ROXICODONE) 10 MG TABS   No No   Sig: Take 1 tablet 4 times daily   oxyCODONE-acetaminophen (PERCOCET)  mg per tablet   No No   Sig: Take 1 tablet by mouth every 4 (four) hours as needed for moderate pain Max Daily Amount: 6 tablets   predniSONE 10 mg tablet   No No   Si for 3 days, 3 for 3 days, 2 for 3 days, 1 for 3 days   traZODone (DESYREL) 300 MG tablet   No No   Si tablets at bedtime      Facility-Administered Medications: None     Allergies   Allergen Reactions    Penicillins Anaphylaxis    Quinine Derivatives Other (See Comments)     High fever       Objective   First Vitals:   Blood Pressure: 141/86 (22 1328)  Pulse: 74 (22 1328)  Temperature: (!) 97 4 °F (36 3 °C) (22 1328)  Temp Source: Oral (22 1328)  Respirations: 18 (22 1328)  SpO2: 97 % (22 1328)    Current Vitals:   Blood Pressure: 141/86 (22 1328)  Pulse: 74 (22 1328)  Temperature: (!) 97 4 °F (36 3 °C) (22 1328)  Temp Source: Oral (22 1328)  Respirations: 18 (22 1328)  SpO2: 97 % (22 1328)    No intake or output data in the 24 hours ending 22 1422    Invasive Devices  Report    Peripheral Intravenous Line  Duration           Peripheral IV 22 Right Forearm <1 day          Drain  Duration           NG/OG/Enteral Tube Nasogastric 16 Fr Right nare <1 day                Physical Exam  Constitutional:       Appearance: He is ill-appearing   He is not toxic-appearing  HENT:      Head: Normocephalic and atraumatic  Eyes:      General: No scleral icterus  Pupils: Pupils are equal, round, and reactive to light  Cardiovascular:      Rate and Rhythm: Tachycardia present  Heart sounds: Normal heart sounds  No murmur heard  Pulmonary:      Effort: Pulmonary effort is normal       Breath sounds: Normal breath sounds  Abdominal:      General: Abdomen is protuberant  Bowel sounds are absent  There is distension  Palpations: There is no hepatomegaly or mass  Tenderness: There is generalized abdominal tenderness  There is guarding  Comments: Maximal tenderness to the right of midline below the umbilicus  Genitourinary:     Comments: Deferred  Musculoskeletal:         General: No deformity  Cervical back: Neck supple  Comments: Arthritic pain right shoulder   Skin:     General: Skin is warm and dry  Findings: Lesion: Dog bite dorsum right hand  Neurological:      General: No focal deficit present  Mental Status: He is alert and oriented to person, place, and time  Psychiatric:         Mood and Affect: Mood normal          Lab Results:   I have personally reviewed pertinent lab results    , CBC:   Lab Results   Component Value Date    WBC 10 10 08/20/2022    HGB 16 5 08/20/2022    HCT 50 1 (H) 08/20/2022    MCV 89 08/20/2022     (H) 08/20/2022    MCH 29 2 08/20/2022    MCHC 32 9 08/20/2022    RDW 12 0 08/20/2022    MPV 8 9 08/20/2022   , CMP:   Lab Results   Component Value Date    SODIUM 133 (L) 08/20/2022    K 3 9 08/20/2022    CL 91 (L) 08/20/2022    CO2 27 08/20/2022    BUN 24 08/20/2022    CREATININE 1 31 (H) 08/20/2022    CALCIUM 9 2 08/20/2022    AST 43 08/20/2022    ALT 41 08/20/2022    ALKPHOS 75 08/20/2022    EGFR 52 08/20/2022   , Urinalysis: No results found for: Anil Ice, SPECGRAV, PHUR, LEUKOCYTESUR, NITRITE, PROTEINUA, GLUCOSEU, KETONESU, BILIRUBINUR, BLOODU, Lipase:   Lab Results Component Value Date    LIPASE 143 08/20/2022     Imaging: I have personally reviewed pertinent reports  and I have personally reviewed pertinent films in PACS           EKG, Pathology, and Other Studies: I have personally reviewed pertinent reports  Code Status: Level 1 - Full Code  Advance Directive and Living Will:      Power of :    POLST:      Counseling / Coordination of Care  Total floor / unit time spent today 60 minutes  Greater than 50% of total time was spent with the patient and / or family counseling and / or coordination of care  A description of the counseling / coordination of care:  Excepting the patient in transfer, notifying the operating room,discussion with Anesthesia

## 2022-08-20 NOTE — TELEPHONE ENCOUNTER
Patient is calling regarding cancelling an appointment  Date/Time: 8/22/2022 @ 0915    Patient was rescheduled: YES [] NO [x]    Patient requesting call back to reschedule: YES [] NO [x]    Patient's wife called and advised the patient is in the ER for distended stomach and vomiting  They are running tests to rule out a cyst but depending on the findings he may be transferred to [place]

## 2022-08-21 LAB
ALBUMIN SERPL BCP-MCNC: 3.3 G/DL (ref 3.5–5)
ALP SERPL-CCNC: 45 U/L (ref 34–104)
ALT SERPL W P-5'-P-CCNC: 20 U/L (ref 7–52)
ANION GAP SERPL CALCULATED.3IONS-SCNC: 11 MMOL/L (ref 4–13)
AST SERPL W P-5'-P-CCNC: 21 U/L (ref 13–39)
BASOPHILS # BLD AUTO: 0.04 THOUSANDS/ΜL (ref 0–0.1)
BASOPHILS NFR BLD AUTO: 0 % (ref 0–1)
BILIRUB SERPL-MCNC: 0.36 MG/DL (ref 0.2–1)
BUN SERPL-MCNC: 20 MG/DL (ref 5–25)
CALCIUM ALBUM COR SERPL-MCNC: 9.3 MG/DL (ref 8.3–10.1)
CALCIUM SERPL-MCNC: 8.7 MG/DL (ref 8.4–10.2)
CHLORIDE SERPL-SCNC: 95 MMOL/L (ref 96–108)
CO2 SERPL-SCNC: 27 MMOL/L (ref 21–32)
CREAT SERPL-MCNC: 1.15 MG/DL (ref 0.6–1.3)
EOSINOPHIL # BLD AUTO: 0 THOUSAND/ΜL (ref 0–0.61)
EOSINOPHIL NFR BLD AUTO: 0 % (ref 0–6)
ERYTHROCYTE [DISTWIDTH] IN BLOOD BY AUTOMATED COUNT: 12.2 % (ref 11.6–15.1)
GFR SERPL CREATININE-BSD FRML MDRD: 61 ML/MIN/1.73SQ M
GLUCOSE SERPL-MCNC: 139 MG/DL (ref 65–140)
HCT VFR BLD AUTO: 38 % (ref 36.5–49.3)
HGB BLD-MCNC: 12.5 G/DL (ref 12–17)
IMM GRANULOCYTES # BLD AUTO: 0.22 THOUSAND/UL (ref 0–0.2)
IMM GRANULOCYTES NFR BLD AUTO: 2 % (ref 0–2)
LYMPHOCYTES # BLD AUTO: 1.14 THOUSANDS/ΜL (ref 0.6–4.47)
LYMPHOCYTES NFR BLD AUTO: 12 % (ref 14–44)
MAGNESIUM SERPL-MCNC: 2 MG/DL (ref 1.9–2.7)
MCH RBC QN AUTO: 29.3 PG (ref 26.8–34.3)
MCHC RBC AUTO-ENTMCNC: 32.9 G/DL (ref 31.4–37.4)
MCV RBC AUTO: 89 FL (ref 82–98)
MONOCYTES # BLD AUTO: 1.12 THOUSAND/ΜL (ref 0.17–1.22)
MONOCYTES NFR BLD AUTO: 11 % (ref 4–12)
NEUTROPHILS # BLD AUTO: 7.37 THOUSANDS/ΜL (ref 1.85–7.62)
NEUTS SEG NFR BLD AUTO: 75 % (ref 43–75)
NRBC BLD AUTO-RTO: 0 /100 WBCS
PHOSPHATE SERPL-MCNC: 3.3 MG/DL (ref 2.3–4.1)
PLATELET # BLD AUTO: 438 THOUSANDS/UL (ref 149–390)
PMV BLD AUTO: 8.5 FL (ref 8.9–12.7)
POTASSIUM SERPL-SCNC: 3.8 MMOL/L (ref 3.5–5.3)
PROT SERPL-MCNC: 6.1 G/DL (ref 6.4–8.4)
RBC # BLD AUTO: 4.26 MILLION/UL (ref 3.88–5.62)
SODIUM SERPL-SCNC: 133 MMOL/L (ref 135–147)
WBC # BLD AUTO: 9.89 THOUSAND/UL (ref 4.31–10.16)

## 2022-08-21 PROCEDURE — 83735 ASSAY OF MAGNESIUM: CPT

## 2022-08-21 PROCEDURE — 97163 PT EVAL HIGH COMPLEX 45 MIN: CPT

## 2022-08-21 PROCEDURE — C9113 INJ PANTOPRAZOLE SODIUM, VIA: HCPCS

## 2022-08-21 PROCEDURE — 97167 OT EVAL HIGH COMPLEX 60 MIN: CPT

## 2022-08-21 PROCEDURE — 84100 ASSAY OF PHOSPHORUS: CPT

## 2022-08-21 PROCEDURE — 85025 COMPLETE CBC W/AUTO DIFF WBC: CPT

## 2022-08-21 PROCEDURE — 80053 COMPREHEN METABOLIC PANEL: CPT

## 2022-08-21 PROCEDURE — 99024 POSTOP FOLLOW-UP VISIT: CPT | Performed by: SPECIALIST

## 2022-08-21 RX ORDER — METOCLOPRAMIDE HYDROCHLORIDE 5 MG/ML
10 INJECTION INTRAMUSCULAR; INTRAVENOUS EVERY 6 HOURS PRN
Status: DISCONTINUED | OUTPATIENT
Start: 2022-08-21 | End: 2022-08-21

## 2022-08-21 RX ORDER — LORAZEPAM 2 MG/ML
1 INJECTION INTRAMUSCULAR EVERY 6 HOURS PRN
Status: DISCONTINUED | OUTPATIENT
Start: 2022-08-21 | End: 2022-08-23

## 2022-08-21 RX ORDER — HYDROMORPHONE HCL/PF 1 MG/ML
0.5 SYRINGE (ML) INJECTION EVERY 2 HOUR PRN
Status: DISCONTINUED | OUTPATIENT
Start: 2022-08-21 | End: 2022-08-26

## 2022-08-21 RX ORDER — HYDROMORPHONE HCL/PF 1 MG/ML
1 SYRINGE (ML) INJECTION EVERY 2 HOUR PRN
Status: DISCONTINUED | OUTPATIENT
Start: 2022-08-21 | End: 2022-08-26

## 2022-08-21 RX ORDER — HYDROMORPHONE HCL IN WATER/PF 6 MG/30 ML
0.2 PATIENT CONTROLLED ANALGESIA SYRINGE INTRAVENOUS
Status: DISCONTINUED | OUTPATIENT
Start: 2022-08-21 | End: 2022-08-30 | Stop reason: HOSPADM

## 2022-08-21 RX ORDER — CIPROFLOXACIN 2 MG/ML
400 INJECTION, SOLUTION INTRAVENOUS EVERY 12 HOURS
Status: DISCONTINUED | OUTPATIENT
Start: 2022-08-21 | End: 2022-08-26

## 2022-08-21 RX ORDER — METOCLOPRAMIDE HYDROCHLORIDE 5 MG/ML
10 INJECTION INTRAMUSCULAR; INTRAVENOUS EVERY 6 HOURS PRN
Status: DISCONTINUED | OUTPATIENT
Start: 2022-08-21 | End: 2022-08-30 | Stop reason: HOSPADM

## 2022-08-21 RX ADMIN — PANTOPRAZOLE SODIUM 40 MG: 40 INJECTION, POWDER, FOR SOLUTION INTRAVENOUS at 09:30

## 2022-08-21 RX ADMIN — SODIUM CHLORIDE, SODIUM LACTATE, POTASSIUM CHLORIDE, AND CALCIUM CHLORIDE 150 ML/HR: .6; .31; .03; .02 INJECTION, SOLUTION INTRAVENOUS at 14:00

## 2022-08-21 RX ADMIN — HYDROMORPHONE HYDROCHLORIDE 1 MG: 1 INJECTION, SOLUTION INTRAMUSCULAR; INTRAVENOUS; SUBCUTANEOUS at 22:16

## 2022-08-21 RX ADMIN — ONDANSETRON 4 MG: 2 INJECTION INTRAMUSCULAR; INTRAVENOUS at 19:43

## 2022-08-21 RX ADMIN — CIPROFLOXACIN 400 MG: 2 INJECTION, SOLUTION INTRAVENOUS at 13:58

## 2022-08-21 RX ADMIN — HYDROMORPHONE HYDROCHLORIDE 0.2 MG: 0.2 INJECTION, SOLUTION INTRAMUSCULAR; INTRAVENOUS; SUBCUTANEOUS at 12:12

## 2022-08-21 RX ADMIN — HYDROMORPHONE HYDROCHLORIDE 1 MG: 1 INJECTION, SOLUTION INTRAMUSCULAR; INTRAVENOUS; SUBCUTANEOUS at 10:44

## 2022-08-21 RX ADMIN — MORPHINE SULFATE 4 MG: 4 INJECTION, SOLUTION INTRAMUSCULAR; INTRAVENOUS at 01:41

## 2022-08-21 RX ADMIN — METRONIDAZOLE 500 MG: 500 INJECTION, SOLUTION INTRAVENOUS at 17:32

## 2022-08-21 RX ADMIN — HYDROMORPHONE HYDROCHLORIDE 0.5 MG: 1 INJECTION, SOLUTION INTRAMUSCULAR; INTRAVENOUS; SUBCUTANEOUS at 03:52

## 2022-08-21 RX ADMIN — METOCLOPRAMIDE HYDROCHLORIDE 10 MG: 5 INJECTION INTRAMUSCULAR; INTRAVENOUS at 13:57

## 2022-08-21 RX ADMIN — HYDROCORTISONE SODIUM SUCCINATE 50 MG: 100 INJECTION, POWDER, FOR SOLUTION INTRAMUSCULAR; INTRAVENOUS at 07:11

## 2022-08-21 RX ADMIN — HYDROMORPHONE HYDROCHLORIDE 1 MG: 1 INJECTION, SOLUTION INTRAMUSCULAR; INTRAVENOUS; SUBCUTANEOUS at 19:34

## 2022-08-21 RX ADMIN — ONDANSETRON 4 MG: 2 INJECTION INTRAMUSCULAR; INTRAVENOUS at 09:48

## 2022-08-21 RX ADMIN — CIPROFLOXACIN 400 MG: 2 INJECTION, SOLUTION INTRAVENOUS at 01:01

## 2022-08-21 RX ADMIN — HYDROMORPHONE HYDROCHLORIDE 1 MG: 1 INJECTION, SOLUTION INTRAMUSCULAR; INTRAVENOUS; SUBCUTANEOUS at 15:10

## 2022-08-21 RX ADMIN — LORAZEPAM 1 MG: 2 INJECTION INTRAMUSCULAR; INTRAVENOUS at 04:20

## 2022-08-21 RX ADMIN — LORAZEPAM 1 MG: 2 INJECTION INTRAMUSCULAR; INTRAVENOUS at 17:34

## 2022-08-21 RX ADMIN — ONDANSETRON 4 MG: 2 INJECTION INTRAMUSCULAR; INTRAVENOUS at 03:11

## 2022-08-21 RX ADMIN — HYDROCORTISONE SODIUM SUCCINATE 50 MG: 100 INJECTION, POWDER, FOR SOLUTION INTRAMUSCULAR; INTRAVENOUS at 13:57

## 2022-08-21 RX ADMIN — HYDROMORPHONE HYDROCHLORIDE 1 MG: 1 INJECTION, SOLUTION INTRAMUSCULAR; INTRAVENOUS; SUBCUTANEOUS at 07:17

## 2022-08-21 RX ADMIN — HYDROMORPHONE HYDROCHLORIDE 0.2 MG: 0.2 INJECTION, SOLUTION INTRAMUSCULAR; INTRAVENOUS; SUBCUTANEOUS at 16:09

## 2022-08-21 RX ADMIN — ENOXAPARIN SODIUM 40 MG: 100 INJECTION SUBCUTANEOUS at 09:41

## 2022-08-21 RX ADMIN — METRONIDAZOLE 500 MG: 500 INJECTION, SOLUTION INTRAVENOUS at 09:30

## 2022-08-21 RX ADMIN — METOCLOPRAMIDE HYDROCHLORIDE 10 MG: 5 INJECTION INTRAMUSCULAR; INTRAVENOUS at 22:22

## 2022-08-21 RX ADMIN — HYDROMORPHONE HYDROCHLORIDE 0.5 MG: 1 INJECTION, SOLUTION INTRAMUSCULAR; INTRAVENOUS; SUBCUTANEOUS at 08:10

## 2022-08-21 NOTE — PLAN OF CARE
Problem: Potential for Falls  Goal: Patient will remain free of falls  Description: INTERVENTIONS:  - Educate patient/family on patient safety including physical limitations  - Instruct patient to call for assistance with activity   - Consult OT/PT to assist with strengthening/mobility   - Keep Call bell within reach  - Keep bed low and locked with side rails adjusted as appropriate  - Keep care items and personal belongings within reach  - Initiate and maintain comfort rounds  - Make Fall Risk Sign visible to staff  - Offer Toileting every 2 Hours, in advance of need  - Initiate/Maintain bed alarm  - Obtain necessary fall risk management equipment  - Apply yellow socks and bracelet for high fall risk patients  - Consider moving patient to room near nurses station  Outcome: Progressing     Problem: SAFETY ADULT  Goal: Patient will remain free of falls  Description: INTERVENTIONS:  - Educate patient/family on patient safety including physical limitations  - Instruct patient to call for assistance with activity   - Consult OT/PT to assist with strengthening/mobility   - Keep Call bell within reach  - Keep bed low and locked with side rails adjusted as appropriate  - Keep care items and personal belongings within reach  - Initiate and maintain comfort rounds  - Make Fall Risk Sign visible to staff  - Offer Toileting every 2 Hours, in advance of need  - Initiate/Maintain bed alarm  - Obtain necessary fall risk management equipment  - Apply yellow socks and bracelet for high fall risk patients  - Consider moving patient to room near nurses station  Outcome: Progressing  Goal: Maintain or return to baseline ADL function  Description: INTERVENTIONS:  -  Assess patient's ability to carry out ADLs; assess patient's baseline for ADL function and identify physical deficits which impact ability to perform ADLs (bathing, care of mouth/teeth, toileting, grooming, dressing, etc )  - Assess/evaluate cause of self-care deficits - Assess range of motion  - Assess patient's mobility; develop plan if impaired  - Assess patient's need for assistive devices and provide as appropriate  - Encourage maximum independence but intervene and supervise when necessary  - Involve family in performance of ADLs  - Assess for home care needs following discharge   - Consider OT consult to assist with ADL evaluation and planning for discharge  - Provide patient education as appropriate  Outcome: Progressing  Goal: Maintains/Returns to pre admission functional level  Description: INTERVENTIONS:  - Perform BMAT or MOVE assessment daily    - Set and communicate daily mobility goal to care team and patient/family/caregiver  - Collaborate with rehabilitation services on mobility goals if consulted  - Perform Range of Motion 2 times a day  - Reposition patient every 2 hours    - Dangle patient 2 times a day  - Stand patient 2 times a day  - Ambulate patient 2 times a day  - Out of bed to chair 3 times a day   - Out of bed for meals 3 times a day  - Out of bed for toileting  - Record patient progress and toleration of activity level   Outcome: Progressing

## 2022-08-21 NOTE — OCCUPATIONAL THERAPY NOTE
Occupational Therapy Evaluation      Juany Ripa    8/21/2022    Principal Problem:    SBO (small bowel obstruction) (HCC)      Past Medical History:   Diagnosis Date    Aortic stenosis     Aortic stenosis, severe     Arthritis     Carpal tunnel syndrome     Chronic back pain     Chronic pain     Chronic, continuous use of opioids     Hyperlipidemia     Hypertension     Hypertrophy of prostate     Insomnia     Large bowel obstruction (HCC)     Peripheral neuropathy     RA (rheumatoid arthritis) ([address] )     Umbilical hernia        Past Surgical History:   Procedure Laterality Date    ADENOIDECTOMY      AORTIC VALVE REPLACEMENT  10/17/2017    BASAL CELL CARCINOMA EXCISION      CARDIAC CATHETERIZATION      LAST ASSESSED: 20OCT2017    CARPAL TUNNEL RELEASE Bilateral     left    CATARACT EXTRACTION      HERNIA REPAIR      inguinal bilateral -umbilical     LAMINECTOMY      DECOMPRESS, FACETECTOMY, FORAMINOTOMY LUMBAR SEG    NEUROPLASTY / TRANSPOSITION MEDIAN NERVE AT CARPAL TUNNEL      OH RPLCMT AORTIC VALVE OPN W/STENTLESS TISSUE VALVE N/A 10/17/2017    Procedure: AORTIC VALVE REPLACEMENT with 23MM MAGNAEASE TISSUE VALVE; INTRA-OP SHANDRA;  Surgeon: Doris Baxter MD;  [facility]: [facility];  Service: Cardiac Surgery    TONSILLECTOMY         08/21/22 0903   Note Type   Note type Evaluation   Restrictions/Precautions   Weight Bearing Precautions Per Order No   Braces or Orthoses   (pt denies)   Other Precautions Multiple lines;Telemetry;O2;Fall Risk;Pain  (NG tube, song, DAMASO drain, NPO/ice chips for comfort)   Pain Assessment   Pain Assessment Tool 0-10   Pain Score 8   Pain Location/Orientation Location: Abdomen   Pain Onset/Description   (spasms)   Hospital Pain Intervention(s) Medication (See MAR)   Home Living   Type of [address] [place] [address] One level;Ramped entrance; Able to live on main level with bedroom/bathroom   Bathroom Shower/Tub Walk-in shower   Bathroom Toilet Raised   Bathroom Equipment Grab bars in shower;Built-in shower seat;Grab bars around toilet   [address]; Wheelchair-manual;Wheelchair-electric   Additional Comments +  ( limitted amount )   Prior Function   Level of [place] Independent with ADLs and functional mobility   Lives With Spouse   Receives Help From Family   ADL Assistance Independent   IADLs Needs assistance   Falls in the last 6 months 0   Vocational Retired   ADL   Additional Comments participation limited by pain   Bed Mobility   Rolling L 1  Dependent   Additional items   (attempted x 3 with railing however unable to attain full roll due to pain; limited participation from pt)   Cognition   Overall Cognitive Status WFL   Arousal/Participation   (limited participation despite encouragement)   Orientation Level Oriented X4   Memory Within functional limits   Following Commands Follows all commands and directions without difficulty   Assessment   Limitation Decreased ADL status; Decreased endurance;Decreased self-care trans   Prognosis Fair   Assessment Pt is a 78 y o  male seen for OT evaluation s/p admit to [facility] on 8/20/2022 w/ SBO (small bowel obstruction) ([facility] [address] )  Comorbidities affecting pt's functional performance at time of assessment include: HTN and RA, ASCVD, AVR, chronic fatigue, peripheral neuropathy, spinal stenosis  Personal factors affecting pt at time of IE include:difficulty performing ADLS  Prior to admission, pt was independent with ADL's IADL's and functional mobility  Upon evaluation: the following deficits impact occupational performance: decreased tolerance  Pt to benefit from continued skilled OT tx while in the hospital to address deficits as defined above and maximize level of functional independence w ADL's and functional mobility  Occupational Performance areas to address include: grooming, bathing/shower, toilet hygiene, dressing, functional mobility and clothing management   From OT standpoint, recommendation at time of d/c would be post acute rehab  Goals   Patient Goals decreased pain   Short Term Goal #1 Patient will be able to sit edge of bed with assist of 1 for up to 5 minutes   Functional Transfer Goals   Pt Will Transfer To Toilet With mod assist   ADL Goals   Pt Will Perform Grooming With min assist   Plan   Treatment Interventions ADL retraining;Functional transfer training; Endurance training;Patient/family training   Goal Expiration Date 08/31/22   OT Frequency 3-5x/wk   Recommendation   OT Discharge Recommendation Post acute rehabilitation services   Additional Comments  The patient's raw score on the AM-PAC Daily Activity inpatient short form low function score is 16, standardized score is Low Function Daily Activity Standardized Score: 27 65  Patients with a standardized score less than 39 4 are likely to benefit from discharge to post-acute rehab services  Please refer to the recommendation of the Occupational Therapist for safe discharge planning     AM-PAC Daily Activity Inpatient   Lower Body Dressing 1   Bathing 1   Toileting 1   Upper Body Dressing 1   Grooming 1   Eating 3   Daily Activity Raw Score 8   Turning Head Towards Sound 4   Follow Simple Instructions 4   Low Function Daily Activity Raw Score 16   Low Function Daily Activity Standardized Score 27 65   Pamela Vazquez, OT

## 2022-08-21 NOTE — PHYSICAL THERAPY NOTE
PHYSICAL THERAPY EVALUATION  NAME:  Eloy Wise: 08/21/22    AGE:   78 y o    Mrn:   [id number]  ADMIT DX:  Small bowel obstruction ([facility] ) [[id number]]  Problem List:   Patient Active Problem List   Diagnosis    Arthritis    Chronic pain    Continuous opioid dependence ([facility] )    Hyperlipidemia    Benign essential hypertension    Insomnia    Peripheral neuropathy    Status post aortic valve replacement with bioprosthetic valve    Chronic fatigue    Atherosclerosis of native coronary artery of native heart without angina pectoris    Rheumatoid arthritis with rheumatoid factor, unspecified (HCC)    Allergic rhinitis    Arthralgia of lower leg    Atopic dermatitis    Carpal tunnel syndrome    H/O steroid therapy    High risk medication use    Hyperglycemia    Low back pain    Primary localized osteoarthrosis of shoulder region    Psoriasis    Testicular hypogonadism    Stage 3 chronic kidney disease, unspecified whether stage 3a or 3b CKD ([place] [id number] )    Psoriasis with arthropathy ([facility])    Primary osteoarthritis of right shoulder    SBO (small bowel obstruction) ([facility] )       Past Medical History  Past Medical History:   Diagnosis Date    Aortic stenosis     Aortic stenosis, severe     Arthritis     Carpal tunnel syndrome     Chronic back pain     Chronic pain     Chronic, continuous use of opioids     Hyperlipidemia     Hypertension     Hypertrophy of prostate     Insomnia     Large bowel obstruction (HCC)     Peripheral neuropathy     RA (rheumatoid arthritis) ([facility] )     Umbilical hernia        Past Surgical History  Past Surgical History:   Procedure Laterality Date    ADENOIDECTOMY      AORTIC VALVE REPLACEMENT  10/17/2017    BASAL CELL CARCINOMA EXCISION      CARDIAC CATHETERIZATION      LAST ASSESSED: [id number]    CARPAL TUNNEL RELEASE Bilateral     left    CATARACT EXTRACTION      HERNIA REPAIR      inguinal bilateral -umbilical     LAMINECTOMY      DECOMPRESS, FACETECTOMY, FORAMINOTOMY LUMBAR SEG    NEUROPLASTY / TRANSPOSITION MEDIAN NERVE AT CARPAL TUNNEL      ID RPLCMT AORTIC VALVE OPN W/STENTLESS TISSUE VALVE N/A 10/17/2017    Procedure: AORTIC VALVE REPLACEMENT with 23MM MAGNAEASE TISSUE VALVE; INTRA-OP SHANDRA;  Surgeon: Radha Wells MD;  Location: [facility];  Service: Cardiac Surgery    TONSILLECTOMY         Length Of Stay: 1  Performed at least 2 patient identifiers during session: Name and Birthday       08/21/22 0919   PT Last Visit   PT Visit Date 08/21/22   Note Type   Note type Evaluation   Pain Assessment   Pain Assessment Tool 0-10   Pain Score 8   Pain Location/Orientation Location: Abdomen   Pain Onset/Description   (spasm)   Hospital Pain Intervention(s) Medication (See MAR)   Restrictions/Precautions   Weight Bearing Precautions Per Order No   Braces or Orthoses   (none reported)   Other Precautions Multiple lines;Telemetry;O2;Fall Risk;Pain  (Fley, NG tube, drain)   Home Living   Type of [address] [place] [address] One level;Ramped entrance; Able to live on main level with bedroom/bathroom   Bathroom Shower/Tub Walk-in shower   Bathroom Toilet Raised   Bathroom Equipment Grab bars in shower;Built-in shower seat;Grab bars around toilet   [address]; Wheelchair-manual;Wheelchair-electric  (not used at baseline)   Additional Comments +  (limited amount)   Prior Function   Level of [place] Independent with ADLs and functional mobility   Lives With Spouse   Receives Help From Family   ADL Assistance Independent   IADLs Needs assistance   Falls in the last 6 months 0   Vocational Retired   General   Family/Caregiver Present Yes  (wife)   Cognition   Overall Cognitive Status WFL   Arousal/Participation Alert   Orientation Level Oriented X4   Memory Within functional limits   Following Commands Follows all commands and directions without difficulty   RLE Assessment   RLE Assessment   (pt able to perform muslce contraction however unable to test due to pain) Vision-Basic Assessment   Current Vision No visual deficits   Coordination   Movements are Fluid and Coordinated   (unable to test due to pain)   Sensation X  (impaired from hips to toes (B))   Bed Mobility   Rolling L 1  Dependent   Additional items   (attempted x 3 with railing however unable to attain full roll due to pain; limited participation from pt)   Transfers   Additional Comments attempted to bend R knee to assist with rolling however pt unable to push through LE; explained log rolling procedure   Endurance Deficit   Endurance Deficit Yes   Endurance Deficit Description unable to roll   Activity Tolerance   Activity Tolerance Patient limited by pain   Assessment   Prognosis Fair   Problem List Decreased endurance;Decreased mobility; Impaired sensation;Pain   Assessment Pt is 78 y o  male seen for PT evaluation s/p admit to [facility] on 8/20/2022 w/ SBO (small bowel obstruction) ([facility] )  PT consulted to assess pt's functional mobility and d/c needs  Order placed for PT eval and tx, w/ ambulate patient order  Pt agreeable to PT  session upon arrival, pt found supine in bed  PTA, pt was independent w/ all functional mobility w/ no AD, ambulates community distances and elevations, lives w/ wife in 1 level home and retired  Pt to benefit from continued PT tx to address deficits and maximize level of functional independent mobility and consistency  From PT/mobility standpoint, recommendation at time of d/c would be post acute rehabilitation services pending progress in order to facilitate return to PLOF  Upon conclusion pt  supine in bed  Complexity: Comorbidities affecting pt's physical performance at time of assessment include: neuropathy and RA, arthritis and chronic pain syndrome  Personal factors affecting pt at time of IE include: inability to ambulate household distances, unable to perform physical activity and inability to perform ADLs   Please find objective findings from PT assessment regarding body systems outlined above with impairments and limitations including decreased endurance, pain, decreased activity tolerance, decreased functional mobility tolerance, altered sensation and fall risk  Pt's clinical presentation is currently unstable/unpredictable seen in pt's presentation of abnormal sodium levels, pain, recent surgery, polypharmacy and wounds  The patient's [facility] Basic Mobility Inpatient Short Form Raw Score is 6  A Raw score of less than or equal to 16 suggests the patient may benefit from discharge to post-acute rehabilitation services  Please also refer to the recommendation of the Physical Therapist for safe discharge planning  Pt seen as a co-eval with OT due to the patient's co-morbidities, clinically unstable presentation, and present impairments which are a regression from the patient's baseline  Barriers to Discharge Decreased caregiver support   Goals   Patient Goals to decrease his pain   LTG Expiration Date 08/31/22   Long Term Goal #1 Pt will: Perform bed mobility tasks to consistent A of 1 to improve ease of bed mobility  Perform transfers to consistent A of 1 to improve ease of transfers  Perform ambulation with Ax1 and RW for 25 ft to  decrease burden of care  Increase dynamic standing balance to F- to decrease fall risk  Increase OOB activity tolerance to 10 minutes without s/s of exertion to decrease fall risk  Plan   Treatment/Interventions Functional transfer training;LE strengthening/ROM; Elevations; Therapeutic exercise; Endurance training;Bed mobility;Gait training;Equipment eval/education;Patient/family training   PT Frequency 3-5x/wk   Recommendation   PT Discharge Recommendation Post acute rehabilitation services   Equipment Recommended   (unknown at this time)   [facility] Basic Mobility Inpatient   Turning in Bed Without Bedrails 1   Lying on Back to Sitting on Edge of Flat Bed 1   Moving Bed to Chair 1   Standing Up From Chair 1   Walk in Room 1   Climb 3-5 Stairs 1   Basic Mobility Inpatient Raw Score 6   Highest Level Of Mobility   -HLM Goal 2: Bed activities/Dependent transfer   -HLM Achieved 2: Bed activities/Dependent transfer   End of Consult   Patient Position at End of Consult All needs within reach       Time In: 0904  Time Out: 0919  Total Evaluation Minutes: 355 Crystal Barragan, PT

## 2022-08-21 NOTE — NURSING NOTE
Received from recovery at this time  Patient awake and alert  C/o 7/10 abdominal pain  Vs stable  NG placed to wall suction at low continuous  Ambriz draining clear yellow urine and dressings intact with small ss drainage    Colostomy red with scant bloody drainage

## 2022-08-21 NOTE — PLAN OF CARE
Problem: Potential for Falls  Goal: Patient will remain free of falls  Description: INTERVENTIONS:  - Educate patient/family on patient safety including physical limitations  - Instruct patient to call for assistance with activity   - Consult OT/PT to assist with strengthening/mobility   - Keep Call bell within reach  - Keep bed low and locked with side rails adjusted as appropriate  - Keep care items and personal belongings within reach  - Initiate and maintain comfort rounds  - Make Fall Risk Sign visible to staff  - Offer Toileting every 2 Hours, in advance of need  - Initiate/Maintain bed alarm  - Obtain necessary fall risk management equipment  - Apply yellow socks and bracelet for high fall risk patients  - Consider moving patient to room near nurses station  Outcome: Progressing     Problem: PAIN - ADULT  Goal: Verbalizes/displays adequate comfort level or baseline comfort level  Description: Interventions:  - Encourage patient to monitor pain and request assistance  - Assess pain using appropriate pain scale  - Administer analgesics based on type and severity of pain and evaluate response  - Implement non-pharmacological measures as appropriate and evaluate response  - Consider cultural and social influences on pain and pain management  - Notify physician/advanced practitioner if interventions unsuccessful or patient reports new pain  Outcome: Progressing     Problem: INFECTION - ADULT  Goal: Absence or prevention of progression during hospitalization  Description: INTERVENTIONS:  - Assess and monitor for signs and symptoms of infection  - Monitor lab/diagnostic results  - Monitor all insertion sites, i e  indwelling lines, tubes, and drains  - Monitor endotracheal if appropriate and nasal secretions for changes in amount and color  - Fraser appropriate cooling/warming therapies per order  - Administer medications as ordered  - Instruct and encourage patient and family to use good hand hygiene technique  - Identify and instruct in appropriate isolation precautions for identified infection/condition  Outcome: Progressing     Problem: SAFETY ADULT  Goal: Patient will remain free of falls  Description: INTERVENTIONS:  - Educate patient/family on patient safety including physical limitations  - Instruct patient to call for assistance with activity   - Consult OT/PT to assist with strengthening/mobility   - Keep Call bell within reach  - Keep bed low and locked with side rails adjusted as appropriate  - Keep care items and personal belongings within reach  - Initiate and maintain comfort rounds  - Make Fall Risk Sign visible to staff  - Offer Toileting every 2 Hours, in advance of need  - Initiate/Maintain bed alarm  - Obtain necessary fall risk management equipment  - Apply yellow socks and bracelet for high fall risk patients  - Consider moving patient to room near nurses station  Outcome: Progressing  Goal: Maintain or return to baseline ADL function  Description: INTERVENTIONS:  -  Assess patient's ability to carry out ADLs; assess patient's baseline for ADL function and identify physical deficits which impact ability to perform ADLs (bathing, care of mouth/teeth, toileting, grooming, dressing, etc )  - Assess/evaluate cause of self-care deficits   - Assess range of motion  - Assess patient's mobility; develop plan if impaired  - Assess patient's need for assistive devices and provide as appropriate  - Encourage maximum independence but intervene and supervise when necessary  - Involve family in performance of ADLs  - Assess for home care needs following discharge   - Consider OT consult to assist with ADL evaluation and planning for discharge  - Provide patient education as appropriate  Outcome: Progressing  Goal: Maintains/Returns to pre admission functional level  Description: INTERVENTIONS:  - Perform BMAT or MOVE assessment daily    - Set and communicate daily mobility goal to care team and patient/family/caregiver  - Collaborate with rehabilitation services on mobility goals if consulted  - Perform Range of Motion 2 times a day  - Reposition patient every 2 hours  - Dangle patient 2 times a day  - Stand patient 2 times a day  - Ambulate patient 2 times a day  - Out of bed to chair 3 times a day   - Out of bed for meals 3 times a day  - Out of bed for toileting  - Record patient progress and toleration of activity level   Outcome: Progressing     Problem: DISCHARGE PLANNING  Goal: Discharge to home or other facility with appropriate resources  Description: INTERVENTIONS:  - Identify barriers to discharge w/patient and caregiver  - Arrange for needed discharge resources and transportation as appropriate  - Identify discharge learning needs (meds, wound care, etc )  - Arrange for interpretive services to assist at discharge as neede- Refer to Case Management Department for coordinating discharge planning if the patient needs post-hospital services based on physician/advanced practitioner order or complex needs related to functional status, cognitive ability, or social support system  Outcome: Progressing     Problem: Knowledge Deficit  Goal: Patient/family/caregiver demonstrates understanding of disease process, treatment plan, medications, and discharge instructions  Description: Complete learning assessment and assess knowledge base    Interventions:  - Provide teaching at level of understanding  - Provide teaching via preferred learning methods  Outcome: Progressing     Problem: GASTROINTESTINAL - ADULT  Goal: Minimal or absence of nausea and/or vomiting  Description: INTERVENTIONS:  - Administer IV fluids if ordered to ensure adequate hydration  - Maintain NPO status until nausea and vomiting are resolved  - Nasogastric tube if ordered  - Administer ordered antiemetic medications as needed  - Provide nonpharmacologic comfort measures as appropriate  - Advance diet as tolerated, if ordered  - Consider nutrition services referral to assist patient with adequate nutrition and appropriate food choices  Outcome: Progressing  Goal: Maintains or returns to baseline bowel function  Description: INTERVENTIONS:  - Assess bowel function  - Encourage oral fluids to ensure adequate hydration  - Administer IV fluids if ordered to ensure adequate hydration  - Administer ordered medications as needed  - Encourage mobilization and activity  - Consider nutritional services referral to assist patient with adequate nutrition and appropriate food choices  Outcome: Progressing  Goal: Oral mucous membranes remain intact  Description: INTERVENTIONS  - Assess oral mucosa and hygiene practices  - Implement preventative oral hygiene regimen  - Implement oral medicated treatments as ordered  - Initiate Nutrition services referral as needed  Outcome: Progressing     Problem: Prexisting or High Potential for Compromised Skin Integrity  Goal: Skin integrity is maintained or improved  Description: INTERVENTIONS:  - Identify patients at risk for skin breakdown  - Assess and monitor skin integrity  - Assess and monitor nutrition and hydration status  - Monitor labs   - Assess for incontinence   - Turn and reposition patient  - Assist with mobility/ambulation  - Relieve pressure over bony prominences  - Avoid friction and shearing  - Provide appropriate hygiene as needed including keeping skin clean and dry  - Evaluate need for skin moisturizer/barrier cream  - Collaborate with interdisciplinary team   - Patient/family teaching  - Consider wound care consult   Outcome: Progressing     Problem: MOBILITY - ADULT  Goal: Maintain or return to baseline ADL function  Description: INTERVENTIONS:  -  Assess patient's ability to carry out ADLs; assess patient's baseline for ADL function and identify physical deficits which impact ability to perform ADLs (bathing, care of mouth/teeth, toileting, grooming, dressing, etc )  - Assess/evaluate cause of self-care deficits   - Assess range of motion  - Assess patient's mobility; develop plan if impaired  - Assess patient's need for assistive devices and provide as appropriate  - Encourage maximum independence but intervene and supervise when necessary  - Involve family in performance of ADLs  - Assess for home care needs following discharge   - Consider OT consult to assist with ADL evaluation and planning for discharge  - Provide patient education as appropriate  Outcome: Progressing  Goal: Maintains/Returns to pre admission functional level  Description: INTERVENTIONS:  - Perform BMAT or MOVE assessment daily    - Set and communicate daily mobility goal to care team and patient/family/caregiver  - Collaborate with rehabilitation services on mobility goals if consulted  - Perform Range of Motion 2 times a day  - Reposition patient every 2 hours    - Dangle patient 2 times a day  - Stand patient 2 times a day  - Ambulate patient 2 times a day  - Out of bed to chair 3 times a day   - Out of bed for meals 3 times a day  - Out of bed for toileting  - Record patient progress and toleration of activity level   Outcome: Progressing

## 2022-08-21 NOTE — PLAN OF CARE
Problem: OCCUPATIONAL THERAPY ADULT  Goal: Performs self-care activities at highest level of function for planned discharge setting  See evaluation for individualized goals  Description: Treatment Interventions: ADL retraining, Functional transfer training, Endurance training, Patient/family training          See flowsheet documentation for full assessment, interventions and recommendations  Note: Limitation: Decreased ADL status, Decreased endurance, Decreased self-care trans  Prognosis: Fair  Assessment: Pt is a 78 y o  male seen for OT evaluation s/p admit to [facility] on 8/20/2022 w/ SBO (small bowel obstruction) ([facility] )  Comorbidities affecting pt's functional performance at time of assessment include: HTN and RA, ASCVD, AVR, chronic fatigue, peripheral neuropathy, spinal stenosis  Personal factors affecting pt at time of IE include:difficulty performing ADLS  Prior to admission, pt was independent with ADL's IADL's and functional mobility  Upon evaluation: the following deficits impact occupational performance: decreased tolerance  Pt to benefit from continued skilled OT tx while in the hospital to address deficits as defined above and maximize level of functional independence w ADL's and functional mobility  Occupational Performance areas to address include: grooming, bathing/shower, toilet hygiene, dressing, functional mobility and clothing management  From OT standpoint, recommendation at time of d/c would be post acute rehab       OT Discharge Recommendation: Post acute rehabilitation services     Pamela Vazquez OT

## 2022-08-21 NOTE — PROGRESS NOTES
Progress Note - General Surgery   Junie Alexandre 78 y o  male MRN: [id number]  Unit/Bed#: -01 Encounter: [id number]    Assessment:  79-year-old male presenting with high-grade small-bowel obstruction with intra-abdominal abscess  AVSS on room air  UO 7 and 50 cc per Ambriz   cc, required flushing this morning  DAMASO x1 25 cc, bloody/serosanguineous  WBC 9 89  HGB 12 5  CR 1 15  BC x2 08/20/2022 pending  Intraoperative cultures pending  XR abdomen 08/20/2022 pending  Bowel sweat ostomy bag    Plan:  NPO, ice chips for comfort  NGT to low continuous suction, frequent flushing to ensure patency  IVF  IV Cipro and IV Flagyl  Antiemetics and analgesics p r n  Strict I/O  Follow-up XR abdomen  Ambriz to remain in place for at least 10 days postop  Ostomy care, will meet with wound care nurse  Wound care  PT/OT, CM    Subjective/Objective   Subjective:  No acute overnight events  Patient notes increased incisional abdominal pain  However pain on arrival improved  Increased nausea secondary to NGT malfunctioning  No episodes of emesis  Ostomy with bowel sweat in bag  Denies chest pain, palpitations, shortness of breath, calf tenderness  Denies fever, chills  Evaluated at bedside with wife  Objective:   Blood pressure 154/78, pulse 89, temperature 98 °F (36 7 °C), temperature source Oral, resp  rate 17, height 5' 6" (1 676 m), weight 85 3 kg (188 lb), SpO2 98 %  ,Body mass index is 30 34 kg/m²  Intake/Output Summary (Last 24 hours) at 8/21/2022 0723  Last data filed at 8/21/2022 0701  Gross per 24 hour   Intake 5310 ml   Output 1185 ml   Net 4125 ml       Invasive Devices  Report    Peripheral Intravenous Line  Duration           Peripheral IV 08/20/22 Right Forearm 1 day    Peripheral IV 08/20/22 Dorsal (posterior); Left Hand <1 day          Drain  Duration           Closed/Suction Drain Right RLQ Bulb 19 Fr  <1 day    NG/OG/Enteral Tube Nasogastric 16 Fr Right nare <1 day    Urethral Catheter Latex 16 Fr  <1 day              Physical Exam  Vitals and nursing note reviewed  Constitutional:       General: He is not in acute distress  Appearance: Normal appearance  He is obese  He is not ill-appearing or toxic-appearing  HENT:      Head: Normocephalic and atraumatic  Cardiovascular:      Rate and Rhythm: Normal rate and regular rhythm  Pulses: Normal pulses  Heart sounds: Normal heart sounds  No murmur heard  No friction rub  No gallop  Pulmonary:      Effort: Pulmonary effort is normal  No respiratory distress  Breath sounds: Normal breath sounds  No wheezing or rales  Abdominal:      General: There is distension  Palpations: Abdomen is soft  Tenderness: There is abdominal tenderness  There is no guarding or rebound  Comments: Midline incision with Betadine-soaked jeffy in place, outer dressings changed at bedside; DAMASO x1 with bloody/serosanguineous drainage; ostomy with bowel sweat in bag, edematous stoma   Musculoskeletal:         General: Normal range of motion  Right lower leg: No edema  Left lower leg: No edema  Skin:     General: Skin is warm and dry  Neurological:      General: No focal deficit present  Mental Status: He is alert and oriented to person, place, and time  Psychiatric:         Mood and Affect: Mood normal          Behavior: Behavior normal          Thought Content: Thought content normal        Lab, Imaging and other studies:  I have personally reviewed pertinent lab results      CBC:   Lab Results   Component Value Date    WBC 9 89 08/21/2022    HGB 12 5 08/21/2022    HCT 38 0 08/21/2022    MCV 89 08/21/2022     (H) 08/21/2022    MCH 29 3 08/21/2022    MCHC 32 9 08/21/2022    RDW 12 2 08/21/2022    MPV 8 5 (L) 08/21/2022    NRBC 0 08/21/2022     CMP:   Lab Results   Component Value Date    SODIUM 133 (L) 08/21/2022    K 3 8 08/21/2022    CL 95 (L) 08/21/2022    CO2 27 08/21/2022    BUN 20 08/21/2022    CREATININE 1 15 08/21/2022    CALCIUM 8 7 08/21/2022    AST 21 08/21/2022    ALT 20 08/21/2022    ALKPHOS 45 08/21/2022    EGFR 61 08/21/2022     VTE Pharmacologic Prophylaxis: Enoxaparin (Lovenox)  VTE Mechanical Prophylaxis: sequential compression device    Anahi Zapata PA-C

## 2022-08-21 NOTE — PLAN OF CARE
Problem: PHYSICAL THERAPY ADULT  Goal: Performs mobility at highest level of function for planned discharge setting  See evaluation for individualized goals  Description: Treatment/Interventions: Functional transfer training, LE strengthening/ROM, Elevations, Therapeutic exercise, Endurance training, Bed mobility, Gait training, Equipment eval/education, Patient/family training  Equipment Recommended:  (unknown at this time)       See flowsheet documentation for full assessment, interventions and recommendations  Outcome: Progressing  Note: Prognosis: Fair  Problem List: Decreased endurance, Decreased mobility, Impaired sensation, Pain  Assessment: Pt is 78 y o  male seen for PT evaluation s/p admit to [address] on 8/20/2022 w/ SBO (small bowel obstruction) ([facility] [address] )  PT consulted to assess pt's functional mobility and d/c needs  Order placed for PT eval and tx, w/ ambulate patient order  Pt agreeable to PT  session upon arrival, pt found supine in bed  PTA, pt was independent w/ all functional mobility w/ no AD, ambulates community distances and elevations, lives w/ wife in 1 level home and retired  Pt to benefit from continued PT tx to address deficits and maximize level of functional independent mobility and consistency  From PT/mobility standpoint, recommendation at time of d/c would be post acute rehabilitation services pending progress in order to facilitate return to PLOF  Upon conclusion pt  supine in bed  Complexity: Comorbidities affecting pt's physical performance at time of assessment include: neuropathy and RA, arthritis and chronic pain syndrome  Personal factors affecting pt at time of IE include: inability to ambulate household distances, unable to perform physical activity and inability to perform ADLs   Please find objective findings from PT assessment regarding body systems outlined above with impairments and limitations including decreased endurance, pain, decreased activity tolerance, decreased functional mobility tolerance, altered sensation and fall risk  Pt's clinical presentation is currently unstable/unpredictable seen in pt's presentation of abnormal sodium levels, pain, recent surgery, polypharmacy and wounds  The patient's AM-PAC Basic Mobility Inpatient Short Form Raw Score is 6  A Raw score of less than or equal to 16 suggests the patient may benefit from discharge to post-acute rehabilitation services  Please also refer to the recommendation of the Physical Therapist for safe discharge planning  Pt seen as a co-eval with OT due to the patient's co-morbidities, clinically unstable presentation, and present impairments which are a regression from the patient's baseline  Barriers to Discharge: Decreased caregiver support     PT Discharge Recommendation: Post acute rehabilitation services    See flowsheet documentation for full assessment

## 2022-08-21 NOTE — OP NOTE
OPERATIVE REPORT  PATIENT NAME: Trish Christina    :  1945  MRN: [id number]  Pt Location: [facility] OR ROOM 01    SURGERY DATE: 2022    Surgeon(s) and Role:     * Luis Smith MD - Primary     * Mervat Hernandez PA-C - Assisting    Preop Diagnosis:  SBO (small bowel obstruction) ([facility] [address] ) [K56 609]    Post-Op Diagnosis Codes:     * [facility] [Z66 291]    Procedure(s) (LRB):  LAPAROTOMY EXPLORATORY, sigmoid colon resection,colostomy (N/A)  Extensive Lysis of Adhesions  Segmental Small Bowel Resection    Specimen(s):  ID Type Source Tests Collected by Time Destination   1 : portion of Tissue Large Intestine, Sigmoid Colon TISSUE EXAM Luis Smith MD 2022 1645    A : anaerobic Body Fluid Peritoneal Fluid ANAEROBIC CULTURE AND GRAM STAIN Luis Smith MD 2022 1557    B : aerobic Body Fluid Peritoneal Fluid BODY FLUID CULTURE AND GRAM STAIN Luis Smith MD 2022 1558        Estimated Blood Loss:   Minimal    Drains:  Closed/Suction Drain Right RLQ Bulb 19 Fr  (Active)   Drainage Appearance Serous 22 0701   Status To bulb suction 22 0422   Output (mL) 10 mL 22 0701   Number of days: 1       NG/OG/Enteral Tube Nasogastric 16 Fr Right nare (Active)   Placement Reverification Aspiration 22 1454   Site Assessment Clean;Dry; Intact 22 1454   Status Suction-low intermittent 22 1454   Drainage Appearance Bile 22 1950   Output (mL) 400 mL 22 1454   Number of days: 1       Urethral Catheter Latex 16 Fr  (Active)   Ambriz Care Done 22 2100   Output (mL) 400 mL 22 0422   Number of days: 1       Anesthesia Type:   General    Operative Indications:  Interloop intraabdominal abscess  SBO (small bowel obstruction) ([facility] ) [K56 609]  Impending colovesical fistula       Operative Findings:  Sigmoid Diverticulitis with abscess and phlegmon resulting in adhesions causing high grade small bowel obstruction    Dense adhesion requiring extensive lysis of adhesions and segmental small bowel resection  Phlegmon with dome of the bladder with impending colovesical fistula  Complications:   None    Procedure and Technique:  The patient was identified by myself taken to the operating room and placed in the supine position on the operating table  After induction of satisfactory general endotracheal anesthesia Ambriz catheter was placed, and the abdomen was then prepped and draped in usual sterile fashion using ChloraPrep solution  A time-out was called the patient identified as Janet Marks,  IV antibiotics were confirmed as given, sequential compression devices were on and functioning, all parties agreed this was the appropriate patient for the proposed procedure  A midline incision was performed, initially limited to a periumbilical incision, and ultimately extended to the pubic symphysis and up toward the epigastrium in the midline  The skin was incised with the 15 Scalpel and deepened through the skin and subcutaneous tissue using electrocautery  The fascia was divided with electrocautery, and the peritoneum below entered sharply  The remainder of the incision was completed using electrocautery  On entering the abdomen there was noted be extremely distended small bowel, no discernable ascites, and a dense phlegmon in the pelvis  The Bookwalter retractor was utilized, and the small bowel packed into the upper abdomen, with the exception of loops of bowel adherent to the phlegmon in the pelvis  The wound edge was protected with moist lap pads, but there was no way to accommodate both the Bookwalter retractor, and a wound protector  Blunt and sharp dissection was utilized to identify the separate loops of small bowel, and in the process the abscess cavity was entered, and malodorous purulent material cultured    Ultimately the phlegmon was found to consist of proximal ileum, and sigmoid colon, stuck down to the dome of the bladder  The small bowel was able to be freed, and this was packed away, and the sigmoid colon the dissected from the dome of the bladder, primarily using finger fracture  The lysis of adhesions and freeing bowel from the phlegmon took the majority of of the time required for this operation  The sigmoid colon proximal to this phlegmon was identified, and divided with the contour stapler with the green staple load  The EnSeal device was then used to carefully divide the mesocolon close to the colon down to where the colon became soft again below the phlegmon  The distal sigmoid, top of the rectum was then divided at this point with a 2nd firing of the contour stapler, and the specimen removed from the field  The rectal pouch was marked with 3-0 Prolene suture  The dome of the bladder was inspected, and was quite indurated, incapable of being sutured  Ultimately a closed suction drain was placed over the dome of the bladder  The site for the colostomy was selected lateral to the umbilicus, and a core of skin and subcutaneous tissue was taken down to the anterior rectus sheath which was scored in a cruciate fashion, the rectus muscle split, and the posterior rectus sheath scored longitudinally  The colon was then delivered, and maintain in position with the a Holly clamp  Attention was then turned to the small bowel  The massively distended small bowel was addressed by making a small enterotomy, after placing a pursestring suture which was proximal to the loop involved with the phlegmon in the anti mesenteric surface of the bowel, protecting the bowel edge with moist laps, and introducing the pool sucker, and decompressing approximately a Liter of succus  After this was accomplished the pursestring suture was tied, and the small bowel resection performed  The bowel was divided flush with the Kocher clamps, and bowel clamps were used to prevent spillage    The EnSeal was then used to divide the mesentery of the bowel and this specimen was removed from the field  A hand-sewn end-to-end 2 layer anastomosis was then performed with 3-0 chromic, and 3-0 silk  There was no spillage  The Bookwalter retractor was removed in the field irrigated with 2 L warm saline  A 19 [place] Cammie Ulises drain was placed via stab wound in the right lower quadrant, and directed into the pelvis placed over the dome of the bladder  This was secured to the skin using a 2-0 nylon drain stitch  A closing table, and in change of gown and gloves was then utilized  The omentum was draped over the small bowel, and the midline incision closed using a mass closure of looped 0 PDS suture  The subcutaneous tissue was irrigated with saline in the skin tacked closed using skin clips, and 3-0 nylon suture  The skin was then packed open using 9 Telfa jeffy impregnated with Betadine, and a dry gauze dressing applied  With the wound protected the colostomy was matured  The colostomy was matured using interrupted 3-0 chromic suture  The skin was cleansed with saline, Cavalon skin barrier was utilized, and a 2 piece Convatec ostomy appliance placed  The patient subsequently made an uneventful recovery from his anesthetic, was extubated in the operating room moved to his waiting stretcher and taken to the recovery room in good condition having tolerated the procedure well       I was present for the entire procedure, A qualified resident physician was not available and A physician assistant was required during the procedure for retraction tissue handling,dissection and suturing    Patient Disposition:  PACU       SIGNATURE: Nae Alexander MD  DATE: August 21, 2022  TIME: 9:28 AM

## 2022-08-22 PROBLEM — K57.32 DIVERTICULITIS OF COLON: Status: ACTIVE | Noted: 2022-08-22

## 2022-08-22 LAB
ANION GAP SERPL CALCULATED.3IONS-SCNC: 9 MMOL/L (ref 4–13)
BACTERIA SPEC ANAEROBE CULT: ABNORMAL
BASOPHILS # BLD MANUAL: 0 THOUSAND/UL (ref 0–0.1)
BASOPHILS NFR MAR MANUAL: 0 % (ref 0–1)
BUN SERPL-MCNC: 15 MG/DL (ref 5–25)
CALCIUM SERPL-MCNC: 8.6 MG/DL (ref 8.4–10.2)
CHLORIDE SERPL-SCNC: 99 MMOL/L (ref 96–108)
CO2 SERPL-SCNC: 28 MMOL/L (ref 21–32)
CREAT SERPL-MCNC: 0.84 MG/DL (ref 0.6–1.3)
EOSINOPHIL # BLD MANUAL: 0.33 THOUSAND/UL (ref 0–0.4)
EOSINOPHIL NFR BLD MANUAL: 2 % (ref 0–6)
ERYTHROCYTE [DISTWIDTH] IN BLOOD BY AUTOMATED COUNT: 12 % (ref 11.6–15.1)
GFR SERPL CREATININE-BSD FRML MDRD: 85 ML/MIN/1.73SQ M
GLUCOSE SERPL-MCNC: 116 MG/DL (ref 65–140)
HCT VFR BLD AUTO: 36.9 % (ref 36.5–49.3)
HGB BLD-MCNC: 11.7 G/DL (ref 12–17)
LYMPHOCYTES # BLD AUTO: 13 % (ref 14–44)
LYMPHOCYTES # BLD AUTO: 2.12 THOUSAND/UL (ref 0.6–4.47)
MCH RBC QN AUTO: 29 PG (ref 26.8–34.3)
MCHC RBC AUTO-ENTMCNC: 31.7 G/DL (ref 31.4–37.4)
MCV RBC AUTO: 92 FL (ref 82–98)
MONOCYTES # BLD AUTO: 1.8 THOUSAND/UL (ref 0–1.22)
MONOCYTES NFR BLD: 11 % (ref 4–12)
NEUTROPHILS # BLD MANUAL: 12.08 THOUSAND/UL (ref 1.85–7.62)
NEUTS BAND NFR BLD MANUAL: 8 % (ref 0–8)
NEUTS SEG NFR BLD AUTO: 66 % (ref 43–75)
PLATELET # BLD AUTO: 447 THOUSANDS/UL (ref 149–390)
PLATELET BLD QL SMEAR: ABNORMAL
PMV BLD AUTO: 8.6 FL (ref 8.9–12.7)
POTASSIUM SERPL-SCNC: 3.5 MMOL/L (ref 3.5–5.3)
RBC # BLD AUTO: 4.03 MILLION/UL (ref 3.88–5.62)
RBC MORPH BLD: NORMAL
SODIUM SERPL-SCNC: 136 MMOL/L (ref 135–147)
WBC # BLD AUTO: 16.32 THOUSAND/UL (ref 4.31–10.16)

## 2022-08-22 PROCEDURE — C9113 INJ PANTOPRAZOLE SODIUM, VIA: HCPCS

## 2022-08-22 PROCEDURE — 85027 COMPLETE CBC AUTOMATED: CPT

## 2022-08-22 PROCEDURE — 80048 BASIC METABOLIC PNL TOTAL CA: CPT

## 2022-08-22 PROCEDURE — 85007 BL SMEAR W/DIFF WBC COUNT: CPT

## 2022-08-22 PROCEDURE — 99024 POSTOP FOLLOW-UP VISIT: CPT | Performed by: SURGERY

## 2022-08-22 PROCEDURE — NC001 PR NO CHARGE

## 2022-08-22 RX ADMIN — METRONIDAZOLE 500 MG: 500 INJECTION, SOLUTION INTRAVENOUS at 23:53

## 2022-08-22 RX ADMIN — HYDROMORPHONE HYDROCHLORIDE 0.2 MG: 0.2 INJECTION, SOLUTION INTRAMUSCULAR; INTRAVENOUS; SUBCUTANEOUS at 14:08

## 2022-08-22 RX ADMIN — SODIUM CHLORIDE, SODIUM LACTATE, POTASSIUM CHLORIDE, AND CALCIUM CHLORIDE 150 ML/HR: .6; .31; .03; .02 INJECTION, SOLUTION INTRAVENOUS at 09:39

## 2022-08-22 RX ADMIN — HYDROMORPHONE HYDROCHLORIDE 1 MG: 1 INJECTION, SOLUTION INTRAMUSCULAR; INTRAVENOUS; SUBCUTANEOUS at 10:47

## 2022-08-22 RX ADMIN — LORAZEPAM 1 MG: 2 INJECTION INTRAMUSCULAR; INTRAVENOUS at 19:14

## 2022-08-22 RX ADMIN — CIPROFLOXACIN 400 MG: 2 INJECTION, SOLUTION INTRAVENOUS at 12:49

## 2022-08-22 RX ADMIN — HYDROMORPHONE HYDROCHLORIDE 1 MG: 1 INJECTION, SOLUTION INTRAMUSCULAR; INTRAVENOUS; SUBCUTANEOUS at 05:12

## 2022-08-22 RX ADMIN — CIPROFLOXACIN 400 MG: 2 INJECTION, SOLUTION INTRAVENOUS at 01:15

## 2022-08-22 RX ADMIN — METRONIDAZOLE 500 MG: 500 INJECTION, SOLUTION INTRAVENOUS at 07:42

## 2022-08-22 RX ADMIN — ONDANSETRON 4 MG: 2 INJECTION INTRAMUSCULAR; INTRAVENOUS at 13:08

## 2022-08-22 RX ADMIN — HYDROMORPHONE HYDROCHLORIDE 1 MG: 1 INJECTION, SOLUTION INTRAMUSCULAR; INTRAVENOUS; SUBCUTANEOUS at 00:16

## 2022-08-22 RX ADMIN — SODIUM CHLORIDE, SODIUM LACTATE, POTASSIUM CHLORIDE, AND CALCIUM CHLORIDE 150 ML/HR: .6; .31; .03; .02 INJECTION, SOLUTION INTRAVENOUS at 02:37

## 2022-08-22 RX ADMIN — HYDROMORPHONE HYDROCHLORIDE 1 MG: 1 INJECTION, SOLUTION INTRAMUSCULAR; INTRAVENOUS; SUBCUTANEOUS at 23:55

## 2022-08-22 RX ADMIN — ENOXAPARIN SODIUM 40 MG: 100 INJECTION SUBCUTANEOUS at 08:37

## 2022-08-22 RX ADMIN — SODIUM CHLORIDE, SODIUM LACTATE, POTASSIUM CHLORIDE, AND CALCIUM CHLORIDE 150 ML/HR: .6; .31; .03; .02 INJECTION, SOLUTION INTRAVENOUS at 19:04

## 2022-08-22 RX ADMIN — HYDROMORPHONE HYDROCHLORIDE 1 MG: 1 INJECTION, SOLUTION INTRAMUSCULAR; INTRAVENOUS; SUBCUTANEOUS at 07:42

## 2022-08-22 RX ADMIN — ONDANSETRON 4 MG: 2 INJECTION INTRAMUSCULAR; INTRAVENOUS at 17:13

## 2022-08-22 RX ADMIN — LORAZEPAM 1 MG: 2 INJECTION INTRAMUSCULAR; INTRAVENOUS at 09:31

## 2022-08-22 RX ADMIN — HYDROMORPHONE HYDROCHLORIDE 0.2 MG: 0.2 INJECTION, SOLUTION INTRAMUSCULAR; INTRAVENOUS; SUBCUTANEOUS at 03:43

## 2022-08-22 RX ADMIN — ONDANSETRON 4 MG: 2 INJECTION INTRAMUSCULAR; INTRAVENOUS at 05:12

## 2022-08-22 RX ADMIN — METRONIDAZOLE 500 MG: 500 INJECTION, SOLUTION INTRAVENOUS at 00:24

## 2022-08-22 RX ADMIN — HYDROMORPHONE HYDROCHLORIDE 1 MG: 1 INJECTION, SOLUTION INTRAMUSCULAR; INTRAVENOUS; SUBCUTANEOUS at 12:49

## 2022-08-22 RX ADMIN — HYDROCORTISONE SODIUM SUCCINATE 25 MG: 100 INJECTION, POWDER, FOR SOLUTION INTRAMUSCULAR; INTRAVENOUS at 21:53

## 2022-08-22 RX ADMIN — HYDROCORTISONE SODIUM SUCCINATE 25 MG: 100 INJECTION, POWDER, FOR SOLUTION INTRAMUSCULAR; INTRAVENOUS at 13:08

## 2022-08-22 RX ADMIN — HYDROCORTISONE SODIUM SUCCINATE 25 MG: 100 INJECTION, POWDER, FOR SOLUTION INTRAMUSCULAR; INTRAVENOUS at 05:12

## 2022-08-22 RX ADMIN — HYDROMORPHONE HYDROCHLORIDE 1 MG: 1 INJECTION, SOLUTION INTRAMUSCULAR; INTRAVENOUS; SUBCUTANEOUS at 19:04

## 2022-08-22 RX ADMIN — METRONIDAZOLE 500 MG: 500 INJECTION, SOLUTION INTRAVENOUS at 15:24

## 2022-08-22 RX ADMIN — HYDROMORPHONE HYDROCHLORIDE 1 MG: 1 INJECTION, SOLUTION INTRAMUSCULAR; INTRAVENOUS; SUBCUTANEOUS at 02:33

## 2022-08-22 RX ADMIN — HYDROMORPHONE HYDROCHLORIDE 1 MG: 1 INJECTION, SOLUTION INTRAMUSCULAR; INTRAVENOUS; SUBCUTANEOUS at 16:12

## 2022-08-22 RX ADMIN — LORAZEPAM 1 MG: 2 INJECTION INTRAMUSCULAR; INTRAVENOUS at 00:17

## 2022-08-22 RX ADMIN — HYDROMORPHONE HYDROCHLORIDE 0.2 MG: 0.2 INJECTION, SOLUTION INTRAMUSCULAR; INTRAVENOUS; SUBCUTANEOUS at 15:26

## 2022-08-22 RX ADMIN — HYDROMORPHONE HYDROCHLORIDE 1 MG: 1 INJECTION, SOLUTION INTRAMUSCULAR; INTRAVENOUS; SUBCUTANEOUS at 21:53

## 2022-08-22 RX ADMIN — PANTOPRAZOLE SODIUM 40 MG: 40 INJECTION, POWDER, FOR SOLUTION INTRAVENOUS at 08:37

## 2022-08-22 NOTE — PLAN OF CARE
Problem: PAIN - ADULT  Goal: Verbalizes/displays adequate comfort level or baseline comfort level  Description: Interventions:  - Encourage patient to monitor pain and request assistance  - Assess pain using appropriate pain scale  - Administer analgesics based on type and severity of pain and evaluate response  - Implement non-pharmacological measures as appropriate and evaluate response  - Consider cultural and social influences on pain and pain management  - Notify physician/advanced practitioner if interventions unsuccessful or patient reports new pain  Outcome: Progressing     Problem: INFECTION - ADULT  Goal: Absence or prevention of progression during hospitalization  Description: INTERVENTIONS:  - Assess and monitor for signs and symptoms of infection  - Monitor lab/diagnostic results  - Monitor all insertion sites, i e  indwelling lines, tubes, and drains  - Monitor endotracheal if appropriate and nasal secretions for changes in amount and color  - Compton appropriate cooling/warming therapies per order  - Administer medications as ordered  - Instruct and encourage patient and family to use good hand hygiene technique  - Identify and instruct in appropriate isolation precautions for identified infection/condition  Outcome: Progressing     Problem: DISCHARGE PLANNING  Goal: Discharge to home or other facility with appropriate resources  Description: INTERVENTIONS:  - Identify barriers to discharge w/patient and caregiver  - Arrange for needed discharge resources and transportation as appropriate  - Identify discharge learning needs (meds, wound care, etc )  - Arrange for interpretive services to assist at discharge as neede- Refer to Case Management Department for coordinating discharge planning if the patient needs post-hospital services based on physician/advanced practitioner order or complex needs related to functional status, cognitive ability, or social support system  Outcome: Progressing Problem: Knowledge Deficit  Goal: Patient/family/caregiver demonstrates understanding of disease process, treatment plan, medications, and discharge instructions  Description: Complete learning assessment and assess knowledge base    Interventions:  - Provide teaching at level of understanding  - Provide teaching via preferred learning methods  Outcome: Progressing     Problem: GASTROINTESTINAL - ADULT  Goal: Minimal or absence of nausea and/or vomiting  Description: INTERVENTIONS:  - Administer IV fluids if ordered to ensure adequate hydration  - Maintain NPO status until nausea and vomiting are resolved  - Nasogastric tube if ordered  - Administer ordered antiemetic medications as needed  - Provide nonpharmacologic comfort measures as appropriate  - Advance diet as tolerated, if ordered  - Consider nutrition services referral to assist patient with adequate nutrition and appropriate food choices  Outcome: Progressing  Goal: Maintains or returns to baseline bowel function  Description: INTERVENTIONS:  - Assess bowel function  - Encourage oral fluids to ensure adequate hydration  - Administer IV fluids if ordered to ensure adequate hydration  - Administer ordered medications as needed  - Encourage mobilization and activity  - Consider nutritional services referral to assist patient with adequate nutrition and appropriate food choices  Outcome: Progressing  Goal: Oral mucous membranes remain intact  Description: INTERVENTIONS  - Assess oral mucosa and hygiene practices  - Implement preventative oral hygiene regimen  - Implement oral medicated treatments as ordered  - Initiate Nutrition services referral as needed  Outcome: Progressing

## 2022-08-22 NOTE — ASSESSMENT & PLAN NOTE
Postop day 2 status post Vane's procedure  Patient appears clinically stable  He is tolerating his trickle tube feeds  On physical examination he is well appearing  Awake alert oriented  Competent reliable as a historian  Vital signs reviewed the computer record  Abdomen soft with mild right-sided tenderness to palpation  The ostomy is pink and viable  Impression clinically stable postop day 2 status post Vaen's procedure and small-bowel resection  Plan  1  Trickle tube feeds  2  Repeat history physical exam and labs on the morning of Tuesday August 23rd

## 2022-08-22 NOTE — PROGRESS NOTES
[facility]  [facility]   Progress Note - Kamlesh Roberts 1945, 78 y o  male MRN: [id number]  Unit/Bed#: -01 Encounter: [id number]  Primary Care Provider: Meir Serra DO   Date and time admitted to hospital: 8/20/2022  1:18 PM    Diverticulitis of colon  Assessment & Plan  Postop day 2 status post Vane's procedure  Patient appears clinically stable  He is tolerating his trickle tube feeds  On physical examination he is well appearing  Awake alert oriented  Competent reliable as a historian  Vital signs reviewed the computer record  Abdomen soft with mild right-sided tenderness to palpation  The ostomy is pink and viable  Impression clinically stable postop day 2 status post Vane's procedure and small-bowel resection  Plan  1  Trickle tube feeds  2  Repeat history physical exam and labs on the morning of Tuesday August 23rd  Subjective/Objective       Blood pressure 163/93, pulse 90, temperature 97 7 °F (36 5 °C), resp  rate 18, height 5' 6" (1 676 m), weight 85 3 kg (188 lb), SpO2 97 %  ,Body mass index is 30 34 kg/m²  Intake/Output Summary (Last 24 hours) at 8/22/2022 1541  Last data filed at 8/22/2022 1320  Gross per 24 hour   Intake 90 ml   Output 3430 ml   Net -3340 ml       Invasive Devices  Report    Peripheral Intravenous Line  Duration           Peripheral IV 08/20/22 Dorsal (posterior); Left Hand 2 days    Peripheral IV 08/20/22 Right Forearm 2 days          Drain  Duration           NG/OG/Enteral Tube Nasogastric 16 Fr Right nare 2 days    Urethral Catheter Latex 16 Fr  2 days    Closed/Suction Drain Right RLQ Bulb 19 Fr  1 day            Lab, Imaging and other studies:I have personally reviewed pertinent lab results

## 2022-08-22 NOTE — UTILIZATION REVIEW
Initial Clinical Review    Admission: Date/Time/Statement:   Admission Orders (From admission, onward)     Ordered        08/20/22 1332  Inpatient Admission  Once                      Orders Placed This Encounter   Procedures    Inpatient Admission     Standing Status:   Standing     Number of Occurrences:   1     Order Specific Question:   Level of Care     Answer:   Med Surg [16]     Order Specific Question:   Estimated length of stay     Answer:   More than 2 Midnights     Order Specific Question:   Certification     Answer:   I certify that inpatient services are medically necessary for this patient for a duration of greater than two midnights  See H&P and MD Progress Notes for additional information about the patient's course of treatment  Initial Presentation: 78 y o  male directly admitted from [facility] to [address] [place] [address] for small bowel obstruction  H/O htn, hld, aortic stenosis has had abdominal pain, distension, nausea worsening over the past 5 days  CT scan without contrast demonstrated a high-grade small-bowel obstruction, and suggested an intra-abdominal abscess  Repeat scanning with contrast confirms intra-abdominal abscess, and a point of obstruction being at the can fluids of the sigmoid colon, dome of the bladder, and small bowel  nPO  Arrives with tachycardia, absent bowel sounds, abdominal distension  OPERATIVE NOTE  SURGERY DATE: 8/20/2022  Procedure(s) (LRB):  LAPAROTOMY EXPLORATORY, sigmoid colon resection,colostomy (N/A)  Extensive Lysis of Adhesions  Segmental Small Bowel Resection  Anesthesia Type:   General    Operative Findings:  Sigmoid Diverticulitis with abscess and phlegmon resulting in adhesions causing high grade small bowel obstruction  Dense adhesion requiring extensive lysis of adhesions and segmental small bowel resection  Phlegmon with dome of the bladder with impending colovesical fistula      Date: 8/21   Day 2:   Tired, complains of incisional pain  High opiate requirement due to longstanding opiate use for chronic pain  Start trickle feeds  NGtube draining and required flush this morning  Blood cultures pending  Continue with IV fluids and IV abx  Follow up Xray abdomen  Ostomy care  Abdominal distension, midline incision  OStomy with bowel sweat in bag and edematous stoma          Temperature Pulse Respirations Blood Pressure SpO2   08/20/22 0905 08/20/22 0905 08/20/22 0905 08/20/22 0905 08/20/22 0905   98 3 °F (36 8 °C) 95 16 154/72 98 %      Temp Source Heart Rate Source Patient Position - Orthostatic VS BP Location FiO2 (%)   08/20/22 1328 08/20/22 1328 08/20/22 1328 08/20/22 1328 --   Oral Monitor Lying Left arm       Pain Score       08/20/22 0905       7          Wt Readings from Last 1 Encounters:   08/20/22 85 3 kg (188 lb)     Additional Vital Signs:   Time Temp Pulse Resp BP MAP (mmHg) SpO2 Calculated FIO2 (%) - Nasal Cannula Nasal Cannula O2 Flow Rate (L/min) O2 Device Cardiac (WDL) Patient Position - Orthostatic VS   08/21/22 1510 97 5 °F (36 4 °C) 94 23 Abnormal  158/93 115 9 % Abnormal  32 3 L/min Nasal cannula -- Lying   08/21/22 1500 97 5 °F (36 4 °C) -- -- -- -- -- -- -- -- -- --   08/21/22 07:47:38 97 6 °F (36 4 °C) 97 20 165/90 115 96 % -- -- -- -- --   08/21/22 0300 98 °F (36 7 °C) 89 17 154/78 -- 98 % -- -- Nasal cannula -- Lying   08/20/22 2345 98 1 °F (36 7 °C) 97 17 153/77 -- 97 % -- -- Nasal cannula -- Lying   08/20/22 2245 98 7 °F (37 1 °C) 97 -- 152/81 -- 97 % -- -- Nasal cannula -- --   08/20/22 21:46:10 98 °F (36 7 °C) 101 17 167/86 113 98 % -- -- -- -- --   08/20/22 2135 -- -- -- -- -- -- 32 3 L/min Nasal cannula -- --   08/20/22 2005 97 4 °F (36 3 °C) Abnormal  90 16 164/74 107 100 % 32 3 L/min Nasal cannula WDL --   08/20/22 1950 -- 88 18 160/72 104 100 % 32 3 L/min Nasal cannula WDL --   08/20/22 1935 97 2 °F (36 2 °C) Abnormal  85 16 161/72 104 99 % 32 3 L/min Nasal cannula WDL --   08/20/22 1920 -- 85 16 155/69 99 99 % 32 3 L/min Nasal cannula WDL --   08/20/22 13:28:19 97 4 °F (36 3 °C) Abnormal  74 18 141/86 104 97 % -- -- None (Room air) -- Lying   08/20/22 0905 98 3 °F (36 8 °C) 95 16 154/72 -- 98 % 32 3 L/min Nasal cannula WDL --       Pertinent Labs/Diagnostic Test Results:     XR abdomen 1 vw portable   Final Result by Mikel Garcia MD (08/21 4012)      Enteric tube in place with tip projecting in the region of proximal stomach  Dilated loop of small bowel in the mid abdomen measuring up to 5 8 cm, in keeping with known small bowel obstruction  There is relative paucity of gas in the abdomen, limiting adequate evaluation                  Workstation performed: [id number]           Results from last 7 days   Lab Units 08/20/22  0739   SARS-COV-2  Negative     Results from last 7 days   Lab Units 08/21/22  0444 08/20/22  0657   WBC Thousand/uL 9 89 10 10   HEMOGLOBIN g/dL 12 5 16 5   HEMATOCRIT % 38 0 50 1*   PLATELETS Thousands/uL 438* 509*   NEUTROS ABS Thousands/µL 7 37  --    BANDS PCT %  --  20*         Results from last 7 days   Lab Units 08/21/22  0444 08/20/22  0723   SODIUM mmol/L 133* 133*   POTASSIUM mmol/L 3 8 3 9   CHLORIDE mmol/L 95* 91*   CO2 mmol/L 27 27   ANION GAP mmol/L 11 15*   BUN mg/dL 20 24   CREATININE mg/dL 1 15 1 31*   EGFR ml/min/1 73sq m 61 52   CALCIUM mg/dL 8 7 9 2   MAGNESIUM mg/dL 2 0  --    PHOSPHORUS mg/dL 3 3  --      Results from last 7 days   Lab Units 08/21/22  0444 08/20/22  0723   AST U/L 21 43   ALT U/L 20 41   ALK PHOS U/L 45 75   TOTAL PROTEIN g/dL 6 1* 8 7*   ALBUMIN g/dL 3 3* 3 3*   TOTAL BILIRUBIN mg/dL 0 36 0 54         Results from last 7 days   Lab Units 08/21/22  0444 08/20/22  0723   GLUCOSE RANDOM mg/dL 139 142*             Results from last 7 days   Lab Units 08/20/22  0723   LACTIC ACID mmol/L 1 6     Results from last 7 days   Lab Units 08/20/22  0723   LIPASE u/L 143       Results from last 7 days   Lab Units 08/20/22  0739   INFLUENZA A PCR  Negative   INFLUENZA B PCR  Negative   RSV PCR  Negative       Results from last 7 days   Lab Units 08/20/22  1558 08/20/22  0739 08/20/22  0736   BLOOD CULTURE   --  No Growth at 24 hrs  No Growth at 24 hrs  GRAM STAIN RESULT  2+ Gram positive cocci in pairs and chains*  No polys seen*  --   --    BODY FLUID CULTURE, STERILE  4+ Growth of Alpha Hemolytic Streptococcus NOT Enterococcus*  --   --        Past Medical History:   Diagnosis Date    Aortic stenosis     Aortic stenosis, severe     Arthritis     Carpal tunnel syndrome     Chronic back pain     Chronic pain     Chronic, continuous use of opioids     Hyperlipidemia     Hypertension     Hypertrophy of prostate     Insomnia     Large bowel obstruction (HCC)     Peripheral neuropathy     RA [facility]rheumatoid arthritis) ([facility] )     Umbilical hernia        Admitting Diagnosis: Small bowel obstruction ([facility] ) [K56 [id number]]  Age/Sex: 78 y o  male  Admission Orders:  Scheduled Medications:  ciprofloxacin, 400 mg, Intravenous, Q12H  enoxaparin, 40 mg, Subcutaneous, Daily  hydrocortisone sodium succinate, 25 mg, Intravenous, Q8H ANDREA  metroNIDAZOLE, 500 mg, Intravenous, Q8H  pantoprazole, 40 mg, Intravenous, Q24H ANDREA      Continuous IV Infusions:  lactated ringers, 150 mL/hr, Intravenous, Continuous      PRN Meds:  HYDROmorphone, 0 5 mg, Intravenous, Q2H PRN  HYDROmorphone, 1 mg, Intravenous, Q2H PRN x 5 on 8/21  HYDROmorphone, 0 2 mg, Intravenous, Q1H PRN x2 on 8/21   LORazepam, 1 mg, Intravenous, Q6H PRN x2 8/21  metoclopramide, 10 mg, Intravenous, Q6H PRN x2 8/21  ondansetron, 4 mg, Intravenous, Q6H PRN x3 8/21  phenol, 1 spray, Mouth/Throat, Q2H PRN        IP CONSULT TO CASE MANAGEMENT    Network Utilization Review Department  ATTENTION: Please call with any questions or concerns to [phone] and carefully listen to the prompts so that you are directed to the right person   All voicemails are confidential   Edy Vasques all requests for admission clinical reviews, approved or denied determinations and any other requests to dedicated fax number below belonging to the campus where the patient is receiving treatment   List of dedicated fax numbers for the Facilities:  [address] [place] [address] DENIALS (Administrative/Medical Necessity) [phone]   [address]  (Maternity/NICU/Pediatrics) [phone] [address] [phone] [address] [phone]   [address] [place] [address] [place] [address] [facility] [address]  [place] [address] [phone]       d

## 2022-08-22 NOTE — PLAN OF CARE
Problem: PAIN - ADULT  Goal: Verbalizes/displays adequate comfort level or baseline comfort level  Description: Interventions:  - Encourage patient to monitor pain and request assistance  - Assess pain using appropriate pain scale  - Administer analgesics based on type and severity of pain and evaluate response  - Implement non-pharmacological measures as appropriate and evaluate response  - Consider cultural and social influences on pain and pain management  - Notify physician/advanced practitioner if interventions unsuccessful or patient reports new pain  Outcome: Progressing     Problem: INFECTION - ADULT  Goal: Absence or prevention of progression during hospitalization  Description: INTERVENTIONS:  - Assess and monitor for signs and symptoms of infection  - Monitor lab/diagnostic results  - Monitor all insertion sites, i e  indwelling lines, tubes, and drains  - Monitor endotracheal if appropriate and nasal secretions for changes in amount and color  - [place] appropriate cooling/warming therapies per order  - Administer medications as ordered  - Instruct and encourage patient and family to use good hand hygiene technique  - Identify and instruct in appropriate isolation precautions for identified infection/condition  Outcome: Progressing

## 2022-08-22 NOTE — PROGRESS NOTES
Progress Note - General Surgery   Prudence Gentle 78 y o  male MRN: [id number]  Unit/Bed#: -01 Encounter: [id number]    Assessment:  46-year-old male POD#2 s/p Vane's procedure  Presented with high-grade small-bowel obstruction with intra-abdominal abscess  - afebrile, somewhat hypertensive, writer vital signs stable on 3 L oxygen via nasal cannula  - urine output: 1350ml  - NGT output: 870mL dark bilious  - Drain output - 80mL ss  - WBC 16 32 from 9 89  - HGB 11 7 from 12 5  - Platelets 765 from 017 yesterday  - XR abdomen with NGT tip projecting into proximal stomach     Plan:  - initiate tube feeds at 10ml/hr  - NPO, ice chips  - IV fluid  - continue IV Cipro and Flagyl  - jeffy to come out of wound site on postop day 4  - strict I and Os  - ostomy care  - drain care  - Ambriz and DAMASO drain to remain in place  - monitor abdominal exams, labs, and vitals  - antiemetics and analgesia p r n   - encourage ambulation  -incentive spirometry  - DVT prophylaxis  - PT/OT    Subjective/Objective     Subjective:  Patient reports nausea when on suction, so he was switched to continuous NG tube suction yesterday  Does reports spasming at the colostomy site  Patient with abdominal pain, requiring frequent pain medications as patient has chronic pain due to a spinal cord injury and severe rheumatoid arthritis  Patient does have some liquid output in his ostomy bag  Patient has been warm at times denies use to wet washcloth to: Soft down  Patient reports he has numbness in his legs bilaterally chronically  Denies chills, shortness of breath, chest pain  Objective:     Blood pressure 163/93, pulse 90, temperature 97 7 °F (36 5 °C), resp  rate 18, height 5' 6" (1 676 m), weight 85 3 kg (188 lb), SpO2 97 %  ,Body mass index is 30 34 kg/m²        Intake/Output Summary (Last 24 hours) at 8/22/2022 0814  Last data filed at 8/22/2022 0601  Gross per 24 hour   Intake 40 ml   Output 2290 ml   Net -2250 ml       Invasive Devices  Report    Peripheral Intravenous Line  Duration           Peripheral IV 08/20/22 Right Forearm 2 days    Peripheral IV 08/20/22 Dorsal (posterior); Left Hand 1 day          Drain  Duration           Closed/Suction Drain Right RLQ Bulb 19 Fr  1 day    NG/OG/Enteral Tube Nasogastric 16 Fr Right nare 1 day    Urethral Catheter Latex 16 Fr  1 day                Physical Exam:   General - no acute distress   HEENT - NGT in place; dark bilious output in canister  Heart - RRR  Lungs - clear to asucultation b/l; ascultated while patient in the supine; 3L via NC  Abdomen - Bowel sounds present  Soft, mild distention, TTP over midline incision and RLQ  Drain in RLQ with ss output  Colostomy with dark brown liquid output, stoma pink and prefused  Midline intact with staples and jeffy in place  No surrounding erythema   - song in place, clear yellow urine  Lab, Imaging and other studies:I have personally reviewed pertinent lab results      VTE Pharmacologic Prophylaxis: Enoxaparin (Lovenox)    Brigitte Torrez  8/22/2022

## 2022-08-22 NOTE — CASE MANAGEMENT
Case Management Assessment & Discharge Planning Note    Patient name Junie Alexandre  Location /-61 MRN [id number]  : 1945 Date 2022       Current Admission Date: 2022  Current Admission Diagnosis:SBO (small bowel obstruction) [facility])   Patient Active Problem List    Diagnosis Date Noted    Diverticulitis of colon 2022    [facility]) [facility] ) 2022    Primary osteoarthritis of right shoulder 2022    Stage 3 chronic kidney disease, unspecified whether stage 3a or 3b CKD ([facility] ) 2022    High risk medication use 2020    Rheumatoid arthritis with rheumatoid factor, unspecified ([place] [address] ) 2020    Atherosclerosis of native coronary artery of native heart without angina pectoris 2019    Chronic fatigue 2018    Status post aortic valve replacement with bioprosthetic valve 2018    Arthritis     Chronic pain     Continuous opioid dependence ([facility] )     Hyperlipidemia     Benign essential hypertension     Insomnia     Peripheral neuropathy     Carpal tunnel syndrome 2015    Psoriasis with arthropathy ([facility] ) 2015    H/O steroid therapy 2013    Arthralgia of lower leg 2013    Hyperglycemia 10/25/2012    Testicular hypogonadism 10/25/2012    Primary localized osteoarthrosis of shoulder region 10/09/2012    Psoriasis 10/09/2012    Atopic dermatitis 2012    Allergic rhinitis 2012    Low back pain 2012      LOS (days): 2  Geometric Mean LOS (GMLOS) (days): 5 70  Days to GMLOS:3 4     OBJECTIVE:    Risk of Unplanned Readmission Score: 15 91         Current admission status: Inpatient  Referral Reason: Other (d/c planning)    Preferred Pharmacy:   [address] [place] [id number]  Phone: [phone] Fax: [phone] [facility] #[id number], [address] [facility], [address] [place] [address]  Phone: [phone] Fax: [phone] [place] [address], [address] [place] [address]  Phone: [phone] Fax: [phone]    Primary Care Provider: Frandy Esqueda DO    Primary Insurance: Sabina Kehr [facility] HOSPITAL REP  Secondary Insurance:     ASSESSMENT:  [address], [address] Representative - Spouse   Primary Phone: [phone] (Mobile)  Home Phone: [phone]               Advance Directives  Does patient have a [address]?: No  Was patient offered paperwork?: Yes (pt declined paperwork)  Does patient have Advance Directives?: No  Was patient offered paperwork?: Yes (pt declined paperwork)         Readmission Root Cause  30 Day Readmission: No    Patient Information  Admitted from[de-identified] Home  Mental Status: Alert  During Assessment patient was accompanied by: Spouse  Assessment information provided by[de-identified] Patient, Spouse  Primary Caregiver: Self  Support Systems: Spouse/significant other  [place] of Residence: Other (specify in comment box)  What city do you live in?: [place] 7[address] entry access options   Select all that apply : Ramp  Type of Current Residence: 2 story home  Upon entering residence, is there a bedroom on the main floor (no further steps)?: Yes  Upon entering residence, is there a bathroom on the main floor (no further steps)?: Yes  In the last 12 months, was there a time when you were not able to pay the mortgage or rent on time?: No  In the last 12 months, how many places have you lived?: 1  In the last 12 months, was there a time when you did not have a steady place to sleep or slept in a shelter (including now)?: No  Homeless/housing insecurity resource given?: N/A  Living Arrangements: Lives w/ Spouse/significant other  Is patient a ?: Yes ([employer]    no benefits)  Is patient active with [facility])?: No    Activities of Daily Living Prior to Admission  Functional Status: Independent (except driving)  Completes ADLs independently?: Yes  Ambulates independently?: Yes  Does patient use assisted devices?: Yes  Assisted Devices (DME) used: Straight Cane  Does patient currently own DME?: Yes  What DME does the patient currently own?: Straight Cane  Does patient have a history of Outpatient Therapy (PT/OT)?: Yes (active at the center in [facility])  Does the patient have a history of Short-Term Rehab?: Yes (pt is unsure of the facility)  Does patient have a history of HHC?: No  Does patient currently have [address]?: No         Patient Information Continued  Income Source: Pension/MCC  Does patient have prescription coverage?: Yes ([facility]  & [facility])  Within the past 12 months, you worried that your food would run out before you got the money to buy more : Never true  Within the past 12 months, the food you bought just didn't last and you didn't have money to get more : Never true  Food insecurity resource given?: N/A  Does patient receive dialysis treatments?: No  Does patient have a history of substance abuse?: No  Does patient have a history of Mental Health Diagnosis?: No         Means of Transportation  Means of Transport to Appts[de-identified] Family transport  In the past 12 months, has lack of transportation kept you from medical appointments or from getting medications?: No  In the past 12 months, has lack of transportation kept you from meetings, work, or from getting things needed for daily living?: No  Was application for public transport provided?: N/A        DISCHARGE DETAILS:    Discharge planning discussed with[de-identified] patient and wife  Freedom of Choice: Yes  Comments - Freedom of Choice: rehab is recommended   pt has declined rehab-  pt has alos declined hcc he states his wife is a nurse-  pt has a new colostomy- cm will offer again  CM contacted family/caregiver?: Yes             Contacts  Patient Contacts: wife Jordan Most  Relationship to Patient[de-identified] Family (wife)  Contact Method:  In Person  Reason/Outcome: Discharge [address]         Is the patient interested in [facility] at discharge?: No    DME Referral Provided  Referral made for DME?:  (pt will need colostomy supplies ordered)    Other Referral/Resources/Interventions Provided:  Referral Comments: pt has declined rehab & hhc-   pt is on oxygen and he does not have home oxygen, pt will need colostomy supplies ordered-  will offer hhc again    Would you like to participate in our [facility] service program?  : No - Declined    Treatment Team Recommendation:  (d/c plan tbd- wife will transport)

## 2022-08-23 LAB
ANION GAP SERPL CALCULATED.3IONS-SCNC: 7 MMOL/L (ref 4–13)
BUN SERPL-MCNC: 13 MG/DL (ref 5–25)
CALCIUM SERPL-MCNC: 8.6 MG/DL (ref 8.4–10.2)
CHLORIDE SERPL-SCNC: 99 MMOL/L (ref 96–108)
CO2 SERPL-SCNC: 26 MMOL/L (ref 21–32)
CREAT SERPL-MCNC: 0.79 MG/DL (ref 0.6–1.3)
ERYTHROCYTE [DISTWIDTH] IN BLOOD BY AUTOMATED COUNT: 11.9 % (ref 11.6–15.1)
GFR SERPL CREATININE-BSD FRML MDRD: 87 ML/MIN/1.73SQ M
GLUCOSE SERPL-MCNC: 114 MG/DL (ref 65–140)
HCT VFR BLD AUTO: 36.5 % (ref 36.5–49.3)
HGB BLD-MCNC: 11.7 G/DL (ref 12–17)
MAGNESIUM SERPL-MCNC: 2 MG/DL (ref 1.9–2.7)
MCH RBC QN AUTO: 29 PG (ref 26.8–34.3)
MCHC RBC AUTO-ENTMCNC: 32.1 G/DL (ref 31.4–37.4)
MCV RBC AUTO: 91 FL (ref 82–98)
PLATELET # BLD AUTO: 466 THOUSANDS/UL (ref 149–390)
PMV BLD AUTO: 8.9 FL (ref 8.9–12.7)
POTASSIUM SERPL-SCNC: 3.6 MMOL/L (ref 3.5–5.3)
RBC # BLD AUTO: 4.03 MILLION/UL (ref 3.88–5.62)
SODIUM SERPL-SCNC: 132 MMOL/L (ref 135–147)
WBC # BLD AUTO: 20.91 THOUSAND/UL (ref 4.31–10.16)

## 2022-08-23 PROCEDURE — 85027 COMPLETE CBC AUTOMATED: CPT

## 2022-08-23 PROCEDURE — 83735 ASSAY OF MAGNESIUM: CPT

## 2022-08-23 PROCEDURE — 80048 BASIC METABOLIC PNL TOTAL CA: CPT

## 2022-08-23 PROCEDURE — 99024 POSTOP FOLLOW-UP VISIT: CPT | Performed by: SURGERY

## 2022-08-23 PROCEDURE — C9113 INJ PANTOPRAZOLE SODIUM, VIA: HCPCS

## 2022-08-23 RX ORDER — KETOROLAC TROMETHAMINE 30 MG/ML
15 INJECTION, SOLUTION INTRAMUSCULAR; INTRAVENOUS EVERY 6 HOURS SCHEDULED
Status: COMPLETED | OUTPATIENT
Start: 2022-08-23 | End: 2022-08-26

## 2022-08-23 RX ORDER — LORAZEPAM 2 MG/ML
1 INJECTION INTRAMUSCULAR EVERY 4 HOURS PRN
Status: DISCONTINUED | OUTPATIENT
Start: 2022-08-23 | End: 2022-08-30 | Stop reason: HOSPADM

## 2022-08-23 RX ADMIN — LORAZEPAM 1 MG: 2 INJECTION INTRAMUSCULAR; INTRAVENOUS at 13:49

## 2022-08-23 RX ADMIN — HYDROMORPHONE HYDROCHLORIDE 1 MG: 1 INJECTION, SOLUTION INTRAMUSCULAR; INTRAVENOUS; SUBCUTANEOUS at 19:30

## 2022-08-23 RX ADMIN — LORAZEPAM 1 MG: 2 INJECTION INTRAMUSCULAR; INTRAVENOUS at 18:09

## 2022-08-23 RX ADMIN — HYDROMORPHONE HYDROCHLORIDE 1 MG: 1 INJECTION, SOLUTION INTRAMUSCULAR; INTRAVENOUS; SUBCUTANEOUS at 14:09

## 2022-08-23 RX ADMIN — HYDROMORPHONE HYDROCHLORIDE 1 MG: 1 INJECTION, SOLUTION INTRAMUSCULAR; INTRAVENOUS; SUBCUTANEOUS at 17:25

## 2022-08-23 RX ADMIN — HYDROMORPHONE HYDROCHLORIDE 1 MG: 1 INJECTION, SOLUTION INTRAMUSCULAR; INTRAVENOUS; SUBCUTANEOUS at 21:34

## 2022-08-23 RX ADMIN — HYDROMORPHONE HYDROCHLORIDE 1 MG: 1 INJECTION, SOLUTION INTRAMUSCULAR; INTRAVENOUS; SUBCUTANEOUS at 12:04

## 2022-08-23 RX ADMIN — LORAZEPAM 1 MG: 2 INJECTION INTRAMUSCULAR; INTRAVENOUS at 04:38

## 2022-08-23 RX ADMIN — KETOROLAC TROMETHAMINE 15 MG: 30 INJECTION, SOLUTION INTRAMUSCULAR at 14:28

## 2022-08-23 RX ADMIN — HYDROMORPHONE HYDROCHLORIDE 1 MG: 1 INJECTION, SOLUTION INTRAMUSCULAR; INTRAVENOUS; SUBCUTANEOUS at 23:38

## 2022-08-23 RX ADMIN — LORAZEPAM 1 MG: 2 INJECTION INTRAMUSCULAR; INTRAVENOUS at 01:19

## 2022-08-23 RX ADMIN — ONDANSETRON 4 MG: 2 INJECTION INTRAMUSCULAR; INTRAVENOUS at 01:19

## 2022-08-23 RX ADMIN — HYDROMORPHONE HYDROCHLORIDE 1 MG: 1 INJECTION, SOLUTION INTRAMUSCULAR; INTRAVENOUS; SUBCUTANEOUS at 08:29

## 2022-08-23 RX ADMIN — KETOROLAC TROMETHAMINE 15 MG: 30 INJECTION, SOLUTION INTRAMUSCULAR at 17:25

## 2022-08-23 RX ADMIN — CIPROFLOXACIN 400 MG: 2 INJECTION, SOLUTION INTRAVENOUS at 12:15

## 2022-08-23 RX ADMIN — PANTOPRAZOLE SODIUM 40 MG: 40 INJECTION, POWDER, FOR SOLUTION INTRAVENOUS at 08:28

## 2022-08-23 RX ADMIN — SODIUM CHLORIDE, SODIUM LACTATE, POTASSIUM CHLORIDE, AND CALCIUM CHLORIDE 150 ML/HR: .6; .31; .03; .02 INJECTION, SOLUTION INTRAVENOUS at 04:45

## 2022-08-23 RX ADMIN — HYDROMORPHONE HYDROCHLORIDE 1 MG: 1 INJECTION, SOLUTION INTRAMUSCULAR; INTRAVENOUS; SUBCUTANEOUS at 04:38

## 2022-08-23 RX ADMIN — SODIUM CHLORIDE, SODIUM LACTATE, POTASSIUM CHLORIDE, AND CALCIUM CHLORIDE 150 ML/HR: .6; .31; .03; .02 INJECTION, SOLUTION INTRAVENOUS at 14:08

## 2022-08-23 RX ADMIN — HYDROMORPHONE HYDROCHLORIDE 0.2 MG: 0.2 INJECTION, SOLUTION INTRAMUSCULAR; INTRAVENOUS; SUBCUTANEOUS at 01:19

## 2022-08-23 RX ADMIN — ONDANSETRON 4 MG: 2 INJECTION INTRAMUSCULAR; INTRAVENOUS at 08:30

## 2022-08-23 RX ADMIN — ENOXAPARIN SODIUM 40 MG: 100 INJECTION SUBCUTANEOUS at 08:29

## 2022-08-23 RX ADMIN — METRONIDAZOLE 500 MG: 500 INJECTION, SOLUTION INTRAVENOUS at 07:16

## 2022-08-23 RX ADMIN — LORAZEPAM 1 MG: 2 INJECTION INTRAMUSCULAR; INTRAVENOUS at 08:34

## 2022-08-23 RX ADMIN — METRONIDAZOLE 500 MG: 500 INJECTION, SOLUTION INTRAVENOUS at 23:38

## 2022-08-23 RX ADMIN — HYDROMORPHONE HYDROCHLORIDE 1 MG: 1 INJECTION, SOLUTION INTRAMUSCULAR; INTRAVENOUS; SUBCUTANEOUS at 06:32

## 2022-08-23 RX ADMIN — LORAZEPAM 1 MG: 2 INJECTION INTRAMUSCULAR; INTRAVENOUS at 23:38

## 2022-08-23 RX ADMIN — METRONIDAZOLE 500 MG: 500 INJECTION, SOLUTION INTRAVENOUS at 15:15

## 2022-08-23 RX ADMIN — CIPROFLOXACIN 400 MG: 2 INJECTION, SOLUTION INTRAVENOUS at 00:42

## 2022-08-23 RX ADMIN — HYDROMORPHONE HYDROCHLORIDE 0.2 MG: 0.2 INJECTION, SOLUTION INTRAMUSCULAR; INTRAVENOUS; SUBCUTANEOUS at 03:25

## 2022-08-23 RX ADMIN — HYDROMORPHONE HYDROCHLORIDE 1 MG: 1 INJECTION, SOLUTION INTRAMUSCULAR; INTRAVENOUS; SUBCUTANEOUS at 02:22

## 2022-08-23 RX ADMIN — METOCLOPRAMIDE HYDROCHLORIDE 10 MG: 5 INJECTION INTRAMUSCULAR; INTRAVENOUS at 04:40

## 2022-08-23 NOTE — PROGRESS NOTES
Progress Note - General Surgery   Iván Call 78 y o  male MRN: [id number]  Unit/Bed#: -01 Encounter: [id number]    Assessment:  26-year-old male presenting with high-grade small-bowel obstruction with intra-abdominal abscess  POD#3 s/p Vane's procedure with segmental small-bowel resection and extensive lysis of adhesions  AVSS on room air  UO 2 1 L via Ambriz   cc  DAMASO x1 140 cc, serosanguineous  Colostomy with dark brown liquid output, stoma pink and viable  WBC 20 91 from 16 32  HGB 11 7 from 11 7  CR 0 79 from 0 84    Plan:  Trickle TF  NPO, ice chips  IVF  IV Cipro and Flagyl, started on 08/20/2022, day 4  IV Protonix  Antiemetics and analgesics p r n  Chloraseptic throat spray p r n  Strict I/O  Ambriz to remain in place 10 days postop  Ostomy care  DAMASO to remain in place to bulb suction  Parveen in place  To be evaluated at bedside with Dr Gume Cabrera    Subjective/Objective   Subjective:  No acute overnight events  Patient complaining of poor pain control this morning  Complaining of abdominal and arthritic pain  Tolerating tube feeds without increase in abdominal pain, nausea, vomiting  Functioning ostomy  Denies chest pain, palpitations, shortness of breath, calf tenderness  Denies fever, chills  Objective:   Blood pressure 156/84, pulse 100, temperature 98 1 °F (36 7 °C), temperature source Axillary, resp  rate 20, height 5' 6" (1 676 m), weight 85 3 kg (188 lb), SpO2 91 %  ,Body mass index is 30 34 kg/m²  Intake/Output Summary (Last 24 hours) at 8/23/2022 0746  Last data filed at 8/23/2022 0705  Gross per 24 hour   Intake 350 ml   Output 2640 ml   Net -2290 ml       Invasive Devices  Report    Peripheral Intravenous Line  Duration           Peripheral IV 08/23/22 Ventral (anterior); Left Forearm <1 day          Drain  Duration           Closed/Suction Drain Right RLQ Bulb 19 Fr  2 days    Colostomy Descending/sigmoid LLQ 2 days    NG/OG/Enteral Tube Nasogastric 16 Fr Right nare 2 days    Urethral Catheter Latex 16 Fr  2 days              Physical Exam  Vitals and nursing note reviewed  Constitutional:       General: He is not in acute distress  Appearance: Normal appearance  He is obese  He is not ill-appearing or toxic-appearing  HENT:      Head: Normocephalic and atraumatic  Cardiovascular:      Rate and Rhythm: Normal rate and regular rhythm  Pulses: Normal pulses  Heart sounds: Normal heart sounds  No murmur heard  No friction rub  No gallop  Pulmonary:      Effort: Pulmonary effort is normal  No respiratory distress  Breath sounds: Normal breath sounds  No wheezing, rhonchi or rales  Abdominal:      General: Bowel sounds are normal  There is distension  Palpations: Abdomen is soft  Tenderness: There is abdominal tenderness (Incisional)  There is no guarding or rebound  Musculoskeletal:         General: Normal range of motion  Right lower leg: No edema  Left lower leg: No edema  Skin:     General: Skin is warm and dry  Neurological:      General: No focal deficit present  Mental Status: He is alert and oriented to person, place, and time  Psychiatric:         Mood and Affect: Mood normal          Behavior: Behavior normal          Thought Content: Thought content normal        Lab, Imaging and other studies:  I have personally reviewed pertinent lab results      CBC:   Lab Results   Component Value Date    WBC 20 91 (H) 08/23/2022    HGB 11 7 (L) 08/23/2022    HCT 36 5 08/23/2022    MCV 91 08/23/2022     (H) 08/23/2022    MCH 29 0 08/23/2022    MCHC 32 1 08/23/2022    RDW 11 9 08/23/2022    MPV 8 9 08/23/2022     CMP:   Lab Results   Component Value Date    SODIUM 132 (L) 08/23/2022    K 3 6 08/23/2022    CL 99 08/23/2022    CO2 26 08/23/2022    BUN 13 08/23/2022    CREATININE 0 79 08/23/2022    CALCIUM 8 6 08/23/2022    EGFR 87 08/23/2022     VTE Pharmacologic Prophylaxis: Enoxaparin (Lovenox)  VTE Mechanical Prophylaxis: sequential compression device    Savage Lofton PA-C

## 2022-08-23 NOTE — OCCUPATIONAL THERAPY NOTE
08/23/22 1152   OT Last Visit   OT Visit Date 08/23/22   Note Type   Note Type Cancelled Session   Cancel Reasons Medical status  (*Specifically, patient having "10" pain at this time > treatment deferred  Wife present and supportive )   Answered patient's call bell - Wife asked for assist  In moving patient for greater comfortability in bed  She reported that he had initiated sitting at edge of bed for a short time, and upon return to supine could not get comfortable  PCA and I assisted patient for repositioning/comfort, with success  OT treatment deferred     ANIKA Veras/DONNIE

## 2022-08-23 NOTE — CASE MANAGEMENT
Case Management Discharge Planning Note    Patient name Shabnam Silva  Location /-10 MRN [id number]  : 1945 Date 2022       Current Admission Date: 2022  Current Admission Diagnosis:SBO (small bowel obstruction) [facility])   Patient Active Problem List    Diagnosis Date Noted    Diverticulitis of colon 2022    SBO ([facility] bowel obstruction) ([facility] ) 2022    Primary osteoarthritis of right shoulder 2022    Stage 3 chronic kidney disease, unspecified whether stage 3a or 3b CKD ([facility] ) 2022    High risk medication use 2020    Rheumatoid arthritis with rheumatoid factor, unspecified ([place] [address] ) 2020    Atherosclerosis of native coronary artery of native heart without angina pectoris 2019    Chronic fatigue 2018    Status post aortic valve replacement with bioprosthetic valve 2018    Arthritis     Chronic pain     Continuous opioid dependence ([place] [address] )     Hyperlipidemia     Benign essential hypertension     Insomnia     Peripheral neuropathy     Carpal tunnel syndrome 2015    Psoriasis with arthropathy ([facility] ) 2015    H/O steroid therapy 2013    Arthralgia of lower leg 2013    Hyperglycemia 10/25/2012    Testicular hypogonadism 10/25/2012    Primary localized osteoarthrosis of shoulder region 10/09/2012    Psoriasis 10/09/2012    Atopic dermatitis 2012    Allergic rhinitis 2012    Low back pain 2012      LOS (days): 3  Geometric Mean LOS (GMLOS) (days): 5 70  Days to GMLOS:2 5     OBJECTIVE:  Risk of Unplanned Readmission Score: 11 91         Current admission status: Inpatient   Preferred Pharmacy:   [address] [place] [address] #[id number] MISTY Soto - [address] [address]  Phone: [phone] Fax: [phone] Ginny Campo #[id number], [address] [address]  Phone: [phone] Fax: [phone] 37 Morgan Martinez, [address] [place] [address]  Phone: [phone] Fax: [phone]    Primary Care Provider: Abran Falk DO    Primary Insurance: Cosmo Moise [facility] REP  Secondary Insurance:     DISCHARGE DETAILS:    Discharge planning discussed with[de-identified] cm received a call at 12:50pm from [facility] stated that they have changed their mind and they would like [facility]     Comments - Freedom of Choice: rehab recommended and pt has declined, family is now in agreement with [facility]-  permission to place a blanket referral  CM contacted family/caregiver?: Yes             Contacts  Patient Contacts: wife Peg Lesch  Relationship to Patient[de-identified] Family (wife)  Contact Method: Phone  Phone Number: [phone]   pt's wife called cm  Reason/Outcome: Discharge [address]         Is the patient interested in [address] at discharge?: Yes  Via Renee Reid requested[de-identified] Nursing, Occupational Therapy, Physical [address] [place] [address] Ave Name[de-identified] Other  [address] [place] [address] Provider[de-identified] Referring Provider  Home Health Services Needed[de-identified] Post-Op Care and Assessment, Strengthening/Theraputic Exercises to Improve Function, Wound/Ostomy Care  Homebound Criteria Met[de-identified] Uses an Assist Device (i e  cane, walker, etc), Requires the Assistance of Another Person for Safe Ambulation or to Leave the Home  Supporting Clincal Findings[de-identified] Limited Endurance         Other Referral/Resources/Interventions Provided:  Interventions: [facility]  Referral Comments: [facility] referrals have been sent as requested    Would you like to participate in our [address] service program?  : No - Declined    Treatment Team Recommendation: Home with [address] (home with spouse & [facility] & outpt follow up- family)

## 2022-08-23 NOTE — WOUND OSTOMY CARE
Progress Note- Ostomy  Earlene Deal 78 y o  male  [id number]  --01      Assessment:  78year old male patient admitted with small bowel obstruction  Wound care consulted to begin ostomy education  Patient is POD#2 for small bowel resection with intra-abdominal abscess and a Vane's procedure    Patient history significant for diverticulitis of colon, chronic pain, continuous opioid dependence, HTN, peripheral neuropathy, chronic fatigue, RA, and atopic dermatitis  Patient in bed, he has a NG tube with trickle tube feeds, song catheter for urinary management and DAMASO bulb to bulb suction to the RLQ with bloody output  Lay Blend LLQ colostomy pouch intact  Pink budded stoma visible through pouch  Stoma functioning, small amount of brown liquid effluent in the pouch  Patient education for colostomy placed at bedside, patient aware of materials  Started basic ostomy education  Patient states he cannot see the pouch or the stoma  Patient stated he is not up to receiving instruction on ostomy care at this time, would like to have session with wife when she arrives  Agreed on Wednesday afternoon to do ostomy teaching with patient and wife  He is having pain and nausea at this time  Ostomy Care Plan:  1  Will meet with patient and wife on Wednesday POD# 4 to provide ostomy teaching to both the patient and his wife  2  Empty pouch when 1/3-1/2 full of stool, if inflated with gas burp pouch  Change pouch every 3 days or PRN  If pouch leaks, remove pouch, cleanse skin with warm water and washcloth, measure stoma, cut to fit pouch  Apply 3M No Sting barrier film to peristomal skin, and apply pouch, place hand over pouch for 1-2 minutes to aid with adhesion of pouch to skin  3  Soft care cushion to chair when OOB  4  Elevate heels off the bed with pillows   5  Turn and reposition every two hours  6  Hydraguard to bilateral heels, buttocks and sacrum BID and PRN  7   Skin nourishing cream daily    Wound:  Wound 08/20/22 Abdomen N/A (Active)   Wound Description JUDY 08/22/22 0742   Dressing ABD;Dry dressing 08/22/22 0742   Dressing Status Clean;Dry; Intact 08/22/22 0742   Number of days: 2     Closed/Suction Drain Right RLQ Bulb 19 Fr  (Active)   Site Description Unable to view 08/22/22 0901   Dressing Status Clean;Dry; Intact 08/22/22 0901   Drainage Appearance Serosanguineous 08/22/22 0901   Status To bulb suction 08/22/22 0901   Output (mL) 40 mL 08/22/22 1904   Number of days: 2       NG/OG/Enteral Tube Nasogastric 16 Fr Right nare (Active)   Placement Reverification Aspiration 08/22/22 1320   Site Assessment Clean;Dry; Intact 08/22/22 0901   Enteral feeding tube interventions Flushed 08/22/22 1936   Status Tube feed infusing 08/22/22 1936   Drainage Appearance Tan 08/22/22 1936   Intake (mL) 70 mL 08/22/22 1936   Output (mL) 400 mL 08/22/22 1320   Number of days: 2       Colostomy Descending/sigmoid LLQ (Active)   Stomal Appliance 2 piece; Intact 08/22/22 2201   Stoma Assessment [place] 08/22/22 2201   Stoma Shape Round 08/22/22 2201   Peristomal Assessment JUDY 08/22/22 2201   Number of days: 2         Reviewed plan of care with primary RN   Recommendations written as orders  Wound care team to follow weekly while admitted  Questions or concerns [facility] Nurse    Margret Burkitt BSN, RN, [employer]

## 2022-08-23 NOTE — PLAN OF CARE
Problem: Potential for Falls  Goal: Patient will remain free of falls  Description: INTERVENTIONS:  - Educate patient/family on patient safety including physical limitations  - Instruct patient to call for assistance with activity   - Consult OT/PT to assist with strengthening/mobility   - Keep Call bell within reach  - Keep bed low and locked with side rails adjusted as appropriate  - Keep care items and personal belongings within reach  - Initiate and maintain comfort rounds  - Make Fall Risk Sign visible to staff  - Offer Toileting every 2 Hours, in advance of need  - Initiate/Maintain bed alarm  - Obtain necessary fall risk management equipment  - Apply yellow socks and bracelet for high fall risk patients  - Consider moving patient to room near nurses station  Outcome: Progressing     Problem: PAIN - ADULT  Goal: Verbalizes/displays adequate comfort level or baseline comfort level  Description: Interventions:  - Encourage patient to monitor pain and request assistance  - Assess pain using appropriate pain scale  - Administer analgesics based on type and severity of pain and evaluate response  - Implement non-pharmacological measures as appropriate and evaluate response  - Consider cultural and social influences on pain and pain management  - Notify physician/advanced practitioner if interventions unsuccessful or patient reports new pain  Outcome: Progressing     Problem: INFECTION - ADULT  Goal: Absence or prevention of progression during hospitalization  Description: INTERVENTIONS:  - Assess and monitor for signs and symptoms of infection  - Monitor lab/diagnostic results  - Monitor all insertion sites, i e  indwelling lines, tubes, and drains  - Monitor endotracheal if appropriate and nasal secretions for changes in amount and color  - [place] appropriate cooling/warming therapies per order  - Administer medications as ordered  - Instruct and encourage patient and family to use good hand hygiene technique  - Identify and instruct in appropriate isolation precautions for identified infection/condition  Outcome: Progressing     Problem: SAFETY ADULT  Goal: Patient will remain free of falls  Description: INTERVENTIONS:  - Educate patient/family on patient safety including physical limitations  - Instruct patient to call for assistance with activity   - Consult OT/PT to assist with strengthening/mobility   - Keep Call bell within reach  - Keep bed low and locked with side rails adjusted as appropriate  - Keep care items and personal belongings within reach  - Initiate and maintain comfort rounds  - Make Fall Risk Sign visible to staff  - Offer Toileting every 2 Hours, in advance of need  - Initiate/Maintain bed alarm  - Obtain necessary fall risk management equipment  - Apply yellow socks and bracelet for high fall risk patients  - Consider moving patient to room near nurses station  Outcome: Progressing  Goal: Maintain or return to baseline ADL function  Description: INTERVENTIONS:  -  Assess patient's ability to carry out ADLs; assess patient's baseline for ADL function and identify physical deficits which impact ability to perform ADLs (bathing, care of mouth/teeth, toileting, grooming, dressing, etc )  - Assess/evaluate cause of self-care deficits   - Assess range of motion  - Assess patient's mobility; develop plan if impaired  - Assess patient's need for assistive devices and provide as appropriate  - Encourage maximum independence but intervene and supervise when necessary  - Involve family in performance of ADLs  - Assess for home care needs following discharge   - Consider OT consult to assist with ADL evaluation and planning for discharge  - Provide patient education as appropriate  Outcome: Progressing  Goal: Maintains/Returns to pre admission functional level  Description: INTERVENTIONS:  - Perform BMAT or MOVE assessment daily    - Set and communicate daily mobility goal to care team and patient/family/caregiver  - Collaborate with rehabilitation services on mobility goals if consulted  - Perform Range of Motion 2 times a day  - Reposition patient every 2 hours  - Dangle patient 2 times a day  - Stand patient 2 times a day  - Ambulate patient 2 times a day  - Out of bed to chair 3 times a day   - Out of bed for meals 3 times a day  - Out of bed for toileting  - Record patient progress and toleration of activity level   Outcome: Progressing     Problem: DISCHARGE PLANNING  Goal: Discharge to home or other facility with appropriate resources  Description: INTERVENTIONS:  - Identify barriers to discharge w/patient and caregiver  - Arrange for needed discharge resources and transportation as appropriate  - Identify discharge learning needs (meds, wound care, etc )  - Arrange for interpretive services to assist at discharge as neede- Refer to Case Management Department for coordinating discharge planning if the patient needs post-hospital services based on physician/advanced practitioner order or complex needs related to functional status, cognitive ability, or social support system  Outcome: Progressing     Problem: Knowledge Deficit  Goal: Patient/family/caregiver demonstrates understanding of disease process, treatment plan, medications, and discharge instructions  Description: Complete learning assessment and assess knowledge base    Interventions:  - Provide teaching at level of understanding  - Provide teaching via preferred learning methods  Outcome: Progressing     Problem: MOBILITY - ADULT  Goal: Maintain or return to baseline ADL function  Description: INTERVENTIONS:  -  Assess patient's ability to carry out ADLs; assess patient's baseline for ADL function and identify physical deficits which impact ability to perform ADLs (bathing, care of mouth/teeth, toileting, grooming, dressing, etc )  - Assess/evaluate cause of self-care deficits   - Assess range of motion  - Assess patient's mobility; develop plan if impaired  - Assess patient's need for assistive devices and provide as appropriate  - Encourage maximum independence but intervene and supervise when necessary  - Involve family in performance of ADLs  - Assess for home care needs following discharge   - Consider OT consult to assist with ADL evaluation and planning for discharge  - Provide patient education as appropriate  Outcome: Progressing  Goal: Maintains/Returns to pre admission functional level  Description: INTERVENTIONS:  - Perform BMAT or MOVE assessment daily    - Set and communicate daily mobility goal to care team and patient/family/caregiver  - Collaborate with rehabilitation services on mobility goals if consulted  - Perform Range of Motion 2 times a day  - Reposition patient every 2 hours    - Dangle patient 2 times a day  - Stand patient 2 times a day  - Ambulate patient 2 times a day  - Out of bed to chair 3 times a day   - Out of bed for meals 3 times a day  - Out of bed for toileting  - Record patient progress and toleration of activity level   Outcome: Progressing

## 2022-08-24 LAB
ANION GAP SERPL CALCULATED.3IONS-SCNC: 7 MMOL/L (ref 4–13)
BACTERIA SPEC BFLD CULT: ABNORMAL
BACTERIA SPEC BFLD CULT: ABNORMAL
BUN SERPL-MCNC: 13 MG/DL (ref 5–25)
CALCIUM SERPL-MCNC: 8.3 MG/DL (ref 8.4–10.2)
CHLORIDE SERPL-SCNC: 100 MMOL/L (ref 96–108)
CO2 SERPL-SCNC: 27 MMOL/L (ref 21–32)
CREAT SERPL-MCNC: 0.92 MG/DL (ref 0.6–1.3)
ERYTHROCYTE [DISTWIDTH] IN BLOOD BY AUTOMATED COUNT: 11.9 % (ref 11.6–15.1)
GFR SERPL CREATININE-BSD FRML MDRD: 81 ML/MIN/1.73SQ M
GLUCOSE SERPL-MCNC: 127 MG/DL (ref 65–140)
GRAM STN SPEC: ABNORMAL
GRAM STN SPEC: ABNORMAL
HCT VFR BLD AUTO: 39 % (ref 36.5–49.3)
HGB BLD-MCNC: 12.4 G/DL (ref 12–17)
MAGNESIUM SERPL-MCNC: 1.9 MG/DL (ref 1.9–2.7)
MCH RBC QN AUTO: 29 PG (ref 26.8–34.3)
MCHC RBC AUTO-ENTMCNC: 31.8 G/DL (ref 31.4–37.4)
MCV RBC AUTO: 91 FL (ref 82–98)
PLATELET # BLD AUTO: 429 THOUSANDS/UL (ref 149–390)
PMV BLD AUTO: 8.4 FL (ref 8.9–12.7)
POTASSIUM SERPL-SCNC: 3.7 MMOL/L (ref 3.5–5.3)
RBC # BLD AUTO: 4.28 MILLION/UL (ref 3.88–5.62)
SODIUM SERPL-SCNC: 134 MMOL/L (ref 135–147)
WBC # BLD AUTO: 20.38 THOUSAND/UL (ref 4.31–10.16)

## 2022-08-24 PROCEDURE — 97530 THERAPEUTIC ACTIVITIES: CPT

## 2022-08-24 PROCEDURE — 85025 COMPLETE CBC W/AUTO DIFF WBC: CPT

## 2022-08-24 PROCEDURE — 80048 BASIC METABOLIC PNL TOTAL CA: CPT

## 2022-08-24 PROCEDURE — 99024 POSTOP FOLLOW-UP VISIT: CPT | Performed by: SURGERY

## 2022-08-24 PROCEDURE — 97535 SELF CARE MNGMENT TRAINING: CPT

## 2022-08-24 PROCEDURE — C9113 INJ PANTOPRAZOLE SODIUM, VIA: HCPCS

## 2022-08-24 PROCEDURE — 83735 ASSAY OF MAGNESIUM: CPT

## 2022-08-24 RX ADMIN — HYDROMORPHONE HYDROCHLORIDE 1 MG: 1 INJECTION, SOLUTION INTRAMUSCULAR; INTRAVENOUS; SUBCUTANEOUS at 16:49

## 2022-08-24 RX ADMIN — CIPROFLOXACIN 400 MG: 2 INJECTION, SOLUTION INTRAVENOUS at 00:43

## 2022-08-24 RX ADMIN — HYDROMORPHONE HYDROCHLORIDE 1 MG: 1 INJECTION, SOLUTION INTRAMUSCULAR; INTRAVENOUS; SUBCUTANEOUS at 09:53

## 2022-08-24 RX ADMIN — KETOROLAC TROMETHAMINE 15 MG: 30 INJECTION, SOLUTION INTRAMUSCULAR at 23:19

## 2022-08-24 RX ADMIN — HYDROMORPHONE HYDROCHLORIDE 1 MG: 1 INJECTION, SOLUTION INTRAMUSCULAR; INTRAVENOUS; SUBCUTANEOUS at 22:14

## 2022-08-24 RX ADMIN — HYDROMORPHONE HYDROCHLORIDE 1 MG: 1 INJECTION, SOLUTION INTRAMUSCULAR; INTRAVENOUS; SUBCUTANEOUS at 14:07

## 2022-08-24 RX ADMIN — LORAZEPAM 1 MG: 2 INJECTION INTRAMUSCULAR; INTRAVENOUS at 11:54

## 2022-08-24 RX ADMIN — HYDROMORPHONE HYDROCHLORIDE 1 MG: 1 INJECTION, SOLUTION INTRAMUSCULAR; INTRAVENOUS; SUBCUTANEOUS at 02:56

## 2022-08-24 RX ADMIN — HYDROMORPHONE HYDROCHLORIDE 1 MG: 1 INJECTION, SOLUTION INTRAMUSCULAR; INTRAVENOUS; SUBCUTANEOUS at 11:55

## 2022-08-24 RX ADMIN — KETOROLAC TROMETHAMINE 15 MG: 30 INJECTION, SOLUTION INTRAMUSCULAR at 00:43

## 2022-08-24 RX ADMIN — ENOXAPARIN SODIUM 40 MG: 100 INJECTION SUBCUTANEOUS at 09:54

## 2022-08-24 RX ADMIN — LORAZEPAM 1 MG: 2 INJECTION INTRAMUSCULAR; INTRAVENOUS at 16:47

## 2022-08-24 RX ADMIN — KETOROLAC TROMETHAMINE 15 MG: 30 INJECTION, SOLUTION INTRAMUSCULAR at 17:02

## 2022-08-24 RX ADMIN — HYDROMORPHONE HYDROCHLORIDE 1 MG: 1 INJECTION, SOLUTION INTRAMUSCULAR; INTRAVENOUS; SUBCUTANEOUS at 07:44

## 2022-08-24 RX ADMIN — LORAZEPAM 1 MG: 2 INJECTION INTRAMUSCULAR; INTRAVENOUS at 07:45

## 2022-08-24 RX ADMIN — PANTOPRAZOLE SODIUM 40 MG: 40 INJECTION, POWDER, FOR SOLUTION INTRAVENOUS at 09:53

## 2022-08-24 RX ADMIN — LORAZEPAM 1 MG: 2 INJECTION INTRAMUSCULAR; INTRAVENOUS at 20:49

## 2022-08-24 RX ADMIN — METRONIDAZOLE 500 MG: 500 INJECTION, SOLUTION INTRAVENOUS at 07:44

## 2022-08-24 RX ADMIN — CIPROFLOXACIN 400 MG: 2 INJECTION, SOLUTION INTRAVENOUS at 13:22

## 2022-08-24 RX ADMIN — KETOROLAC TROMETHAMINE 15 MG: 30 INJECTION, SOLUTION INTRAMUSCULAR at 06:15

## 2022-08-24 RX ADMIN — HYDROMORPHONE HYDROCHLORIDE 1 MG: 1 INJECTION, SOLUTION INTRAMUSCULAR; INTRAVENOUS; SUBCUTANEOUS at 20:10

## 2022-08-24 RX ADMIN — KETOROLAC TROMETHAMINE 15 MG: 30 INJECTION, SOLUTION INTRAMUSCULAR at 11:35

## 2022-08-24 RX ADMIN — HYDROMORPHONE HYDROCHLORIDE 1 MG: 1 INJECTION, SOLUTION INTRAMUSCULAR; INTRAVENOUS; SUBCUTANEOUS at 05:11

## 2022-08-24 RX ADMIN — METRONIDAZOLE 500 MG: 500 INJECTION, SOLUTION INTRAVENOUS at 23:22

## 2022-08-24 RX ADMIN — SODIUM CHLORIDE, SODIUM LACTATE, POTASSIUM CHLORIDE, AND CALCIUM CHLORIDE 150 ML/HR: .6; .31; .03; .02 INJECTION, SOLUTION INTRAVENOUS at 23:26

## 2022-08-24 RX ADMIN — METRONIDAZOLE 500 MG: 500 INJECTION, SOLUTION INTRAVENOUS at 15:10

## 2022-08-24 NOTE — PLAN OF CARE
Problem: PHYSICAL THERAPY ADULT  Goal: Performs mobility at highest level of function for planned discharge setting  See evaluation for individualized goals  Description: Treatment/Interventions: Functional transfer training, LE strengthening/ROM, Elevations, Therapeutic exercise, Endurance training, Bed mobility, Gait training, Equipment eval/education, Patient/family training  Equipment Recommended:  (unknown at this time)       See flowsheet documentation for full assessment, interventions and recommendations  Outcome: Progressing  Note: Prognosis: Fair  Problem List: Decreased strength, Decreased endurance, Impaired balance, Decreased mobility, Decreased safety awareness, Pain, Decreased skin integrity  Assessment: Pt seen for PT treatment session this date, consisting of ther act focused on bed mobility with focus on log roll technique and functional transfers and gt training on level surfaces to improve pt safety in household environment  Since previous session, pt has made good progress in terms of improvement in tolerance to all activity including supine>sit, STS and short amb x 3 ft with RW  Pertinent barriers during this session include pain  Current goals and POC remain appropriate, pt continues to have rehab potential and is making progress towards STGs  Pt prognosis for achieving goals is fair, pending pt progress with hospitalization/medical status improvements, and indicated by orientation, eye contact and awareness  Pt limited d/t the presence of intractable pain, fear of pain provocation, avoidance behaviors and self limiting  PT recommends post acute rehabilitation services upon discharge  Pt continues to be functioning below baseline level, and remains limited 2* factors listed above  PT will continue to see pt during current hospitalization in order to address the deficits listed above and provide interventions consistent w/ POC in effort to achieve STGs    Barriers to Discharge: Decreased caregiver support     PT Discharge Recommendation: Post acute rehabilitation services    See flowsheet documentation for full assessment

## 2022-08-24 NOTE — QUICK NOTE
Midline wound dressing changed  9 telfa jeffy removed intact  Patient OOB to chair, tolerating trickle feeds at 20 cc/hr  Green liquid stool in ostomy appliance, but tube feedings have not made it through yet

## 2022-08-24 NOTE — NURSING NOTE
Patient agreeable to morning labs  Questioned the necessity, he was then educated on what the labs would inform his care team of in regards to his treatment plan, and patient stated he understood and was agreeable to them being drawn  He was a difficult stick the last couple of nights, but voiced that he understood the reason and that staff could try and get them  Was difficult at first to obtain lab work, but the labs were obtained successfully with two RNs in the room during this whole process and no interjection from patient during this time  Once labs were obtained the patient's demeanor completely changed and he started getting angry with staff and stating "when is enough enough, why do you keep doing this to me? Every time you come in here all you do is poke me  I am in intense pain and you are doing nothing for me but making it worse  You know I can leave at any time  Maybe I should do that " Writer remained calm, expressed empathy toward the situation and tried to assess what had caused the quick change in temper  The patient's mood did not change, he continued to repeat those same things, would not answer any questions, and sent staff from room  He later called this writer back into room and apologized, stating "this has felt like its been going on for weeks already, and its only been a couple days " Writer accepted apology, expressed empathy for his situation and left the room with all questions and needs met

## 2022-08-24 NOTE — PLAN OF CARE
Problem: OCCUPATIONAL THERAPY ADULT  Goal: Performs self-care activities at highest level of function for planned discharge setting  See evaluation for individualized goals  Outcome: Progressing  Note: Limitation: Decreased ADL status, Decreased endurance, Decreased self-care trans  Prognosis: Fair  Assessment: Patient participated in Skilled OT session this date with interventions consisting of functional transfer training, ADL re training with the use of correct body mechnaics, [employer] techniques and increase dynamic sit/ stand balance during functional activity    Patient agreeable to OT treatment session, upon arrival patient was found supine in bed, alert, responsive  and in no apparent distress  Patient transfers supine to sit EOB with Mod A x 1  Patient then sat EOB unsupported x 5 mins with no LOB noted  Patient then transfers sit to stand and takes a few small steps with Min A x 2 and VCs for safety/sequencing/hand placement  Once seated in recliner, patient performs ADL as detailed in flow sheet  Following ADL, patient declined further activity due to fatigue/pain levels  Session ended with patient seated OOB to recliner, all needs met, and call bell within reach  In comparison to previous session, patient with improvements in level of participation, endurance, self care ADL performance, and functional transfers  Patient requiring verbal cues for safety, verbal cues for correct technique, verbal cues for pacing thru activity steps and frequent rest periods  Patient performance demonstrated good carryover of learned techniques and strategies to facilitate safety during functional tasks  Patient continues to be functioning below baseline level, occupational performance remains limited secondary to factors listed above and increased risk for falls and injury  From OT standpoint, recommendation at time of d/c would be Short Term Rehab    Patient to benefit from continued Occupational Therapy treatment while in the hospital to address deficits as defined above and maximize level of functional independence with ADLs and functional mobility in order to return to PLOF       OT Discharge Recommendation: Post acute rehabilitation services

## 2022-08-24 NOTE — CASE MANAGEMENT
Case Management Discharge Planning Note    Patient name Alexx Ambrose  Location /-43 MRN [id number]  : 1945 Date 2022       Current Admission Date: 2022  Current Admission Diagnosis:SBO (small bowel obstruction) [facility])   Patient Active Problem List    Diagnosis Date Noted    Diverticulitis of colon 2022    [facility]    Primary osteoarthritis of right shoulder 2022    Stage 3 chronic kidney disease, unspecified whether stage 3a or 3b CKD ([facility] ) 2022    High risk medication use 2020    Rheumatoid arthritis with rheumatoid factor, unspecified ([place] [address] ) 2020    Atherosclerosis of native coronary artery of native heart without angina pectoris 2019    Chronic fatigue 2018    Status post aortic valve replacement with bioprosthetic valve 2018    Arthritis     Chronic pain     Continuous opioid dependence ([facility] )     Hyperlipidemia     Benign essential hypertension     Insomnia     Peripheral neuropathy     Carpal tunnel syndrome 2015    Psoriasis with arthropathy ([facility] ) 2015    H/O steroid therapy 2013    Arthralgia of lower leg 2013    Hyperglycemia 10/25/2012    Testicular hypogonadism 10/25/2012    Primary localized osteoarthrosis of shoulder region 10/09/2012    Psoriasis 10/09/2012    Atopic dermatitis 2012    Allergic rhinitis 2012    Low back pain 2012      LOS (days): 4  Geometric Mean LOS (GMLOS) (days): 5 70  Days to GMLOS:1 6     OBJECTIVE:  Risk of Unplanned Readmission Score: 11 54         Current admission status: Inpatient   Preferred Pharmacy:   [address] [place] [address] #[id number] MISTY Alexander - [address] [address]  Phone: [phone] Fax: [phone] Taylor Parks #[id number], [address] [address]  Phone: [phone] Fax: [phone] 37 Andre [place], [address] [place] [address]  Phone: [phone] Fax: [phone]    Primary Care Provider: Elmira Webb DO    Primary Insurance: Amee Desai [facility] REP  Secondary Insurance:     DISCHARGE DETAILS:    Discharge planning discussed with[de-identified] patient and daughter  Freedom of Choice: Yes  Comments - Freedom of Choice: rehab recommended- pt has declined    fmaily and patient are now in agreement [facility]- they were made aware [address] can accept the pt  this is the family's 1st choice  CM contacted family/caregiver?: Yes             Contacts  Patient Contacts: wife Rosiland Mealing  Relationship to Patient[de-identified] Family (wife)  Contact Method:  In Person  Reason/Outcome: Discharge [address]         Is the patient interested in [address] at discharge?: Yes

## 2022-08-24 NOTE — PLAN OF CARE
Problem: Potential for Falls  Goal: Patient will remain free of falls  Description: INTERVENTIONS:  - Educate patient/family on patient safety including physical limitations  - Instruct patient to call for assistance with activity   - Consult OT/PT to assist with strengthening/mobility   - Keep Call bell within reach  - Keep bed low and locked with side rails adjusted as appropriate  - Keep care items and personal belongings within reach  - Initiate and maintain comfort rounds  - Make Fall Risk Sign visible to staff  - Offer Toileting every 2 Hours, in advance of need  - Initiate/Maintain bed alarm  - Obtain necessary fall risk management equipment  - Apply yellow socks and bracelet for high fall risk patients  - Consider moving patient to room near nurses station  Outcome: Progressing     Problem: PAIN - ADULT  Goal: Verbalizes/displays adequate comfort level or baseline comfort level  Description: Interventions:  - Encourage patient to monitor pain and request assistance  - Assess pain using appropriate pain scale  - Administer analgesics based on type and severity of pain and evaluate response  - Implement non-pharmacological measures as appropriate and evaluate response  - Consider cultural and social influences on pain and pain management  - Notify physician/advanced practitioner if interventions unsuccessful or patient reports new pain  Outcome: Progressing     Problem: SAFETY ADULT  Goal: Patient will remain free of falls  Description: INTERVENTIONS:  - Educate patient/family on patient safety including physical limitations  - Instruct patient to call for assistance with activity   - Consult OT/PT to assist with strengthening/mobility   - Keep Call bell within reach  - Keep bed low and locked with side rails adjusted as appropriate  - Keep care items and personal belongings within reach  - Initiate and maintain comfort rounds  - Make Fall Risk Sign visible to staff  - Offer Toileting every 2 Hours, in advance of need  - Initiate/Maintain bed alarm  - Obtain necessary fall risk management equipment  - Apply yellow socks and bracelet for high fall risk patients  - Consider moving patient to room near nurses station  Outcome: Progressing  Goal: Maintain or return to baseline ADL function  Description: INTERVENTIONS:  -  Assess patient's ability to carry out ADLs; assess patient's baseline for ADL function and identify physical deficits which impact ability to perform ADLs (bathing, care of mouth/teeth, toileting, grooming, dressing, etc )  - Assess/evaluate cause of self-care deficits   - Assess range of motion  - Assess patient's mobility; develop plan if impaired  - Assess patient's need for assistive devices and provide as appropriate  - Encourage maximum independence but intervene and supervise when necessary  - Involve family in performance of ADLs  - Assess for home care needs following discharge   - Consider OT consult to assist with ADL evaluation and planning for discharge  - Provide patient education as appropriate  Outcome: Progressing  Goal: Maintains/Returns to pre admission functional level  Description: INTERVENTIONS:  - Perform BMAT or MOVE assessment daily    - Set and communicate daily mobility goal to care team and patient/family/caregiver  - Collaborate with rehabilitation services on mobility goals if consulted  - Perform Range of Motion 2 times a day  - Reposition patient every 2 hours    - Dangle patient 2 times a day  - Stand patient 2 times a day  - Ambulate patient 2 times a day  - Out of bed to chair 3 times a day   - Out of bed for meals 3 times a day  - Out of bed for toileting  - Record patient progress and toleration of activity level   Outcome: Progressing     Problem: DISCHARGE PLANNING  Goal: Discharge to home or other facility with appropriate resources  Description: INTERVENTIONS:  - Identify barriers to discharge w/patient and caregiver  - Arrange for needed discharge resources and transportation as appropriate  - Identify discharge learning needs (meds, wound care, etc )  - Arrange for interpretive services to assist at discharge as neede- Refer to Case Management Department for coordinating discharge planning if the patient needs post-hospital services based on physician/advanced practitioner order or complex needs related to functional status, cognitive ability, or social support system  Outcome: Progressing     Problem: Knowledge Deficit  Goal: Patient/family/caregiver demonstrates understanding of disease process, treatment plan, medications, and discharge instructions  Description: Complete learning assessment and assess knowledge base    Interventions:  - Provide teaching at level of understanding  - Provide teaching via preferred learning methods  Outcome: Progressing     Problem: GASTROINTESTINAL - ADULT  Goal: Minimal or absence of nausea and/or vomiting  Description: INTERVENTIONS:  - Administer IV fluids if ordered to ensure adequate hydration  - Maintain NPO status until nausea and vomiting are resolved  - Nasogastric tube if ordered  - Administer ordered antiemetic medications as needed  - Provide nonpharmacologic comfort measures as appropriate  - Advance diet as tolerated, if ordered  - Consider nutrition services referral to assist patient with adequate nutrition and appropriate food choices  Outcome: Progressing  Goal: Maintains or returns to baseline bowel function  Description: INTERVENTIONS:  - Assess bowel function  - Encourage oral fluids to ensure adequate hydration  - Administer IV fluids if ordered to ensure adequate hydration  - Administer ordered medications as needed  - Encourage mobilization and activity  - Consider nutritional services referral to assist patient with adequate nutrition and appropriate food choices  Outcome: Progressing  Goal: Oral mucous membranes remain intact  Description: INTERVENTIONS  - Assess oral mucosa and hygiene practices  - Implement preventative oral hygiene regimen  - Implement oral medicated treatments as ordered  - Initiate Nutrition services referral as needed  Outcome: Progressing     Problem: MOBILITY - ADULT  Goal: Maintain or return to baseline ADL function  Description: INTERVENTIONS:  -  Assess patient's ability to carry out ADLs; assess patient's baseline for ADL function and identify physical deficits which impact ability to perform ADLs (bathing, care of mouth/teeth, toileting, grooming, dressing, etc )  - Assess/evaluate cause of self-care deficits   - Assess range of motion  - Assess patient's mobility; develop plan if impaired  - Assess patient's need for assistive devices and provide as appropriate  - Encourage maximum independence but intervene and supervise when necessary  - Involve family in performance of ADLs  - Assess for home care needs following discharge   - Consider OT consult to assist with ADL evaluation and planning for discharge  - Provide patient education as appropriate  Outcome: Progressing  Goal: Maintains/Returns to pre admission functional level  Description: INTERVENTIONS:  - Perform BMAT or MOVE assessment daily    - Set and communicate daily mobility goal to care team and patient/family/caregiver  - Collaborate with rehabilitation services on mobility goals if consulted  - Perform Range of Motion 2 times a day  - Reposition patient every 2 hours    - Dangle patient 2 times a day  - Stand patient 2 times a day  - Ambulate patient 2 times a day  - Out of bed to chair 3 times a day   - Out of bed for meals 3 times a day  - Out of bed for toileting  - Record patient progress and toleration of activity level   Outcome: Progressing

## 2022-08-24 NOTE — PHYSICAL THERAPY NOTE
Physical Therapy Treatment Note       08/24/22 1118   PT Last Visit   PT Visit Date 08/24/22   Note Type   Note Type Treatment   Pain Assessment   Pain Assessment Tool 0-10   Pain Score 10 - Worst Possible Pain   Pain Location/Orientation Location: Abdomen   Pain Onset/Description Onset: Ongoing;Frequency: Intermittent; Descriptor: Aching;Descriptor: Cramping   Effect of Pain on Daily Activities worsens with activity   Hospital Pain Intervention(s) Repositioned; Emotional support   Restrictions/Precautions   Weight Bearing Precautions Per Order No   Other Precautions Fall Risk;Pain;Multiple lines; Chair Alarm; Bed Alarm  (song catheter; DAMASO drain, NG tube)   General   Chart Reviewed Yes   Response to Previous Treatment Patient with no complaints from previous session  Family/Caregiver Present Yes  (pt's wife at end of session)   Cognition   Arousal/Participation Alert; Responsive; Cooperative   Attention Attends with cues to redirect   Orientation Level Oriented X4   Memory Within functional limits   Following Commands Follows all commands and directions without difficulty   Comments pt agreeable to PT session   Subjective   Subjective "I can try and sit up"   Bed Mobility   Rolling L 4  Minimal assistance   Additional items Assist x 1;Bedrails; Increased time required   Supine to Sit 3  Moderate assistance   Additional items Assist x 1;HOB elevated; Bedrails; Increased time required;Verbal cues;LE management  (VCs for positioning)   Additional Comments pt expressing lightheadedness initially upon reaching sitting posture- BP taken = 160/100; BP once seated OOB in chair = 186/104   Transfers   Sit to Stand 4  Minimal assistance   Additional items Assist x 2;Bedrails; Increased time required   Stand to Sit 4  Minimal assistance   Additional items Assist x 2; Increased time required;Verbal cues   Ambulation/Elevation   Gait pattern Improper Weight shift;Decreased foot clearance; Short stride   Gait Assistance 4  Minimal assist Additional items Assist x 2   Assistive Device Rolling walker   Distance 3 ft from bed>recliner   Stair Management Assistance Not tested   Balance   Static Sitting Good   Dynamic Sitting Fair   Static Standing Poor +   Dynamic Standing Poor +   Ambulatory Poor +   Endurance Deficit   Endurance Deficit Yes   Activity Tolerance   Activity Tolerance Patient limited by fatigue;Patient limited by pain   Assessment   Prognosis Fair   Problem List Decreased strength;Decreased endurance; Impaired balance;Decreased mobility; Decreased safety awareness;Pain;Decreased skin integrity   Assessment Pt seen for PT treatment session this date, consisting of ther act focused on bed mobility with focus on log roll technique and functional transfers and gt training on level surfaces to improve pt safety in household environment  Since previous session, pt has made good progress in terms of improvement in tolerance to all activity including supine>sit, STS and short amb x 3 ft with RW  Pertinent barriers during this session include pain  Current goals and POC remain appropriate, pt continues to have rehab potential and is making progress towards STGs  Pt prognosis for achieving goals is fair, pending pt progress with hospitalization/medical status improvements, and indicated by orientation, eye contact and awareness  Pt limited d/t the presence of intractable pain, fear of pain provocation, avoidance behaviors and self limiting  PT recommends post acute rehabilitation services upon discharge  Pt continues to be functioning below baseline level, and remains limited 2* factors listed above  PT will continue to see pt during current hospitalization in order to address the deficits listed above and provide interventions consistent w/ POC in effort to achieve STGs     Barriers to Discharge Decreased caregiver support   Goals   Patient Goals "to have less pain"   LTG Expiration Date 08/31/22   Long Term Goal #1 goals remain appropriate   PT Treatment Day 1   Plan   Treatment/Interventions Functional transfer training;LE strengthening/ROM; Therapeutic exercise; Endurance training;Patient/family training;Equipment eval/education; Bed mobility;Gait training;Spoke to nursing;Spoke to case management   Progress Slow progress, decreased activity tolerance   PT Frequency 3-5x/wk   Recommendation   PT Discharge Recommendation Post acute rehabilitation services   Equipment Recommended   (continue RW use)   AM-PAC Basic Mobility Inpatient   Turning in Bed Without Bedrails 2   Lying on Back to Sitting on Edge of Flat Bed 2   Moving Bed to Chair 2   Standing Up From Chair 2   Walk in Room 2   Climb 3-5 Stairs 1   Basic Mobility Inpatient Raw Score 11   Basic Mobility Standardized Score 30 25   Highest Level Of Mobility   -HLM Goal 4: Move to chair/commode   JH-HLM Achieved 4: Move to chair/commode   Education   Education Provided Mobility training;Assistive device   Patient Demonstrates acceptance/verbal understanding;Reinforcement needed   End of Consult   Patient Position at End of Consult Bedside chair; All needs within reach   End of Consult Comments RN Leo Alvarado made aware of outcomes/recs & pt's request for returning to bed shortly     Andre Del Castillo, PT, DPT    Time of PT treatment session: [id number] (total treatment time = 41 minutes)

## 2022-08-24 NOTE — PROGRESS NOTES
Progress Note - General Surgery   Kenn Bettencourt 78 y o  male MRN: [id number]  Unit/Bed#: -01 Encounter: [id number]    Assessment:  68-year-old male presenting with high-grade small-bowel obstruction with intra-abdominal abscess  POD#4 s/p Vane's procedure with segmental small-bowel resection and extensive lysis of adhesions  AVSS on room air   UO 2 4L via song, day 5  DAMASO x 1 150 cc  Stool via colostomy 350 cc, dark brown/black output    WBC 20 38 from 20 91   Hgb 12 4 from 11 7   Cr 0 92 from 0 79    Plan:  Tube feeds via NGT to goal of 60, to likely be discontinued tomorrow  IVF  IV Cipro and IV Flagyl  IV Protonix  Antiemetics, analgesics, anxiolytics p r n  Chloraseptic throat spray p r n  Strict I/O  Ostomy care, to have teaching with ostomy nurses afternoon with wife  Incentive spirometry  Encourage ambulation  Parveen removed at bedside by Dr Carlitos Sanchez   Subjective:  No acute overnight events however patient with some distress this morning for lab draw  Patient notes slight improvement in generalized abdominal pain  Tolerating tube feeds without increased abdominal pain, nausea, vomiting  Functioning ostomy however with gas and melena like stool in bag  Denies chest pain, palpitations, shortness of breath, calf tenderness  Denies fever, chills  Objective:   Blood pressure 160/90, pulse 90, temperature 97 7 °F (36 5 °C), temperature source Oral, resp  rate 16, height 5' 6" (1 676 m), weight 85 3 kg (188 lb), SpO2 94 %  ,Body mass index is 30 34 kg/m²  Intake/Output Summary (Last 24 hours) at 8/24/2022 0823  Last data filed at 8/24/2022 0601  Gross per 24 hour   Intake 1980 ml   Output 2950 ml   Net -970 ml       Invasive Devices  Report    Peripheral Intravenous Line  Duration           Peripheral IV 08/23/22 Ventral (anterior); Left Forearm 1 day          Drain  Duration           Closed/Suction Drain Right RLQ Bulb 19 Fr  3 days    Colostomy Descending/sigmoid LLQ 3 days    NG/OG/Enteral Tube Nasogastric 16 Fr Right nare 3 days    Urethral Catheter Latex 16 Fr  3 days              Physical Exam  Vitals and nursing note reviewed  Constitutional:       General: He is not in acute distress  Appearance: Normal appearance  He is obese  He is not ill-appearing or toxic-appearing  HENT:      Head: Normocephalic and atraumatic  Cardiovascular:      Rate and Rhythm: Normal rate and regular rhythm  Pulses: Normal pulses  Heart sounds: Normal heart sounds  No murmur heard  No friction rub  No gallop  Pulmonary:      Effort: Pulmonary effort is normal  No respiratory distress  Breath sounds: Normal breath sounds  No wheezing, rhonchi or rales  Abdominal:      General: Bowel sounds are normal  There is distension  Palpations: Abdomen is soft  Tenderness: There is abdominal tenderness (Improving)  There is no guarding or rebound  Comments: Midline incision is clean, dry, intact with jeffy in place, to be removed by Dr Rowena Marshall later today; DAMASO site x1 with serosanguineous/serous drainage, dressing replaced; ostomy with melena like stool in bag, stoma is edematous but pink/viable   Musculoskeletal:         General: Normal range of motion  Right lower leg: No edema  Left lower leg: No edema  Skin:     General: Skin is warm and dry  Neurological:      General: No focal deficit present  Mental Status: He is alert and oriented to person, place, and time  Psychiatric:         Mood and Affect: Mood normal          Behavior: Behavior normal          Thought Content: Thought content normal        Lab, Imaging and other studies:  I have personally reviewed pertinent lab results      CBC:   Lab Results   Component Value Date    WBC 20 38 (H) 08/24/2022    HGB 12 4 08/24/2022    HCT 39 0 08/24/2022    MCV 91 08/24/2022     (H) 08/24/2022    MCH 29 0 08/24/2022    MCHC 31 8 08/24/2022    RDW 11 9 08/24/2022    MPV 8 4 (L) 08/24/2022 CMP:   Lab Results   Component Value Date    SODIUM 134 (L) 08/24/2022    K 3 7 08/24/2022     08/24/2022    CO2 27 08/24/2022    BUN 13 08/24/2022    CREATININE 0 92 08/24/2022    CALCIUM 8 3 (L) 08/24/2022    EGFR 81 08/24/2022     VTE Pharmacologic Prophylaxis: Enoxaparin (Lovenox)  VTE Mechanical Prophylaxis: sequential compression device    Pinky Pulido PA-C

## 2022-08-24 NOTE — OCCUPATIONAL THERAPY NOTE
08/24/22 1117   OT Last Visit   OT Visit Date 08/24/22   Note Type   Note Type Treatment   Restrictions/Precautions   Weight Bearing Precautions Per Order No   Other Precautions Multiple lines;Telemetry;O2;Fall Risk;Pain  (Fley, NG tube, drain)   Pain Assessment   Pain Assessment Tool 0-10   Pain Score 10 - Worst Possible Pain   Pain Location/Orientation Location: Abdomen   Pain Onset/Description Frequency: Intermittent; Descriptor: Aching;Descriptor: Burning; Descriptor: Cramping   Patient's Stated Pain Goal No pain   Hospital Pain Intervention(s) Medication (See MAR); Repositioned; Ambulation/increased activity   ADL   Where Assessed Edge of bed   UB Bathing Assistance 4  Minimal Assistance   UB Bathing Deficit Setup;Verbal cueing;Supervision/safety; Increased time to complete   LB Bathing Assistance 2  Maximal Assistance   LB Bathing Deficit Setup;Verbal cueing;Supervision/safety; Increased time to complete; Buttocks;Right upper leg;Left upper leg;Right lower leg including foot; Left lower leg including foot   UB Dressing Assistance 4  Minimal Assistance   UB Dressing Deficit Setup;Verbal cueing;Supervision/safety; Increased time to complete; Thread RUE; Thread LUE; Fasteners   LB Dressing Assistance 2  Maximal Assistance   LB Dressing Deficit Setup;Supervision/safety;Verbal cueing; Increased time to complete; Don/doff R sock; Don/doff L sock   Bed Mobility   Supine to Sit 3  Moderate assistance   Additional items Assist x 1;HOB elevated; Bedrails; Increased time required;Verbal cues;LE management   Additional Comments Sat unsupported EOB with no LOB noted  Transfers   Sit to Stand 4  Minimal assistance   Additional items Assist x 2;Bedrails; Increased time required;Verbal cues   Stand to Sit 4  Minimal assistance   Additional items Assist x 2;Armrests; Increased time required;Verbal cues   Stand pivot 4  Minimal assistance   Additional items Assist x 2; Increased time required;Verbal cues   Functional Mobility   Functional Mobility 4  Minimal assistance   Additional Comments Pt ambulates a few steps from bed to recliner with Min A x 2  Additional items Rolling walker   Cognition   Overall Cognitive Status WFL   Arousal/Participation Alert; Responsive; Cooperative   Attention Attends with cues to redirect   Orientation Level Oriented X4   Memory Within functional limits   Following Commands Follows all commands and directions without difficulty   Comments Pt agreebale to OT treatment,   Activity Tolerance   Activity Tolerance Patient limited by fatigue;Patient limited by pain   Assessment   Assessment Patient participated in Skilled OT session this date with interventions consisting of functional transfer training, ADL re training with the use of correct body mechnaics, Energy Conservation techniques and increase dynamic sit/ stand balance during functional activity    Patient agreeable to OT treatment session, upon arrival patient was found supine in bed, alert, responsive  and in no apparent distress  Patient transfers supine to sit EOB with Mod A x 1  Patient then sat EOB unsupported x 5 mins with no LOB noted  Patient then transfers sit to stand and takes a few small steps with Min A x 2 and VCs for safety/sequencing/hand placement  Once seated in recliner, patient performs ADL as detailed in flow sheet  Following ADL, patient declined further activity due to fatigue/pain levels  Session ended with patient seated OOB to recliner, all needs met, and call bell within reach  In comparison to previous session, patient with improvements in level of participation, endurance, self care ADL performance, and functional transfers  Patient requiring verbal cues for safety, verbal cues for correct technique, verbal cues for pacing thru activity steps and frequent rest periods  Patient performance demonstrated good carryover of learned techniques and strategies to facilitate safety during functional tasks   Patient continues to be functioning below baseline level, occupational performance remains limited secondary to factors listed above and increased risk for falls and injury  From OT standpoint, recommendation at time of d/c would be Short Term Rehab  Patient to benefit from continued Occupational Therapy treatment while in the hospital to address deficits as defined above and maximize level of functional independence with ADLs and functional mobility in order to return to PLOF     Plan   Treatment Interventions ADL retraining;Functional transfer training;Energy conservation   Goal Expiration Date 08/31/22   OT Treatment Day 1   OT Frequency 3-5x/wk   Recommendation   OT Discharge Recommendation Post acute rehabilitation services   AM-PAC Daily Activity Inpatient   Lower Body Dressing 1   Bathing 1   Toileting 1   Upper Body Dressing 1   Grooming 1   Eating 3   Daily Activity Raw Score 8   Turning Head Towards Sound 4   Follow Simple Instructions 4   Low Function Daily Activity Raw Score 16   Low Function Daily Activity Standardized Score 27 65   AM-PAC Applied Cognition Inpatient   Following a Speech/Presentation 4   Understanding Ordinary Conversation 4   Taking Medications 4   Remembering Where Things Are Placed or Put Away 4   Remembering List of 4-5 Errands 4   Taking Care of Complicated Tasks 4   Applied Cognition Raw Score 24   Applied Cognition Standardized Score 62 21   ANIKA Lazcano

## 2022-08-25 LAB
BACTERIA BLD CULT: NORMAL
BACTERIA BLD CULT: NORMAL
ERYTHROCYTE [DISTWIDTH] IN BLOOD BY AUTOMATED COUNT: 12.2 % (ref 11.6–15.1)
HCT VFR BLD AUTO: 38.7 % (ref 36.5–49.3)
HGB BLD-MCNC: 12.3 G/DL (ref 12–17)
MCH RBC QN AUTO: 28.9 PG (ref 26.8–34.3)
MCHC RBC AUTO-ENTMCNC: 31.8 G/DL (ref 31.4–37.4)
MCV RBC AUTO: 91 FL (ref 82–98)
PLATELET # BLD AUTO: 471 THOUSANDS/UL (ref 149–390)
PMV BLD AUTO: 8.8 FL (ref 8.9–12.7)
RBC # BLD AUTO: 4.25 MILLION/UL (ref 3.88–5.62)
WBC # BLD AUTO: 20.88 THOUSAND/UL (ref 4.31–10.16)

## 2022-08-25 PROCEDURE — 97530 THERAPEUTIC ACTIVITIES: CPT

## 2022-08-25 PROCEDURE — 99232 SBSQ HOSP IP/OBS MODERATE 35: CPT | Performed by: SURGERY

## 2022-08-25 PROCEDURE — 97110 THERAPEUTIC EXERCISES: CPT

## 2022-08-25 PROCEDURE — 85027 COMPLETE CBC AUTOMATED: CPT

## 2022-08-25 PROCEDURE — C9113 INJ PANTOPRAZOLE SODIUM, VIA: HCPCS

## 2022-08-25 RX ADMIN — METRONIDAZOLE 500 MG: 500 INJECTION, SOLUTION INTRAVENOUS at 07:54

## 2022-08-25 RX ADMIN — KETOROLAC TROMETHAMINE 15 MG: 30 INJECTION, SOLUTION INTRAMUSCULAR at 23:13

## 2022-08-25 RX ADMIN — ONDANSETRON 4 MG: 2 INJECTION INTRAMUSCULAR; INTRAVENOUS at 03:48

## 2022-08-25 RX ADMIN — LORAZEPAM 1 MG: 2 INJECTION INTRAMUSCULAR; INTRAVENOUS at 06:24

## 2022-08-25 RX ADMIN — SODIUM CHLORIDE, SODIUM LACTATE, POTASSIUM CHLORIDE, AND CALCIUM CHLORIDE 75 ML/HR: .6; .31; .03; .02 INJECTION, SOLUTION INTRAVENOUS at 17:20

## 2022-08-25 RX ADMIN — METRONIDAZOLE 500 MG: 500 INJECTION, SOLUTION INTRAVENOUS at 23:13

## 2022-08-25 RX ADMIN — LORAZEPAM 1 MG: 2 INJECTION INTRAMUSCULAR; INTRAVENOUS at 01:04

## 2022-08-25 RX ADMIN — KETOROLAC TROMETHAMINE 15 MG: 30 INJECTION, SOLUTION INTRAMUSCULAR at 13:25

## 2022-08-25 RX ADMIN — CIPROFLOXACIN 400 MG: 2 INJECTION, SOLUTION INTRAVENOUS at 00:15

## 2022-08-25 RX ADMIN — LORAZEPAM 1 MG: 2 INJECTION INTRAMUSCULAR; INTRAVENOUS at 16:20

## 2022-08-25 RX ADMIN — PANTOPRAZOLE SODIUM 40 MG: 40 INJECTION, POWDER, FOR SOLUTION INTRAVENOUS at 09:42

## 2022-08-25 RX ADMIN — HYDROMORPHONE HYDROCHLORIDE 1 MG: 1 INJECTION, SOLUTION INTRAMUSCULAR; INTRAVENOUS; SUBCUTANEOUS at 03:34

## 2022-08-25 RX ADMIN — ONDANSETRON 4 MG: 2 INJECTION INTRAMUSCULAR; INTRAVENOUS at 09:48

## 2022-08-25 RX ADMIN — HYDROMORPHONE HYDROCHLORIDE 1 MG: 1 INJECTION, SOLUTION INTRAMUSCULAR; INTRAVENOUS; SUBCUTANEOUS at 07:59

## 2022-08-25 RX ADMIN — METRONIDAZOLE 500 MG: 500 INJECTION, SOLUTION INTRAVENOUS at 15:39

## 2022-08-25 RX ADMIN — LORAZEPAM 1 MG: 2 INJECTION INTRAMUSCULAR; INTRAVENOUS at 20:14

## 2022-08-25 RX ADMIN — KETOROLAC TROMETHAMINE 15 MG: 30 INJECTION, SOLUTION INTRAMUSCULAR at 19:17

## 2022-08-25 RX ADMIN — HYDROMORPHONE HYDROCHLORIDE 1 MG: 1 INJECTION, SOLUTION INTRAMUSCULAR; INTRAVENOUS; SUBCUTANEOUS at 23:13

## 2022-08-25 RX ADMIN — CIPROFLOXACIN 400 MG: 2 INJECTION, SOLUTION INTRAVENOUS at 13:30

## 2022-08-25 RX ADMIN — ENOXAPARIN SODIUM 40 MG: 100 INJECTION SUBCUTANEOUS at 09:42

## 2022-08-25 RX ADMIN — HYDROMORPHONE HYDROCHLORIDE 1 MG: 1 INJECTION, SOLUTION INTRAMUSCULAR; INTRAVENOUS; SUBCUTANEOUS at 13:30

## 2022-08-25 RX ADMIN — ONDANSETRON 4 MG: 2 INJECTION INTRAMUSCULAR; INTRAVENOUS at 23:18

## 2022-08-25 RX ADMIN — LORAZEPAM 1 MG: 2 INJECTION INTRAMUSCULAR; INTRAVENOUS at 10:54

## 2022-08-25 RX ADMIN — HYDROMORPHONE HYDROCHLORIDE 1 MG: 1 INJECTION, SOLUTION INTRAMUSCULAR; INTRAVENOUS; SUBCUTANEOUS at 16:25

## 2022-08-25 RX ADMIN — KETOROLAC TROMETHAMINE 15 MG: 30 INJECTION, SOLUTION INTRAMUSCULAR at 05:49

## 2022-08-25 NOTE — PLAN OF CARE
Problem: PAIN - ADULT  Goal: Verbalizes/displays adequate comfort level or baseline comfort level  Description: Interventions:  - Encourage patient to monitor pain and request assistance  - Assess pain using appropriate pain scale  - Administer analgesics based on type and severity of pain and evaluate response  - Implement non-pharmacological measures as appropriate and evaluate response  - Consider cultural and social influences on pain and pain management  - Notify physician/advanced practitioner if interventions unsuccessful or patient reports new pain  Outcome: Progressing     Problem: SAFETY ADULT  Goal: Maintain or return to baseline ADL function  Description: INTERVENTIONS:  -  Assess patient's ability to carry out ADLs; assess patient's baseline for ADL function and identify physical deficits which impact ability to perform ADLs (bathing, care of mouth/teeth, toileting, grooming, dressing, etc )  - Assess/evaluate cause of self-care deficits   - Assess range of motion  - Assess patient's mobility; develop plan if impaired  - Assess patient's need for assistive devices and provide as appropriate  - Encourage maximum independence but intervene and supervise when necessary  - Involve family in performance of ADLs  - Assess for home care needs following discharge   - Consider OT consult to assist with ADL evaluation and planning for discharge  - Provide patient education as appropriate  Outcome: Progressing     Problem: GASTROINTESTINAL - ADULT  Goal: Minimal or absence of nausea and/or vomiting  Description: INTERVENTIONS:  - Administer IV fluids if ordered to ensure adequate hydration  - Maintain NPO status until nausea and vomiting are resolved  - Nasogastric tube if ordered  - Administer ordered antiemetic medications as needed  - Provide nonpharmacologic comfort measures as appropriate  - Advance diet as tolerated, if ordered  - Consider nutrition services referral to assist patient with adequate nutrition and appropriate food choices  Outcome: Progressing     Problem: MOBILITY - ADULT  Goal: Maintain or return to baseline ADL function  Description: INTERVENTIONS:  -  Assess patient's ability to carry out ADLs; assess patient's baseline for ADL function and identify physical deficits which impact ability to perform ADLs (bathing, care of mouth/teeth, toileting, grooming, dressing, etc )  - Assess/evaluate cause of self-care deficits   - Assess range of motion  - Assess patient's mobility; develop plan if impaired  - Assess patient's need for assistive devices and provide as appropriate  - Encourage maximum independence but intervene and supervise when necessary  - Involve family in performance of ADLs  - Assess for home care needs following discharge   - Consider OT consult to assist with ADL evaluation and planning for discharge  - Provide patient education as appropriate  Outcome: Progressing     Problem: Nutrition/Hydration-ADULT  Goal: Nutrient/Hydration intake appropriate for improving, restoring or maintaining nutritional needs  Description: Monitor and assess patient's nutrition/hydration status for malnutrition  Collaborate with interdisciplinary team and initiate plan and interventions as ordered  Monitor patient's weight and dietary intake as ordered or per policy  Utilize nutrition screening tool and intervene as necessary  Determine patient's food preferences and provide high-protein, high-caloric foods as appropriate       INTERVENTIONS:  - Monitor oral intake, urinary output, labs, and treatment plans  - Assess nutrition and hydration status and recommend course of action  - Evaluate amount of meals eaten  - Assist patient with eating if necessary   - Allow adequate time for meals  - Recommend/ encourage appropriate diets, oral nutritional supplements, and vitamin/mineral supplements  - Order, calculate, and assess calorie counts as needed  - Recommend, monitor, and adjust tube feedings and TPN/PPN based on assessed needs  - Assess need for intravenous fluids  - Provide specific nutrition/hydration education as appropriate  - Include patient/family/caregiver in decisions related to nutrition  Outcome: Progressing

## 2022-08-25 NOTE — OCCUPATIONAL THERAPY NOTE
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)   Bed Mobility   Supine to Sit 3  Moderate assistance   Additional items Assist x 1;Bedrails; Increased time required;Verbal cues  (*patient familiar with Log-rolling for safety and comfort)   Additional Comments patient remained in chair at end of session, awaiting food for lunch   Transfers   Sit to Stand 4  Minimal assistance   Additional items Assist x 2;Bedrails; Increased time required   Stand to Sit 4  Minimal assistance   Additional items Assist x 2; Increased time required;Verbal cues  (patient failed to use armrests to sit to chair - *will provide further practice for same)   Stand pivot 4  Minimal assistance   Additional items Assist x 2; Increased time required   Additional Comments patient with complaint of slight dizziness upon supine to sit - BP taken: it was appropriate for continuation of the transfer to chair  (**Wife and patient sought out assistance for patient to get OOB)   Coordination   Gross Motor WFL   Dexterity appears WFL   Cognition   Overall Cognitive Status WFL   Comments pt  continues to report anxiety - he states that it hinders his performance of any task   Activity Tolerance   Activity Tolerance Patient limited by fatigue;Patient limited by pain  (and Anxiety)   Assessment   Assessment Patient participated in Skilled OT session this date with interventions consisting of safety awareness and fall prevention techniques,  therapeutic activities to: increase activity tolerance and increase OOB/ sitting tolerance   Patient agreeable to OT treatment session, upon arrival patient was found seated in bed (back supported)  In comparison to previous session, patient with improvements in activity tolerance and functional mobility  Patient requiring verbal cues for correct technique, frequent rest periods and ocassional safety reminders  Patient continues to be functioning below baseline level, occupational performance remains limited secondary to factors listed above and increased risk for falls and injury  From OT standpoint, recommendation at time of d/c would be post-acute rehabilitation services  Patient to benefit from continued Occupational Therapy treatment while in the hospital to address deficits as defined above and maximize level of functional independence with ADLs and functional mobility  Plan   Treatment Interventions Functional transfer training; Activityengagement   Goal Expiration Date 08/31/22   OT Treatment Day 3   Recommendation   OT Discharge Recommendation Post acute rehabilitation services   Additional Comments 2 The patient's raw score on the AM-PAC Daily Activity inpatient short form is 11, standardized score is 29 04, less than 39 4  Patients at this level are likely to benefit from discharge to post-acute rehabilitation services  Please refer to the recommendation of the Occupational Therapist for safe discharge planning     AM-PAC Daily Activity Inpatient   Lower Body Dressing 1   Bathing 2   Toileting 1   Upper Body Dressing 2   Grooming 2   Eating 3   Daily Activity Raw Score 11   Daily Activity Standardized Score (Calc for Raw Score >=11) 29 04   AM-[facility] Applied Cognition Inpatient   Following a Speech/Presentation 4   Understanding Ordinary Conversation 4   Taking Medications 4   Remembering Where Things Are Placed or Put Away 4   Remembering List of 4-5 Errands 4   Taking Care of Complicated Tasks 4   Applied Cognition Raw Score 24   Applied Cognition Standardized Score 62 21     ANIKA Jones/DONNIE

## 2022-08-25 NOTE — QUICK NOTE
Performed bedside colostomy appliance change secondary to leak  Performed teaching again with patient's wife who wished to participate  Patient tolerated well  Assisted back in bed       Armando Kyle PA-C   08/25/22

## 2022-08-25 NOTE — OCCUPATIONAL THERAPY NOTE
08/25/22 0905   OT Last Visit   OT Visit Date 08/25/22   Note Type   Note Type Treatment  (completed 8610-0905)   Restrictions/Precautions   Weight Bearing Precautions Per Order No   Other Precautions Fall Risk;Pain;Multiple lines; Bed Alarm  (Anxiety)   General   Response to Previous Treatment Patient with no complaints from previous session   Lifestyle   Reciprocal Relationships wife visits   Intrinsic Gratification walks with the dogs "a little bit" ; used to [place]   Pain Assessment   Pain Assessment Tool 0-10   Pain Score 10 - Worst Possible Pain   Pain Location/Orientation Location: Abdomen   ADL   Where Assessed   (reports that staff washed him earlier today - declined self-cares at this time)   Therapeutic Exercise - ROM   UE-ROM Yes   ROM- Right Upper Extremities   R Shoulder AROM; Flexion; Horizontal ABduction  (and pro/re-traction)   R Elbow AROM;Elbow flexion;Elbow extension  (in forward and reverse;  also: supin/pron-ation)   R Weight/Reps/Sets 5 reps each - with effort   ROM - Left Upper Extremities    L Weight/Reps/Sets all exers  same as RUE above   Coordination   Gross Motor WFL but weakened   Cognition   Overall Cognitive Status WFL   Arousal/Participation Responsive; Cooperative   Attention Attends with cues to redirect   Orientation Level Oriented X4   Following Commands Follows all commands and directions without difficulty   Activity Tolerance   Activity Tolerance Patient limited by fatigue;Patient limited by pain  (and somewhat by Anxiety)   Medical Staff Made Aware Nurse Loyda Meadows aware of session's content/results - she entered room per patient's request for medication(s)   Assessment   Assessment Patient participated in Skilled OT session this date with interventions consisting of therapeutic exercise to: increase functional use of BUEs, increase BUE muscle strength to resume baseline ADL participation    Patient agreeable to OT treatment session, upon arrival patient was found seated in bed (back supported)  In comparison to previous session, patient with slight improvements in ability to engage in treatment  Patient requiring verbal cues for correct technique, verbal cues for pacing thru activity steps, one step directives and frequent rest periods, and self-care assistance as noted in flow sheet  Patient continues to be functioning below baseline level, occupational performance remains limited secondary to factors listed above and increased risk for falls and injury  From OT standpoint, recommendation at time of d/c would be post-acute rehabilitation services  Patient to benefit from continued Occupational Therapy treatment while in the hospital to address deficits as defined above and maximize level of functional independence with ADLs and functional mobility  Plan   Treatment Interventions UE strengthening/ROM; Patient/family training; Activityengagement; Energy conservation   Goal Expiration Date 08/31/22   OT Treatment Day 2   Recommendation   OT Discharge Recommendation Post acute rehabilitation services   Additional Comments 2 The patient's raw score on the AM-PAC Daily Activity inpatient short form low function score is 17, standardized score is Low Function Daily Activity Standardized Score: 28 95  Patients with a standardized score less than 39 4 are likely to benefit from discharge to post-acute rehab services  Please refer to the recommendation of the Occupational Therapist for safe discharge planning     AM-PAC Daily Activity Inpatient   Lower Body Dressing 1   Bathing 1   Toileting 1   Upper Body Dressing 1   Grooming 2   Eating 3   Daily Activity Raw Score 9   Turning Head Towards Sound 4   Follow Simple Instructions 4   Low Function Daily Activity Raw Score 17   Low Function Daily Activity Standardized Score 28 95   AM-PAC Applied Cognition Inpatient   Following a Speech/Presentation 4   Understanding Ordinary Conversation 4   Taking Medications 4   Remembering Where Things Are Placed or Put Away 4   Remembering List of 4-5 Errands 4   Taking Care of Complicated Tasks 4   Applied Cognition Raw Score 24   Applied Cognition Standardized Score 62 21     ANIKA Edmond/L

## 2022-08-25 NOTE — PROGRESS NOTES
Progress Note - General Surgery   Tamy Ashley 78 y o  male MRN: [id number]  Unit/Bed#: -01 Encounter: [id number]    Assessment:  79-year-old male presenting with high-grade small-bowel obstruction with intra-abdominal abscess  POD#5 s/p Vane's procedure with segmental small-bowel resection and extensive lysis of adhesions  Afebrile, one episode of hypertension with SBP 180s, other vital signs stable on room air   UO 2 4L via song  NGT 80 cc  DAMASO x1 175 cc, serosang  Stool via colostomy 450 cc, dark liquid output  WBC 20 88/20 38/20 91/16 32  BC x2 08/20/2022 NGTD  Culture positive for Bacteroides fragilis, Streptococcus intermedius, Klebsiella pneumoniae    Plan:  TF at goal of 60 cc/hr,will discontinue and transition to oral diet   NGT, may remove once advanced to oral diet   MIVF  IV cipro/flagyl, started 08/20/2022  Antiemetics and analgesics prn   Strict I/O   Ostomy care  Encourage ambulation and work with PT/OT  PT/OT recommending post acute rehab services however patient declined and wishes to be discharged with [facility]  ID consult for persistent leukocytosis     Subjective/Objective   Subjective: No acute overnight events  Additional appliance change needed overnight  Tolerating TF at goal without increase in abdominal pain, nausea vomiting  Functioning ostomy with dark liquid output  Denies chest pain, palpitations, shortness of breath, calf tenderness  Denies fever, chills  Objective:   Blood pressure 153/97, pulse 88, temperature 97 6 °F (36 4 °C), resp  rate 18, height 5' 6" (1 676 m), weight 85 3 kg (188 lb), SpO2 96 %  ,Body mass index is 30 34 kg/m²  Intake/Output Summary (Last 24 hours) at 8/25/2022 0815  Last data filed at 8/25/2022 9407  Gross per 24 hour   Intake 1278 ml   Output 3155 ml   Net -1877 ml       Invasive Devices  Report    Peripheral Intravenous Line  Duration           Peripheral IV 08/23/22 Ventral (anterior); Left Forearm 2 days          Drain  Duration Closed/Suction Drain Right RLQ Bulb 19 Fr  4 days    Colostomy Descending/sigmoid LLQ 4 days    NG/OG/Enteral Tube Nasogastric 16 Fr Right nare 4 days    Urethral Catheter Latex 16 Fr  4 days              Physical Exam  Vitals and nursing note reviewed  Constitutional:       General: He is not in acute distress  Appearance: Normal appearance  He is obese  He is not ill-appearing or toxic-appearing  HENT:      Head: Normocephalic and atraumatic  Cardiovascular:      Rate and Rhythm: Normal rate and regular rhythm  Pulses: Normal pulses  Heart sounds: Normal heart sounds  No murmur heard  No friction rub  No gallop  Pulmonary:      Effort: Pulmonary effort is normal  No respiratory distress  Breath sounds: Normal breath sounds  No wheezing, rhonchi or rales  Abdominal:      General: Bowel sounds are normal  There is distension  Palpations: Abdomen is soft  Tenderness: There is abdominal tenderness  There is no guarding or rebound  Comments: Midline incision is clean, dry, intact with jeffy removed 8/24, DAMASO site clean, dry, intact with serosang output in bulb, colostomy with dark brown liquid stool output   Genitourinary:     Comments: Ambriz in place  Musculoskeletal:         General: Normal range of motion  Right lower leg: No edema  Left lower leg: No edema  Skin:     General: Skin is warm and dry  Neurological:      General: No focal deficit present  Mental Status: He is alert and oriented to person, place, and time  Psychiatric:         Mood and Affect: Mood normal          Behavior: Behavior normal  Behavior is cooperative  Thought Content: Thought content normal        Lab, Imaging and other studies:  I have personally reviewed pertinent lab results      CBC:   Lab Results   Component Value Date    WBC 20 88 (H) 08/25/2022    HGB 12 3 08/25/2022    HCT 38 7 08/25/2022    MCV 91 08/25/2022     (H) 08/25/2022    [facility]    RDW 12 2 08/25/2022    MPV 8 8 (L) 08/25/2022     VTE Pharmacologic Prophylaxis: Enoxaparin (Lovenox)  VTE Mechanical Prophylaxis: sequential compression device    Gilda Schaefer PA-C

## 2022-08-25 NOTE — PLAN OF CARE
Problem: Potential for Falls  Goal: Patient will remain free of falls  Description: INTERVENTIONS:  - Educate patient/family on patient safety including physical limitations  - Instruct patient to call for assistance with activity   - Consult OT/PT to assist with strengthening/mobility   - Keep Call bell within reach  - Keep bed low and locked with side rails adjusted as appropriate  - Keep care items and personal belongings within reach  - Initiate and maintain comfort rounds  - Make Fall Risk Sign visible to staff  - Offer Toileting every 2 Hours, in advance of need  - Initiate/Maintain bed alarm  - Obtain necessary fall risk management equipment  - Apply yellow socks and bracelet for high fall risk patients  - Consider moving patient to room near nurses station  Outcome: Progressing     Problem: PAIN - ADULT  Goal: Verbalizes/displays adequate comfort level or baseline comfort level  Description: Interventions:  - Encourage patient to monitor pain and request assistance  - Assess pain using appropriate pain scale  - Administer analgesics based on type and severity of pain and evaluate response  - Implement non-pharmacological measures as appropriate and evaluate response  - Consider cultural and social influences on pain and pain management  - Notify physician/advanced practitioner if interventions unsuccessful or patient reports new pain  Outcome: Progressing     Problem: INFECTION - ADULT  Goal: Absence or prevention of progression during hospitalization  Description: INTERVENTIONS:  - Assess and monitor for signs and symptoms of infection  - Monitor lab/diagnostic results  - Monitor all insertion sites, i e  indwelling lines, tubes, and drains  - Monitor endotracheal if appropriate and nasal secretions for changes in amount and color  - [place] appropriate cooling/warming therapies per order  - Administer medications as ordered  - Instruct and encourage patient and family to use good hand hygiene technique  - Identify and instruct in appropriate isolation precautions for identified infection/condition  Outcome: Progressing     Problem: SAFETY ADULT  Goal: Patient will remain free of falls  Description: INTERVENTIONS:  - Educate patient/family on patient safety including physical limitations  - Instruct patient to call for assistance with activity   - Consult OT/PT to assist with strengthening/mobility   - Keep Call bell within reach  - Keep bed low and locked with side rails adjusted as appropriate  - Keep care items and personal belongings within reach  - Initiate and maintain comfort rounds  - Make Fall Risk Sign visible to staff  - Offer Toileting every 2 Hours, in advance of need  - Initiate/Maintain bed alarm  - Obtain necessary fall risk management equipment  - Apply yellow socks and bracelet for high fall risk patients  - Consider moving patient to room near nurses station  Outcome: Progressing  Goal: Maintain or return to baseline ADL function  Description: INTERVENTIONS:  -  Assess patient's ability to carry out ADLs; assess patient's baseline for ADL function and identify physical deficits which impact ability to perform ADLs (bathing, care of mouth/teeth, toileting, grooming, dressing, etc )  - Assess/evaluate cause of self-care deficits   - Assess range of motion  - Assess patient's mobility; develop plan if impaired  - Assess patient's need for assistive devices and provide as appropriate  - Encourage maximum independence but intervene and supervise when necessary  - Involve family in performance of ADLs  - Assess for home care needs following discharge   - Consider OT consult to assist with ADL evaluation and planning for discharge  - Provide patient education as appropriate  Outcome: Progressing  Goal: Maintains/Returns to pre admission functional level  Description: INTERVENTIONS:  - Perform BMAT or MOVE assessment daily    - Set and communicate daily mobility goal to care team and patient/family/caregiver  - Collaborate with rehabilitation services on mobility goals if consulted  - Perform Range of Motion 2 times a day  - Reposition patient every 2 hours  - Dangle patient 2 times a day  - Stand patient 2 times a day  - Ambulate patient 2 times a day  - Out of bed to chair 3 times a day   - Out of bed for meals 3 times a day  - Out of bed for toileting  - Record patient progress and toleration of activity level   Outcome: Progressing     Problem: DISCHARGE PLANNING  Goal: Discharge to home or other facility with appropriate resources  Description: INTERVENTIONS:  - Identify barriers to discharge w/patient and caregiver  - Arrange for needed discharge resources and transportation as appropriate  - Identify discharge learning needs (meds, wound care, etc )  - Refer to Case Management Department for coordinating discharge planning if the patient needs post-hospital services based on physician/advanced practitioner order or complex needs related to functional status, cognitive ability, or social support system  Outcome: Progressing     Problem: Knowledge Deficit  Goal: Patient/family/caregiver demonstrates understanding of disease process, treatment plan, medications, and discharge instructions  Description: Complete learning assessment and assess knowledge base    Interventions:  - Provide teaching at level of understanding  - Provide teaching via preferred learning methods  Outcome: Progressing     Problem: GASTROINTESTINAL - ADULT  Goal: Minimal or absence of nausea and/or vomiting  Description: INTERVENTIONS:  - Administer IV fluids if ordered to ensure adequate hydration  - Maintain NPO status until nausea and vomiting are resolved  - Nasogastric tube if ordered  - Administer ordered antiemetic medications as needed  - Provide nonpharmacologic comfort measures as appropriate  - Advance diet as tolerated, if ordered  - Consider nutrition services referral to assist patient with adequate nutrition and appropriate food choices  Outcome: Progressing  Goal: Maintains or returns to baseline bowel function  Description: INTERVENTIONS:  - Assess bowel function  - Encourage oral fluids to ensure adequate hydration  - Administer IV fluids if ordered to ensure adequate hydration  - Administer ordered medications as needed  - Encourage mobilization and activity  - Consider nutritional services referral to assist patient with adequate nutrition and appropriate food choices  Outcome: Progressing  Goal: Oral mucous membranes remain intact  Description: INTERVENTIONS  - Assess oral mucosa and hygiene practices  - Implement preventative oral hygiene regimen  - Implement oral medicated treatments as ordered  - Initiate Nutrition services referral as needed  Outcome: Progressing     Problem: Prexisting or High Potential for Compromised Skin Integrity  Goal: Skin integrity is maintained or improved  Description: INTERVENTIONS:  - Identify patients at risk for skin breakdown  - Assess and monitor skin integrity  - Assess and monitor nutrition and hydration status  - Monitor labs   - Assess for incontinence   - Turn and reposition patient  - Assist with mobility/ambulation  - Relieve pressure over bony prominences  - Avoid friction and shearing  - Provide appropriate hygiene as needed including keeping skin clean and dry  - Evaluate need for skin moisturizer/barrier cream  - Collaborate with interdisciplinary team   - Patient/family teaching  - Consider wound care consult   Outcome: Progressing     Problem: MOBILITY - ADULT  Goal: Maintain or return to baseline ADL function  Description: INTERVENTIONS:  -  Assess patient's ability to carry out ADLs; assess patient's baseline for ADL function and identify physical deficits which impact ability to perform ADLs (bathing, care of mouth/teeth, toileting, grooming, dressing, etc )  - Assess/evaluate cause of self-care deficits   - Assess range of motion  - Assess patient's mobility; develop plan if impaired  - Assess patient's need for assistive devices and provide as appropriate  - Encourage maximum independence but intervene and supervise when necessary  - Involve family in performance of ADLs  - Assess for home care needs following discharge   - Consider OT consult to assist with ADL evaluation and planning for discharge  - Provide patient education as appropriate  Outcome: Progressing  Goal: Maintains/Returns to pre admission functional level  Description: INTERVENTIONS:  - Perform BMAT or MOVE assessment daily    - Set and communicate daily mobility goal to care team and patient/family/caregiver  - Collaborate with rehabilitation services on mobility goals if consulted  - Perform Range of Motion 2 times a day  - Reposition patient every 2 hours  - Dangle patient 2 times a day  - Stand patient 2 times a day  - Ambulate patient 2 times a day  - Out of bed to chair 3 times a day   - Out of bed for meals 3 times a day  - Out of bed for toileting  - Record patient progress and toleration of activity level   Outcome: Progressing     Problem: Nutrition/Hydration-ADULT  Goal: Nutrient/Hydration intake appropriate for improving, restoring or maintaining nutritional needs  Description: Monitor and assess patient's nutrition/hydration status for malnutrition  Collaborate with interdisciplinary team and initiate plan and interventions as ordered  Monitor patient's weight and dietary intake as ordered or per policy  Utilize nutrition screening tool and intervene as necessary  Determine patient's food preferences and provide high-protein, high-caloric foods as appropriate       INTERVENTIONS:  - Monitor oral intake, urinary output, labs, and treatment plans  - Assess nutrition and hydration status and recommend course of action  - Evaluate amount of meals eaten  - Assist patient with eating if necessary   - Allow adequate time for meals  - Recommend/ encourage appropriate diets, oral nutritional supplements, and vitamin/mineral supplements  - Order, calculate, and assess calorie counts as needed  - Recommend, monitor, and adjust tube feedings and TPN/PPN based on assessed needs  - Assess need for intravenous fluids  - Provide specific nutrition/hydration education as appropriate  - Include patient/family/caregiver in decisions related to nutrition  Outcome: Progressing

## 2022-08-25 NOTE — NURSING NOTE
NG tube removed by physician, tolerated well, tube feeding discontinued  Patient states feels much better with NG out   Resting comfortably

## 2022-08-25 NOTE — PLAN OF CARE
Problem: OCCUPATIONAL THERAPY ADULT  Goal: Performs self-care activities at highest level of function for planned discharge setting  See evaluation for individualized goals  Description: Treatment Interventions: UE strengthening/ROM, Patient/family training, Activityengagement, Energy conservation          See flowsheet documentation for full assessment, interventions and recommendations  Outcome: Progressing  Note: Limitation: Decreased ADL status, Decreased endurance, Decreased self-care trans  Prognosis: Fair  Assessment: Patient participated in Skilled OT session this date with interventions consisting of therapeutic exercise to: increase functional use of BUEs, increase BUE muscle strength to resume baseline ADL participation   Patient agreeable to OT treatment session, upon arrival patient was found seated in bed (back supported)  In comparison to previous session, patient with slight improvements in ability to engage in treatment  Patient requiring verbal cues for correct technique, verbal cues for pacing thru activity steps, one step directives and frequent rest periods, and self-care assistance as noted in flow sheet  Patient continues to be functioning below baseline level, occupational performance remains limited secondary to factors listed above and increased risk for falls and injury  From OT standpoint, recommendation at time of d/c would be post-acute rehabilitation services  Patient to benefit from continued Occupational Therapy treatment while in the hospital to address deficits as defined above and maximize level of functional independence with ADLs and functional mobility       OT Discharge Recommendation: Post acute rehabilitation services        [place], DONALDSON/L

## 2022-08-25 NOTE — WOUND OSTOMY CARE
Progress Note- Ostomy  Deborrbrett Ashley 78 y o  male  [id number]  --01    Assessment:  Ostomy care and teaching performed today with patient's wife  Patient had been given pain medication prior to ostomy instruction  Wife is a retired nurse and was able to be proactive in ostomy pouch removal, cleansing of the peristomal skin and application of new pouch  She was able to perform with little direction from ostomy nurse  She removed the pouch with push pull method  Dressings to the abdomen intact, no drainage on dressing  DAMASO bulb to right abdomen with serosanguineous drainage  Patient has the NG tube in place with tube feeds infusing  Ambriz catheter in place  Stoma to low LLQ, red, budded, functioning for dark brown liquid output  Noted area at 11:00 on pouch was leaking but had not reached the edge of the pouch  Noted a fold in the area at 11:00  Ecchymotic periwound, congealed blood on the stoma surface, mucocutaneous junction intact  Per wife, patient normally has a round abdomen, but at this time is noticeably larger with edema from surgery  Patient did not participate in ostomy teaching at this time  Wife performed all ostomy care  Patient's wife has been reading the materials left at the bedside  Ostomy instruction included:    Burping pouch  Emptying pouch when 1/3-1/2 full of gas or stool  How to empty pouch, cleanse drainage end and closing pouch  Removal of pouch using push pull method  Appearance of stoma, normal and abnormal-call physician if color change to brown, black or purple   Call physician for continuous bleeding from stoma  Cleansing the peristomal skin with warm water and washcloth  How to measure stoma with measuring tool, cutting to fit one piece pouch, application of barrier film, and application of new pouch  Discussed showering with and without pouch, swimming with pouch when allowed by surgeon  Changing pouch first thing in AM on empty stomach  Change with any signs of leakage  Discussed one and two piece pouches and application of both  Care in the car with seatbelt to avoid trama to the stoma    Skin and Ostomy Care Plan:  1  Empty pouch when 1/3-1/2 full of stool, burp when inflated with gas  Change pouch every 3 days and PRN with signs of leakage  Encourage patient to observe emptying pouch and burping pouch  2  Ehob offloading cushion to chair when OOB  3  Elevate heels off of bed with pillows to offload  4  Apply hydraguard to b/l heels, buttocks and sacrum, BID and PRN  5  Apply skin nourishing cream to the skin daily  6  Turn and reposition patient Q2 hours    Wound:  Wound 08/20/22 Abdomen N/A (Active)   Wound Image   08/24/22 1437   Wound Description JUDY 08/23/22 1905   Dressing ABD;Dry dressing 08/24/22 1023   Dressing Status Clean;Dry; Intact 08/24/22 1023   Number of days: 4         Closed/Suction Drain Right RLQ Bulb 19 Fr  (Active)   Site Description Unable to view 08/22/22 0901   Dressing Status Clean;Dry; Intact 08/24/22 0601   Drainage Appearance Serosanguineous 08/24/22 0601   Status To bulb suction 08/24/22 0601   Output (mL) 70 mL 08/24/22 1801   Number of days: 4       NG/OG/Enteral Tube Nasogastric 16 Fr Right nare (Active)   Placement Reverification Aspiration 08/22/22 1320   Site Assessment Clean;Dry; Intact 08/23/22 2134   Enteral feeding tube interventions Flushed 08/24/22 1101   Status Tube feed infusing 08/24/22 1101   Drainage Appearance Tan 08/23/22 2134   Intake (mL) 80 mL 08/24/22 1101   Output (mL) 400 mL 08/22/22 1320   Number of days: 4       Colostomy Descending/sigmoid LLQ (Active)   Stomal Appliance 2 piece 08/24/22 1430   Stoma Assessment Budded;Red 08/24/22 1430   Stoma size (in) 1 75 in 08/24/22 1430   Stoma Shape Round 08/24/22 1430   Peristomal Assessment Intact 08/24/22 1430   Treatment Bag change;Site care 08/24/22 1430   Output (mL) 150 mL 08/24/22 0601   Number of days: 4       Urethral Catheter Latex 16 Fr   (Active)   Reasons to continue Urinary Catheter  Post-operative urological requirements 08/23/22 0727   Site Assessment Clean;Skin intact 08/23/22 0727   Ambriz Care Done 08/24/22 2029   Collection Container Standard drainage bag 08/23/22 0727   Securing Device Change Date 08/26/22 08/22/22 0901   Output (mL) 550 mL 08/24/22 2029   Number of days: 4       Peripheral IV 08/23/22 Ventral (anterior); Left Forearm (Active)   Site Assessment WDL 08/24/22 1000   Line Status Infusing 08/24/22 1000   Number of days: 1     Reviewed plan of care with primary RN  Recommendations written as orders  Wound care team to continue to follow bi-weekly while admitted  Questions or concerns [phone] Nurse    Alcon Huff BSN, RN, [employer]

## 2022-08-25 NOTE — CONSULTS
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  Plan:  Discussed Ambriz management with the patient, his wife and Dr Benito Serrano at the bedside  Will continue the Ambrzi catheter and obtain a cystogram on Monday, 8/29  If the cystogram is unremarkable, the Ambriz catheter can be removed at that time with a trial of voiding and follow-up bladder scan for postvoid residual to ensure emptying  I will continue to follow along with you  History of Present Illness   Attending: Jen Fabian MD  Reason for Consult / Principal Problem:  Potential colovesical fistula, Ambriz catheter management  HPI: Ravinder Hernandez is a 78y o  year old male who presents with small-bowel obstruction secondary to sigmoid diverticulitis with abscess and phlegmon with gas within the phlegmon on CT scan  The phlegmon abuts the dome of the bladder, but there was no gas within the bladder on preoperative CT scan  The patient underwent sigmoid resection on 08/20/2022  The phlegmon was peeled off of the bladder with finger dissection according to the operative report  Postoperatively a Ambriz catheter remains in place  The Ambriz catheter has been draining well with clear urine  The patient has had occasional low-grade bladder spasms  Preoperatively, the patient denies any difficulty voiding other than his baseline nocturia times 2-3  He denies significant urinary frequency and states his flow is satisfactory  He has no past urologic history  He denies history of urinary tract infection  Consults    Review of Systems   Genitourinary: Negative for difficulty urinating, dysuria, flank pain and hematuria         Historical Information   Past Medical History:   Diagnosis Date    Aortic stenosis     Aortic stenosis, severe     Arthritis     Carpal tunnel syndrome     Chronic back pain     Chronic pain     Chronic, continuous use of opioids     Hyperlipidemia     Hypertension     Hypertrophy of prostate     Insomnia     Large bowel obstruction (HCC)     Peripheral neuropathy     RA (rheumatoid arthritis) ([address] )     Umbilical hernia      Past Surgical History:   Procedure Laterality Date    ADENOIDECTOMY      AORTIC VALVE REPLACEMENT  10/17/2017    BASAL CELL CARCINOMA EXCISION      CARDIAC CATHETERIZATION      LAST ASSESSED: 20OCT2017    CARPAL TUNNEL RELEASE Bilateral     left    CATARACT EXTRACTION      HERNIA REPAIR      inguinal bilateral -umbilical     LAMINECTOMY      DECOMPRESS, FACETECTOMY, FORAMINOTOMY LUMBAR SEG    LAPAROTOMY N/A 8/20/2022    Procedure: LAPAROTOMY EXPLORATORY, sigmoid colon resection,colostomy;  Surgeon:  Floyd Rivera MD;  Location: [facility];  Service: General    NEUROPLASTY / TRANSPOSITION MEDIAN NERVE AT CARPAL TUNNEL      [facility] RPLCMT AORTIC VALVE OPN W/STENTLESS TISSUE VALVE N/A 10/17/2017    Procedure: AORTIC VALVE REPLACEMENT with 23MM MAGNAEASE TISSUE VALVE; INTRA-OP SHANDRA;  Surgeon: Tyron Lee MD;  Location: [facility];  Service: Cardiac Surgery    TONSILLECTOMY       Social History   Social History     Substance and Sexual Activity   Alcohol Use Not Currently     @DRUGHX  E-Cigarette/Vaping    E-Cigarette Use Never User      E-Cigarette/Vaping Substances    Nicotine No     THC No     CBD No     Flavoring No     Other No     Unknown No      Social History     Tobacco Use   Smoking Status Never Smoker   Smokeless Tobacco Never Used     Family History: non-contributory    Meds/Allergies   current meds:   Current Facility-Administered Medications   Medication Dose Route Frequency    ciprofloxacin (CIPRO) IVPB (premix in 5% dextrose) 400 mg 200 mL  400 mg Intravenous Q12H    enoxaparin (LOVENOX) subcutaneous injection 40 mg  40 mg Subcutaneous Daily    HYDROmorphone (DILAUDID) injection 0 5 mg  0 5 mg Intravenous Q2H PRN    HYDROmorphone (DILAUDID) injection 1 mg  1 mg Intravenous Q2H PRN    HYDROmorphone HCl (DILAUDID) injection 0 2 mg  0 2 mg Intravenous Q1H PRN    ketorolac (TORADOL) injection 15 mg  15 mg Intravenous Q6H [address]    lactated ringers infusion  75 mL/hr Intravenous Continuous    LORazepam (ATIVAN) injection 1 mg  1 mg Intravenous Q4H PRN    metoclopramide (REGLAN) injection 10 mg  10 mg Intravenous Q6H PRN    metroNIDAZOLE (FLAGYL) IVPB (premix) 500 mg 100 mL  500 mg Intravenous Q8H    ondansetron (ZOFRAN) injection 4 mg  4 mg Intravenous Q6H PRN    pantoprazole (PROTONIX) injection 40 mg  40 mg Intravenous Q24H ANDREA    phenol (CHLORASEPTIC) 1 4 % mucosal liquid 1 spray  1 spray Mouth/Throat Q2H PRN     Allergies   Allergen Reactions    Penicillins Anaphylaxis    Quinine Derivatives Other (See Comments)     High fever       Objective   Vitals: Blood pressure 164/96, pulse 91, temperature 98 4 °F (36 9 °C), resp  rate 18, height 5' 6" (1 676 m), weight 85 3 kg (188 lb), SpO2 98 %  I/O last 24 hours: In: 1890 [I V :300; NG/GT:180; IV Piggyback:400; Feedings:448]  Out: 1700 [Urine:3575; Emesis/NG output:80; Drains:175; Stool:700]    Invasive Devices  Report    Peripheral Intravenous Line  Duration           Peripheral IV 08/23/22 Ventral (anterior); Left Forearm 2 days          Drain  Duration           Closed/Suction Drain Right RLQ Bulb 19 Fr  5 days    Colostomy Descending/sigmoid LLQ 5 days    Urethral Catheter Latex 16 Fr  5 days                Physical Exam  Abdominal:      General: There is no distension  Palpations: Abdomen is soft  Tenderness: There is no abdominal tenderness  There is no right CVA tenderness or left CVA tenderness       Ambriz catheter in place draining clear yellow urine    Lab Results:   CBC:   Lab Results   Component Value Date    WBC 20 88 (H) 08/25/2022    HGB 12 3 08/25/2022 HCT 38 7 08/25/2022    MCV 91 08/25/2022     (H) 08/25/2022    MCH 28 9 08/25/2022    MCHC 31 8 08/25/2022    RDW 12 2 08/25/2022    MPV 8 8 (L) 08/25/2022     CMP: No results found for: SODIUM, CL, CO2, ANIONGAP, BUN, CREATININE, GLUCOSE, CALCIUM, AST, ALT, ALKPHOS, PROT, BILITOT, EGFR  Urinalysis: No results found for: Meghann Moulder, SPECGRAV, PHUR, LEUKOCYTESUR, NITRITE, PROTEINUA, GLUCOSEU, KETONESU, BILIRUBINUR, BLOODU  Urine Culture: No results found for: URINECX  Imaging Studies: I have personally reviewed pertinent reports  EKG, Pathology, and Other Studies: I have personally reviewed pertinent reports  VTE Prophylaxis: Sequential compression device (Venodyne)     Code Status: Level 1 - Full Code  Advance Directive and Living Will:      Power of :    POLST:      Counseling / Coordination of Care  Total floor / unit time spent today 30 minutes  Greater than 50% of total time was spent with the patient and / or family counseling and / or coordination of care  A description of the counseling / coordination of care:  I have discussed the case with Dr Duncan Hayden

## 2022-08-25 NOTE — PLAN OF CARE
Problem: OCCUPATIONAL THERAPY ADULT  Goal: Performs self-care activities at highest level of function for planned discharge setting  See evaluation for individualized goals  Description: Treatment Interventions: Functional transfer training, Activityengagement          See flowsheet documentation for full assessment, interventions and recommendations  8/25/2022 1502 by TED Cintron  Outcome: Progressing  Note: Limitation: Decreased ADL status, Decreased endurance, Decreased self-care trans  Prognosis: Fair  Assessment: Patient participated in Skilled OT session this date with interventions consisting of safety awareness and fall prevention techniques,  therapeutic activities to: increase activity tolerance and increase OOB/ sitting tolerance   Patient agreeable to OT treatment session, upon arrival patient was found seated in bed (back supported)  In comparison to previous session, patient with improvements in activity tolerance and functional mobility  Patient requiring verbal cues for correct technique, frequent rest periods and ocassional safety reminders  Patient continues to be functioning below baseline level, occupational performance remains limited secondary to factors listed above and increased risk for falls and injury  From OT standpoint, recommendation at time of d/c would be post-acute rehabilitation services  Patient to benefit from continued Occupational Therapy treatment while in the hospital to address deficits as defined above and maximize level of functional independence with ADLs and functional mobility  OT Discharge Recommendation: Post acute rehabilitation services     ANIKA Cintron/L  *patient and wife requested transfer of patient to chair for patient to eat lunch

## 2022-08-26 LAB
ANION GAP SERPL CALCULATED.3IONS-SCNC: 8 MMOL/L (ref 4–13)
BASOPHILS # BLD MANUAL: 0 THOUSAND/UL (ref 0–0.1)
BASOPHILS NFR MAR MANUAL: 0 % (ref 0–1)
BUN SERPL-MCNC: 11 MG/DL (ref 5–25)
CALCIUM SERPL-MCNC: 8.4 MG/DL (ref 8.4–10.2)
CHLORIDE SERPL-SCNC: 103 MMOL/L (ref 96–108)
CO2 SERPL-SCNC: 25 MMOL/L (ref 21–32)
CREAT SERPL-MCNC: 0.86 MG/DL (ref 0.6–1.3)
EOSINOPHIL # BLD MANUAL: 0 THOUSAND/UL (ref 0–0.4)
EOSINOPHIL NFR BLD MANUAL: 0 % (ref 0–6)
ERYTHROCYTE [DISTWIDTH] IN BLOOD BY AUTOMATED COUNT: 12.2 % (ref 11.6–15.1)
GFR SERPL CREATININE-BSD FRML MDRD: 84 ML/MIN/1.73SQ M
GLUCOSE SERPL-MCNC: 101 MG/DL (ref 65–140)
GLUCOSE SERPL-MCNC: 107 MG/DL (ref 65–140)
GLUCOSE SERPL-MCNC: 113 MG/DL (ref 65–140)
HCT VFR BLD AUTO: 36.5 % (ref 36.5–49.3)
HGB BLD-MCNC: 11.7 G/DL (ref 12–17)
LYMPHOCYTES # BLD AUTO: 11 % (ref 14–44)
LYMPHOCYTES # BLD AUTO: 2.23 THOUSAND/UL (ref 0.6–4.47)
MAGNESIUM SERPL-MCNC: 1.9 MG/DL (ref 1.9–2.7)
MCH RBC QN AUTO: 29 PG (ref 26.8–34.3)
MCHC RBC AUTO-ENTMCNC: 32.1 G/DL (ref 31.4–37.4)
MCV RBC AUTO: 91 FL (ref 82–98)
MONOCYTES # BLD AUTO: 1.01 THOUSAND/UL (ref 0–1.22)
MONOCYTES NFR BLD: 5 % (ref 4–12)
NEUTROPHILS # BLD MANUAL: 17 THOUSAND/UL (ref 1.85–7.62)
NEUTS SEG NFR BLD AUTO: 84 % (ref 43–75)
PLATELET # BLD AUTO: 463 THOUSANDS/UL (ref 149–390)
PLATELET BLD QL SMEAR: ADEQUATE
PMV BLD AUTO: 8.8 FL (ref 8.9–12.7)
POTASSIUM SERPL-SCNC: 3.8 MMOL/L (ref 3.5–5.3)
RBC # BLD AUTO: 4.03 MILLION/UL (ref 3.88–5.62)
SODIUM SERPL-SCNC: 136 MMOL/L (ref 135–147)
WBC # BLD AUTO: 20.24 THOUSAND/UL (ref 4.31–10.16)

## 2022-08-26 PROCEDURE — 97530 THERAPEUTIC ACTIVITIES: CPT

## 2022-08-26 PROCEDURE — 85027 COMPLETE CBC AUTOMATED: CPT

## 2022-08-26 PROCEDURE — NC001 PR NO CHARGE: Performed by: RADIOLOGY

## 2022-08-26 PROCEDURE — 97535 SELF CARE MNGMENT TRAINING: CPT

## 2022-08-26 PROCEDURE — 80048 BASIC METABOLIC PNL TOTAL CA: CPT

## 2022-08-26 PROCEDURE — 85007 BL SMEAR W/DIFF WBC COUNT: CPT

## 2022-08-26 PROCEDURE — 99232 SBSQ HOSP IP/OBS MODERATE 35: CPT | Performed by: SPECIALIST

## 2022-08-26 PROCEDURE — 82948 REAGENT STRIP/BLOOD GLUCOSE: CPT

## 2022-08-26 PROCEDURE — 83735 ASSAY OF MAGNESIUM: CPT

## 2022-08-26 PROCEDURE — C9113 INJ PANTOPRAZOLE SODIUM, VIA: HCPCS

## 2022-08-26 RX ORDER — ATORVASTATIN CALCIUM 20 MG/1
20 TABLET, FILM COATED ORAL DAILY
Status: DISCONTINUED | OUTPATIENT
Start: 2022-08-26 | End: 2022-08-30 | Stop reason: HOSPADM

## 2022-08-26 RX ORDER — GABAPENTIN 600 MG/1
300 TABLET ORAL 3 TIMES DAILY
Status: DISCONTINUED | OUTPATIENT
Start: 2022-08-26 | End: 2022-08-30 | Stop reason: HOSPADM

## 2022-08-26 RX ORDER — LOSARTAN POTASSIUM 50 MG/1
50 TABLET ORAL DAILY
Status: DISCONTINUED | OUTPATIENT
Start: 2022-08-26 | End: 2022-08-30 | Stop reason: HOSPADM

## 2022-08-26 RX ORDER — OXYCODONE HYDROCHLORIDE AND ACETAMINOPHEN 5; 325 MG/1; MG/1
1 TABLET ORAL EVERY 4 HOURS PRN
Refills: 0 | Status: DISCONTINUED | OUTPATIENT
Start: 2022-08-26 | End: 2022-08-30 | Stop reason: HOSPADM

## 2022-08-26 RX ORDER — ASPIRIN 81 MG/1
81 TABLET ORAL DAILY
Refills: 2 | Status: DISCONTINUED | OUTPATIENT
Start: 2022-08-26 | End: 2022-08-30 | Stop reason: HOSPADM

## 2022-08-26 RX ORDER — TRAZODONE HYDROCHLORIDE 150 MG/1
300 TABLET ORAL
Status: DISCONTINUED | OUTPATIENT
Start: 2022-08-26 | End: 2022-08-30 | Stop reason: HOSPADM

## 2022-08-26 RX ORDER — CARISOPRODOL 350 MG/1
350 TABLET ORAL 4 TIMES DAILY
Status: DISCONTINUED | OUTPATIENT
Start: 2022-08-26 | End: 2022-08-30 | Stop reason: HOSPADM

## 2022-08-26 RX ORDER — OXYCODONE HYDROCHLORIDE 10 MG/1
10 TABLET ORAL 4 TIMES DAILY
Status: DISCONTINUED | OUTPATIENT
Start: 2022-08-26 | End: 2022-08-30 | Stop reason: HOSPADM

## 2022-08-26 RX ADMIN — TRAZODONE HYDROCHLORIDE 300 MG: 150 TABLET ORAL at 22:29

## 2022-08-26 RX ADMIN — ONDANSETRON 4 MG: 2 INJECTION INTRAMUSCULAR; INTRAVENOUS at 14:01

## 2022-08-26 RX ADMIN — OXYCODONE HYDROCHLORIDE 10 MG: 10 TABLET ORAL at 17:47

## 2022-08-26 RX ADMIN — ENOXAPARIN SODIUM 40 MG: 100 INJECTION SUBCUTANEOUS at 08:21

## 2022-08-26 RX ADMIN — CIPROFLOXACIN 400 MG: 2 INJECTION, SOLUTION INTRAVENOUS at 02:15

## 2022-08-26 RX ADMIN — CARISOPRODOL 350 MG: 350 TABLET ORAL at 22:29

## 2022-08-26 RX ADMIN — METOCLOPRAMIDE HYDROCHLORIDE 10 MG: 5 INJECTION INTRAMUSCULAR; INTRAVENOUS at 08:21

## 2022-08-26 RX ADMIN — ATORVASTATIN CALCIUM 20 MG: 20 TABLET, FILM COATED ORAL at 11:23

## 2022-08-26 RX ADMIN — GABAPENTIN 300 MG: 600 TABLET, FILM COATED ORAL at 12:19

## 2022-08-26 RX ADMIN — CARISOPRODOL 350 MG: 350 TABLET ORAL at 17:47

## 2022-08-26 RX ADMIN — LOSARTAN POTASSIUM 50 MG: 50 TABLET, FILM COATED ORAL at 11:23

## 2022-08-26 RX ADMIN — ONDANSETRON 4 MG: 2 INJECTION INTRAMUSCULAR; INTRAVENOUS at 06:16

## 2022-08-26 RX ADMIN — HYDROMORPHONE HYDROCHLORIDE 1 MG: 1 INJECTION, SOLUTION INTRAMUSCULAR; INTRAVENOUS; SUBCUTANEOUS at 08:21

## 2022-08-26 RX ADMIN — GABAPENTIN 300 MG: 600 TABLET, FILM COATED ORAL at 16:10

## 2022-08-26 RX ADMIN — CARISOPRODOL 350 MG: 350 TABLET ORAL at 12:19

## 2022-08-26 RX ADMIN — LORAZEPAM 1 MG: 2 INJECTION INTRAMUSCULAR; INTRAVENOUS at 02:11

## 2022-08-26 RX ADMIN — PANTOPRAZOLE SODIUM 40 MG: 40 INJECTION, POWDER, FOR SOLUTION INTRAVENOUS at 08:21

## 2022-08-26 RX ADMIN — OXYCODONE HYDROCHLORIDE 10 MG: 10 TABLET ORAL at 22:29

## 2022-08-26 RX ADMIN — LORAZEPAM 1 MG: 2 INJECTION INTRAMUSCULAR; INTRAVENOUS at 11:23

## 2022-08-26 RX ADMIN — OXYCODONE HYDROCHLORIDE 10 MG: 10 TABLET ORAL at 11:23

## 2022-08-26 RX ADMIN — OXYCODONE HYDROCHLORIDE AND ACETAMINOPHEN 1 TABLET: 5; 325 TABLET ORAL at 14:07

## 2022-08-26 RX ADMIN — GABAPENTIN 300 MG: 600 TABLET, FILM COATED ORAL at 22:29

## 2022-08-26 RX ADMIN — METOCLOPRAMIDE HYDROCHLORIDE 10 MG: 5 INJECTION INTRAMUSCULAR; INTRAVENOUS at 16:02

## 2022-08-26 RX ADMIN — LORAZEPAM 1 MG: 2 INJECTION INTRAMUSCULAR; INTRAVENOUS at 16:10

## 2022-08-26 RX ADMIN — METRONIDAZOLE 500 MG: 500 INJECTION, SOLUTION INTRAVENOUS at 08:22

## 2022-08-26 RX ADMIN — KETOROLAC TROMETHAMINE 15 MG: 30 INJECTION, SOLUTION INTRAMUSCULAR at 06:16

## 2022-08-26 RX ADMIN — HYDROMORPHONE HYDROCHLORIDE 1 MG: 1 INJECTION, SOLUTION INTRAMUSCULAR; INTRAVENOUS; SUBCUTANEOUS at 06:16

## 2022-08-26 NOTE — OCCUPATIONAL THERAPY NOTE
Occupational Therapy Treatment Note      Jose Mueller    8/26/2022    Principal Problem:    SBO (small bowel obstruction) (HCC)  Active Problems:    Diverticulitis of colon      Past Medical History:   Diagnosis Date    Aortic stenosis     Aortic stenosis, severe     Arthritis     Carpal tunnel syndrome     Chronic back pain     Chronic pain     Chronic, continuous use of opioids     Hyperlipidemia     Hypertension     Hypertrophy of prostate     Insomnia     Large bowel obstruction (HCC)     Peripheral neuropathy     RA (rheumatoid arthritis) ([address] )     Umbilical hernia        Past Surgical History:   Procedure Laterality Date    ADENOIDECTOMY      AORTIC VALVE REPLACEMENT  10/17/2017    BASAL CELL CARCINOMA EXCISION      CARDIAC CATHETERIZATION      LAST ASSESSED: 20OCT2017    CARPAL TUNNEL RELEASE Bilateral     left    CATARACT EXTRACTION      HERNIA REPAIR      inguinal bilateral -umbilical     LAMINECTOMY      DECOMPRESS, FACETECTOMY, FORAMINOTOMY LUMBAR SEG    LAPAROTOMY N/A 8/20/2022    Procedure: LAPAROTOMY EXPLORATORY, sigmoid colon resection,colostomy;  Surgeon: Winnie Pardo MD;  Location: [facility];  Service: General    NEUROPLASTY / TRANSPOSITION MEDIAN NERVE AT CARPAL TUNNEL      [place] RPLCMT AORTIC VALVE OPN W/STENTLESS TISSUE VALVE N/A 10/17/2017    Procedure: AORTIC VALVE REPLACEMENT with 23MM MAGNAEASE TISSUE VALVE; INTRA-OP SHANDRA;  Surgeon: Linnie Saint, MD;  Location: [facility];  Service: Cardiac Surgery    TONSILLECTOMY          08/26/22 0917   OT Last Visit   OT Visit Date 08/26/22   Note Type   Note Type Treatment   Restrictions/Precautions   Weight Bearing Precautions Per Order No   Other Precautions Fall Risk;Pain;Multiple lines; Bed Alarm   General   Response to Previous Treatment Patient reporting fatigue but able to participate   Pain Assessment   Pain Assessment Tool 0-10   Pain Score 10 - Worst Possible Pain   Pain Location/Orientation Location: Abdomen   Pain Radiating Towards (L leg)   Pain Onset/Description Onset: Ongoing   Effect of Pain on Daily Activities worsens with activity   Patient's Stated Pain Goal No pain   Multiple Pain Sites Yes   ADL   Where Assessed Edge of bed   UB Bathing Assistance 3  Moderate Assistance   UB Bathing Deficit Setup;Verbal cueing; Increased time to complete   LB Bathing Assistance 2  Maximal Assistance   LB Bathing Deficit Setup;Verbal cueing;Supervision/safety; Increased time to complete;Perineal area;Right upper leg;Buttocks; Left upper leg;Right lower leg including foot; Left lower leg including foot   UB Dressing Assistance 4  Minimal Assistance   UB Dressing Deficit Setup;Verbal cueing;Supervision/safety; Increased time to complete; Thread RUE; Thread LUE   LB Dressing Assistance 2  Maximal Assistance   LB Dressing Deficit Setup;Don/doff R sock; Don/doff L sock   Bed Mobility   Rolling L 4  Minimal assistance   Additional items Assist x 1;HOB elevated; Bedrails;Verbal cues; Increased time required;LE management   Supine to Sit 3  Moderate assistance   Additional items Assist x 1;HOB elevated; Increased time required;Verbal cues;LE management; Bedrails   Additional Comments   (Pt  transfered to recliner and remained there for duration of treatment session)   Transfers   Sit to Stand 4  Minimal assistance   Additional items Assist x 2;HOB elevated; Bedrails;Armrests; Increased time required;Verbal cues   Stand to Sit 4  Minimal assistance   Additional items Assist x 2;Bedrails;Verbal cues; Increased time required   Stand pivot 4  Minimal assistance   Additional items Assist x 2;Verbal cues; Increased time required   Additional items   (RW)   Functional Mobility   Functional Mobility 4  Minimal assistance   Additional Comments   (Pt  amb  a few feet to  with X 2)   Additional items Rolling walker   Cognition   Overall Cognitive Status WFL   Arousal/Participation Responsive; Cooperative   Attention Attends with cues to redirect   Orientation Level Oriented to person;Oriented to place;Oriented to time;Disoriented to situation   Memory Within functional limits   Following Commands Follows all commands and directions without difficulty   Additional Activities   Additional Activities Comments   (Tolerated sitting on EOB for approx  15 mins  to complete bathing and dressing)   Activity Tolerance   Activity Tolerance Patient limited by fatigue;Patient limited by pain; Patient tolerated treatment well   Medical Staff Made Aware Nsg  present for most of treatment session   Assessment   Assessment Patient participated in Skilled OT session this date with interventions consisting of ADL re training with the use of correct body mechnaics, safety awareness and fall prevention techniques and  therapeutic activities to: increase activity tolerance   Patient agreeable to OT treatment session, upon arrival patient was found supine in bed  In comparison to previous session, patient with improvements in activity tolerance and ADL participation  Patient requiring verbal cues for safety, verbal cues for correct technique and frequent rest periods  Patient continues to be functioning below baseline level, occupational performance remains limited secondary to factors listed above and increased risk for falls and injury  From OT standpoint, recommendation at time of d/c would be Short Term Rehab  Patient to benefit from continued Occupational Therapy treatment while in the hospital to address deficits as defined above and maximize level of functional independence with ADLs and functional mobility  Plan   Treatment Interventions ADL retraining;Functional transfer training; Endurance training;Patient/family training; Activityengagement; Energy conservation   Goal Expiration Date 08/31/22   OT Treatment Day 4   OT Frequency 3-5x/wk   Recommendation   OT Discharge Recommendation Post acute rehabilitation services   Additional Comments 2 The patient's raw score on the AM-PAC Daily Activity inpatient short form is 11, standardized score is 29 04, less than 39 4  Patients at this level are likely to benefit from discharge to post-acute rehabilitation services  Please refer to the recommendation of the Occupational Therapist for safe discharge planning     AM-PAC Daily Activity Inpatient   Lower Body Dressing 1   Bathing 2   Toileting 1   Upper Body Dressing 2   Grooming 2   Eating 3   Daily Activity Raw Score 11   Daily Activity Standardized Score (Calc for Raw Score >=11) 29 04   Turning Head Towards Sound 4   Follow Simple Instructions 4   Low Function Daily Activity Raw Score 19   Low Function Daily Activity Standardized Score 31 34   AM-PAC Applied Cognition Inpatient   Following a Speech/Presentation 4   Understanding Ordinary Conversation 4   Taking Medications 4   Remembering Where Things Are Placed or Put Away 4   Remembering List of 4-5 Errands 4   Taking Care of Complicated Tasks 4   Applied Cognition Raw Score 24   Applied Cognition Standardized Score 62 21   TED Jackson

## 2022-08-26 NOTE — WOUND OSTOMY CARE
Progress Note- Ostomy  Gt Crowley 78 y o  male  [id number]  --01    Assessment:  Ostomy follow up  Patient's wife present  Two piece moldable ostomy pouch in use  Ostomy pouch intact  Patient has urinary catheter in place, DAMASO drain to the right abdomen with serosanguineous drainage  Was informed that pouch was changed this AM, unsure if due to leakage or dislodged due to gas in pouch  Patient's wife is able to provide ostomy care  She had a couple of questions regarding frequency of change of pouch, showering and how supplies are ordered when patient is discharged home  Answered questions to patient's wife's satisfaction  Skin and Ostomy Care Plan:  1  Empty pouch when 1/3-1/2 full of stool, burp when inflated with gas  Change pouch every 3 days and PRN with signs of leakage  Encourage patient to observe emptying pouch and burping pouch  2  Ehob offloading cushion to chair when OOB  3  Elevate heels off of bed with pillows to offload  4  Apply hydraguard to b/l heels, buttocks and sacrum, BID and PRN  5  Apply skin nourishing cream to the skin daily  6  Turn and reposition patient Q2 hours    Wound:  Wound 08/20/22 Abdomen N/A (Active)   Wound Image   08/24/22 1437   Wound Description JUDY 08/25/22 1917   Dressing ABD;Dry dressing 08/25/22 1917   Dressing Status Clean;Dry; Intact 08/25/22 1917   Number of days: 6     Closed/Suction Drain Right RLQ Bulb 19 Fr  (Active)   Site Description Unable to view 08/24/22 2010   Dressing Status Clean;Dry; Intact 08/25/22 1850   Drainage Appearance Serosanguineous 08/26/22 0215   Status To bulb suction 08/26/22 0215   Output (mL) 40 mL 08/26/22 0822   Number of days: 6       Colostomy Descending/sigmoid LLQ (Active)   Stomal Appliance 2 piece; Intact 08/26/22 1400   Stoma Assessment Budded;Red 08/26/22 1400   Stoma size (in) 1 75 in 08/24/22 2010   Stoma Shape Round;Budded 08/26/22 1400   Peristomal Assessment Intact 08/25/22 0745   Treatment Bag change;Site care 08/24/22 1430   Output (mL) 300 mL 08/26/22 0919   Number of days: 6         Reviewed plan of care with primary RN Hetal  Recommendations written as orders  Wound care team to follow weekly while admitted  Questions or concerns 2039 [facility] Nurse    Chandni Davidson BSN, RN, Mariellen Moritz

## 2022-08-26 NOTE — NURSING NOTE
Responded to call bell at 0210  Found patient standing at side of bed facing the window  His gown was off  Patient wasn't sure why he was there but appeared distressed  Assisted patient back to bed and noted that his abdominal wound had dehissed between staples    Steri strips applied to dehissed sections of wound to approximate wound and wound recovered with ABD

## 2022-08-26 NOTE — PLAN OF CARE
Problem: OCCUPATIONAL THERAPY ADULT  Goal: Performs self-care activities at highest level of function for planned discharge setting  See evaluation for individualized goals  Description: Treatment Interventions: ADL retraining, Functional transfer training, Endurance training, Patient/family training, Activityengagement, Energy conservation          See flowsheet documentation for full assessment, interventions and recommendations  Outcome: Progressing  Note: Limitation: Decreased ADL status, Decreased endurance, Decreased self-care trans  Prognosis: Fair  Assessment: Patient participated in Skilled OT session this date with interventions consisting of ADL re training with the use of correct body mechnaics, safety awareness and fall prevention techniques and  therapeutic activities to: increase activity tolerance   Patient agreeable to OT treatment session, upon arrival patient was found supine in bed  In comparison to previous session, patient with improvements in activity tolerance and ADL participation  Patient requiring verbal cues for safety, verbal cues for correct technique and frequent rest periods  Patient continues to be functioning below baseline level, occupational performance remains limited secondary to factors listed above and increased risk for falls and injury  From OT standpoint, recommendation at time of d/c would be Short Term Rehab  Patient to benefit from continued Occupational Therapy treatment while in the hospital to address deficits as defined above and maximize level of functional independence with ADLs and functional mobility       OT Discharge Recommendation: Post acute rehabilitation services     Rian Meigs, OTA

## 2022-08-26 NOTE — DISCHARGE INSTR - OTHER ORDERS
Ostomy Care Plan:  1  Will meet with patient and wife to provide ostomy teaching to both the patient and his wife twice a week until discahrged  2  Empty pouch when 1/3-1/2 full of stool, if inflated with gas burp pouch  Change pouch every 3 days or PRN  If pouch leaks, remove pouch, cleanse skin with warm water and washcloth, measure stoma, cut to fit pouch  Apply 3M No Sting barrier film to peristomal skin, and apply pouch, place hand over pouch for 1-2 minutes to aid with adhesion of pouch to skin  3  Soft care cushion to chair when OOB  4  Elevate heels off the bed with pillows   5  Turn and reposition every two hours  6  Hydraguard to bilateral heels, buttocks and sacrum BID and PRN  7   Skin nourishing cream daily

## 2022-08-26 NOTE — PHYSICAL THERAPY NOTE
Physical Therapy Cancellation Note       08/26/22 0800   PT Last Visit   PT Visit Date 08/26/22   Note Type   Note Type Cancelled Session   Cancel Reasons   (pt refused d/t pain and nausea)       Chart review performed  At this time, PT treatment session cancelled d/t refusal  PT will follow and provide PT interventions as appropriate      Rodrigo [place], PT, DPT

## 2022-08-27 LAB
ANION GAP SERPL CALCULATED.3IONS-SCNC: 9 MMOL/L (ref 4–13)
BASOPHILS # BLD MANUAL: 0 THOUSAND/UL (ref 0–0.1)
BASOPHILS NFR MAR MANUAL: 0 % (ref 0–1)
BUN SERPL-MCNC: 9 MG/DL (ref 5–25)
CALCIUM SERPL-MCNC: 9 MG/DL (ref 8.4–10.2)
CHLORIDE SERPL-SCNC: 105 MMOL/L (ref 96–108)
CO2 SERPL-SCNC: 27 MMOL/L (ref 21–32)
CREAT SERPL-MCNC: 1.04 MG/DL (ref 0.6–1.3)
EOSINOPHIL # BLD MANUAL: 0.61 THOUSAND/UL (ref 0–0.4)
EOSINOPHIL NFR BLD MANUAL: 4 % (ref 0–6)
ERYTHROCYTE [DISTWIDTH] IN BLOOD BY AUTOMATED COUNT: 12.5 % (ref 11.6–15.1)
GFR SERPL CREATININE-BSD FRML MDRD: 69 ML/MIN/1.73SQ M
GLUCOSE SERPL-MCNC: 103 MG/DL (ref 65–140)
HCT VFR BLD AUTO: 38.5 % (ref 36.5–49.3)
HGB BLD-MCNC: 12.2 G/DL (ref 12–17)
LYMPHOCYTES # BLD AUTO: 18 % (ref 14–44)
LYMPHOCYTES # BLD AUTO: 2.75 THOUSAND/UL (ref 0.6–4.47)
MCH RBC QN AUTO: 29.3 PG (ref 26.8–34.3)
MCHC RBC AUTO-ENTMCNC: 31.7 G/DL (ref 31.4–37.4)
MCV RBC AUTO: 92 FL (ref 82–98)
MONOCYTES # BLD AUTO: 0.61 THOUSAND/UL (ref 0–1.22)
MONOCYTES NFR BLD: 4 % (ref 4–12)
NEUTROPHILS # BLD MANUAL: 11.32 THOUSAND/UL (ref 1.85–7.62)
NEUTS BAND NFR BLD MANUAL: 3 % (ref 0–8)
NEUTS SEG NFR BLD AUTO: 71 % (ref 43–75)
PLATELET # BLD AUTO: 455 THOUSANDS/UL (ref 149–390)
PLATELET BLD QL SMEAR: ABNORMAL
PMV BLD AUTO: 9.2 FL (ref 8.9–12.7)
POTASSIUM SERPL-SCNC: 3.8 MMOL/L (ref 3.5–5.3)
RBC # BLD AUTO: 4.17 MILLION/UL (ref 3.88–5.62)
RBC MORPH BLD: NORMAL
SODIUM SERPL-SCNC: 141 MMOL/L (ref 135–147)
WBC # BLD AUTO: 15.3 THOUSAND/UL (ref 4.31–10.16)

## 2022-08-27 PROCEDURE — 80048 BASIC METABOLIC PNL TOTAL CA: CPT | Performed by: PHYSICIAN ASSISTANT

## 2022-08-27 PROCEDURE — C9113 INJ PANTOPRAZOLE SODIUM, VIA: HCPCS

## 2022-08-27 PROCEDURE — 99024 POSTOP FOLLOW-UP VISIT: CPT

## 2022-08-27 PROCEDURE — 85027 COMPLETE CBC AUTOMATED: CPT | Performed by: PHYSICIAN ASSISTANT

## 2022-08-27 PROCEDURE — 85007 BL SMEAR W/DIFF WBC COUNT: CPT | Performed by: PHYSICIAN ASSISTANT

## 2022-08-27 RX ADMIN — LOSARTAN POTASSIUM 50 MG: 50 TABLET, FILM COATED ORAL at 09:00

## 2022-08-27 RX ADMIN — ASPIRIN 81 MG: 81 TABLET, COATED ORAL at 09:00

## 2022-08-27 RX ADMIN — ONDANSETRON 4 MG: 2 INJECTION INTRAMUSCULAR; INTRAVENOUS at 18:53

## 2022-08-27 RX ADMIN — GABAPENTIN 300 MG: 600 TABLET, FILM COATED ORAL at 17:32

## 2022-08-27 RX ADMIN — OXYCODONE HYDROCHLORIDE 10 MG: 10 TABLET ORAL at 21:19

## 2022-08-27 RX ADMIN — PANTOPRAZOLE SODIUM 40 MG: 40 INJECTION, POWDER, FOR SOLUTION INTRAVENOUS at 09:00

## 2022-08-27 RX ADMIN — CARISOPRODOL 350 MG: 350 TABLET ORAL at 21:19

## 2022-08-27 RX ADMIN — OXYCODONE HYDROCHLORIDE AND ACETAMINOPHEN 1 TABLET: 5; 325 TABLET ORAL at 05:49

## 2022-08-27 RX ADMIN — OXYCODONE HYDROCHLORIDE 10 MG: 10 TABLET ORAL at 12:36

## 2022-08-27 RX ADMIN — TRAZODONE HYDROCHLORIDE 300 MG: 150 TABLET ORAL at 21:19

## 2022-08-27 RX ADMIN — CARISOPRODOL 350 MG: 350 TABLET ORAL at 09:10

## 2022-08-27 RX ADMIN — GABAPENTIN 300 MG: 600 TABLET, FILM COATED ORAL at 09:00

## 2022-08-27 RX ADMIN — ENOXAPARIN SODIUM 40 MG: 100 INJECTION SUBCUTANEOUS at 08:59

## 2022-08-27 RX ADMIN — LORAZEPAM 1 MG: 2 INJECTION INTRAMUSCULAR; INTRAVENOUS at 03:19

## 2022-08-27 RX ADMIN — OXYCODONE HYDROCHLORIDE 10 MG: 10 TABLET ORAL at 17:32

## 2022-08-27 RX ADMIN — CARISOPRODOL 350 MG: 350 TABLET ORAL at 17:32

## 2022-08-27 RX ADMIN — CARISOPRODOL 350 MG: 350 TABLET ORAL at 12:36

## 2022-08-27 RX ADMIN — ATORVASTATIN CALCIUM 20 MG: 20 TABLET, FILM COATED ORAL at 09:00

## 2022-08-27 RX ADMIN — GABAPENTIN 300 MG: 600 TABLET, FILM COATED ORAL at 21:19

## 2022-08-27 RX ADMIN — OXYCODONE HYDROCHLORIDE 10 MG: 10 TABLET ORAL at 09:01

## 2022-08-27 NOTE — PLAN OF CARE
Problem: Potential for Falls  Goal: Patient will remain free of falls  Description: INTERVENTIONS:  - Educate patient/family on patient safety including physical limitations  - Instruct patient to call for assistance with activity   - Consult OT/PT to assist with strengthening/mobility   - Keep Call bell within reach  - Keep bed low and locked with side rails adjusted as appropriate  - Keep care items and personal belongings within reach  - Initiate and maintain comfort rounds  - Make Fall Risk Sign visible to staff  - Offer Toileting every 2 Hours, in advance of need  - Initiate/Maintain bed alarm  - Obtain necessary fall risk management equipment  - Apply yellow socks and bracelet for high fall risk patients  - Consider moving patient to room near nurses station  Outcome: Progressing     Problem: PAIN - ADULT  Goal: Verbalizes/displays adequate comfort level or baseline comfort level  Description: Interventions:  - Encourage patient to monitor pain and request assistance  - Assess pain using appropriate pain scale  - Administer analgesics based on type and severity of pain and evaluate response  - Implement non-pharmacological measures as appropriate and evaluate response  - Consider cultural and social influences on pain and pain management  - Notify physician/advanced practitioner if interventions unsuccessful or patient reports new pain  Outcome: Progressing

## 2022-08-27 NOTE — PLAN OF CARE
Problem: Potential for Falls  Goal: Patient will remain free of falls  Description: INTERVENTIONS:  - Educate patient/family on patient safety including physical limitations  - Instruct patient to call for assistance with activity   - Consult OT/PT to assist with strengthening/mobility   - Keep Call bell within reach  - Keep bed low and locked with side rails adjusted as appropriate  - Keep care items and personal belongings within reach  - Initiate and maintain comfort rounds  - Make Fall Risk Sign visible to staff  - Offer Toileting every 2 Hours, in advance of need  - Initiate/Maintain bed alarm  - Obtain necessary fall risk management equipment  - Apply yellow socks and bracelet for high fall risk patients  - Consider moving patient to room near nurses station  Outcome: Progressing     Problem: PAIN - ADULT  Goal: Verbalizes/displays adequate comfort level or baseline comfort level  Description: Interventions:  - Encourage patient to monitor pain and request assistance  - Assess pain using appropriate pain scale  - Administer analgesics based on type and severity of pain and evaluate response  - Implement non-pharmacological measures as appropriate and evaluate response  - Consider cultural and social influences on pain and pain management  - Notify physician/advanced practitioner if interventions unsuccessful or patient reports new pain  Outcome: Progressing     Problem: INFECTION - ADULT  Goal: Absence or prevention of progression during hospitalization  Description: INTERVENTIONS:  - Assess and monitor for signs and symptoms of infection  - Monitor lab/diagnostic results  - Monitor all insertion sites, i e  indwelling lines, tubes, and drains  - Monitor endotracheal if appropriate and nasal secretions for changes in amount and color  - [place] appropriate cooling/warming therapies per order  - Administer medications as ordered  - Instruct and encourage patient and family to use good hand hygiene technique  - Identify and instruct in appropriate isolation precautions for identified infection/condition  Outcome: Progressing     Problem: SAFETY ADULT  Goal: Patient will remain free of falls  Description: INTERVENTIONS:  - Educate patient/family on patient safety including physical limitations  - Instruct patient to call for assistance with activity   - Consult OT/PT to assist with strengthening/mobility   - Keep Call bell within reach  - Keep bed low and locked with side rails adjusted as appropriate  - Keep care items and personal belongings within reach  - Initiate and maintain comfort rounds  - Make Fall Risk Sign visible to staff  - Offer Toileting every 2 Hours, in advance of need  - Initiate/Maintain bed alarm  - Obtain necessary fall risk management equipment  - Apply yellow socks and bracelet for high fall risk patients  - Consider moving patient to room near nurses station  Outcome: Progressing  Goal: Maintain or return to baseline ADL function  Description: INTERVENTIONS:  -  Assess patient's ability to carry out ADLs; assess patient's baseline for ADL function and identify physical deficits which impact ability to perform ADLs (bathing, care of mouth/teeth, toileting, grooming, dressing, etc )  - Assess/evaluate cause of self-care deficits   - Assess range of motion  - Assess patient's mobility; develop plan if impaired  - Assess patient's need for assistive devices and provide as appropriate  - Encourage maximum independence but intervene and supervise when necessary  - Involve family in performance of ADLs  - Assess for home care needs following discharge   - Consider OT consult to assist with ADL evaluation and planning for discharge  - Provide patient education as appropriate  Outcome: Progressing  Goal: Maintains/Returns to pre admission functional level  Description: INTERVENTIONS:  - Perform BMAT or MOVE assessment daily    - Set and communicate daily mobility goal to care team and patient/family/caregiver  - Collaborate with rehabilitation services on mobility goals if consulted  - Perform Range of Motion 2 times a day  - Reposition patient every 2 hours  - Dangle patient 2 times a day  - Stand patient 2 times a day  - Ambulate patient 2 times a day  - Out of bed to chair 3 times a day   - Out of bed for meals 3 times a day  - Out of bed for toileting  - Record patient progress and toleration of activity level   Outcome: Progressing     Problem: DISCHARGE PLANNING  Goal: Discharge to home or other facility with appropriate resources  Description: INTERVENTIONS:  - Identify barriers to discharge w/patient and caregiver  - Arrange for needed discharge resources and transportation as appropriate  - Identify discharge learning needs (meds, wound care, etc )  - Refer to Case Management Department for coordinating discharge planning if the patient needs post-hospital services based on physician/advanced practitioner order or complex needs related to functional status, cognitive ability, or social support system  Outcome: Progressing     Problem: Knowledge Deficit  Goal: Patient/family/caregiver demonstrates understanding of disease process, treatment plan, medications, and discharge instructions  Description: Complete learning assessment and assess knowledge base    Interventions:  - Provide teaching at level of understanding  - Provide teaching via preferred learning methods  Outcome: Progressing     Problem: GASTROINTESTINAL - ADULT  Goal: Minimal or absence of nausea and/or vomiting  Description: INTERVENTIONS:  - Administer IV fluids if ordered to ensure adequate hydration  - Maintain NPO status until nausea and vomiting are resolved  - Nasogastric tube if ordered  - Administer ordered antiemetic medications as needed  - Provide nonpharmacologic comfort measures as appropriate  - Advance diet as tolerated, if ordered  - Consider nutrition services referral to assist patient with adequate nutrition and appropriate food choices  Outcome: Progressing  Goal: Maintains or returns to baseline bowel function  Description: INTERVENTIONS:  - Assess bowel function  - Encourage oral fluids to ensure adequate hydration  - Administer IV fluids if ordered to ensure adequate hydration  - Administer ordered medications as needed  - Encourage mobilization and activity  - Consider nutritional services referral to assist patient with adequate nutrition and appropriate food choices  Outcome: Progressing  Goal: Oral mucous membranes remain intact  Description: INTERVENTIONS  - Assess oral mucosa and hygiene practices  - Implement preventative oral hygiene regimen  - Implement oral medicated treatments as ordered  - Initiate Nutrition services referral as needed  Outcome: Progressing     Problem: Prexisting or High Potential for Compromised Skin Integrity  Goal: Skin integrity is maintained or improved  Description: INTERVENTIONS:  - Identify patients at risk for skin breakdown  - Assess and monitor skin integrity  - Assess and monitor nutrition and hydration status  - Monitor labs   - Assess for incontinence   - Turn and reposition patient  - Assist with mobility/ambulation  - Relieve pressure over bony prominences  - Avoid friction and shearing  - Provide appropriate hygiene as needed including keeping skin clean and dry  - Evaluate need for skin moisturizer/barrier cream  - Collaborate with interdisciplinary team   - Patient/family teaching  - Consider wound care consult   Outcome: Progressing     Problem: MOBILITY - ADULT  Goal: Maintain or return to baseline ADL function  Description: INTERVENTIONS:  -  Assess patient's ability to carry out ADLs; assess patient's baseline for ADL function and identify physical deficits which impact ability to perform ADLs (bathing, care of mouth/teeth, toileting, grooming, dressing, etc )  - Assess/evaluate cause of self-care deficits   - Assess range of motion  - Assess patient's mobility; develop plan if impaired  - Assess patient's need for assistive devices and provide as appropriate  - Encourage maximum independence but intervene and supervise when necessary  - Involve family in performance of ADLs  - Assess for home care needs following discharge   - Consider OT consult to assist with ADL evaluation and planning for discharge  - Provide patient education as appropriate  Outcome: Progressing  Goal: Maintains/Returns to pre admission functional level  Description: INTERVENTIONS:  - Perform BMAT or MOVE assessment daily    - Set and communicate daily mobility goal to care team and patient/family/caregiver  - Collaborate with rehabilitation services on mobility goals if consulted  - Perform Range of Motion 2 times a day  - Reposition patient every 2 hours  - Dangle patient 2 times a day  - Stand patient 2 times a day  - Ambulate patient 2 times a day  - Out of bed to chair 3 times a day   - Out of bed for meals 3 times a day  - Out of bed for toileting  - Record patient progress and toleration of activity level   Outcome: Progressing     Problem: Nutrition/Hydration-ADULT  Goal: Nutrient/Hydration intake appropriate for improving, restoring or maintaining nutritional needs  Description: Monitor and assess patient's nutrition/hydration status for malnutrition  Collaborate with interdisciplinary team and initiate plan and interventions as ordered  Monitor patient's weight and dietary intake as ordered or per policy  Utilize nutrition screening tool and intervene as necessary  Determine patient's food preferences and provide high-protein, high-caloric foods as appropriate       INTERVENTIONS:  - Monitor oral intake, urinary output, labs, and treatment plans  - Assess nutrition and hydration status and recommend course of action  - Evaluate amount of meals eaten  - Assist patient with eating if necessary   - Allow adequate time for meals  - Recommend/ encourage appropriate diets, oral nutritional supplements, and vitamin/mineral supplements  - Order, calculate, and assess calorie counts as needed  - Recommend, monitor, and adjust tube feedings and TPN/PPN based on assessed needs  - Assess need for intravenous fluids  - Provide specific nutrition/hydration education as appropriate  - Include patient/family/caregiver in decisions related to nutrition  Outcome: Progressing

## 2022-08-27 NOTE — PROGRESS NOTES
Progress Note - General Surgery   Jose Mueller 78 y o  male MRN: [id number]  Unit/Bed#: -01 Encounter: [id number]      ASSESSMENT:  68-year-old male presenting with high-grade small-bowel obstruction with intra-abdominal abscess  POD#6 (22) s/p Ex-lap, Vane's procedure with segmental small-bowel resection and extensive lysis of adhesions    · AF, VSS  · WBC stable around 20 24--having bowel function, no overt signs of infection based on vitals, labs, and sx  Leukocytosis may be 2/2 pt receiving stress dose of steroids perioperatively  · Cr 0 86, UO:  2 4L, Ambriz in place b/c pt presented with surrounding phlegmon abutting the dome of the bladder with potential colovesical fistula   · DAMASO drain x 1: 70cc serosanguinous fluid  · Ostomy functionincc stool  · Wound care nurse did bag change and provided ostomy teaching with the pt and his wife      PLAN:  · Restart PO home meds; will initiate home pain regimen; home medication list with dosage and frequency reviewed with the pt and his wife  · IV Ativan PRN  · DVT ppx, Lovenox  · Regular diet as tolerated  · Encourage OOB to chair, ambulation as able   · Encourage IS use  · Drain care, I/Os  · ABD to midline incision  Pt may shower/wash incision with warm soap and water, pat dry  · Work with PT/OT  · Appreciate urology recs; plan for cystogram on   If no abnormalities, Ambriz will be removed  · Dispo planning PT/OT recommending post acute rehab services--discuss with CM over the weekend  · Dr Aguirre Hearing rounded on the pt this morning and the above plan was discussed  SUBJECTIVE:  Per pt and nursing staff pt had "blow out" early this AM  AM nurse performed a bag change  Pt still with some nausea which related to anxiety  Anxious about not receiving his home meds while he was NPO; will restart today  Pt has chronic back pain and spasms and current IV meds have not helped much   He has been standing up, OOB to chair and to bed throughout the day but c/o back spasms  Using IS  Wife visits daily and is very involved in his care as she is a previous nurse  OBJECTIVE:   Vitals:  Blood pressure 145/86, pulse 83, temperature 98 °F (36 7 °C), resp  rate 18, height 5' 6" (1 676 m), weight 85 3 kg (188 lb), SpO2 97 %  ,Body mass index is 30 34 kg/m²  I/Os:    Intake/Output Summary (Last 24 hours) at 8/26/2022 2017  Last data filed at 8/26/2022 1608  Gross per 24 hour   Intake 120 ml   Output 3840 ml   Net -3720 ml       Lines/Drains:  Invasive Devices  Report    Peripheral Intravenous Line  Duration           Peripheral IV 08/23/22 Ventral (anterior); Left Forearm 3 days          Drain  Duration           Closed/Suction Drain Right RLQ Bulb 19 Fr  6 days    Colostomy Descending/sigmoid LLQ 6 days    Urethral Catheter Latex 16 Fr  6 days                Physical Exam  Vitals reviewed  Constitutional:       General: He is not in acute distress  Cardiovascular:      Rate and Rhythm: Normal rate and regular rhythm  Pulmonary:      Effort: Pulmonary effort is normal       Comments: CTA anterior lung fields  Abdominal:      General: Bowel sounds are normal       Palpations: Abdomen is soft  Tenderness: There is no abdominal tenderness  There is no guarding or rebound  Comments: Protuberant abdomen, nondistended, soft   Midline incision with intermittent staples C/D/I, open areas probed, fascia intact no purulent fluid present  No erythema, edema, warmth  Incision nontender  DAMASO intact, draining SS fluid  Musculoskeletal:         General: No tenderness  Cervical back: Neck supple  Right lower leg: No edema  Left lower leg: No edema  Skin:     General: Skin is warm and dry  Neurological:      General: No focal deficit present  Mental Status: He is alert and oriented to person, place, and time  Diagnostics:  I have personally reviewed pertinent lab results       CT abdomen pelvis wo contrast    Result Date: 8/20/2022  Impression: Although limited without use of intravenous and oral contrast there appears to be an abscess in the central pelvis as described causing proximal small bowel obstruction  Repeat examination with the use of intravenous and oral contrast may provide additional information  Recommend surgical consultation  The study was marked in [facility] for immediate notification  Workstation performed: [id number]     XR abdomen 1 vw portable    Result Date: 8/21/2022  Impression: Enteric tube in place with tip projecting in the region of proximal stomach  Dilated loop of small bowel in the mid abdomen measuring up to 5 8 cm, in keeping with known small bowel obstruction  There is relative paucity of gas in the abdomen, limiting adequate evaluation  Workstation performed: [id number]     CT abdomen pelvis with contrast    Result Date: 8/20/2022  Impression: 1  High-grade small bowel obstruction secondary to an interloop abscess in the central pelvis  The etiology of the abscess maybe sequela of prior perforated sigmoid diverticulitis or perhaps a perforated Meckel's diverticulitis  Nonetheless surgical assessment is advised  2   Small amount of pneumoperitoneum along the superior margin of the interloop abscess  3   Soft tissue induration along the margins of the central pelvic collection/interloop abscess possibly indurated phlegmon/fistulas to the sigmoid colon and dome of the bladder  4   Area of underdistention versus annular thickening of the mid sigmoid colon on series 2 image 75  An apple core lesion secondary to adenocarcinoma is difficult to exclude  Follow-up nonemergent colonoscopy is advised after the patient's small bowel obstruction is resolved/treated    I personally discussed this study with Yuliana Leal on 8/20/2022 at 12:04 PM  Workstation performed: [id number]     Recent Results (from the past 39 hour(s))   Basic metabolic panel    Collection Time: 08/26/22 6:13 AM   Result Value Ref Range    Sodium 136 135 - 147 mmol/L    Potassium 3 8 3 5 - 5 3 mmol/L    Chloride 103 96 - 108 mmol/L    CO2 25 21 - 32 mmol/L    ANION GAP 8 4 - 13 mmol/L    BUN 11 5 - 25 mg/dL    Creatinine 0 86 0 60 - 1 30 mg/dL    Glucose 113 65 - 140 mg/dL    Calcium 8 4 8 4 - 10 2 mg/dL    eGFR 84 ml/min/1 73sq m   CBC and differential    Collection Time: 08/26/22  6:13 AM   Result Value Ref Range    WBC 20 24 (H) 4 31 - 10 16 Thousand/uL    RBC 4 03 3 88 - 5 62 Million/uL    Hemoglobin 11 7 (L) 12 0 - 17 0 g/dL    Hematocrit 36 5 36 5 - 49 3 %    MCV 91 82 - 98 fL    MCH 29 0 26 8 - 34 3 pg    MCHC 32 1 31 4 - 37 4 g/dL    RDW 12 2 11 6 - 15 1 %    MPV 8 8 (L) 8 9 - 12 7 fL    Platelets 288 (H) 143 - 390 Thousands/uL   Magnesium    Collection Time: 08/26/22  6:13 AM   Result Value Ref Range    Magnesium 1 9 1 9 - 2 7 mg/dL   Manual Differential(PHLEBS Do Not Order)    Collection Time: 08/26/22  6:13 AM   Result Value Ref Range    Segmented % 84 (H) 43 - 75 %    Lymphocytes % 11 (L) 14 - 44 %    Monocytes % 5 4 - 12 %    Eosinophils, % 0 0 - 6 %    Basophils % 0 0 - 1 %    Absolute Neutrophils 17 00 (H) 1 85 - 7 62 Thousand/uL    Lymphocytes Absolute 2 23 0 60 - 4 47 Thousand/uL    Monocytes Absolute 1 01 0 00 - 1 22 Thousand/uL    Eosinophils Absolute 0 00 0 00 - 0 40 Thousand/uL    Basophils Absolute 0 00 0 00 - 0 10 Thousand/uL    Total Counted      Platelet Estimate Adequate Adequate   Fingerstick Glucose (POCT)    Collection Time: 08/26/22  3:44 PM   Result Value Ref Range    POC Glucose 107 65 - 140 mg/dl       Current Medications:  Scheduled Meds:  Current Facility-Administered Medications   Medication Dose Route Frequency Provider Last Rate    aspirin  81 mg Oral Daily Catherine Martinez PA-C      atorvastatin  20 mg Oral Daily Catherine Martinez PA-C      carisoprodol  350 mg Oral 4x Daily Catherine Martinez PA-C      enoxaparin  40 mg Subcutaneous Daily VINI Song      gabapentin  300 mg Oral TID VINI Parikh      HYDROmorphone  0 2 mg Intravenous Q1H PRN Jay Hong MD      LORazepam  1 mg Intravenous Q4H PRN Clarissa Lucero PA-C      losartan  50 mg Oral Daily [facility] [place], [place]      metoclopramide  10 mg Intravenous Q6H PRN Jay Hong MD      ondansetron  4 mg Intravenous Q6H PRN Rafael PrVINI paz      oxyCODONE  10 mg Oral 4x Daily Catherine [place], [place]      oxyCODONE-acetaminophen  1 tablet Oral Q4H PRN Catherine Martinez PA-C      pantoprazole  40 mg Intravenous Q24H [address] Rafael PryVINI      traZODone  300 mg Oral HS Catherine Martinez PA-C       Continuous Infusions:   PRN Meds:    HYDROmorphone    LORazepam    metoclopramide    ondansetron    oxyCODONE-acetaminophen      VINI Parikh  8/26/2022  8:17 PM

## 2022-08-28 LAB
ALBUMIN SERPL BCP-MCNC: 3.1 G/DL (ref 3.5–5)
ALP SERPL-CCNC: 35 U/L (ref 34–104)
ALT SERPL W P-5'-P-CCNC: 27 U/L (ref 7–52)
ANION GAP SERPL CALCULATED.3IONS-SCNC: 10 MMOL/L (ref 4–13)
AST SERPL W P-5'-P-CCNC: 23 U/L (ref 13–39)
BILIRUB SERPL-MCNC: 0.22 MG/DL (ref 0.2–1)
BUN SERPL-MCNC: 9 MG/DL (ref 5–25)
CALCIUM ALBUM COR SERPL-MCNC: 9.1 MG/DL (ref 8.3–10.1)
CALCIUM SERPL-MCNC: 8.4 MG/DL (ref 8.4–10.2)
CHLORIDE SERPL-SCNC: 105 MMOL/L (ref 96–108)
CO2 SERPL-SCNC: 27 MMOL/L (ref 21–32)
CREAT SERPL-MCNC: 0.87 MG/DL (ref 0.6–1.3)
ERYTHROCYTE [DISTWIDTH] IN BLOOD BY AUTOMATED COUNT: 12.9 % (ref 11.6–15.1)
GFR SERPL CREATININE-BSD FRML MDRD: 84 ML/MIN/1.73SQ M
GLUCOSE SERPL-MCNC: 91 MG/DL (ref 65–140)
HCT VFR BLD AUTO: 34 % (ref 36.5–49.3)
HGB BLD-MCNC: 10.6 G/DL (ref 12–17)
MCH RBC QN AUTO: 29 PG (ref 26.8–34.3)
MCHC RBC AUTO-ENTMCNC: 31.2 G/DL (ref 31.4–37.4)
MCV RBC AUTO: 93 FL (ref 82–98)
PLATELET # BLD AUTO: 417 THOUSANDS/UL (ref 149–390)
PMV BLD AUTO: 9.2 FL (ref 8.9–12.7)
POTASSIUM SERPL-SCNC: 4.1 MMOL/L (ref 3.5–5.3)
PROT SERPL-MCNC: 5.5 G/DL (ref 6.4–8.4)
RBC # BLD AUTO: 3.65 MILLION/UL (ref 3.88–5.62)
SODIUM SERPL-SCNC: 142 MMOL/L (ref 135–147)
WBC # BLD AUTO: 12.84 THOUSAND/UL (ref 4.31–10.16)

## 2022-08-28 PROCEDURE — C9113 INJ PANTOPRAZOLE SODIUM, VIA: HCPCS

## 2022-08-28 PROCEDURE — 99024 POSTOP FOLLOW-UP VISIT: CPT

## 2022-08-28 PROCEDURE — 85027 COMPLETE CBC AUTOMATED: CPT

## 2022-08-28 PROCEDURE — 80053 COMPREHEN METABOLIC PANEL: CPT

## 2022-08-28 RX ADMIN — HYDROMORPHONE HYDROCHLORIDE 0.2 MG: 0.2 INJECTION, SOLUTION INTRAMUSCULAR; INTRAVENOUS; SUBCUTANEOUS at 04:13

## 2022-08-28 RX ADMIN — OXYCODONE HYDROCHLORIDE 10 MG: 10 TABLET ORAL at 21:24

## 2022-08-28 RX ADMIN — GABAPENTIN 300 MG: 600 TABLET, FILM COATED ORAL at 15:25

## 2022-08-28 RX ADMIN — OXYCODONE HYDROCHLORIDE 10 MG: 10 TABLET ORAL at 11:55

## 2022-08-28 RX ADMIN — ENOXAPARIN SODIUM 40 MG: 100 INJECTION SUBCUTANEOUS at 08:18

## 2022-08-28 RX ADMIN — TRAZODONE HYDROCHLORIDE 300 MG: 150 TABLET ORAL at 21:24

## 2022-08-28 RX ADMIN — PANTOPRAZOLE SODIUM 40 MG: 40 INJECTION, POWDER, FOR SOLUTION INTRAVENOUS at 08:18

## 2022-08-28 RX ADMIN — OXYCODONE HYDROCHLORIDE 10 MG: 10 TABLET ORAL at 17:24

## 2022-08-28 RX ADMIN — OXYCODONE HYDROCHLORIDE 10 MG: 10 TABLET ORAL at 08:19

## 2022-08-28 RX ADMIN — GABAPENTIN 300 MG: 600 TABLET, FILM COATED ORAL at 21:24

## 2022-08-28 RX ADMIN — LOSARTAN POTASSIUM 50 MG: 50 TABLET, FILM COATED ORAL at 08:19

## 2022-08-28 RX ADMIN — GABAPENTIN 300 MG: 600 TABLET, FILM COATED ORAL at 08:19

## 2022-08-28 RX ADMIN — HYDROMORPHONE HYDROCHLORIDE 0.2 MG: 0.2 INJECTION, SOLUTION INTRAMUSCULAR; INTRAVENOUS; SUBCUTANEOUS at 19:41

## 2022-08-28 RX ADMIN — CARISOPRODOL 350 MG: 350 TABLET ORAL at 11:55

## 2022-08-28 RX ADMIN — CARISOPRODOL 350 MG: 350 TABLET ORAL at 17:24

## 2022-08-28 RX ADMIN — CARISOPRODOL 350 MG: 350 TABLET ORAL at 08:31

## 2022-08-28 RX ADMIN — ASPIRIN 81 MG: 81 TABLET, COATED ORAL at 08:19

## 2022-08-28 RX ADMIN — ATORVASTATIN CALCIUM 20 MG: 20 TABLET, FILM COATED ORAL at 08:19

## 2022-08-28 RX ADMIN — OXYCODONE HYDROCHLORIDE AND ACETAMINOPHEN 1 TABLET: 5; 325 TABLET ORAL at 15:25

## 2022-08-28 RX ADMIN — CARISOPRODOL 350 MG: 350 TABLET ORAL at 21:24

## 2022-08-28 NOTE — PLAN OF CARE
Problem: Potential for Falls  Goal: Patient will remain free of falls  Description: INTERVENTIONS:  - Educate patient/family on patient safety including physical limitations  - Instruct patient to call for assistance with activity   - Consult OT/PT to assist with strengthening/mobility   - Keep Call bell within reach  - Keep bed low and locked with side rails adjusted as appropriate  - Keep care items and personal belongings within reach  - Initiate and maintain comfort rounds  - Make Fall Risk Sign visible to staff  - Offer Toileting every 2 Hours, in advance of need  - Initiate/Maintain bed alarm  - Obtain necessary fall risk management equipment  - Apply yellow socks and bracelet for high fall risk patients  - Consider moving patient to room near nurses station  Outcome: Progressing     Problem: PAIN - ADULT  Goal: Verbalizes/displays adequate comfort level or baseline comfort level  Description: Interventions:  - Encourage patient to monitor pain and request assistance  - Assess pain using appropriate pain scale  - Administer analgesics based on type and severity of pain and evaluate response  - Implement non-pharmacological measures as appropriate and evaluate response  - Consider cultural and social influences on pain and pain management  - Notify physician/advanced practitioner if interventions unsuccessful or patient reports new pain  Outcome: Progressing     Problem: INFECTION - ADULT  Goal: Absence or prevention of progression during hospitalization  Description: INTERVENTIONS:  - Assess and monitor for signs and symptoms of infection  - Monitor lab/diagnostic results  - Monitor all insertion sites, i e  indwelling lines, tubes, and drains  - Monitor endotracheal if appropriate and nasal secretions for changes in amount and color  - [place] appropriate cooling/warming therapies per order  - Administer medications as ordered  - Instruct and encourage patient and family to use good hand hygiene technique  - Identify and instruct in appropriate isolation precautions for identified infection/condition  Outcome: Progressing     Problem: SAFETY ADULT  Goal: Patient will remain free of falls  Description: INTERVENTIONS:  - Educate patient/family on patient safety including physical limitations  - Instruct patient to call for assistance with activity   - Consult OT/PT to assist with strengthening/mobility   - Keep Call bell within reach  - Keep bed low and locked with side rails adjusted as appropriate  - Keep care items and personal belongings within reach  - Initiate and maintain comfort rounds  - Make Fall Risk Sign visible to staff  - Offer Toileting every 2 Hours, in advance of need  - Initiate/Maintain bed alarm  - Obtain necessary fall risk management equipment  - Apply yellow socks and bracelet for high fall risk patients  - Consider moving patient to room near nurses station  Outcome: Progressing  Goal: Maintain or return to baseline ADL function  Description: INTERVENTIONS:  -  Assess patient's ability to carry out ADLs; assess patient's baseline for ADL function and identify physical deficits which impact ability to perform ADLs (bathing, care of mouth/teeth, toileting, grooming, dressing, etc )  - Assess/evaluate cause of self-care deficits   - Assess range of motion  - Assess patient's mobility; develop plan if impaired  - Assess patient's need for assistive devices and provide as appropriate  - Encourage maximum independence but intervene and supervise when necessary  - Involve family in performance of ADLs  - Assess for home care needs following discharge   - Consider OT consult to assist with ADL evaluation and planning for discharge  - Provide patient education as appropriate  Outcome: Progressing  Goal: Maintains/Returns to pre admission functional level  Description: INTERVENTIONS:  - Perform BMAT or MOVE assessment daily    - Set and communicate daily mobility goal to care team and patient/family/caregiver  - Collaborate with rehabilitation services on mobility goals if consulted  - Perform Range of Motion 2 times a day  - Reposition patient every 2 hours  - Dangle patient 2 times a day  - Stand patient 2 times a day  - Ambulate patient 2 times a day  - Out of bed to chair 3 times a day   - Out of bed for meals 3 times a day  - Out of bed for toileting  - Record patient progress and toleration of activity level   Outcome: Progressing     Problem: DISCHARGE PLANNING  Goal: Discharge to home or other facility with appropriate resources  Description: INTERVENTIONS:  - Identify barriers to discharge w/patient and caregiver  - Arrange for needed discharge resources and transportation as appropriate  - Identify discharge learning needs (meds, wound care, etc )  - Refer to Case Management Department for coordinating discharge planning if the patient needs post-hospital services based on physician/advanced practitioner order or complex needs related to functional status, cognitive ability, or social support system  Outcome: Progressing     Problem: Knowledge Deficit  Goal: Patient/family/caregiver demonstrates understanding of disease process, treatment plan, medications, and discharge instructions  Description: Complete learning assessment and assess knowledge base    Interventions:  - Provide teaching at level of understanding  - Provide teaching via preferred learning methods  Outcome: Progressing     Problem: GASTROINTESTINAL - ADULT  Goal: Minimal or absence of nausea and/or vomiting  Description: INTERVENTIONS:  - Administer IV fluids if ordered to ensure adequate hydration  - Maintain NPO status until nausea and vomiting are resolved  - Nasogastric tube if ordered  - Administer ordered antiemetic medications as needed  - Provide nonpharmacologic comfort measures as appropriate  - Advance diet as tolerated, if ordered  - Consider nutrition services referral to assist patient with adequate nutrition and appropriate food choices  Outcome: Progressing  Goal: Maintains or returns to baseline bowel function  Description: INTERVENTIONS:  - Assess bowel function  - Encourage oral fluids to ensure adequate hydration  - Administer IV fluids if ordered to ensure adequate hydration  - Administer ordered medications as needed  - Encourage mobilization and activity  - Consider nutritional services referral to assist patient with adequate nutrition and appropriate food choices  Outcome: Progressing  Goal: Oral mucous membranes remain intact  Description: INTERVENTIONS  - Assess oral mucosa and hygiene practices  - Implement preventative oral hygiene regimen  - Implement oral medicated treatments as ordered  - Initiate Nutrition services referral as needed  Outcome: Progressing     Problem: Prexisting or High Potential for Compromised Skin Integrity  Goal: Skin integrity is maintained or improved  Description: INTERVENTIONS:  - Identify patients at risk for skin breakdown  - Assess and monitor skin integrity  - Assess and monitor nutrition and hydration status  - Monitor labs   - Assess for incontinence   - Turn and reposition patient  - Assist with mobility/ambulation  - Relieve pressure over bony prominences  - Avoid friction and shearing  - Provide appropriate hygiene as needed including keeping skin clean and dry  - Evaluate need for skin moisturizer/barrier cream  - Collaborate with interdisciplinary team   - Patient/family teaching  - Consider wound care consult   Outcome: Progressing     Problem: MOBILITY - ADULT  Goal: Maintain or return to baseline ADL function  Description: INTERVENTIONS:  -  Assess patient's ability to carry out ADLs; assess patient's baseline for ADL function and identify physical deficits which impact ability to perform ADLs (bathing, care of mouth/teeth, toileting, grooming, dressing, etc )  - Assess/evaluate cause of self-care deficits   - Assess range of motion  - Assess patient's mobility; develop plan if impaired  - Assess patient's need for assistive devices and provide as appropriate  - Encourage maximum independence but intervene and supervise when necessary  - Involve family in performance of ADLs  - Assess for home care needs following discharge   - Consider OT consult to assist with ADL evaluation and planning for discharge  - Provide patient education as appropriate  Outcome: Progressing  Goal: Maintains/Returns to pre admission functional level  Description: INTERVENTIONS:  - Perform BMAT or MOVE assessment daily    - Set and communicate daily mobility goal to care team and patient/family/caregiver  - Collaborate with rehabilitation services on mobility goals if consulted  - Perform Range of Motion 2 times a day  - Reposition patient every 2 hours  - Dangle patient 2 times a day  - Stand patient 2 times a day  - Ambulate patient 2 times a day  - Out of bed to chair 3 times a day   - Out of bed for meals 3 times a day  - Out of bed for toileting  - Record patient progress and toleration of activity level   Outcome: Progressing     Problem: Nutrition/Hydration-ADULT  Goal: Nutrient/Hydration intake appropriate for improving, restoring or maintaining nutritional needs  Description: Monitor and assess patient's nutrition/hydration status for malnutrition  Collaborate with interdisciplinary team and initiate plan and interventions as ordered  Monitor patient's weight and dietary intake as ordered or per policy  Utilize nutrition screening tool and intervene as necessary  Determine patient's food preferences and provide high-protein, high-caloric foods as appropriate       INTERVENTIONS:  - Monitor oral intake, urinary output, labs, and treatment plans  - Assess nutrition and hydration status and recommend course of action  - Evaluate amount of meals eaten  - Assist patient with eating if necessary   - Allow adequate time for meals  - Recommend/ encourage appropriate diets, oral nutritional supplements, and vitamin/mineral supplements  - Order, calculate, and assess calorie counts as needed  - Recommend, monitor, and adjust tube feedings and TPN/PPN based on assessed needs  - Assess need for intravenous fluids  - Provide specific nutrition/hydration education as appropriate  - Include patient/family/caregiver in decisions related to nutrition  Outcome: Progressing

## 2022-08-28 NOTE — PROGRESS NOTES
Progress Note - General Surgery   Camila Salines 78 y o  male MRN: [id number]  Unit/Bed#: -01 Encounter: [id number]      ASSESSMENT:  81-year-old male presenting with high-grade small-bowel obstruction with intra-abdominal abscess  POD#8 (8/20/22) s/p Ex-lap, Vane's procedure with segmental small-bowel resection and extensive lysis of adhesions  · AF, VSS  · WBC trending down after discontinuing antibiotics, WBC 12 84 today from 20 earlier in the week  · Cr 0 87, UO:  1 5L, Ambriz in place b/c pt presented with surrounding phlegmon abutting the dome of the bladder with potential colovesical fistula   · DAMASO drain x 1: 120cc serous fluid  · Ostomy functioning, stool on recorded, bag without leakage  · Midline wound intact with intermittent staples, wound flushed with sterile saline re-dressed with sterile 4 x 4/ABDs, secured with tape  PLAN:  · Cystogram tomorrow, if no abnormalities Ambriz will be removed and potential discharge tomorrow  · Home p o  medication regimen  · DVT ppx, Lovenox  · Regular diet as tolerated  · Encourage OOB to chair, ambulation as able   · Encourage IS use  · Drain care, I/Os  · ABD to midline incision  Pt may shower/wash incision with warm soap and water, pat dry  · Work with PT/OT; concurrently recommendation is a post-acute Rehab Services, however patient refuses and would rather have home PT/OT, which is reasonable      SUBJECTIVE:  Feels much better  No significant abdominal pain  Mostly his baseline backspace/leg spasms that are bothersome  No nausea/vomiting, tolerating regular diet  Having good urine output, Ambriz in place  Having ostomy function  No bag leakage   Getting out of bed to chair using walker  Wife very involved with his care and present during my encounter  Patient is in good spirits and is highly anticipating his potential discharge tomorrow  OBJECTIVE:   Vitals:  Blood pressure 138/71, pulse 85, temperature 99 1 °F (37 3 °C), resp   rate 19, height 5' 6" (1 676 m), weight 85 3 kg (188 lb), SpO2 92 %  ,Body mass index is 30 34 kg/m²  I/Os:    Intake/Output Summary (Last 24 hours) at 8/28/2022 1611  Last data filed at 8/28/2022 0432  Gross per 24 hour   Intake 240 ml   Output 1630 ml   Net -1390 ml       Lines/Drains:  Invasive Devices  Report    Peripheral Intravenous Line  Duration           Peripheral IV 08/27/22 Right;Ventral (anterior) Forearm 1 day          Drain  Duration           Colostomy Descending/sigmoid LLQ 8 days    Urethral Catheter Latex 16 Fr  8 days    Closed/Suction Drain Right RLQ Bulb 19 Fr  7 days                Physical Exam  Vitals reviewed  Constitutional:       General: He is not in acute distress  Cardiovascular:      Rate and Rhythm: Normal rate and regular rhythm  Pulmonary:      Effort: Pulmonary effort is normal       Comments: CTA anterior lung fields  Abdominal:      General: Bowel sounds are normal       Palpations: Abdomen is soft  Tenderness: There is no abdominal tenderness  There is no guarding or rebound  Comments: Protuberant abdomen, nondistended, soft   Midline incision with intermittent staples C/D/  No erythema, edema, warmth  Incision nontender  DAMASO intact, draining serous fluid  Ostomy pink, viable, functioning, stool in bag  Musculoskeletal:         General: No tenderness  Cervical back: Neck supple  Right lower leg: No edema  Left lower leg: No edema  Skin:     General: Skin is warm and dry  Neurological:      General: No focal deficit present  Mental Status: He is alert and oriented to person, place, and time  Diagnostics:  I have personally reviewed pertinent lab results  CT abdomen pelvis wo contrast    Result Date: 8/20/2022  Impression: Although limited without use of intravenous and oral contrast there appears to be an abscess in the central pelvis as described causing proximal small bowel obstruction    Repeat examination with the use of intravenous and oral contrast may provide additional information  Recommend surgical consultation  The study was marked in [facility] for immediate notification  Workstation performed: [id number]     XR abdomen 1 vw portable    Result Date: 8/21/2022  Impression: Enteric tube in place with tip projecting in the region of proximal stomach  Dilated loop of small bowel in the mid abdomen measuring up to 5 8 cm, in keeping with known small bowel obstruction  There is relative paucity of gas in the abdomen, limiting adequate evaluation  Workstation performed: [id number]     CT abdomen pelvis with contrast    Result Date: 8/20/2022  Impression: 1  High-grade small bowel obstruction secondary to an interloop abscess in the central pelvis  The etiology of the abscess maybe sequela of prior perforated sigmoid diverticulitis or perhaps a perforated Meckel's diverticulitis  Nonetheless surgical assessment is advised  2   Small amount of pneumoperitoneum along the superior margin of the interloop abscess  3   Soft tissue induration along the margins of the central pelvic collection/interloop abscess possibly indurated phlegmon/fistulas to the sigmoid colon and dome of the bladder  4   Area of underdistention versus annular thickening of the mid sigmoid colon on series 2 image 75  An apple core lesion secondary to adenocarcinoma is difficult to exclude  Follow-up nonemergent colonoscopy is advised after the patient's small bowel obstruction is resolved/treated    I personally discussed this study with Yuliana Leal on 8/20/2022 at 12:04 PM  Workstation performed: [id number]     Recent Results (from the past 36 hour(s))   Basic metabolic panel    Collection Time: 08/27/22  5:09 AM   Result Value Ref Range    Sodium 141 135 - 147 mmol/L    Potassium 3 8 3 5 - 5 3 mmol/L    Chloride 105 96 - 108 mmol/L    CO2 27 21 - 32 mmol/L    ANION GAP 9 4 - 13 mmol/L    BUN 9 5 - 25 mg/dL    Creatinine 1 04 0 60 - 1 30 mg/dL    Glucose 103 65 - 140 mg/dL    Calcium 9 0 8 4 - 10 2 mg/dL    eGFR 69 ml/min/1 73sq m   CBC and differential    Collection Time: 08/27/22  5:09 AM   Result Value Ref Range    WBC 15 30 (H) 4 31 - 10 16 Thousand/uL    RBC 4 17 3 88 - 5 62 Million/uL    Hemoglobin 12 2 12 0 - 17 0 g/dL    Hematocrit 38 5 36 5 - 49 3 %    MCV 92 82 - 98 fL    MCH 29 3 26 8 - 34 3 pg    MCHC 31 7 31 4 - 37 4 g/dL    RDW 12 5 11 6 - 15 1 %    MPV 9 2 8 9 - 12 7 fL    Platelets 623 (H) 212 - 390 Thousands/uL   Manual Differential(PHLEBS Do Not Order)    Collection Time: 08/27/22  5:09 AM   Result Value Ref Range    Segmented % 71 43 - 75 %    Bands % 3 0 - 8 %    Lymphocytes % 18 14 - 44 %    Monocytes % 4 4 - 12 %    Eosinophils, % 4 0 - 6 %    Basophils % 0 0 - 1 %    Absolute Neutrophils 11 32 (H) 1 85 - 7 62 Thousand/uL    Lymphocytes Absolute 2 75 0 60 - 4 47 Thousand/uL    Monocytes Absolute 0 61 0 00 - 1 22 Thousand/uL    Eosinophils Absolute 0 61 (H) 0 00 - 0 40 Thousand/uL    Basophils Absolute 0 00 0 00 - 0 10 Thousand/uL    Total Counted      RBC Morphology Normal     Platelet Estimate Increased (A) Adequate   CBC and differential    Collection Time: 08/28/22  5:15 AM   Result Value Ref Range    WBC 12 84 (H) 4 31 - 10 16 Thousand/uL    RBC 3 65 (L) 3 88 - 5 62 Million/uL    Hemoglobin 10 6 (L) 12 0 - 17 0 g/dL    Hematocrit 34 0 (L) 36 5 - 49 3 %    MCV 93 82 - 98 fL    MCH 29 0 26 8 - 34 3 pg    MCHC 31 2 (L) 31 4 - 37 4 g/dL    RDW 12 9 11 6 - 15 1 %    MPV 9 2 8 9 - 12 7 fL    Platelets 644 (H) 781 - 390 Thousands/uL   Comprehensive metabolic panel    Collection Time: 08/28/22  5:15 AM   Result Value Ref Range    Sodium 142 135 - 147 mmol/L    Potassium 4 1 3 5 - 5 3 mmol/L    Chloride 105 96 - 108 mmol/L    CO2 27 21 - 32 mmol/L    ANION GAP 10 4 - 13 mmol/L    BUN 9 5 - 25 mg/dL    Creatinine 0 87 0 60 - 1 30 mg/dL    Glucose 91 65 - 140 mg/dL    Calcium 8 4 8 4 - 10 2 mg/dL    Corrected Calcium 9 1 8 3 - 10 1 mg/dL    AST 23 13 - 39 U/L    ALT 27 7 - 52 U/L    Alkaline Phosphatase 35 34 - 104 U/L    Total Protein 5 5 (L) 6 4 - 8 4 g/dL    Albumin 3 1 (L) 3 5 - 5 0 g/dL    Total Bilirubin 0 22 0 20 - 1 00 mg/dL    eGFR 84 ml/min/1 73sq m       Current Medications:  Scheduled Meds:  Current Facility-Administered Medications   Medication Dose Route Frequency Provider Last Rate    aspirin  81 mg Oral Daily Catherine Martinez PA-C      atorvastatin  20 mg Oral Daily VINI Parikh      carisoprodol  350 mg Oral 4x Daily Catherine Martinez PA-C      enoxaparin  40 mg Subcutaneous Daily VINI Song      gabapentin  300 mg Oral TID VINI Parikh      HYDROmorphone  0 2 mg Intravenous Q1H PRN Micheal Flores MD      LORazepam  1 mg Intravenous Q4H PRN Brian Aguilera PA-C      losartan  50 mg Oral Daily VINI Parikh      metoclopramide  10 mg Intravenous Q6H PRN Micheal Flores MD      ondansetron  4 mg Intravenous Q6H PRN VINI Song      oxyCODONE  10 mg Oral 4x Daily Catherine Zamora PA-C      oxyCODONE-acetaminophen  1 tablet Oral Q4H PRN Catherine Martinez PA-C      pantoprazole  40 mg Intravenous Q24H [facility] Sabina Buck PA-C      traZODone  300 mg Oral HS Catherine Martinez PA-C       Continuous Infusions:   PRN Meds:    HYDROmorphone    LORazepam    metoclopramide    ondansetron    oxyCODONE-acetaminophen      VINI Parikh  8/28/2022  4:11 PM

## 2022-08-28 NOTE — PLAN OF CARE
Problem: Potential for Falls  Goal: Patient will remain free of falls  Description: INTERVENTIONS:  - Educate patient/family on patient safety including physical limitations  - Instruct patient to call for assistance with activity   - Consult OT/PT to assist with strengthening/mobility   - Keep Call bell within reach  - Keep bed low and locked with side rails adjusted as appropriate  - Keep care items and personal belongings within reach  - Initiate and maintain comfort rounds  - Make Fall Risk Sign visible to staff  - Offer Toileting every 2 Hours, in advance of need  - Initiate/Maintain bed alarm  - Obtain necessary fall risk management equipment  - Apply yellow socks and bracelet for high fall risk patients  - Consider moving patient to room near nurses station  Outcome: Progressing     Problem: PAIN - ADULT  Goal: Verbalizes/displays adequate comfort level or baseline comfort level  Description: Interventions:  - Encourage patient to monitor pain and request assistance  - Assess pain using appropriate pain scale  - Administer analgesics based on type and severity of pain and evaluate response  - Implement non-pharmacological measures as appropriate and evaluate response  - Consider cultural and social influences on pain and pain management  - Notify physician/advanced practitioner if interventions unsuccessful or patient reports new pain  Outcome: Progressing     Problem: INFECTION - ADULT  Goal: Absence or prevention of progression during hospitalization  Description: INTERVENTIONS:  - Assess and monitor for signs and symptoms of infection  - Monitor lab/diagnostic results  - Monitor all insertion sites, i e  indwelling lines, tubes, and drains  - Monitor endotracheal if appropriate and nasal secretions for changes in amount and color  - [place] appropriate cooling/warming therapies per order  - Administer medications as ordered  - Instruct and encourage patient and family to use good hand hygiene technique  - Identify and instruct in appropriate isolation precautions for identified infection/condition  Outcome: Progressing     Problem: SAFETY ADULT  Goal: Patient will remain free of falls  Description: INTERVENTIONS:  - Educate patient/family on patient safety including physical limitations  - Instruct patient to call for assistance with activity   - Consult OT/PT to assist with strengthening/mobility   - Keep Call bell within reach  - Keep bed low and locked with side rails adjusted as appropriate  - Keep care items and personal belongings within reach  - Initiate and maintain comfort rounds  - Make Fall Risk Sign visible to staff  - Offer Toileting every 2 Hours, in advance of need  - Initiate/Maintain bed alarm  - Obtain necessary fall risk management equipment  - Apply yellow socks and bracelet for high fall risk patients  - Consider moving patient to room near nurses station  Outcome: Progressing  Goal: Maintain or return to baseline ADL function  Description: INTERVENTIONS:  -  Assess patient's ability to carry out ADLs; assess patient's baseline for ADL function and identify physical deficits which impact ability to perform ADLs (bathing, care of mouth/teeth, toileting, grooming, dressing, etc )  - Assess/evaluate cause of self-care deficits   - Assess range of motion  - Assess patient's mobility; develop plan if impaired  - Assess patient's need for assistive devices and provide as appropriate  - Encourage maximum independence but intervene and supervise when necessary  - Involve family in performance of ADLs  - Assess for home care needs following discharge   - Consider OT consult to assist with ADL evaluation and planning for discharge  - Provide patient education as appropriate  Outcome: Progressing  Goal: Maintains/Returns to pre admission functional level  Description: INTERVENTIONS:  - Perform BMAT or MOVE assessment daily    - Set and communicate daily mobility goal to care team and patient/family/caregiver  - Collaborate with rehabilitation services on mobility goals if consulted  - Perform Range of Motion 2 times a day  - Reposition patient every 2 hours  - Dangle patient 2 times a day  - Stand patient 2 times a day  - Ambulate patient 2 times a day  - Out of bed to chair 3 times a day   - Out of bed for meals 3 times a day  - Out of bed for toileting  - Record patient progress and toleration of activity level   Outcome: Progressing     Problem: DISCHARGE PLANNING  Goal: Discharge to home or other facility with appropriate resources  Description: INTERVENTIONS:  - Identify barriers to discharge w/patient and caregiver  - Arrange for needed discharge resources and transportation as appropriate  - Identify discharge learning needs (meds, wound care, etc )  - Refer to Case Management Department for coordinating discharge planning if the patient needs post-hospital services based on physician/advanced practitioner order or complex needs related to functional status, cognitive ability, or social support system  Outcome: Progressing     Problem: Knowledge Deficit  Goal: Patient/family/caregiver demonstrates understanding of disease process, treatment plan, medications, and discharge instructions  Description: Complete learning assessment and assess knowledge base    Interventions:  - Provide teaching at level of understanding  - Provide teaching via preferred learning methods  Outcome: Progressing     Problem: GASTROINTESTINAL - ADULT  Goal: Minimal or absence of nausea and/or vomiting  Description: INTERVENTIONS:  - Administer IV fluids if ordered to ensure adequate hydration  - Maintain NPO status until nausea and vomiting are resolved  - Nasogastric tube if ordered  - Administer ordered antiemetic medications as needed  - Provide nonpharmacologic comfort measures as appropriate  - Advance diet as tolerated, if ordered  - Consider nutrition services referral to assist patient with adequate nutrition and appropriate food choices  Outcome: Progressing  Goal: Maintains or returns to baseline bowel function  Description: INTERVENTIONS:  - Assess bowel function  - Encourage oral fluids to ensure adequate hydration  - Administer IV fluids if ordered to ensure adequate hydration  - Administer ordered medications as needed  - Encourage mobilization and activity  - Consider nutritional services referral to assist patient with adequate nutrition and appropriate food choices  Outcome: Progressing  Goal: Oral mucous membranes remain intact  Description: INTERVENTIONS  - Assess oral mucosa and hygiene practices  - Implement preventative oral hygiene regimen  - Implement oral medicated treatments as ordered  - Initiate Nutrition services referral as needed  Outcome: Progressing     Problem: Prexisting or High Potential for Compromised Skin Integrity  Goal: Skin integrity is maintained or improved  Description: INTERVENTIONS:  - Identify patients at risk for skin breakdown  - Assess and monitor skin integrity  - Assess and monitor nutrition and hydration status  - Monitor labs   - Assess for incontinence   - Turn and reposition patient  - Assist with mobility/ambulation  - Relieve pressure over bony prominences  - Avoid friction and shearing  - Provide appropriate hygiene as needed including keeping skin clean and dry  - Evaluate need for skin moisturizer/barrier cream  - Collaborate with interdisciplinary team   - Patient/family teaching  - Consider wound care consult   Outcome: Progressing     Problem: MOBILITY - ADULT  Goal: Maintain or return to baseline ADL function  Description: INTERVENTIONS:  -  Assess patient's ability to carry out ADLs; assess patient's baseline for ADL function and identify physical deficits which impact ability to perform ADLs (bathing, care of mouth/teeth, toileting, grooming, dressing, etc )  - Assess/evaluate cause of self-care deficits   - Assess range of motion  - Assess patient's mobility; develop plan if impaired  - Assess patient's need for assistive devices and provide as appropriate  - Encourage maximum independence but intervene and supervise when necessary  - Involve family in performance of ADLs  - Assess for home care needs following discharge   - Consider OT consult to assist with ADL evaluation and planning for discharge  - Provide patient education as appropriate  Outcome: Progressing  Goal: Maintains/Returns to pre admission functional level  Description: INTERVENTIONS:  - Perform BMAT or MOVE assessment daily    - Set and communicate daily mobility goal to care team and patient/family/caregiver  - Collaborate with rehabilitation services on mobility goals if consulted  - Perform Range of Motion 2 times a day  - Reposition patient every 2 hours  - Dangle patient 2 times a day  - Stand patient 2 times a day  - Ambulate patient 2 times a day  - Out of bed to chair 3 times a day   - Out of bed for meals 3 times a day  - Out of bed for toileting  - Record patient progress and toleration of activity level   Outcome: Progressing     Problem: Nutrition/Hydration-ADULT  Goal: Nutrient/Hydration intake appropriate for improving, restoring or maintaining nutritional needs  Description: Monitor and assess patient's nutrition/hydration status for malnutrition  Collaborate with interdisciplinary team and initiate plan and interventions as ordered  Monitor patient's weight and dietary intake as ordered or per policy  Utilize nutrition screening tool and intervene as necessary  Determine patient's food preferences and provide high-protein, high-caloric foods as appropriate       INTERVENTIONS:  - Monitor oral intake, urinary output, labs, and treatment plans  - Assess nutrition and hydration status and recommend course of action  - Evaluate amount of meals eaten  - Assist patient with eating if necessary   - Allow adequate time for meals  - Recommend/ encourage appropriate diets, oral nutritional supplements, and vitamin/mineral supplements  - Order, calculate, and assess calorie counts as needed  - Recommend, monitor, and adjust tube feedings and TPN/PPN based on assessed needs  - Assess need for intravenous fluids  - Provide specific nutrition/hydration education as appropriate  - Include patient/family/caregiver in decisions related to nutrition  Outcome: Progressing

## 2022-08-29 ENCOUNTER — APPOINTMENT (OUTPATIENT)
Dept: RADIOLOGY | Facility: HOSPITAL | Age: 77
DRG: 330 | End: 2022-08-29
Payer: COMMERCIAL

## 2022-08-29 DIAGNOSIS — M48.061 SPINAL STENOSIS OF LUMBAR REGION, UNSPECIFIED WHETHER NEUROGENIC CLAUDICATION PRESENT: ICD-10-CM

## 2022-08-29 LAB
ANION GAP SERPL CALCULATED.3IONS-SCNC: 10 MMOL/L (ref 4–13)
BASOPHILS # BLD AUTO: 0.06 THOUSANDS/ΜL (ref 0–0.1)
BASOPHILS NFR BLD AUTO: 0 % (ref 0–1)
BUN SERPL-MCNC: 15 MG/DL (ref 5–25)
CALCIUM SERPL-MCNC: 9.1 MG/DL (ref 8.4–10.2)
CHLORIDE SERPL-SCNC: 100 MMOL/L (ref 96–108)
CO2 SERPL-SCNC: 26 MMOL/L (ref 21–32)
CREAT SERPL-MCNC: 1.01 MG/DL (ref 0.6–1.3)
EOSINOPHIL # BLD AUTO: 0.14 THOUSAND/ΜL (ref 0–0.61)
EOSINOPHIL NFR BLD AUTO: 1 % (ref 0–6)
ERYTHROCYTE [DISTWIDTH] IN BLOOD BY AUTOMATED COUNT: 13.3 % (ref 11.6–15.1)
GFR SERPL CREATININE-BSD FRML MDRD: 72 ML/MIN/1.73SQ M
GLUCOSE SERPL-MCNC: 134 MG/DL (ref 65–140)
HCT VFR BLD AUTO: 41 % (ref 36.5–49.3)
HGB BLD-MCNC: 12.9 G/DL (ref 12–17)
IMM GRANULOCYTES # BLD AUTO: 0.24 THOUSAND/UL (ref 0–0.2)
IMM GRANULOCYTES NFR BLD AUTO: 2 % (ref 0–2)
LYMPHOCYTES # BLD AUTO: 3.24 THOUSANDS/ΜL (ref 0.6–4.47)
LYMPHOCYTES NFR BLD AUTO: 23 % (ref 14–44)
MCH RBC QN AUTO: 29.5 PG (ref 26.8–34.3)
MCHC RBC AUTO-ENTMCNC: 31.5 G/DL (ref 31.4–37.4)
MCV RBC AUTO: 94 FL (ref 82–98)
MONOCYTES # BLD AUTO: 0.93 THOUSAND/ΜL (ref 0.17–1.22)
MONOCYTES NFR BLD AUTO: 7 % (ref 4–12)
NEUTROPHILS # BLD AUTO: 9.76 THOUSANDS/ΜL (ref 1.85–7.62)
NEUTS SEG NFR BLD AUTO: 67 % (ref 43–75)
NRBC BLD AUTO-RTO: 0 /100 WBCS
PLATELET # BLD AUTO: 522 THOUSANDS/UL (ref 149–390)
PMV BLD AUTO: 8.8 FL (ref 8.9–12.7)
POTASSIUM SERPL-SCNC: 4.2 MMOL/L (ref 3.5–5.3)
RBC # BLD AUTO: 4.38 MILLION/UL (ref 3.88–5.62)
SODIUM SERPL-SCNC: 136 MMOL/L (ref 135–147)
WBC # BLD AUTO: 14.37 THOUSAND/UL (ref 4.31–10.16)

## 2022-08-29 PROCEDURE — 80048 BASIC METABOLIC PNL TOTAL CA: CPT | Performed by: PHYSICIAN ASSISTANT

## 2022-08-29 PROCEDURE — 74430 CONTRAST X-RAY BLADDER: CPT

## 2022-08-29 PROCEDURE — 99024 POSTOP FOLLOW-UP VISIT: CPT | Performed by: SPECIALIST

## 2022-08-29 PROCEDURE — BT10ZZZ FLUOROSCOPY OF BLADDER: ICD-10-PCS | Performed by: RADIOLOGY

## 2022-08-29 PROCEDURE — 85025 COMPLETE CBC W/AUTO DIFF WBC: CPT | Performed by: PHYSICIAN ASSISTANT

## 2022-08-29 PROCEDURE — C9113 INJ PANTOPRAZOLE SODIUM, VIA: HCPCS

## 2022-08-29 PROCEDURE — 97110 THERAPEUTIC EXERCISES: CPT

## 2022-08-29 RX ORDER — OXYCODONE HYDROCHLORIDE 10 MG/1
TABLET ORAL
Qty: 120 TABLET | Refills: 0 | Status: SHIPPED | OUTPATIENT
Start: 2022-08-29 | End: 2022-09-28 | Stop reason: SDUPTHER

## 2022-08-29 RX ADMIN — OXYCODONE HYDROCHLORIDE 10 MG: 10 TABLET ORAL at 12:46

## 2022-08-29 RX ADMIN — OXYCODONE HYDROCHLORIDE 10 MG: 10 TABLET ORAL at 08:00

## 2022-08-29 RX ADMIN — TRAZODONE HYDROCHLORIDE 300 MG: 150 TABLET ORAL at 21:48

## 2022-08-29 RX ADMIN — OXYCODONE HYDROCHLORIDE AND ACETAMINOPHEN 1 TABLET: 5; 325 TABLET ORAL at 19:54

## 2022-08-29 RX ADMIN — ASPIRIN 81 MG: 81 TABLET, COATED ORAL at 07:58

## 2022-08-29 RX ADMIN — CARISOPRODOL 350 MG: 350 TABLET ORAL at 17:43

## 2022-08-29 RX ADMIN — HYDROMORPHONE HYDROCHLORIDE 0.2 MG: 0.2 INJECTION, SOLUTION INTRAMUSCULAR; INTRAVENOUS; SUBCUTANEOUS at 04:56

## 2022-08-29 RX ADMIN — CARISOPRODOL 350 MG: 350 TABLET ORAL at 12:46

## 2022-08-29 RX ADMIN — CARISOPRODOL 350 MG: 350 TABLET ORAL at 08:04

## 2022-08-29 RX ADMIN — LOSARTAN POTASSIUM 50 MG: 50 TABLET, FILM COATED ORAL at 08:00

## 2022-08-29 RX ADMIN — ENOXAPARIN SODIUM 40 MG: 100 INJECTION SUBCUTANEOUS at 07:59

## 2022-08-29 RX ADMIN — OXYCODONE HYDROCHLORIDE 10 MG: 10 TABLET ORAL at 21:49

## 2022-08-29 RX ADMIN — OXYCODONE HYDROCHLORIDE AND ACETAMINOPHEN 1 TABLET: 5; 325 TABLET ORAL at 14:50

## 2022-08-29 RX ADMIN — GABAPENTIN 300 MG: 600 TABLET, FILM COATED ORAL at 21:49

## 2022-08-29 RX ADMIN — GABAPENTIN 300 MG: 600 TABLET, FILM COATED ORAL at 08:00

## 2022-08-29 RX ADMIN — ATORVASTATIN CALCIUM 20 MG: 20 TABLET, FILM COATED ORAL at 07:58

## 2022-08-29 RX ADMIN — GABAPENTIN 300 MG: 600 TABLET, FILM COATED ORAL at 17:44

## 2022-08-29 RX ADMIN — IOTHALAMATE MEGLUMINE 225 ML: 172 INJECTION URETERAL at 11:53

## 2022-08-29 RX ADMIN — CARISOPRODOL 350 MG: 350 TABLET ORAL at 21:59

## 2022-08-29 RX ADMIN — PANTOPRAZOLE SODIUM 40 MG: 40 INJECTION, POWDER, FOR SOLUTION INTRAVENOUS at 08:00

## 2022-08-29 RX ADMIN — OXYCODONE HYDROCHLORIDE 10 MG: 10 TABLET ORAL at 17:43

## 2022-08-29 NOTE — PROGRESS NOTES
Progress Note - General Surgery   Jimbo Moore 78 y o  male MRN: [id number]  Unit/Bed#: -01 Encounter: [id number]      ASSESSMENT:  79-year-old male presenting with high-grade small-bowel obstruction with intra-abdominal abscess  POD#9 (22) s/p Ex-lap, Vane's procedure with segmental small-bowel resection and extensive lysis of adhesions  · AF, VSS  · UO:  2 6L, Ambriz in place b/c pt presented with surrounding phlegmon abutting the dome of the bladder with potential colovesical fistula   · DAMASO drain x 1: 70cc serous fluid  · Ostomy functionincc bag without leakage  · Midline wound intact with intermittent staples, wound flushed with sterile saline re-dressed with sterile 4 x 4/ABDs, secured with tape  PLAN:  · Cystogram today, potential Ambriz removal + voiding trial based on results  · Home p o  medication regimen  · DVT ppx, Lovenox  · Regular diet as tolerated  · Encourage OOB to chair, ambulation as able   · Encourage IS use  · Drain care, I/Os  · ABD to midline incision  Pt may shower/wash incision with warm soap and water, pat dry  · Dispo planning: potential dc this evening if Ambriz is removed and he passes voiding trial  [facility] set up for V[facility]-home PT/ostomy care  SUBJECTIVE:  No acute changes doing well  Tolerating diet, having ostomy function  Does state that 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  OBJECTIVE:   Vitals:  Blood pressure 116/66, pulse 72, temperature 98 5 °F (36 9 °C), resp  rate 19, height 5' 6" (1 676 m), weight 85 3 kg (188 lb), SpO2 92 %  ,Body mass index is 30 34 kg/m²      I/Os:    Intake/Output Summary (Last 24 hours) at 2022 0925  Last data filed at 2022 0454  Gross per 24 hour   Intake --   Output 2770 ml   Net -2770 ml       Lines/Drains:  Invasive Devices  Report    Peripheral Intravenous Line  Duration           Peripheral IV 22 Right;Ventral (anterior) Forearm 2 days          Drain  Duration           Closed/Suction Drain Right RLQ Bulb 19 Fr  8 days    Colostomy Descending/sigmoid LLQ 8 days    Urethral Catheter Latex 16 Fr  8 days                Physical Exam  Vitals reviewed  Constitutional:       General: He is not in acute distress  Cardiovascular:      Rate and Rhythm: Normal rate and regular rhythm  Pulmonary:      Effort: Pulmonary effort is normal       Comments: CTA anterior lung fields  Abdominal:      General: Bowel sounds are normal       Palpations: Abdomen is soft       Tenderness: There is no abdominal tenderness  There is no guarding or rebound       Comments: Protuberant abdomen, nondistended, soft   Midline incision with intermittent staples C/D/  No erythema, edema, warmth  Incision nontender  DAMASO intact, draining serous fluid  Ostomy pink, viable, functioning, stool in bag      Musculoskeletal:         General: No tenderness       Cervical back: Neck supple       Right lower leg: No edema       Left lower leg: No edema  Skin:     General: Skin is warm and dry  Neurological:      General: No focal deficit present       Mental Status: He is alert and oriented to person, place, and time        Diagnostics:  I have personally reviewed pertinent lab results  CT abdomen pelvis wo contrast    Result Date: 8/20/2022  Impression: Although limited without use of intravenous and oral contrast there appears to be an abscess in the central pelvis as described causing proximal small bowel obstruction  Repeat examination with the use of intravenous and oral contrast may provide additional information  Recommend surgical consultation  The study was marked in [facility] for immediate notification  Workstation performed: [id number]     XR abdomen 1 vw portable    Result Date: 8/21/2022  Impression: Enteric tube in place with tip projecting in the region of proximal stomach  Dilated loop of small bowel in the mid abdomen measuring up to 5 8 cm, in keeping with known small bowel obstruction    There is relative paucity of gas in the abdomen, limiting adequate evaluation  Workstation performed: [id number]     CT abdomen pelvis with contrast    Result Date: 8/20/2022  Impression: 1  High-grade small bowel obstruction secondary to an interloop abscess in the central pelvis  The etiology of the abscess maybe sequela of prior perforated sigmoid diverticulitis or perhaps a perforated Meckel's diverticulitis  Nonetheless surgical assessment is advised  2   Small amount of pneumoperitoneum along the superior margin of the interloop abscess  3   Soft tissue induration along the margins of the central pelvic collection/interloop abscess possibly indurated phlegmon/fistulas to the sigmoid colon and dome of the bladder  4   Area of underdistention versus annular thickening of the mid sigmoid colon on series 2 image 75  An apple core lesion secondary to adenocarcinoma is difficult to exclude  Follow-up nonemergent colonoscopy is advised after the patient's small bowel obstruction is resolved/treated    I personally discussed this study with Rossana Sifuentes on 8/20/2022 at 12:04 PM  Workstation performed: [id number]     Recent Results (from the past 36 hour(s))   CBC and differential    Collection Time: 08/28/22  5:15 AM   Result Value Ref Range    WBC 12 84 (H) 4 31 - 10 16 Thousand/uL    RBC 3 65 (L) 3 88 - 5 62 Million/uL    Hemoglobin 10 6 (L) 12 0 - 17 0 g/dL    Hematocrit 34 0 (L) 36 5 - 49 3 %    MCV 93 82 - 98 fL    MCH 29 0 26 8 - 34 3 pg    MCHC 31 2 (L) 31 4 - 37 4 g/dL    RDW 12 9 11 6 - 15 1 %    MPV 9 2 8 9 - 12 7 fL    Platelets 787 (H) 058 - 390 Thousands/uL   Comprehensive metabolic panel    Collection Time: 08/28/22  5:15 AM   Result Value Ref Range    Sodium 142 135 - 147 mmol/L    Potassium 4 1 3 5 - 5 3 mmol/L    Chloride 105 96 - 108 mmol/L    CO2 27 21 - 32 mmol/L    ANION GAP 10 4 - 13 mmol/L    BUN 9 5 - 25 mg/dL    Creatinine 0 87 0 60 - 1 30 mg/dL    Glucose 91 65 - 140 mg/dL    Calcium 8 4 8 4 - 10 2 mg/dL Corrected Calcium 9 1 8 3 - 10 1 mg/dL    AST 23 13 - 39 U/L    ALT 27 7 - 52 U/L    Alkaline Phosphatase 35 34 - 104 U/L    Total Protein 5 5 (L) 6 4 - 8 4 g/dL    Albumin 3 1 (L) 3 5 - 5 0 g/dL    Total Bilirubin 0 22 0 20 - 1 00 mg/dL    eGFR 84 ml/min/1 73sq m       Current Medications:  Scheduled Meds:  Current Facility-Administered Medications   Medication Dose Route Frequency Provider Last Rate    aspirin  81 mg Oral Daily Catherine Martinez PA-C      atorvastatin  20 mg Oral Daily VINI Parikh      carisoprodol  350 mg Oral 4x Daily Catherine Martinez PA-C      enoxaparin  40 mg Subcutaneous Daily VINI Song      gabapentin  300 mg Oral TID VINI Parikh      HYDROmorphone  0 2 mg Intravenous Q1H PRN Nya Grossman MD      LORazepam  1 mg Intravenous Q4H PRN Willaine VINI Ashford      losartan  50 mg Oral Daily VINI Parikh      metoclopramide  10 mg Intravenous Q6H PRN Nya Grossman MD      ondansetron  4 mg Intravenous Q6H PRN VINI Song      oxyCODONE  10 mg Oral 4x Daily Catherine Zamora PA-C      oxyCODONE-acetaminophen  1 tablet Oral Q4H PRN Catherine Martinez PA-C      pantoprazole  40 mg Intravenous Q24H [address] Sabina Buck PA-C      traZODone  300 mg Oral HS Catherine Martinez PA-C       Continuous Infusions:   PRN Meds:    HYDROmorphone    LORazepam    metoclopramide    ondansetron    oxyCODONE-acetaminophen      VINI Parikh  8/29/2022  9:25 AM

## 2022-08-29 NOTE — CASE MANAGEMENT
Case Management Discharge Planning Note    Patient name Bety Salinas  Location /-31 MRN [id number]  : 1945 Date 2022       Current Admission Date: 2022  Current Admission Diagnosis:SBO (small bowel obstruction) [facility])   Patient Active Problem List    Diagnosis Date Noted    Diverticulitis of colon 2022    SBO (small bowel obstruction) ([facility] ) 2022    Primary osteoarthritis of right shoulder 2022    Stage 3 chronic kidney disease, unspecified whether stage 3a or 3b CKD ([facility] ) 2022    High risk medication use 2020    Rheumatoid arthritis with rheumatoid factor, unspecified ([place] [address] ) 2020    Atherosclerosis of native coronary artery of native heart without angina pectoris 2019    Chronic fatigue 2018    Status post aortic valve replacement with bioprosthetic valve 2018    Arthritis     Chronic pain     Continuous opioid dependence ([place] [address] )     Hyperlipidemia     Benign essential hypertension     Insomnia     Peripheral neuropathy     Carpal tunnel syndrome 2015    Psoriasis with arthropathy ([facility] ) 2015    H/O steroid therapy 2013    Arthralgia of lower leg 2013    Hyperglycemia 10/25/2012    Testicular hypogonadism 10/25/2012    Primary localized osteoarthrosis of shoulder region 10/09/2012    Psoriasis 10/09/2012    Atopic dermatitis 2012    Allergic rhinitis 2012    Low back pain 2012      LOS (days): 9  Geometric Mean LOS (GMLOS) (days): 5 70  Days to GMLOS:-3 4     OBJECTIVE:  Risk of Unplanned Readmission Score: 16 24         Current admission status: Inpatient   Preferred Pharmacy:   [address] [place] [address] [id number] [place], [place] [address] [address]  Phone: [phone] Fax: [phone] [id number] [address] [address]  Phone: [phone] Fax: [phone] 37 Sherly Lopez, [address] [place] [address]  Phone: [phone] Fax: [phone]    Primary Care Provider: João Hsu DO    Primary Insurance: Issac Arias W [facility] REP  Secondary Insurance:     DISCHARGE DETAILS:    Discharge planning discussed with[de-identified] patient and wife  Freedom of Choice: Yes  Comments - Freedom of Choice: hhc recommended by OT-  pt does want to go home,  Critical access hospital has accepted  CM contacted family/caregiver?: Yes             Contacts  Patient Contacts: wife Lilibeth Martinez  Relationship to Patient[de-identified] Family  Contact Method:  In Person  Reason/Outcome: Discharge [address]         Is the patient interested in [facility] at discharge?: Yes    DME Referral Provided  Referral made for DME?: No    Other Referral/Resources/Interventions Provided:  Interventions: HHC  Referral Comments: pt has been accepted by Critical access hospital    Would you like to participate in our [address] service program?  : No - Declined    Treatment Team Recommendation: Home with [address] [facility] (home with spouse and [address]  [facility] follow up- wife)                                   IMM Given (Date):: 08/29/22  IMM Given to[de-identified] Family

## 2022-08-29 NOTE — PLAN OF CARE
Problem: Potential for Falls  Goal: Patient will remain free of falls  Description: INTERVENTIONS:  - Educate patient/family on patient safety including physical limitations  - Instruct patient to call for assistance with activity   - Consult OT/PT to assist with strengthening/mobility   - Keep Call bell within reach  - Keep bed low and locked with side rails adjusted as appropriate  - Keep care items and personal belongings within reach  - Initiate and maintain comfort rounds  - Make Fall Risk Sign visible to staff  - Offer Toileting every 2 Hours, in advance of need  - Initiate/Maintain bed alarm  - Obtain necessary fall risk management equipment  - Apply yellow socks and bracelet for high fall risk patients  - Consider moving patient to room near nurses station  Outcome: Progressing     Problem: PAIN - ADULT  Goal: Verbalizes/displays adequate comfort level or baseline comfort level  Description: Interventions:  - Encourage patient to monitor pain and request assistance  - Assess pain using appropriate pain scale  - Administer analgesics based on type and severity of pain and evaluate response  - Implement non-pharmacological measures as appropriate and evaluate response  - Consider cultural and social influences on pain and pain management  - Notify physician/advanced practitioner if interventions unsuccessful or patient reports new pain  Outcome: Progressing     Problem: INFECTION - ADULT  Goal: Absence or prevention of progression during hospitalization  Description: INTERVENTIONS:  - Assess and monitor for signs and symptoms of infection  - Monitor lab/diagnostic results  - Monitor all insertion sites, i e  indwelling lines, tubes, and drains  - Monitor endotracheal if appropriate and nasal secretions for changes in amount and color  - [place] appropriate cooling/warming therapies per order  - Administer medications as ordered  - Instruct and encourage patient and family to use good hand hygiene technique  - Identify and instruct in appropriate isolation precautions for identified infection/condition  Outcome: Progressing     Problem: SAFETY ADULT  Goal: Patient will remain free of falls  Description: INTERVENTIONS:  - Educate patient/family on patient safety including physical limitations  - Instruct patient to call for assistance with activity   - Consult OT/PT to assist with strengthening/mobility   - Keep Call bell within reach  - Keep bed low and locked with side rails adjusted as appropriate  - Keep care items and personal belongings within reach  - Initiate and maintain comfort rounds  - Make Fall Risk Sign visible to staff  - Offer Toileting every 2 Hours, in advance of need  - Initiate/Maintain bed alarm  - Obtain necessary fall risk management equipment  - Apply yellow socks and bracelet for high fall risk patients  - Consider moving patient to room near nurses station  Outcome: Progressing  Goal: Maintain or return to baseline ADL function  Description: INTERVENTIONS:  -  Assess patient's ability to carry out ADLs; assess patient's baseline for ADL function and identify physical deficits which impact ability to perform ADLs (bathing, care of mouth/teeth, toileting, grooming, dressing, etc )  - Assess/evaluate cause of self-care deficits   - Assess range of motion  - Assess patient's mobility; develop plan if impaired  - Assess patient's need for assistive devices and provide as appropriate  - Encourage maximum independence but intervene and supervise when necessary  - Involve family in performance of ADLs  - Assess for home care needs following discharge   - Consider OT consult to assist with ADL evaluation and planning for discharge  - Provide patient education as appropriate  Outcome: Progressing  Goal: Maintains/Returns to pre admission functional level  Description: INTERVENTIONS:  - Perform BMAT or MOVE assessment daily    - Set and communicate daily mobility goal to care team and patient/family/caregiver  - Collaborate with rehabilitation services on mobility goals if consulted  - Perform Range of Motion 2 times a day  - Reposition patient every 2 hours  - Dangle patient 2 times a day  - Stand patient 2 times a day  - Ambulate patient 2 times a day  - Out of bed to chair 3 times a day   - Out of bed for meals 3 times a day  - Out of bed for toileting  - Record patient progress and toleration of activity level   Outcome: Progressing     Problem: DISCHARGE PLANNING  Goal: Discharge to home or other facility with appropriate resources  Description: INTERVENTIONS:  - Identify barriers to discharge w/patient and caregiver  - Arrange for needed discharge resources and transportation as appropriate  - Identify discharge learning needs (meds, wound care, etc )  - Refer to Case Management Department for coordinating discharge planning if the patient needs post-hospital services based on physician/advanced practitioner order or complex needs related to functional status, cognitive ability, or social support system  Outcome: Progressing     Problem: Knowledge Deficit  Goal: Patient/family/caregiver demonstrates understanding of disease process, treatment plan, medications, and discharge instructions  Description: Complete learning assessment and assess knowledge base    Interventions:  - Provide teaching at level of understanding  - Provide teaching via preferred learning methods  Outcome: Progressing     Problem: GASTROINTESTINAL - ADULT  Goal: Minimal or absence of nausea and/or vomiting  Description: INTERVENTIONS:  - Administer IV fluids if ordered to ensure adequate hydration  - Maintain NPO status until nausea and vomiting are resolved  - Nasogastric tube if ordered  - Administer ordered antiemetic medications as needed  - Provide nonpharmacologic comfort measures as appropriate  - Advance diet as tolerated, if ordered  - Consider nutrition services referral to assist patient with adequate nutrition and appropriate food choices  Outcome: Progressing  Goal: Maintains or returns to baseline bowel function  Description: INTERVENTIONS:  - Assess bowel function  - Encourage oral fluids to ensure adequate hydration  - Administer IV fluids if ordered to ensure adequate hydration  - Administer ordered medications as needed  - Encourage mobilization and activity  - Consider nutritional services referral to assist patient with adequate nutrition and appropriate food choices  Outcome: Progressing  Goal: Oral mucous membranes remain intact  Description: INTERVENTIONS  - Assess oral mucosa and hygiene practices  - Implement preventative oral hygiene regimen  - Implement oral medicated treatments as ordered  - Initiate Nutrition services referral as needed  Outcome: Progressing     Problem: Prexisting or High Potential for Compromised Skin Integrity  Goal: Skin integrity is maintained or improved  Description: INTERVENTIONS:  - Identify patients at risk for skin breakdown  - Assess and monitor skin integrity  - Assess and monitor nutrition and hydration status  - Monitor labs   - Assess for incontinence   - Turn and reposition patient  - Assist with mobility/ambulation  - Relieve pressure over bony prominences  - Avoid friction and shearing  - Provide appropriate hygiene as needed including keeping skin clean and dry  - Evaluate need for skin moisturizer/barrier cream  - Collaborate with interdisciplinary team   - Patient/family teaching  - Consider wound care consult   Outcome: Progressing     Problem: MOBILITY - ADULT  Goal: Maintain or return to baseline ADL function  Description: INTERVENTIONS:  -  Assess patient's ability to carry out ADLs; assess patient's baseline for ADL function and identify physical deficits which impact ability to perform ADLs (bathing, care of mouth/teeth, toileting, grooming, dressing, etc )  - Assess/evaluate cause of self-care deficits   - Assess range of motion  - Assess patient's mobility; develop plan if impaired  - Assess patient's need for assistive devices and provide as appropriate  - Encourage maximum independence but intervene and supervise when necessary  - Involve family in performance of ADLs  - Assess for home care needs following discharge   - Consider OT consult to assist with ADL evaluation and planning for discharge  - Provide patient education as appropriate  Outcome: Progressing  Goal: Maintains/Returns to pre admission functional level  Description: INTERVENTIONS:  - Perform BMAT or MOVE assessment daily    - Set and communicate daily mobility goal to care team and patient/family/caregiver  - Collaborate with rehabilitation services on mobility goals if consulted  - Perform Range of Motion 2 times a day  - Reposition patient every 2 hours  - Dangle patient 2 times a day  - Stand patient 2 times a day  - Ambulate patient 2 times a day  - Out of bed to chair 3 times a day   - Out of bed for meals 3 times a day  - Out of bed for toileting  - Record patient progress and toleration of activity level   Outcome: Progressing     Problem: Nutrition/Hydration-ADULT  Goal: Nutrient/Hydration intake appropriate for improving, restoring or maintaining nutritional needs  Description: Monitor and assess patient's nutrition/hydration status for malnutrition  Collaborate with interdisciplinary team and initiate plan and interventions as ordered  Monitor patient's weight and dietary intake as ordered or per policy  Utilize nutrition screening tool and intervene as necessary  Determine patient's food preferences and provide high-protein, high-caloric foods as appropriate       INTERVENTIONS:  - Monitor oral intake, urinary output, labs, and treatment plans  - Assess nutrition and hydration status and recommend course of action  - Evaluate amount of meals eaten  - Assist patient with eating if necessary   - Allow adequate time for meals  - Recommend/ encourage appropriate diets, oral nutritional supplements, and vitamin/mineral supplements  - Order, calculate, and assess calorie counts as needed  - Recommend, monitor, and adjust tube feedings and TPN/PPN based on assessed needs  - Assess need for intravenous fluids  - Provide specific nutrition/hydration education as appropriate  - Include patient/family/caregiver in decisions related to nutrition  Outcome: Progressing

## 2022-08-29 NOTE — PLAN OF CARE
Chief Complaint:     Kar Tanner is a 69 year old male who presents to clinic today for the following health issues:      Chief Complaint   Patient presents with     ER F/U     Letter Request     Medication Request        ED/UC Followup:    Facility:  ED  Date of visit: 11/2/2019  Reason for visit: Wound Check   Current Status: Patient report that has notice improve in wound and feels confident to back to work. Would like to get a letter from provider to let employer know he is ready to go back to work. Also would like to get a prescription on medication that can suppress infection from spreading or getting worse.        Reviewed and updated as needed this visit by Provider          HPI:   Patient Kar Tanner is a very pleasant 69 year old male with history of Type 2 Diabetes who presents to Internal Medicine clinic today for post ER discharge follow up of prepatellar cellulitis symptoms s/p I&D treatment at the ER. Regarding the patient's cellulitis symptoms, the patient reports that his cellulitis and pain symptoms have significantly improved, although not fully resolved. Regarding the patient's Type 2 Diabetes, the patient denies any recent new hypoglycemia events. No chest pain, headaches, fever or chills.         Current Medications:     Current Outpatient Medications   Medication Sig Dispense Refill     albuterol (PROAIR HFA/PROVENTIL HFA/VENTOLIN HFA) 108 (90 Base) MCG/ACT inhaler Inhale 2 puffs into the lungs every 6 hours as needed for shortness of breath / dyspnea or wheezing       aspirin (ASA) 81 MG EC tablet Take 1 tablet (81 mg) by mouth daily 90 tablet 3     atorvastatin (LIPITOR) 40 MG tablet Take 1 tablet (40 mg) by mouth daily 90 tablet 3     blood glucose (NO BRAND SPECIFIED) lancets standard Use to test blood sugar 1 time daily in the morning before breakfast 100 each 11     blood glucose (NO BRAND SPECIFIED) test strip Use to test blood sugar 1 time daily in the morning  Problem: OCCUPATIONAL THERAPY ADULT  Goal: Performs self-care activities at highest level of function for planned discharge setting  See evaluation for individualized goals  Description: Treatment Interventions: UE strengthening/ROM, Patient/family training, Activityengagement          See flowsheet documentation for full assessment, interventions and recommendations  Outcome: Progressing  Note: Limitation: Decreased ADL status, Decreased endurance, Decreased self-care trans  Prognosis: Fair  Assessment: Patient participated in Skilled OT session this date with interventions consisting of therapeutic exercise to: increase functional use of BUEs, increase BUE muscle strength  and increase OOB/ sitting tolerance   Patient agreeable to OT treatment session, upon arrival patient was found seated OOB to Chair  In comparison to previous session, patient with improvements in pain level (now at 0), and overall activity tolerance and ability for functional mobility  Patient requiring verbal cues for correct technique and frequent rest periods, and self-care assistance as noted in flow sheet/AM-PAC  Patient continues to be functioning below baseline level, occupational performance remains limited secondary to factors listed above and increased risk for falls and injury  From OT standpoint, recommendation at time of d/c would be home with home health services (has good home support)  Patient to benefit from continued Occupational Therapy treatment while in the hospital to address deficits as defined above and maximize level of functional independence with ADLs and functional mobility       OT Discharge Recommendation: Home with home health rehabilitation     ANIKA Ross/DONNIE before breakfast 100 each 11     blood glucose monitoring (NO BRAND SPECIFIED) meter device kit Use to test blood sugar 1 time daily in the morning before breakfast 1 kit 3     buPROPion (WELLBUTRIN SR) 150 MG 12 hr tablet Take 1 tablet (150 mg) by mouth 2 times daily 180 tablet 3     cephALEXin (KEFLEX) 500 MG capsule Take 1 capsule (500 mg) by mouth 3 times daily 30 capsule 0     cetirizine HCl 10 MG CAPS 2 caps po q hs 60 capsule 6     doxycycline hyclate (VIBRAMYCIN) 100 MG capsule Take 1 capsule (100 mg) by mouth 2 times daily for 14 days 28 capsule 0     finasteride (PROPECIA) 1 MG tablet Take 1 tablet (1 mg) by mouth daily 90 tablet 3     fluocinonide (LIDEX) 0.05 % external solution Apply to scalp bid for one week then prn 60 mL 3     fluticasone (FLONASE) 50 MCG/ACT nasal spray Spray 2 sprays into both nostrils daily       fluticasone (FLOVENT HFA) 220 MCG/ACT inhaler Inhale 1 puff into the lungs 2 times daily       hydrocortisone (CORTAID) 1 % external cream Apply topically 2 times daily 15 g 3     hydrOXYzine (ATARAX) 25 MG tablet Take 1 tablet (25 mg) by mouth every 8 hours as needed for itching 90 tablet 11     isosorbide mononitrate (IMDUR) 30 MG 24 hr tablet Take 1 tablet (30 mg) by mouth daily 90 tablet 3     lisinopril (PRINIVIL/ZESTRIL) 5 MG tablet Take 1 tablet (5 mg) by mouth daily 90 tablet 3     loratadine (CLARITIN) 10 MG tablet Take 2 tablets (20 mg) by mouth daily 60 tablet 6     metFORMIN (GLUCOPHAGE) 500 MG tablet Take 1 tablet (500 mg) by mouth 2 times daily (with meals) 180 tablet 3     metoprolol tartrate (LOPRESSOR) 25 MG tablet Take 1 tablet (25 mg) by mouth 2 times daily 180 tablet 3     MULTI-VITAMIN OR TABS 1 tablet every other day  0     nitroGLYcerin (NITROSTAT) 0.4 MG sublingual tablet For chest pain place 1 tablet under the tongue every 5 minutes for 3 doses. If symptoms persist 5 minutes after 1st dose call 911. 25 tablet 11     pantoprazole (PROTONIX) 40 MG EC tablet Take 1  tablet (40 mg) by mouth every morning (before breakfast) 90 tablet 3     PREVIDENT 5000 BOOSTER PLUS 1.1 % PSTE USE BID WITH BRUSHING  6     sertraline (ZOLOFT) 25 MG tablet Take 1 tablet (25 mg) by mouth daily 90 tablet 3     ticagrelor (BRILINTA) 90 MG tablet Take 1 tablet (90 mg) by mouth every 12 hours 180 tablet 3     traZODone (DESYREL) 100 MG tablet Take 1 tablet (100 mg) by mouth At Bedtime 90 tablet 3     triamcinolone (KENALOG) 0.1 % external ointment Apply to AA BID x 2-3 weeks then  g 1         Allergies:      Allergies   Allergen Reactions     Dust Mites             Past Medical History:     Past Medical History:   Diagnosis Date     Churg-Naima syndrome with lung involvement (H) 2011    spinal tap, lung biopsy to diagnosis it     Depressive disorder      Heart disease     nstemi feb 2019     Hypertension      Sleep apnea 2008    doesn't use Cpap- can't get used to it     Uncomplicated asthma          Past Surgical History:     Past Surgical History:   Procedure Laterality Date     BIOPSY  2006    lung and others     COLONOSCOPY       CV CORONARY ANGIOGRAM N/A 4/3/2019    Procedure: Coronary Angiogram;  Surgeon: Paul Sibley MD;  Location:  [facility]     CV HEART CATHETERIZATION WITH POSSIBLE INTERVENTION N/A 2/22/2019    Procedure: Heart Catheterization with possible Intervention;  Surgeon: Dar Harris MD;  Location:  [facility] CATH [facility]     CV LEFT HEART CATH N/A 4/3/2019    Procedure: Left Heart Cath;  Surgeon: Paul Sibley MD;  Location:  [facility]ATH [facility]     CV PCI ANGIOPLASTY N/A 2/22/2019    Procedure: Percutaneous Coronary Intervention;  Surgeon: Dar Harris MD;  Location:  HEART CARD[facility] CATH LAB     ENT SURGERY  before 12    tonsils removed in [place]     ENT SURGERY  before 12    two wisdom teeth removed     EYE SURGERY  2003    [facility]     VASCULAR SURGERY      biopsy- churg-naima         Family Medical History:     Family  History   Problem Relation Age of Onset     Hypertension Mother      Depression Mother      Lipids Mother      Respiratory Mother         asthma     Autism Spectrum Disorder Mother         possibly Asperger's     Anxiety Disorder Mother      Hypertension Father      Prostate Cancer Father      Lipids Father      Mental Illness Brother         alopesia     Mental Illness Maternal Grandmother         demanding- physical punishment     Mental Illness Paternal Uncle         academic         Social History:     Social History     Socioeconomic History     Marital status: Significant other     Spouse name: Not on file     Number of children: Not on file     Years of education: 18     Highest education level: Not on file   Occupational History     Occupation:      Employer: [employer]   Social Needs     Financial resource strain: Not on file     Food insecurity:     Worry: Not on file     Inability: Not on file     Transportation needs:     Medical: Not on file     Non-medical: Not on file   Tobacco Use     Smoking status: Former Smoker     Packs/day: 1.00     Years: 30.00     Pack years: 30.00     Start date:      Last attempt to quit: 1998     Years since quittin.2     Smokeless tobacco: Never Used   Substance and Sexual Activity     Alcohol use: No     Drug use: No     Sexual activity: Not Currently   Lifestyle     Physical activity:     Days per week: Not on file     Minutes per session: Not on file     Stress: Not on file   Relationships     Social connections:     Talks on phone: Not on file     Gets together: Not on file     Attends [employer] service: Not on file     Active member of club or organization: Not on file     Attends meetings of clubs or organizations: Not on file     Relationship status: Not on file     Intimate partner violence:     Fear of current or ex partner: Not on file     Emotionally abused: Not on file     Physically abused: Not on file     Forced sexual  "activity: Not on file   Other Topics Concern     Parent/sibling w/ CABG, MI or angioplasty before 65F 55M? No   Social History Narrative    Balanced Diet - Yes    Osteoporosis Preventative measures-  Weight bearing exercise and calcium in mvi    Regular Exercise -  Yes Describe walking every day    Dental Exam up - YES - Date: last month    Eye Exam - NO    Self Testicular Exam -  No    Do you have any concerns about STD's -  No    Abuse: Current or Past (Physical, Sexual or Emotional)- No    Do you feel safe in your environment - Yes    Guns stored in the home - No    Sunscreen used - No    Seatbelts used - Yes    Lipids - YES - Date: 1-2 years ago    Glucose -  NO    Colon Cancer Screening - No    Hemoccults - NO    PSA - NO    Digital Rectal Exam - NO    Immunizations reviewed and up to date - unsure when last td was given           Review of System:     Constitutional: Negative for fever or chills  Skin: positive for prepatellar cellulitis symptoms present without signs of acute bleeding.  Ears/Nose/Throat: Negative for nasal congestion, sore throat  Respiratory: No shortness of breath, dyspnea on exertion, cough, or hemoptysis  Cardiovascular: Negative for chest pain  Gastrointestinal: Negative for nausea, vomiting  Genitourinary: Negative for dysuria, hematuria  Musculoskeletal: Negative for myalgias  Neurologic: Negative for headaches  Psychiatric: Negative for depression, anxiety  Hematologic/Lymphatic/Immunologic: Negative  Endocrine: Negative  Behavioral: Negative for tobacco use       Physical Exam:   /70 (BP Location: Left arm, Patient Position: Sitting, Cuff Size: Adult Regular)   Pulse 67   Temp 96.9  F (36.1  C) (Tympanic)   Ht 1.727 m (5' 8\")   Wt 73.9 kg (163 lb)   SpO2 94%   BMI 24.78 kg/m      GENERAL: alert and no distress  EYES: eyes grossly normal to inspection, and conjunctivae and sclerae normal  HENT: Normocephalic atraumatic. Nose and mouth without ulcers or lesions  NECK: " supple  RESP: lungs clear to auscultation   CV: regular rate and rhythm, normal S1 S2  LYMPH: no peripheral edema   ABDOMEN: nondistended  MS: no gross musculoskeletal defects noted  SKIN: prepatellar cellulitis symptoms present without signs of acute bleeding.  NEURO: Alert & Oriented x 3.   PSYCH: mentation appears normal, affect normal        Diagnostic Test Results:     Diagnostic Test Results:    Lab Results   Component Value Date    WBC 12.2 11/02/2019     Lab Results   Component Value Date    RBC 4.48 11/02/2019     Lab Results   Component Value Date    HGB 12.5 11/02/2019     Lab Results   Component Value Date    HCT 37.6 11/02/2019     No components found for: MCT  Lab Results   Component Value Date    MCV 84 11/02/2019     Lab Results   Component Value Date    MCH 27.9 11/02/2019     Lab Results   Component Value Date    MCHC 33.2 11/02/2019     Lab Results   Component Value Date    RDW 13.0 11/02/2019     Lab Results   Component Value Date     11/02/2019     Lab Results   Component Value Date    A1C 7.5 04/12/2019         ASSESSMENT/PLAN:     (Z51.89) Visit for wound check  (L03.115) Cellulitis of right lower extremity  (primary encounter diagnosis)  Comment: right anterior knee cap prepatellar cellulitis symptoms have improved, although not fully resolved.  Plan: cephALEXin (KEFLEX) 500 MG capsule    (E11.9) Type 2 diabetes mellitus without complication, without long-term current use of insulin (H)  Comment: no recent hypoglycemia events  Plan: continue current diabetes therapy    (I10) Essential hypertension, benign  Comment: BP is well controlled  Plan: continue current BP medication regimen.    Follow Up Plan:     Patient is instructed to return to Internal Medicine clinic for follow-up visit in 2 weeks.        Kathy Chapman MD  Internal Medicine  [facility]

## 2022-08-29 NOTE — OCCUPATIONAL THERAPY NOTE
08/29/22 1037   OT Last Visit   OT Visit Date 08/29/22   Note Type   Note Type Treatment   Restrictions/Precautions   Weight Bearing Precautions Per Order No   Other Precautions Fall Risk;Pain;Multiple lines   General   Response to Previous Treatment Patient with no complaints from previous session   Family/Caregiver Present wife present, very pleasant and attentive   Pain Assessment   Pain Assessment Tool 0-10   Pain Score No Pain   ADL   Where Assessed   (declined any self-care/need for same at this time)   Therapeutic Excerise-Strength   UE Strength Yes   Right Upper Extremity- Strength   R Shoulder Flexion; Horizontal ABduction; Other (Comment)  (pro/re-traction)   R Elbow   (INSTRUCTION only IN [place] AND REVERSE 'CURL', AND SUPIN/PRON-ATION EXERCISE (pt  had to leave floor for x-ray))   R Weight/Reps/Sets 1 pound weight - 15 reps shoulder pro/re, 10 reps shoulder flexion, and 6 reps shoulder horiz  (Elbows)   RUE Strength Comment self-monitors, per arthritis of shoulder   Left Upper Extremity-Strength   L Shoulder Flexion; Horizontal ABduction; Other (Comment)  (pro/re-traction)   L Weights/Reps/Sets 1 pound weight - 20 reps shoulder pro/re,  15 reps shoulder flexion, 10 reps shoulder horiz  Exercise Tools   Exercise Tools   (*Patient requested written exercise sheets for home reference - education provided to wife while patient was off floor for test (she verbalized good understanding))   Coordination   Gross Motor WFL   Dexterity WFL   Cognition   Overall Cognitive Status WFL   Arousal/Participation Alert; Cooperative   Attention Within functional limits   Orientation Level Oriented X4   Following Commands Follows all commands and directions without difficulty   Comments patient with much brighter and relaxed affect today > anticipating discharge soon/possibly today   Activity Tolerance   Activity Tolerance Patient limited by fatigue  (and limitations due to arthritis)   Assessment   Assessment Patient participated in Skilled OT session this date with interventions consisting of therapeutic exercise to: increase functional use of BUEs, increase BUE muscle strength  and increase OOB/ sitting tolerance   Patient agreeable to OT treatment session, upon arrival patient was found seated OOB to Chair  In comparison to previous session, patient with improvements in pain level (now at 0), and overall activity tolerance and ability for functional mobility  Patient requiring verbal cues for correct technique and frequent rest periods, and self-care assistance as noted in flow sheet/AM-PAC  Patient continues to be functioning below baseline level, occupational performance remains limited secondary to factors listed above and increased risk for falls and injury  From OT standpoint, recommendation at time of d/c would be home with home health services (has good home support)  Patient to benefit from continued Occupational Therapy treatment while in the hospital to address deficits as defined above and maximize level of functional independence with ADLs and functional mobility  Plan   Treatment Interventions UE strengthening/ROM; Patient/family training; Activityengagement   Goal Expiration Date 08/31/22   OT Treatment Day 5   Recommendation   OT Discharge Recommendation Home with home health rehabilitation   Additional Comments 2 The patient's raw score on the AM-PAC Daily Activity inpatient short form is 16, standardized score is 35 96, less than 39 4  Patients at this level are likely to benefit from discharge to post-acute rehabilitation services  Please refer to the recommendation of the Occupational Therapist for safe discharge planning     AM-PAC Daily Activity Inpatient   Lower Body Dressing 2   Bathing 2   Toileting 2   Upper Body Dressing 3   Grooming 3   Eating 4   Daily Activity Raw Score 16   Daily Activity Standardized Score (Calc for Raw Score >=11) 35 96   AM-[facility] Applied Cognition Inpatient   Following a Speech/Presentation 4   Understanding Ordinary Conversation 4   Taking Medications 4   Remembering Where Things Are Placed or Put Away 4   Remembering List of 4-5 Errands 4   Taking Care of Complicated Tasks 4   Applied Cognition Raw Score 24   Applied Cognition Standardized Score 62 21     ANIKA Donovan/L

## 2022-08-30 ENCOUNTER — TRANSITIONAL CARE MANAGEMENT (OUTPATIENT)
Dept: FAMILY MEDICINE CLINIC | Facility: CLINIC | Age: 77
End: 2022-08-30

## 2022-08-30 VITALS
WEIGHT: 188 LBS | DIASTOLIC BLOOD PRESSURE: 71 MMHG | HEIGHT: 66 IN | RESPIRATION RATE: 20 BRPM | SYSTOLIC BLOOD PRESSURE: 127 MMHG | TEMPERATURE: 98.4 F | BODY MASS INDEX: 30.22 KG/M2 | HEART RATE: 77 BPM | OXYGEN SATURATION: 91 %

## 2022-08-30 PROCEDURE — 99024 POSTOP FOLLOW-UP VISIT: CPT

## 2022-08-30 RX ADMIN — GABAPENTIN 300 MG: 600 TABLET, FILM COATED ORAL at 07:58

## 2022-08-30 RX ADMIN — OXYCODONE HYDROCHLORIDE AND ACETAMINOPHEN 1 TABLET: 5; 325 TABLET ORAL at 05:50

## 2022-08-30 RX ADMIN — CARISOPRODOL 350 MG: 350 TABLET ORAL at 07:58

## 2022-08-30 RX ADMIN — ATORVASTATIN CALCIUM 20 MG: 20 TABLET, FILM COATED ORAL at 07:57

## 2022-08-30 RX ADMIN — OXYCODONE HYDROCHLORIDE 10 MG: 10 TABLET ORAL at 08:00

## 2022-08-30 RX ADMIN — ASPIRIN 81 MG: 81 TABLET, COATED ORAL at 07:57

## 2022-08-30 RX ADMIN — LOSARTAN POTASSIUM 50 MG: 50 TABLET, FILM COATED ORAL at 08:00

## 2022-08-30 RX ADMIN — ENOXAPARIN SODIUM 40 MG: 100 INJECTION SUBCUTANEOUS at 07:59

## 2022-08-30 NOTE — PLAN OF CARE
Problem: Potential for Falls  Goal: Patient will remain free of falls  Description: INTERVENTIONS:  - Educate patient/family on patient safety including physical limitations  - Instruct patient to call for assistance with activity   - Consult OT/PT to assist with strengthening/mobility   - Keep Call bell within reach  - Keep bed low and locked with side rails adjusted as appropriate  - Keep care items and personal belongings within reach  - Initiate and maintain comfort rounds  - Make Fall Risk Sign visible to staff  - Offer Toileting every 2 Hours, in advance of need  - Initiate/Maintain bed alarm  - Obtain necessary fall risk management equipment  - Apply yellow socks and bracelet for high fall risk patients  - Consider moving patient to room near nurses station  Outcome: Progressing     Problem: PAIN - ADULT  Goal: Verbalizes/displays adequate comfort level or baseline comfort level  Description: Interventions:  - Encourage patient to monitor pain and request assistance  - Assess pain using appropriate pain scale  - Administer analgesics based on type and severity of pain and evaluate response  - Implement non-pharmacological measures as appropriate and evaluate response  - Consider cultural and social influences on pain and pain management  - Notify physician/advanced practitioner if interventions unsuccessful or patient reports new pain  Outcome: Progressing     Problem: INFECTION - ADULT  Goal: Absence or prevention of progression during hospitalization  Description: INTERVENTIONS:  - Assess and monitor for signs and symptoms of infection  - Monitor lab/diagnostic results  - Monitor all insertion sites, i e  indwelling lines, tubes, and drains  - Monitor endotracheal if appropriate and nasal secretions for changes in amount and color  - [place] appropriate cooling/warming therapies per order  - Administer medications as ordered  - Instruct and encourage patient and family to use good hand hygiene technique  - Identify and instruct in appropriate isolation precautions for identified infection/condition  Outcome: Progressing     Problem: SAFETY ADULT  Goal: Patient will remain free of falls  Description: INTERVENTIONS:  - Educate patient/family on patient safety including physical limitations  - Instruct patient to call for assistance with activity   - Consult OT/PT to assist with strengthening/mobility   - Keep Call bell within reach  - Keep bed low and locked with side rails adjusted as appropriate  - Keep care items and personal belongings within reach  - Initiate and maintain comfort rounds  - Make Fall Risk Sign visible to staff  - Offer Toileting every 2 Hours, in advance of need  - Initiate/Maintain bed alarm  - Obtain necessary fall risk management equipment  - Apply yellow socks and bracelet for high fall risk patients  - Consider moving patient to room near nurses station  Outcome: Progressing  Goal: Maintain or return to baseline ADL function  Description: INTERVENTIONS:  -  Assess patient's ability to carry out ADLs; assess patient's baseline for ADL function and identify physical deficits which impact ability to perform ADLs (bathing, care of mouth/teeth, toileting, grooming, dressing, etc )  - Assess/evaluate cause of self-care deficits   - Assess range of motion  - Assess patient's mobility; develop plan if impaired  - Assess patient's need for assistive devices and provide as appropriate  - Encourage maximum independence but intervene and supervise when necessary  - Involve family in performance of ADLs  - Assess for home care needs following discharge   - Consider OT consult to assist with ADL evaluation and planning for discharge  - Provide patient education as appropriate  Outcome: Progressing  Goal: Maintains/Returns to pre admission functional level  Description: INTERVENTIONS:  - Perform BMAT or MOVE assessment daily    - Set and communicate daily mobility goal to care team and patient/family/caregiver  - Collaborate with rehabilitation services on mobility goals if consulted  - Perform Range of Motion 2 times a day  - Reposition patient every 2 hours  - Dangle patient 2 times a day  - Stand patient 2 times a day  - Ambulate patient 2 times a day  - Out of bed to chair 3 times a day   - Out of bed for meals 3 times a day  - Out of bed for toileting  - Record patient progress and toleration of activity level   Outcome: Progressing     Problem: DISCHARGE PLANNING  Goal: Discharge to home or other facility with appropriate resources  Description: INTERVENTIONS:  - Identify barriers to discharge w/patient and caregiver  - Arrange for needed discharge resources and transportation as appropriate  - Identify discharge learning needs (meds, wound care, etc )  - Refer to Case Management Department for coordinating discharge planning if the patient needs post-hospital services based on physician/advanced practitioner order or complex needs related to functional status, cognitive ability, or social support system  Outcome: Progressing     Problem: Knowledge Deficit  Goal: Patient/family/caregiver demonstrates understanding of disease process, treatment plan, medications, and discharge instructions  Description: Complete learning assessment and assess knowledge base    Interventions:  - Provide teaching at level of understanding  - Provide teaching via preferred learning methods  Outcome: Progressing     Problem: GASTROINTESTINAL - ADULT  Goal: Minimal or absence of nausea and/or vomiting  Description: INTERVENTIONS:  - Administer IV fluids if ordered to ensure adequate hydration  - Maintain NPO status until nausea and vomiting are resolved  - Nasogastric tube if ordered  - Administer ordered antiemetic medications as needed  - Provide nonpharmacologic comfort measures as appropriate  - Advance diet as tolerated, if ordered  - Consider nutrition services referral to assist patient with adequate nutrition and appropriate food choices  Outcome: Progressing  Goal: Maintains or returns to baseline bowel function  Description: INTERVENTIONS:  - Assess bowel function  - Encourage oral fluids to ensure adequate hydration  - Administer IV fluids if ordered to ensure adequate hydration  - Administer ordered medications as needed  - Encourage mobilization and activity  - Consider nutritional services referral to assist patient with adequate nutrition and appropriate food choices  Outcome: Progressing  Goal: Oral mucous membranes remain intact  Description: INTERVENTIONS  - Assess oral mucosa and hygiene practices  - Implement preventative oral hygiene regimen  - Implement oral medicated treatments as ordered  - Initiate Nutrition services referral as needed  Outcome: Progressing     Problem: Prexisting or High Potential for Compromised Skin Integrity  Goal: Skin integrity is maintained or improved  Description: INTERVENTIONS:  - Identify patients at risk for skin breakdown  - Assess and monitor skin integrity  - Assess and monitor nutrition and hydration status  - Monitor labs   - Assess for incontinence   - Turn and reposition patient  - Assist with mobility/ambulation  - Relieve pressure over bony prominences  - Avoid friction and shearing  - Provide appropriate hygiene as needed including keeping skin clean and dry  - Evaluate need for skin moisturizer/barrier cream  - Collaborate with interdisciplinary team   - Patient/family teaching  - Consider wound care consult   Outcome: Progressing     Problem: MOBILITY - ADULT  Goal: Maintain or return to baseline ADL function  Description: INTERVENTIONS:  -  Assess patient's ability to carry out ADLs; assess patient's baseline for ADL function and identify physical deficits which impact ability to perform ADLs (bathing, care of mouth/teeth, toileting, grooming, dressing, etc )  - Assess/evaluate cause of self-care deficits   - Assess range of motion  - Assess patient's mobility; develop plan if impaired  - Assess patient's need for assistive devices and provide as appropriate  - Encourage maximum independence but intervene and supervise when necessary  - Involve family in performance of ADLs  - Assess for home care needs following discharge   - Consider OT consult to assist with ADL evaluation and planning for discharge  - Provide patient education as appropriate  Outcome: Progressing  Goal: Maintains/Returns to pre admission functional level  Description: INTERVENTIONS:  - Perform BMAT or MOVE assessment daily    - Set and communicate daily mobility goal to care team and patient/family/caregiver  - Collaborate with rehabilitation services on mobility goals if consulted  - Perform Range of Motion 2 times a day  - Reposition patient every 2 hours  - Dangle patient 2 times a day  - Stand patient 2 times a day  - Ambulate patient 2 times a day  - Out of bed to chair 3 times a day   - Out of bed for meals 3 times a day  - Out of bed for toileting  - Record patient progress and toleration of activity level   Outcome: Progressing     Problem: Nutrition/Hydration-ADULT  Goal: Nutrient/Hydration intake appropriate for improving, restoring or maintaining nutritional needs  Description: Monitor and assess patient's nutrition/hydration status for malnutrition  Collaborate with interdisciplinary team and initiate plan and interventions as ordered  Monitor patient's weight and dietary intake as ordered or per policy  Utilize nutrition screening tool and intervene as necessary  Determine patient's food preferences and provide high-protein, high-caloric foods as appropriate       INTERVENTIONS:  - Monitor oral intake, urinary output, labs, and treatment plans  - Assess nutrition and hydration status and recommend course of action  - Evaluate amount of meals eaten  - Assist patient with eating if necessary   - Allow adequate time for meals  - Recommend/ encourage appropriate diets, oral nutritional supplements, and vitamin/mineral supplements  - Order, calculate, and assess calorie counts as needed  - Recommend, monitor, and adjust tube feedings and TPN/PPN based on assessed needs  - Assess need for intravenous fluids  - Provide specific nutrition/hydration education as appropriate  - Include patient/family/caregiver in decisions related to nutrition  Outcome: Progressing

## 2022-08-30 NOTE — NURSING NOTE
Discharge instructions reviewed virtually with the pt and his wife  Their questions were answered and they stated understanding

## 2022-08-30 NOTE — DISCHARGE INSTRUCTIONS
DISCHARGE INSTRUCTIONS:   Light activity   No heavy lifting, limit lifting to 15-20 pounds for 2 weeks   No limitations for diet   Please take medications as prescribed   Please take acetaminophen/ibuprofen scheduled every 4-6 hours for pain  Please take narcotic pain medication as needed for severe pain   Do not drive if taking narcotic pain medication     Surgical staples are over your midline incision  Please do not remove surgical staples  Please remove dressings tomorrow  You may shower but do not scrub or submerge incisions    Please notify general surgery office if you experience:  Fever over 101 5F  Persistent nausea or vomiting  Severe uncontrolled pain  Redness, tenderness, or signs of infection near incisions (pain, swelling, redness, yellow/green drainage)  Active or persistent bleeding from incisions  Chest pain, shortness of breath     Please call to schedule a follow up appointment in general surgery office in 2 weeks   Please call our office with any additional questions or concerns

## 2022-08-30 NOTE — CASE MANAGEMENT
Case Management Discharge Planning Note    Patient name Iván Call  Location /-13 MRN [id number]  : 1945 Date 2022       Current Admission Date: 2022  Current Admission Diagnosis:SBO (small bowel obstruction) [facility])   Patient Active Problem List    Diagnosis Date Noted    Diverticulitis of colon 2022    [facility] 20[facility]    Primary osteoarthritis of right shoulder 2022    Stage 3 chronic kidney disease, unspecified whether stage 3a or 3b CKD ([facility] ) 2022    High risk medication use 2020    Rheumatoid arthritis with rheumatoid factor, unspecified ([place] [address] ) 2020    Atherosclerosis of native coronary artery of native heart without angina pectoris 2019    Chronic fatigue 2018    Status post aortic valve replacement with bioprosthetic valve 2018    Arthritis     Chronic pain     Continuous opioid dependence ([facility] )     Hyperlipidemia     Benign essential hypertension     Insomnia     Peripheral neuropathy     Carpal tunnel syndrome 2015    Psoriasis with arthropathy ([facility] ) 2015    H/O steroid therapy 2013    Arthralgia of lower leg 2013    Hyperglycemia 10/25/2012    Testicular hypogonadism 10/25/2012    Primary localized osteoarthrosis of shoulder region 10/09/2012    Psoriasis 10/09/2012    Atopic dermatitis 2012    Allergic rhinitis 2012    Low back pain 2012      LOS (days): 10  Geometric Mean LOS (GMLOS) (days): 5 70  Days to GMLOS:-4 2     OBJECTIVE:  Risk of Unplanned Readmission Score: 14 99         Current admission status: Inpatient   Preferred Pharmacy:   [address] [place] [address] #[id number] [facility] - [address] [address]  Phone: [phone] Fax: [phone] [facility] #[id number], [address] [address]  Phone: [phone] Fax: [phone] 37 [address], [address] [place] [address]  Phone: [phone] Fax: [phone]    Primary Care Provider: Sayda Granado DO    Primary Insurance: [facility]ung[facility]  Secondary Insurance:     DISCHARGE DETAILS:    Discharge planning discussed with[de-identified] patient and wife  Freedom of Choice: Yes  Comments - Freedom of Choice: family would like [facility]- [facility]- [facility] was made aware of the d/c- avs was faxed at [phone]  CM contacted family/caregiver?: Yes  Were Treatment Team discharge recommendations reviewed with patient/caregiver?: Yes  Did patient/caregiver verbalize understanding of patient care needs?: Yes  Were patient/caregiver advised of the risks associated with not following Treatment Team discharge recommendations?: Yes    Contacts  Patient Contacts: wife Omro Lack  Relationship to Patient[de-identified] Family  Contact Method:  In Person  Reason/Outcome: Discharge [address]         Is the patient interested in [facility] at discharge?: Yes    DME Referral Provided  Referral made for DME?: No    Other Referral/Resources/Interventions Provided:  Interventions: [facility]  Referral Comments: pt has been accepted  by [facility]-  wound nurse has ordered colostomy supplies for this patient, cm has supplies to take home- pt and wife deny any additional d/c needs    Would you like to participate in our [address] service program?  : No - Declined    Treatment Team Recommendation: Home with [address] [employer] (home with spouse , [facility] & outpt follow up-wife will transport)  Discharge Destination Plan[de-identified] Home with [address] [employer] (home with wife, [facility] and outpt follow up)  Transport at Discharge : Automobile, Family         pt and family are in agreement with the d/c and d/c plan              Accompanied by: Family member           Family notified[de-identified] wife was in the room

## 2022-08-31 NOTE — DISCHARGE SUMMARY
Discharge Summary   Cricket Kennedy 78 y o  male MRN: [id number]  Unit/Bed#: -01 Encounter: [id number]  8/31/2022    Admission Date: 8/20/2022   Discharge Date: 8/31/2022    Reason for Admission:  Admitting Diagnosis:   Small bowel obstruction ([facility] ) [J50 354]    PMH/Secondary Diagnoses:  Past Medical History:   Diagnosis Date    Aortic stenosis     Aortic stenosis, severe     Arthritis     Carpal tunnel syndrome     Chronic back pain     Chronic pain     Chronic, continuous use of opioids     Hyperlipidemia     Hypertension     Hypertrophy of prostate     Insomnia     Large bowel obstruction (HCC)     Peripheral neuropathy     RA (rheumatoid arthritis) ([facility] )     Umbilical hernia      Past Surgical History:   Procedure Laterality Date    ADENOIDECTOMY      AORTIC VALVE REPLACEMENT  10/17/2017    BASAL CELL CARCINOMA EXCISION      CARDIAC CATHETERIZATION      LAST ASSESSED: 20OCT2017    CARPAL TUNNEL RELEASE Bilateral     left    CATARACT EXTRACTION      FL CYSTOGRAM  8/29/2022    HERNIA REPAIR      inguinal bilateral -umbilical     LAMINECTOMY      DECOMPRESS, FACETECTOMY, FORAMINOTOMY LUMBAR SEG    LAPAROTOMY N/A 8/20/2022    Procedure: LAPAROTOMY EXPLORATORY, sigmoid colon resection,colostomy;  Surgeon:  Leann Varela MD;  Location: [facility];  Service: General    NEUROPLASTY / TRANSPOSITION MEDIAN NERVE AT CARPAL TUNNEL      AR RPLCMT AORTIC VALVE OPN W/STENTLESS TISSUE VALVE N/A 10/17/2017    Procedure: AORTIC VALVE REPLACEMENT with 23MM MAGNAEASE TISSUE VALVE; INTRA-OP SHANDRA;  Surgeon: Gustavo Acevedo MD;  Location: [facility];  Service: Cardiac Surgery    TONSILLECTOMY       Discharge Diagnoses:   Principal Problem:    SBO (small bowel obstruction) ([facility] )  Active Problems:    Diverticulitis of colon    Consultants:   Urology    Procedures/Surgeries Performed:  LAPAROTOMY EXPLORATORY, sigmoid colon resection,colostomy (N/A Abdomen)    Significant Findings:  CT abdomen pelvis wo contrast    Result Date: 8/20/2022  Impression: Although limited without use of intravenous and oral contrast there appears to be an abscess in the central pelvis as described causing proximal small bowel obstruction  Repeat examination with the use of intravenous and oral contrast may provide additional information  Recommend surgical consultation  The study was marked in [facility] for immediate notification  Workstation performed: [id number]     XR abdomen 1 vw portable    Result Date: 8/21/2022  Impression: Enteric tube in place with tip projecting in the region of proximal stomach  Dilated loop of small bowel in the mid abdomen measuring up to 5 8 cm, in keeping with known small bowel obstruction  There is relative paucity of gas in the abdomen, limiting adequate evaluation  Workstation performed: [id number]     CT abdomen pelvis with contrast    Result Date: 8/20/2022  Impression: 1  High-grade small bowel obstruction secondary to an interloop abscess in the central pelvis  The etiology of the abscess maybe sequela of prior perforated sigmoid diverticulitis or perhaps a perforated Meckel's diverticulitis  Nonetheless surgical assessment is advised  2   Small amount of pneumoperitoneum along the superior margin of the interloop abscess  3   Soft tissue induration along the margins of the central pelvic collection/interloop abscess possibly indurated phlegmon/fistulas to the sigmoid colon and dome of the bladder  4   Area of underdistention versus annular thickening of the mid sigmoid colon on series 2 image 75  An apple core lesion secondary to adenocarcinoma is difficult to exclude  Follow-up nonemergent colonoscopy is advised after the patient's small bowel obstruction is resolved/treated    I personally discussed this study with Giovanna Ashton on 8/20/2022 at 12:04 PM  Workstation performed: [id number]     Medications/Allergies:  Penicillins  Quinine Derivatives    History of Present Illness: As per   Junette Litten: "Junie Alexandre is a 78 y o  male who presents with high-grade small-bowel obstruction  Patient states 4 days ago he began to experience abdominal distension and a vague abdominal discomfort  He denies fever, says that he always feels cold but did not have rigors  The symptoms progressed, he had flatus yesterday, and a small ineffective bowel movement , and no bowel movement since  He did pass some gas this morning, however this morning he began to experience intractable vomiting of a tan feculent material   He proceeded to the emergency room where CT scan without contrast demonstrated a high-grade small-bowel obstruction, and suggested an intra-abdominal abscess  Repeat scanning with contrast confirms intra-abdominal abscess, and a point of obstruction being at the can fluids of the sigmoid colon, dome of the bladder, and small bowel  The films were discussed with Dr Reji Ron from Interventional Radiology, who states that the abscess is enveloped by bowel and there is no way to percutaneously drain it  The patient is now scheduled for exploratory laparotomy with drainage of intra-abdominal abscess and possible bowel resection  He is aware that a sigmoid colostomy may be necessary "    Summary of Hospital Course:  Patient is sleepy presented to [facility] ED on 08/20/2022 with complaints of abdominal distension and abdominal discomfort  CT performed in ED with evidence of high-grade small-bowel obstruction with intra-abdominal abscess  Evaluated in ED by general surgeon with recommendation to proceed with transfer to [facility] for exploratory laparotomy with drainage of intra-abdominal abscess and possible bowel resection including possible sigmoid colostomy  Patient was subsequently transferred to [address] [place] [address] and underwent exploratory laparotomy with segmental small bowel resection and Vane's procedure on 95/49/76/4925 without complications    Postoperatively NG tube remained in place and Ambriz remained in place with concern for colovesical fistula  Patient remained admitted postoperatively for close monitoring and optimization  Trickle tube feeds for subsequently initiated through NG tube and increase to goal as tolerated  As a result NG tube was discontinued on 08/25/2022 with advancement in oral diet  Patient tolerated advancement without increase in abdominal pain, nausea, vomiting  Bowel sweat present in colostomy bag on postop day 1 with full return of bowel function with liquid/paste stool and gas  Parveen removed on 08/24/2022 by Dr Wilson Ramirez  Stoma was pink and viable  Evaluated by ostomy nurse with teaching performed with patient and patient's wife at bedside  Patient and patient's wife comfortable with ostomy care in addition to aid with home health care arranged by case management  Patient was evaluated by Urology and underwent cystogram with subsequent removal Ambriz  Patient with successful voiding trial   Patient subsequently stable for discharge with outpatient follow-up for staple removal/suture removal with Dr Wilson Ramirez  Complications: None    Plan Upon Discharge:  -Patient discharged with [place] [address] to aid in postoperative care and ostomy care  -Established pain regimen with current refill, instructed patient to call office if pain becomes intolerable  -Will follow up in general surgery office for staple removal/suture removal   -Discharge instructions discussed with the patient and a print out AVS of these instructions were given to the patient by the nurse upon discharge       Discharge Diagnosis:   Principal Problem:    SBO (small bowel obstruction) (HCC)  Active Problems:    Diverticulitis of colon    Physical Exam on Day of Discharge:   /71   Pulse 77   Temp 98 4 °F (36 9 °C)   Resp 20   Ht 5' 6" (1 676 m)   Wt 85 3 kg (188 lb)   SpO2 91%   BMI 30 34 kg/m²   General appearance: alert and oriented, in no acute distress  Lungs: clear to auscultation bilaterally  Heart: regular rate and rhythm, S1, S2 normal, no murmur, click, rub or gallop  Abdomen: normal findings: bowel sounds normal and soft, non-tender and abnormal findings:  midline incision is clean, dry, intact; colosotmy site clean  Extremities: extremities normal, warm and well-perfused; no cyanosis, clubbing, or edema  Pulses: 2+ and symmetric    Condition at Discharge: stable     Discharge instructions/Information to patient and family:   See after visit summary for information provided to patient and family  Provisions for Follow-Up Care:  See after visit summary for information related to follow-up care and any pertinent home health orders  PCP: Elmira Webb DO    Disposition: See After Visit Summary for discharge disposition information  Planned Readmission: No    Discharge Statement   I spent 25 minutes discharging the patient  This time was spent on the day of discharge  I had direct contact with the patient on the day of discharge  Additional documentation is required if more than 30 minutes were spent on discharge  Discharge Medications:  See after visit summary for reconciled discharge medications provided to patient and family        Alvia Hashimoto, PA-C   8/31/2022

## 2022-08-31 NOTE — UTILIZATION REVIEW
Notification of Discharge   This is a Notification of Discharge from our facility [address]  Please be advised that this patient has been discharge from our facility  Below you will find the admission and discharge date and time including the patients disposition  UTILIZATION REVIEW CONTACT:  Lizet Reason  Utilization   Network Utilization Review Department  Phone: [phone] carefully listen to the prompts  All voicemails are confidential   Email: [email or web address]     PHYSICIAN ADVISORY SERVICES:  FOR [id number] REVIEW - MEDICAL NECESSITY DENIAL  Phone: [phone]  Fax: [phone]  Email: [email or web address]     PRESENTATION DATE: 8/20/2022  1:18 PM  OBERVATION ADMISSION DATE:   INPATIENT ADMISSION DATE: 8/20/22  1:18 PM   DISCHARGE DATE: 8/30/2022 12:35 PM  DISPOSITION: Home with [place] with [address] [place] [address] Road INFORMATION:  Send all requests for admission clinical reviews, approved or denied determinations and any other requests to dedicated fax number below belonging to the campus where the patient is receiving treatment   List of dedicated fax numbers:  [address] DENIALS (Administrative/Medical Necessity) [phone]   [address] (Maternity/NICU/Pediatrics) [phone]   Ekith Min [phone]   [address] [facility] [address] [phone]   [address] [place] [address] [phone]   2000 [facility] [address] [place] [address],[address] [phone]   [facility]  [phone]   [address] [facility]  [address] [facility] [address] [facility] [phone]

## 2022-08-31 NOTE — WOUND OSTOMY CARE
Progress Note- Ostomy  Chuyita Flores 78 y o  male  [id number]  --01    Assessment:  Patient was off the unit at time of visit  Patient's wife was present  Gathered ostomy supplies for home and gave to patient's wife  She did not have any further questions regarding ostomy care  She states her  should be discharged home today and will have home care nurses  Wound 08/20/22 Abdomen N/A (Active)   Wound Image   08/24/22 1437   Wound Description JUDY 08/28/22 2124   Genoveva-wound Assessment JUDY 08/28/22 2124   Dressing ABD;Dry dressing 08/30/22 0800   Dressing Changed Changed by provider 08/28/22 0818   Patient Tolerance Tolerated well 08/28/22 0818   Dressing Status Clean;Dry; Intact 08/30/22 0800   Number of days: 10     Closed/Suction Drain Right RLQ Bulb 19 Fr  (Active)   Site Description Healing 08/30/22 0601   Dressing Status Clean;Dry; Intact 08/30/22 0601   Drainage Appearance Serous;Straw 08/30/22 0601   Status To bulb suction 08/30/22 0601   Output (mL) 30 mL 08/30/22 0601   Number of days: 10       Colostomy Descending/sigmoid LLQ (Active)   Stomal Appliance 2 piece; Intact 08/28/22 2124   Stoma Assessment Budded;Red 08/28/22 2124   Stoma size (in) 1 75 in 08/28/22 2124   Stoma Shape Round;Budded 08/28/22 2124   Peristomal Assessment Clean; Intact 08/28/22 2124   Treatment Bag change;Site care 08/26/22 9254   Output (mL) 100 mL 08/28/22 1730   Number of days: 10         Questions or concerns [address] [facility] Nurse    Lucy GARCIAN, RN, [facility]

## 2022-09-06 DIAGNOSIS — M54.9 CHRONIC BACK PAIN, UNSPECIFIED BACK LOCATION, UNSPECIFIED BACK PAIN LATERALITY: ICD-10-CM

## 2022-09-06 DIAGNOSIS — G89.29 CHRONIC BACK PAIN, UNSPECIFIED BACK LOCATION, UNSPECIFIED BACK PAIN LATERALITY: ICD-10-CM

## 2022-09-06 RX ORDER — CARISOPRODOL 350 MG/1
350 TABLET ORAL 4 TIMES DAILY
Qty: 360 TABLET | Refills: 1 | Status: SHIPPED | OUTPATIENT
Start: 2022-09-06

## 2022-09-07 DIAGNOSIS — M48.061 SPINAL STENOSIS OF LUMBAR REGION, UNSPECIFIED WHETHER NEUROGENIC CLAUDICATION PRESENT: ICD-10-CM

## 2022-09-07 RX ORDER — OXYCODONE AND ACETAMINOPHEN 10; 325 MG/1; MG/1
1 TABLET ORAL EVERY 4 HOURS PRN
Qty: 120 TABLET | Refills: 0 | Status: SHIPPED | OUTPATIENT
Start: 2022-09-07 | End: 2022-10-07 | Stop reason: SDUPTHER

## 2022-09-08 ENCOUNTER — OFFICE VISIT (OUTPATIENT)
Dept: SURGERY | Facility: CLINIC | Age: 77
End: 2022-09-08

## 2022-09-08 VITALS — TEMPERATURE: 98.1 F

## 2022-09-08 DIAGNOSIS — K57.32 DIVERTICULITIS OF COLON: Primary | ICD-10-CM

## 2022-09-08 PROCEDURE — 99024 POSTOP FOLLOW-UP VISIT: CPT | Performed by: SPECIALIST

## 2022-09-08 RX ORDER — COVID-19 ANTIGEN TEST
KIT MISCELLANEOUS
COMMUNITY
Start: 2022-08-30

## 2022-09-08 NOTE — ASSESSMENT & PLAN NOTE
The patient is postop day 19 from exploratory laparotomy with Vane's procedure and segmental small bowel resection for complicated diverticulitis with abscess, of phlegmon and small bowel obstruction  The patient is getting around quite well, his colostomy is functioning, although the stool is somewhat insipated  His wound is healing, and he has been discharged by the visiting nurses  All the sutures removed, except for the bottom 2 sutures where the skin will naturally gape if they were removed at this time  He will follow-up in 1 week to have the sutures removed and a follow-up visit, and we will schedule his Vane's reversal at that time

## 2022-09-08 NOTE — PATIENT INSTRUCTIONS
Case Report   Surgical Pathology Report                         Case: [id number]                                    Authorizing Provider:  Torsten Santacruz MD    Collected:           08/20/2022 1642               Ordering Location:     [place] Received:            08/20/2022 1930                                      Operating Room                                                                Pathologist:           Edgar Lopez MD                                                                     Specimen:    Large Intestine, Sigmoid Colon, portion of                                                 Final Diagnosis   A  Portion of sigmoid colon, resection:   -   Segment of small intestine with acute enteritis, mesenteric abscess, and acute serositis  -   Segment of sigmoid colon with diverticulitis, pulse granuloma and rare pill fragment in pericolonic adipose tissue   -   All resection margins are viable  -   Three benign lymph nodes  -   Separate skin and fibroadipose tissue with no significant histopathologic change  Interpretation performed at [facility], [address] [place] [address]     Electronically signed by Edgar Lopez MD on 8/25/2022 at  4:49 PM   Additional Information    All reported additional testing was performed with appropriately reactive controls  These tests were developed and their performance characteristics determined by [facility] or appropriate performing facility, though some tests may be performed on tissues which have not been validated for performance characteristics (such as staining performed on alcohol exposed cell blocks and decalcified tissues)  Results should be interpreted with caution and in the context of the patients clinical condition  These tests may not be cleared or approved by the [place]  Food and Drug Administration, though the FDA has determined that such clearance or approval is not necessary   These tests are used for clinical purposes and they should not be regarded as investigational or for research  This laboratory has been approved by CLIA 88, designated as a high-complexity laboratory and is qualified to perform these tests  Kamini Ansari Description       A  The specimen is received in formalin, labeled with the patient's name and hospital number, and is designated "portion of sigmoid colon  The specimen consists of 2 unoriented segments of intestinal tissue  The first measures 10 cm in length by 5 cm in circumference  The serosa and attached adipose tissue is covered by hemorrhagic material and adhesions  A perforation is identified within 1 of the unoriented ends of resection  The mucosa throughout the specimen appears unremarkable  Upon cut section multiple areas concerning for abscess are identified in the surrounding tissue  The second measures 13 cm in length and ranges in circumference from 2 5-6 cm  The serosa and attached adipose tissue is covered by hemorrhagic material and adhesions  The mucosa throughout the specimen appears unremarkable  Upon cut section multiple diverticular outpouchings are identified, 1 of which is perforated  No other abnormalities are noted  Finally submitted in the specimen container is a fibrofatty tissue fragment measuring 7 cm  On 1 surface is a tan puckered portion of skin presumed be umbilicus measuring 1 8 cm  Upon cut section the tissue appears unremarkable  Representative sections  12 cassettes    1-2: Ends of resection, first described intestinal segment, area of perforation present in cassette 2  3: 1st described intestinal segment showing surface adhesions   4-5: 1st described intestinal segment showing areas concerning for abscess    6-7: Ends of resection, second described intestinal segment  8: Area of perforation, second described intestinal segment   9-10: Diverticula, second described intestinal segment   11: 3 randomly sampled regional lymph nodes  12: Skin and fatty tissue     Note: The estimated total formalin fixation time based upon information provided by the submitting clinician and the standard processing schedule is under 72 hours  MCrites   Clinical Information    Per op note,   Operative Indications:   Interloop intraabdominal abscess   SBO (small bowel obstruction) ([facility] ) [J24 282]   Impending colovesical fistula       Operative Findings:   Sigmoid Diverticulitis with abscess and phlegmon resulting in adhesions causing high grade small bowel obstruction  Dense adhesion requiring extensive lysis of adhesions and segmental small bowel resection  Phlegmon with dome of the bladder with impending colovesical fistula  I would suggest using Metamucil or equivalent generic one rounded tablespoon in 6 - 8 oz of chilled water daily to keep the stoma functioning smoothly  You can wash the wound with soap and water, and use cocoa butter on the incision as it heals  I would suggest using an ABD pad to protect the incision from the elastic at your waste band  You can be active up to your level of comfort, but nothing strenuous  Return in one week to remove the rest of the sutures      In a pinch you can get colostomy supplies at [address] in [place]

## 2022-09-08 NOTE — PROGRESS NOTES
Assessment/Plan:    Diverticulitis of colon  The patient is postop day 23 from exploratory laparotomy with Vane's procedure and segmental small bowel resection for complicated diverticulitis with abscess, of phlegmon and small bowel obstruction  The patient is getting around quite well, his colostomy is functioning, although the stool is somewhat insipated  His wound is healing, and he has been discharged by the visiting nurses  All the sutures removed, except for the bottom 2 sutures where the skin will naturally gape if they were removed at this time  He will follow-up in 1 week to have the sutures removed and a follow-up visit, and we will schedule his Vane's reversal at that time  Diagnoses and all orders for this visit:    Diverticulitis of colon    Other orders  -     Flowflex COVID-19 Ag Home Test KIT          Subjective:      Patient ID: Kenn Bettencourt is a 78 y o  male  The patient presents in follow-up postop day 19 from exploratory laparotomy with Vane's procedure, and segmental small bowel resection for perforated diverticulitis with abscess, phlegmon and small bowel obstruction  The patient had a Ambriz catheter in place for 10 days after surgery to an impending colovesical fistula noted at the time of surgery  The patient now presents for suture removal     The patient denies any fever or chills  He is moving around comfortably  He has a good appetite has been gaining back the weight that he lost during his illness  The colostomy is functioning normally, but the patient notes that the stool frequently is hard and inspissated  The following portions of the patient's history were reviewed and updated as appropriate: allergies, current medications, past family history, past medical history, past social history, past surgical history and problem list     Review of Systems   Constitutional: Negative for chills and fever  Gastrointestinal: Positive for constipation  Genitourinary: Negative for difficulty urinating  Skin: Positive for wound  Objective:      Temp 98 1 °F (36 7 °C) (Temporal)          Physical Exam  Abdominal:      General: The ostomy site is clean  There is no distension

## 2022-09-12 ENCOUNTER — OFFICE VISIT (OUTPATIENT)
Dept: CARDIOLOGY CLINIC | Facility: CLINIC | Age: 77
End: 2022-09-12
Payer: COMMERCIAL

## 2022-09-12 VITALS
BODY MASS INDEX: 29.35 KG/M2 | SYSTOLIC BLOOD PRESSURE: 144 MMHG | HEIGHT: 67 IN | WEIGHT: 187 LBS | HEART RATE: 76 BPM | DIASTOLIC BLOOD PRESSURE: 66 MMHG

## 2022-09-12 DIAGNOSIS — Z95.3 STATUS POST AORTIC VALVE REPLACEMENT WITH BIOPROSTHETIC VALVE: ICD-10-CM

## 2022-09-12 DIAGNOSIS — I10 BENIGN ESSENTIAL HYPERTENSION: ICD-10-CM

## 2022-09-12 DIAGNOSIS — E78.2 MIXED HYPERLIPIDEMIA: Primary | ICD-10-CM

## 2022-09-12 DIAGNOSIS — I25.10 ATHEROSCLEROSIS OF NATIVE CORONARY ARTERY OF NATIVE HEART WITHOUT ANGINA PECTORIS: ICD-10-CM

## 2022-09-12 DIAGNOSIS — N18.30 STAGE 3 CHRONIC KIDNEY DISEASE, UNSPECIFIED WHETHER STAGE 3A OR 3B CKD (HCC): ICD-10-CM

## 2022-09-12 DIAGNOSIS — M05.9 RHEUMATOID ARTHRITIS WITH RHEUMATOID FACTOR, UNSPECIFIED (HCC): ICD-10-CM

## 2022-09-12 PROCEDURE — 3078F DIAST BP <80 MM HG: CPT | Performed by: INTERNAL MEDICINE

## 2022-09-12 PROCEDURE — 3077F SYST BP >= 140 MM HG: CPT | Performed by: INTERNAL MEDICINE

## 2022-09-12 PROCEDURE — 1111F DSCHRG MED/CURRENT MED MERGE: CPT | Performed by: INTERNAL MEDICINE

## 2022-09-12 PROCEDURE — 99214 OFFICE O/P EST MOD 30 MIN: CPT | Performed by: INTERNAL MEDICINE

## 2022-09-12 PROCEDURE — 1160F RVW MEDS BY RX/DR IN RCRD: CPT | Performed by: INTERNAL MEDICINE

## 2022-09-12 NOTE — PROGRESS NOTES
[facility]  Follow up note  Argenis Fierro 78 y o  male MRN: [id number]        Problems    1  Mixed hyperlipidemia     2  Atherosclerosis of native coronary artery of native heart without angina pectoris     3  Benign essential hypertension     4  Status post aortic valve replacement with bioprosthetic valve     5  Rheumatoid arthritis with rheumatoid factor, unspecified (HCC)     6  Stage 3 chronic kidney disease, unspecified whether stage 3a or 3b CKD (HCC)         Impression:    Bioprosthetic AVR  Normal functioning valve by examination  No concerning symptoms  No additional follow-up needed    Hyperlipidemia  Reasonable control of his lipids in the setting of minimal luminal irregularity/minor CAD in the past noted by cardiac catheterization prior to valve replacement    Hypertension  Excellent    CAD  Nonobstructive/minimal at the time of his AVR in 2017  He has no significant symptoms  Plan:    Exceptionally stable from a cardiac standpoint, especially considering his recent small-bowel obstruction requiring colostomy, exploration, antibiotics, thankfully he is recovering well  I warned him of the risk of perioperative stress peptic ulcer disease, that he should cut down his aspirin and should of always only been on 81 mg, and that he should be aware that any complaints of shortness of breath, any melenic colostomy output should prompt immediate evaluation for GI bleed  Usual annual follow-up with        HPI:   Argenis Fierro is a 78y o  year old male  with rheumatoid arthritis, bioprosthetic AVR for severe aortic stenosis, hypertension, hyperlipidemia, decreased libido/fatigue who follows up in my office  He recently developed abdominal bloating, nausea, found to have a small-bowel obstruction due to a partially perforated an encapsulated diverticulum, with diverticulitis, treated with antibiotics, ex lap, resection of the abscess and temporary colostomy    He had no cardiac issues during hospitalization, no bacteremia, he offers me no complaints, he is normotensive although he did lose significant amount of weight and thankfully he is not hypotensive, with no complaints of any melena, shortness of breath, unfortunately still on aspirin 325 mg contrary to the recommended 81 mg  He denies any fevers or chills, shortness of breath, lightheadedness, edema  Review of Systems   Constitutional: Negative for appetite change, diaphoresis, fatigue and fever  Respiratory: Negative for chest tightness, shortness of breath and wheezing  Cardiovascular: Negative for chest pain, palpitations and leg swelling  Gastrointestinal: Negative for abdominal pain and blood in stool  Musculoskeletal: Negative for arthralgias and joint swelling  Skin: Negative for rash  Neurological: Negative for dizziness, syncope and light-headedness  Past Medical History:   Diagnosis Date    Aortic stenosis     Aortic stenosis, severe     Arthritis     Carpal tunnel syndrome     Chronic back pain     Chronic pain     Chronic, continuous use of opioids     Hyperlipidemia     Hypertension     Hypertrophy of prostate     Insomnia     Large bowel obstruction (HCC)     Peripheral neuropathy     RA [facility]heumatoid arthritis) ([facility] [address] )     Umbilical hernia      Social History     Substance and Sexual Activity   Alcohol Use Not Currently     Social History     Substance and Sexual Activity   Drug Use No     Social History     Tobacco Use   Smoking Status Never Smoker   Smokeless Tobacco Never Used       Allergies:   Allergies   Allergen Reactions    Penicillins Anaphylaxis    Quinine Derivatives Other (See Comments)     High fever       Medications:     Current Outpatient Medications:     aspirin 81 MG tablet, Take 1 tablet (81 mg total) by mouth daily, Disp: 90 tablet, Rfl: 2    atorvastatin (LIPITOR) 20 mg tablet, take 1 tablet by mouth once daily, Disp: 90 tablet, Rfl: 1    betamethasone dipropionate (DIPROSONE) 0 05 % cream, Apply topically, Disp: , Rfl:     carisoprodol (SOMA) 350 mg tablet, Take 1 tablet (350 mg total) by mouth 4 (four) times a day, Disp: 360 tablet, Rfl: 1    Cholecalciferol (VITAMIN D3) 1000 units CAPS, Take 1 tablet by mouth daily, Disp: , Rfl:     diclofenac sodium (VOLTAREN) 1 %, Apply 2 g topically 4 (four) times a day, Disp: 5 Tube, Rfl: 3    Flowflex COVID-19 Ag Home Test KIT, , Disp: , Rfl:     fluocinonide (LIDEX) 0 05 % ointment, Apply topically 2 (two) times a day, Disp: , Rfl:     gabapentin (NEURONTIN) 600 MG tablet, take 1 tablet by mouth three times a day (MORNING, NOON, AND BEDTIME), Disp: , Rfl:     ketoconazole (NIZORAL) 2 % cream, Apply topically 2 (two) times a day, Disp: 30 g, Rfl: 1    losartan (COZAAR) 50 mg tablet, TAKE 1 TABLET BY MOUTH EVERY DAY, Disp: 90 tablet, Rfl: 3    naloxone (NARCAN) 4 mg/0 1 mL nasal spray, Administer 1 spray into a nostril  If no response after 2-3 minutes, give another dose in the other nostril using a new spray , Disp: 1 each, Rfl: 1    oxyCODONE (ROXICODONE) 10 MG TABS, Take 1 tablet 4 times daily, Disp: 120 tablet, Rfl: 0    oxyCODONE-acetaminophen (PERCOCET)  mg per tablet, Take 1 tablet by mouth every 4 (four) hours as needed for moderate pain Max Daily Amount: 6 tablets, Disp: 120 tablet, Rfl: 0    Secukinumab 150 MG/ML SOAJ, One injection subcutaneously at weeks 1,2,3,4 and 5 for loading dose then every 4 weeks thereafter, Disp: , Rfl:     traZODone (DESYREL) 300 MG tablet, 2 tablets at bedtime, Disp: 180 tablet, Rfl: 1      Vitals:    09/12/22 1346   BP: 144/66   Pulse: 76     Weight (last 2 days)     Date/Time Weight    09/12/22 1346 84 8 (187)        Physical Exam  Constitutional:       General: He is not in acute distress  Appearance: He is not diaphoretic  HENT:      Head: Normocephalic and atraumatic  Eyes:      General: No scleral icterus       Conjunctiva/sclera: Conjunctivae normal    Neck: Vascular: No JVD  Cardiovascular:      Rate and Rhythm: Normal rate and regular rhythm  Heart sounds: Normal heart sounds  No murmur heard  Pulmonary:      Effort: Pulmonary effort is normal  No respiratory distress  Breath sounds: Normal breath sounds  No decreased breath sounds, wheezing, rhonchi or rales  Musculoskeletal:      Cervical back: Normal range of motion  Right lower leg: Normal  No edema  Left lower leg: Normal  No edema  Skin:     General: Skin is warm and dry  Neurological:      Mental Status: He is alert and oriented to person, place, and time             Laboratory Studies:  Lab Results   Component Value Date    HGBA1C 6 0 (H) 03/30/2022    HGBA1C 5 7 (H) 10/15/2020    HGBA1C 5 9 10/10/2017    K 4 2 08/29/2022    K 4 1 08/28/2022    K 3 8 08/27/2022     08/29/2022     08/28/2022     08/27/2022    CO2 26 08/29/2022    CO2 27 08/28/2022    CO2 27 08/27/2022    CO2 27 10/17/2017    CO2 27 10/17/2017    CO2 28 10/17/2017    GLUCOSE 125 10/17/2017    GLUCOSE 133 10/17/2017    GLUCOSE 163 (H) 10/17/2017    CREATININE 1 01 08/29/2022    CREATININE 0 87 08/28/2022    CREATININE 1 04 08/27/2022    BUN 15 08/29/2022    BUN 9 08/28/2022    BUN 9 08/27/2022    MG 1 9 08/26/2022    MG 1 9 08/24/2022    MG 2 0 08/23/2022    PHOS 3 3 08/21/2022     Lab Results   Component Value Date    WBC 14 37 (H) 08/29/2022    RBC 4 38 08/29/2022    HGB 12 9 08/29/2022    HCT 41 0 08/29/2022    MCV 94 08/29/2022    MCH 29 5 08/29/2022    RDW 13 3 08/29/2022     (H) 08/29/2022     Lipid Profile:   No results found for: CHOL  Lab Results   Component Value Date    HDL 46 03/30/2022    HDL 52 09/20/2018    HDL 50 09/29/2017     Lab Results   Component Value Date    LDLCALC 86 03/30/2022    LDLCALC 88 09/20/2018    LDLCALC 78 09/29/2017     Lab Results   Component Value Date    TRIG 95 03/30/2022    TRIG 122 09/20/2018    TRIG 84 09/29/2017         Cardiac testing:     EKG reviewed personally:     No results found for this visit on 09/12/22  Teodora Dubin, MD    Portions of the record may have been created with voice recognition software   Occasional wrong word or "sound a like" substitutions may have occurred due to the inherent limitations of voice recognition software   Read the chart carefully and recognize, using context, where substitutions have occurred

## 2022-09-16 ENCOUNTER — OFFICE VISIT (OUTPATIENT)
Dept: SURGERY | Facility: CLINIC | Age: 77
End: 2022-09-16

## 2022-09-16 VITALS — TEMPERATURE: 97.2 F

## 2022-09-16 DIAGNOSIS — K57.32 DIVERTICULITIS OF COLON: Primary | ICD-10-CM

## 2022-09-16 DIAGNOSIS — K56.609 SBO (SMALL BOWEL OBSTRUCTION) (HCC): ICD-10-CM

## 2022-09-16 PROCEDURE — 99024 POSTOP FOLLOW-UP VISIT: CPT | Performed by: SURGERY

## 2022-09-16 NOTE — ASSESSMENT & PLAN NOTE
Patient returns sp [facility] for the Rx of his complicated diverticulitis  No complaints voiced  Pathology report reviewed  PE: Patient well appearing  Abd soft, NT/ND Wounds CDI  Ostomy patent and functioning  Moran removed  Short term Treatment plan outlined to include follow up with Dr Aguilar Going in a month to discuss timing for Polo's reversal     All questions answered to satisfaction of patient and wife at bedside

## 2022-09-16 NOTE — PROGRESS NOTES
Assessment/Plan:    Diverticulitis of colon  Patient returns sp [facility] for the Rx of his complicated diverticulitis  No complaints voiced  Pathology report reviewed  PE: Patient well appearing  Abd soft, NT/ND Wounds CDI  Ostomy patent and functioning  Ailyn removed  Short term Treatment plan outlined to include follow up with Dr Calvin Avila in a month to discuss timing for Polo's reversal     All questions answered to satisfaction of patient and wife at bedside  Subjective:      Patient ID: Segundo Marshall is a 78 y o  male  Pt is coming in the office for Po op suture removal, s/p SBO 8/20 no fever/chills PABLO White MA      The following portions of the patient's history were reviewed and updated as appropriate: allergies, current medications, past family history, past medical history, past social history, past surgical history and problem list     Review of Systems   Constitutional: Negative for chills and fever  HENT: Negative for ear pain and sore throat  Eyes: Negative for pain and visual disturbance  Respiratory: Negative for cough and shortness of breath  Cardiovascular: Negative for chest pain and palpitations  Gastrointestinal: Negative for abdominal pain and vomiting  Genitourinary: Negative for dysuria and hematuria  Musculoskeletal: Negative for arthralgias and back pain  Skin: Negative for color change and rash  Neurological: Negative for seizures and syncope  All other systems reviewed and are negative  Objective:      Temp (!) 97 2 °F (36 2 °C) (Temporal)          Physical Exam  Vitals and nursing note reviewed  Constitutional:       Appearance: He is well-developed  HENT:      Head: Normocephalic and atraumatic  Eyes:      Conjunctiva/sclera: Conjunctivae normal       Pupils: Pupils are equal, round, and reactive to light  Cardiovascular:      Rate and Rhythm: Normal rate and regular rhythm     Pulmonary:      Effort: Pulmonary effort is normal       Breath sounds: Normal breath sounds  Abdominal:      General: Bowel sounds are normal       Palpations: Abdomen is soft  Comments: Exam as described above   Musculoskeletal:         General: Normal range of motion  Cervical back: Normal range of motion and neck supple  Skin:     General: Skin is warm and dry  Neurological:      Mental Status: He is alert and oriented to person, place, and time  Psychiatric:         Behavior: Behavior normal          Thought Content:  Thought content normal          Judgment: Judgment normal

## 2022-09-16 NOTE — LETTER
September 16, 2022     Taylor Bella DO  [address]  [address]  [facility] [address]    Patient: Segundo Marshall   YOB: 1945   Date of Visit: 9/16/2022       Dear Dr Kailyn Navarrete: Thank you for referring Renae Alvarado to me for evaluation  Below are my notes for this consultation  If you have questions, please do not hesitate to call me  I look forward to following your patient along with you  Sincerely,        Eagle Shankar MD        CC: No Recipients  Eagle Shankar MD  9/16/2022 12:51 PM  Incomplete  Assessment/Plan:    Diverticulitis of colon  Patient returns sp [facility] for the Rx of his complicated diverticulitis  No complaints voiced  Pathology report reviewed  PE: Patient well appearing  Abd soft, NT/ND Wounds CDI  Ostomy patent and functioning  Craig removed  Short term Treatment plan outlined to include follow up with Dr Calvin Avila in a month to discuss timing for Polo's reversal     All questions answered to satisfaction of patient and wife at bedside  Subjective:      Patient ID: Segundo Marshall is a 78 y o  male  Pt is coming in the office for Po op suture removal, s/p SBO 8/20 no fever/chills PABLO White MA      The following portions of the patient's history were reviewed and updated as appropriate: allergies, current medications, past family history, past medical history, past social history, past surgical history and problem list     Review of Systems   Constitutional: Negative for chills and fever  HENT: Negative for ear pain and sore throat  Eyes: Negative for pain and visual disturbance  Respiratory: Negative for cough and shortness of breath  Cardiovascular: Negative for chest pain and palpitations  Gastrointestinal: Negative for abdominal pain and vomiting  Genitourinary: Negative for dysuria and hematuria  Musculoskeletal: Negative for arthralgias and back pain  Skin: Negative for color change and rash     Neurological: Negative for seizures and syncope  All other systems reviewed and are negative  Objective:      Temp (!) 97 2 °F (36 2 °C) (Temporal)          Physical Exam  Vitals and nursing note reviewed  Constitutional:       Appearance: He is well-developed  HENT:      Head: Normocephalic and atraumatic  Eyes:      Conjunctiva/sclera: Conjunctivae normal       Pupils: Pupils are equal, round, and reactive to light  Cardiovascular:      Rate and Rhythm: Normal rate and regular rhythm  Pulmonary:      Effort: Pulmonary effort is normal       Breath sounds: Normal breath sounds  Abdominal:      General: Bowel sounds are normal       Palpations: Abdomen is soft  Comments: Exam as described above   Musculoskeletal:         General: Normal range of motion  Cervical back: Normal range of motion and neck supple  Skin:     General: Skin is warm and dry  Neurological:      Mental Status: He is alert and oriented to person, place, and time  Psychiatric:         Behavior: Behavior normal          Thought Content:  Thought content normal          Judgment: Judgment normal

## 2022-09-28 DIAGNOSIS — M48.061 SPINAL STENOSIS OF LUMBAR REGION, UNSPECIFIED WHETHER NEUROGENIC CLAUDICATION PRESENT: ICD-10-CM

## 2022-09-28 DIAGNOSIS — E78.5 HYPERLIPIDEMIA, UNSPECIFIED HYPERLIPIDEMIA TYPE: ICD-10-CM

## 2022-09-28 RX ORDER — OXYCODONE HYDROCHLORIDE 10 MG/1
TABLET ORAL
Qty: 120 TABLET | Refills: 0 | Status: SHIPPED | OUTPATIENT
Start: 2022-09-28

## 2022-09-28 RX ORDER — ATORVASTATIN CALCIUM 20 MG/1
TABLET, FILM COATED ORAL
Qty: 90 TABLET | Refills: 1 | Status: SHIPPED | OUTPATIENT
Start: 2022-09-28

## 2022-10-07 DIAGNOSIS — M48.061 SPINAL STENOSIS OF LUMBAR REGION, UNSPECIFIED WHETHER NEUROGENIC CLAUDICATION PRESENT: ICD-10-CM

## 2022-10-07 RX ORDER — OXYCODONE AND ACETAMINOPHEN 10; 325 MG/1; MG/1
1 TABLET ORAL EVERY 4 HOURS PRN
Qty: 120 TABLET | Refills: 0 | Status: SHIPPED | OUTPATIENT
Start: 2022-10-07

## 2022-10-18 ENCOUNTER — OFFICE VISIT (OUTPATIENT)
Dept: SURGERY | Facility: CLINIC | Age: 77
End: 2022-10-18

## 2022-10-18 VITALS
RESPIRATION RATE: 16 BRPM | SYSTOLIC BLOOD PRESSURE: 110 MMHG | WEIGHT: 197 LBS | BODY MASS INDEX: 30.92 KG/M2 | HEART RATE: 57 BPM | HEIGHT: 67 IN | DIASTOLIC BLOOD PRESSURE: 55 MMHG | TEMPERATURE: 97.2 F | OXYGEN SATURATION: 97 %

## 2022-10-18 DIAGNOSIS — K57.32 DIVERTICULITIS OF COLON: Primary | ICD-10-CM

## 2022-10-18 PROCEDURE — 99024 POSTOP FOLLOW-UP VISIT: CPT | Performed by: SPECIALIST

## 2022-10-18 RX ORDER — LEVOFLOXACIN 5 MG/ML
500 INJECTION, SOLUTION INTRAVENOUS ONCE
OUTPATIENT
Start: 2022-10-18 | End: 2022-10-18

## 2022-10-18 RX ORDER — SODIUM CHLORIDE, SODIUM LACTATE, POTASSIUM CHLORIDE, CALCIUM CHLORIDE 600; 310; 30; 20 MG/100ML; MG/100ML; MG/100ML; MG/100ML
125 INJECTION, SOLUTION INTRAVENOUS CONTINUOUS
OUTPATIENT
Start: 2022-10-18

## 2022-10-18 NOTE — PATIENT INSTRUCTIONS
I have tentatively scheduled reversal of your colonoscopy along with a flexible sigmoidoscopy for January 23, 2023  I will see you back in December to order the bowel prep and have you sign the consents  You may want to check out Dr Tri Gonzalez web page, or recent book regarding restorative medicine  Call if you are having any problems in the meantime

## 2022-10-18 NOTE — PROGRESS NOTES
Assessment/Plan:    No problem-specific Assessment & Plan notes found for this encounter  Diagnoses and all orders for this visit:    Diverticulitis of colon  -     Case request operating room: Flexible sigmoidoscopy, resection and CLOSURE of sigmoid COLOSTOMY (Reversal of Vane's procedure); Standing  -     Case request operating room: Flexible sigmoidoscopy, resection and CLOSURE of sigmoid COLOSTOMY (Reversal of Vane's procedure)    Other orders  -     Diet NPO; Sips with meds; Standing  -     Apply Sequential Compression Device; Standing  -     Place sequential compression device; Standing  -     lactated ringers infusion  -     levofloxacin (LEVAQUIN) IVPB (premix in dextrose) 500 mg 100 mL  -     metroNIDAZOLE (FLAGYL) (HIGH DOSE) IVPB 500 mg          Subjective:      Patient ID: Shabnam Silva is a 78 y o  male  The patient is a 81-year-old white male who presents for follow-up 8 weeks out from the emergency laparotomy, Vane's procedure and segmental small bowel resection for perforated diverticulitis with abscess  The patient feels well, states his colostomy is functioning normally, and is anticipating having it reversed  The patient had extensive intra-abdominal inflammation, and would would best served by allowing sufficient time for all this inflammation to resolve  The patient will follow up in December to finalize plans for his Tali Mitten reversal, he will review the bowel prep at that time, the reversal be scheduled for January, which will allow 5 months from the date of the original procedure  The patient had a colonoscopy 2 years ago, and does not need to have that repeated, however I do plan to do a flexible sigmoidoscopy of the rectal stump prior to reversal of the colostomy  We also discussed forgoing his usual treatment with Cosentyx prior to the colostomy reversal, to cut down on the risk of infectious complications        The following portions of the patient's history were reviewed and updated as appropriate: allergies, current medications, past family history, past medical history, past social history, past surgical history and problem list     Review of Systems   Constitutional: Negative for chills and fever  HENT: Negative for trouble swallowing  Respiratory: Negative for cough and shortness of breath  Cardiovascular: Negative for chest pain and palpitations  Gastrointestinal: Negative for constipation, nausea and vomiting  Genitourinary: Negative for difficulty urinating  Musculoskeletal: Positive for arthralgias  Skin: Wound: healed midline wound  Neurological: Negative  Psychiatric/Behavioral: Negative  Objective:      /55 (BP Location: Left arm, Patient Position: Sitting, Cuff Size: Large)   Pulse 57   Temp (!) 97 2 °F (36 2 °C) (Temporal)   Resp 16   Ht 5' 7" (1 702 m)   Wt 89 4 kg (197 lb)   SpO2 97%   BMI 30 85 kg/m²          Physical Exam  Constitutional:       Appearance: Normal appearance  HENT:      Head: Normocephalic  Eyes:      General: No scleral icterus  Pupils: Pupils are equal, round, and reactive to light  Cardiovascular:      Rate and Rhythm: Normal rate  Heart sounds: Normal heart sounds  Pulmonary:      Effort: Pulmonary effort is normal       Breath sounds: Normal breath sounds  Abdominal:      General: Bowel sounds are normal       Palpations: Abdomen is soft  Genitourinary:     Comments: deferred  Skin:     General: Skin is warm and dry  Neurological:      General: No focal deficit present  Mental Status: He is oriented to person, place, and time     Psychiatric:         Mood and Affect: Mood normal

## 2022-10-26 ENCOUNTER — RA CDI HCC (OUTPATIENT)
Dept: OTHER | Facility: HOSPITAL | Age: 77
End: 2022-10-26

## 2022-10-26 NOTE — PROGRESS NOTES
[facility]  coding opportunities       Chart reviewed, no opportunity found:   [address]        Patients Insurance     Medicare Insurance: [employer]

## 2022-10-31 ENCOUNTER — TELEMEDICINE (OUTPATIENT)
Dept: FAMILY MEDICINE CLINIC | Facility: CLINIC | Age: 77
End: 2022-10-31

## 2022-10-31 VITALS — BODY MASS INDEX: 30.61 KG/M2 | HEIGHT: 67 IN | WEIGHT: 195 LBS

## 2022-10-31 DIAGNOSIS — L21.9 SEBORRHEA: ICD-10-CM

## 2022-10-31 DIAGNOSIS — M48.061 SPINAL STENOSIS OF LUMBAR REGION, UNSPECIFIED WHETHER NEUROGENIC CLAUDICATION PRESENT: ICD-10-CM

## 2022-10-31 DIAGNOSIS — Z00.00 MEDICARE ANNUAL WELLNESS VISIT, SUBSEQUENT: Primary | ICD-10-CM

## 2022-10-31 PROBLEM — R53.82 CHRONIC FATIGUE: Status: RESOLVED | Noted: 2018-09-14 | Resolved: 2022-10-31

## 2022-10-31 RX ORDER — OXYCODONE HYDROCHLORIDE 10 MG/1
TABLET ORAL
Qty: 120 TABLET | Refills: 0 | Status: SHIPPED | OUTPATIENT
Start: 2022-10-31

## 2022-10-31 RX ORDER — FLUOCINONIDE TOPICAL SOLUTION USP, 0.05% 0.5 MG/ML
SOLUTION TOPICAL DAILY
Qty: 60 ML | Refills: 3 | Status: SHIPPED | OUTPATIENT
Start: 2022-10-31

## 2022-10-31 NOTE — PROGRESS NOTES
Assessment/Plan     Problem List Items Addressed This Visit    None     Visit Diagnoses     Seborrhea    -  Primary    Relevant Medications    fluocinonide (LIDEX) 0 05 % external solution    Spinal stenosis of lumbar region, unspecified whether neurogenic claudication present        Relevant Medications    oxyCODONE (ROXICODONE) 10 MG TABS         Treatment Plan: Patient is stable and has been stable on his current regimen for many many years   Meds refilled patient is compliant with the PDMP and has shown no risky behavior    Opiate risks  There are risks associated with opioid medications, including dependence, addiction and tolerance  The patient understands and agrees to use these medications only as prescribed  Potential side effects of the medications include, but are not limited to, constipation, drowsiness, addiction, impaired judgment and risk of fatal overdose if not taken as prescribed  The patient was warned against driving while taking sedation medications  Sharing medications is a felony  At this point in time, the patient is showing no signs of addiction, abuse, diversion or suicidal ideation  Patient is taking concurrent muscle relaxers  Has been counseled on the risks of taking opioids and muscle relaxers including sedation, increased fall risk, dizziness, addictive potential and death  Patient has a high risk condition (age > 72, [place], renal or hepatic impairment, mental health condition, use of alcohol or other substances)  Has been counseled on the specific risks of taking opioids with these conditions and the increased risks including including sedation, increased fall risk, dizziness, addictive potential and death  PDMP review  [place] or [place]  reviewed  No red flags were identified; safe to proceed with prescription      I have spent 15 minutes directly with the patient   Greater than 50% of this time was spent in counseling/coordination of care regarding: instructions for management  Patient will sign a narcotic contract in his next in office visit 3 months   UDS will be done at that time as well        Subjective         Patient presents today for opioid management visit   He is stable with no issues today    Current pain description: Pain is under control with his current meds    Functional status: Patient is able to accomplish his activities of daily living    Screening Tools/Assessments:    PHQ-2/9:  Last PHQ-2 score: 0 (Last PHQ-2 date: 7/18/2022)    Brief Pain Inventory (BPI):  1) Throughout our lives, most of us have had pain from time to time (such as minor headaches, sprains, and toothaches)  Have you had pain other than these everyday kinds of pain today? Yes  2) Where is your pain located? Lower back radiating down back of left leg, both feet and hands, right shoulder , pain on sitting, standing and walking  3) Rate your pain at its worst in the last 24 hours: 9  4) Rate your pain at its least in the last 24 hours: 8  5) Rate your average level of pain: 8  6) Rate your pain right now: 9  7) What treatments or medications are you receiving for your pain?  Oxycodone, percocet , gabapentin, trazadone  8) In the past 24 hours, how much relief have pain treatments or medication provided? 50%  9) During the past 24 hours, pain has interfered with your:     A) General activity: 9     B) Mood: 6     C) Walking ability: 8     D) Normal work (work outside the home & housework): 10     E) Relations with other people: 8     F) Sleep: 8     G) Enjoyment of life: 7    Drug Screen:  Date of last drug screen: 4/27/2022    Opioid agreement:  Active Opioid agreement on file?: Yes    Opioid agreement signed date: 4/25/2022  Opioid agreement expiration date: 4/25/2023    Naloxone:  Currently prescribed Naloxone (Narcan): Yes      See other HPI    Pain Medications             aspirin 81 MG tablet Take 1 tablet (81 mg total) by mouth daily    carisoprodol (SOMA) 350 mg tablet Take 1 tablet (350 mg total) by mouth 4 (four) times a day    gabapentin (NEURONTIN) 600 MG tablet take 1 tablet by mouth three times a day (MORNING, NOON, AND BEDTIME)    oxyCODONE (ROXICODONE) 10 MG TABS Take 1 tablet 4 times daily    oxyCODONE-acetaminophen (PERCOCET)  mg per tablet Take 1 tablet by mouth every 4 (four) hours as needed for moderate pain Max Daily Amount: 6 tablets    traZODone (DESYREL) 300 MG tablet 2 tablets at bedtime         Outpatient Morphine Milligram Equivalents Per Day     10/31/22 and after Unknown (at least 90 MME/Day)    Order Name Dose Route Frequency Maximum MME/Day     oxyCODONE-acetaminophen (PERCOCET)  mg per tablet 1 tablet Oral Every 4 hours PRN 90 MME/Day     oxyCODONE (ROXICODONE) 10 MG TABS     Unknown    Total Potential Morphine Milligram Equivalents Per Day Unknown (at least 90 MME/Day)    An error was encountered while attempting to calculate the morphine milligram equivalents per day  Calculation Information        oxyCODONE-acetaminophen (PERCOCET)  mg per tablet    oxyCODONE-acetaminophen  mg Tabs: maximum daily dose of 60 mg of opioid in combo * morphine equivalence factor of 1 5 = 90 MME/Day       oxyCODONE (ROXICODONE) 10 MG TABS    Not enough information to calculate morphine milligram equivalents per day  PDMP Review       Value Time User    PDMP Reviewed  Yes 8/30/2022  9:24 AM Judith Crowell PA-C         Review of Systems   Constitutional: Negative  HENT: Negative  Eyes: Negative  Respiratory: Negative  Cardiovascular: Negative  Gastrointestinal: Negative  Endocrine: Negative  Genitourinary: Negative  Musculoskeletal: Positive for arthralgias, back pain, gait problem, myalgias, neck pain and neck stiffness  Skin: Negative  Allergic/Immunologic: Negative  Hematological: Negative  Psychiatric/Behavioral: Negative  All other systems reviewed and are negative      Objective     Ht 5' 7" (1 702 m) Comment: Verbal  Wt 88 5 kg (195 lb) Comment: verbal  BMI 30 54 kg/m²     Physical Exam  Constitutional:       Appearance: He is well-developed  HENT:      Head: Normocephalic  Right Ear: External ear normal       Left Ear: External ear normal       Nose: Nose normal    Eyes:      Conjunctiva/sclera: Conjunctivae normal    Pulmonary:      Effort: Pulmonary effort is normal    Musculoskeletal:         General: Normal range of motion  Cervical back: Normal range of motion and neck supple  Skin:     General: Skin is warm and dry  Neurological:      Mental Status: He is alert and oriented to person, place, and time  Psychiatric:         Behavior: Behavior normal          Thought Content:  Thought content normal          Judgment: Judgment normal          Telly Page, DO

## 2022-10-31 NOTE — PROGRESS NOTES
Assessment/Plan:    No problem-specific Assessment & Plan notes found for this encounter  There are no diagnoses linked to this encounter  Subjective:     Chief Complaint   Patient presents with   • 6 month follow-up   • Medication Refill     Lidex ointment, Oxycodone        Patient ID: Kely Mcnally is a 78 y o  male      HPI    The following portions of the patient's history were reviewed and updated as appropriate: allergies, current medications, past family history, past medical history, past social history, past surgical history and problem list     Review of Systems      Objective:    Vitals:    10/31/22 1101   Weight: 88 5 kg (195 lb)   Height: 5' 7" (1 702 m)          Physical Exam

## 2022-10-31 NOTE — PROGRESS NOTES
Virtual AWV Consent    Verification of patient location:    Patient is located in the following state in which I hold an active license [place]    Reason for visit is Medicare wellness visit    Encounter provider Idris Mckay DO    Provider located at [address]  [address] [address] [phone]      Recent Visits  No visits were found meeting these conditions  Showing recent visits within past 7 days and meeting all other requirements  Today's Visits  Date Type Provider Dept   10/31/22 Telemedicine Telly Sharma DO [facility]   Showing today's visits and meeting all other requirements  Future Appointments  No visits were found meeting these conditions  Showing future appointments within next 150 days and meeting all other requirements       After connecting through [employer], the patient was identified by name and date of birth  Cheryl Juares was informed that this is a telemedicine visit and that the visit is being conducted through the [address] [employer] platform  He agrees to proceed  My office door was closed  No one else was in the room  He acknowledged consent and understanding of privacy and security of the video platform  Cheryl Juares verbally agrees to participate in [employer]  Pt is aware that [employer] could be limited without vital signs or the ability to perform a full hands-on physical exam  Julio Garcia understands he or the provider may request at any time to terminate the video visit and request the patient to seek care or treatment in person  Patient is aware this is a billable service     Assessment and Plan:     Problem List Items Addressed This Visit    None     Visit Diagnoses     Medicare annual wellness visit, subsequent    -  Primary    Spinal stenosis of lumbar region, unspecified whether neurogenic claudication present        Relevant Medications    oxyCODONE (ROXICODONE) 10 MG TABS Seborrhea        Relevant Medications    fluocinonide (LIDEX) 0 05 % external solution            Medicare wellness visit completed   Opioid management visit completed  Fluocinolone  refilled   Oxycodone refilled  Patient's current conditions are chronically stable   Will continue to monitor for symptoms of COVID since his wife came down with that today  Follow-up in 3-4 months or sooner if needed      Preventive health issues were discussed with patient, and age appropriate screening tests were ordered as noted in patient's After Visit Summary  Personalized health advice and appropriate referrals for health education or preventive services given if needed, as noted in patient's After Visit Summary  History of Present Illness:     Patient presents for a Medicare Wellness Visit    Patient presents today for Medicare wellness visit   See other note for acute chronic issues  Overall patient is doing well  His visit was virtual today due to his wife being diagnosed with COVID this morning     Patient Care Team:  Palomo Sargent DO as PCP - General  Lorren Curry, MD Bertie Naval, MD Rodney Homans, MD     Review of Systems:     Review of Systems   Constitutional: Negative  HENT: Negative  Eyes: Negative  Respiratory: Negative  Cardiovascular: Negative  Gastrointestinal: Negative  Endocrine: Negative  Genitourinary: Negative  Musculoskeletal: Positive for arthralgias, back pain, gait problem, myalgias and neck pain  Skin: Negative  Allergic/Immunologic: Negative  Hematological: Negative  Psychiatric/Behavioral: Negative  All other systems reviewed and are negative         Problem List:     Patient Active Problem List   Diagnosis   • Arthritis   • Chronic pain   • Continuous opioid dependence ([address] )   • Hyperlipidemia   • Benign essential hypertension   • Peripheral neuropathy   • Status post aortic valve replacement with bioprosthetic valve   • Atherosclerosis of native coronary artery of native heart without angina pectoris   • Rheumatoid arthritis with rheumatoid factor, unspecified ([facility])   • Arthralgia of lower leg   • Atopic dermatitis   • Carpal tunnel syndrome   • High risk medication use   • Hyperglycemia   • Low back pain   • Primary localized osteoarthrosis of shoulder region   • Psoriasis   • Stage 3 chronic kidney disease, unspecified whether stage 3a or 3b CKD ([place] [address] )   • Psoriasis with arthropathy ([facility])   • Primary osteoarthritis of right shoulder   • SBO (small bowel obstruction) ([address] )   • Diverticulitis of colon      Past Medical and Surgical History:     Past Medical History:   Diagnosis Date   • Aortic stenosis    • Aortic stenosis, severe    • Arthritis    • Carpal tunnel syndrome    • Chronic back pain    • Chronic pain    • Chronic, continuous use of opioids    • Hyperlipidemia    • Hypertension    • Hypertrophy of prostate    • Insomnia    • Large bowel obstruction ([facility])    • Peripheral neuropathy    • RA (rheumatoid arthritis) ([place] [address] )    • Umbilical hernia      Past Surgical History:   Procedure Laterality Date   • ADENOIDECTOMY     • AORTIC VALVE REPLACEMENT  10/17/2017   • BASAL CELL CARCINOMA EXCISION     • CARDIAC CATHETERIZATION      LAST ASSESSED: 20OCT2017   • CARPAL TUNNEL RELEASE Bilateral     left   • CATARACT EXTRACTION     • FL CYSTOGRAM  8/29/2022   • HERNIA REPAIR      inguinal bilateral -umbilical    • LAMINECTOMY      DECOMPRESS, FACETECTOMY, FORAMINOTOMY LUMBAR SEG   • LAPAROTOMY N/A 8/20/2022    Procedure: LAPAROTOMY EXPLORATORY, sigmoid colon resection,colostomy;  Surgeon:  Reny Diamond MD;  Location: [facility];  Service: General   • NEUROPLASTY / TRANSPOSITION MEDIAN NERVE AT CARPAL TUNNEL     • DC RPLCMT AORTIC VALVE OPN W/STENTLESS TISSUE VALVE N/A 10/17/2017    Procedure: AORTIC VALVE REPLACEMENT with 23MM MAGNAEASE TISSUE VALVE; INTRA-OP SHANDRA;  Surgeon: Beau Duong MD;  Location:  MAIN OR;  Service: Cardiac Surgery   • TONSILLECTOMY        Family History:     Family History   Problem Relation Age of Onset   • Stroke Mother    • Diabetes Mother    • Hypertension Mother    • Heart disease Father    • Hypertension Father    • Heart attack Father    • Coronary artery disease Family    • Colon polyps Neg Hx    • Colon cancer Neg Hx       Social History:     Social History     Socioeconomic History   • Marital status: /Civil Union     Spouse name: None   • Number of children: None   • Years of education: None   • Highest education level: None   Occupational History   • Occupation: Retired   Tobacco Use   • Smoking status: Never Smoker   • Smokeless tobacco: Never Used   Vaping Use   • Vaping Use: Never used   Substance and Sexual Activity   • Alcohol use: Not Currently   • Drug use: No   • Sexual activity: Yes   Other Topics Concern   • None   Social History Narrative   • None     Social Determinants of Health     Financial Resource Strain: Low Risk    • Difficulty of Paying Living Expenses: Not very hard   Food Insecurity: No Food Insecurity   • Worried About Running Out of Food in the Last Year: Never true   • Ran Out of Food in the Last Year: Never true   Transportation Needs: No Transportation Needs   • Lack of Transportation (Medical): No   • Lack of Transportation (Non-Medical):  No   Physical Activity: Inactive   • Days of Exercise per Week: 0 days   • Minutes of Exercise per Session: 0 min   Stress: No Stress Concern Present   • Feeling of Stress : Not at all   Social Connections: Not on file   Intimate Partner Violence: Not At Risk   • Fear of Current or Ex-Partner: No   • Emotionally Abused: No   • Physically Abused: No   • Sexually Abused: No   Housing Stability: Low Risk    • Unable to Pay for Housing in the Last Year: No   • Number of Places Lived in the Last Year: 1   • Unstable Housing in the Last Year: No      Medications and Allergies:     Current Outpatient Medications   Medication Sig Dispense Refill   • aspirin 81 MG tablet Take 1 tablet (81 mg total) by mouth daily 90 tablet 2   • atorvastatin (LIPITOR) 20 mg tablet take 1 tablet by mouth once daily 90 tablet 1   • betamethasone dipropionate (DIPROSONE) 0 05 % cream Apply topically     • carisoprodol (SOMA) 350 mg tablet Take 1 tablet (350 mg total) by mouth 4 (four) times a day 360 tablet 1   • Cholecalciferol (VITAMIN D3) 1000 units CAPS Take 1 tablet by mouth daily     • diclofenac sodium (VOLTAREN) 1 % Apply 2 g topically 4 (four) times a day 5 Tube 3   • fluocinonide (LIDEX) 0 05 % external solution Apply topically daily 60 mL 3   • gabapentin (NEURONTIN) 600 MG tablet take 1 tablet by mouth three times a day (MORNING, NOON, AND BEDTIME)     • ketoconazole (NIZORAL) 2 % cream Apply topically 2 (two) times a day 30 g 1   • losartan (COZAAR) 50 mg tablet TAKE 1 TABLET BY MOUTH EVERY DAY 90 tablet 3   • naloxone (NARCAN) 4 mg/0 1 mL nasal spray Administer 1 spray into a nostril  If no response after 2-3 minutes, give another dose in the other nostril using a new spray  1 each 1   • oxyCODONE (ROXICODONE) 10 MG TABS Take 1 tablet 4 times daily 120 tablet 0   • oxyCODONE-acetaminophen (PERCOCET)  mg per tablet Take 1 tablet by mouth every 4 (four) hours as needed for moderate pain Max Daily Amount: 6 tablets 120 tablet 0   • Secukinumab 150 MG/ML SOAJ      • traZODone (DESYREL) 300 MG tablet 2 tablets at bedtime 180 tablet 1   • Flowflex COVID-19 Ag Home Test KIT  (Patient not taking: Reported on 10/31/2022)     • fluocinonide (LIDEX) 0 05 % ointment Apply topically 2 (two) times a day (Patient not taking: Reported on 10/31/2022)       No current facility-administered medications for this visit       Allergies   Allergen Reactions   • Penicillins Anaphylaxis   • Quinine Derivatives Other (See Comments)     High fever      Immunizations:     Immunization History   Administered Date(s) Administered   • Influenza Split High Dose Preservative Free IM 10/09/2014   • Pneumococcal Conjugate 13-Valent 07/02/2019   • Pneumococcal Polysaccharide PPV23 05/22/2014   • Zoster 03/23/2013      Health Maintenance:         Topic Date Due   • Hepatitis C Screening  Never done   • Colorectal Cancer Screening  06/02/2030         Topic Date Due   • COVID-19 Vaccine (1) Never done   • Influenza Vaccine (1) 09/01/2022      Medicare Screening Tests and Risk Assessments:     Kiesha Howe is here for his Subsequent Wellness visit  Last Medicare Wellness visit information reviewed, patient interviewed and updates made to the record today  Health Risk Assessment:   Patient rates overall health as fair  Patient feels that their physical health rating is same  Patient is satisfied with their life  Eyesight was rated as same  Hearing was rated as same  Patient feels that their emotional and mental health rating is same  Patients states they are never, rarely angry  Patient states they are sometimes unusually tired/fatigued  Pain experienced in the last 7 days has been a lot  Patient's pain rating has been 8/10  Patient states that he has experienced no weight loss or gain in last 6 months  Fall Risk Screening: In the past year, patient has experienced: no history of falling in past year      Home Safety:  Patient does not have trouble with stairs inside or outside of their home  Patient has working smoke alarms and has working carbon monoxide detector  Home safety hazards include: none  Nutrition:   Current diet is Regular  Medications:   Patient is currently taking over-the-counter supplements  OTC medications include: see medication list  Patient is able to manage medications  Activities of Daily Living (ADLs)/Instrumental Activities of Daily Living (IADLs):   Walk and transfer into and out of bed and chair?: Yes  Dress and groom yourself?: Yes    Bathe or shower yourself?: Yes    Feed yourself?  Yes  Do your laundry/housekeeping?: Yes  Manage your money, pay your bills and track your expenses?: Yes  Make your own meals?: Yes    Do your own shopping?: Yes    Previous Hospitalizations:   Any hospitalizations or ED visits within the last 12 months?: No      Advance Care Planning:   Living will: Yes    Durable POA for healthcare: Yes    Advanced directive: Yes    Advanced directive counseling given: Yes    Five wishes given: No    End of Life Decisions reviewed with patient: Yes    Provider agrees with end of life decisions: Yes      Cognitive Screening:   Provider or family/friend/caregiver concerned regarding cognition?: No    PREVENTIVE SCREENINGS      Cardiovascular Screening:    General: Screening Not Indicated, History Lipid Disorder and Screening Current      Diabetes Screening:     General: Screening Current      Colorectal Cancer Screening:     General: Screening Current      Prostate Cancer Screening:    General: Screening Not Indicated and Screening Current      Osteoporosis Screening:    General: Screening Current      Abdominal Aortic Aneurysm (AAA) Screening:    Risk factors include: age between 73-67 yo        Lung Cancer Screening:     General: Screening Not Indicated      Hepatitis C Screening:    General: Screening Not Indicated    Screening, Brief Intervention, and Referral to Treatment (SBIRT)    Screening    Typical number of drinks in a week: 0    Single Item Drug Screening:  How often have you used an illegal drug (including marijuana) or a prescription medication for non-medical reasons in the past year? never    Single Item Drug Screen Score: 0  Interpretation: Negative screen for possible drug use disorder    Brief Intervention  Alcohol & drug use screenings were reviewed  No concerns regarding substance use disorder identified       Review of Current Opioid Use    Opioid Risk Tool (ORT) Interpretation: Complete Opioid Risk Tool (ORT)    Other Counseling Topics:   Car/seat belt/driving safety, skin self-exam, sunscreen and calcium and vitamin D intake and regular weightbearing exercise  No exam data present     Physical Exam:     Ht 5' 7" (1 702 m) Comment: Verbal  Wt 88 5 kg (195 lb) Comment: verbal  BMI 30 54 kg/m²     Physical Exam  Constitutional:       Appearance: He is well-developed  HENT:      Head: Normocephalic  Right Ear: External ear normal       Left Ear: External ear normal       Nose: Nose normal    Eyes:      Conjunctiva/sclera: Conjunctivae normal    Pulmonary:      Effort: Pulmonary effort is normal    Musculoskeletal:         General: Normal range of motion  Cervical back: Normal range of motion  Skin:     General: Skin is warm and dry  Neurological:      Mental Status: He is alert and oriented to person, place, and time  Psychiatric:         Behavior: Behavior normal          Thought Content:  Thought content normal          Judgment: Judgment normal           Telly Page, DO

## 2022-10-31 NOTE — PATIENT INSTRUCTIONS
Medicare Preventive Visit Patient Instructions  Thank you for completing your Welcome to Medicare Visit or Medicare Annual Wellness Visit today  Your next wellness visit will be due in one year (11/1/2023)  The screening/preventive services that you may require over the next 5-10 years are detailed below  Some tests may not apply to you based off risk factors and/or age  Screening tests ordered at today's visit but not completed yet may show as past due  Also, please note that scanned in results may not display below  Preventive Screenings:  Service Recommendations Previous Testing/Comments   Colorectal Cancer Screening  · Colonoscopy    · Fecal Occult Blood Test (FOBT)/Fecal Immunochemical Test (FIT)  · Fecal DNA/Cologuard Test  · Flexible Sigmoidoscopy Age: 39-70 years old   Colonoscopy: every 10 years (May be performed more frequently if at higher risk)  OR  FOBT/FIT: every 1 year  OR  Cologuard: every 3 years  OR  Sigmoidoscopy: every 5 years  Screening may be recommended earlier than age 39 if at higher risk for colorectal cancer  Also, an individualized decision between you and your healthcare provider will decide whether screening between the ages of 74-80 would be appropriate  Colonoscopy: 06/02/2020  FOBT/FIT: Not on file  Cologuard: 01/06/2020  Sigmoidoscopy: Not on file          Prostate Cancer Screening Individualized decision between patient and health care provider in men between ages of 53-78   Medicare will cover every 12 months beginning on the day after your 50th birthday PSA: 0 4 ng/mL           Hepatitis C Screening Once for adults born between 1945 and 1965  More frequently in patients at high risk for Hepatitis C Hep C Antibody: Not on file        Diabetes Screening 1-2 times per year if you're at risk for diabetes or have pre-diabetes Fasting glucose: 113 mg/dL (3/30/2022)  A1C: 6 0 % (3/30/2022)      Cholesterol Screening Once every 5 years if you don't have a lipid disorder   May order more often based on risk factors  Lipid panel: 03/30/2022         Other Preventive Screenings Covered by Medicare:  1  Abdominal Aortic Aneurysm (AAA) Screening: covered once if your at risk  You're considered to be at risk if you have a family history of AAA or a male between the age of 73-68 who smoking at least 100 cigarettes in your lifetime  2  Lung Cancer Screening: covers low dose CT scan once per year if you meet all of the following conditions: (1) Age 50-69; (2) No signs or symptoms of lung cancer; (3) Current smoker or have quit smoking within the last 15 years; (4) You have a tobacco smoking history of at least 20 pack years (packs per day x number of years you smoked); (5) You get a written order from a healthcare provider  3  Glaucoma Screening: covered annually if you're considered high risk: (1) You have diabetes OR (2) Family history of glaucoma OR (3)  aged 48 and older OR (3)  American aged 72 and older  3  Osteoporosis Screening: covered every 2 years if you meet one of the following conditions: (1) Have a vertebral abnormality; (2) On glucocorticoid therapy for more than 3 months; (3) Have primary hyperparathyroidism; (4) On osteoporosis medications and need to assess response to drug therapy  5  HIV Screening: covered annually if you're between the age of 12-76  Also covered annually if you are younger than 13 and older than 72 with risk factors for HIV infection  For pregnant patients, it is covered up to 3 times per pregnancy      Immunizations:  Immunization Recommendations   Influenza Vaccine Annual influenza vaccination during flu season is recommended for all persons aged >= 6 months who do not have contraindications   Pneumococcal Vaccine   * Pneumococcal conjugate vaccine = PCV13 (Prevnar 13), PCV15 (Vaxneuvance), PCV20 (Prevnar 20)  * Pneumococcal polysaccharide vaccine = PPSV23 (Pneumovax) Adults 25-60 years old: 1-3 doses may be recommended based on certain risk factors  Adults 72 years old: 1-2 doses may be recommended based off what pneumonia vaccine you previously received   Hepatitis B Vaccine 3 dose series if at intermediate or high risk (ex: diabetes, end stage renal disease, liver disease)   Tetanus (Td) Vaccine - COST NOT COVERED BY MEDICARE PART B Following completion of primary series, a booster dose should be given every 10 years to maintain immunity against tetanus  Td may also be given as tetanus wound prophylaxis  Tdap Vaccine - COST NOT COVERED BY MEDICARE PART B Recommended at least once for all adults  For pregnant patients, recommended with each pregnancy  Shingles Vaccine (Shingrix) - COST NOT COVERED BY MEDICARE PART B  2 shot series recommended in those aged 48 and above     Health Maintenance Due:      Topic Date Due   • Hepatitis C Screening  Never done   • Colorectal Cancer Screening  06/02/2030     Immunizations Due:      Topic Date Due   • COVID-19 Vaccine (1) Never done   • Influenza Vaccine (1) 09/01/2022     Advance Directives   What are advance directives? Advance directives are legal documents that state your wishes and plans for medical care  These plans are made ahead of time in case you lose your ability to make decisions for yourself  Advance directives can apply to any medical decision, such as the treatments you want, and if you want to donate organs  What are the types of advance directives? There are many types of advance directives, and each state has rules about how to use them  You may choose a combination of any of the following:  · Living will: This is a written record of the treatment you want  You can also choose which treatments you do not want, which to limit, and which to stop at a certain time  This includes surgery, medicine, IV fluid, and tube feedings  · Durable power of  for healthcare [facility]):   This is a written record that states who you want to make healthcare choices for you when you are unable to make them for yourself  This person, called a proxy, is usually a family member or a friend  You may choose more than 1 proxy  · Do not resuscitate (DNR) order:  A DNR order is used in case your heart stops beating or you stop breathing  It is a request not to have certain forms of treatment, such as CPR  A DNR order may be included in other types of advance directives  · Medical directive: This covers the care that you want if you are in a coma, near death, or unable to make decisions for yourself  You can list the treatments you want for each condition  Treatment may include pain medicine, surgery, blood transfusions, dialysis, IV or tube feedings, and a ventilator (breathing machine)  · Values history: This document has questions about your views, beliefs, and how you feel and think about life  This information can help others choose the care that you would choose  Why are advance directives important? An advance directive helps you control your care  Although spoken wishes may be used, it is better to have your wishes written down  Spoken wishes can be misunderstood, or not followed  Treatments may be given even if you do not want them  An advance directive may make it easier for your family to make difficult choices about your care  Weight Management   Why it is important to manage your weight:  Being overweight increases your risk of health conditions such as heart disease, high blood pressure, type 2 diabetes, and certain types of cancer  It can also increase your risk for osteoarthritis, sleep apnea, and other respiratory problems  Aim for a slow, steady weight loss  Even a small amount of weight loss can lower your risk of health problems  How to lose weight safely:  A safe and healthy way to lose weight is to eat fewer calories and get regular exercise  You can lose up about 1 pound a week by decreasing the number of calories you eat by 500 calories each day     Healthy meal plan for weight management:  A healthy meal plan includes a variety of foods, contains fewer calories, and helps you stay healthy  A healthy meal plan includes the following:  · Eat whole-grain foods more often  A healthy meal plan should contain fiber  Fiber is the part of grains, fruits, and vegetables that is not broken down by your body  Whole-grain foods are healthy and provide extra fiber in your diet  Some examples of whole-grain foods are whole-wheat breads and pastas, oatmeal, brown rice, and bulgur  · Eat a variety of vegetables every day  Include dark, leafy greens such as spinach, kale, marquez greens, and mustard greens  Eat yellow and orange vegetables such as carrots, sweet potatoes, and winter squash  · Eat a variety of fruits every day  Choose fresh or canned fruit (canned in its own juice or light syrup) instead of juice  Fruit juice has very little or no fiber  · Eat low-fat dairy foods  Drink fat-free (skim) milk or 1% milk  Eat fat-free yogurt and low-fat cottage cheese  Try low-fat cheeses such as mozzarella and other reduced-fat cheeses  · Choose meat and other protein foods that are low in fat  Choose beans or other legumes such as split peas or lentils  Choose fish, skinless poultry (chicken or turkey), or lean cuts of red meat (beef or pork)  Before you cook meat or poultry, cut off any visible fat  · Use less fat and oil  Try baking foods instead of frying them  Add less fat, such as margarine, sour cream, regular salad dressing and mayonnaise to foods  Eat fewer high-fat foods  Some examples of high-fat foods include french fries, doughnuts, ice cream, and cakes  · Eat fewer sweets  Limit foods and drinks that are high in sugar  This includes candy, cookies, regular soda, and sweetened drinks  Exercise:  Exercise at least 30 minutes per day on most days of the week  Some examples of exercise include walking, biking, dancing, and swimming   You can also fit in more physical activity by taking the stairs instead of the elevator or parking farther away from stores  Ask your healthcare provider about the best exercise plan for you  © Copyright [employer] 2018 Information is for End User's use only and may not be sold, redistributed or otherwise used for commercial purposes  All illustrations and images included in CareNotes® are the copyrighted property of [employer]  or [address]onda Sánchez 62 of care:  Maximize your health and quality of life by:   · Increasing your level of function and activity  · Decreasing the negative effects of pain on your life  · Minimizing the risks and side effects of medications and ensuring safe use of opioid medication     Ways for you to help meet your goals:  Maintain a healthy lifestyle  This includes proper nutrition, regular physical activity as able, try for 8 hours of sleep per night, use stress reduction strategies, avoid triggers  Risks and side effects of opioid use:  Prescription opioids carry serious risks of addiction and  overdose, especially with prolonged use  An opioid overdose,  often marked by slowed breathing, can cause sudden death  The  use of prescription opioids can have a number of side effects as  well, even when taken as directed:  · Tolerance--meaning you might need to take more of a medication for the same pain relief  · Physical dependence--meaning you have symptoms of withdrawal when a medication is stopped  · Increased sensitivity to pain  · Constipation  · Nausea, vomiting, dry mouth  · Sleepiness and dizziness   · Confusion  · Depression  · Low levels of testosterone that can result in lower sex drive, energy, and strength  · Itching and sweating    If you are prescribed opioids for pain:  · Never take opioids in greater amounts or more often than prescribed  · Help prevent misuse and abuse  - Never sell or share prescription opioids         - Never use another person’s prescription opioids    · ‡Store prescription opioids in a secure place and out of reach of others (this may include visitors, children, friends, and family)  · Safely dispose of unused prescription opioids: Find your community drug take-back program or your pharmacy mail-back program, or flush them down the toilet, following guidance from the Food and Drug Administration (www fda gov/Drugs/ResourcesForYou)  · ‡Visit www cdc gov/drugoverdose to learn about the risks of opioid abuse and overdose  · If you believe you may be struggling with addiction, tell your health care provider and ask for guidance or call [facility]’s National Helpline at 0-203-527-MLWI

## 2022-11-01 DIAGNOSIS — M48.061 SPINAL STENOSIS OF LUMBAR REGION, UNSPECIFIED WHETHER NEUROGENIC CLAUDICATION PRESENT: ICD-10-CM

## 2022-11-01 RX ORDER — OXYCODONE HYDROCHLORIDE 10 MG/1
TABLET ORAL
Qty: 120 TABLET | Refills: 0 | OUTPATIENT
Start: 2022-11-01

## 2022-11-07 DIAGNOSIS — M48.061 SPINAL STENOSIS OF LUMBAR REGION, UNSPECIFIED WHETHER NEUROGENIC CLAUDICATION PRESENT: ICD-10-CM

## 2022-11-07 RX ORDER — OXYCODONE AND ACETAMINOPHEN 10; 325 MG/1; MG/1
1 TABLET ORAL EVERY 4 HOURS PRN
Qty: 120 TABLET | Refills: 0 | Status: SHIPPED | OUTPATIENT
Start: 2022-11-07

## 2022-12-01 DIAGNOSIS — M48.061 SPINAL STENOSIS OF LUMBAR REGION, UNSPECIFIED WHETHER NEUROGENIC CLAUDICATION PRESENT: ICD-10-CM

## 2022-12-01 RX ORDER — OXYCODONE HYDROCHLORIDE 10 MG/1
TABLET ORAL
Qty: 120 TABLET | Refills: 0 | Status: ON HOLD | OUTPATIENT
Start: 2022-12-01

## 2022-12-06 DIAGNOSIS — M48.061 SPINAL STENOSIS OF LUMBAR REGION, UNSPECIFIED WHETHER NEUROGENIC CLAUDICATION PRESENT: ICD-10-CM

## 2022-12-06 RX ORDER — OXYCODONE AND ACETAMINOPHEN 10; 325 MG/1; MG/1
1 TABLET ORAL EVERY 4 HOURS PRN
Qty: 120 TABLET | Refills: 0 | Status: ON HOLD | OUTPATIENT
Start: 2022-12-06

## 2022-12-11 ENCOUNTER — HOSPITAL ENCOUNTER (INPATIENT)
Facility: HOSPITAL | Age: 77
End: 2022-12-11
Attending: EMERGENCY MEDICINE | Admitting: SPECIALIST

## 2022-12-11 ENCOUNTER — APPOINTMENT (EMERGENCY)
Dept: CT IMAGING | Facility: HOSPITAL | Age: 77
End: 2022-12-11

## 2022-12-11 ENCOUNTER — ANESTHESIA (INPATIENT)
Dept: PERIOP | Facility: HOSPITAL | Age: 77
End: 2022-12-11

## 2022-12-11 ENCOUNTER — ANESTHESIA EVENT (INPATIENT)
Dept: PERIOP | Facility: HOSPITAL | Age: 77
End: 2022-12-11

## 2022-12-11 DIAGNOSIS — R10.9 ABDOMINAL PAIN: Primary | ICD-10-CM

## 2022-12-11 DIAGNOSIS — Z95.3 STATUS POST AORTIC VALVE REPLACEMENT WITH BIOPROSTHETIC VALVE: ICD-10-CM

## 2022-12-11 DIAGNOSIS — F11.20 CONTINUOUS OPIOID DEPENDENCE (HCC): ICD-10-CM

## 2022-12-11 DIAGNOSIS — G89.4 CHRONIC PAIN SYNDROME: ICD-10-CM

## 2022-12-11 DIAGNOSIS — I48.91 NEW ONSET ATRIAL FIBRILLATION (HCC): ICD-10-CM

## 2022-12-11 DIAGNOSIS — Z93.3 COLOSTOMY IN PLACE (HCC): ICD-10-CM

## 2022-12-11 DIAGNOSIS — N18.30 STAGE 3 CHRONIC KIDNEY DISEASE, UNSPECIFIED WHETHER STAGE 3A OR 3B CKD (HCC): ICD-10-CM

## 2022-12-11 DIAGNOSIS — R10.0 ACUTE ABDOMEN: ICD-10-CM

## 2022-12-11 DIAGNOSIS — G47.00 INSOMNIA, UNSPECIFIED TYPE: ICD-10-CM

## 2022-12-11 DIAGNOSIS — K65.9 PERITONITIS (HCC): ICD-10-CM

## 2022-12-11 DIAGNOSIS — I42.9 CARDIOMYOPATHY, UNSPECIFIED TYPE (HCC): ICD-10-CM

## 2022-12-11 DIAGNOSIS — M05.9 RHEUMATOID ARTHRITIS WITH RHEUMATOID FACTOR, UNSPECIFIED (HCC): ICD-10-CM

## 2022-12-11 DIAGNOSIS — R10.13 DYSPEPSIA: ICD-10-CM

## 2022-12-11 LAB
ALBUMIN SERPL BCP-MCNC: 4.3 G/DL (ref 3.5–5)
ALP SERPL-CCNC: 54 U/L (ref 34–104)
ALT SERPL W P-5'-P-CCNC: 18 U/L (ref 7–52)
ANION GAP SERPL CALCULATED.3IONS-SCNC: 12 MMOL/L (ref 4–13)
ANION GAP SERPL CALCULATED.3IONS-SCNC: 9 MMOL/L (ref 4–13)
AST SERPL W P-5'-P-CCNC: 17 U/L (ref 13–39)
BASOPHILS # BLD AUTO: 0.01 THOUSANDS/ÂΜL (ref 0–0.1)
BASOPHILS # BLD MANUAL: 0 THOUSAND/UL (ref 0–0.1)
BASOPHILS NFR BLD AUTO: 0 % (ref 0–1)
BASOPHILS NFR MAR MANUAL: 0 % (ref 0–1)
BILIRUB SERPL-MCNC: 0.57 MG/DL (ref 0.2–1)
BUN SERPL-MCNC: 15 MG/DL (ref 5–25)
BUN SERPL-MCNC: 19 MG/DL (ref 5–25)
CALCIUM SERPL-MCNC: 7.9 MG/DL (ref 8.4–10.2)
CALCIUM SERPL-MCNC: 9.5 MG/DL (ref 8.4–10.2)
CHLORIDE SERPL-SCNC: 100 MMOL/L (ref 96–108)
CHLORIDE SERPL-SCNC: 106 MMOL/L (ref 96–108)
CO2 SERPL-SCNC: 21 MMOL/L (ref 21–32)
CO2 SERPL-SCNC: 26 MMOL/L (ref 21–32)
CREAT SERPL-MCNC: 0.96 MG/DL (ref 0.6–1.3)
CREAT SERPL-MCNC: 1.28 MG/DL (ref 0.6–1.3)
EOSINOPHIL # BLD AUTO: 0 THOUSAND/ÂΜL (ref 0–0.61)
EOSINOPHIL # BLD MANUAL: 0 THOUSAND/UL (ref 0–0.4)
EOSINOPHIL NFR BLD AUTO: 0 % (ref 0–6)
EOSINOPHIL NFR BLD MANUAL: 0 % (ref 0–6)
ERYTHROCYTE [DISTWIDTH] IN BLOOD BY AUTOMATED COUNT: 12.9 % (ref 11.6–15.1)
ERYTHROCYTE [DISTWIDTH] IN BLOOD BY AUTOMATED COUNT: 13.2 % (ref 11.6–15.1)
GFR SERPL CREATININE-BSD FRML MDRD: 54 ML/MIN/1.73SQ M
GFR SERPL CREATININE-BSD FRML MDRD: 77 ML/MIN/1.73SQ M
GLUCOSE SERPL-MCNC: 110 MG/DL (ref 65–140)
GLUCOSE SERPL-MCNC: 113 MG/DL (ref 65–140)
GLUCOSE SERPL-MCNC: 115 MG/DL (ref 65–140)
GLUCOSE SERPL-MCNC: 133 MG/DL (ref 65–140)
GLUCOSE SERPL-MCNC: 185 MG/DL (ref 65–140)
HCT VFR BLD AUTO: 44.8 % (ref 36.5–49.3)
HCT VFR BLD AUTO: 52.2 % (ref 36.5–49.3)
HGB BLD-MCNC: 14.2 G/DL (ref 12–17)
HGB BLD-MCNC: 16.2 G/DL (ref 12–17)
IMM GRANULOCYTES # BLD AUTO: 0.01 THOUSAND/UL (ref 0–0.2)
IMM GRANULOCYTES NFR BLD AUTO: 0 % (ref 0–2)
LACTATE SERPL-SCNC: 2.6 MMOL/L (ref 0.5–2)
LACTATE SERPL-SCNC: 4.3 MMOL/L (ref 0.5–2)
LIPASE SERPL-CCNC: 32 U/L (ref 11–82)
LYMPHOCYTES # BLD AUTO: 0.2 THOUSANDS/ÂΜL (ref 0.6–4.47)
LYMPHOCYTES # BLD AUTO: 0.95 THOUSAND/UL (ref 0.6–4.47)
LYMPHOCYTES # BLD AUTO: 18 % (ref 14–44)
LYMPHOCYTES NFR BLD AUTO: 7 % (ref 14–44)
MCH RBC QN AUTO: 28.5 PG (ref 26.8–34.3)
MCH RBC QN AUTO: 28.9 PG (ref 26.8–34.3)
MCHC RBC AUTO-ENTMCNC: 31 G/DL (ref 31.4–37.4)
MCHC RBC AUTO-ENTMCNC: 31.7 G/DL (ref 31.4–37.4)
MCV RBC AUTO: 91 FL (ref 82–98)
MCV RBC AUTO: 92 FL (ref 82–98)
METAMYELOCYTES NFR BLD MANUAL: 9 % (ref 0–1)
MONOCYTES # BLD AUTO: 0.16 THOUSAND/UL (ref 0–1.22)
MONOCYTES # BLD AUTO: 0.25 THOUSAND/ÂΜL (ref 0.17–1.22)
MONOCYTES NFR BLD AUTO: 8 % (ref 4–12)
MONOCYTES NFR BLD: 3 % (ref 4–12)
MYELOCYTES NFR BLD MANUAL: 1 % (ref 0–1)
NEUTROPHILS # BLD AUTO: 2.52 THOUSANDS/ÂΜL (ref 1.85–7.62)
NEUTROPHILS # BLD MANUAL: 3.62 THOUSAND/UL (ref 1.85–7.62)
NEUTS BAND NFR BLD MANUAL: 30 % (ref 0–8)
NEUTS SEG NFR BLD AUTO: 39 % (ref 43–75)
NEUTS SEG NFR BLD AUTO: 85 % (ref 43–75)
NRBC BLD AUTO-RTO: 0 /100 WBCS
PLATELET # BLD AUTO: 215 THOUSANDS/UL (ref 149–390)
PLATELET # BLD AUTO: 251 THOUSANDS/UL (ref 149–390)
PLATELET BLD QL SMEAR: ADEQUATE
PMV BLD AUTO: 9 FL (ref 8.9–12.7)
PMV BLD AUTO: 9.1 FL (ref 8.9–12.7)
POTASSIUM SERPL-SCNC: 3.8 MMOL/L (ref 3.5–5.3)
POTASSIUM SERPL-SCNC: 4.4 MMOL/L (ref 3.5–5.3)
PROT SERPL-MCNC: 7 G/DL (ref 6.4–8.4)
RBC # BLD AUTO: 4.92 MILLION/UL (ref 3.88–5.62)
RBC # BLD AUTO: 5.69 MILLION/UL (ref 3.88–5.62)
RBC MORPH BLD: NORMAL
SODIUM SERPL-SCNC: 136 MMOL/L (ref 135–147)
SODIUM SERPL-SCNC: 138 MMOL/L (ref 135–147)
WBC # BLD AUTO: 2.99 THOUSAND/UL (ref 4.31–10.16)
WBC # BLD AUTO: 5.25 THOUSAND/UL (ref 4.31–10.16)
WBC TOXIC VACUOLES BLD QL SMEAR: PRESENT

## 2022-12-11 PROCEDURE — 0W9G0ZZ DRAINAGE OF PERITONEAL CAVITY, OPEN APPROACH: ICD-10-PCS | Performed by: SPECIALIST

## 2022-12-11 PROCEDURE — 0DN80ZZ RELEASE SMALL INTESTINE, OPEN APPROACH: ICD-10-PCS | Performed by: SPECIALIST

## 2022-12-11 PROCEDURE — 0DTJ0ZZ RESECTION OF APPENDIX, OPEN APPROACH: ICD-10-PCS | Performed by: SPECIALIST

## 2022-12-11 PROCEDURE — 0DNU0ZZ RELEASE OMENTUM, OPEN APPROACH: ICD-10-PCS | Performed by: SPECIALIST

## 2022-12-11 RX ORDER — HYDROMORPHONE HCL/PF 1 MG/ML
1 SYRINGE (ML) INJECTION EVERY 2 HOUR PRN
Status: DISCONTINUED | OUTPATIENT
Start: 2022-12-11 | End: 2022-12-12

## 2022-12-11 RX ORDER — PANTOPRAZOLE SODIUM 40 MG/10ML
40 INJECTION, POWDER, LYOPHILIZED, FOR SOLUTION INTRAVENOUS EVERY 12 HOURS SCHEDULED
Status: DISCONTINUED | OUTPATIENT
Start: 2022-12-11 | End: 2022-12-23 | Stop reason: HOSPADM

## 2022-12-11 RX ORDER — DIAZEPAM 5 MG/ML
2.5 INJECTION, SOLUTION INTRAMUSCULAR; INTRAVENOUS EVERY 6 HOURS PRN
Status: DISCONTINUED | OUTPATIENT
Start: 2022-12-11 | End: 2022-12-12

## 2022-12-11 RX ORDER — NEOSTIGMINE METHYLSULFATE 1 MG/ML
INJECTION INTRAVENOUS AS NEEDED
Status: DISCONTINUED | OUTPATIENT
Start: 2022-12-11 | End: 2022-12-11

## 2022-12-11 RX ORDER — HYDROMORPHONE HCL/PF 1 MG/ML
1 SYRINGE (ML) INJECTION ONCE
Status: COMPLETED | OUTPATIENT
Start: 2022-12-11 | End: 2022-12-11

## 2022-12-11 RX ORDER — SUCCINYLCHOLINE/SOD CL,ISO/PF 100 MG/5ML
SYRINGE (ML) INTRAVENOUS AS NEEDED
Status: DISCONTINUED | OUTPATIENT
Start: 2022-12-11 | End: 2022-12-11

## 2022-12-11 RX ORDER — SODIUM CHLORIDE, SODIUM LACTATE, POTASSIUM CHLORIDE, CALCIUM CHLORIDE 600; 310; 30; 20 MG/100ML; MG/100ML; MG/100ML; MG/100ML
100 INJECTION, SOLUTION INTRAVENOUS CONTINUOUS
Status: DISCONTINUED | OUTPATIENT
Start: 2022-12-11 | End: 2022-12-13

## 2022-12-11 RX ORDER — ONDANSETRON 2 MG/ML
4 INJECTION INTRAMUSCULAR; INTRAVENOUS ONCE
Status: COMPLETED | OUTPATIENT
Start: 2022-12-11 | End: 2022-12-11

## 2022-12-11 RX ORDER — CIPROFLOXACIN 2 MG/ML
400 INJECTION, SOLUTION INTRAVENOUS ONCE
Status: COMPLETED | OUTPATIENT
Start: 2022-12-11 | End: 2022-12-11

## 2022-12-11 RX ORDER — ROCURONIUM BROMIDE 10 MG/ML
INJECTION, SOLUTION INTRAVENOUS AS NEEDED
Status: DISCONTINUED | OUTPATIENT
Start: 2022-12-11 | End: 2022-12-11

## 2022-12-11 RX ORDER — ONDANSETRON 2 MG/ML
4 INJECTION INTRAMUSCULAR; INTRAVENOUS EVERY 6 HOURS PRN
Status: DISCONTINUED | OUTPATIENT
Start: 2022-12-11 | End: 2022-12-23 | Stop reason: HOSPADM

## 2022-12-11 RX ORDER — FENTANYL CITRATE 50 UG/ML
100 INJECTION, SOLUTION INTRAMUSCULAR; INTRAVENOUS ONCE
Status: COMPLETED | OUTPATIENT
Start: 2022-12-11 | End: 2022-12-11

## 2022-12-11 RX ORDER — DEXAMETHASONE SODIUM PHOSPHATE 10 MG/ML
INJECTION, SOLUTION INTRAMUSCULAR; INTRAVENOUS AS NEEDED
Status: DISCONTINUED | OUTPATIENT
Start: 2022-12-11 | End: 2022-12-11

## 2022-12-11 RX ORDER — CIPROFLOXACIN 2 MG/ML
400 INJECTION, SOLUTION INTRAVENOUS EVERY 12 HOURS
Status: DISCONTINUED | OUTPATIENT
Start: 2022-12-11 | End: 2022-12-16

## 2022-12-11 RX ORDER — DIAZEPAM 5 MG/ML
2.5 INJECTION, SOLUTION INTRAMUSCULAR; INTRAVENOUS EVERY 6 HOURS PRN
Status: DISCONTINUED | OUTPATIENT
Start: 2022-12-11 | End: 2022-12-11

## 2022-12-11 RX ORDER — GLYCOPYRROLATE 0.2 MG/ML
INJECTION INTRAMUSCULAR; INTRAVENOUS AS NEEDED
Status: DISCONTINUED | OUTPATIENT
Start: 2022-12-11 | End: 2022-12-11

## 2022-12-11 RX ORDER — MAGNESIUM HYDROXIDE 1200 MG/15ML
LIQUID ORAL AS NEEDED
Status: DISCONTINUED | OUTPATIENT
Start: 2022-12-11 | End: 2022-12-11 | Stop reason: HOSPADM

## 2022-12-11 RX ORDER — HYDROMORPHONE HCL/PF 1 MG/ML
0.5 SYRINGE (ML) INJECTION EVERY 2 HOUR PRN
Status: DISCONTINUED | OUTPATIENT
Start: 2022-12-11 | End: 2022-12-12

## 2022-12-11 RX ORDER — METRONIDAZOLE 500 MG/100ML
500 INJECTION, SOLUTION INTRAVENOUS EVERY 8 HOURS
Status: DISCONTINUED | OUTPATIENT
Start: 2022-12-11 | End: 2022-12-16

## 2022-12-11 RX ORDER — FLUCONAZOLE 2 MG/ML
400 INJECTION, SOLUTION INTRAVENOUS EVERY 24 HOURS
Status: DISCONTINUED | OUTPATIENT
Start: 2022-12-11 | End: 2022-12-15

## 2022-12-11 RX ORDER — ALBUMIN (HUMAN) 12.5 G/50ML
SOLUTION INTRAVENOUS AS NEEDED
Status: DISCONTINUED | OUTPATIENT
Start: 2022-12-11 | End: 2022-12-11

## 2022-12-11 RX ORDER — ALBUMIN, HUMAN INJ 5% 5 %
SOLUTION INTRAVENOUS CONTINUOUS PRN
Status: DISCONTINUED | OUTPATIENT
Start: 2022-12-11 | End: 2022-12-11

## 2022-12-11 RX ORDER — FENTANYL CITRATE 50 UG/ML
INJECTION, SOLUTION INTRAMUSCULAR; INTRAVENOUS AS NEEDED
Status: DISCONTINUED | OUTPATIENT
Start: 2022-12-11 | End: 2022-12-11

## 2022-12-11 RX ORDER — INSULIN LISPRO 100 [IU]/ML
1-5 INJECTION, SOLUTION INTRAVENOUS; SUBCUTANEOUS EVERY 6 HOURS SCHEDULED
Status: DISCONTINUED | OUTPATIENT
Start: 2022-12-11 | End: 2022-12-16

## 2022-12-11 RX ORDER — PROPOFOL 10 MG/ML
INJECTION, EMULSION INTRAVENOUS AS NEEDED
Status: DISCONTINUED | OUTPATIENT
Start: 2022-12-11 | End: 2022-12-11

## 2022-12-11 RX ORDER — HEPARIN SODIUM 5000 [USP'U]/ML
5000 INJECTION, SOLUTION INTRAVENOUS; SUBCUTANEOUS EVERY 8 HOURS SCHEDULED
Status: DISCONTINUED | OUTPATIENT
Start: 2022-12-11 | End: 2022-12-13

## 2022-12-11 RX ORDER — MIDAZOLAM HYDROCHLORIDE 2 MG/2ML
INJECTION, SOLUTION INTRAMUSCULAR; INTRAVENOUS AS NEEDED
Status: DISCONTINUED | OUTPATIENT
Start: 2022-12-11 | End: 2022-12-11

## 2022-12-11 RX ADMIN — MIDAZOLAM HYDROCHLORIDE 2 MG: 1 INJECTION, SOLUTION INTRAMUSCULAR; INTRAVENOUS at 11:11

## 2022-12-11 RX ADMIN — ROCURONIUM BROMIDE 30 MG: 10 INJECTION, SOLUTION INTRAVENOUS at 11:28

## 2022-12-11 RX ADMIN — ONDANSETRON 4 MG: 2 INJECTION INTRAMUSCULAR; INTRAVENOUS at 11:48

## 2022-12-11 RX ADMIN — ALBUMIN (HUMAN): 12.5 INJECTION, SOLUTION INTRAVENOUS at 13:18

## 2022-12-11 RX ADMIN — SODIUM CHLORIDE 1000 ML: 0.9 INJECTION, SOLUTION INTRAVENOUS at 09:29

## 2022-12-11 RX ADMIN — Medication 100 MG: at 11:17

## 2022-12-11 RX ADMIN — HYDROMORPHONE HYDROCHLORIDE 0.5 MG: 1 INJECTION, SOLUTION INTRAMUSCULAR; INTRAVENOUS; SUBCUTANEOUS at 21:13

## 2022-12-11 RX ADMIN — PANTOPRAZOLE SODIUM 40 MG: 40 INJECTION, POWDER, LYOPHILIZED, FOR SOLUTION INTRAVENOUS at 16:56

## 2022-12-11 RX ADMIN — SODIUM CHLORIDE, SODIUM LACTATE, POTASSIUM CHLORIDE, AND CALCIUM CHLORIDE 1000 ML: .6; .31; .03; .02 INJECTION, SOLUTION INTRAVENOUS at 21:28

## 2022-12-11 RX ADMIN — HEPARIN SODIUM 5000 UNITS: 5000 INJECTION INTRAVENOUS; SUBCUTANEOUS at 21:18

## 2022-12-11 RX ADMIN — METRONIDAZOLE 500 MG: 500 INJECTION, SOLUTION INTRAVENOUS at 15:53

## 2022-12-11 RX ADMIN — CIPROFLOXACIN 400 MG: 2 INJECTION, SOLUTION INTRAVENOUS at 21:18

## 2022-12-11 RX ADMIN — HYDROMORPHONE HYDROCHLORIDE 1 MG: 1 INJECTION, SOLUTION INTRAMUSCULAR; INTRAVENOUS; SUBCUTANEOUS at 23:23

## 2022-12-11 RX ADMIN — SODIUM CHLORIDE, POTASSIUM CHLORIDE, SODIUM LACTATE AND CALCIUM CHLORIDE 125 ML/HR: 600; 310; 30; 20 INJECTION, SOLUTION INTRAVENOUS at 16:49

## 2022-12-11 RX ADMIN — DIAZEPAM 2.5 MG: 5 INJECTION INTRAMUSCULAR; INTRAVENOUS at 22:20

## 2022-12-11 RX ADMIN — GLYCOPYRROLATE 0.4 MG: 0.4 INJECTION INTRAMUSCULAR; INTRAVENOUS at 14:47

## 2022-12-11 RX ADMIN — ROCURONIUM BROMIDE 10 MG: 10 INJECTION, SOLUTION INTRAVENOUS at 14:13

## 2022-12-11 RX ADMIN — FENTANYL CITRATE 50 MCG: 50 INJECTION, SOLUTION INTRAMUSCULAR; INTRAVENOUS at 12:11

## 2022-12-11 RX ADMIN — HEPARIN SODIUM 5000 UNITS: 5000 INJECTION INTRAVENOUS; SUBCUTANEOUS at 16:17

## 2022-12-11 RX ADMIN — PROPOFOL 100 MG: 10 INJECTION, EMULSION INTRAVENOUS at 11:17

## 2022-12-11 RX ADMIN — DEXAMETHASONE SODIUM PHOSPHATE 10 MG: 10 INJECTION, SOLUTION INTRAMUSCULAR; INTRAVENOUS at 11:47

## 2022-12-11 RX ADMIN — FENTANYL CITRATE 100 MCG: 50 INJECTION INTRAMUSCULAR; INTRAVENOUS at 07:55

## 2022-12-11 RX ADMIN — SODIUM CHLORIDE 1000 ML: 0.9 INJECTION, SOLUTION INTRAVENOUS at 07:03

## 2022-12-11 RX ADMIN — HYDROMORPHONE HYDROCHLORIDE 1 MG: 1 INJECTION, SOLUTION INTRAMUSCULAR; INTRAVENOUS; SUBCUTANEOUS at 06:59

## 2022-12-11 RX ADMIN — SODIUM CHLORIDE, SODIUM LACTATE, POTASSIUM CHLORIDE, AND CALCIUM CHLORIDE: .6; .31; .03; .02 INJECTION, SOLUTION INTRAVENOUS at 11:05

## 2022-12-11 RX ADMIN — ROCURONIUM BROMIDE 20 MG: 10 INJECTION, SOLUTION INTRAVENOUS at 12:25

## 2022-12-11 RX ADMIN — FENTANYL CITRATE 50 MCG: 50 INJECTION, SOLUTION INTRAMUSCULAR; INTRAVENOUS at 15:12

## 2022-12-11 RX ADMIN — PANTOPRAZOLE SODIUM 40 MG: 40 INJECTION, POWDER, LYOPHILIZED, FOR SOLUTION INTRAVENOUS at 22:20

## 2022-12-11 RX ADMIN — HYDROMORPHONE HYDROCHLORIDE 1 MG: 1 INJECTION, SOLUTION INTRAMUSCULAR; INTRAVENOUS; SUBCUTANEOUS at 15:53

## 2022-12-11 RX ADMIN — METRONIDAZOLE 500 MG: 500 INJECTION, SOLUTION INTRAVENOUS at 09:02

## 2022-12-11 RX ADMIN — SODIUM CHLORIDE 1000 ML: 0.9 INJECTION, SOLUTION INTRAVENOUS at 07:47

## 2022-12-11 RX ADMIN — PHENYLEPHRINE HYDROCHLORIDE 40 MCG/MIN: 10 INJECTION INTRAVENOUS at 11:30

## 2022-12-11 RX ADMIN — FENTANYL CITRATE 50 MCG: 50 INJECTION, SOLUTION INTRAMUSCULAR; INTRAVENOUS at 11:11

## 2022-12-11 RX ADMIN — CIPROFLOXACIN 400 MG: 2 INJECTION, SOLUTION INTRAVENOUS at 09:27

## 2022-12-11 RX ADMIN — NEOSTIGMINE METHYLSULFATE 4 MG: 1 INJECTION INTRAVENOUS at 14:47

## 2022-12-11 RX ADMIN — FLUCONAZOLE IN SODIUM CHLORIDE 400 MG: 2 INJECTION, SOLUTION INTRAVENOUS at 16:48

## 2022-12-11 RX ADMIN — HYDROMORPHONE HYDROCHLORIDE 1 MG: 1 INJECTION, SOLUTION INTRAMUSCULAR; INTRAVENOUS; SUBCUTANEOUS at 19:12

## 2022-12-11 RX ADMIN — ONDANSETRON 4 MG: 2 INJECTION INTRAMUSCULAR; INTRAVENOUS at 07:03

## 2022-12-11 RX ADMIN — ROCURONIUM BROMIDE 20 MG: 10 INJECTION, SOLUTION INTRAVENOUS at 11:51

## 2022-12-11 RX ADMIN — IOHEXOL 100 ML: 350 INJECTION, SOLUTION INTRAVENOUS at 07:57

## 2022-12-11 RX ADMIN — DIAZEPAM 2.5 MG: 5 INJECTION INTRAMUSCULAR; INTRAVENOUS at 16:17

## 2022-12-11 RX ADMIN — FENTANYL CITRATE 50 MCG: 50 INJECTION, SOLUTION INTRAMUSCULAR; INTRAVENOUS at 13:45

## 2022-12-11 RX ADMIN — ROCURONIUM BROMIDE 20 MG: 10 INJECTION, SOLUTION INTRAVENOUS at 13:00

## 2022-12-11 NOTE — ED CARE HANDOFF
Emergency Department Sign Out Note        Sign out and transfer of care from Check  See Separate Emergency Department note  The patient, Jean Peralta, was evaluated by the previous provider for Abd pain  Workup Completed:  Labs, CT pending    ED Course / Workup Pending (followup): Patient with free air on abdominal CT, clearly inflammatory process  Surgery consulted, Dr Joon Raymond aware evaluated patient immediately    Patient started on IV antibiotics, will admit patient, take to the OR for concern for perforated viscus, sepsis                                  ED Course as of 12/11/22 1725   Sun Dec 11, 2022   3430 Consistent with diverticulitis or other inflammatory process on CT     0818 Surgery coming     CriticalCare Time  Performed by: Massiel Terry DO  Authorized by: Massiel Terry,      Critical care provider statement:     Critical care time (minutes):  43    Critical care time was exclusive of:  Separately billable procedures and treating other patients and teaching time    Critical care was necessary to treat or prevent imminent or life-threatening deterioration of the following conditions:  Sepsis and shock    Critical care was time spent personally by me on the following activities:  Examination of patient, evaluation of patient's response to treatment, discussions with consultants, development of treatment plan with patient or surrogate, obtaining history from patient or surrogate, review of old charts, re-evaluation of patient's condition, ordering and review of radiographic studies, ordering and review of laboratory studies, ordering and performing treatments and interventions and blood draw for specimens      MDM        Disposition  Final diagnoses:   Abdominal pain   Colostomy in place [facility])     Time reflects when diagnosis was documented in both MDM as applicable and the Disposition within this note     Time User Action Codes Description Comment    12/11/2022  7:00 AM Check, Phuong Hock [R10 9] Abdominal pain     12/11/2022  7:00 AM Check, Kirsty Face Add [Z93 3] Colostomy in place [facility])     12/11/2022  9:31 AM Dougshaina Singletarying T Add [N18 30] Stage 3 chronic kidney disease, unspecified whether stage 3a or 3b CKD ([facility] [address] )     12/11/2022  9:31 AM Doug Singletarying T Add [Z95 3] Status post aortic valve replacement with bioprosthetic valve     12/11/2022  9:45 AM Doug Brooking T Add [R10 0] Acute abdomen     12/11/2022 11:55 AM Maylon Arts N Modify [R10 0] Acute abdomen     12/11/2022  3:33 PM Dejuan Milder Add [G89 4] Chronic pain syndrome     12/11/2022  3:33 PM Dejuan Milder Add [M05 9] Rheumatoid arthritis with rheumatoid factor, unspecified ([facility] )     12/11/2022  3:33 PM Dejuan Milder Add [F11 20] Continuous opioid dependence [facility])       ED Disposition     None      Follow-up Information    None       Current Discharge Medication List      CONTINUE these medications which have NOT CHANGED    Details   aspirin 81 MG tablet Take 1 tablet (81 mg total) by mouth daily  Qty: 90 tablet, Refills: 2    Associated Diagnoses: Other hyperlipidemia      atorvastatin (LIPITOR) 20 mg tablet take 1 tablet by mouth once daily  Qty: 90 tablet, Refills: 1    Associated Diagnoses: Hyperlipidemia, unspecified hyperlipidemia type      betamethasone dipropionate (DIPROSONE) 0 05 % cream Apply topically      carisoprodol (SOMA) 350 mg tablet Take 1 tablet (350 mg total) by mouth 4 (four) times a day  Qty: 360 tablet, Refills: 1    Associated Diagnoses: Chronic back pain, unspecified back location, unspecified back pain laterality      Cholecalciferol (VITAMIN D3) 1000 units CAPS Take 1 tablet by mouth daily      diclofenac sodium (VOLTAREN) 1 % Apply 2 g topically 4 (four) times a day  Qty: 5 Tube, Refills: 3    Associated Diagnoses: Arthritis      Flowflex COVID-19 Ag Home Test KIT       fluocinonide (LIDEX) 0 05 % external solution Apply topically daily  Qty: 60 mL, Refills: 3    Associated Diagnoses: Seborrhea      fluocinonide (LIDEX) 0 05 % ointment Apply topically 2 (two) times a day      gabapentin (NEURONTIN) 600 MG tablet take 1 tablet by mouth three times a day (MORNING, NOON, AND BEDTIME)      ketoconazole (NIZORAL) 2 % cream Apply topically 2 (two) times a day  Qty: 30 g, Refills: 1    Associated Diagnoses: Fungal dermatitis      losartan (COZAAR) 50 mg tablet TAKE 1 TABLET BY MOUTH EVERY DAY  Qty: 90 tablet, Refills: 3    Associated Diagnoses: Benign essential hypertension      naloxone (NARCAN) 4 mg/0 1 mL nasal spray Administer 1 spray into a nostril  If no response after 2-3 minutes, give another dose in the other nostril using a new spray  Qty: 1 each, Refills: 1    Associated Diagnoses: Continuous opioid dependence (HCC)      oxyCODONE (ROXICODONE) 10 MG TABS Take 1 tablet 4 times daily  Qty: 120 tablet, Refills: 0    Associated Diagnoses: Spinal stenosis of lumbar region, unspecified whether neurogenic claudication present      oxyCODONE-acetaminophen (PERCOCET)  mg per tablet Take 1 tablet by mouth every 4 (four) hours as needed for moderate pain Max Daily Amount: 6 tablets  Qty: 120 tablet, Refills: 0    Associated Diagnoses: Spinal stenosis of lumbar region, unspecified whether neurogenic claudication present      Secukinumab 150 MG/ML SOAJ       traZODone (DESYREL) 300 MG tablet 2 tablets at bedtime  Qty: 180 tablet, Refills: 1    Associated Diagnoses: Insomnia, unspecified type           No discharge procedures on file         ED Provider  Electronically Signed by     Catherine Manzo DO  12/11/22 5802
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
## 2022-12-11 NOTE — SEPSIS NOTE
Sepsis Note   Jean Peralta 78 y o  male MRN: [id number]  Unit/Bed#: ED 19 Encounter: [id number]       qSOFA     [address] Name 12/11/22 7594                Altered mental status GCS < 15 --        Respiratory Rate > / =64 0        Systolic BP < / =303 0        Q Sofa Score 0                   Initial Sepsis Screening     Row Name 12/11/22 0707                Is the patient's history suggestive of a new or worsening infection? --        Suspected source of infection acute abdominal infection  -RC        Are two or more of the following signs & symptoms of infection both present and new to the patient? --        Indicate SIRS criteria Leukopenia (WBC < 4000 IJL)  -RC        If the answer is yes to both questions, suspicion of sepsis is present --        If severe sepsis is present AND tissue hypoperfusion perists in the hour after fluid resuscitation or lactate > 4, the patient meets criteria for SEPTIC SHOCK --        Are any of the following organ dysfunction criteria present within 6 hours of suspected infection and SIRS criteria that are NOT considered to be chronic conditions? Yes  -RC        Organ dysfunction Lactate >/equal 4 0 mmol/L (MEETS CRITERIA FOR SEPTIC SHOCK)  -RC        Date of presentation of severe sepsis 12/11/22  -RC        Time of presentation of severe sepsis 0707  -RC        Tissue hypoperfusion persists in the hour after crystalloid fluid administration, evidenced, by either: * OR * Lactate level is greater to or equal 4 mmol/dL ( ___ mmol/dL in comment field)  -RC        Was hypotension present within one hour of the conclusion of crystalloid fluid administration?  No  -RC        Date of presentation of septic shock 12/11/22  -RC        Time of presentation of septic shock 0708  -RC              User Key  (r) = Recorded By, (t) = Taken By, (c) = Cosigned By    [address] Name Provider Type    Cleveland Luna MD Physician

## 2022-12-11 NOTE — H&P
H&P Exam - General Surgery   Camila Means 78 y o  male MRN: [id number]  Unit/Bed#: ED 19 Encounter: [id number]    Assessment/Plan     Assessment:  79yo M w  PMH of chronic pain, perforated sigmoid diverticulitis s/p Vane's procedure (8/2022), scheduled for reversal in Jan 2023, now presenting with acute onset abdominal pain meeting sepsis criteria   - acute onset lower abdominal pain which woke pt from sleep at 2am, radiates across lower abdomen, pain associated with nausea, no episodes of emesis  - afebrile, tachycardic, all other VSS on RA  - CTAP 12/11 "Extensive inflammatory changes involving the jejunum and proximal-mid ileum, compatible with severe enteritis  Infectious and ischemic etiologies may be considered  In particular, there is focal free air surrounding the lower midline ileum just above the urinary bladder with extensive inflammatory changes  There are a few mildly dilated loops of jejunum and within the left upper abdomen, likely secondary to extensive inflammatory changes with possible superimposed low-grade partial small bowel obstruction  There is a small amount of free air and free fluid in the remainder of the abdomen and pelvis "  - Lactic elevated at 4 3  - Neutropenic, 2 99  - elytes unremarkable     Plan:  - OR today ex lap, possible bowel resection, all other indicated procedures   - NPO  - IVF hydration   - IV abx   Evaluated at bedside with Dr Ivanna Herzog       History of Present Illness   HPI:  Camila Means is a 78 y o  male w PMH significant for acute sigmoid diverticulitis s/p Vane's procedure in aug 2022, scheduled for reversal with Dr Ivanna Herzog  in Jan 2023 who presents with acute onset abdominal pain  Pain woke patient from sleep at approximately 2am this morning  Pain radiates across lower abdomen, associated with nausea, no episodes of emesis   He states he was in his normal state of health yesterday but did notice some discomfort when seatbelt was pressing on lower abdomen during long car ride to visit family  Ostomy functioning normally yesterday, but wife states he has not had gas or stool in bag since colostomy bag was changed yesterday morning  Denies subjective fever or chills, confirms diaphoresis  Denies taking blood thinning medications  Last had something to eat/drink last evening for dinner  Review of Systems   Constitutional: Positive for diaphoresis  Negative for chills and fever  Respiratory: Negative for shortness of breath  Cardiovascular: Negative for chest pain and leg swelling  Gastrointestinal: Positive for abdominal distention, abdominal pain and nausea  Negative for vomiting  Genitourinary: Negative for decreased urine volume, flank pain and hematuria  Neurological: Negative for dizziness, weakness and light-headedness  All other ROS negative unless stated above     Historical Information   Past Medical History:   Diagnosis Date   • Aortic stenosis    • Aortic stenosis, severe    • Arthritis    • Carpal tunnel syndrome    • Chronic back pain    • Chronic pain    • Chronic, continuous use of opioids    • Hyperlipidemia    • Hypertension    • Hypertrophy of prostate    • Insomnia    • Large bowel obstruction (HCC)    • Peripheral neuropathy    • RA (rheumatoid arthritis) ([facility] [address] )    • Umbilical hernia      Past Surgical History:   Procedure Laterality Date   • ADENOIDECTOMY     • AORTIC VALVE REPLACEMENT  10/17/2017   • BASAL CELL CARCINOMA EXCISION     • CARDIAC CATHETERIZATION      LAST ASSESSED: 20OCT2017   • CARPAL TUNNEL RELEASE Bilateral     left   • CATARACT EXTRACTION     • FL CYSTOGRAM  8/29/2022   • HERNIA REPAIR      inguinal bilateral -umbilical    • LAMINECTOMY      DECOMPRESS, FACETECTOMY, FORAMINOTOMY LUMBAR SEG   • LAPAROTOMY N/A 8/20/2022    Procedure: LAPAROTOMY EXPLORATORY, sigmoid colon resection,colostomy;  Surgeon:  Winnie Pardo MD;  Location: [facility];  Service: General   • NEUROPLASTY / [address] [place] [address] AT CARPAL TUNNEL     • ME RPLCMT AORTIC VALVE OPN W/STENTLESS TISSUE VALVE N/A 10/17/2017    Procedure: AORTIC VALVE REPLACEMENT with 23MM MAGNAEASE TISSUE VALVE; INTRA-OP SHANDRA;  Surgeon: Elena Martell MD;  Location: [facility];  Service: Cardiac Surgery   • TONSILLECTOMY       Social History   Social History     Substance and Sexual Activity   Alcohol Use Not Currently     Social History     Substance and Sexual Activity   Drug Use No     Social History     Tobacco Use   Smoking Status Never   Smokeless Tobacco Never     E-Cigarette/Vaping   • E-Cigarette Use Never User      E-Cigarette/Vaping Substances   • Nicotine No    • THC No    • CBD No    • Flavoring No    • Other No    • Unknown No      Family History: non-contributory    Meds/Allergies   all medications and allergies reviewed  Allergies   Allergen Reactions   • Penicillins Anaphylaxis   • Quinine Derivatives Other (See Comments)     High fever       Objective   First Vitals:   Blood Pressure: 135/72 (12/11/22 0519)  Pulse: (!) 108 (12/11/22 0746)  Temperature: 98 7 °F (37 1 °C) (12/11/22 0519)  Temp Source: Oral (12/11/22 0519)  Respirations: 19 (12/11/22 0519)  SpO2: 93 % (12/11/22 0746)    Current Vitals:   Blood Pressure: 135/72 (12/11/22 0519)  Pulse: (!) 108 (12/11/22 0746)  Temperature: 98 7 °F (37 1 °C) (12/11/22 0519)  Temp Source: Oral (12/11/22 0519)  Respirations: 19 (12/11/22 0519)  SpO2: 93 % (12/11/22 0746)    No intake or output data in the 24 hours ending 12/11/22 0905    Invasive Devices     Peripheral Intravenous Line  Duration           Peripheral IV 12/11/22 Right;Upper;Ventral (anterior) Arm <1 day          Drain  Duration           Closed/Suction Drain Right RLQ Bulb 19 Fr  112 days    Colostomy Descending/sigmoid  days    Urethral Catheter Latex 16 Fr  112 days                Physical Exam  Vitals reviewed  Constitutional:       General: He is not in acute distress  Appearance: He is ill-appearing and diaphoretic   He is not toxic-appearing  HENT:      Head: Normocephalic and atraumatic  Mouth/Throat:      Mouth: Mucous membranes are moist       Pharynx: No oropharyngeal exudate  Eyes:      General: No scleral icterus  Pupils: Pupils are equal, round, and reactive to light  Cardiovascular:      Rate and Rhythm: Regular rhythm  Tachycardia present  Heart sounds: No murmur heard  Pulmonary:      Effort: Pulmonary effort is normal       Breath sounds: Normal breath sounds  No wheezing, rhonchi or rales  Abdominal:      General: There is distension  Palpations: Abdomen is soft  Tenderness: There is abdominal tenderness (generalized)  There is guarding  There is no rebound  Comments: Decreased bowel sounds  Midline incision noted, well healed, ostomy present in LLQ, pink and viable, no gas or stool in bag   Musculoskeletal:      Right lower leg: No edema  Left lower leg: No edema  Skin:     General: Skin is warm  Coloration: Skin is pale  Neurological:      General: No focal deficit present  Mental Status: He is alert and oriented to person, place, and time  Psychiatric:         Mood and Affect: Mood normal          Behavior: Behavior normal          Lab Results:   I have personally reviewed pertinent lab results    , CBC:   Lab Results   Component Value Date    WBC 2 99 (L) 12/11/2022    HGB 16 2 12/11/2022    HCT 52 2 (H) 12/11/2022    MCV 92 12/11/2022     12/11/2022    MCH 28 5 12/11/2022    MCHC 31 0 (L) 12/11/2022    RDW 12 9 12/11/2022    MPV 9 0 12/11/2022    NRBC 0 12/11/2022   , CMP:   Lab Results   Component Value Date    SODIUM 138 12/11/2022    K 3 8 12/11/2022     12/11/2022    CO2 26 12/11/2022    BUN 19 12/11/2022    CREATININE 1 28 12/11/2022    CALCIUM 9 5 12/11/2022    AST 17 12/11/2022    ALT 18 12/11/2022    ALKPHOS 54 12/11/2022    EGFR 54 12/11/2022   , Lipase:   Lab Results   Component Value Date    LIPASE 32 12/11/2022     Imaging: I have personally reviewed pertinent reports  [facility] 12/11/2022: "1   Extensive inflammatory changes involving the jejunum and proximal-mid ileum, compatible with severe enteritis  Infectious and ischemic etiologies may be considered  In particular, there is focal free air surrounding the lower midline ileum just   above the urinary bladder with extensive inflammatory changes  There are a few mildly dilated loops of jejunum and within the left upper abdomen, likely secondary to extensive inflammatory changes with possible superimposed low-grade partial small bowel   obstruction  There is a small amount of free air and free fluid in the remainder of the abdomen and pelvis  The stomach and proximal esophagus are distended and fluid-filled, presumably secondary to delayed transit      2  Postsurgical changes in the anterior abdominal wall with interval left lower quadrant colostomy "    EKG, Pathology, and Other Studies: I have personally reviewed pertinent reports  Code Status: Prior  Counseling / Coordination of Care  Total floor / unit time spent today 45 minutes  Greater than 50% of total time was spent with the patient and / or family counseling and / or coordination of care  Libby López  12/11/22  **Please Note: This note may have been constructed using a voice recognition system  **

## 2022-12-11 NOTE — NURSING NOTE
Patient received from OR; S/P exploratory Lap with Lysis of adhesions; Alert + Oriented x 4; drowsy; Sinus Rhythm on cardiac monitor; Vital signs stable; midline abdominal incision drsg intact; small amount of serosanguinous drainage present;  DAMASO drain intact to Rt L Quad to bulb suction  2 piece ostomy appliance intact to LLQ  Indwelling urethral catheter patent for clear yellow urine  Medicated for 8/10 abdominal discomfort with Dilaudid 1 mg IV  Safety measures intact; call bell with in reach

## 2022-12-11 NOTE — ANESTHESIA POSTPROCEDURE EVALUATION
Post-Op Assessment Note    CV Status:  Stable  Pain Score: 5    Pain management: adequate     Mental Status:  Awake and sleepy   Hydration Status:  Euvolemic   PONV Controlled:  Controlled   Airway Patency:  Patent      Post Op Vitals Reviewed: Yes      Staff: CRNA         No notable events documented      BP  165/82   Temp   97 5   Pulse  82   Resp   22   SpO2   98

## 2022-12-11 NOTE — ANESTHESIA PREPROCEDURE EVALUATION
Procedure:  LAPAROTOMY EXPLORATORY W/ BOWEL RESECTION (Abdomen)    Relevant Problems   CARDIO   (+) Atherosclerosis of native coronary artery of native heart without angina pectoris   (+) Benign essential hypertension   (+) Hyperlipidemia   (+) Status post aortic valve replacement with bioprosthetic valve      GI/HEPATIC   (+) SBO (small bowel obstruction) (HCC)      /RENAL   (+) Stage 3 chronic kidney disease, unspecified whether stage 3a or 3b CKD (HCC)      MUSCULOSKELETAL   (+) Arthritis   (+) Low back pain   (+) Primary localized osteoarthrosis of shoulder region   (+) Primary osteoarthritis of right shoulder   (+) Psoriasis with arthropathy (HCC)   (+) Rheumatoid arthritis with rheumatoid factor, unspecified (HCC) (No recent steroid use)      NEURO/PSYCH   (+) Chronic pain   (+) Continuous opioid dependence (HCC)      Digestive   (+) Diverticulitis of colon      Nervous and Auditory   (+) Peripheral neuropathy        Physical Exam    Airway    Mallampati score: III  TM Distance: >3 FB  Neck ROM: full     Dental   No notable dental hx     Cardiovascular  Cardiovascular exam normal    Pulmonary  Pulmonary exam normal     Other Findings        Anesthesia Plan  ASA Score- 3 Emergent    Anesthesia Type- general with ASA Monitors  Additional Monitors:   Airway Plan: ETT  Plan Factors-Exercise tolerance (METS): >4 METS  Chart reviewed  EKG reviewed  Imaging results reviewed  Existing labs reviewed  Patient summary reviewed  Induction- intravenous  Postoperative Plan- Plan for postoperative opioid use  Planned trial extubation    Informed Consent- Anesthetic plan and risks discussed with patient  I personally reviewed this patient with the CRNA  Discussed and agreed on the Anesthesia Plan with the CRNA  Jose Stewart

## 2022-12-11 NOTE — CONSULTS
[address] 1945, 78 y o  male MRN: [id number]  Unit/Bed#: ICU 10-01 Encounter: [id number]  Primary Care Provider: Sincere Arango DO   Date and time admitted to hospital: 12/11/2022  5:19 AM    Consults    Rheumatoid arthritis with rheumatoid factor, unspecified ([facility] [address] )  Assessment & Plan  · Holding home meds while NPO    Status post aortic valve replacement with bioprosthetic valve  Assessment & Plan  · Completed 2018  · Holding ASA while NPO    Continuous opioid dependence ([address] )  Assessment & Plan  · Holding home regimen  · Continue PRN meds while NPO    Chronic pain  Assessment & Plan  · Holding home regimen while NPO  · Continue Valium for Muscle spasms  · Continue Dilaudid for severe and breakthrough pain    * Peritonitis ([facility] [address] )  Assessment & Plan  · Presented today with acute abdominal pain which woke him @ 2am  · H/o sigmoid diverticulitis s/p Polo's procedure and colostomy with planned reversal scheduled January 2023  · CT abdomen pelvis:  Extensive inflammatory changes involving the jejunum and proximal-mid ileum, compatible with severe enteritis  Infectious and ischemic etiologies may be considered  In particular, there is focal free air surrounding the lower midline ileum just above the urinary bladder with extensive inflammatory changes  There are a few mildly dilated loops of jejunum and within the left upper abdomen, likely secondary to extensive inflammatory changes with possible superimposed low-grade partial small bowel obstruction  There is a small amount of free air and free fluid in the remainder of the abdomen and pelvis  The stomach and proximal esophagus are distended and fluid-filled, presumably secondary to delayed transit  Postsurgical changes in the anterior abdominal wall with interval left lower quadrant colostomy    · OR 12/11 for ex lap  w/lysis of adhesion and washout  · Continue Flagyl Cipro Diflucan  · Continue fluid hydration  · Valium & Dilaudid for pain management  · Continue NPO and NG tube to intermittent suction  · PRN Zofran      ----------------------------------------------------------------------------------------  HPI/24hr events: Presented to the ED today for evaluation of acute abdominal pain which woke him at 2 AM this morning  History of sigmoid diverticulitis status post Polo's procedure August 2022 with scheduled reversal January 2023  CT of the abdomen showing extensive inflammatory change involving the jejunum and proximal mid ileum with free air and free fluid abdomen and pelvis  He was taken to the OR for ex lap with lysis of adhesions and washout  Currently has a right lower quadrant DAMASO drain and maintains his left colostomy with pink budded stoma present  Has midline incision with staples and packing present, abdominal dressing in place  In the ED he received a dose of Cipro, 100 mg fentanyl 1 mg Dilaudid 1 L LR, zofran  He received and additional 3 L normal saline and is continued on maintenance fluids    Transferred to the ICU critical care was consulted for management    Patient appropriate for transfer out of the ICU today?: No  Disposition: Admit to Stepdown Level 1  Code Status: Level 1 - Full Code  ---------------------------------------------------------------------------------------  SUBJECTIVE  unable to assess    Review of Systems   Unable to perform ROS: Other (lethargic post surgical)     Review of systems was reviewed and negative unless stated above in HPI/24-hour events   ---------------------------------------------------------------------------------------  OBJECTIVE    Vitals   Vitals:    12/11/22 1600 12/11/22 1615 12/11/22 1630 12/11/22 1645   BP: 165/70 160/71 147/65 151/66   BP Location: Left arm Left arm     Pulse: 82 79 90 94   Resp: (!) 34 (!) 33 (!) 26 (!) 28   Temp: 98 1 °F (36 7 °C)      TempSrc: Temporal      SpO2: 94% 96% (!) 88% (!) 89%   Weight:       Height: Temp (24hrs), Av 4 °F (36 9 °C), Min:98 1 °F (36 7 °C), Max:98 7 °F (37 1 °C)  Current: Temperature: 98 1 °F (36 7 °C)          Respiratory:  SpO2: SpO2: (!) 89 %    Nasal Cannula O2 Flow Rate (L/min): 2 L/min    Invasive/non-invasive ventilation settings   Respiratory    Lab Data (Last 4 hours)    None         O2/Vent Data (Last 4 hours)    None                Physical Exam  Vitals and nursing note reviewed  Constitutional:       General: He is not in acute distress  HENT:      Mouth/Throat:      Mouth: Mucous membranes are moist       Pharynx: Oropharynx is clear  Eyes:      Pupils: Pupils are equal, round, and reactive to light  Cardiovascular:      Rate and Rhythm: Normal rate and regular rhythm  Pulses: Normal pulses  Heart sounds: Normal heart sounds  Pulmonary:      Effort: Pulmonary effort is normal       Breath sounds: Normal breath sounds  Abdominal:      General: There is no distension  Palpations: Abdomen is soft  Tenderness: There is abdominal tenderness  Musculoskeletal:      Right lower leg: No edema  Left lower leg: No edema  Skin:     General: Skin is warm and dry  Capillary Refill: Capillary refill takes less than 2 seconds  Comments: Surgical scar abdomen   Neurological:      Mental Status: He is oriented to person, place, and time               Laboratory and Diagnostics:  Results from last 7 days   Lab Units 22  0620   WBC Thousand/uL 2 99*   HEMOGLOBIN g/dL 16 2   HEMATOCRIT % 52 2*   PLATELETS Thousands/uL 251   NEUTROS PCT % 85*   MONOS PCT % 8     Results from last 7 days   Lab Units 22  0620   SODIUM mmol/L 138   POTASSIUM mmol/L 3 8   CHLORIDE mmol/L 100   CO2 mmol/L 26   ANION GAP mmol/L 12   BUN mg/dL 19   CREATININE mg/dL 1 28   CALCIUM mg/dL 9 5   GLUCOSE RANDOM mg/dL 185*   ALT U/L 18   AST U/L 17   ALK PHOS U/L 54   ALBUMIN g/dL 4 3   TOTAL BILIRUBIN mg/dL 0 57                   Results from last 7 days   Lab Units 12/11/22  0620   LACTIC ACID mmol/L 4 3*     ABG:    VBG:          Micro  Results from last 7 days   Lab Units 12/11/22  0831   BLOOD CULTURE  Received in Microbiology Lab  Culture in Progress  Received in Microbiology Lab  Culture in Progress  EKG: reviewed  Imaging: I have personally reviewed pertinent reports  Intake and Output  I/O       12/09 0701  12/10 0700 12/10 0701 12/11 0700 12/11 0701 12/12 0700    IV Piggyback   4250    Total Intake(mL/kg)   4250 (52 1)    Urine (mL/kg/hr)   740 (0 9)    Blood   100    Total Output   840    Net   +3410                 Height and Weights   Height: 5' 7" (170 2 cm)  IBW (Ideal Body Weight): 66 1 kg  Body mass index is 28 19 kg/m²  Weight (last 2 days)     Date/Time Weight    12/11/22 0932 81 6 (180)            Nutrition       Diet Orders   (From admission, onward)             Start     Ordered    12/11/22 0931  Diet NPO  Diet effective now        References:    Nutrtion Support Algorithm Enteral vs  Parenteral   Question Answer Comment   Diet Type NPO    RD to adjust diet per protocol?  No        12/11/22 0933                  Active Medications  Scheduled Meds:  Current Facility-Administered Medications   Medication Dose Route Frequency Provider Last Rate   • ciprofloxacin  400 mg Intravenous Q12H Delanna Clear, PA-C     • diazepam  2 5 mg Intravenous Q6H PRN PEDRO Doss     • fluconazole  400 mg Intravenous Q24H Delanna Clear, PA-C 400 mg (12/11/22 1648)   • heparin (porcine)  5,000 Units Subcutaneous [facility] Delanna Clear, PA-C     • HYDROmorphone  0 5 mg Intravenous Q2H PRN Delanna Clear, PA-C     • HYDROmorphone  1 mg Intravenous Q2H PRN Delanna Clear, PA-C     • insulin lispro  1-5 Units Subcutaneous Q6H [facility] PEDRO Perry     • lactated ringers  125 mL/hr Intravenous Continuous Delanna Clear, PA-C 125 mL/hr (12/11/22 1649)   • metroNIDAZOLE  500 mg Intravenous Q8H Delanna Clear, PA-C 500 mg (12/11/22 1553)   • ondansetron  4 mg Intravenous Q6H PRN León Waller PA-C     • pantoprazole  40 mg Intravenous Q12H [facility] León Waller PA-C       Continuous Infusions:  lactated ringers, 125 mL/hr, Last Rate: 125 mL/hr (12/11/22 1649)      PRN Meds:   diazepam, 2 5 mg, Q6H PRN  HYDROmorphone, 0 5 mg, Q2H PRN  HYDROmorphone, 1 mg, Q2H PRN  ondansetron, 4 mg, Q6H PRN        Invasive Devices Review  Invasive Devices     Peripheral Intravenous Line  Duration           Peripheral IV 12/11/22 Distal;Left;Upper;Ventral (anterior) Arm <1 day    Peripheral IV 12/11/22 Right;Upper;Ventral (anterior) Arm <1 day          Drain  Duration           Colostomy Descending/sigmoid  days    Urethral Catheter Latex 16 Fr  113 days    Closed/Suction Drain Right RLQ Bulb 19 Fr  112 days    Closed/Suction Drain Lateral;Right RLQ Bulb 19 Fr  <1 day    Urethral Catheter Latex 16 Fr  <1 day                Rationale for remaining devices:   ---------------------------------------------------------------------------------------  Advance Directive and Living Will:      Power of :    POLST:    ---------------------------------------------------------------------------------------  Care Time Delivered:   No Critical Care time spent       [employer], CRNP      Portions of the record may have been created with voice recognition software  Occasional wrong word or "sound a like" substitutions may have occurred due to the inherent limitations of voice recognition software    Read the chart carefully and recognize, using context, where substitutions have occurred

## 2022-12-11 NOTE — PLAN OF CARE
Problem: CARDIOVASCULAR - ADULT  Goal: Maintains optimal cardiac output and hemodynamic stability  Description: INTERVENTIONS:  - Monitor I/O, vital signs and rhythm  - Monitor for S/S and trends of decreased cardiac output  - Administer and titrate ordered vasoactive medications to optimize hemodynamic stability  - Assess quality of pulses, skin color and temperature  - Assess for signs of decreased coronary artery perfusion  - Instruct patient to report change in severity of symptoms  Outcome: Progressing  Goal: Absence of cardiac dysrhythmias or at baseline rhythm  Description: INTERVENTIONS:  - Continuous cardiac monitoring, vital signs, obtain 12 lead EKG if ordered  - Administer antiarrhythmic and heart rate control medications as ordered  - Monitor electrolytes and administer replacement therapy as ordered  Outcome: Progressing     Problem: RESPIRATORY - ADULT  Goal: Achieves optimal ventilation and oxygenation  Description: INTERVENTIONS:  - Assess for changes in respiratory status  - Assess for changes in mentation and behavior  - Position to facilitate oxygenation and minimize respiratory effort  - Oxygen administered by appropriate delivery if ordered  - Initiate smoking cessation education as indicated  - Encourage broncho-pulmonary hygiene including cough, deep breathe, Incentive Spirometry  - Assess the need for suctioning and aspirate as needed  - Assess and instruct to report SOB or any respiratory difficulty  - Respiratory Therapy support as indicated  Outcome: Progressing     Problem: GASTROINTESTINAL - ADULT  Goal: Minimal or absence of nausea and/or vomiting  Description: INTERVENTIONS:  - Administer IV fluids if ordered to ensure adequate hydration  - Maintain NPO status until nausea and vomiting are resolved  - Nasogastric tube if ordered  - Administer ordered antiemetic medications as needed  - Provide nonpharmacologic comfort measures as appropriate  - Advance diet as tolerated, if ordered  - Consider nutrition services referral to assist patient with adequate nutrition and appropriate food choices  Outcome: Progressing  Goal: Establish and maintain optimal ostomy function  Description: INTERVENTIONS:  - Assess bowel function  - Encourage oral fluids to ensure adequate hydration  - Administer IV fluids if ordered to ensure adequate hydration   - Administer ordered medications as needed  - Encourage mobilization and activity  - Nutrition services referral to assist patient with appropriate food choices  - Assess stoma site  - Consider wound care consult   Outcome: Progressing  Goal: Oral mucous membranes remain intact  Description: INTERVENTIONS  - Assess oral mucosa and hygiene practices  - Implement preventative oral hygiene regimen  - Implement oral medicated treatments as ordered  - Initiate Nutrition services referral as needed  Outcome: Progressing     Problem: GASTROINTESTINAL - ADULT  Goal: Establish and maintain optimal ostomy function  Description: INTERVENTIONS:  - Assess bowel function  - Encourage oral fluids to ensure adequate hydration  - Administer IV fluids if ordered to ensure adequate hydration   - Administer ordered medications as needed  - Encourage mobilization and activity  - Nutrition services referral to assist patient with appropriate food choices  - Assess stoma site  - Consider wound care consult   Outcome: Progressing     Problem: GASTROINTESTINAL - ADULT  Goal: Oral mucous membranes remain intact  Description: INTERVENTIONS  - Assess oral mucosa and hygiene practices  - Implement preventative oral hygiene regimen  - Implement oral medicated treatments as ordered  - Initiate Nutrition services referral as needed  Outcome: Progressing     Problem: SKIN/TISSUE INTEGRITY - ADULT  Goal: Skin Integrity remains intact(Skin Breakdown Prevention)  Description: Assess:  -Perform Kt assessment every shift  -Clean and moisturize skin every shift  -Inspect skin when repositioning, toileting, and assisting with ADLS  Assess extremities for adequate circulation and sensation     Bed Management:  -Have minimal linens on bed & keep smooth, unwrinkled  -Change linens as needed when moist or perspiring  -Avoid sitting or lying in one position for more than 2  hours while in bed  -Keep HOB at 30 degrees         Activity:  -Mobilize patient  2 times a day  -Encourage activity and walks on unit  -Encourage or provide ROM exercises   -Turn and reposition patient every 2  Hours  -Use appropriate equipment to lift or move patient in bed  -Instruct/ Assist with weight shifting every hour when out of bed in chair  -Consider limitation of chair time  1 hour intervals    Skin Care:  -Avoid use of baby powder, tape, friction and shearing, hot water or constrictive clothing  -Relieve pressure over bony prominences using wedges and pillows to offload areas   -Do not massage red bony areas    Next Steps:  -Teach patient strategies to minimize risks such as repositioning Q 2 hrs  -Consider consults to  interdisciplinary teams such as PT and OT    Outcome: Progressing     Problem: HEMATOLOGIC - ADULT  Goal: Maintains hematologic stability  Description: INTERVENTIONS  - Assess for signs and symptoms of bleeding or hemorrhage  - Monitor labs  - Administer supportive blood products/factors as ordered and appropriate  Outcome: Progressing

## 2022-12-11 NOTE — ASSESSMENT & PLAN NOTE
· Presented today with acute abdominal pain which woke him @ 2am  · H/o sigmoid diverticulitis s/p Polo's procedure and colostomy with planned reversal scheduled January 2023  · CT abdomen pelvis:  Extensive inflammatory changes involving the jejunum and proximal-mid ileum, compatible with severe enteritis  Infectious and ischemic etiologies may be considered  In particular, there is focal free air surrounding the lower midline ileum just above the urinary bladder with extensive inflammatory changes  There are a few mildly dilated loops of jejunum and within the left upper abdomen, likely secondary to extensive inflammatory changes with possible superimposed low-grade partial small bowel obstruction  There is a small amount of free air and free fluid in the remainder of the abdomen and pelvis  The stomach and proximal esophagus are distended and fluid-filled, presumably secondary to delayed transit  Postsurgical changes in the anterior abdominal wall with interval left lower quadrant colostomy    · OR 12/11 for ex lap  w/lysis of adhesion and washout  · Continue Flagyl Cipro Diflucan  · Continue fluid hydration  · Valium & Dilaudid for pain management  · Continue NPO and NG tube to intermittent suction  · PRN Zofran

## 2022-12-11 NOTE — ASSESSMENT & PLAN NOTE
· Holding home regimen while NPO  · Continue Valium for Muscle spasms  · Continue Dilaudid for severe and breakthrough pain

## 2022-12-12 ENCOUNTER — APPOINTMENT (INPATIENT)
Dept: INTERVENTIONAL RADIOLOGY/VASCULAR | Facility: HOSPITAL | Age: 77
End: 2022-12-12

## 2022-12-12 PROBLEM — A41.9 SEPSIS (HCC): Status: ACTIVE | Noted: 2022-12-12

## 2022-12-12 PROBLEM — J96.01 ACUTE RESPIRATORY FAILURE WITH HYPOXIA (HCC): Status: ACTIVE | Noted: 2022-12-12

## 2022-12-12 PROBLEM — R65.20 SEVERE SEPSIS (HCC): Status: ACTIVE | Noted: 2022-12-12

## 2022-12-12 LAB
ALBUMIN SERPL BCP-MCNC: 3.3 G/DL (ref 3.5–5)
ALP SERPL-CCNC: 27 U/L (ref 34–104)
ALT SERPL W P-5'-P-CCNC: 12 U/L (ref 7–52)
ANION GAP SERPL CALCULATED.3IONS-SCNC: 5 MMOL/L (ref 4–13)
ANION GAP SERPL CALCULATED.3IONS-SCNC: 6 MMOL/L (ref 4–13)
AST SERPL W P-5'-P-CCNC: 17 U/L (ref 13–39)
BILIRUB SERPL-MCNC: 0.68 MG/DL (ref 0.2–1)
BUN SERPL-MCNC: 12 MG/DL (ref 5–25)
BUN SERPL-MCNC: 13 MG/DL (ref 5–25)
CA-I BLD-SCNC: 1.05 MMOL/L (ref 1.12–1.32)
CA-I BLD-SCNC: 1.1 MMOL/L (ref 1.12–1.32)
CALCIUM ALBUM COR SERPL-MCNC: 8.7 MG/DL (ref 8.3–10.1)
CALCIUM SERPL-MCNC: 8.1 MG/DL (ref 8.4–10.2)
CALCIUM SERPL-MCNC: 8.6 MG/DL (ref 8.4–10.2)
CHLORIDE SERPL-SCNC: 104 MMOL/L (ref 96–108)
CHLORIDE SERPL-SCNC: 105 MMOL/L (ref 96–108)
CO2 SERPL-SCNC: 24 MMOL/L (ref 21–32)
CO2 SERPL-SCNC: 26 MMOL/L (ref 21–32)
CREAT SERPL-MCNC: 0.96 MG/DL (ref 0.6–1.3)
CREAT SERPL-MCNC: 1.12 MG/DL (ref 0.6–1.3)
ERYTHROCYTE [DISTWIDTH] IN BLOOD BY AUTOMATED COUNT: 13.4 % (ref 11.6–15.1)
FLUAV RNA RESP QL NAA+PROBE: NEGATIVE
FLUBV RNA RESP QL NAA+PROBE: NEGATIVE
GFR SERPL CREATININE-BSD FRML MDRD: 63 ML/MIN/1.73SQ M
GFR SERPL CREATININE-BSD FRML MDRD: 77 ML/MIN/1.73SQ M
GLUCOSE SERPL-MCNC: 102 MG/DL (ref 65–140)
GLUCOSE SERPL-MCNC: 106 MG/DL (ref 65–140)
GLUCOSE SERPL-MCNC: 110 MG/DL (ref 65–140)
GLUCOSE SERPL-MCNC: 110 MG/DL (ref 65–140)
GLUCOSE SERPL-MCNC: 114 MG/DL (ref 65–140)
GLUCOSE SERPL-MCNC: 98 MG/DL (ref 65–140)
HCT VFR BLD AUTO: 42 % (ref 36.5–49.3)
HGB BLD-MCNC: 13.4 G/DL (ref 12–17)
LACTATE SERPL-SCNC: 1.6 MMOL/L (ref 0.5–2)
MAGNESIUM SERPL-MCNC: 1.3 MG/DL (ref 1.9–2.7)
MAGNESIUM SERPL-MCNC: 1.9 MG/DL (ref 1.9–2.7)
MCH RBC QN AUTO: 28.8 PG (ref 26.8–34.3)
MCHC RBC AUTO-ENTMCNC: 31.9 G/DL (ref 31.4–37.4)
MCV RBC AUTO: 90 FL (ref 82–98)
PHOSPHATE SERPL-MCNC: 2.3 MG/DL (ref 2.3–4.1)
PHOSPHATE SERPL-MCNC: 2.7 MG/DL (ref 2.3–4.1)
PLATELET # BLD AUTO: 188 THOUSANDS/UL (ref 149–390)
PMV BLD AUTO: 9.3 FL (ref 8.9–12.7)
POTASSIUM SERPL-SCNC: 3.8 MMOL/L (ref 3.5–5.3)
POTASSIUM SERPL-SCNC: 4 MMOL/L (ref 3.5–5.3)
PROCALCITONIN SERPL-MCNC: 8.29 NG/ML
PROT SERPL-MCNC: 5.1 G/DL (ref 6.4–8.4)
RBC # BLD AUTO: 4.66 MILLION/UL (ref 3.88–5.62)
SARS-COV-2 RNA RESP QL NAA+PROBE: NEGATIVE
SODIUM SERPL-SCNC: 135 MMOL/L (ref 135–147)
SODIUM SERPL-SCNC: 135 MMOL/L (ref 135–147)
WBC # BLD AUTO: 11.36 THOUSAND/UL (ref 4.31–10.16)

## 2022-12-12 RX ORDER — KETOROLAC TROMETHAMINE 30 MG/ML
15 INJECTION, SOLUTION INTRAMUSCULAR; INTRAVENOUS EVERY 6 HOURS SCHEDULED
Status: COMPLETED | OUTPATIENT
Start: 2022-12-12 | End: 2022-12-15

## 2022-12-12 RX ORDER — CALCIUM GLUCONATE 20 MG/ML
1 INJECTION, SOLUTION INTRAVENOUS ONCE
Status: COMPLETED | OUTPATIENT
Start: 2022-12-12 | End: 2022-12-12

## 2022-12-12 RX ORDER — DIAZEPAM 5 MG/ML
5 INJECTION, SOLUTION INTRAMUSCULAR; INTRAVENOUS ONCE
Status: COMPLETED | OUTPATIENT
Start: 2022-12-12 | End: 2022-12-12

## 2022-12-12 RX ORDER — MAGNESIUM SULFATE HEPTAHYDRATE 40 MG/ML
2 INJECTION, SOLUTION INTRAVENOUS ONCE
Status: COMPLETED | OUTPATIENT
Start: 2022-12-12 | End: 2022-12-13

## 2022-12-12 RX ORDER — HYDROMORPHONE HCL/PF 1 MG/ML
1 SYRINGE (ML) INJECTION EVERY 2 HOUR PRN
Status: DISCONTINUED | OUTPATIENT
Start: 2022-12-12 | End: 2022-12-12

## 2022-12-12 RX ORDER — DEXMEDETOMIDINE HYDROCHLORIDE 4 UG/ML
.1-1 INJECTION, SOLUTION INTRAVENOUS
Status: DISCONTINUED | OUTPATIENT
Start: 2022-12-12 | End: 2022-12-13

## 2022-12-12 RX ORDER — HYDROMORPHONE HCL/PF 1 MG/ML
0.5 SYRINGE (ML) INJECTION EVERY 2 HOUR PRN
Status: DISCONTINUED | OUTPATIENT
Start: 2022-12-12 | End: 2022-12-13

## 2022-12-12 RX ORDER — HYDROMORPHONE HCL/PF 1 MG/ML
0.5 SYRINGE (ML) INJECTION EVERY 2 HOUR PRN
Status: DISCONTINUED | OUTPATIENT
Start: 2022-12-12 | End: 2022-12-12

## 2022-12-12 RX ORDER — FENTANYL CITRATE/PF 50 MCG/ML
25 SYRINGE (ML) INJECTION
Status: DISCONTINUED | OUTPATIENT
Start: 2022-12-12 | End: 2022-12-12

## 2022-12-12 RX ORDER — DIAZEPAM 5 MG/ML
5 INJECTION, SOLUTION INTRAMUSCULAR; INTRAVENOUS EVERY 6 HOURS PRN
Status: DISCONTINUED | OUTPATIENT
Start: 2022-12-12 | End: 2022-12-13

## 2022-12-12 RX ORDER — HYDROMORPHONE HCL/PF 1 MG/ML
1 SYRINGE (ML) INJECTION EVERY 2 HOUR PRN
Status: DISCONTINUED | OUTPATIENT
Start: 2022-12-12 | End: 2022-12-13

## 2022-12-12 RX ORDER — ONDANSETRON 2 MG/ML
4 INJECTION INTRAMUSCULAR; INTRAVENOUS ONCE AS NEEDED
Status: DISCONTINUED | OUTPATIENT
Start: 2022-12-12 | End: 2022-12-12

## 2022-12-12 RX ORDER — POTASSIUM CHLORIDE 14.9 MG/ML
20 INJECTION INTRAVENOUS ONCE
Status: COMPLETED | OUTPATIENT
Start: 2022-12-12 | End: 2022-12-12

## 2022-12-12 RX ADMIN — HYDROMORPHONE HYDROCHLORIDE 0.5 MG: 1 INJECTION, SOLUTION INTRAMUSCULAR; INTRAVENOUS; SUBCUTANEOUS at 04:07

## 2022-12-12 RX ADMIN — PHENOL 1 SPRAY: 1.5 LIQUID ORAL at 13:43

## 2022-12-12 RX ADMIN — DEXMEDETOMIDINE HYDROCHLORIDE 0.4 MCG/KG/HR: 400 INJECTION INTRAVENOUS at 18:40

## 2022-12-12 RX ADMIN — PANTOPRAZOLE SODIUM 40 MG: 40 INJECTION, POWDER, LYOPHILIZED, FOR SOLUTION INTRAVENOUS at 21:39

## 2022-12-12 RX ADMIN — POTASSIUM CHLORIDE 20 MEQ: 14.9 INJECTION, SOLUTION INTRAVENOUS at 07:57

## 2022-12-12 RX ADMIN — METRONIDAZOLE 500 MG: 500 INJECTION, SOLUTION INTRAVENOUS at 07:57

## 2022-12-12 RX ADMIN — HYDROMORPHONE HYDROCHLORIDE 0.5 MG: 1 INJECTION, SOLUTION INTRAMUSCULAR; INTRAVENOUS; SUBCUTANEOUS at 07:38

## 2022-12-12 RX ADMIN — HYDROMORPHONE HYDROCHLORIDE 1 MG: 1 INJECTION, SOLUTION INTRAMUSCULAR; INTRAVENOUS; SUBCUTANEOUS at 06:31

## 2022-12-12 RX ADMIN — METRONIDAZOLE 500 MG: 500 INJECTION, SOLUTION INTRAVENOUS at 23:15

## 2022-12-12 RX ADMIN — DIAZEPAM 5 MG: 5 INJECTION INTRAMUSCULAR; INTRAVENOUS at 18:40

## 2022-12-12 RX ADMIN — FLUCONAZOLE IN SODIUM CHLORIDE 400 MG: 2 INJECTION, SOLUTION INTRAVENOUS at 15:41

## 2022-12-12 RX ADMIN — DIAZEPAM 2.5 MG: 5 INJECTION INTRAMUSCULAR; INTRAVENOUS at 18:06

## 2022-12-12 RX ADMIN — HYDROMORPHONE HYDROCHLORIDE 1 MG: 1 INJECTION, SOLUTION INTRAMUSCULAR; INTRAVENOUS; SUBCUTANEOUS at 15:40

## 2022-12-12 RX ADMIN — CIPROFLOXACIN 400 MG: 2 INJECTION, SOLUTION INTRAVENOUS at 21:38

## 2022-12-12 RX ADMIN — DIAZEPAM 2.5 MG: 5 INJECTION INTRAMUSCULAR; INTRAVENOUS at 11:34

## 2022-12-12 RX ADMIN — HYDROMORPHONE HYDROCHLORIDE 0.5 MG: 1 INJECTION, SOLUTION INTRAMUSCULAR; INTRAVENOUS; SUBCUTANEOUS at 00:42

## 2022-12-12 RX ADMIN — HYDROMORPHONE HYDROCHLORIDE 1 MG: 1 INJECTION, SOLUTION INTRAMUSCULAR; INTRAVENOUS; SUBCUTANEOUS at 02:10

## 2022-12-12 RX ADMIN — MAGNESIUM SULFATE HEPTAHYDRATE 2 G: 40 INJECTION, SOLUTION INTRAVENOUS at 07:57

## 2022-12-12 RX ADMIN — METRONIDAZOLE 500 MG: 500 INJECTION, SOLUTION INTRAVENOUS at 15:41

## 2022-12-12 RX ADMIN — HYDROMORPHONE HYDROCHLORIDE 0.5 MG: 1 INJECTION, SOLUTION INTRAMUSCULAR; INTRAVENOUS; SUBCUTANEOUS at 16:46

## 2022-12-12 RX ADMIN — DIAZEPAM 2.5 MG: 5 INJECTION INTRAMUSCULAR; INTRAVENOUS at 04:07

## 2022-12-12 RX ADMIN — PANTOPRAZOLE SODIUM 40 MG: 40 INJECTION, POWDER, LYOPHILIZED, FOR SOLUTION INTRAVENOUS at 08:04

## 2022-12-12 RX ADMIN — HYDROMORPHONE HYDROCHLORIDE 1 MG: 1 INJECTION, SOLUTION INTRAMUSCULAR; INTRAVENOUS; SUBCUTANEOUS at 09:36

## 2022-12-12 RX ADMIN — HYDROMORPHONE HYDROCHLORIDE 0.5 MG: 1 INJECTION, SOLUTION INTRAMUSCULAR; INTRAVENOUS; SUBCUTANEOUS at 11:33

## 2022-12-12 RX ADMIN — HEPARIN SODIUM 5000 UNITS: 5000 INJECTION INTRAVENOUS; SUBCUTANEOUS at 13:46

## 2022-12-12 RX ADMIN — HYDROMORPHONE HYDROCHLORIDE 0.5 MG: 1 INJECTION, SOLUTION INTRAMUSCULAR; INTRAVENOUS; SUBCUTANEOUS at 13:46

## 2022-12-12 RX ADMIN — CIPROFLOXACIN 400 MG: 2 INJECTION, SOLUTION INTRAVENOUS at 08:03

## 2022-12-12 RX ADMIN — HEPARIN SODIUM 5000 UNITS: 5000 INJECTION INTRAVENOUS; SUBCUTANEOUS at 06:31

## 2022-12-12 RX ADMIN — METRONIDAZOLE 500 MG: 500 INJECTION, SOLUTION INTRAVENOUS at 00:03

## 2022-12-12 RX ADMIN — KETOROLAC TROMETHAMINE 15 MG: 30 INJECTION, SOLUTION INTRAMUSCULAR; INTRAVENOUS at 17:58

## 2022-12-12 RX ADMIN — CALCIUM GLUCONATE 1 G: 20 INJECTION, SOLUTION INTRAVENOUS at 20:52

## 2022-12-12 RX ADMIN — HEPARIN SODIUM 5000 UNITS: 5000 INJECTION INTRAVENOUS; SUBCUTANEOUS at 21:39

## 2022-12-12 NOTE — OP NOTE
OPERATIVE REPORT  PATIENT NAME: Joie Bloch    :  1945  MRN: [id number]  Pt Location: [facility] OR ROOM 01    SURGERY DATE: 2022    Surgeon(s) and Role:     * Benny Dumas MD - Primary     * Andrea Lawton PA-C - Assisting    Preop Diagnosis:  Acute abdomen [R10 0]    Post-Op Diagnosis Codes:     * Acute abdomen [R10 0]    Procedure(s) (LRB):  LAPAROTOMY EXPLORATORY,  EXTENSIVE LYSIS OF ADHESIONS, APPENDECTOMY, DRAINAGE OF ABDOMINAL ABCESS (N/A)    Specimen(s):  ID Type Source Tests Collected by Time Destination   1 : APPENDIX Tissue Appendix TISSUE EXAM Benny Dumas MD 2022 1246    A : PERITONEAL FLUID Body Fluid Peritoneal Fluid ANAEROBIC CULTURE AND GRAM STAIN, BODY FLUID CULTURE AND GRAM STAIN Benny Dumas MD 2022 1155        Estimated Blood Loss:   100 mL    Drains:  Closed/Suction Drain Right RLQ Bulb 19 Fr  (Active)   Number of days: 113       Closed/Suction Drain Lateral;Right RLQ Bulb 19 Fr  (Active)   Number of days: 0       Colostomy Descending/sigmoid LLQ (Active)   Number of days: 113       Urethral Catheter Latex 16 Fr  (Active)   Number of days: 113       Urethral Catheter Latex 16 Fr  (Active)   Output (mL) 75 mL 22 1800   Number of days: 0       Anesthesia Type:   General    Operative Indications:  Acute abdomen [R10 0]  With free air    Operative Findings:  Abscess RLQ contiguous with appendix, extensive adhesions  No perforated bowel identified  Complications:   None    Procedure and Technique:  The patient was identified by myself taken to the operating room and placed in a supine position on the operating table  After induction of satisfactory general endotracheal anesthesia the colostomy appliance was removed, the skin around the colostomy washed with Hibiclens, and an occlusive dressing applied over the stoma    A Ambriz catheter was placed and the abdomen was then prepped and draped in usual sterile fashion using ChloraPrep solution including an Ioban drape  A timeout was called the patient identified as Vaibhav Perrin, IV antibiotics were confirmed is given sequential compression devices were on and functioning all parties agreed this was the appropriate patient for the proposed procedure  The infraumbilical portion of his previous midline incision was incised with a #10 scalpel and deepened through the subcutaneous tissue with electrocautery  The fascia was divided with electrocautery and adhesions to omentum were encountered protecting the viscera from injury  The omentum was freed from the underside of the incision and on entering the abdominal cavity eduar pus was encountered  This appeared to be coming from the right lower quadrant and representative cultures were obtained  There were dense adhesions of the distal small bowel, appendix and omentum to this abscess cavity  Further the small bowel was densely adherent to the dome of the bladder  Extensive lysis of adhesions was necessary to free the omentum from the underside of the incision in order to place a wound protector, and extensive lysis of adhesions was necessary to free up the small bowel the bowel from the pelvis  The wound protector was ultimately removed and the incision was extended above the umbilicus, by that time the abscess in the pelvis had been drained, and an appendectomy performed  The appendix did not appear necrotic, but was contiguous with this abscess  An appendectomy was performed in a retrograde fashion  A window was developed between the appendix and the mesoappendix at the base, the appendix doubly ligated with 2-0 Vicryl, and the distal portion clamped and the appendix divided flush with a clamp, and the mucosa of the stump fulgurated with electrocautery  The mesoappendix was then developed into pedicles which were ligated with 2-0 Vicryl and the specimen removed from the field    There was eduar peritonitis between the leaves of the small bowel, and the small bowel was eventually examined from the ileocecal valve to the ligament of Treitz  No perforation of the small bowel was identified  There was purulent fluid in the right upper quadrant, this was irrigated and aspirated, there was edema around the gallbladder and the duodenum, the hepatic flexure of the colon was reflected the duodenum completely exposed and was soft and pliable without evidence of perforation from peptic ulcer  The lesser sac was entered between the stomach and the transverse colon, and the underside of the stomach palpated, this was soft and pliable without overt evidence of perforation and from a gastric ulcer into the lesser sac  Likewise the gallbladder was unremarkable  It appears that purulent fluid had tracked to this location from the right lower quadrant  The incision was ultimately extended above the umbilicus to gain complete exposure and to examine the small bowel  The transverse colon was unremarkable  The splenic flexure was likewise soft and pliable, and the descending colon terminated in his end colostomy  In the pelvis, the rectal stump was identified and was intact  After freeing up the small bowel there is no evidence of damage to the bladder, and urine draining from the Ambriz catheter was clear  The abdomen was irrigated with several liters of warm saline  The position of the nasogastric tube in the stomach was confirmed  A 19 Chinese round Eleanora Mariana drain was placed through a stab wound in right lower quadrant and directed along the right pelvic sidewall and into the pelvis behind the bladder and in front of the rectal stump  This was secured to the skin using a 2-0 nylon drain stitch  A closing table was then utilized  Change of gown and gloves, squaring out the incision with fresh towels, and new instruments were employed    The midline incision was closed using a continuous looped #1 PDS suture the subcutaneous tissue was irrigated with additional warm saline, the skin was tacked closed using interrupted 3-0 nylon suture, and skin clips  11 jeffy of Telfa impregnated with Betadine were placed between the skin clips packing the skin open  The wound was dressed with sterile gauze  An ostomy appliance was reapplied, and dressing sponge was placed over the drain which was connected to bulb suction  The patient subsequently made an uneventful recovery from his anesthetic, was moved to his waiting ICU bed and taken directly to the intensive care unit in good condition having tolerated the procedure well       I was present for the entire procedure, A qualified resident physician was not available and A physician assistant was required during the procedure for retraction tissue handling,dissection and suturing    Patient Disposition:  Critical Care Unit and extubated and stable    This procedure was not performed to treat colon cancer through resection      SIGNATURE: Nya Grossman MD  DATE: December 11, 2022  TIME: 7:07 PM

## 2022-12-12 NOTE — PROGRESS NOTES
[facility]  Progress Note - Roger Angle 1945, 78 y o  male MRN: [id number]  Unit/Bed#: ICU 10-01 Encounter: [id number]  Primary Care Provider: Catie Guy DO   Date and time admitted to hospital: 12/11/2022  5:19 AM    * Peritonitis [facility])  Assessment & Plan  · Presented to the ED with acute onset abdominal pain concerning for perforation, SBO, and acute abdomen   · H/o sigmoid diverticulitis s/p Polo's procedure and colostomy with planned reversal scheduled January 2023  · CT abdomen pelvis:  Extensive inflammatory changes involving the jejunum and proximal-mid ileum, compatible with severe enteritis  Infectious and ischemic etiologies may be considered  In particular, there is focal free air surrounding the lower midline ileum just above the urinary bladder with extensive inflammatory changes  There are a few mildly dilated loops of jejunum and within the left upper abdomen, likely secondary to extensive inflammatory changes with possible superimposed low-grade partial small bowel obstruction  There is a small amount of free air and free fluid in the remainder of the abdomen and pelvis  The stomach and proximal esophagus are distended and fluid-filled, presumably secondary to delayed transit  Postsurgical changes in the anterior abdominal wall with interval left lower quadrant colostomy    · POD 1 ex lap, MELANI, appendectomy, drainage of intra abdominal abscess  · Continue NPO and NGT to LCS  · Continue cipro, diflucan, and flagyl, day 2  · Continue IVF  · Continue PRN dilaudid and valium for pain management  · Close HD monitoring, trend lactic until clear  · Monitor incision closely, dressing changes per surgery   · Monitor ostomy OP     Severe sepsis ([address] )  Assessment & Plan  · Present on arrival to ED, tachycardia, leukopenia, lactic acidosis   · Suspected intra abdominal source   · S/p 3L IVF in the ED  · Continue IVF hydration  · Continue cipro, flagyl, diflucan, day 2 per surgery  · Continue to trend lactic until clear  · Follow up on abdominal culture data  · Follow up on BC  · Monitor WBC, fever curve     Acute respiratory failure with hypoxia (HCC)  Assessment & Plan  · Likely in the setting of post operative respiratory insufficiency   · Continue supplemental O2 via nasal cannula for SpO2>90  · Encourage cough, deep breathing, IS, ambulation    Rheumatoid arthritis with rheumatoid factor, unspecified (HCC)  Assessment & Plan  · Holding home meds while NPO    Status post aortic valve replacement with bioprosthetic valve  Assessment & Plan  · Completed 2018  · Holding ASA while NPO    Continuous opioid dependence ([address] )  Assessment & Plan  · For chronic back pain  · Restart home oxy IR, percocet when able to take PO  · Continue PRN dilaudid IV for now     Chronic pain  Assessment & Plan  · In the setting of lumbar disc disease   · Continue PRN dilaudid and valium IV while NPO  · Restart home soma, oxy IR, and percocet when able       ----------------------------------------------------------------------------------------  HPI/24hr events: POD 1 ex lap, MELANI, appendectomy, drainage of abscess  Remained HD stable overnight  No other acute events  Patient appropriate for transfer out of the ICU today?: Patient does not meet criteria for referral to the ICU Follow-Up Clinic; referral has not been made  Disposition: Transfer to Med-Surg   Code Status: Level 1 - Full Code  ---------------------------------------------------------------------------------------  SUBJECTIVE  "My stomach hurts"    Review of Systems   Constitutional: Negative  HENT: Negative  Eyes: Negative  Respiratory: Negative  Cardiovascular: Negative  Gastrointestinal: Positive for abdominal distention and abdominal pain  Endocrine: Negative  Genitourinary: Negative  Musculoskeletal: Negative  Skin: Negative  Allergic/Immunologic: Negative  Neurological: Negative      Hematological: Negative  Psychiatric/Behavioral: Negative  Review of systems was reviewed and negative unless stated above in HPI/24-hour events   ---------------------------------------------------------------------------------------  OBJECTIVE    Vitals   Vitals:    22 1630 22 1645 22 2200 22 0000   BP: 147/65 151/66 164/86 162/80   BP Location:    Left arm   Pulse: 90 94 92 94   Resp: (!) 26 (!) 28 (!) 27 (!) 25   Temp:       TempSrc:       SpO2: (!) 88% (!) 89% 95% 93%   Weight:       Height:         Temp (24hrs), Av 4 °F (36 9 °C), Min:98 1 °F (36 7 °C), Max:98 7 °F (37 1 °C)  Current: Temperature: 98 1 °F (36 7 °C)          Respiratory:  SpO2: SpO2: 93 %, SpO2 Device: O2 Device: Nasal cannula  Nasal Cannula O2 Flow Rate (L/min): 2 L/min    Invasive/non-invasive ventilation settings   Respiratory    Lab Data (Last 4 hours)    None         O2/Vent Data (Last 4 hours)    None                Physical Exam  Constitutional:       General: He is not in acute distress  Appearance: He is ill-appearing  HENT:      Head: Normocephalic and atraumatic  Mouth/Throat:      Mouth: Mucous membranes are moist    Eyes:      Pupils: Pupils are equal, round, and reactive to light  Cardiovascular:      Rate and Rhythm: Normal rate and regular rhythm  Pulses: Normal pulses  Heart sounds: Normal heart sounds  No murmur heard  No friction rub  No gallop  Pulmonary:      Effort: Pulmonary effort is normal  No respiratory distress  Breath sounds: No wheezing, rhonchi or rales  Comments: diminished  Abdominal:      General: There is distension  Palpations: Abdomen is soft  Tenderness: There is abdominal tenderness  Musculoskeletal:         General: No swelling  Normal range of motion  Cervical back: Neck supple  Skin:     General: Skin is warm and dry  Capillary Refill: Capillary refill takes less than 2 seconds     Neurological:      General: No focal deficit present  Mental Status: He is alert and oriented to person, place, and time  Cranial Nerves: No cranial nerve deficit  Sensory: No sensory deficit  Motor: No weakness  Laboratory and Diagnostics:  Results from last 7 days   Lab Units 12/11/22 1911 12/11/22  0620   WBC Thousand/uL 5 25 2 99*   HEMOGLOBIN g/dL 14 2 16 2   HEMATOCRIT % 44 8 52 2*   PLATELETS Thousands/uL 215 251   NEUTROS PCT %  --  85*   BANDS PCT % 30*  --    MONOS PCT %  --  8   MONO PCT % 3*  --      Results from last 7 days   Lab Units 12/11/22 1911 12/11/22  0620   SODIUM mmol/L 136 138   POTASSIUM mmol/L 4 4 3 8   CHLORIDE mmol/L 106 100   CO2 mmol/L 21 26   ANION GAP mmol/L 9 12   BUN mg/dL 15 19   CREATININE mg/dL 0 96 1 28   CALCIUM mg/dL 7 9* 9 5   GLUCOSE RANDOM mg/dL 113 185*   ALT U/L  --  18   AST U/L  --  17   ALK PHOS U/L  --  54   ALBUMIN g/dL  --  4 3   TOTAL BILIRUBIN mg/dL  --  0 57                   Results from last 7 days   Lab Units 12/11/22 2033 12/11/22  0620   LACTIC ACID mmol/L 2 6* 4 3*     ABG:    VBG:          Micro  Results from last 7 days   Lab Units 12/11/22  0831   BLOOD CULTURE  Received in Microbiology Lab  Culture in Progress  Received in Microbiology Lab  Culture in Progress  EKG: NSR rate 90  Imaging: I have personally reviewed pertinent reports  and I have personally reviewed pertinent films in PACS    Intake and Output  I/O       12/10 0701 12/11 0700 12/11 0701 12/12 0700    I V  (mL/kg)  897 9 (11)    IV Piggyback  5650    Total Intake(mL/kg)  6547 9 (80 2)    Urine (mL/kg/hr)  1045 (0 5)    Drains  65    Blood  100    Total Output  1210    Net  +5337 9                Height and Weights   Height: 5' 7" (170 2 cm)  IBW (Ideal Body Weight): 66 1 kg  Body mass index is 28 19 kg/m²    Weight (last 2 days)     Date/Time Weight    12/11/22 0932 81 6 (180)            Nutrition       Diet Orders   (From admission, onward)             Start     Ordered 12/11/22 1833  Diet NPO; Ice chips  Diet effective now        References:    Nutrtion Support Algorithm Enteral vs  Parenteral   Question Answer Comment   Diet Type NPO    NPO Except: Ice chips    RD to adjust diet per protocol?  No        12/11/22 1832                  Active Medications  Scheduled Meds:  Current Facility-Administered Medications   Medication Dose Route Frequency Provider Last Rate   • ciprofloxacin  400 mg Intravenous Q12H Kwesi Spencer PA-C 400 mg (12/11/22 2118)   • diazepam  2 5 mg Intravenous Q6H PRN PEDRO Guy     • fluconazole  400 mg Intravenous Q24H Kwesi Spencer PA-C 400 mg (12/11/22 1648)   • heparin (porcine)  5,000 Units Subcutaneous [facility] Kwesi Spencer PA-C     • HYDROmorphone  0 5 mg Intravenous Q2H PRN Kwesi Spencer PA-C     • HYDROmorphone  1 mg Intravenous Q2H PRN Kwesi Spencer PA-C     • insulin lispro  1-5 Units Subcutaneous Q6H [facility] PEDRO Guy     • lactated ringers  125 mL/hr Intravenous Continuous Kwesi Spencer PA-C 125 mL/hr (12/11/22 1649)   • metroNIDAZOLE  500 mg Intravenous Q8H Kwesi Spencer PA-C 500 mg (12/12/22 0003)   • ondansetron  4 mg Intravenous Q6H PRN Kwesi Spencer PA-C     • pantoprazole  40 mg Intravenous Q12H [facility] Kwesi Spencer PA-C     • phenol  1 spray Mouth/Throat Q2H PRN Jen Fabian MD       Continuous Infusions:  lactated ringers, 125 mL/hr, Last Rate: 125 mL/hr (12/11/22 1649)      PRN Meds:   diazepam, 2 5 mg, Q6H PRN  HYDROmorphone, 0 5 mg, Q2H PRN  HYDROmorphone, 1 mg, Q2H PRN  ondansetron, 4 mg, Q6H PRN  phenol, 1 spray, Q2H PRN        Invasive Devices Review  Invasive Devices     Peripheral Intravenous Line  Duration           Peripheral IV 12/11/22 Left;Upper;Ventral (anterior) Arm <1 day    Peripheral IV 12/11/22 Right;Upper;Ventral (anterior) Arm <1 day          Drain  Duration           Closed/Suction Drain Right RLQ Bulb 19 Fr  113 days    Colostomy Descending/sigmoid  days Urethral Catheter Latex 16 Fr  113 days    Closed/Suction Drain Lateral;Right RLQ Bulb 19 Fr  <1 day    Urethral Catheter Latex 16 Fr  <1 day                ---------------------------------------------------------------------------------------  Advance Directive and Living Will:      Power of :    POLST:    ---------------------------------------------------------------------------------------  Care Time Delivered:   No Critical Care time spent       PEDRO Harmon      Portions of the record may have been created with voice recognition software  Occasional wrong word or "sound a like" substitutions may have occurred due to the inherent limitations of voice recognition software    Read the chart carefully and recognize, using context, where substitutions have occurred

## 2022-12-12 NOTE — PROGRESS NOTES
Progress Note - General Surgery   Joie Bloch 78 y o  male MRN: [id number]  Unit/Bed#: ICU 10-01 Encounter: [id number]    Assessment:  72-year-old male POD#1 s/p exploratory laparotomy, MEALNI, appendectomy, drainage of abdominal abscess  -Afebrile, variable respirations with tachypnea at times (resps 17-47), hypertension; 4L supplemental O2  - UO - 2 27L   - Drain output: 125cc ss  - NGT output - 300cc dark bilious output seen in canister  - Colostomy - gas present in bag   -Preliminary body fluid culture growing gram positive cocci and rare gram-negative rods  -Hypomagnesemia, 1 3  -WBC 11 36  - HGB 13 4  -Lactic acid cleared  -Patient with persistent abdominal pain    PMH of chronic pain, perforated sigmoid diverticulitis s/p Vane's procedure (8/2022), scheduled for reversal in Jan 2023    Plan:  - NPO  - NGT, IV Protonix, Chloraseptic as needed  - IVF  -Will hold off and removing Ambriz to potential need for diuresis; discussed with critical care team  - I&O's  - Abx - Cipro and Flagyl  - Diflucan   -Replete electrolytes  - Monitor abdominal exams - wick in place at midline incision, vitals, and labs  - Monitor DAMASO drain output and appearance of output  - F/u fluid cultures  - Analgesia and antiemetics prn   - PT/OT  - Incentive spirometer   - DVT ppx   -To be seen by attending on rounds    Subjective/Objective     Subjective: Patient states he has significant abdominal pain  Patient states that his abdominal pain is more on the right side incision sites, not in pain surrounding colostomy  Patient has chronic pain and takes Percocet, Soma, and Desyrel  Patient with some edema of upper extremities bilaterally, patient and nurse noted this  Patient denies nausea  Reports sore throat in the setting of NG tube  Patient with gas in ostomy bag  Denies fevers, chills, chest pain, shortness of breath      Objective:     Blood pressure 168/75, pulse 91, temperature 98 1 °F (36 7 °C), temperature source Temporal, resp  rate 17, height 5' 7" (1 702 m), weight 81 6 kg (180 lb), SpO2 92 %  ,Body mass index is 28 19 kg/m²  Intake/Output Summary (Last 24 hours) at 12/12/2022 1018  Last data filed at 12/12/2022 0600  Gross per 24 hour   Intake 7297 92 ml   Output 2495 ml   Net 4802 92 ml       Invasive Devices     Peripheral Intravenous Line  Duration           Peripheral IV 12/11/22 Right;Upper;Ventral (anterior) Arm 1 day    Peripheral IV 12/11/22 Left;Upper;Ventral (anterior) Arm <1 day          Drain  Duration           Closed/Suction Drain Right RLQ Bulb 19 Fr  113 days    Colostomy Descending/sigmoid  days    Urethral Catheter Latex 16 Fr  113 days    Closed/Suction Drain Lateral;Right RLQ Bulb 19 Fr  <1 day    Urethral Catheter Latex 16 Fr  <1 day                Physical Exam:   General -acute distress  Heart - RRR  Lungs -clear to auscultation bilaterally - could auscultate the lung apices; could not auscultated lung bases due to shallow deep breathing   Abdomen -bowel sounds present  Midline incision with jeffy and staples in place, overlying dressing  Soft, mildly distended, generalized tenderness to light palpation  Generalized voluntary guarding  No rebound  Extremities - no lower extremity pitting edema bilaterally; bilateral upper extremities appear edematous    Lab, Imaging and other studies:I have personally reviewed pertinent lab results      VTE Pharmacologic Prophylaxis: Heparin  VTE Mechanical Prophylaxis: sequential compression device     Brigitte Quigley [place], [place]  12/12/2022

## 2022-12-12 NOTE — UTILIZATION REVIEW
Initial Clinical Review    Admission: Date/Time/Statement:   Admission Orders (From admission, onward)     Ordered        12/11/22 0933  Inpatient Admission  Once                      Orders Placed This Encounter   Procedures   • Inpatient Admission     Standing Status:   Standing     Number of Occurrences:   1     Order Specific Question:   Level of Care     Answer:   Level 1 Stepdown [13]     Order Specific Question:   Bed request comments     Answer:   Acute abdomen, will need monitoring post op     Order Specific Question:   Estimated length of stay     Answer:   More than 2 Midnights     Order Specific Question:   Certification     Answer:   I certify that inpatient services are medically necessary for this patient for a duration of greater than two midnights  See H&P and MD Progress Notes for additional information about the patient's course of treatment  ED Arrival Information     Expected   -    Arrival   12/11/2022 05:06    Acuity   Urgent            Means of arrival   Walk-In    Escorted by   Spouse    Service   Surgery-General    Admission type   Emergency            Arrival complaint   stomach pain           Chief Complaint   Patient presents with   • Abdominal Pain     LLQ abd pain that started tonight, pt has colostomy       Initial Presentation: 78 y o  male with PMH of chronic pain, perforated sigmoid diverticulitis s/p Vane's procedure (8/2022) and scheduled for reversal in Jan 2023, now presenting with acute onset abdominal pain meeting sepsis criteria    In ED, pt tachycardic, lactic elevated, neutropenic and CTAP "Extensive inflammatory changes involving the jejunum and proximal-mid ileum, compatible with severe enteritis   Infectious and ischemic etiologies may be considered   In particular, there is focal free air surrounding the lower midline ileum just above the urinary bladder with extensive inflammatory changes   There are a few mildly dilated loops of jejunum and within the left upper abdomen, likely secondary to extensive inflammatory changes with possible superimposed low-grade partial small bowel obstruction  There is a small amount of free air and free fluid in the remainder of the abdomen and pelvis "  Admit Inpatient ICU d/t Peritonitis:  Keep NPO for OR on 12/11 see report below, give IVF, IV ABX, accuchecks w/ SSI, insert urinary catheter, NG tube insertion, monitor IO, pain control, antiemetics  12/11 OP Note:  Procedure(s) (LRB):  LAPAROTOMY EXPLORATORY,  EXTENSIVE LYSIS OF ADHESIONS, APPENDECTOMY, DRAINAGE OF ABDOMINAL ABCESS (N/A)  Anesthesia Type:   General  Operative Indications:  Acute abdomen [R10 0]  With free air  Operative Findings:  Abscess RLQ contiguous with appendix, extensive adhesions  No perforated bowel identified      Date: 12/12   Day 2:   POD 1 ex lap, MELANI, appendectomy, drainage of abscess  Remained HD stable overnight  No other acute events  Pt noted to have abdominal distention and pain  Continue NPO, NGT to LCS, IV ABX, IVF, pain and nausea control prn, trend lactic, monitor incision closely with dsg changes per sx, monitor ostomy OP, monitor WBC and fever, inc spirometry, hold home meds  Transfer to med surg today      ED Triage Vitals   Temperature Pulse Respirations Blood Pressure SpO2   12/11/22 0519 12/11/22 0746 12/11/22 0519 12/11/22 0519 12/11/22 0746   98 7 °F (37 1 °C) (!) 108 19 135/72 93 %      Temp Source Heart Rate Source Patient Position - Orthostatic VS BP Location FiO2 (%)   12/11/22 0519 12/11/22 0932 12/11/22 0932 12/11/22 0932 --   Oral Monitor Lying Left arm       Pain Score       12/11/22 0659       9          Wt Readings from Last 1 Encounters:   12/11/22 81 6 kg (180 lb)     Additional Vital Signs:   Date/Time Temp Pulse Resp BP MAP (mmHg) SpO2 Calculated FIO2 (%) - Nasal Cannula Nasal Cannula O2 Flow Rate (L/min) O2 Device   12/12/22 0400 -- 91 17 168/75 108 92 % 36 4 L/min Nasal cannula   12/12/22 0200 -- 93 18 176/80 Abnormal  115 92 % 36 4 L/min Nasal cannula   12/12/22 0000 -- 94 25 Abnormal  162/80 110 93 % 36 4 L/min Nasal cannula   12/11/22 2200 -- 92 27 Abnormal  164/86 118 95 % 36 4 L/min Nasal cannula   12/11/22 1645 -- 94 28 Abnormal  151/66 95 89 % Abnormal  -- -- --   12/11/22 1630 -- 90 26 Abnormal  147/65 94 88 % Abnormal  -- -- --   12/11/22 1615 -- 79 33 Abnormal  160/71 102 96 % 28 2 L/min Nasal cannula   12/11/22 1600 98 1 °F (36 7 °C) 82 34 Abnormal  165/70 103 94 % 28 2 L/min Nasal cannula   12/11/22 1553 -- 83 47 Abnormal  -- -- 96 % -- -- --   12/11/22 0932 -- 97 18 173/81 Abnormal  113 92 % -- -- None (Room air)   12/11/22 0746 -- 108 Abnormal  -- -- -- 93 % -- -- None (Room air)   12/11/22 0519 98 7 °F (37 1 °C) -- 19 135/72 -- -- -- -- None (Room air)     Pertinent Labs/Diagnostic Test Results:   CT abdomen pelvis with contrast   Final Result by Yue Palmer MD (12/11 3470)         1  Extensive inflammatory changes involving the jejunum and proximal-mid ileum, compatible with severe enteritis  Infectious and ischemic etiologies may be considered  In particular, there is focal free air surrounding the lower midline ileum just    above the urinary bladder with extensive inflammatory changes  There are a few mildly dilated loops of jejunum and within the left upper abdomen, likely secondary to extensive inflammatory changes with possible superimposed low-grade partial small bowel    obstruction  There is a small amount of free air and free fluid in the remainder of the abdomen and pelvis  The stomach and proximal esophagus are distended and fluid-filled, presumably secondary to delayed transit  2   Postsurgical changes in the anterior abdominal wall with interval left lower quadrant colostomy           I personally discussed this study with Dr Jacqueline Lagunas of the ED on 12/11/2022 at 8:47 AM                            Workstation performed: [id number]               Results from last 7 days   Lab Units 12/12/22  0629 12/11/22  1911 12/11/22  0620   WBC Thousand/uL 11 36* 5 25 2 99*   HEMOGLOBIN g/dL 13 4 14 2 16 2   HEMATOCRIT % 42 0 44 8 52 2*   PLATELETS Thousands/uL 188 215 251   NEUTROS ABS Thousands/µL  --   --  2 52   BANDS PCT %  --  30*  --          Results from last 7 days   Lab Units 12/12/22  0629 12/11/22 1911 12/11/22  0620   SODIUM mmol/L 135 136 138   POTASSIUM mmol/L 3 8 4 4 3 8   CHLORIDE mmol/L 105 106 100   CO2 mmol/L 24 21 26   ANION GAP mmol/L 6 9 12   BUN mg/dL 12 15 19   CREATININE mg/dL 0 96 0 96 1 28   EGFR ml/min/1 73sq m 77 77 54   CALCIUM mg/dL 8 1* 7 9* 9 5   CALCIUM, IONIZED mmol/L 1 05*  --   --    MAGNESIUM mg/dL 1 3*  --   --    PHOSPHORUS mg/dL 2 7  --   --      Results from last 7 days   Lab Units 12/12/22  0629 12/11/22  0620   AST U/L 17 17   ALT U/L 12 18   ALK PHOS U/L 27* 54   TOTAL PROTEIN g/dL 5 1* 7 0   ALBUMIN g/dL 3 3* 4 3   TOTAL BILIRUBIN mg/dL 0 68 0 57     Results from last 7 days   Lab Units 12/12/22  0630 12/12/22  0019 12/11/22  1816 12/11/22  1805 12/11/22  1537   POC GLUCOSE mg/dl 102 98 115 110 133     Results from last 7 days   Lab Units 12/12/22  0629 12/11/22  1911 12/11/22  0620   GLUCOSE RANDOM mg/dL 110 113 185*     Results from last 7 days   Lab Units 12/12/22  0629   PROCALCITONIN ng/ml 8 29*     Results from last 7 days   Lab Units 12/12/22  0629 12/11/22 2033 12/11/22  0620   LACTIC ACID mmol/L 1 6 2 6* 4 3*     Results from last 7 days   Lab Units 12/11/22  0620   LIPASE u/L 32     Results from last 7 days   Lab Units 12/11/22  0831   BLOOD CULTURE  Received in Microbiology Lab  Culture in Progress  Received in Microbiology Lab  Culture in Progress       ED Treatment:   Medication Administration from 12/11/2022 0506 to 12/11/2022 1011       Date/Time Order Dose Route Action     12/11/2022 0659 EST HYDROmorphone (DILAUDID) injection 1 mg 1 mg Intravenous Given     12/11/2022 0747 EST sodium chloride 0 9 % bolus 1,000 mL 1,000 mL Intravenous New Bag     12/11/2022 0703 EST ondansetron (ZOFRAN) injection 4 mg 4 mg Intravenous Given     12/11/2022 0703 EST sodium chloride 0 9 % bolus 1,000 mL 1,000 mL Intravenous New Bag     12/11/2022 0927 EST ciprofloxacin (CIPRO) IVPB (premix in 5% dextrose) 400 mg 200 mL 400 mg Intravenous New Bag     12/11/2022 0902 EST metroNIDAZOLE (FLAGYL) IVPB (premix) 500 mg 100 mL 500 mg Intravenous New Bag     12/11/2022 0929 EST sodium chloride 0 9 % bolus 1,000 mL 1,000 mL Intravenous New Bag     12/11/2022 0755 EST fentanyl citrate (PF) 100 MCG/2ML 100 mcg 100 mcg Intravenous Given     12/11/2022 0757 EST iohexol (OMNIPAQUE) 350 MG/ML injection (SINGLE-DOSE) 100 mL 100 mL Intravenous Given        Past Medical History:   Diagnosis Date   • Aortic stenosis    • Aortic stenosis, severe    • Arthritis    • Carpal tunnel syndrome    • Chronic back pain    • Chronic pain    • Chronic, continuous use of opioids    • Hyperlipidemia    • Hypertension    • Hypertrophy of prostate    • Insomnia    • Large bowel obstruction (HCC)    • Peripheral neuropathy    • [facility]    • Umbilical hernia      Present on Admission:  • Chronic pain  • Continuous opioid dependence ([facility] )  • Rheumatoid arthritis with rheumatoid factor, unspecified (HCC)      Admitting Diagnosis: Acute abdomen [R10 0]  Stomach pain [R10 9]  Abdominal pain [R10 9]  Colostomy in place [facility]) [Z93 3]  Status post aortic valve replacement with bioprosthetic valve [Z95 3]  Stage 3 chronic kidney disease, unspecified whether stage 3a or 3b CKD ([facility] ) [N18 30]  Age/Sex: 78 y o  male  Admission Orders:  Scheduled Medications:  ciprofloxacin, 400 mg, Intravenous, Q12H  fluconazole, 400 mg, Intravenous, Q24H  heparin (porcine), 5,000 Units, Subcutaneous, Q8H [facility]  insulin lispro, 1-5 Units, Subcutaneous, Q6H ANDREA  metroNIDAZOLE, 500 mg, Intravenous, Q8H  pantoprazole, 40 mg, Intravenous, Q12H [facility]      Continuous IV Infusions:  lactated ringers, 125 mL/hr, Intravenous, Continuous    albumin human (FLEXBUMIN) 5 % injection  Freq: Continuous PRN Route: IV  Last Dose: Stopped (12/11/22 1500)  Start: 12/11/22 1318 End: 12/11/22 1523    PRN Meds:  diazepam, 2 5 mg, Intravenous, Q6H PRN x3 thus far  HYDROmorphone, 0 5 mg, Intravenous, Q2H PRN x4 thus far  HYDROmorphone, 1 mg, Intravenous, Q2H PRN x6 thus far  ondansetron, 4 mg, Intravenous, Q6H PRN  phenol, 1 spray, Mouth/Throat, Q2H PRN    scd    IP CONSULT TO INTERNAL MEDICINE  IP CONSULT TO MEDICAL CRITICAL CARE    Network Utilization Review Department  ATTENTION: Please call with any questions or concerns to [phone] and carefully listen to the prompts so that you are directed to the right person  All voicemails are confidential   Chrissy Muniz all requests for admission clinical reviews, approved or denied determinations and any other requests to dedicated fax number below belonging to the campus where the patient is receiving treatment   List of dedicated fax numbers for the Facilities:  [address] DENIALS (Administrative/Medical Necessity) [phone]   [address] (Maternity/NICU/Pediatrics) [phone]   3 Gerda Clarke [phone]   [facility] [phone]   [address] [address] [facility] [phone]   [address] Pierre Magaña [facility] 134 815 [facility] [phone]

## 2022-12-12 NOTE — ASSESSMENT & PLAN NOTE
· For chronic back pain  · Restart home oxy IR, percocet when able to take PO  · Continue PRN dilaudid IV for now

## 2022-12-12 NOTE — ASSESSMENT & PLAN NOTE
· In the setting of lumbar disc disease   · Continue PRN dilaudid and valium IV while NPO  · Restart home soma, oxy IR, and percocet when able

## 2022-12-12 NOTE — PLAN OF CARE
Problem: Potential for Falls  Goal: Patient will remain free of falls  Description: INTERVENTIONS:  - Educate patient/family on patient safety including physical limitations  - Instruct patient to call for assistance with activity   - Consult OT/PT to assist with strengthening/mobility   - Keep Call bell within reach  - Keep bed low and locked with side rails adjusted as appropriate  - Keep care items and personal belongings within reach  - Initiate and maintain comfort rounds  - Make Fall Risk Sign visible to staff  - Apply yellow socks and bracelet for high fall risk patients  - Consider moving patient to room near nurses station  Outcome: Progressing     Problem: Prexisting or High Potential for Compromised Skin Integrity  Goal: Skin integrity is maintained or improved  Description: INTERVENTIONS:  - Identify patients at risk for skin breakdown  - Assess and monitor skin integrity  - Assess and monitor nutrition and hydration status  - Monitor labs   - Assess for incontinence   - Turn and reposition patient  - Assist with mobility/ambulation  - Relieve pressure over bony prominences  - Avoid friction and shearing  - Provide appropriate hygiene as needed including keeping skin clean and dry  - Evaluate need for skin moisturizer/barrier cream  - Collaborate with interdisciplinary team   - Patient/family teaching  - Consider wound care consult   Outcome: Progressing     Problem: MOBILITY - ADULT  Goal: Maintain or return to baseline ADL function  Description: INTERVENTIONS:  -  Assess patient's ability to carry out ADLs; assess patient's baseline for ADL function and identify physical deficits which impact ability to perform ADLs (bathing, care of mouth/teeth, toileting, grooming, dressing, etc )  - Assess/evaluate cause of self-care deficits   - Assess range of motion  - Assess patient's mobility; develop plan if impaired  - Assess patient's need for assistive devices and provide as appropriate  - Encourage maximum independence but intervene and supervise when necessary  - Involve family in performance of ADLs  - Assess for home care needs following discharge   - Consider OT consult to assist with ADL evaluation and planning for discharge  - Provide patient education as appropriate  Outcome: Progressing  Goal: Maintains/Returns to pre admission functional level  Description: INTERVENTIONS:  - Perform BMAT or MOVE assessment daily    - Set and communicate daily mobility goal to care team and patient/family/caregiver     - Collaborate with rehabilitation services on mobility goals if consulted  - Out of bed for toileting  - Record patient progress and toleration of activity level   Outcome: Progressing     Problem: PAIN - ADULT  Goal: Verbalizes/displays adequate comfort level or baseline comfort level  Description: Interventions:  - Encourage patient to monitor pain and request assistance  - Assess pain using appropriate pain scale  - Administer analgesics based on type and severity of pain and evaluate response  - Implement non-pharmacological measures as appropriate and evaluate response  - Consider cultural and social influences on pain and pain management  - Notify physician/advanced practitioner if interventions unsuccessful or patient reports new pain  Outcome: Progressing     Problem: INFECTION - ADULT  Goal: Absence or prevention of progression during hospitalization  Description: INTERVENTIONS:  - Assess and monitor for signs and symptoms of infection  - Monitor lab/diagnostic results  - Monitor all insertion sites, i e  indwelling lines, tubes, and drains  - Monitor endotracheal if appropriate and nasal secretions for changes in amount and color  - [place] appropriate cooling/warming therapies per order  - Administer medications as ordered  - Instruct and encourage patient and family to use good hand hygiene technique  - Identify and instruct in appropriate isolation precautions for identified infection/condition  Outcome: Progressing  Goal: Absence of fever/infection during neutropenic period  Description: INTERVENTIONS:  - Monitor WBC    Outcome: Progressing     Problem: SAFETY ADULT  Goal: Patient will remain free of falls  Description: INTERVENTIONS:  - Educate patient/family on patient safety including physical limitations  - Instruct patient to call for assistance with activity   - Consult OT/PT to assist with strengthening/mobility   - Keep Call bell within reach  - Keep bed low and locked with side rails adjusted as appropriate  - Keep care items and personal belongings within reach  - Initiate and maintain comfort rounds  - Make Fall Risk Sign visible to staff    - Apply yellow socks and bracelet for high fall risk patients  - Consider moving patient to room near nurses station  Outcome: Progressing  Goal: Maintain or return to baseline ADL function  Description: INTERVENTIONS:  -  Assess patient's ability to carry out ADLs; assess patient's baseline for ADL function and identify physical deficits which impact ability to perform ADLs (bathing, care of mouth/teeth, toileting, grooming, dressing, etc )  - Assess/evaluate cause of self-care deficits   - Assess range of motion  - Assess patient's mobility; develop plan if impaired  - Assess patient's need for assistive devices and provide as appropriate  - Encourage maximum independence but intervene and supervise when necessary  - Involve family in performance of ADLs  - Assess for home care needs following discharge   - Consider OT consult to assist with ADL evaluation and planning for discharge  - Provide patient education as appropriate  Outcome: Progressing  Goal: Maintains/Returns to pre admission functional level  Description: INTERVENTIONS:  - Perform BMAT or MOVE assessment daily    - Set and communicate daily mobility goal to care team and patient/family/caregiver     - Collaborate with rehabilitation services on mobility goals if consulted  - Out of bed for toileting  - Record patient progress and toleration of activity level   Outcome: Progressing     Problem: DISCHARGE PLANNING  Goal: Discharge to home or other facility with appropriate resources  Description: INTERVENTIONS:  - Identify barriers to discharge w/patient and caregiver  - Arrange for needed discharge resources and transportation as appropriate  - Identify discharge learning needs (meds, wound care, etc )  - Arrange for interpretive services to assist at discharge as needed  - Refer to Case Management Department for coordinating discharge planning if the patient needs post-hospital services based on physician/advanced practitioner order or complex needs related to functional status, cognitive ability, or social support system  Outcome: Progressing     Problem: Knowledge Deficit  Goal: Patient/family/caregiver demonstrates understanding of disease process, treatment plan, medications, and discharge instructions  Description: Complete learning assessment and assess knowledge base    Interventions:  - Provide teaching at level of understanding  - Provide teaching via preferred learning methods  Outcome: Progressing     Problem: CARDIOVASCULAR - ADULT  Goal: Maintains optimal cardiac output and hemodynamic stability  Description: INTERVENTIONS:  - Monitor I/O, vital signs and rhythm  - Monitor for S/S and trends of decreased cardiac output  - Administer and titrate ordered vasoactive medications to optimize hemodynamic stability  - Assess quality of pulses, skin color and temperature  - Assess for signs of decreased coronary artery perfusion  - Instruct patient to report change in severity of symptoms  Outcome: Progressing  Goal: Absence of cardiac dysrhythmias or at baseline rhythm  Description: INTERVENTIONS:  - Continuous cardiac monitoring, vital signs, obtain 12 lead EKG if ordered  - Administer antiarrhythmic and heart rate control medications as ordered  - Monitor electrolytes and administer replacement therapy as ordered  Outcome: Progressing     Problem: RESPIRATORY - ADULT  Goal: Achieves optimal ventilation and oxygenation  Description: INTERVENTIONS:  - Assess for changes in respiratory status  - Assess for changes in mentation and behavior  - Position to facilitate oxygenation and minimize respiratory effort  - Oxygen administered by appropriate delivery if ordered  - Initiate smoking cessation education as indicated  - Encourage broncho-pulmonary hygiene including cough, deep breathe, Incentive Spirometry  - Assess the need for suctioning and aspirate as needed  - Assess and instruct to report SOB or any respiratory difficulty  - Respiratory Therapy support as indicated  Outcome: Progressing     Problem: GASTROINTESTINAL - ADULT  Goal: Minimal or absence of nausea and/or vomiting  Description: INTERVENTIONS:  - Administer IV fluids if ordered to ensure adequate hydration  - Maintain NPO status until nausea and vomiting are resolved  - Nasogastric tube if ordered  - Administer ordered antiemetic medications as needed  - Provide nonpharmacologic comfort measures as appropriate  - Advance diet as tolerated, if ordered  - Consider nutrition services referral to assist patient with adequate nutrition and appropriate food choices  Outcome: Progressing  Goal: Maintains or returns to baseline bowel function  Description: INTERVENTIONS:  - Assess bowel function  - Encourage oral fluids to ensure adequate hydration  - Administer IV fluids if ordered to ensure adequate hydration  - Administer ordered medications as needed  - Encourage mobilization and activity  - Consider nutritional services referral to assist patient with adequate nutrition and appropriate food choices  Outcome: Progressing  Goal: Maintains adequate nutritional intake  Description: INTERVENTIONS:  - Monitor percentage of each meal consumed  - Identify factors contributing to decreased intake, treat as appropriate  - Assist with meals as needed  - Monitor I&O, weight, and lab values if indicated  - Obtain nutrition services referral as needed  Outcome: Progressing  Goal: Establish and maintain optimal ostomy function  Description: INTERVENTIONS:  - Assess bowel function  - Encourage oral fluids to ensure adequate hydration  - Administer IV fluids if ordered to ensure adequate hydration   - Administer ordered medications as needed  - Encourage mobilization and activity  - Nutrition services referral to assist patient with appropriate food choices  - Assess stoma site  - Consider wound care consult   Outcome: Progressing  Goal: Oral mucous membranes remain intact  Description: INTERVENTIONS  - Assess oral mucosa and hygiene practices  - Implement preventative oral hygiene regimen  - Implement oral medicated treatments as ordered  - Initiate Nutrition services referral as needed  Outcome: Progressing     Problem: GENITOURINARY - ADULT  Goal: Maintains or returns to baseline urinary function  Description: INTERVENTIONS:  - Assess urinary function  - Encourage oral fluids to ensure adequate hydration if ordered  - Administer IV fluids as ordered to ensure adequate hydration  - Administer ordered medications as needed  - Offer frequent toileting  - Follow urinary retention protocol if ordered  Outcome: Progressing  Goal: Absence of urinary retention  Description: INTERVENTIONS:  - Assess patient’s ability to void and empty bladder  - Monitor I/O  - Bladder scan as needed  - Discuss with physician/AP medications to alleviate retention as needed  - Discuss catheterization for long term situations as appropriate  Outcome: Progressing  Goal: Urinary catheter remains patent  Description: INTERVENTIONS:  - Assess patency of urinary catheter  - If patient has a chronic song, consider changing catheter if non-functioning  - Follow guidelines for intermittent irrigation of non-functioning urinary catheter  Outcome: Progressing     Problem: METABOLIC, FLUID AND ELECTROLYTES - ADULT  Goal: Electrolytes maintained within normal limits  Description: INTERVENTIONS:  - Monitor labs and assess patient for signs and symptoms of electrolyte imbalances  - Administer electrolyte replacement as ordered  - Monitor response to electrolyte replacements, including repeat lab results as appropriate  - Instruct patient on fluid and nutrition as appropriate  Outcome: Progressing  Goal: Fluid balance maintained  Description: INTERVENTIONS:  - Monitor labs   - Monitor I/O and WT  - Instruct patient on fluid and nutrition as appropriate  - Assess for signs & symptoms of volume excess or deficit  Outcome: Progressing  Goal: Glucose maintained within target range  Description: INTERVENTIONS:  - Monitor Blood Glucose as ordered  - Assess for signs and symptoms of hyperglycemia and hypoglycemia  - Administer ordered medications to maintain glucose within target range  - Assess nutritional intake and initiate nutrition service referral as needed  Outcome: Progressing     Problem: SKIN/TISSUE INTEGRITY - ADULT  Goal: Skin Integrity remains intact(Skin Breakdown Prevention)  Description: Assess:  -Assess extremities for adequate circulation and sensation     Bed Management:  -Have minimal linens on bed & keep smooth, unwrinkled  -Change linens as needed when moist or perspiring    Toileting:  -Offer bedside commode    Activity:  -Encourage activity and walks on unit  -Encourage or provide ROM exercises       Skin Care:  -Avoid use of baby powder, tape, friction and shearing, hot water or constrictive clothing  -Do not massage red bony areas    Outcome: Progressing  Goal: Incision(s), wounds(s) or drain site(s) healing without S/S of infection  Description: INTERVENTIONS  - Assess and document dressing, incision, wound bed, drain sites and surrounding tissue  - Provide patient and family education    Outcome: Progressing     Problem: HEMATOLOGIC - ADULT  Goal: Maintains hematologic stability  Description: INTERVENTIONS  - Assess for signs and symptoms of bleeding or hemorrhage  - Monitor labs  - Administer supportive blood products/factors as ordered and appropriate  Outcome: Progressing     Problem: MUSCULOSKELETAL - ADULT  Goal: Maintain or return mobility to safest level of function  Description: INTERVENTIONS:  - Assess patient's ability to carry out ADLs; assess patient's baseline for ADL function and identify physical deficits which impact ability to perform ADLs (bathing, care of mouth/teeth, toileting, grooming, dressing, etc )  - Assess/evaluate cause of self-care deficits   - Assess range of motion  - Assess patient's mobility  - Assess patient's need for assistive devices and provide as appropriate  - Encourage maximum independence but intervene and supervise when necessary  - Involve family in performance of ADLs  - Assess for home care needs following discharge   - Consider OT consult to assist with ADL evaluation and planning for discharge  - Provide patient education as appropriate  Outcome: Progressing  Goal: Maintain proper alignment of affected body part  Description: INTERVENTIONS:  - Support, maintain and protect limb and body alignment  - Provide patient/ family with appropriate education  Outcome: Progressing

## 2022-12-12 NOTE — ASSESSMENT & PLAN NOTE
· Likely in the setting of post operative respiratory insufficiency   · Continue supplemental O2 via nasal cannula for SpO2>90  · Encourage cough, deep breathing, IS, ambulation

## 2022-12-12 NOTE — ASSESSMENT & PLAN NOTE
· Presented to the ED with acute onset abdominal pain concerning for perforation, SBO, and acute abdomen   · H/o sigmoid diverticulitis s/p Polo's procedure and colostomy with planned reversal scheduled January 2023  · CT abdomen pelvis:  Extensive inflammatory changes involving the jejunum and proximal-mid ileum, compatible with severe enteritis  Infectious and ischemic etiologies may be considered  In particular, there is focal free air surrounding the lower midline ileum just above the urinary bladder with extensive inflammatory changes  There are a few mildly dilated loops of jejunum and within the left upper abdomen, likely secondary to extensive inflammatory changes with possible superimposed low-grade partial small bowel obstruction  There is a small amount of free air and free fluid in the remainder of the abdomen and pelvis  The stomach and proximal esophagus are distended and fluid-filled, presumably secondary to delayed transit  Postsurgical changes in the anterior abdominal wall with interval left lower quadrant colostomy    · POD 1 ex lap, MELANI, appendectomy, drainage of intra abdominal abscess  · Continue NPO and NGT to LCS  · Continue cipro, diflucan, and flagyl, day 2  · Continue IVF  · Continue PRN dilaudid and valium for pain management  · Close HD monitoring, trend lactic until clear  · Monitor incision closely, dressing changes per surgery   · Monitor ostomy OP

## 2022-12-12 NOTE — QUICK NOTE
General Surgery Quick Note    On rounds   -Added scheduled Toradol to pain regimen  -PICC consult placed to IR for access; patient is a difficult stick  -Plan for NGT and song out tomorrow     [facility] Marcos Garcia  12/12/2022

## 2022-12-12 NOTE — ASSESSMENT & PLAN NOTE
· Present on arrival to ED, tachycardia, leukopenia, lactic acidosis   · Suspected intra abdominal source   · S/p 3L IVF in the ED  · Continue IVF hydration  · Continue cipro, flagyl, diflucan, day 2 per surgery  · Continue to trend lactic until clear  · Follow up on abdominal culture data  · Follow up on BC  · Monitor WBC, fever curve

## 2022-12-13 ENCOUNTER — APPOINTMENT (INPATIENT)
Dept: RADIOLOGY | Facility: HOSPITAL | Age: 77
End: 2022-12-13

## 2022-12-13 PROBLEM — R65.21 SEPTIC SHOCK (HCC): Status: ACTIVE | Noted: 2022-12-12

## 2022-12-13 PROBLEM — I48.91 NEW ONSET ATRIAL FIBRILLATION (HCC): Status: ACTIVE | Noted: 2022-12-13

## 2022-12-13 LAB
ALBUMIN SERPL BCP-MCNC: 3 G/DL (ref 3.5–5)
ALP SERPL-CCNC: 35 U/L (ref 34–104)
ALT SERPL W P-5'-P-CCNC: 10 U/L (ref 7–52)
ANION GAP SERPL CALCULATED.3IONS-SCNC: 6 MMOL/L (ref 4–13)
ANION GAP SERPL CALCULATED.3IONS-SCNC: 7 MMOL/L (ref 4–13)
APTT PPP: 42 SECONDS (ref 23–37)
AST SERPL W P-5'-P-CCNC: 14 U/L (ref 13–39)
BACTERIA SPEC ANAEROBE CULT: ABNORMAL
BACTERIA SPEC ANAEROBE CULT: ABNORMAL
BILIRUB SERPL-MCNC: 0.74 MG/DL (ref 0.2–1)
BUN SERPL-MCNC: 15 MG/DL (ref 5–25)
BUN SERPL-MCNC: 16 MG/DL (ref 5–25)
CA-I BLD-SCNC: 1.16 MMOL/L (ref 1.12–1.32)
CALCIUM ALBUM COR SERPL-MCNC: 9.3 MG/DL (ref 8.3–10.1)
CALCIUM SERPL-MCNC: 8.5 MG/DL (ref 8.4–10.2)
CALCIUM SERPL-MCNC: 8.5 MG/DL (ref 8.4–10.2)
CHLORIDE SERPL-SCNC: 102 MMOL/L (ref 96–108)
CHLORIDE SERPL-SCNC: 104 MMOL/L (ref 96–108)
CO2 SERPL-SCNC: 26 MMOL/L (ref 21–32)
CO2 SERPL-SCNC: 27 MMOL/L (ref 21–32)
CREAT SERPL-MCNC: 1.05 MG/DL (ref 0.6–1.3)
CREAT SERPL-MCNC: 1.18 MG/DL (ref 0.6–1.3)
ERYTHROCYTE [DISTWIDTH] IN BLOOD BY AUTOMATED COUNT: 13.4 % (ref 11.6–15.1)
ERYTHROCYTE [DISTWIDTH] IN BLOOD BY AUTOMATED COUNT: 13.5 % (ref 11.6–15.1)
GFR SERPL CREATININE-BSD FRML MDRD: 60 ML/MIN/1.73SQ M
GFR SERPL CREATININE-BSD FRML MDRD: 69 ML/MIN/1.73SQ M
GLUCOSE SERPL-MCNC: 106 MG/DL (ref 65–140)
GLUCOSE SERPL-MCNC: 107 MG/DL (ref 65–140)
GLUCOSE SERPL-MCNC: 111 MG/DL (ref 65–140)
GLUCOSE SERPL-MCNC: 112 MG/DL (ref 65–140)
GLUCOSE SERPL-MCNC: 117 MG/DL (ref 65–140)
GLUCOSE SERPL-MCNC: 138 MG/DL (ref 65–140)
HCT VFR BLD AUTO: 36.6 % (ref 36.5–49.3)
HCT VFR BLD AUTO: 37.9 % (ref 36.5–49.3)
HGB BLD-MCNC: 11.7 G/DL (ref 12–17)
HGB BLD-MCNC: 12 G/DL (ref 12–17)
INR PPP: 1.32 (ref 0.84–1.19)
MAGNESIUM SERPL-MCNC: 1.8 MG/DL (ref 1.9–2.7)
MAGNESIUM SERPL-MCNC: 2 MG/DL (ref 1.9–2.7)
MCH RBC QN AUTO: 28.9 PG (ref 26.8–34.3)
MCH RBC QN AUTO: 29 PG (ref 26.8–34.3)
MCHC RBC AUTO-ENTMCNC: 31.7 G/DL (ref 31.4–37.4)
MCHC RBC AUTO-ENTMCNC: 32 G/DL (ref 31.4–37.4)
MCV RBC AUTO: 91 FL (ref 82–98)
MCV RBC AUTO: 91 FL (ref 82–98)
PHOSPHATE SERPL-MCNC: 1.9 MG/DL (ref 2.3–4.1)
PHOSPHATE SERPL-MCNC: 2.9 MG/DL (ref 2.3–4.1)
PLATELET # BLD AUTO: 170 THOUSANDS/UL (ref 149–390)
PLATELET # BLD AUTO: 206 THOUSANDS/UL (ref 149–390)
PMV BLD AUTO: 9.3 FL (ref 8.9–12.7)
PMV BLD AUTO: 9.3 FL (ref 8.9–12.7)
POTASSIUM SERPL-SCNC: 3.4 MMOL/L (ref 3.5–5.3)
POTASSIUM SERPL-SCNC: 4.1 MMOL/L (ref 3.5–5.3)
PROCALCITONIN SERPL-MCNC: 6.02 NG/ML
PROT SERPL-MCNC: 5.3 G/DL (ref 6.4–8.4)
PROTHROMBIN TIME: 16.3 SECONDS (ref 11.6–14.5)
RBC # BLD AUTO: 4.04 MILLION/UL (ref 3.88–5.62)
RBC # BLD AUTO: 4.15 MILLION/UL (ref 3.88–5.62)
SODIUM SERPL-SCNC: 135 MMOL/L (ref 135–147)
SODIUM SERPL-SCNC: 137 MMOL/L (ref 135–147)
WBC # BLD AUTO: 11.25 THOUSAND/UL (ref 4.31–10.16)
WBC # BLD AUTO: 15.33 THOUSAND/UL (ref 4.31–10.16)

## 2022-12-13 RX ORDER — DILTIAZEM HYDROCHLORIDE 5 MG/ML
30 INJECTION INTRAVENOUS ONCE
Status: DISCONTINUED | OUTPATIENT
Start: 2022-12-13 | End: 2022-12-13

## 2022-12-13 RX ORDER — FUROSEMIDE 10 MG/ML
40 INJECTION INTRAMUSCULAR; INTRAVENOUS ONCE
Status: COMPLETED | OUTPATIENT
Start: 2022-12-13 | End: 2022-12-13

## 2022-12-13 RX ORDER — METOPROLOL TARTRATE 5 MG/5ML
5 INJECTION INTRAVENOUS ONCE
Status: COMPLETED | OUTPATIENT
Start: 2022-12-13 | End: 2022-12-13

## 2022-12-13 RX ORDER — OXYCODONE HYDROCHLORIDE 10 MG/1
10 TABLET ORAL EVERY 6 HOURS
Status: DISCONTINUED | OUTPATIENT
Start: 2022-12-13 | End: 2022-12-23 | Stop reason: HOSPADM

## 2022-12-13 RX ORDER — POTASSIUM CHLORIDE 14.9 MG/ML
20 INJECTION INTRAVENOUS ONCE
Status: COMPLETED | OUTPATIENT
Start: 2022-12-13 | End: 2022-12-13

## 2022-12-13 RX ORDER — HYDROMORPHONE HCL/PF 1 MG/ML
0.5 SYRINGE (ML) INJECTION EVERY 4 HOURS PRN
Status: DISCONTINUED | OUTPATIENT
Start: 2022-12-13 | End: 2022-12-15

## 2022-12-13 RX ORDER — DILTIAZEM HYDROCHLORIDE 5 MG/ML
20 INJECTION INTRAVENOUS ONCE
Status: COMPLETED | OUTPATIENT
Start: 2022-12-13 | End: 2022-12-13

## 2022-12-13 RX ORDER — HEPARIN SODIUM 10000 [USP'U]/100ML
3-20 INJECTION, SOLUTION INTRAVENOUS
Status: DISCONTINUED | OUTPATIENT
Start: 2022-12-13 | End: 2022-12-18

## 2022-12-13 RX ORDER — SODIUM CHLORIDE FOR INHALATION 3 %
4 VIAL, NEBULIZER (ML) INHALATION
Status: DISCONTINUED | OUTPATIENT
Start: 2022-12-13 | End: 2022-12-13

## 2022-12-13 RX ORDER — MAGNESIUM SULFATE HEPTAHYDRATE 40 MG/ML
2 INJECTION, SOLUTION INTRAVENOUS ONCE
Status: COMPLETED | OUTPATIENT
Start: 2022-12-13 | End: 2022-12-13

## 2022-12-13 RX ORDER — LEVALBUTEROL 1.25 MG/.5ML
1.25 SOLUTION, CONCENTRATE RESPIRATORY (INHALATION)
Status: DISCONTINUED | OUTPATIENT
Start: 2022-12-13 | End: 2022-12-13

## 2022-12-13 RX ORDER — POTASSIUM CHLORIDE 20 MEQ/1
40 TABLET, EXTENDED RELEASE ORAL ONCE
Status: COMPLETED | OUTPATIENT
Start: 2022-12-13 | End: 2022-12-13

## 2022-12-13 RX ORDER — DILTIAZEM HYDROCHLORIDE 5 MG/ML
INJECTION INTRAVENOUS
Status: COMPLETED
Start: 2022-12-13 | End: 2022-12-13

## 2022-12-13 RX ORDER — MELATONIN
1000 DAILY
Status: DISCONTINUED | OUTPATIENT
Start: 2022-12-14 | End: 2022-12-23 | Stop reason: HOSPADM

## 2022-12-13 RX ORDER — CARISOPRODOL 350 MG/1
350 TABLET ORAL 4 TIMES DAILY
Status: DISCONTINUED | OUTPATIENT
Start: 2022-12-13 | End: 2022-12-23 | Stop reason: HOSPADM

## 2022-12-13 RX ORDER — ATORVASTATIN CALCIUM 10 MG/1
20 TABLET, FILM COATED ORAL DAILY
Status: DISCONTINUED | OUTPATIENT
Start: 2022-12-14 | End: 2022-12-23 | Stop reason: HOSPADM

## 2022-12-13 RX ORDER — DILTIAZEM HYDROCHLORIDE 5 MG/ML
15 INJECTION INTRAVENOUS ONCE
Status: COMPLETED | OUTPATIENT
Start: 2022-12-13 | End: 2022-12-13

## 2022-12-13 RX ORDER — ALBUTEROL SULFATE 2.5 MG/3ML
2.5 SOLUTION RESPIRATORY (INHALATION) EVERY 4 HOURS PRN
Status: DISCONTINUED | OUTPATIENT
Start: 2022-12-13 | End: 2022-12-15

## 2022-12-13 RX ORDER — HYDROMORPHONE HCL/PF 1 MG/ML
1 SYRINGE (ML) INJECTION EVERY 4 HOURS PRN
Status: DISCONTINUED | OUTPATIENT
Start: 2022-12-13 | End: 2022-12-15

## 2022-12-13 RX ORDER — FENTANYL CITRATE 50 UG/ML
50 INJECTION, SOLUTION INTRAMUSCULAR; INTRAVENOUS ONCE
Status: COMPLETED | OUTPATIENT
Start: 2022-12-13 | End: 2022-12-13

## 2022-12-13 RX ORDER — TRAZODONE HYDROCHLORIDE 100 MG/1
100 TABLET ORAL
Status: DISCONTINUED | OUTPATIENT
Start: 2022-12-13 | End: 2022-12-23 | Stop reason: HOSPADM

## 2022-12-13 RX ORDER — GABAPENTIN 600 MG/1
600 TABLET ORAL 3 TIMES DAILY
Status: DISCONTINUED | OUTPATIENT
Start: 2022-12-13 | End: 2022-12-23 | Stop reason: HOSPADM

## 2022-12-13 RX ADMIN — POTASSIUM CHLORIDE 20 MEQ: 14.9 INJECTION, SOLUTION INTRAVENOUS at 18:40

## 2022-12-13 RX ADMIN — HYDROMORPHONE HYDROCHLORIDE 1 MG: 1 INJECTION, SOLUTION INTRAMUSCULAR; INTRAVENOUS; SUBCUTANEOUS at 14:55

## 2022-12-13 RX ADMIN — TRAZODONE HYDROCHLORIDE 100 MG: 100 TABLET ORAL at 21:37

## 2022-12-13 RX ADMIN — HEPARIN SODIUM 5000 UNITS: 5000 INJECTION INTRAVENOUS; SUBCUTANEOUS at 14:31

## 2022-12-13 RX ADMIN — DILTIAZEM HYDROCHLORIDE 15 MG: 5 INJECTION INTRAVENOUS at 08:33

## 2022-12-13 RX ADMIN — KETOROLAC TROMETHAMINE 15 MG: 30 INJECTION, SOLUTION INTRAMUSCULAR; INTRAVENOUS at 23:54

## 2022-12-13 RX ADMIN — FUROSEMIDE 40 MG: 10 INJECTION, SOLUTION INTRAMUSCULAR; INTRAVENOUS at 12:09

## 2022-12-13 RX ADMIN — SODIUM PHOSPHATE, MONOBASIC, MONOHYDRATE 30 MMOL: 276; 142 INJECTION, SOLUTION INTRAVENOUS at 08:07

## 2022-12-13 RX ADMIN — HEPARIN SODIUM 5000 UNITS: 5000 INJECTION INTRAVENOUS; SUBCUTANEOUS at 06:13

## 2022-12-13 RX ADMIN — METRONIDAZOLE 500 MG: 500 INJECTION, SOLUTION INTRAVENOUS at 14:33

## 2022-12-13 RX ADMIN — AMIODARONE HYDROCHLORIDE 1 MG/MIN: 50 INJECTION, SOLUTION INTRAVENOUS at 10:12

## 2022-12-13 RX ADMIN — KETOROLAC TROMETHAMINE 15 MG: 30 INJECTION, SOLUTION INTRAMUSCULAR; INTRAVENOUS at 17:23

## 2022-12-13 RX ADMIN — HYDROMORPHONE HYDROCHLORIDE 0.5 MG: 1 INJECTION, SOLUTION INTRAMUSCULAR; INTRAVENOUS; SUBCUTANEOUS at 03:39

## 2022-12-13 RX ADMIN — KETOROLAC TROMETHAMINE 15 MG: 30 INJECTION, SOLUTION INTRAMUSCULAR; INTRAVENOUS at 00:21

## 2022-12-13 RX ADMIN — METOPROLOL TARTRATE 25 MG: 25 TABLET, FILM COATED ORAL at 12:05

## 2022-12-13 RX ADMIN — POTASSIUM CHLORIDE 40 MEQ: 1500 TABLET, EXTENDED RELEASE ORAL at 18:37

## 2022-12-13 RX ADMIN — KETOROLAC TROMETHAMINE 15 MG: 30 INJECTION, SOLUTION INTRAMUSCULAR; INTRAVENOUS at 12:08

## 2022-12-13 RX ADMIN — PANTOPRAZOLE SODIUM 40 MG: 40 INJECTION, POWDER, LYOPHILIZED, FOR SOLUTION INTRAVENOUS at 21:37

## 2022-12-13 RX ADMIN — KETOROLAC TROMETHAMINE 15 MG: 30 INJECTION, SOLUTION INTRAMUSCULAR; INTRAVENOUS at 06:13

## 2022-12-13 RX ADMIN — CARISOPRODOL 350 MG: 350 TABLET ORAL at 12:07

## 2022-12-13 RX ADMIN — CARISOPRODOL 350 MG: 350 TABLET ORAL at 17:22

## 2022-12-13 RX ADMIN — GABAPENTIN 600 MG: 600 TABLET, FILM COATED ORAL at 16:16

## 2022-12-13 RX ADMIN — METRONIDAZOLE 500 MG: 500 INJECTION, SOLUTION INTRAVENOUS at 23:56

## 2022-12-13 RX ADMIN — METRONIDAZOLE 500 MG: 500 INJECTION, SOLUTION INTRAVENOUS at 07:06

## 2022-12-13 RX ADMIN — METOPROLOL TARTRATE 5 MG: 5 INJECTION, SOLUTION INTRAVENOUS at 08:02

## 2022-12-13 RX ADMIN — CIPROFLOXACIN 400 MG: 2 INJECTION, SOLUTION INTRAVENOUS at 10:22

## 2022-12-13 RX ADMIN — OXYCODONE HYDROCHLORIDE 10 MG: 10 TABLET ORAL at 23:55

## 2022-12-13 RX ADMIN — METOPROLOL TARTRATE 25 MG: 25 TABLET, FILM COATED ORAL at 21:39

## 2022-12-13 RX ADMIN — PANTOPRAZOLE SODIUM 40 MG: 40 INJECTION, POWDER, LYOPHILIZED, FOR SOLUTION INTRAVENOUS at 10:15

## 2022-12-13 RX ADMIN — MAGNESIUM SULFATE HEPTAHYDRATE 2 G: 40 INJECTION, SOLUTION INTRAVENOUS at 20:51

## 2022-12-13 RX ADMIN — CIPROFLOXACIN 400 MG: 2 INJECTION, SOLUTION INTRAVENOUS at 21:37

## 2022-12-13 RX ADMIN — HEPARIN SODIUM 11.1 UNITS/KG/HR: 10000 INJECTION, SOLUTION INTRAVENOUS at 19:49

## 2022-12-13 RX ADMIN — DILTIAZEM HYDROCHLORIDE 11 MG/HR: 5 INJECTION, SOLUTION INTRAVENOUS at 21:53

## 2022-12-13 RX ADMIN — CARISOPRODOL 350 MG: 350 TABLET ORAL at 21:37

## 2022-12-13 RX ADMIN — DILTIAZEM HYDROCHLORIDE 20 MG: 5 INJECTION INTRAVENOUS at 16:13

## 2022-12-13 RX ADMIN — DEXTROSE 150 MG: 50 INJECTION, SOLUTION INTRAVENOUS at 10:00

## 2022-12-13 RX ADMIN — SODIUM CHLORIDE, POTASSIUM CHLORIDE, SODIUM LACTATE AND CALCIUM CHLORIDE 100 ML/HR: 600; 310; 30; 20 INJECTION, SOLUTION INTRAVENOUS at 05:39

## 2022-12-13 RX ADMIN — DILTIAZEM HYDROCHLORIDE 5 MG/HR: 5 INJECTION, SOLUTION INTRAVENOUS at 13:24

## 2022-12-13 RX ADMIN — GABAPENTIN 600 MG: 600 TABLET, FILM COATED ORAL at 21:37

## 2022-12-13 RX ADMIN — FLUCONAZOLE IN SODIUM CHLORIDE 400 MG: 2 INJECTION, SOLUTION INTRAVENOUS at 15:39

## 2022-12-13 RX ADMIN — GABAPENTIN 600 MG: 600 TABLET, FILM COATED ORAL at 12:07

## 2022-12-13 RX ADMIN — OXYCODONE HYDROCHLORIDE 10 MG: 10 TABLET ORAL at 17:22

## 2022-12-13 RX ADMIN — METOPROLOL TARTRATE 5 MG: 1 INJECTION, SOLUTION INTRAVENOUS at 07:51

## 2022-12-13 RX ADMIN — FENTANYL CITRATE 50 MCG: 50 INJECTION INTRAMUSCULAR; INTRAVENOUS at 08:14

## 2022-12-13 RX ADMIN — OXYCODONE HYDROCHLORIDE 10 MG: 10 TABLET ORAL at 12:07

## 2022-12-13 NOTE — ASSESSMENT & PLAN NOTE
· Present on arrival to ED - tachycardia, leukopenia, lactic acidosis   · Suspected intra abdominal source   · S/p 3L IVF in the ED  · Continue IVF hydration  · Continue cipro, flagyl, diflucan, day 3 per surgery  · Follow up on abdominal culture data  · Follow up on BC  · Monitor WBC, fever curve

## 2022-12-13 NOTE — ASSESSMENT & PLAN NOTE
· Presented to the ED with acute onset abdominal pain concerning for perforation, SBO, and acute abdomen   · H/o sigmoid diverticulitis s/p Polo's procedure and colostomy with planned reversal scheduled January 2023  · CT abdomen pelvis:  Extensive inflammatory changes involving the jejunum and proximal-mid ileum, compatible with severe enteritis  Infectious and ischemic etiologies may be considered  In particular, there is focal free air surrounding the lower midline ileum just above the urinary bladder with extensive inflammatory changes  There are a few mildly dilated loops of jejunum and within the left upper abdomen, likely secondary to extensive inflammatory changes with possible superimposed low-grade partial small bowel obstruction  There is a small amount of free air and free fluid in the remainder of the abdomen and pelvis  The stomach and proximal esophagus are distended and fluid-filled, presumably secondary to delayed transit  Postsurgical changes in the anterior abdominal wall with interval left lower quadrant colostomy    · POD 2 ex lap, MELANI, appendectomy, drainage of intra abdominal abscess  · Continue NPO and NGT to LCS  · Continue cipro, diflucan, and flagyl, day 3  · Continue IVF  · Continue PRN dilaudid for pain management  · Close HD monitoring  · Monitor incision closely, dressing changes per surgery   · Monitor ostomy OP

## 2022-12-13 NOTE — PLAN OF CARE
Problem: Potential for Falls  Goal: Patient will remain free of falls  Description: INTERVENTIONS:  - Educate patient/family on patient safety including physical limitations  - Instruct patient to call for assistance with activity   - Consult OT/PT to assist with strengthening/mobility   - Keep Call bell within reach  - Keep bed low and locked with side rails adjusted as appropriate  - Keep care items and personal belongings within reach  - Initiate and maintain comfort rounds  - Make Fall Risk Sign visible to staff  - Offer Toileting every 2 Hours, in advance of need  - Initiate/Maintain bed alarm  - Obtain necessary fall risk management equipment:   - Apply yellow socks and bracelet for high fall risk patients  - Consider moving patient to room near nurses station  Outcome: Progressing     Problem: Prexisting or High Potential for Compromised Skin Integrity  Goal: Skin integrity is maintained or improved  Description: INTERVENTIONS:  - Identify patients at risk for skin breakdown  - Assess and monitor skin integrity  - Assess and monitor nutrition and hydration status  - Monitor labs   - Assess for incontinence   - Turn and reposition patient  - Assist with mobility/ambulation  - Relieve pressure over bony prominences  - Avoid friction and shearing  - Provide appropriate hygiene as needed including keeping skin clean and dry  - Evaluate need for skin moisturizer/barrier cream  - Collaborate with interdisciplinary team   - Patient/family teaching  - Consider wound care consult   Outcome: Progressing     Problem: MOBILITY - ADULT  Goal: Maintain or return to baseline ADL function  Description: INTERVENTIONS:  - Educate patient/family on patient safety including physical limitations  - Instruct patient to call for assistance with activity   - Consult OT/PT to assist with strengthening/mobility   - Keep Call bell within reach  - Keep bed low and locked with side rails adjusted as appropriate  - Keep care items and personal belongings within reach  - Initiate and maintain comfort rounds  - Make Fall Risk Sign visible to staff  - Offer Toileting every 2 Hours, in advance of need  - Initiate/Maintain bed alarm  - Obtain necessary fall risk management equipment:   - Apply yellow socks and bracelet for high fall risk patients  - Consider moving patient to room near nurses station  Outcome: Progressing  Goal: Maintains/Returns to pre admission functional level  Description: INTERVENTIONS:  - Perform BMAT or MOVE assessment daily    - Set and communicate daily mobility goal to care team and patient/family/caregiver     - Collaborate with rehabilitation services on mobility goals if consulted  - Perform Range of Motion 3  Problem: PAIN - ADULT  Goal: Verbalizes/displays adequate comfort level or baseline comfort level  Description: Interventions:  - Encourage patient to monitor pain and request assistance  - Assess pain using appropriate pain scale  - Administer analgesics based on type and severity of pain and evaluate response  - Implement non-pharmacological measures as appropriate and evaluate response  - Consider cultural and social influences on pain and pain management  - Notify physician/advanced practitioner if interventions unsuccessful or patient reports new pain  Outcome: Progressing     Problem: INFECTION - ADULT  Goal: Absence or prevention of progression during hospitalization  Description: INTERVENTIONS:  - Assess and monitor for signs and symptoms of infection  - Monitor lab/diagnostic results  - Monitor all insertion sites, i e  indwelling lines, tubes, and drains  - Monitor endotracheal if appropriate and nasal secretions for changes in amount and color  - [place] appropriate cooling/warming therapies per order  - Administer medications as ordered  - Instruct and encourage patient and family to use good hand hygiene technique  - Identify and instruct in appropriate isolation precautions for identified infection/condition  Outcome: Progressing  Goal: Absence of fever/infection during neutropenic period  Description: INTERVENTIONS:  - Monitor WBC    Outcome: Progressing     Problem: SAFETY ADULT  Goal: Patient will remain free of falls  Description: INTERVENTIONS:  - Educate patient/family on patient safety including physical limitations  - Instruct patient to call for assistance with activity   - Consult OT/PT to assist with strengthening/mobility   - Keep Call bell within reach  - Keep bed low and locked with side rails adjusted as appropriate  - Keep care items and personal belongings within reach  - Initiate and maintain comfort rounds  - Make Fall Risk Sign visible to staff  - Offer Toileting every 2 Hours, in advance of need  - Initiate/Maintain bed alarm  - Obtain necessary fall risk management equipment:   - Apply yellow socks and bracelet for high fall risk patients  - Consider moving patient to room near nurses station  Outcome: Progressing  Goal: Maintain or return to baseline ADL function  Description: INTERVENTIONS:  - Educate patient/family on patient safety including physical limitations  - Instruct patient to call for assistance with activity   - Consult OT/PT to assist with strengthening/mobility   - Keep Call bell within reach  - Keep bed low and locked with side rails adjusted as appropriate  - Keep care items and personal belongings within reach  - Initiate and maintain comfort rounds  - Make Fall Risk Sign visible to staff  - Offer Toileting every 2 Hours, in advance of need  - Initiate/Maintain bed alarm  - Obtain necessary fall risk management equipment:   - Apply yellow socks and bracelet for high fall risk patients  - Consider moving patient to room near nurses station  Outcome: Progressing  Goal: Maintains/Returns to pre admission functional level  Description: INTERVENTIONS:  - Perform BMAT or MOVE assessment daily    - Set and communicate daily mobility goal to care team and patient/family/caregiver  - Collaborate with rehabilitation services on mobility goals if consulted  - Perform Range of Motion 3 times a day  - Reposition patient every 2 hours  - Dangle patient 3 times a day  - Stand patient 3 times a day  - Ambulate patient 3 times a day  - Out of bed to chair 3 times a day   - Out of bed for meals  3 times a day  - Out of bed for toileting  - Record patient progress and toleration of activity level   Outcome: Progressing     Problem: DISCHARGE PLANNING  Goal: Discharge to home or other facility with appropriate resources  Description: INTERVENTIONS:  - Identify barriers to discharge w/patient and caregiver  - Arrange for needed discharge resources and transportation as appropriate  - Identify discharge learning needs (meds, wound care, etc )  - Arrange for interpretive services to assist at discharge as needed  - Refer to Case Management Department for coordinating discharge planning if the patient needs post-hospital services based on physician/advanced practitioner order or complex needs related to functional status, cognitive ability, or social support system  Outcome: Progressing     Problem: Knowledge Deficit  Goal: Patient/family/caregiver demonstrates understanding of disease process, treatment plan, medications, and discharge instructions  Description: Complete learning assessment and assess knowledge base    Interventions:  - Provide teaching at level of understanding  - Provide teaching via preferred learning methods  Outcome: Progressing     Problem: RESPIRATORY - ADULT  Goal: Achieves optimal ventilation and oxygenation  Description: INTERVENTIONS:  - Assess for changes in respiratory status  - Assess for changes in mentation and behavior  - Position to facilitate oxygenation and minimize respiratory effort  - Oxygen administered by appropriate delivery if ordered  - Initiate smoking cessation education as indicated  - Encourage broncho-pulmonary hygiene including cough, deep breathe, Incentive Spirometry  - Assess the need for suctioning and aspirate as needed  - Assess and instruct to report SOB or any respiratory difficulty  - Respiratory Therapy support as indicated  Outcome: Progressing     Problem: GASTROINTESTINAL - ADULT  Goal: Minimal or absence of nausea and/or vomiting  Description: INTERVENTIONS:  - Administer IV fluids if ordered to ensure adequate hydration  - Maintain NPO status until nausea and vomiting are resolved  - Nasogastric tube if ordered  - Administer ordered antiemetic medications as needed  - Provide nonpharmacologic comfort measures as appropriate  - Advance diet as tolerated, if ordered  - Consider nutrition services referral to assist patient with adequate nutrition and appropriate food choices  Outcome: Progressing  Goal: Maintains or returns to baseline bowel function  Description: INTERVENTIONS:  - Assess bowel function  - Encourage oral fluids to ensure adequate hydration  - Administer IV fluids if ordered to ensure adequate hydration  - Administer ordered medications as needed  - Encourage mobilization and activity  - Consider nutritional services referral to assist patient with adequate nutrition and appropriate food choices  Outcome: Progressing  Goal: Maintains adequate nutritional intake  Description: INTERVENTIONS:  - Monitor percentage of each meal consumed  - Identify factors contributing to decreased intake, treat as appropriate  - Assist with meals as needed  - Monitor I&O, weight, and lab values if indicated  - Obtain nutrition services referral as needed  Outcome: Progressing  Goal: Establish and maintain optimal ostomy function  Description: INTERVENTIONS:  - Assess bowel function  - Encourage oral fluids to ensure adequate hydration  - Administer IV fluids if ordered to ensure adequate hydration   - Administer ordered medications as needed  - Encourage mobilization and activity  - Nutrition services referral to assist patient with appropriate food choices  - Assess stoma site  - Consider wound care consult   Outcome: Progressing  Goal: Oral mucous membranes remain intact  Description: INTERVENTIONS  - Assess oral mucosa and hygiene practices  - Implement preventative oral hygiene regimen  - Implement oral medicated treatments as ordered  - Initiate Nutrition services referral as needed  Outcome: Progressing     Problem: GENITOURINARY - ADULT  Goal: Maintains or returns to baseline urinary function  Description: INTERVENTIONS:  - Assess urinary function  - Encourage oral fluids to ensure adequate hydration if ordered  - Administer IV fluids as ordered to ensure adequate hydration  - Administer ordered medications as needed  - Offer frequent toileting  - Follow urinary retention protocol if ordered  Outcome: Progressing  Goal: Absence of urinary retention  Description: INTERVENTIONS:  - Assess patient’s ability to void and empty bladder  - Monitor I/O  - Bladder scan as needed  - Discuss with physician/AP medications to alleviate retention as needed  - Discuss catheterization for long term situations as appropriate  Outcome: Progressing  Goal: Urinary catheter remains patent  Description: INTERVENTIONS:  - Assess patency of urinary catheter  - If patient has a chronic song, consider changing catheter if non-functioning  - Follow guidelines for intermittent irrigation of non-functioning urinary catheter  Outcome: Progressing     Problem: METABOLIC, FLUID AND ELECTROLYTES - ADULT  Goal: Electrolytes maintained within normal limits  Description: INTERVENTIONS:  - Monitor labs and assess patient for signs and symptoms of electrolyte imbalances  - Administer electrolyte replacement as ordered  - Monitor response to electrolyte replacements, including repeat lab results as appropriate  - Instruct patient on fluid and nutrition as appropriate  Outcome: Progressing  Goal: Fluid balance maintained  Description: INTERVENTIONS:  - Monitor labs   - Monitor I/O and WT  - Instruct patient on fluid and nutrition as appropriate  - Assess for signs & symptoms of volume excess or deficit  Outcome: Progressing  Goal: Glucose maintained within target range  Description: INTERVENTIONS:  - Monitor Blood Glucose as ordered  - Assess for signs and symptoms of hyperglycemia and hypoglycemia  - Administer ordered medications to maintain glucose within target range  - Assess nutritional intake and initiate nutrition service referral as needed  Outcome: Progressing     Problem: SKIN/TISSUE INTEGRITY - ADULT  Goal: Skin Integrity remains intact(Skin Breakdown Prevention)  Description: Assess:  -Perform Kt assessment every shift  -Clean and moisturize skin every shift and as needed  -Inspect skin when repositioning, toileting, and assisting with ADLS  -Assess under medical devices such as  every   -Assess extremities for adequate circulation and sensation     Bed Management:  -Have minimal linens on bed & keep smooth, unwrinkled  -Change linens as needed when moist or perspiring  -Avoid sitting or lying in one position for more than 2 hours while in bed  -Keep HOB at 30 degrees     Toileting:  -Offer bedside commode  -Assess for incontinence every  -Use incontinent care products after each incontinent episode such as    Activity:  -Mobilize patient 3 times a day  -Encourage activity and walks on unit  -Encourage or provide ROM exercises   -Turn and reposition patient every 3 Hours  -Use appropriate equipment to lift or move patient in bed  -Instruct/ Assist with weight shifting every  when out of bed in chair  -Consider limitation of chair time  hour intervals    Skin Care:  -Avoid use of baby powder, tape, friction and shearing, hot water or constrictive clothing  -Relieve pressure over bony prominences using   -Do not massage red bony areas    Next Steps:  -Teach patient strategies to minimize risks such as   -Consider consults to  interdisciplinary teams such as Outcome: Progressing  Goal: Incision(s), wounds(s) or drain site(s) healing without S/S of infection  Description: INTERVENTIONS  - Assess and document dressing, incision, wound bed, drain sites and surrounding tissue  - Provide patient and family education  - Perform skin care/dressing changes every   Outcome: Progressing     Problem: HEMATOLOGIC - ADULT  Goal: Maintains hematologic stability  Description: INTERVENTIONS  - Assess for signs and symptoms of bleeding or hemorrhage  - Monitor labs  - Administer supportive blood products/factors as ordered and appropriate  Outcome: Progressing     Problem: MUSCULOSKELETAL - ADULT  Goal: Maintain or return mobility to safest level of function  Description: INTERVENTIONS:  - Assess patient's ability to carry out ADLs; assess patient's baseline for ADL function and identify physical deficits which impact ability to perform ADLs (bathing, care of mouth/teeth, toileting, grooming, dressing, etc )  - Assess/evaluate cause of self-care deficits   - Assess range of motion  - Assess patient's mobility  - Assess patient's need for assistive devices and provide as appropriate  - Encourage maximum independence but intervene and supervise when necessary  - Involve family in performance of ADLs  - Assess for home care needs following discharge   - Consider OT consult to assist with ADL evaluation and planning for discharge  - Provide patient education as appropriate  Outcome: Progressing  Goal: Maintain proper alignment of affected body part  Description: INTERVENTIONS:  - Support, maintain and protect limb and body alignment  - Provide patient/ family with appropriate education  Outcome: Progressing    times a day  - Reposition patient every 2 hours    - Dangle patient 3 times a day  - Stand patient 3 times a day  - Ambulate patient 3 times a day  - Out of bed to chair 3 times a day   - Out of bed for meals 3 times a day  - Out of bed for toileting  - Record patient progress and toleration of activity level   Outcome: Progressing

## 2022-12-13 NOTE — CASE MANAGEMENT
Case Management Assessment & Discharge Planning Note    Patient name Segundo Marshall  Location ICU 10/ICU 10-01 MRN [id number]  : 1945 Date 2022       Current Admission Date: 2022  Current Admission Diagnosis:Peritonitis [facility])   Patient Active Problem List    Diagnosis Date Noted   • Severe sepsis ([facility] ) 2022   • Acute respiratory failure with hypoxia ([facility] ) 2022   • Peritonitis ([facility] ) 2022   • Diverticulitis of colon 2022   • SBO (small bowel obstruction) ([facility] ) 2022   • Primary osteoarthritis of right shoulder 2022   • Stage 3 chronic kidney disease, unspecified whether stage 3a or 3b CKD ([facility] ) 2022   • High risk medication use 2020   • Rheumatoid arthritis with rheumatoid factor, unspecified ([facility] ) 2020   • Atherosclerosis of native coronary artery of native heart without angina pectoris 2019   • Status post aortic valve replacement with bioprosthetic valve 2018   • Arthritis    • Chronic pain    • Continuous opioid dependence ([facility] )    • Hyperlipidemia    • Benign essential hypertension    • Peripheral neuropathy    • Carpal tunnel syndrome 2015   • Psoriasis with arthropathy ([facility] ) 2015   • Arthralgia of lower leg 2013   • Hyperglycemia 10/25/2012   • Primary localized osteoarthrosis of shoulder region 10/09/2012   • Psoriasis 10/09/2012   • Atopic dermatitis 2012   • Low back pain 2012      LOS (days): 2  Geometric Mean LOS (GMLOS) (days): 9 60  Days to GMLOS:7 2     OBJECTIVE:    Risk of Unplanned Readmission Score: 14 61         Current admission status: Inpatient  Referral Reason: Other (d/c planning)    Preferred Pharmacy:   [address] [place] [id number]  Phone: [phone] Fax: [phone] [facility] [address][id number], [address] [place] [address], [address], [place], [place]   [address] [facility] [address]  Phone: [phone] Fax: [phone] [facility] [place], [address] [address]  Phone: [phone] Fax: [phone]    Primary Care Provider: Chkaa Mondragon DO    Primary Insurance: Nevaeh Marcelo [facility] REP  Secondary Insurance:     ASSESSMENT:  [address], [address] - Spouse   Primary Phone: [phone] [facility]  Home Phone: [phone]               Advance Directives  Does patient have a [address]?: No  Was patient offered paperwork?: Yes (pt declined paperwork)  Does patient have Advance Directives?: No  Was patient offered paperwork?: Yes (pt declined paperwork)         Readmission Root Cause  30 Day Readmission: No    Patient Information  Admitted from[de-identified] Home  Mental Status: Alert  During Assessment patient was accompanied by: Not accompanied during assessment  Assessment information provided by[de-identified] Patient  Primary Caregiver: Self  Support Systems: Spouse/significant other  [place] of Residence: Other (specify in comment box)  What city do you live in[place] [address] entry access options  Select all that apply : Ramp, Stairs (ramp from gargage)  Number of steps to enter home  : 1  Do the steps have railings?: No  Type of Current Residence: [place]  In the last 12 months, was there a time when you were not able to pay the mortgage or rent on time?: No  In the last 12 months, how many places have you lived?: 1  In the last 12 months, was there a time when you did not have a steady place to sleep or slept in a shelter (including now)?: No  Homeless/housing insecurity resource given?: N/A  Living Arrangements: Lives w/ Spouse/significant other  Is patient a ?: Yes ([employer]  no benefits)  Is patient active with [facility])?: No    Activities of Daily Living Prior to Admission  Functional Status: Independent  Completes ADLs independently?: Yes  Ambulates independently?: Yes  Does patient use assisted devices?: No  Does patient currently own DME?: No  Does patient have a history of Outpatient Therapy (PT/OT)?: No  Does the patient have a history of Short-Term Rehab?: No  Does patient have a history of HHC?: No  Does patient currently have [place] [id number]?: No         Patient Information Continued  Income Source: Pension/[facility]  Does patient have prescription coverage?: Yes ([facility])  Within the past 12 months, you worried that your food would run out before you got the money to buy more : Never true  Within the past 12 months, the food you bought just didn't last and you didn't have money to get more : Never true  Food insecurity resource given?: N/A  Does patient have a history of substance abuse?: No  Does patient have a history of Mental Health Diagnosis?: No         Means of Transportation  Means of Transport to Appts[de-identified] Drives Self  In the past 12 months, has lack of transportation kept you from medical appointments or from getting medications?: No  In the past 12 months, has lack of transportation kept you from meetings, work, or from getting things needed for daily living?: No  Was application for public transport provided?: N/A        DISCHARGE DETAILS:    Discharge planning discussed with[de-identified] patient  Freedom of Choice: Yes  Comments - Freedom of Choice: recommendation is for rehab - pt has declined and he has declined hhc- he stated his wife is a [facility]         Is the [place] interested in [place] [address] at discharge?: No    DME Referral Provided  Referral made for DME?: No (pt has colostomy supplies at home)    Other Referral/Resources/Interventions Provided:  Referral Comments: rehab has been recommended and pt has declined and he has declined rehab    Would you like to participate in our [address] service program?  : No - Declined    Treatment Team Recommendation: Home (home with spouse & outpt follow up- wife)

## 2022-12-13 NOTE — PLAN OF CARE
Problem: OCCUPATIONAL THERAPY ADULT  Goal: Performs self-care activities at highest level of function for planned discharge setting  See evaluation for individualized goals  Description: Treatment Interventions: ADL retraining, Functional transfer training, Endurance training, Patient/family training, Equipment evaluation/education, Compensatory technique education, Energy conservation, Activityengagement    See flowsheet documentation for full assessment, interventions and recommendations  Note: Limitation: Decreased ADL status, Decreased Safe judgement during ADL, Decreased endurance, Decreased self-care trans, Decreased high-level ADLs  Prognosis: Good  Assessment: Pt is a 78 y o  male seen for OT evaluation s/p admit to [address] [place] [address] on 12/11/2022 w/ Peritonitis ([address] )  Comorbidities affecting pt's functional performance at time of assessment include: Arthritis, Carpal tunnel syndrome, HLD, HTN  Personal factors affecting pt at time of IE include:difficulty performing ADLS  Prior to admission, pt was Mod I with ADLs  Upon evaluation: the following deficits impact occupational performance: decreased balance, decreased tolerance and increased pain  Pt to benefit from continued skilled OT tx while in the hospital to address deficits as defined above and maximize level of functional independence w ADL's and functional mobility  Occupational Performance areas to address include: bathing/shower, toilet hygiene, dressing, functional mobility and clothing management  From OT standpoint, recommendation at time of d/c would be STR       OT Discharge Recommendation: Post acute rehabilitation services     [address] [place] [address] Road, [place], OTR/L

## 2022-12-13 NOTE — UTILIZATION REVIEW
Continued Stay Review    Date: 12/13/22                          Current Patient Class: inpatient  Current Level of Care: ICU    HPI:77 y o  male initially admitted on 12/11/22 Peritonitis     Assessment/Plan: Pt agitated overnight, briefly requiring precedex  Pt pulled out NG and became agitated  This AM he went into rapid afib w/RVR @ 200, 5 mg Lopressor given X2  Precedex infusion stopped, Cardizem drip started  Continue NPO, NGT to LCS, IV ABX, IVF, pain control, monitor incision closely, dsg changes per sx, monitor ostomy OP  Encourage inc spirometry, coughing and deep breathing  Fu on cxs and labs, monitor WBC and temp, keep NPO, hold ASA and home meds  Continue ICU level of care      Vital Signs:   Date/Time Temp Pulse Resp BP MAP (mmHg) SpO2 Calculated FIO2 (%) - Nasal Cannula Nasal Cannula O2 Flow Rate (L/min) O2 Device   12/13/22 1300 -- -- -- -- -- 91 % -- -- None (Room air)   12/13/22 1205 98 9 °F (37 2 °C) 143 Abnormal  33 Abnormal  173/79 Abnormal  114 95 % 32 3 L/min Nasal cannula   12/13/22 1015 -- 141 Abnormal  36 Abnormal  163/80 113 90 % -- -- --   12/13/22 0945 -- 132 Abnormal  17 124/76 95 90 % -- -- --   12/13/22 0915 -- 140 Abnormal  33 Abnormal  158/90 113 89 % Abnormal  -- -- --   12/13/22 0900 -- 121 Abnormal  36 Abnormal  150/72 103 86 % Abnormal  -- -- --   12/13/22 0814 -- 126 Abnormal  28 Abnormal  147/79 103 92 % -- -- --   12/13/22 0800 99 2 °F (37 3 °C) 129 Abnormal  33 Abnormal  169/74 105 94 % 32 3 L/min Nasal cannula   12/13/22 0749 -- 206 Abnormal  32 Abnormal  164/98 125 94 % -- -- --   12/13/22 0700 -- 96 35 Abnormal  161/78 112 94 % -- -- --   12/13/22 0600 -- 102 32 Abnormal  194/90 Abnormal  129 94 % 32 3 L/min Nasal cannula   12/13/22 0500 -- 96 32 Abnormal  190/87 Abnormal  125 96 % -- -- --   12/13/22 0400 -- 92 29 Abnormal  174/88 Abnormal  122 96 % 32 3 L/min Nasal cannula   12/13/22 0300 -- 63 19 124/65 89 96 % -- -- --   12/13/22 0200 -- 68 25 Abnormal  120/71 88 94 % -- -- --   12/13/22 0100 -- 63 19 115/56 80 95 % -- -- --   12/13/22 0021 -- 72 25 Abnormal  -- -- 96 % -- -- --   12/13/22 0000 97 7 °F (36 5 °C) 63 21 105/64 79 95 % 32 3 L/min Nasal cannula   12/12/22 2300 -- 60 18 96/54 71 95 % -- -- --   12/12/22 2200 -- 66 20 103/53 74 95 % 36 4 L/min Nasal cannula   12/12/22 2100 -- 79 24 Abnormal  113/64 84 94 % -- -- --   12/12/22 2000 97 7 °F (36 5 °C) 78 20 99/57 73 94 % 36 4 L/min Nasal cannula   12/12/22 1900 -- 100 25 Abnormal  -- -- 93 % -- -- --   12/12/22 1800 -- 110 Abnormal  33 Abnormal  167/103 Abnormal  130 94 % -- -- --   12/12/22 1758 -- 110 Abnormal  34 Abnormal  167/103 Abnormal  130 94 % -- -- --   12/12/22 1700 -- 107 Abnormal  29 Abnormal  186/79 Abnormal  114 94 % -- -- --   12/12/22 1646 -- 111 Abnormal  16 -- -- 94 % -- -- --   12/12/22 1600 -- 104 34 Abnormal  168/83 116 93 % -- -- --   12/12/22 1500 -- 104 29 Abnormal  171/81 Abnormal  117 94 % -- -- --   12/12/22 1416 -- 101 28 Abnormal  162/84 116 94 % -- -- --   12/12/22 1400 -- 101 31 Abnormal  184/81 Abnormal  116 93 % 28 2 L/min Nasal cannula   12/12/22 1300 -- 102 30 Abnormal  187/88 Abnormal  127 94 % -- -- --   12/12/22 1200 97 5 °F (36 4 °C) 100 18 179/82 Abnormal  118 93 % -- -- --   12/12/22 1144 -- 109 Abnormal  27 Abnormal  -- -- 94 % -- -- --   12/12/22 1100 98 °F (36 7 °C) 102 28 Abnormal  174/87 Abnormal  124 94 % 36 4 L/min Nasal cannula       Pertinent Labs/Diagnostic Results:   Results from last 7 days   Lab Units 12/11/22  0805   SARS-COV-2  Negative     Results from last 7 days   Lab Units 12/13/22  0507 12/12/22  0629 12/11/22  1911 12/11/22  0620   WBC Thousand/uL 11 25* 11 36* 5 25 2 99*   HEMOGLOBIN g/dL 12 0 13 4 14 2 16 2   HEMATOCRIT % 37 9 42 0 44 8 52 2*   PLATELETS Thousands/uL 170 188 215 251   NEUTROS ABS Thousands/µL  --   --   --  2 52   BANDS PCT %  --   --  30*  --          Results from last 7 days   Lab Units 12/13/22  0507 12/12/22  1853 12/12/22  6083 12/11/22  1911 12/11/22  0620   SODIUM mmol/L 137 135 135 136 138   POTASSIUM mmol/L 4 1 4 0 3 8 4 4 3 8   CHLORIDE mmol/L 104 104 105 106 100   CO2 mmol/L 26 26 24 21 26   ANION GAP mmol/L 7 5 6 9 12   BUN mg/dL 16 13 12 15 19   CREATININE mg/dL 1 05 1 12 0 96 0 96 1 28   EGFR ml/min/1 73sq m 69 63 77 77 54   CALCIUM mg/dL 8 5 8 6 8 1* 7 9* 9 5   CALCIUM, IONIZED mmol/L 1 16 1 10* 1 05*  --   --    MAGNESIUM mg/dL 2 0 1 9 1 3*  --   --    PHOSPHORUS mg/dL 1 9* 2 3 2 7  --   --      Results from last 7 days   Lab Units 12/13/22  0507 12/12/22  0629 12/11/22  0620   AST U/L 14 17 17   ALT U/L 10 12 18   ALK PHOS U/L 35 27* 54   TOTAL PROTEIN g/dL 5 3* 5 1* 7 0   ALBUMIN g/dL 3 0* 3 3* 4 3   TOTAL BILIRUBIN mg/dL 0 74 0 68 0 57     Results from last 7 days   Lab Units 12/13/22  1218 12/13/22  0612 12/13/22  0020 12/12/22  1753 12/12/22  1139 12/12/22  0630 12/12/22  0019 12/11/22  1816 12/11/22  1805 12/11/22  1537   POC GLUCOSE mg/dl 138 111 107 106 110 102 98 115 110 133     Results from last 7 days   Lab Units 12/13/22  0507 12/12/22  1853 12/12/22  0629 12/11/22  1911 12/11/22  0620   GLUCOSE RANDOM mg/dL 112 114 110 113 185*     Results from last 7 days   Lab Units 12/13/22  0507 12/12/22  0629   PROCALCITONIN ng/ml 6 02* 8 29*     Results from last 7 days   Lab Units 12/12/22  0629 12/11/22  2033 12/11/22  0620   LACTIC ACID mmol/L 1 6 2 6* 4 3*     Results from last 7 days   Lab Units 12/11/22  0620   LIPASE u/L 32     Results from last 7 days   Lab Units 12/11/22  0805   INFLUENZA A PCR  Negative   INFLUENZA B PCR  Negative     Results from last 7 days   Lab Units 12/11/22  1155 12/11/22  0831   BLOOD CULTURE   --  No Growth at 24 hrs  No Growth at 24 hrs     GRAM STAIN RESULT  2+ Polys*  2+ Gram positive cocci in pairs*  2+ Gram positive cocci in chains*  Rare Gram negative rods*  --    BODY FLUID CULTURE, STERILE  3+ Growth of Gamma Hemolytic Streptococcus  1+ Growth of  --      Medications:   Scheduled Medications:  carisoprodol, 350 mg, Oral, 4x Daily  ciprofloxacin, 400 mg, Intravenous, Q12H  fluconazole, 400 mg, Intravenous, Q24H  gabapentin, 600 mg, Oral, TID  heparin (porcine), 5,000 Units, Subcutaneous, Q8H ANDREA  insulin lispro, 1-5 Units, Subcutaneous, Q6H ANDREA  ketorolac, 15 mg, Intravenous, Q6H [address]  levalbuterol, 1 25 mg, Nebulization, TID  metoprolol tartrate, 25 mg, Oral, Q12H ANDREA  metroNIDAZOLE, 500 mg, Intravenous, Q8H  oxyCODONE, 10 mg, Oral, Q6H  pantoprazole, 40 mg, Intravenous, Q12H [address]  sodium chloride, 4 mL, Nebulization, TID  traZODone, 100 mg, Oral, HS      Continuous IV Infusions:  diltiazem, 1-15 mg/hr, Intravenous, Titrated      PRN Meds:  HYDROmorphone, 0 5 mg, Intravenous, Q2H PRN  HYDROmorphone, 0 5 mg, Intravenous, Q2H PRN   Or  HYDROmorphone, 1 mg, Intravenous, Q2H PRN  ondansetron, 4 mg, Intravenous, Q6H PRN  phenol, 1 spray, Mouth/Throat, Q2H PRN        Discharge Plan: d    Network Utilization Review Department  ATTENTION: Please call with any questions or concerns to [phone] and carefully listen to the prompts so that you are directed to the right person  All voicemails are confidential   [facility] all requests for admission clinical reviews, approved or denied determinations and any other requests to dedicated fax number below belonging to the campus where the patient is receiving treatment   List of dedicated fax numbers for the Facilities:  [address] DENIALS (Administrative/Medical Necessity) [phone]   [address] [place] [address] (Maternity/NICU/Pediatrics) [phone]   [address] Tricia [address] [phone]   [address] 134 760 [facility] [phone]

## 2022-12-13 NOTE — PHYSICAL THERAPY NOTE
Physical Therapy Evaluation     Patient's Name: Joie Bloch    Admitting Diagnosis  Acute abdomen [R10 0]  Stomach pain [R10 9]  Abdominal pain [R10 9]  Colostomy in place [facility]) [Z93 3]  Status post aortic valve replacement with bioprosthetic valve [Z95 3]  Stage 3 chronic kidney disease, unspecified whether stage 3a or 3b CKD ([address] ) [N18 30]    Problem List  Patient Active Problem List   Diagnosis    Arthritis    Chronic pain    Continuous opioid dependence ([address] )    Hyperlipidemia    Benign essential hypertension    Peripheral neuropathy    Status post aortic valve replacement with bioprosthetic valve    Atherosclerosis of native coronary artery of native heart without angina pectoris    Rheumatoid arthritis with rheumatoid factor, unspecified (HCC)    Arthralgia of lower leg    Atopic dermatitis    Carpal tunnel syndrome    High risk medication use    Hyperglycemia    Low back pain    Primary localized osteoarthrosis of shoulder region    Psoriasis    Stage 3 chronic kidney disease, unspecified whether stage 3a or 3b CKD ([address] )    Psoriasis with arthropathy (HCC)    Primary osteoarthritis of right shoulder    SBO (small bowel obstruction) ([address] )    Diverticulitis of colon    Peritonitis ([address] )    Severe sepsis ([address] )    Acute respiratory failure with hypoxia ([address] )       Past Medical History  Past Medical History:   Diagnosis Date    Aortic stenosis     Aortic stenosis, severe     Arthritis     Carpal tunnel syndrome     Chronic back pain     Chronic pain     Chronic, continuous use of opioids     Hyperlipidemia     Hypertension     Hypertrophy of prostate     Insomnia     Large bowel obstruction (HCC)     Peripheral neuropathy     RA (rheumatoid arthritis) ([address] )     Umbilical hernia        Past Surgical History  Past Surgical History:   Procedure Laterality Date    ADENOIDECTOMY      AORTIC VALVE REPLACEMENT  10/17/2017    BASAL CELL CARCINOMA EXCISION      CARDIAC CATHETERIZATION      LAST ASSESSED: [id number] CARPAL TUNNEL RELEASE Bilateral     left    CATARACT EXTRACTION      EXPLORATORY LAPAROTOMY W/ BOWEL RESECTION N/A 12/11/2022    Procedure: LAPAROTOMY EXPLORATORY,  EXTENSIVE LYSIS OF ADHESIONS, APPENDECTOMY, DRAINAGE OF ABDOMINAL ABCESS;  Surgeon: Cielo Soares MD;  Location: [facility];  Service: General    FL CYSTOGRAM  8/29/2022    HERNIA REPAIR      inguinal bilateral -umbilical     LAMINECTOMY      DECOMPRESS, FACETECTOMY, FORAMINOTOMY LUMBAR SEG    LAPAROTOMY N/A 8/20/2022    Procedure: LAPAROTOMY EXPLORATORY, sigmoid colon resection,colostomy;  Surgeon: Cielo Soares MD;  Location: [facility];  Service: General    NEUROPLASTY / TRANSPOSITION MEDIAN NERVE AT CARPAL TUNNEL      OK RPLCMT AORTIC VALVE OPN W/STENTLESS TISSUE VALVE N/A 10/17/2017    Procedure: AORTIC VALVE REPLACEMENT with 23MM MAGNAEASE TISSUE VALVE; INTRA-OP SHANDRA;  Surgeon: Faiza Carbajal MD;  Location: [facility];  Service: Cardiac Surgery    TONSILLECTOMY          12/13/22 1058   PT Last Visit   PT Visit Date 12/13/22   Note Type   Note type Evaluation   Pain Assessment   Pain Assessment Tool 0-10   Pain Score 9   Pain Location/Orientation Orientation: Mid;Location: Abdomen   Pain Radiating Towards yes, across abdomen R->L   Pain Onset/Description Onset: Ongoing;Frequency: Constant/Continuous; Descriptor: Regenia Monegasque; Descriptor: Sore   Effect of Pain on Daily Activities yes   Patient's Stated Pain Goal No pain   Hospital Pain Intervention(s) Medication (See MAR); Repositioned; Ambulation/increased activity; Emotional support; Environmental changes   Multiple Pain Sites No   Restrictions/Precautions   Weight Bearing Precautions Per Order No   Other Precautions Multiple lines;Telemetry;O2;Fall Risk;Pain  (NG tube, 3 L NC, colostomy,DAMASO drain,PICC line)   Home Living   Type of Home House   Home Layout Two level;Performs ADLs on one level; Able to live on main level with bedroom/bathroom; Access   PPI Walk-in shower   Bathroom Toilet Raised   Bathroom Equipment Grab bars in shower;Grab bars around toilet;Built-in shower seat   [address]   (denies prior AD usage)   Prior Function   Level of [place] Independent with ADLs; Independent with functional mobility; Independent with IADLS   Lives With Spouse   Receives Help From Family  (prn)   IADLs Independent with driving; Independent with meal prep; Independent with medication management   Falls in the last 6 months 0  (denies)   Vocational Retired   General   Additional Pertinent History Myrisa OT present for co-assessment due to medical complexity, required skilled interventions of 2 clinicians for care delivery   Family/Caregiver Present Yes  (spouse)   Cognition   Overall Cognitive Status WFL   Arousal/Participation Alert   Orientation Level Oriented X4   Memory Within functional limits   Following Commands Follows one step commands with increased time or repetition   Comments Zuri Ibanez was agreeable to PT assessment,pleasant  RLE Assessment   RLE Assessment X  (3+/5 gross musculature)   LLE Assessment   LLE Assessment X  (3+/5 gross musculature)   Vision-Basic Assessment   Current Vision No visual deficits   Vestibular   Spontaneous Nystagmus (-) no evidence of nystagmus at rest in room light   Coordination   Movements are Fluid and Coordinated 0   Coordination and Movement Description Incremental, antalgic mobility requiring increased time and tactile facilitation  Sharp/Dull   RLE Sharp/Dull Grossly intact   LLE Sharp/Dull Impaired  (history of LLE sciatica)   Bed Mobility   Supine to Sit 3  Moderate assistance   Additional items Assist x 1;HOB elevated; Bedrails; Increased time required;Verbal cues;LE management   Sit to Supine   (DNT pt  was sitting out of bed on recliner upon conclusion )   Additional Comments Verbal cues for bedrail utilization, proper body mechanics, and safety with transitional movements     Transfers   Sit to Stand 3 Moderate assistance   Additional items Assist x 1;Bedrails; Increased time required;Verbal cues  (RW usage)   Stand to Sit 3  Moderate assistance   Additional items Assist x 1;Bedrails; Increased time required;Verbal cues   Stand pivot 3  Moderate assistance   Additional items Assist x 1; Increased time required;Verbal cues; Other  (2nd staff present for all phases of mobility)   Additional Comments Verbal cues for proper BUE placement with transitional movements, safety while turning, and appropriate AD usage  Ambulation/Elevation   Gait pattern Improper Weight shift; Antalgic; Forward Flexion;Decreased foot clearance; Short stride; Step to;Excessively slow   Gait Assistance 3  Moderate assist   Additional items Assist x 1;Verbal cues; Tactile cues   Assistive Device Rolling walker   Distance 2 feet  (to recliner, could not perform additional distance due to pain severity and fatigue severity)   Stair Management Assistance Not tested   Ambulation/Elevation Additional Comments Verbal cues for proper body mechanics, safety with directional changes, conservatory breathing for pain modulation  Balance   Static Sitting Fair +   Dynamic Sitting Fair   Static Standing Fair -   Dynamic Standing Poor +   Ambulatory Poor +   Endurance Deficit   Endurance Deficit Yes   Activity Tolerance   Activity Tolerance Patient limited by fatigue;Patient limited by pain   Medical Staff Made Aware Jeromy perez critical care CRNP   Nurse Made Aware yes, Lorenzo Del Cid RN   Assessment   Prognosis Good   Problem List Decreased strength;Decreased endurance; Impaired balance;Decreased mobility; Decreased coordination;Decreased skin integrity;Pain   Assessment Pt is 78 y o  male seen for PT evaluation s/p admit to [facility] on 12/11/2022 w/ Peritonitis ([facility] )  PT consulted to assess pt's functional mobility and d/c needs  Order placed for PT eval and tx order   Comorbidities affecting pt's physical performance at time of assessment include:peritonitis, acute respiratory failure with hypoxia, chronic pain, continuous opioid dependence, RA,severe sepsis, s/p aortic valce replacement   PTA, pt was independent w/ all functional mobility w/ o AD usage  Personal factors affecting pt at time of IE include: ambulating w/ assistive device, inability to ambulate household distances, inability to navigate community distances, inability to navigate level surfaces w/o external assistance, unable to perform dynamic tasks in community, unable to perform physical activity, inability to perform IADLs and inability to perform ADLs  Please find objective findings from PT assessment regarding body systems outlined above with impairments and limitations including weakness, impaired balance, decreased endurance, impaired coordination, gait deviations, pain, decreased activity tolerance, decreased functional mobility tolerance, fall risk and decreased skin integrity  From PT/mobility standpoint, recommendation at time of d/c would be post acute rehabilitation services pending progress in order to facilitate return to PLOF  Goals   Patient Goals to feel better soon   LTG Expiration Date 12/23/22   Long Term Goal #1 1 )Patient will complete bed mobility supervision of 1 for decrease need for caregiver assistance, decrease burden of care  2 ) Patient will complete transfers CGA of 1 to decrease risk of falls, facilitate upright standing posture  3 ) BLE strength to greater than/equal to 4/5 gross musculature to increase ability to safely transfer, control descent to chair  4 ) Patient will exhibit increase dynamic standing to Fair 3-5 minutes without LOB CGA of 1 to improve activity tolerance  5 ) Patient will exhibit increase dynamic ambulatory balance to Fair  feet w/AD CGA of 1 to improve ability to mobilize to toilet, chair and decrease risk for additional medical complications   6 ) Patient will exhibit good self monitoring and ability to follow 2 step commands to increase complexity of tasks and resume ADL's without LOB  PT Treatment Day 0   Plan   Treatment/Interventions Functional transfer training;LE strengthening/ROM; Elevations; Therapeutic exercise; Endurance training;Patient/family training;Equipment eval/education; Bed mobility;Gait training; Compensatory technique education;Spoke to nursing;OT   PT Frequency 3-5x/wk   Recommendation   PT Discharge Recommendation (S)  Post acute rehabilitation services   Equipment Recommended [facility] Recommended Wheeled walker   Change/add to [employer]? No   Additional Comments Upon conclusion,pt  was sitting out of bed on recliner  All needs within reach  Additional Comments 2 Pt's raw score on the AM-[facility] Basic Mobility inpatient short form is 12, standardized score is 32 23  Patients at this level are likely to benefit from DC to [address]  However, please refer to therapist recommendation for safe DC planning     AM-PAC Basic Mobility Inpatient   Turning in Bed Without Bedrails 3   Lying on Back to Sitting on Edge of Flat Bed 2   Moving Bed to Chair 2   Standing Up From Chair 2   Walk in Room 2   Climb 3-5 Stairs 1   Basic Mobility Inpatient Raw Score 12   Basic Mobility Standardized Score 32 23   Highest Level Of Mobility   JH-HLM Goal 4: Move to chair/commode   JH-HLM Achieved 4: Move to chair/commode     History/Personal Factors/Comorbidities: peritonitis, acute respiratory failure with hypoxia, chronic pain, continuous opioid dependence, RA,severe sepsis, s/p aortic valce replacement     # of body structures/limitations: muscle weakness, activity intolerance,decreased endurance, impaired balance, gait deviations,pain, impaired coordination    Clinical presentation: unstable as seen in pain severity constant in nature, fall risk, progressive symptoms prior to hospitalization, decreased skin integrity    Initial Assessment Time: [id number]    Tommy Miguel, PT

## 2022-12-13 NOTE — RESPIRATORY THERAPY NOTE
RT Protocol Note  Juany Alba 78 y o  male MRN: [id number]  Unit/Bed#: ICU 10-01 Encounter: [id number]    Assessment    Principal Problem:    Peritonitis ([facility] [address] )  Active Problems:    Chronic pain    Continuous opioid dependence ([facility] )    Status post aortic valve replacement with bioprosthetic valve    Rheumatoid arthritis with rheumatoid factor, unspecified (HCC)    Severe sepsis (HCC)    Acute respiratory failure with hypoxia (HCC)      Home Pulmonary Medications:  none       Past Medical History:   Diagnosis Date    Aortic stenosis     Aortic stenosis, severe     Arthritis     Carpal tunnel syndrome     Chronic back pain     Chronic pain     Chronic, continuous use of opioids     Hyperlipidemia     Hypertension     Hypertrophy of prostate     Insomnia     Large bowel obstruction (HCC)     Peripheral neuropathy     RA (rheumatoid arthritis) ([facility] )     Umbilical hernia      Social History     Socioeconomic History    Marital status: /Civil Union     Spouse name: None    Number of children: None    Years of education: None    Highest education level: None   Occupational History    Occupation: Retired   Tobacco Use    Smoking status: Never    Smokeless tobacco: Never   Vaping Use    Vaping Use: Never used   Substance and Sexual Activity    Alcohol use: Not Currently    Drug use: No    Sexual activity: Yes   Other Topics Concern    None   Social History Narrative    None     Social Determinants of Health     Financial Resource Strain: Low Risk     Difficulty of Paying Living Expenses: Not very hard   Food Insecurity: No Food Insecurity    Worried About Running Out of Food in the Last Year: Never true    Ran Out of Food in the Last Year: Never true   Transportation Needs: No Transportation Needs    Lack of Transportation (Medical): No    Lack of Transportation (Non-Medical):  No   Physical Activity: Inactive    Days of Exercise per Week: 0 days    Minutes of Exercise per Session: 0 min   Stress: No Stress Concern Present    Feeling of Stress : Not at all   Social Connections: Not on file   Intimate Partner Violence: Not At Risk    Fear of Current or Ex-Partner: No    Emotionally Abused: No    Physically Abused: No    Sexually Abused: No   Housing Stability: Low Risk     Unable to Pay for Housing in the Last Year: No    Number of Places Lived in the Last Year: 1    Unstable Housing in the Last Year: No       Subjective         Objective    Physical Exam:   Assessment Type: (P) Assess only  General Appearance: (P) Alert, Awake  Respiratory Pattern: (P) Normal  Chest Assessment: (P) Chest expansion symmetrical  Bilateral Breath Sounds: (P) Clear, Diminished  Cough: (P) Non-productive  O2 Device: room air    Vitals:  Blood pressure 126/72, pulse (!) 147, temperature 98 9 °F (37 2 °C), temperature source Tympanic, resp  rate (!) 30, height 5' 7" (1 702 m), weight 93 6 kg (206 lb 5 6 oz), SpO2 94 %  Imaging and other studies: I have personally reviewed pertinent reports        O2 Device: room air     Plan    Respiratory Plan: (P) No distress/Pulmonary history  Airway Clearance Plan: (P) Incentive Spirometer     Resp Comments: (P) [email or web address]

## 2022-12-13 NOTE — NURSING NOTE
C/o difficulty breathing; "I'm all congested, it started about 400"  I/Os reviewed(4+L positive); NP made aware   IVF stopped per order; PCXR to be done

## 2022-12-13 NOTE — PLAN OF CARE
Problem: PHYSICAL THERAPY ADULT  Goal: Performs mobility at highest level of function for planned discharge setting  See evaluation for individualized goals  Description: Treatment/Interventions: Functional transfer training, LE strengthening/ROM, Elevations, Therapeutic exercise, Endurance training, Patient/family training, Equipment eval/education, Bed mobility, Gait training, Compensatory technique education, Spoke to nursing, OT  Equipment Recommended: Merlin Sovereign       See flowsheet documentation for full assessment, interventions and recommendations  Note: Prognosis: Good  Problem List: Decreased strength, Decreased endurance, Impaired balance, Decreased mobility, Decreased coordination, Decreased skin integrity, Pain  Assessment: Pt is 78 y o  male seen for PT evaluation s/p admit to [facility] on 12/11/2022 w/ Peritonitis ([facility] [address] )  PT consulted to assess pt's functional mobility and d/c needs  Order placed for PT eval and tx order  Comorbidities affecting pt's physical performance at time of assessment include:peritonitis, acute respiratory failure with hypoxia, chronic pain, continuous opioid dependence, RA,severe sepsis, s/p aortic valce replacement   PTA, pt was independent w/ all functional mobility w/ o AD usage  Personal factors affecting pt at time of IE include: ambulating w/ assistive device, inability to ambulate household distances, inability to navigate community distances, inability to navigate level surfaces w/o external assistance, unable to perform dynamic tasks in community, unable to perform physical activity, inability to perform IADLs and inability to perform ADLs   Please find objective findings from PT assessment regarding body systems outlined above with impairments and limitations including weakness, impaired balance, decreased endurance, impaired coordination, gait deviations, pain, decreased activity tolerance, decreased functional mobility tolerance, fall risk and decreased skin integrity  From PT/mobility standpoint, recommendation at time of d/c would be post acute rehabilitation services pending progress in order to facilitate return to PLOF  PT Discharge Recommendation: (S) Post acute rehabilitation services    See flowsheet documentation for full assessment

## 2022-12-13 NOTE — PROGRESS NOTES
Progress Note - General Surgery   Jnuie Alexandre 78 y o  male MRN: [id number]  Unit/Bed#: ICU 10-01 Encounter: [id number]    Assessment:  45-year-old male with past medical history significant for AS, chronic pain with continuous use of opioids, HLD, HTN, RA, perforated sigmoid diverticulitis s/p Polo's procedure (August 2022) for reversal in January 2023 presenting with sepsis secondary to abdominal abscess  Now POD#2 s/p exploratory laparotomy with appendectomy, drainage of abscess, and extensive lysis of adhesions  - TMax 99 2, a fib with RVR with rates in the 200s s/p Lopressor x 2, amiodarone, and Cardizem, tachypnea 20s to 30s, hypertensive with SBP 160s to 190s, on 3L O2 via NC  - UO 1 L via Ambriz, additional 450 cc this morning  -  cc, patient removed NGT overnight 2ry to agitation and congestion has since been replaced; dark green bilious output  - DAMASO x1  cc, additional 100 cc this morning; serous/serosang  - LLQ ostomy site is clean with pink/viable stoma, no gas or stool present in bag  - Leukocytosis, WBC 11 25  - Hgb 12 0  - CR 1 05  - Procal 6 02  - Electrolytes unremarkable other than borderline hypophosphatemia at 1 9  - Body fluid culture and Gram stain momentarily positive for gram-positive cocci in pairs/chains and gram-negative rods    Plan:  NPO/NGT, NGT likely to remove be removed today  IVF  IV Cipro, Diflucan, Flagyl (day 3)  F/u cultures  Medical management per critical care, appreciate recommendations  Antiemetics and analgesics as needed  Continue to trend WBC/fever curve  Strict I/O  Encourage use of incentive spirometer and ambulation  DAMASO to bulb suction   Ambriz likely to be removed later this morning   Parveen to remain in place, may change overlying dressings as needed for saturation  Ostomy care  To be evaluated at bedside by attending     Subjective/Objective   Subjective: Patient with a fib with RVR upon evaluation this morning, Critical Care aware   Otherwise patient reports increased congestion that caused him to pull NGT overnight  Also complaining of abdominal pain not well controlled with IV pain medication  Objective:   Blood pressure 147/79, pulse (!) 126, temperature 99 2 °F (37 3 °C), temperature source Tympanic, resp  rate (!) 28, height 5' 7" (1 702 m), weight 93 6 kg (206 lb 5 6 oz), SpO2 92 %  ,Body mass index is 32 32 kg/m²  Intake/Output Summary (Last 24 hours) at 12/13/2022 0905  Last data filed at 12/13/2022 0701  Gross per 24 hour   Intake 1908 71 ml   Output 1573 ml   Net 335 71 ml       Invasive Devices     Peripheral Intravenous Line  Duration           Long-Dwell Peripheral IV (Midline) 33/22/88 Left Basilic <1 day          Drain  Duration           Closed/Suction Drain Right RLQ Bulb 19 Fr  114 days    Colostomy Descending/sigmoid  days    Closed/Suction Drain Lateral;Right RLQ Bulb 19 Fr  1 day    Urethral Catheter Latex 16 Fr  1 day    NG/OG/Enteral Tube Nasogastric 16 Fr Right nare <1 day              Physical Exam  Vitals and nursing note reviewed  Constitutional:       General: He is not in acute distress  Appearance: Normal appearance  He is ill-appearing  He is not toxic-appearing  Interventions: Nasal cannula in place  HENT:      Head: Normocephalic and atraumatic  Comments: NGT in place, dark bilious output     Nose: Congestion present  Cardiovascular:      Rate and Rhythm: Tachycardia present  Rhythm irregular  Pulses: Normal pulses  Heart sounds: Normal heart sounds  Pulmonary:      Effort: Pulmonary effort is normal       Breath sounds: Rhonchi and rales present  Abdominal:      General: The ostomy site is clean  Bowel sounds are decreased  There is distension  Palpations: Abdomen is soft  Tenderness: There is abdominal tenderness  There is no guarding or rebound        Comments: Midline incision with dressings taken down at bedside, 11 telfa jeffy in place with moderate amount of serosang output; DAMASO site is c/d/i with unsaturated dressing in place, serous/serosang output   Musculoskeletal:         General: Normal range of motion  Right lower leg: No edema  Left lower leg: No edema  Comments: Upper extremity edema   Skin:     General: Skin is warm and dry  Findings: Bruising present  Neurological:      General: No focal deficit present  Mental Status: He is alert and oriented to person, place, and time  Psychiatric:         Mood and Affect: Mood normal          Behavior: Behavior normal          Thought Content: Thought content normal        Lab, Imaging and other studies:  I have personally reviewed pertinent lab results      CBC:   Lab Results   Component Value Date    WBC 11 25 (H) 12/13/2022    HGB 12 0 12/13/2022    HCT 37 9 12/13/2022    MCV 91 12/13/2022     12/13/2022    MCH 28 9 12/13/2022    MCHC 31 7 12/13/2022    RDW 13 5 12/13/2022    MPV 9 3 12/13/2022     CMP:   Lab Results   Component Value Date    SODIUM 137 12/13/2022    K 4 1 12/13/2022     12/13/2022    CO2 26 12/13/2022    BUN 16 12/13/2022    CREATININE 1 05 12/13/2022    CALCIUM 8 5 12/13/2022    AST 14 12/13/2022    ALT 10 12/13/2022    ALKPHOS 35 12/13/2022    EGFR 69 12/13/2022     VTE Pharmacologic Prophylaxis: Heparin  VTE Mechanical Prophylaxis: sequential compression device    Bo Owens PA-C

## 2022-12-13 NOTE — OCCUPATIONAL THERAPY NOTE
Occupational Therapy Evaluation      Amee Krueger    12/13/2022    Principal Problem:    Peritonitis ([address] )  Active Problems:    Chronic pain    Continuous opioid dependence ([address] )    Status post aortic valve replacement with bioprosthetic valve    Rheumatoid arthritis with rheumatoid factor, unspecified (HCC)    Severe sepsis (HCC)    Acute respiratory failure with hypoxia (HCC)      Past Medical History:   Diagnosis Date    Aortic stenosis     Aortic stenosis, severe     Arthritis     Carpal tunnel syndrome     Chronic back pain     Chronic pain     Chronic, continuous use of opioids     Hyperlipidemia     Hypertension     Hypertrophy of prostate     Insomnia     Large bowel obstruction (HCC)     Peripheral neuropathy     RA (rheumatoid arthritis) ([address] )     Umbilical hernia        Past Surgical History:   Procedure Laterality Date    ADENOIDECTOMY      AORTIC VALVE REPLACEMENT  10/17/2017    BASAL CELL CARCINOMA EXCISION      CARDIAC CATHETERIZATION      LAST ASSESSED: [id number]    CARPAL TUNNEL RELEASE Bilateral     left    CATARACT EXTRACTION      EXPLORATORY LAPAROTOMY W/ BOWEL RESECTION N/A 12/11/2022    Procedure: LAPAROTOMY EXPLORATORY,  EXTENSIVE LYSIS OF ADHESIONS, APPENDECTOMY, DRAINAGE OF ABDOMINAL ABCESS;  Surgeon: Micheal Flores MD;  Location: [facility];  Service: General    FL CYSTOGRAM  8/29/2022    HERNIA REPAIR      inguinal bilateral -umbilical     LAMINECTOMY      DECOMPRESS, FACETECTOMY, FORAMINOTOMY LUMBAR SEG    LAPAROTOMY N/A 8/20/2022    Procedure: LAPAROTOMY EXPLORATORY, sigmoid colon resection,colostomy;  Surgeon:  Micheal Flores MD;  Location: [facility];  Service: General    NEUROPLASTY / TRANSPOSITION MEDIAN NERVE AT CARPAL TUNNEL      NH RPLCMT AORTIC VALVE OPN W/STENTLESS TISSUE VALVE N/A 10/17/2017    Procedure: AORTIC VALVE REPLACEMENT with 23MM MAGNAEASE TISSUE VALVE; INTRA-OP SHANDRA;  Surgeon: Korey Baum MD;  Location: [facility];  Service: Cardiac Surgery TONSILLECTOMY          12/13/22 1122   OT Last Visit   OT Visit Date 12/13/22   Note Type   Note type Evaluation   Pain Assessment   Pain Assessment Tool 0-10   Pain Score 9   Pain Location/Orientation Orientation: Mid;Location: Abdomen   Pain Radiating Towards yes, across abdomen R->L   Pain Onset/Description Onset: Ongoing;Frequency: Constant/Continuous; Descriptor: Morgana Achilles; Descriptor: Sore   Effect of Pain on Daily Activities yes   Patient's Stated Pain Goal No pain   Hospital Pain Intervention(s) Repositioned; Ambulation/increased activity; Emotional support; Environmental changes   Multiple Pain Sites No   Restrictions/Precautions   Weight Bearing Precautions Per Order No   Other Precautions Multiple lines;Telemetry;O2;Fall Risk;Pain  (NG tube, 3 L NC, colostomy,DAMASO drain,PICC line)   Home Living   Type of Home House   Home Layout Two level;Performs ADLs on one level; Able to live on main level with bedroom/bathroom; Access   Box Upon a Time Walk-in shower   Bathroom Toilet Raised   Bathroom Equipment Grab bars in shower;Grab bars around toilet;Built-in shower seat   [address]   (denies prior AD usage)   Prior Function   Level of [place] Independent with ADLs; Independent with functional mobility; Independent with IADLS   Lives With Spouse   Receives Help From Family  (prn)   IADLs Independent with driving; Independent with meal prep; Independent with medication management   Falls in the last 6 months 0  (denies)   Vocational Retired   ADL   UB Bathing Assistance 5  [address] [place] [address]  3  Moderate Assistance   [address] 5  Supervision/Setup    [address] 1  Total Assistance   Bed Mobility   Supine to Sit 3  Moderate assistance   Additional items Assist x 1;HOB elevated; Bedrails; Increased time required;Verbal cues;LE management   Sit to Supine   (DNT pt  was sitting out of bed on recliner upon conclusion )   Transfers   Sit to Stand 3  Moderate assistance   Additional items Assist x 1;Bedrails; Increased time required;Verbal cues   Stand to Sit 3  Moderate assistance   Additional items Assist x 1;Bedrails; Increased time required;Verbal cues   Stand pivot 3  Moderate assistance   Additional items Assist x 1; Increased time required;Verbal cues; Other  (RW, 2nd staff present for all phases of mobility)   Balance   Static Sitting Fair +   Dynamic Sitting Fair   Static Standing Fair -   Dynamic Standing Poor +   Ambulatory Poor +   Activity Tolerance   Activity Tolerance Patient limited by fatigue;Patient limited by pain   Medical Staff Made Aware yes, Екатерина Kat critical care CRNP   Nurse Made Aware RONNA Polo   RUE Assessment   RUE Assessment WFL   LUE Assessment   LUE Assessment WFL   Vision-Basic Assessment   Current Vision No visual deficits   Cognition   Overall Cognitive Status WFL   Arousal/Participation Alert; Cooperative   Attention Within functional limits   Orientation Level Oriented X4   Memory Within functional limits   Following Commands Follows one step commands with increased time or repetition   Assessment   Limitation Decreased ADL status; Decreased Safe judgement during ADL;Decreased endurance;Decreased self-care trans;Decreased high-level ADLs   Prognosis Good   Assessment Pt is a 78 y o  male seen for OT evaluation s/p [facility] w/ Peritonitis ([facility] [address] )  Comorbidities affecting pt's functional performance at time of assessment include: Arthritis, Carpal tunnel syndrome, HLD, HTN  Personal factors affecting pt at time of IE include:difficulty performing ADLS  Prior to admission, pt was Mod I with ADLs  Upon evaluation: the following deficits impact occupational performance: decreased balance, decreased tolerance and increased pain  Pt to benefit from continued skilled OT tx while in the hospital to address deficits as defined above and maximize level of functional independence w ADL's and functional mobility  Occupational Performance areas to address include: bathing/shower, toilet hygiene, dressing, functional mobility and clothing management  From OT standpoint, recommendation at time of d/c would be STR  Goals   Patient Goals to feel better soon   Plan   Treatment Interventions ADL retraining;Functional transfer training; Endurance training;Patient/family training;Equipment evaluation/education; Compensatory technique education; Energy conservation; Activityengagement   Goal Expiration Date 12/23/22   OT Treatment Day 0   OT Frequency 3-5x/wk   Recommendation   OT Discharge Recommendation Post acute rehabilitation services   Additional Comments  Pt seen as a co-eval with PT due to the patient's co-morbidities, clinically unstable presentation, and present impairments which are a regression from the patient's baseline  Additional Comments 2 The patient's raw score on the AM-PAC Daily Activity inpatient short form is 15, standardized score is 34 69, less than 39 4  Patients at this level are likely to benefit from discharge to post-acute rehabilitation services  Please refer to the recommendation of the Occupational Therapist for safe discharge planning     AM-PAC Daily Activity Inpatient   Lower Body Dressing 1   Bathing 2   Toileting 1   Upper Body Dressing 3   Grooming 4   Eating 4   Daily Activity Raw Score 15   Daily Activity Standardized Score (Calc for Raw Score >=11) 34 69   AM-[facility] Applied Cognition Inpatient   Following a Speech/Presentation 4   Understanding Ordinary Conversation 4   Taking Medications 4   Remembering Where Things Are Placed or Put Away 4   Remembering List of 4-5 Errands 4   Taking Care of Complicated Tasks 4   Applied Cognition Raw Score 24   Applied Cognition Standardized Score 62 21     GOALS:    Pt will achieve the following within specified time frame: STG  Pt will achieve the following goals within 5 days    *ADL transfers with Min (A) for inc'd independence with ADLs/purposeful tasks    *UB ADL with (I) for inc'd independence with self cares    *LB ADL with Max (A) using AE prn for inc'd independence with self cares    *Toileting with Min (A) for clothing management and hygiene for return to PLOF with personal care    *Increase static stand balance to F and dyn stand balance to F- for inc'd safety with standing purposeful tasks    *Increase stand tolerance x3 m for inc'd tolerance with standing purposeful tasks    *Participate in 10m UE therex to increase overall stamina/activity tolerance for purposeful tasks    *Bed mobility- Min (A) for inc'd independence to manage own comfort and initiate EOB & OOB purposeful tasks    Pt will achieve the following within specified time frame: LTG  Pt will achieve the following goals within 10 days    *ADL transfers with CGA for inc'd independence with ADLs/purposeful tasks    *LB ADL with Mod (A) using AE prn for inc'd independence with self cares    *Toileting with CGA for clothing management and hygiene for return to PLOF with personal care    *Increase static stand balance to F+ and dyn stand balance to F for inc'd safety with standing purposeful tasks    *Increase stand tolerance x5 m for inc'd tolerance with standing purposeful tasks    *Bed mobility- CGA for inc'd independence to manage own comfort and initiate EOB & OOB purposeful tasks      Bear Espinoza MS, OTR/L

## 2022-12-13 NOTE — PHYSICAL THERAPY NOTE
Physical Therapy Cancellation Note           12/13/22 0748   PT Last Visit   PT Visit Date 12/13/22   Note Type   Note type Evaluation   Cancel Reasons Medical status     Piyush Cobos

## 2022-12-13 NOTE — PROGRESS NOTES
Monty U  68   Progress Note - Laurell Deal 1945, 78 y o  male MRN: [id number]  Unit/Bed#: ICU 10-01 Encounter: [id number]  Primary Care Provider: Maverick Williamson DO   Date and time admitted to hospital: 12/11/2022  5:19 AM    Acute respiratory failure with hypoxia [facility])  Assessment & Plan  · Likely in the setting of post operative respiratory insufficiency   · Continue supplemental O2 via nasal cannula for SpO2>90  · Encourage cough, deep breathing, IS, ambulation    Severe sepsis (HCC)  Assessment & Plan  · Present on arrival to ED - tachycardia, leukopenia, lactic acidosis   · Suspected intra abdominal source   · S/p 3L IVF in the ED  · Continue IVF hydration  · Continue cipro, flagyl, diflucan, day 3 per surgery  · Follow up on abdominal culture data  · Follow up on BC  · Monitor WBC, fever curve     Rheumatoid arthritis with rheumatoid factor, unspecified (HCC)  Assessment & Plan  · Holding home meds while NPO    Status post aortic valve replacement with bioprosthetic valve  Assessment & Plan  · Completed 2018  · Holding ASA while NPO    Continuous opioid dependence ([facility] [address] )  Assessment & Plan  · For chronic back pain  · Restart home oxy IR, percocet when able to take PO  · Continue PRN dilaudid IV for now     Chronic pain  Assessment & Plan  · In the setting of lumbar disc disease   · Continue PRN dilaudid and valium IV while NPO  · Restart home soma, oxy IR, and percocet when able     * Peritonitis [facility])  Assessment & Plan  · Presented to the ED with acute onset abdominal pain concerning for perforation, SBO, and acute abdomen   · H/o sigmoid diverticulitis s/p Polo's procedure and colostomy with planned reversal scheduled January 2023  · CT abdomen pelvis:  Extensive inflammatory changes involving the jejunum and proximal-mid ileum, compatible with severe enteritis  Infectious and ischemic etiologies may be considered    In particular, there is focal free air surrounding the lower midline ileum just above the urinary bladder with extensive inflammatory changes  There are a few mildly dilated loops of jejunum and within the left upper abdomen, likely secondary to extensive inflammatory changes with possible superimposed low-grade partial small bowel obstruction  There is a small amount of free air and free fluid in the remainder of the abdomen and pelvis  The stomach and proximal esophagus are distended and fluid-filled, presumably secondary to delayed transit  Postsurgical changes in the anterior abdominal wall with interval left lower quadrant colostomy  · POD 2 ex lap, MELANI, appendectomy, drainage of intra abdominal abscess  · Continue NPO and NGT to LCS  · Continue cipro, diflucan, and flagyl, day 3  · Continue IVF  · Continue PRN dilaudid for pain management  · Close HD monitoring  · Monitor incision closely, dressing changes per surgery   · Monitor ostomy OP         ----------------------------------------------------------------------------------------  HPI/24hr events: pt agitated overnight, briefly requiring precedex  Pt pulled out NG and became agitated  This AM he went into rapid afib w/RVR @ 200, 5 mg Lopressor given X2  Patient appropriate for transfer out of the ICU today?: No  Disposition: Continue Stepdown Level 1 level of care   Code Status: Level 1 - Full Code  ---------------------------------------------------------------------------------------  SUBJECTIVE  "I have pain and it hard to cough, but I have so much mucous  " pt reports abdominal pain     Review of Systems   Constitutional: Positive for fatigue  Negative for chills  HENT: Positive for congestion  Respiratory: Positive for cough  Negative for choking, chest tightness and shortness of breath  Cardiovascular: Negative  Negative for chest pain and palpitations  Gastrointestinal: Positive for abdominal pain  Negative for abdominal distention and nausea  Genitourinary: Negative      Musculoskeletal: Negative  Skin: Positive for wound  Neurological: Positive for weakness  Negative for dizziness  Psychiatric/Behavioral: Positive for agitation  Review of systems was reviewed and negative unless stated above in HPI/24-hour events   ---------------------------------------------------------------------------------------  OBJECTIVE    Vitals   Vitals:    22 0300 22 0400 22 0500 22 0600   BP: 124/65 (!) 174/88 (!) 190/87 (!) 194/90   BP Location:    Left arm   Pulse: 63 92 96 102   Resp: 19 (!) 29 (!) 32 (!) 32   Temp:       TempSrc:       SpO2: 96% 96% 96% 94%   Weight:       Height:         Temp (24hrs), Av 7 °F (36 5 °C), Min:97 5 °F (36 4 °C), Max:98 °F (36 7 °C)  Current: Temperature: 97 7 °F (36 5 °C)          Respiratory:  SpO2: SpO2: 94 %  Nasal Cannula O2 Flow Rate (L/min): 3 L/min    Invasive/non-invasive ventilation settings   Respiratory    Lab Data (Last 4 hours)    None         O2/Vent Data (Last 4 hours)    None                Physical Exam  Vitals and nursing note reviewed  Constitutional:       General: He is in acute distress  Appearance: He is ill-appearing  HENT:      Nose: Congestion present  Mouth/Throat:      Mouth: Mucous membranes are moist       Pharynx: Oropharynx is clear  Eyes:      Pupils: Pupils are equal, round, and reactive to light  Cardiovascular:      Rate and Rhythm: Tachycardia present  Rhythm irregular  Pulses: Normal pulses  Heart sounds: Normal heart sounds  Pulmonary:      Breath sounds: Rhonchi present  Abdominal:      General: There is no distension  Palpations: Abdomen is soft  Musculoskeletal:         General: Swelling present  Comments: RUE BL +2edema   Skin:     General: Skin is warm and dry  Capillary Refill: Capillary refill takes less than 2 seconds  Neurological:      Mental Status: He is oriented to person, place, and time     Psychiatric:      Comments: anxious Laboratory and Diagnostics:  Results from last 7 days   Lab Units 12/13/22  0507 12/12/22  0629 12/11/22  1911 12/11/22  0620   WBC Thousand/uL 11 25* 11 36* 5 25 2 99*   HEMOGLOBIN g/dL 12 0 13 4 14 2 16 2   HEMATOCRIT % 37 9 42 0 44 8 52 2*   PLATELETS Thousands/uL 170 188 215 251   NEUTROS PCT %  --   --   --  85*   BANDS PCT %  --   --  30*  --    MONOS PCT %  --   --   --  8   MONO PCT %  --   --  3*  --      Results from last 7 days   Lab Units 12/13/22  0507 12/12/22  1853 12/12/22  0629 12/11/22  1911 12/11/22  0620   SODIUM mmol/L 137 135 135 136 138   POTASSIUM mmol/L 4 1 4 0 3 8 4 4 3 8   CHLORIDE mmol/L 104 104 105 106 100   CO2 mmol/L 26 26 24 21 26   ANION GAP mmol/L 7 5 6 9 12   BUN mg/dL 16 13 12 15 19   CREATININE mg/dL 1 05 1 12 0 96 0 96 1 28   CALCIUM mg/dL 8 5 8 6 8 1* 7 9* 9 5   GLUCOSE RANDOM mg/dL 112 114 110 113 185*   ALT U/L 10  --  12  --  18   AST U/L 14  --  17  --  17   ALK PHOS U/L 35  --  27*  --  54   ALBUMIN g/dL 3 0*  --  3 3*  --  4 3   TOTAL BILIRUBIN mg/dL 0 74  --  0 68  --  0 57     Results from last 7 days   Lab Units 12/13/22  0507 12/12/22  1853 12/12/22  0629   MAGNESIUM mg/dL 2 0 1 9 1 3*   PHOSPHORUS mg/dL 1 9* 2 3 2 7               Results from last 7 days   Lab Units 12/12/22  0629 12/11/22  2033 12/11/22  0620   LACTIC ACID mmol/L 1 6 2 6* 4 3*     ABG:    VBG:    Results from last 7 days   Lab Units 12/13/22  0507 12/12/22  0629   PROCALCITONIN ng/ml 6 02* 8 29*       Micro  Results from last 7 days   Lab Units 12/11/22  1155 12/11/22  0831   BLOOD CULTURE   --  No Growth at 24 hrs  No Growth at 24 hrs  GRAM STAIN RESULT  2+ Polys*  2+ Gram positive cocci in pairs*  2+ Gram positive cocci in chains*  Rare Gram negative rods*  --        EKG: SR  Imaging: I have personally reviewed pertinent reports  Intake and Output  I/O       12/11 0701 12/12 0700 12/12 0701 12/13 0700 12/13 0701 12/14 0700    P  O   0     I V  (mL/kg) 1647 9 20 9) 1503 7 (18  5)     IV Piggyback 5750 850     Total Intake(mL/kg) 7397 9 (90 7) 2358 7 (28 9)     Urine (mL/kg/hr) 2270 (1 2) 1010 (0 5) 100 (1 8)    Emesis/NG output  100 100    Drains 125 153 10    Other   100    Blood 100      Total Output 2495 1263 310    Net +4902 9 +1095 7 -310                 Height and Weights   Height: 5' 7" (170 2 cm)  IBW (Ideal Body Weight): 66 1 kg  Body mass index is 28 19 kg/m²  Weight (last 2 days)     Date/Time Weight    12/11/22 0932 81 6 (180)            Nutrition       Diet Orders   (From admission, onward)             Start     Ordered    12/12/22 1530  Diet NPO  Diet effective now        Comments: Patient may have ice chips and small sips of water  References:    Nutrtion Support Algorithm Enteral vs  Parenteral   Question Answer Comment   Diet Type NPO    RD to adjust diet per protocol?  No        12/12/22 1529                  Active Medications  Scheduled Meds:  Current Facility-Administered Medications   Medication Dose Route Frequency Provider Last Rate   • ciprofloxacin  400 mg Intravenous Q12H Glory Sellers PA-C Stopped (12/12/22 2317)   • fluconazole  400 mg Intravenous Q24H Glory Sellers PA-C Stopped (12/12/22 2028)   • heparin (porcine)  5,000 Units Subcutaneous [facility] Glory Sellers PA-C     • HYDROmorphone  0 5 mg Intravenous Q2H PRN PEDRO Paul     • HYDROmorphone  0 5 mg Intravenous Q2H PRN PEDRO Paul      Or   • HYDROmorphone  1 mg Intravenous Q2H PRN PEDRO Paul     • insulin lispro  1-5 Units Subcutaneous Q6H [facility] PEDRO Perry     • ketorolac  15 mg Intravenous Q6H [facility] Brigitte Stover PA-C     • metroNIDAZOLE  500 mg Intravenous Q8H Glory Sellers PA-C 500 mg (12/13/22 0706)   • ondansetron  4 mg Intravenous Q6H PRN Glory Sellers PA-C     • pantoprazole  40 mg Intravenous Q12H [facility] Glory Sellers PA-C     • phenol  1 spray Mouth/Throat Q2H PRN Brigitte Stover PA-C     • sodium chloride  4 mL Nebulization Q6H PEDRO Paul     • sodium phosphate  30 mmol Intravenous Once [employer], CRNP       Continuous Infusions:     PRN Meds:   HYDROmorphone, 0 5 mg, Q2H PRN  HYDROmorphone, 0 5 mg, Q2H PRN   Or  HYDROmorphone, 1 mg, Q2H PRN  ondansetron, 4 mg, Q6H PRN  phenol, 1 spray, Q2H PRN        Invasive Devices Review  Invasive Devices     Peripheral Intravenous Line  Duration           Long-Dwell Peripheral IV (Midline) 95/99/98 Left Basilic <1 day          Drain  Duration           Closed/Suction Drain Right RLQ Bulb 19 Fr  114 days    Colostomy Descending/sigmoid  days    Closed/Suction Drain Lateral;Right RLQ Bulb 19 Fr  1 day    Urethral Catheter Latex 16 Fr  1 day    NG/OG/Enteral Tube Nasogastric 16 Fr Right nare <1 day                Rationale for remaining devices: NG tube for decompression   ---------------------------------------------------------------------------------------  Advance Directive and Living Will:      Power of :    POLST:    ---------------------------------------------------------------------------------------  Care Time Delivered:   Upon my evaluation, this patient had a high probability of imminent or life-threatening deterioration due to A fib RVR, which required my direct attention, intervention, and personal management  I have personally provided 30 minutes (0700 to 0730) of critical care time, exclusive of procedures, teaching, family meetings, and any prior time recorded by providers other than myself  [employer], CRNP      Portions of the record may have been created with voice recognition software  Occasional wrong word or "sound a like" substitutions may have occurred due to the inherent limitations of voice recognition software    Read the chart carefully and recognize, using context, where substitutions have occurred

## 2022-12-14 ENCOUNTER — APPOINTMENT (INPATIENT)
Dept: NON INVASIVE DIAGNOSTICS | Facility: HOSPITAL | Age: 77
End: 2022-12-14

## 2022-12-14 PROBLEM — N18.2 CHRONIC KIDNEY DISEASE, STAGE 2 (MILD): Status: ACTIVE | Noted: 2022-04-25

## 2022-12-14 LAB
ALBUMIN SERPL BCP-MCNC: 3.1 G/DL (ref 3.5–5)
ALP SERPL-CCNC: 44 U/L (ref 34–104)
ALT SERPL W P-5'-P-CCNC: 12 U/L (ref 7–52)
ANION GAP SERPL CALCULATED.3IONS-SCNC: 8 MMOL/L (ref 4–13)
AORTIC ROOT: 3 CM
AORTIC VALVE MEAN VELOCITY: 11.9 M/S
APTT PPP: 159 SECONDS (ref 23–37)
APTT PPP: 53 SECONDS (ref 23–37)
APTT PPP: 60 SECONDS (ref 23–37)
APTT PPP: 62 SECONDS (ref 23–37)
AST SERPL W P-5'-P-CCNC: 14 U/L (ref 13–39)
AV LVOT MEAN GRADIENT: 2 MMHG
AV LVOT PEAK GRADIENT: 3 MMHG
AV MEAN GRADIENT: 7 MMHG
AV PEAK GRADIENT: 12 MMHG
BILIRUB SERPL-MCNC: 0.54 MG/DL (ref 0.2–1)
BUN SERPL-MCNC: 16 MG/DL (ref 5–25)
CA-I BLD-SCNC: 1.07 MMOL/L (ref 1.12–1.32)
CALCIUM ALBUM COR SERPL-MCNC: 9.2 MG/DL (ref 8.3–10.1)
CALCIUM SERPL-MCNC: 8.5 MG/DL (ref 8.4–10.2)
CHLORIDE SERPL-SCNC: 103 MMOL/L (ref 96–108)
CO2 SERPL-SCNC: 25 MMOL/L (ref 21–32)
CREAT SERPL-MCNC: 1.06 MG/DL (ref 0.6–1.3)
DOP CALC AO PEAK VEL: 1.7 M/S
DOP CALC AO VTI: 25.73 CM
DOP CALC LVOT PEAK VEL VTI: 10.57 CM
DOP CALC LVOT PEAK VEL: 0.85 M/S
ERYTHROCYTE [DISTWIDTH] IN BLOOD BY AUTOMATED COUNT: 13.6 % (ref 11.6–15.1)
FRACTIONAL SHORTENING: 19 (ref 28–44)
GFR SERPL CREATININE-BSD FRML MDRD: 68 ML/MIN/1.73SQ M
GLUCOSE SERPL-MCNC: 105 MG/DL (ref 65–140)
GLUCOSE SERPL-MCNC: 120 MG/DL (ref 65–140)
GLUCOSE SERPL-MCNC: 124 MG/DL (ref 65–140)
GLUCOSE SERPL-MCNC: 124 MG/DL (ref 65–140)
GLUCOSE SERPL-MCNC: 95 MG/DL (ref 65–140)
GLUCOSE SERPL-MCNC: 97 MG/DL (ref 65–140)
HCT VFR BLD AUTO: 37.4 % (ref 36.5–49.3)
HGB BLD-MCNC: 12.1 G/DL (ref 12–17)
INTERVENTRICULAR SEPTUM IN DIASTOLE (PARASTERNAL SHORT AXIS VIEW): 0.8 CM
INTERVENTRICULAR SEPTUM: 0.8 CM (ref 0.6–1.1)
LEFT ATRIUM SIZE: 5.1 CM
LEFT INTERNAL DIMENSION IN SYSTOLE: 4.4 CM (ref 2.1–4)
LEFT VENTRICULAR INTERNAL DIMENSION IN DIASTOLE: 5.4 CM (ref 3.5–6)
LEFT VENTRICULAR POSTERIOR WALL IN END DIASTOLE: 0.8 CM
LEFT VENTRICULAR STROKE VOLUME: 54 ML
LVSV (TEICH): 54 ML
MAGNESIUM SERPL-MCNC: 2.3 MG/DL (ref 1.9–2.7)
MCH RBC QN AUTO: 29.2 PG (ref 26.8–34.3)
MCHC RBC AUTO-ENTMCNC: 32.4 G/DL (ref 31.4–37.4)
MCV RBC AUTO: 90 FL (ref 82–98)
PHOSPHATE SERPL-MCNC: 2.4 MG/DL (ref 2.3–4.1)
PLATELET # BLD AUTO: 201 THOUSANDS/UL (ref 149–390)
PMV BLD AUTO: 9.5 FL (ref 8.9–12.7)
POTASSIUM SERPL-SCNC: 3.8 MMOL/L (ref 3.5–5.3)
PROCALCITONIN SERPL-MCNC: 3.36 NG/ML
PROT SERPL-MCNC: 5.8 G/DL (ref 6.4–8.4)
RA PRESSURE ESTIMATED: 10 MMHG
RBC # BLD AUTO: 4.14 MILLION/UL (ref 3.88–5.62)
RV PSP: 48 MMHG
SL CV LV EF: 35
SL CV PED ECHO LEFT VENTRICLE DIASTOLIC VOLUME (MOD BIPLANE) 2D: 144 ML
SL CV PED ECHO LEFT VENTRICLE SYSTOLIC VOLUME (MOD BIPLANE) 2D: 90 ML
SODIUM SERPL-SCNC: 136 MMOL/L (ref 135–147)
TR MAX PG: 38 MMHG
TR PEAK VELOCITY: 3.1 M/S
TRICUSPID VALVE PEAK REGURGITATION VELOCITY: 3.1 M/S
WBC # BLD AUTO: 15.4 THOUSAND/UL (ref 4.31–10.16)

## 2022-12-14 RX ORDER — METOPROLOL TARTRATE 50 MG/1
50 TABLET, FILM COATED ORAL EVERY 12 HOURS SCHEDULED
Status: DISCONTINUED | OUTPATIENT
Start: 2022-12-14 | End: 2022-12-15

## 2022-12-14 RX ORDER — FUROSEMIDE 10 MG/ML
40 INJECTION INTRAMUSCULAR; INTRAVENOUS ONCE
Status: COMPLETED | OUTPATIENT
Start: 2022-12-14 | End: 2022-12-14

## 2022-12-14 RX ORDER — POTASSIUM CHLORIDE 20 MEQ/1
20 TABLET, EXTENDED RELEASE ORAL ONCE
Status: COMPLETED | OUTPATIENT
Start: 2022-12-14 | End: 2022-12-14

## 2022-12-14 RX ORDER — CALCIUM GLUCONATE 20 MG/ML
1 INJECTION, SOLUTION INTRAVENOUS ONCE
Status: COMPLETED | OUTPATIENT
Start: 2022-12-14 | End: 2022-12-14

## 2022-12-14 RX ADMIN — DILTIAZEM HYDROCHLORIDE 9 MG/HR: 5 INJECTION, SOLUTION INTRAVENOUS at 20:16

## 2022-12-14 RX ADMIN — CIPROFLOXACIN 400 MG: 2 INJECTION, SOLUTION INTRAVENOUS at 09:30

## 2022-12-14 RX ADMIN — OXYCODONE HYDROCHLORIDE 10 MG: 10 TABLET ORAL at 17:59

## 2022-12-14 RX ADMIN — TRAZODONE HYDROCHLORIDE 100 MG: 100 TABLET ORAL at 21:22

## 2022-12-14 RX ADMIN — METRONIDAZOLE 500 MG: 500 INJECTION, SOLUTION INTRAVENOUS at 14:53

## 2022-12-14 RX ADMIN — POTASSIUM CHLORIDE 20 MEQ: 1500 TABLET, EXTENDED RELEASE ORAL at 09:16

## 2022-12-14 RX ADMIN — GABAPENTIN 600 MG: 600 TABLET, FILM COATED ORAL at 21:22

## 2022-12-14 RX ADMIN — PANTOPRAZOLE SODIUM 40 MG: 40 INJECTION, POWDER, LYOPHILIZED, FOR SOLUTION INTRAVENOUS at 09:16

## 2022-12-14 RX ADMIN — Medication 1000 UNITS: at 09:15

## 2022-12-14 RX ADMIN — HYDROMORPHONE HYDROCHLORIDE 1 MG: 1 INJECTION, SOLUTION INTRAMUSCULAR; INTRAVENOUS; SUBCUTANEOUS at 07:23

## 2022-12-14 RX ADMIN — PANTOPRAZOLE SODIUM 40 MG: 40 INJECTION, POWDER, LYOPHILIZED, FOR SOLUTION INTRAVENOUS at 21:22

## 2022-12-14 RX ADMIN — KETOROLAC TROMETHAMINE 15 MG: 30 INJECTION, SOLUTION INTRAMUSCULAR; INTRAVENOUS at 23:10

## 2022-12-14 RX ADMIN — CARISOPRODOL 350 MG: 350 TABLET ORAL at 17:59

## 2022-12-14 RX ADMIN — CIPROFLOXACIN 400 MG: 2 INJECTION, SOLUTION INTRAVENOUS at 20:17

## 2022-12-14 RX ADMIN — METRONIDAZOLE 500 MG: 500 INJECTION, SOLUTION INTRAVENOUS at 23:10

## 2022-12-14 RX ADMIN — OXYCODONE HYDROCHLORIDE 10 MG: 10 TABLET ORAL at 23:10

## 2022-12-14 RX ADMIN — METRONIDAZOLE 500 MG: 500 INJECTION, SOLUTION INTRAVENOUS at 08:12

## 2022-12-14 RX ADMIN — ATORVASTATIN CALCIUM 20 MG: 10 TABLET, FILM COATED ORAL at 09:15

## 2022-12-14 RX ADMIN — GABAPENTIN 600 MG: 600 TABLET, FILM COATED ORAL at 15:40

## 2022-12-14 RX ADMIN — GABAPENTIN 600 MG: 600 TABLET, FILM COATED ORAL at 09:15

## 2022-12-14 RX ADMIN — CARISOPRODOL 350 MG: 350 TABLET ORAL at 21:22

## 2022-12-14 RX ADMIN — KETOROLAC TROMETHAMINE 15 MG: 30 INJECTION, SOLUTION INTRAMUSCULAR; INTRAVENOUS at 12:45

## 2022-12-14 RX ADMIN — FLUCONAZOLE IN SODIUM CHLORIDE 400 MG: 2 INJECTION, SOLUTION INTRAVENOUS at 15:40

## 2022-12-14 RX ADMIN — KETOROLAC TROMETHAMINE 15 MG: 30 INJECTION, SOLUTION INTRAMUSCULAR; INTRAVENOUS at 17:59

## 2022-12-14 RX ADMIN — HEPARIN SODIUM 13.1 UNITS/KG/HR: 10000 INJECTION, SOLUTION INTRAVENOUS at 15:38

## 2022-12-14 RX ADMIN — METOPROLOL TARTRATE 25 MG: 25 TABLET, FILM COATED ORAL at 09:15

## 2022-12-14 RX ADMIN — CARISOPRODOL 350 MG: 350 TABLET ORAL at 09:16

## 2022-12-14 RX ADMIN — OXYCODONE HYDROCHLORIDE 10 MG: 10 TABLET ORAL at 05:35

## 2022-12-14 RX ADMIN — CALCIUM GLUCONATE 1 G: 20 INJECTION, SOLUTION INTRAVENOUS at 09:12

## 2022-12-14 RX ADMIN — KETOROLAC TROMETHAMINE 15 MG: 30 INJECTION, SOLUTION INTRAMUSCULAR; INTRAVENOUS at 05:36

## 2022-12-14 RX ADMIN — METOPROLOL TARTRATE 50 MG: 50 TABLET ORAL at 21:22

## 2022-12-14 RX ADMIN — CARISOPRODOL 350 MG: 350 TABLET ORAL at 12:44

## 2022-12-14 RX ADMIN — OXYCODONE HYDROCHLORIDE 10 MG: 10 TABLET ORAL at 12:45

## 2022-12-14 RX ADMIN — DILTIAZEM HYDROCHLORIDE 9 MG/HR: 5 INJECTION, SOLUTION INTRAVENOUS at 09:10

## 2022-12-14 RX ADMIN — FUROSEMIDE 40 MG: 10 INJECTION, SOLUTION INTRAMUSCULAR; INTRAVENOUS at 14:49

## 2022-12-14 NOTE — ASSESSMENT & PLAN NOTE
· In setting of RA/OA   · Continue Soma 350 mg QID, Neurontin 600 mg TID, Oxycodone 10 mg QID, and PRN Percocet  mg Q4H  · Continue PRN IV Dilaudid for post-operative pain as well as scheduled IV Toradol  · Continue splinting w/pillow for pain

## 2022-12-14 NOTE — QUICK NOTE
Wound Check  POD 3 from laparotomy, lysis of adhesions, appendectomy  Midline wound clean, scant drainage from wound jeffy  11 telfa jeffy removed, wound swabbed with betadine and dry sterile dressing applied  Remains on Cipro, flagyl and Diflucan  Final cultures pending  D/C Fluconizole if no yeast on final culture

## 2022-12-14 NOTE — ASSESSMENT & PLAN NOTE
· Suspect this is is multifactorial in setting of atelectasis vs mild pulmonary vascular congestion vs post-operative pain  · CXR on 12/13 w/mildly increased interstitial markings bilaterally - may indicate mild pulmonary edema  · Respiratory status improved w/IV diuresis - continue w/PRN diuresis  · Now weaned to RA - continue to maintain SpO2 > 90%  · Continue scheduled HTS nebs for now given thick secretions  · Aggressive pulmonary hygiene  · Encourage cough, deep breathing, IS, ambulation

## 2022-12-14 NOTE — ASSESSMENT & PLAN NOTE
· Takes Oxycodone 10 mg QID and PRN Percocet  mg Q4H  · PDMP reviewed   · Continue home Oxycodone 10 mg QID  · Currently w/PRN IV Dilaudid for post-operative pain

## 2022-12-14 NOTE — WOUND OSTOMY CARE
Progress Note- Ostomy  Alexx Ambrose 78 y o  male  [id number]  ICU 10-ICU 10-01 ([facility] CAR NON INV)    Assessment:  78year old male patient admitted with peritonitis, he is POD# 3 s/p laparotomy with appendectomy, drainage of abscess and extensive lysis of adhesions  [facility] nurse consulted by request of wife  Patient has been using the Convatec two piece moldable ostomy pouch and recently the wife has noted that the opening of the barrier wafer is larger than the stoma resulting in an area of erythema to the peristomal skin, no open areas  She crusted the area at home  Supplied wife with 57mm barrier wafer and pouch  No questions or other skin issues at this time  1  DAMASO drain to the right lower abdomen to bulb suction  2  Dressings to abdomen dry and intact  3  Ostomy pouch to left lower quadrant intact, no output or gas in pouch at this time    Skin and Ostomy Care Plan:  1  Apply skin nourishing cream to the skin daily  2  Ehob offloading cushion to chair when OOB  3  Elevate heels off of bed with pillows to offload  4  Apply Hydraguard to b/l heels, buttocks and sacrum BID and PRN  5  Turn and reposition patient Q2 hours  6  Empty ostomy pouch when 1/3-1/2 full of stool or gas  Change pouch every 3 days and PRN with leakage  Cleanse skin with warm water and washcloth, pat dry, apply 3M No sting barrier film to peristomal skin  Use 2 piece moldable ostomy pouch per patient preference    Wound:  Wound 12/11/22 Abdomen N/A (Active)   Wound Description Intact 12/14/22 0800   Wound Site Closure Staples 12/14/22 0800   Drainage Amount Small 12/14/22 0800   Drainage Description Brown 12/14/22 0800   Treatments Site care 12/13/22 0800   Dressing ABD 12/14/22 0800   Wound packed? Yes 12/13/22 0400   Dressing Status Clean; Intact;Dry 12/14/22 0800   Number of days: 3     Closed/Suction Drain Lateral;Right RLQ Bulb 19 Fr  (Active)   Site Description Unable to view 12/13/22 1544   Dressing Status Clean; Intact; Old drainage 12/13/22 1544   Drainage Appearance Serosanguineous 12/14/22 0945   Status To bulb suction 12/14/22 0945   Output (mL) 40 mL 12/14/22 1000   Number of days: 3       Colostomy Descending/sigmoid LLQ (Active)   Number of days: 116       Colostomy Descending/sigmoid (Active)   Stomal Appliance 2 piece; Intact 12/13/22 1500   Stoma Assessment Budded;[place] 12/13/22 1500   Stoma Shape Round;Budded 12/13/22 1500   Treatment Placement checked 12/13/22 1500   Number of days:      Reviewed plan of care with primary RN   Recommendations written as orders  Wound care team to sign off  Questions or concerns [address] [facility] [place] Nurse    Katelyn GARCIAN, RN, [facility]

## 2022-12-14 NOTE — UTILIZATION REVIEW
Continued Stay Review    Date: 12/14/22                          Current Patient Class: inpatient  Current Level of Care: ICU    HPI:77 y o  male initially admitted on 12/11/22 Peritonitis     Assessment/Plan:  Pt states of feeling much better  Continues with tachycardia and rhythm irregularly irregular  BL lung fields with rales, decreased breath sounds noted  Abdominal distention, incision dsg intact with dsg changes per sx team, colostomy, monitor drain output  BL LE +1 pitting edema  New onset AF with RVR as high as 225, received multiple rate controlling medications as well as IV diuresis  PO lopressor started, cardizem drip weaned overnight, Heparin drip started for [facility] in setting of AF  NGT removed and clear liquid diet started, restarted home pain regimen, OOB to chair  Continue IV ABX and monitor fever and WBC, trend procal and fu on cxs  Monitor IO and daily wts, encourage inc spirometry and deep breathing and coughing  Continue ICU level of care      Vital Signs:   Date/Time Temp Pulse Resp BP MAP (mmHg) SpO2 Calculated FIO2 (%) - Nasal Cannula Nasal Cannula O2 Flow Rate (L/min) O2 Device Patient Position - Orthostatic VS   12/14/22 1100 97 8 °F (36 6 °C) 90 21 120/63 85 90 % -- -- -- Lying   12/14/22 1000 -- 95 23 Abnormal  127/73 95 90 % -- -- -- Lying   12/14/22 0900 -- 127 Abnormal  24 Abnormal  123/76 92 90 % -- -- -- --   12/14/22 0825 -- -- -- -- -- -- -- -- None (Room air) --   12/14/22 0800 -- 130 Abnormal  28 Abnormal  129/81 -- 86 % Abnormal  -- -- -- --   12/14/22 0758 -- 80 22 114/71 100 90 % -- -- None (Room air) --   12/14/22 0730 98 8 °F (37 1 °C) 131 Abnormal  32 Abnormal  114/71 -- 90 % -- -- None (Room air) --   Comment rows:   OBSERV: Awake alert having Echo at bedside at 12/14/22 0730   12/14/22 0500 -- 111 Abnormal  25 Abnormal  112/71 87 87 % Abnormal  -- -- -- --   12/14/22 0400 -- 102 24 Abnormal  135/74 97 87 % Abnormal  -- -- -- --   12/14/22 0300 -- 95 23 Abnormal  114/57 78 89 % Abnormal  -- -- -- --   12/14/22 0200 -- 126 Abnormal  31 Abnormal  114/69 86 92 % -- -- -- --   12/14/22 0100 -- 88 25 Abnormal  115/57 82 90 % -- -- -- --   12/14/22 0000 -- 99 22 140/68 98 90 % -- -- -- --   12/13/22 2300 -- 68 -- 125/58 83 90 % -- -- -- --   12/13/22 2200 -- 94 23 Abnormal  132/89 106 90 % -- -- -- --   12/13/22 2100 -- 87 24 Abnormal  128/68 90 91 % -- -- None (Room air) --   12/13/22 2000 98 9 °F (37 2 °C) 94 26 Abnormal  123/79 93 90 % -- -- -- --   12/13/22 1710 -- 136 Abnormal  35 Abnormal  129/70 92 90 % -- -- -- --   12/13/22 1552 99 5 °F (37 5 °C) -- -- -- -- -- -- -- -- --   12/13/22 1450 -- 128 Abnormal  23 Abnormal  161/78 110 93 % -- -- -- --   12/13/22 1441 -- 134 Abnormal  26 Abnormal  -- -- 93 % -- -- -- --   12/13/22 1428 -- 147 Abnormal  30 Abnormal  126/72 94 94 % -- -- -- Lying       Pertinent Labs/Diagnostic Results:   Results from last 7 days   Lab Units 12/11/22  0805   SARS-COV-2  Negative     Results from last 7 days   Lab Units 12/14/22  0528 12/13/22  1950 12/13/22  0507 12/12/22  0629 12/11/22  1911 12/11/22  0620   WBC Thousand/uL 15 40* 15 33* 11 25* 11 36* 5 25 2 99*   HEMOGLOBIN g/dL 12 1 11 7* 12 0 13 4 14 2 16 2   HEMATOCRIT % 37 4 36 6 37 9 42 0 44 8 52 2*   PLATELETS Thousands/uL 201 206 170 188 215 251   NEUTROS ABS Thousands/µL  --   --   --   --   --  2 52   BANDS PCT %  --   --   --   --  30*  --          Results from last 7 days   Lab Units 12/14/22  0528 12/13/22  1758 12/13/22  0507 12/12/22  1853 12/12/22  0629   SODIUM mmol/L 136 135 137 135 135   POTASSIUM mmol/L 3 8 3 4* 4 1 4 0 3 8   CHLORIDE mmol/L 103 102 104 104 105   CO2 mmol/L 25 27 26 26 24   ANION GAP mmol/L 8 6 7 5 6   BUN mg/dL 16 15 16 13 12   CREATININE mg/dL 1 06 1 18 1 05 1 12 0 96   EGFR ml/min/1 73sq m 68 60 69 63 77   CALCIUM mg/dL 8 5 8 5 8 5 8 6 8 1*   CALCIUM, IONIZED mmol/L 1 07*  --  1 16 1 10* 1 05*   MAGNESIUM mg/dL 2 3 1 8* 2 0 1 9 1 3*   PHOSPHORUS mg/dL 2 4 2 9 1 9* 2 3 2 7     Results from last 7 days   Lab Units 12/14/22  0528 12/13/22  0507 12/12/22  0629 12/11/22  0620   AST U/L 14 14 17 17   ALT U/L 12 10 12 18   ALK PHOS U/L 44 35 27* 54   TOTAL PROTEIN g/dL 5 8* 5 3* 5 1* 7 0   ALBUMIN g/dL 3 1* 3 0* 3 3* 4 3   TOTAL BILIRUBIN mg/dL 0 54 0 74 0 68 0 57     Results from last 7 days   Lab Units 12/14/22  1201 12/14/22  0535 12/13/22  2355 12/13/22  1726 12/13/22  1218 12/13/22  0612 12/13/22  0020 12/12/22  1753 12/12/22  1139 12/12/22  0630 12/12/22  0019 12/11/22  1816   POC GLUCOSE mg/dl 105 97 124 106 138 111 107 106 110 102 98 115     Results from last 7 days   Lab Units 12/14/22  0528 12/13/22  1758 12/13/22  0507 12/12/22  1853 12/12/22  0629 12/11/22  1911 12/11/22  0620   GLUCOSE RANDOM mg/dL 95 117 112 114 110 113 185*     Results from last 7 days   Lab Units 12/14/22  0806 12/14/22  0159 12/13/22  1950   PROTIME seconds  --   --  16 3*   INR   --   --  1 32*   PTT seconds 53* 62* 42*         Results from last 7 days   Lab Units 12/14/22  0528 12/13/22  0507 12/12/22  0629   PROCALCITONIN ng/ml 3 36* 6 02* 8 29*     Results from last 7 days   Lab Units 12/12/22  0629 12/11/22  2033 12/11/22  0620   LACTIC ACID mmol/L 1 6 2 6* 4 3*     Results from last 7 days   Lab Units 12/11/22  0620   LIPASE u/L 32     Results from last 7 days   Lab Units 12/11/22  0805   INFLUENZA A PCR  Negative   INFLUENZA B PCR  Negative     Results from last 7 days   Lab Units 12/11/22  1155 12/11/22  0831   BLOOD CULTURE   --  No Growth at 48 hrs  No Growth at 48 hrs     GRAM STAIN RESULT  2+ Polys*  2+ Gram positive cocci in pairs*  2+ Gram positive cocci in chains*  Rare Gram negative rods*  --    BODY FLUID CULTURE, STERILE  3+ Growth of Gamma Hemolytic Streptococcus NOT Enterococcus  2+ Growth of Gram Negative Feliberto*  1+ Growth of  --      Medications:   Scheduled Medications:  atorvastatin, 20 mg, Oral, Daily  carisoprodol, 350 mg, Oral, 4x Daily  cholecalciferol, 1,000 Units, Oral, Daily  ciprofloxacin, 400 mg, Intravenous, Q12H  fluconazole, 400 mg, Intravenous, Q24H  gabapentin, 600 mg, Oral, TID  insulin lispro, 1-5 Units, Subcutaneous, Q6H ANDREA  ketorolac, 15 mg, Intravenous, Q6H [facility]  metoprolol tartrate, 25 mg, Oral, Q12H ANDREA  metroNIDAZOLE, 500 mg, Intravenous, Q8H  oxyCODONE, 10 mg, Oral, Q6H  pantoprazole, 40 mg, Intravenous, Q12H ANDREA  traZODone, 100 mg, Oral, HS      Continuous IV Infusions:  diltiazem, 1-15 mg/hr, Intravenous, Titrated  heparin (porcine), 3-20 Units/kg/hr (Order-Specific), Intravenous, Titrated      PRN Meds:  albuterol, 2 5 mg, Nebulization, Q4H PRN  HYDROmorphone, 0 5 mg, Intravenous, Q4H PRN   Or  HYDROmorphone, 1 mg, Intravenous, Q4H PRN  HYDROmorphone, 0 5 mg, Intravenous, Q4H PRN  ondansetron, 4 mg, Intravenous, Q6H PRN  perflutren lipid microsphere, 1 mL/min, Intravenous, Once in imaging  phenol, 1 spray, Mouth/Throat, Q2H PRN        Discharge Plan: d    Network Utilization Review Department  ATTENTION: Please call with any questions or concerns to [phone] and carefully listen to the prompts so that you are directed to the right person  All voicemails are confidential   Kassy Bee all requests for admission clinical reviews, approved or denied determinations and any other requests to dedicated fax number below belonging to the campus where the patient is receiving treatment   List of dedicated fax numbers for the Facilities:  [address] DENIALS (Administrative/Medical Necessity) [phone]   [address] [place] [address] (Maternity/NICU/Pediatrics) [phone]   [address] Gerda Clarke [address] [facility] [place] [address] [address] [id number] [place] [address] [address] [facility]  [address] 00[address] [facility] [phone]

## 2022-12-14 NOTE — CASE MANAGEMENT
Case Management Discharge Planning Note    Patient name Shabnam Silva  Location ICU 10/ICU 10-01 MRN [id number]  : 1945 Date 2022       Current Admission Date: 2022  Current Admission Diagnosis:Peritonitis [facility])   Patient Active Problem List    Diagnosis Date Noted   • New onset atrial fibrillation ([address] ) 2022   • Septic shock ([address] ) 2022   • Acute respiratory failure with hypoxia ([address] ) 2022   • Peritonitis ([address] ) 2022   • Diverticulitis of colon 2022   • SBO (small bowel obstruction) ([address] ) 2022   • Primary osteoarthritis of right shoulder 2022   • Chronic kidney disease, stage 2 (mild) 2022   • High risk medication use 2020   • Rheumatoid arthritis with rheumatoid factor, unspecified ([address] ) 2020   • Atherosclerosis of native coronary artery of native heart without angina pectoris 2019   • Status post aortic valve replacement with bioprosthetic valve 2018   • Arthritis    • Chronic pain    • Continuous opioid dependence ([address] )    • Hyperlipidemia    • Benign essential hypertension    • Insomnia    • Peripheral neuropathy    • Carpal tunnel syndrome 2015   • Psoriasis with arthropathy ([address] ) 2015   • Arthralgia of lower leg 2013   • Hyperglycemia 10/25/2012   • Primary localized osteoarthrosis of shoulder region 10/09/2012   • Psoriasis 10/09/2012   • Atopic dermatitis 2012   • Low back pain 2012      LOS (days): 3  Geometric Mean LOS (GMLOS) (days): 9 60  Days to GMLOS:6 2     OBJECTIVE:  Risk of Unplanned Readmission Score: 17 1         Current admission status: Inpatient   Preferred Pharmacy:   [facility] [address], [place] - [address] [address]  Phone: [phone] Fax: [phone] [facility] #[id number], [address] [address]  Phone: [phone] Fax: [address] [place] [address]  Phone: [phone] Fax: [phone]    Primary Care Provider: Elmira Webb DO    Primary Insurance: Amee Desai [facility]  Secondary Insurance:     DISCHARGE DETAILS:    Discharge planning discussed with[de-identified] patient and wife at the bedside  Freedom of Choice: Yes  Comments - Freedom of Choice: recommendation is for rehaband pt has declined- pt is now in agreement wit [facility]- permission was given to send a blanklet referral  CM contacted family/caregiver?: Yes             Contacts  Patient Contacts: wife Salomon Mealing  Relationship to Patient[de-identified] Family (wife)  Contact Method:  In Person  Reason/Outcome: Discharge [address]         Is the patient interested in [facility] at discharge?: Yes  [address] requested[de-identified] Nursing, Occupational Therapy, Physical [address] Name[de-identified] Other  [facility] [place] Provider[de-identified] PCP  Home Health Services Needed[de-identified] Post-Op Care and Assessment, Strengthening/Theraputic Exercises to Improve Function, Wound/Ostomy Care  Homebound Criteria Met[de-identified] Requires the Assistance of Another Person for Safe Ambulation or to Leave the Home  Supporting Clincal Findings[de-identified] Limited Endurance         Other Referral/Resources/Interventions Provided:  Interventions: C  Referral Comments: blanket referral sent for [facility]  with permission    Would you like to participate in our [address] service program?  : No - Declined    Treatment Team Recommendation[facility] (home with wife & [facility] & outpt follow up- wife)

## 2022-12-14 NOTE — ASSESSMENT & PLAN NOTE
· Presented to ED on 12/11 w/acute onset abd pain concerning for acute abdomen   · W/hx of sigmoid diverticulitis s/p Polo's procedure and colostomy with planned reversal scheduled for 01/2023  · CT A/P on admission revealed extensive inflammatory changes involving jejunum and proximal-mid ileum, compatible with severe enteritis; there is focal free air surrounding lower midline ileum just above urinary bladder with extensive inflammatory changes; few mildly dilated loops of jejunum within left upper abdomen, likely 2/2 extensive inflammatory changes with possible superimposed low-grade partial SBO; small amount of free air and free fluid in remainder of abdomen and pelvis; stomach and proximal esophagus are distended and fluid-filled, presumably secondary to delayed transit  · OR on 12/11 for EX LAP,  EXTENSIVE MELANI, APPENDECTOMY, DRAINAGE OF ABDOMINAL ABSCESS - today is POD#3  · Peritoneal fluid culture w/gamma hemolytic Strep  · Anaerobic culture w/Bacteroides fragilis   · Continue IV Ciprofloxacin, Flagyl, Diflucan D#4  · NGT removed on 12/13 - tolerating clear liquid diet   · Home scheduled pain regimen restarted - continue PRN Dilaudid for acute surgical pain as well as scheduled IV Toradol  · Monitor incision closely - dressing changes per surgery team  · Monitor ostomy/drain output

## 2022-12-14 NOTE — ASSESSMENT & PLAN NOTE
Lab Results   Component Value Date    EGFR 60 12/13/2022    EGFR 69 12/13/2022    EGFR 63 12/12/2022    CREATININE 1 18 12/13/2022    CREATININE 1 05 12/13/2022    CREATININE 1 12 12/12/2022     · Baseline creatinine appears to be around 0 8 - 1 1  · Trend renal indices  · Monitor I/O  · Daily weights

## 2022-12-14 NOTE — PROGRESS NOTES
[address]  Progress Note - Keely Islas 1945, 78 y o  male MRN: [id number]  Unit/Bed#: ICU 10-01 Encounter: [id number]  Primary Care Provider: Hugo Coffey DO   Date and time admitted to hospital: 12/11/2022  5:19 AM    * Peritonitis [facility])  Assessment & Plan  · Presented to ED on 12/11 w/acute onset abd pain concerning for acute abdomen   · W/hx of sigmoid diverticulitis s/p Polo's procedure and colostomy with planned reversal scheduled for 01/2023  · CT A/P on admission revealed extensive inflammatory changes involving jejunum and proximal-mid ileum, compatible with severe enteritis; there is focal free air surrounding lower midline ileum just above urinary bladder with extensive inflammatory changes; few mildly dilated loops of jejunum within left upper abdomen, likely 2/2 extensive inflammatory changes with possible superimposed low-grade partial SBO; small amount of free air and free fluid in remainder of abdomen and pelvis; stomach and proximal esophagus are distended and fluid-filled, presumably secondary to delayed transit  · OR on 12/11 for EX LAP,  EXTENSIVE MELANI, APPENDECTOMY, DRAINAGE OF ABDOMINAL ABSCESS - today is POD#3  · Peritoneal fluid culture w/gamma hemolytic Strep  · Anaerobic culture w/Bacteroides fragilis   · Continue IV Ciprofloxacin, Flagyl, Diflucan D#4  · NGT removed on 12/13 - tolerating clear liquid diet   · Home scheduled pain regimen restarted - continue PRN Dilaudid for acute surgical pain as well as scheduled IV Toradol  · Monitor incision closely - dressing changes per surgery team  · Monitor ostomy/drain output     Septic shock ([facility] )  Assessment & Plan  · POA: AEB tachycardia, tachypnea, leukopenia, lactic acidosis > 4, GERMAN  · In setting of intraabdominal infection/peritonitis  · Imaging as noted above  · COVID/FLU/RSV negative  · Blood cultures w/NGTD  · Peritoneal fluid culture w/gamma hemolytic Strep  · Anaerobic culture w/Bacteroides fragilis · Lactic acid 4 3 - has since cleared   · Procalcitonin 8 29 - 6 02  · Is s/p aggressive IVF resuscitation  · Continue IV Ciprofloxacin, Flagyl, Diflucan D#4  · Monitor fever and WBC curve  · Trend procalcitonin and follow-up cultures    New onset atrial fibrillation (HCC)  Assessment & Plan  · New onset AF w/RVR as high as 225 in AM of 12/13  · No previous history of AF  · Suspect this is 2/2 septic shock/peritonitis vs pulmonary vascular congestion w/concurrent anxiety and pain  · Refractory to IV Lopressor 5 mg x2, IV Cardizem 15 mg x1, IV Amiodarone bolus + gtt initiation  · Improved s/p additional IV Cardizem 20 mg x1 + gtt initiation as well as initiation of PO Lopressor 25 mg BID  · Continue w/PRN diuresis  · Continue AC w/Heparin gtt - will likely need DOAC on discharge  · Check TTE  · Continue cardiac monitoring and PRN ECG    Acute respiratory failure with hypoxia (HCC)  Assessment & Plan  · Suspect this is is multifactorial in setting of atelectasis vs mild pulmonary vascular congestion vs post-operative pain  · CXR on 12/13 w/mildly increased interstitial markings bilaterally - may indicate mild pulmonary edema  · Respiratory status improved w/IV diuresis - continue w/PRN diuresis  · Now weaned to RA - continue to maintain SpO2 > 90%  · Continue scheduled HTS nebs for now given thick secretions  · Aggressive pulmonary hygiene  · Encourage cough, deep breathing, IS, ambulation    Chronic kidney disease, stage 2 (mild)  Assessment & Plan  Lab Results   Component Value Date    EGFR 60 12/13/2022    EGFR 69 12/13/2022    EGFR 63 12/12/2022    CREATININE 1 18 12/13/2022    CREATININE 1 05 12/13/2022    CREATININE 1 12 12/12/2022     · Baseline creatinine appears to be around 0 8 - 1 1  · Trend renal indices  · Monitor I/O  · Daily weights    Rheumatoid arthritis with rheumatoid factor, unspecified (HCC)  Assessment & Plan  · Continue pain regimen as noted above    Status post aortic valve replacement with bioprosthetic valve  Assessment & Plan  · Is s/p AVR in 2018   · Holding ASA while fresh post-op - will discuss restarting w/surgery team    Insomnia  Assessment & Plan  · Continue home Trazodone    Benign essential hypertension  Assessment & Plan  · Currently holding home Cozaar - would consider restarting if becomes hypertensive  · Trend BPs    Hyperlipidemia  Assessment & Plan  · Continue home statin    Continuous opioid dependence (HCC)  Assessment & Plan  · Takes Oxycodone 10 mg QID and PRN Percocet  mg Q4H  · PDMP reviewed   · Continue home Oxycodone 10 mg QID  · Currently w/PRN IV Dilaudid for post-operative pain    Chronic pain  Assessment & Plan  · In setting of RA/OA   · Continue Soma 350 mg QID, Neurontin 600 mg TID, Oxycodone 10 mg QID, and PRN Percocet  mg Q4H  · Continue PRN IV Dilaudid for post-operative pain as well as scheduled IV Toradol  · Continue splinting w/pillow for pain    ----------------------------------------------------------------------------------------  HPI/24hr events:     · New onset AF w/RVR as high as 225 in AM - received multiple rate controlling medications as well as IV diuresis  · PO Lopressor started   · Cardizem gtt weaned overnight  · Heparin gtt started for [facility] in setting of AF  · NGT removed and clear liquid diet started  · Home scheduled pain regimen restarted   · OOB to chair   · No acute events overnight    Patient appropriate for transfer out of the ICU today?: No  Disposition: Continue Stepdown Level 1 level of care   Code Status: Level 1 - Full Code  ---------------------------------------------------------------------------------------  SUBJECTIVE  "I'm feeling so much better, but am still having pain " Denies pain      Review of Systems  Review of systems was reviewed and negative unless stated above in HPI/24-hour events   ---------------------------------------------------------------------------------------  OBJECTIVE    Vitals   Vitals:    12/14/22 0300 22 0400 22 0500 22 0539   BP: 114/57 135/74 112/71    Pulse: 95 102 (!) 111    Resp: (!) 23 (!) 24 (!) 25    Temp:       TempSrc:       SpO2: (!) 89% (!) 87% (!) 87%    Weight:    90 9 kg (200 lb 6 4 oz)   Height:         Temp (24hrs), Av 1 °F (37 3 °C), Min:98 9 °F (37 2 °C), Max:99 5 °F (37 5 °C)  Current: Temperature: 98 9 °F (37 2 °C)          Respiratory:  SpO2: SpO2: (!) 87 %, SpO2 Activity: SpO2 Activity: At Rest, SpO2 Device: O2 Device: None (Room air)  Nasal Cannula O2 Flow Rate (L/min): 3 L/min    Invasive/non-invasive ventilation settings   Respiratory    Lab Data (Last 4 hours)    None         O2/Vent Data (Last 4 hours)    None                Physical Exam  Vitals and nursing note reviewed  Constitutional:       General: He is awake  He is not in acute distress  HENT:      Head: Normocephalic and atraumatic  Eyes:      Pupils: Pupils are equal, round, and reactive to light  Cardiovascular:      Rate and Rhythm: Tachycardia present  Rhythm irregularly irregular  Pulses: Normal pulses  Heart sounds: Normal heart sounds  Comments: AF  Pulmonary:      Effort: Pulmonary effort is normal       Breath sounds: Examination of the right-lower field reveals rales  Examination of the left-lower field reveals rales  Decreased breath sounds and rales present  No wheezing  Abdominal:      General: Bowel sounds are normal  There is distension  Palpations: Abdomen is soft  Tenderness: There is abdominal tenderness  Comments: Abdominal incision w/dressing intact; colostomy   Musculoskeletal:      Cervical back: Normal range of motion and neck supple  Right lower le+ Pitting Edema present  Left lower le+ Pitting Edema present  Skin:     General: Skin is warm and dry  Capillary Refill: Capillary refill takes less than 2 seconds  Neurological:      General: No focal deficit present        Mental Status: He is alert and oriented to person, place, and time  Psychiatric:         Behavior: Behavior is cooperative           Laboratory and Diagnostics:  Results from last 7 days   Lab Units 12/14/22  0528 12/13/22  1950 12/13/22  0507 12/12/22  0629 12/11/22  1911 12/11/22  0620   WBC Thousand/uL 15 40* 15 33* 11 25* 11 36* 5 25 2 99*   HEMOGLOBIN g/dL 12 1 11 7* 12 0 13 4 14 2 16 2   HEMATOCRIT % 37 4 36 6 37 9 42 0 44 8 52 2*   PLATELETS Thousands/uL 201 206 170 188 215 251   NEUTROS PCT %  --   --   --   --   --  85*   BANDS PCT %  --   --   --   --  30*  --    MONOS PCT %  --   --   --   --   --  8   MONO PCT %  --   --   --   --  3*  --      Results from last 7 days   Lab Units 12/14/22  0528 12/13/22  1758 12/13/22  0507 12/12/22  1853 12/12/22  0629 12/11/22  1911 12/11/22  0620   SODIUM mmol/L 136 135 137 135 135 136 138   POTASSIUM mmol/L 3 8 3 4* 4 1 4 0 3 8 4 4 3 8   CHLORIDE mmol/L 103 102 104 104 105 106 100   CO2 mmol/L 25 27 26 26 24 21 26   ANION GAP mmol/L 8 6 7 5 6 9 12   BUN mg/dL 16 15 16 13 12 15 19   CREATININE mg/dL 1 06 1 18 1 05 1 12 0 96 0 96 1 28   CALCIUM mg/dL 8 5 8 5 8 5 8 6 8 1* 7 9* 9 5   GLUCOSE RANDOM mg/dL 95 117 112 114 110 113 185*   ALT U/L 12  --  10  --  12  --  18   AST U/L 14  --  14  --  17  --  17   ALK PHOS U/L 44  --  35  --  27*  --  54   ALBUMIN g/dL 3 1*  --  3 0*  --  3 3*  --  4 3   TOTAL BILIRUBIN mg/dL 0 54  --  0 74  --  0 68  --  0 57     Results from last 7 days   Lab Units 12/14/22  0528 12/13/22  1758 12/13/22  0507 12/12/22  1853 12/12/22  0629   MAGNESIUM mg/dL 2 3 1 8* 2 0 1 9 1 3*   PHOSPHORUS mg/dL 2 4 2 9 1 9* 2 3 2 7      Results from last 7 days   Lab Units 12/14/22  0159 12/13/22  1950   INR   --  1 32*   PTT seconds 62* 42*          Results from last 7 days   Lab Units 12/12/22  0629 12/11/22  2033 12/11/22  0620   LACTIC ACID mmol/L 1 6 2 6* 4 3*     ABG:    VBG:    Results from last 7 days   Lab Units 12/14/22  0528 12/13/22  0507 12/12/22  0629   PROCALCITONIN ng/ml 3 36* 6 02* 8 29*       Micro  Results from last 7 days   Lab Units 12/11/22  1155 12/11/22  0831   BLOOD CULTURE   --  No Growth at 48 hrs  No Growth at 48 hrs  GRAM STAIN RESULT  2+ Polys*  2+ Gram positive cocci in pairs*  2+ Gram positive cocci in chains*  Rare Gram negative rods*  --    BODY FLUID CULTURE, STERILE  3+ Growth of Gamma Hemolytic Streptococcus  1+ Growth of  --        EKG: AF  Imaging: I have personally reviewed pertinent reports  and I have personally reviewed pertinent films in PACS    Intake and Output  I/O       12/12 0701 12/13 0700 12/13 0701 12/14 0700    P  O  0 240    I V  (mL/kg) 1508 7 (16 1) 147 4 (1 6)    IV Piggyback 850 650    Total Intake(mL/kg) 2358 7 (25 2) 1037 4 (11 1)    Urine (mL/kg/hr) 1010 (0 4) 2500 (1 1)    Emesis/NG output 100 150    Drains 153 50    Other  100    Total Output 1263 2800    Net +1095 7 -1762 6                Height and Weights   Height: 5' 7" (170 2 cm)  IBW (Ideal Body Weight): 66 1 kg  Body mass index is 31 39 kg/m²  Weight (last 2 days)     Date/Time Weight    12/14/22 0539 90 9 (200 4)    12/13/22 0600 93 6 (206 35)     Weight: checked weight 3x at 12/13/22 0600            Nutrition       Diet Orders   (From admission, onward)             Start     Ordered    12/13/22 1124  Diet Clear Liquid  Diet effective now        Comments: Patient may have ice chips and small sips of water  References:    Nutrtion Support Algorithm Enteral vs  Parenteral   Question Answer Comment   Diet Type Clear Liquid    RD to adjust diet per protocol?  No        12/13/22 1127                  Active Medications  Scheduled Meds:  Current Facility-Administered Medications   Medication Dose Route Frequency Provider Last Rate   • albuterol  2 5 mg Nebulization Q4H PRN Jen Fabian MD     • atorvastatin  20 mg Oral Daily PEDRO Guerrero     • carisoprodol  350 mg Oral 4x Daily PEDRO Guy     • cholecalciferol  1,000 Units Oral Daily Aury ANG PEDRO Perez     • ciprofloxacin  400 mg Intravenous Q12H Raquel Blackmon PA-C 400 mg (12/13/22 2137)   • diltiazem  1-15 mg/hr Intravenous Titrated PEDRO Mays 9 mg/hr (12/14/22 0202)   • fluconazole  400 mg Intravenous Q24H Raquel Blackmon PA-C Stopped (12/13/22 1800)   • gabapentin  600 mg Oral TID PEDRO Mays     • heparin (porcine)  3-20 Units/kg/hr (Order-Specific) Intravenous Titrated PEDRO Mays 11 1 Units/kg/hr (12/13/22 1949)   • HYDROmorphone  0 5 mg Intravenous Q4H PRN PEDRO Paul      Or   • HYDROmorphone  1 mg Intravenous Q4H PRN PEDRO Paul     • HYDROmorphone  0 5 mg Intravenous Q4H PRN PEDRO Paul     • insulin lispro  1-5 Units Subcutaneous Q6H [address] PEDRO Mays     • ketorolac  15 mg Intravenous Q6H [address] Brigitte Stover PA-C     • metoprolol tartrate  25 mg Oral Q12H [address] PEDRO Mays     • metroNIDAZOLE  500 mg Intravenous Q8H Raquel Blackmon PA-C 500 mg (12/13/22 2356)   • ondansetron  4 mg Intravenous Q6H PRN Raquel Blackmon PA-C     • oxyCODONE  10 mg Oral Q6H PEDRO Mays     • pantoprazole  40 mg Intravenous Q12H [address] Raquel Blackmon PA-C     • phenol  1 spray Mouth/Throat Q2H PRN Brigitte Madera PA-C     • traZODone  100 mg Oral HS PEDRO Perry       Continuous Infusions:  diltiazem, 1-15 mg/hr, Last Rate: 9 mg/hr (12/14/22 0202)  heparin (porcine), 3-20 Units/kg/hr (Order-Specific), Last Rate: 11 1 Units/kg/hr (12/13/22 1949)      PRN Meds:   albuterol, 2 5 mg, Q4H PRN  HYDROmorphone, 0 5 mg, Q4H PRN   Or  HYDROmorphone, 1 mg, Q4H PRN  HYDROmorphone, 0 5 mg, Q4H PRN  ondansetron, 4 mg, Q6H PRN  phenol, 1 spray, Q2H PRN        Invasive Devices Review  Invasive Devices     Peripheral Intravenous Line  Duration           Long-Dwell Peripheral IV (Midline) 46/64/43 Left Basilic 1 day    Peripheral IV 12/13/22 Right;Ventral (anterior) Forearm <1 day          Drain Duration           Closed/Suction Drain Right RLQ Bulb 19 Fr  115 days    Colostomy Descending/sigmoid  days    Closed/Suction Drain Lateral;Right RLQ Bulb 19 Fr  2 days                Rationale for remaining devices: N/A  ---------------------------------------------------------------------------------------  Advance Directive and Living Will:      Power of :    POLST:    ---------------------------------------------------------------------------------------  Care Time Delivered:   Upon my evaluation, this patient had a high probability of imminent or life-threatening deterioration due to peritonitis, septic shock, acute respiratory failure, new onset AF, which required my direct attention, intervention, and personal management  I have personally provided 20 minutes (0502 to 0522) of critical care time, exclusive of procedures, teaching, family meetings, and any prior time recorded by providers other than myself  PEDRO Bustillos      Portions of the record may have been created with voice recognition software  Occasional wrong word or "sound a like" substitutions may have occurred due to the inherent limitations of voice recognition software    Read the chart carefully and recognize, using context, where substitutions have occurred

## 2022-12-14 NOTE — PROGRESS NOTES
Progress Note - General Surgery   Bety Salinas 78 y o  male MRN: [id number]  Unit/Bed#: ICU 10-01 Encounter: [id number]    Assessment:  21-year-old male with past medical history significant for AS, chronic pain with continuous use of opioids, HLD, HTN, RA, perforated sigmoid diverticulitis s/p Polo's procedure (August 2022) for reversal in January 2023 presenting with sepsis secondary to abdominal abscess  Now POD#3 s/p exploratory laparotomy with appendectomy, drainage of abscess, and extensive lysis of adhesions  - Afebrile, atrial fibrillation 110s to 130s, tachypnea 20s to 30s, desats high 80s to low 90s  - UO 2 6 L, Ambirz removed 12/13  - DAMASO x1 RLQ 50 cc, serous  - WBC 15 40  - Hgb 12 1  - CR 1 06  - Pro-Chito 3 36  - Electrolytes unremarkable  - Body fluid culture and gram stain-anaerobic culture positive for mixed skin migel, Bacteroides fragilis, and gamma hemolytic strep    Plan:  CLD as tolerated, may consider advancing later this afternoon  IV Cipro/Flagyl and IV Diflucan (day 4)  Cardizem gtt per Critical Care  Heparin gtt per Critical Care  Medical management per critical care, appreciate recommendations  Antiemetics and analgesics as needed  Will abdominal exams  Parveen to be removed POD#5, may change overlying dressings as needed for saturation   DAMASO to bulb suction  Continue to monitor labs  Encourage ambulation and out of bed to chair  To be evaluated bedside by attending    Subjective/Objective   Subjective: No acute overnight events  Patient overall doing well this morning  Continues to note generalized abdominal pain notes some improvement with change in pain regimen yesterday after NGT removal   He has voided twice on his own since Ambriz removal   Tolerating clear liquid diet without increase in abdominal pain, nausea, vomiting  Minimal amount of gas present in ostomy bag  Denies chest pain, palpitations, shortness of breath, calf tenderness  Denies fever, chills      Objective:   Blood pressure 114/71, pulse 80, temperature 98 8 °F (37 1 °C), resp  rate 22, height 5' 7" (1 702 m), weight 90 7 kg (200 lb), SpO2 90 %  ,Body mass index is 31 32 kg/m²  Intake/Output Summary (Last 24 hours) at 12/14/2022 0828  Last data filed at 12/14/2022 0501  Gross per 24 hour   Intake 1122 88 ml   Output 2540 ml   Net -1417 12 ml       Invasive Devices     Peripheral Intravenous Line  Duration           Long-Dwell Peripheral IV (Midline) 83/58/05 Left Basilic 1 day    Peripheral IV 12/13/22 Right;Ventral (anterior) Forearm <1 day          Drain  Duration           Closed/Suction Drain Right RLQ Bulb 19 Fr  115 days    Colostomy Descending/sigmoid  days    Closed/Suction Drain Lateral;Right RLQ Bulb 19 Fr  2 days              Physical Exam  Vitals and nursing note reviewed  Constitutional:       General: He is not in acute distress  Appearance: Normal appearance  He is obese  He is not ill-appearing or toxic-appearing  HENT:      Head: Normocephalic and atraumatic  Cardiovascular:      Rate and Rhythm: Tachycardia present  Rhythm irregular  Pulses: Normal pulses  Heart sounds: Normal heart sounds  Pulmonary:      Effort: Pulmonary effort is normal       Breath sounds: Rales present  Abdominal:      General: The ostomy site is clean  Bowel sounds are decreased  There is distension  Palpations: Abdomen is soft  Tenderness: There is generalized abdominal tenderness  There is no guarding or rebound  Comments: Midline incision with 11 jeffy in place, minimal amount of serous/serosanguineous drainage noted on overlying dressings; DAMASO x1 with serous output   Musculoskeletal:         General: Normal range of motion  Skin:     General: Skin is warm and dry  Capillary Refill: Capillary refill takes less than 2 seconds  Neurological:      General: No focal deficit present  Mental Status: He is alert and oriented to person, place, and time     Psychiatric: Mood and Affect: Mood normal          Behavior: Behavior normal  Behavior is cooperative  Thought Content: Thought content normal          Lab, Imaging and other studies:  I have personally reviewed pertinent lab results      CBC:   Lab Results   Component Value Date    WBC 15 40 (H) 12/14/2022    HGB 12 1 12/14/2022    HCT 37 4 12/14/2022    MCV 90 12/14/2022     12/14/2022    MCH 29 2 12/14/2022    MCHC 32 4 12/14/2022    RDW 13 6 12/14/2022    MPV 9 5 12/14/2022     CMP:   Lab Results   Component Value Date    SODIUM 136 12/14/2022    K 3 8 12/14/2022     12/14/2022    CO2 25 12/14/2022    BUN 16 12/14/2022    CREATININE 1 06 12/14/2022    CALCIUM 8 5 12/14/2022    AST 14 12/14/2022    ALT 12 12/14/2022    ALKPHOS 44 12/14/2022    EGFR 68 12/14/2022     VTE Pharmacologic Prophylaxis: Heparin  VTE Mechanical Prophylaxis: sequential compression device    [place] PA-C

## 2022-12-14 NOTE — PLAN OF CARE
Problem: Potential for Falls  Goal: Patient will remain free of falls  Description: INTERVENTIONS:  - Educate patient/family on patient safety including physical limitations  - Instruct patient to call for assistance with activity   - Consult OT/PT to assist with strengthening/mobility   - Keep Call bell within reach  - Keep bed low and locked with side rails adjusted as appropriate  - Keep care items and personal belongings within reach  - Initiate and maintain comfort rounds  - Make Fall Risk Sign visible to staff  - Offer Toileting every 2 Hours, in advance of need  - Initiate/Maintain bed alarm  - Obtain necessary fall risk management equipment:   - Apply yellow socks and bracelet for high fall risk patients  - Consider moving patient to room near nurses station  Outcome: Progressing     Problem: Prexisting or High Potential for Compromised Skin Integrity  Goal: Skin integrity is maintained or improved  Description: INTERVENTIONS:  - Identify patients at risk for skin breakdown  - Assess and monitor skin integrity  - Assess and monitor nutrition and hydration status  - Monitor labs   - Assess for incontinence   - Turn and reposition patient  - Assist with mobility/ambulation  - Relieve pressure over bony prominences  - Avoid friction and shearing  - Provide appropriate hygiene as needed including keeping skin clean and dry  - Evaluate need for skin moisturizer/barrier cream  - Collaborate with interdisciplinary team   - Patient/family teaching  - Consider wound care consult   Outcome: Progressing     Problem: PAIN - ADULT  Goal: Verbalizes/displays adequate comfort level or baseline comfort level  Description: Interventions:  - Encourage patient to monitor pain and request assistance  - Assess pain using appropriate pain scale  - Administer analgesics based on type and severity of pain and evaluate response  - Implement non-pharmacological measures as appropriate and evaluate response  - Consider cultural and social influences on pain and pain management  - Notify physician/advanced practitioner if interventions unsuccessful or patient reports new pain  Outcome: Progressing     Problem: INFECTION - ADULT  Goal: Absence or prevention of progression during hospitalization  Description: INTERVENTIONS:  - Assess and monitor for signs and symptoms of infection  - Monitor lab/diagnostic results  - Monitor all insertion sites, i e  indwelling lines, tubes, and drains  - Monitor endotracheal if appropriate and nasal secretions for changes in amount and color  - [place] appropriate cooling/warming therapies per order  - Administer medications as ordered  - Instruct and encourage patient and family to use good hand hygiene technique  - Identify and instruct in appropriate isolation precautions for identified infection/condition  Outcome: Progressing     Problem: INFECTION - ADULT  Goal: Absence of fever/infection during neutropenic period  Description: INTERVENTIONS:  - Monitor WBC    Outcome: Progressing     Problem: SAFETY ADULT  Goal: Patient will remain free of falls  Description: INTERVENTIONS:  - Educate patient/family on patient safety including physical limitations  - Instruct patient to call for assistance with activity   - Consult OT/PT to assist with strengthening/mobility   - Keep Call bell within reach  - Keep bed low and locked with side rails adjusted as appropriate  - Keep care items and personal belongings within reach  - Initiate and maintain comfort rounds  - Make Fall Risk Sign visible to staff  - Offer Toileting every 2 Hours, in advance of need  - Initiate/Maintain bed alarm  - Obtain necessary fall risk management equipment:   - Apply yellow socks and bracelet for high fall risk patients  - Consider moving patient to room near nurses station  Outcome: Progressing     Problem: DISCHARGE PLANNING  Goal: Discharge to home or other facility with appropriate resources  Description: INTERVENTIONS:  - Identify barriers to discharge w/patient and caregiver  - Arrange for needed discharge resources and transportation as appropriate  - Identify discharge learning needs (meds, wound care, etc )  - Arrange for interpretive services to assist at discharge as needed  - Refer to Case Management Department for coordinating discharge planning if the patient needs post-hospital services based on physician/advanced practitioner order or complex needs related to functional status, cognitive ability, or social support system  Outcome: Progressing     Problem: Knowledge Deficit  Goal: Patient/family/caregiver demonstrates understanding of disease process, treatment plan, medications, and discharge instructions  Description: Complete learning assessment and assess knowledge base    Interventions:  - Provide teaching at level of understanding  - Provide teaching via preferred learning methods  Outcome: Progressing     Problem: CARDIOVASCULAR - ADULT  Goal: Maintains optimal cardiac output and hemodynamic stability  Description: INTERVENTIONS:  - Monitor I/O, vital signs and rhythm  - Monitor for S/S and trends of decreased cardiac output  - Administer and titrate ordered vasoactive medications to optimize hemodynamic stability  - Assess quality of pulses, skin color and temperature  - Assess for signs of decreased coronary artery perfusion  - Instruct patient to report change in severity of symptoms  Outcome: Progressing     Problem: CARDIOVASCULAR - ADULT  Goal: Absence of cardiac dysrhythmias or at baseline rhythm  Description: INTERVENTIONS:  - Continuous cardiac monitoring, vital signs, obtain 12 lead EKG if ordered  - Administer antiarrhythmic and heart rate control medications as ordered  - Monitor electrolytes and administer replacement therapy as ordered  Outcome: Progressing     Problem: RESPIRATORY - ADULT  Goal: Achieves optimal ventilation and oxygenation  Description: INTERVENTIONS:  - Assess for changes in respiratory status  - Assess for changes in mentation and behavior  - Position to facilitate oxygenation and minimize respiratory effort  - Oxygen administered by appropriate delivery if ordered  - Initiate smoking cessation education as indicated  - Encourage broncho-pulmonary hygiene including cough, deep breathe, Incentive Spirometry  - Assess the need for suctioning and aspirate as needed  - Assess and instruct to report SOB or any respiratory difficulty  - Respiratory Therapy support as indicated  Outcome: Progressing     Problem: GASTROINTESTINAL - ADULT  Goal: Minimal or absence of nausea and/or vomiting  Description: INTERVENTIONS:  - Administer IV fluids if ordered to ensure adequate hydration  - Maintain NPO status until nausea and vomiting are resolved  - Nasogastric tube if ordered  - Administer ordered antiemetic medications as needed  - Provide nonpharmacologic comfort measures as appropriate  - Advance diet as tolerated, if ordered  - Consider nutrition services referral to assist patient with adequate nutrition and appropriate food choices  Outcome: Progressing  Goal: Maintains or returns to baseline bowel function  Description: INTERVENTIONS:  - Assess bowel function  - Encourage oral fluids to ensure adequate hydration  - Administer IV fluids if ordered to ensure adequate hydration  - Administer ordered medications as needed  - Encourage mobilization and activity  - Consider nutritional services referral to assist patient with adequate nutrition and appropriate food choices  Outcome: Progressing  Goal: Maintains adequate nutritional intake  Description: INTERVENTIONS:  - Monitor percentage of each meal consumed  - Identify factors contributing to decreased intake, treat as appropriate  - Assist with meals as needed  - Monitor I&O, weight, and lab values if indicated  - Obtain nutrition services referral as needed  Outcome: Progressing  Goal: Establish and maintain optimal ostomy function  Description: INTERVENTIONS:  - Assess bowel function  - Encourage oral fluids to ensure adequate hydration  - Administer IV fluids if ordered to ensure adequate hydration   - Administer ordered medications as needed  - Encourage mobilization and activity  - Nutrition services referral to assist patient with appropriate food choices  - Assess stoma site  - Consider wound care consult   Outcome: Progressing  Goal: Oral mucous membranes remain intact  Description: INTERVENTIONS  - Assess oral mucosa and hygiene practices  - Implement preventative oral hygiene regimen  - Implement oral medicated treatments as ordered  - Initiate Nutrition services referral as needed  Outcome: Progressing     Problem: GASTROINTESTINAL - ADULT  Goal: Minimal or absence of nausea and/or vomiting  Description: INTERVENTIONS:  - Administer IV fluids if ordered to ensure adequate hydration  - Maintain NPO status until nausea and vomiting are resolved  - Nasogastric tube if ordered  - Administer ordered antiemetic medications as needed  - Provide nonpharmacologic comfort measures as appropriate  - Advance diet as tolerated, if ordered  - Consider nutrition services referral to assist patient with adequate nutrition and appropriate food choices  Outcome: Progressing  Goal: Maintains or returns to baseline bowel function  Description: INTERVENTIONS:  - Assess bowel function  - Encourage oral fluids to ensure adequate hydration  - Administer IV fluids if ordered to ensure adequate hydration  - Administer ordered medications as needed  - Encourage mobilization and activity  - Consider nutritional services referral to assist patient with adequate nutrition and appropriate food choices  Outcome: Progressing  Goal: Maintains adequate nutritional intake  Description: INTERVENTIONS:  - Monitor percentage of each meal consumed  - Identify factors contributing to decreased intake, treat as appropriate  - Assist with meals as needed  - Monitor I&O, weight, and lab values if indicated  - Obtain nutrition services referral as needed  Outcome: Progressing  Goal: Establish and maintain optimal ostomy function  Description: INTERVENTIONS:  - Assess bowel function  - Encourage oral fluids to ensure adequate hydration  - Administer IV fluids if ordered to ensure adequate hydration   - Administer ordered medications as needed  - Encourage mobilization and activity  - Nutrition services referral to assist patient with appropriate food choices  - Assess stoma site  - Consider wound care consult   Outcome: Progressing  Goal: Oral mucous membranes remain intact  Description: INTERVENTIONS  - Assess oral mucosa and hygiene practices  - Implement preventative oral hygiene regimen  - Implement oral medicated treatments as ordered  - Initiate Nutrition services referral as needed  Outcome: Progressing     Problem: GASTROINTESTINAL - ADULT  Goal: Minimal or absence of nausea and/or vomiting  Description: INTERVENTIONS:  - Administer IV fluids if ordered to ensure adequate hydration  - Maintain NPO status until nausea and vomiting are resolved  - Nasogastric tube if ordered  - Administer ordered antiemetic medications as needed  - Provide nonpharmacologic comfort measures as appropriate  - Advance diet as tolerated, if ordered  - Consider nutrition services referral to assist patient with adequate nutrition and appropriate food choices  Outcome: Progressing     Problem: GASTROINTESTINAL - ADULT  Goal: Maintains or returns to baseline bowel function  Description: INTERVENTIONS:  - Assess bowel function  - Encourage oral fluids to ensure adequate hydration  - Administer IV fluids if ordered to ensure adequate hydration  - Administer ordered medications as needed  - Encourage mobilization and activity  - Consider nutritional services referral to assist patient with adequate nutrition and appropriate food choices  Outcome: Progressing     Problem: GASTROINTESTINAL - ADULT  Goal: Maintains adequate nutritional intake  Description: INTERVENTIONS:  - Monitor percentage of each meal consumed  - Identify factors contributing to decreased intake, treat as appropriate  - Assist with meals as needed  - Monitor I&O, weight, and lab values if indicated  - Obtain nutrition services referral as needed  Outcome: Progressing     Problem: GASTROINTESTINAL - ADULT  Goal: Establish and maintain optimal ostomy function  Description: INTERVENTIONS:  - Assess bowel function  - Encourage oral fluids to ensure adequate hydration  - Administer IV fluids if ordered to ensure adequate hydration   - Administer ordered medications as needed  - Encourage mobilization and activity  - Nutrition services referral to assist patient with appropriate food choices  - Assess stoma site  - Consider wound care consult   Outcome: Progressing     Problem: GASTROINTESTINAL - ADULT  Goal: Oral mucous membranes remain intact  Description: INTERVENTIONS  - Assess oral mucosa and hygiene practices  - Implement preventative oral hygiene regimen  - Implement oral medicated treatments as ordered  - Initiate Nutrition services referral as needed  Outcome: Progressing     Problem: GENITOURINARY - ADULT  Goal: Maintains or returns to baseline urinary function  Description: INTERVENTIONS:  - Assess urinary function  - Encourage oral fluids to ensure adequate hydration if ordered  - Administer IV fluids as ordered to ensure adequate hydration  - Administer ordered medications as needed  - Offer frequent toileting  - Follow urinary retention protocol if ordered  Outcome: Progressing  Goal: Absence of urinary retention  Description: INTERVENTIONS:  - Assess patient’s ability to void and empty bladder  - Monitor I/O  - Bladder scan as needed  - Discuss with physician/AP medications to alleviate retention as needed  - Discuss catheterization for long term situations as appropriate  Outcome: Progressing  Goal: Urinary catheter remains patent  Description: INTERVENTIONS:  - Assess patency of urinary catheter  - If patient has a chronic song, consider changing catheter if non-functioning  - Follow guidelines for intermittent irrigation of non-functioning urinary catheter  Outcome: Progressing     Problem: GENITOURINARY - ADULT  Goal: Maintains or returns to baseline urinary function  Description: INTERVENTIONS:  - Assess urinary function  - Encourage oral fluids to ensure adequate hydration if ordered  - Administer IV fluids as ordered to ensure adequate hydration  - Administer ordered medications as needed  - Offer frequent toileting  - Follow urinary retention protocol if ordered  Outcome: Progressing  Goal: Absence of urinary retention  Description: INTERVENTIONS:  - Assess patient’s ability to void and empty bladder  - Monitor I/O  - Bladder scan as needed  - Discuss with physician/AP medications to alleviate retention as needed  - Discuss catheterization for long term situations as appropriate  Outcome: Progressing  Goal: Urinary catheter remains patent  Description: INTERVENTIONS:  - Assess patency of urinary catheter  - If patient has a chronic song, consider changing catheter if non-functioning  - Follow guidelines for intermittent irrigation of non-functioning urinary catheter  Outcome: Progressing     Problem: GENITOURINARY - ADULT  Goal: Maintains or returns to baseline urinary function  Description: INTERVENTIONS:  - Assess urinary function  - Encourage oral fluids to ensure adequate hydration if ordered  - Administer IV fluids as ordered to ensure adequate hydration  - Administer ordered medications as needed  - Offer frequent toileting  - Follow urinary retention protocol if ordered  Outcome: Progressing     Problem: GENITOURINARY - ADULT  Goal: Absence of urinary retention  Description: INTERVENTIONS:  - Assess patient’s ability to void and empty bladder  - Monitor I/O  - Bladder scan as needed  - Discuss with physician/AP medications to alleviate retention as needed  - Discuss catheterization for long term situations as appropriate  Outcome: Progressing     Problem: GENITOURINARY - ADULT  Goal: Urinary catheter remains patent  Description: INTERVENTIONS:  - Assess patency of urinary catheter  - If patient has a chronic song, consider changing catheter if non-functioning  - Follow guidelines for intermittent irrigation of non-functioning urinary catheter  Outcome: Progressing     Problem: METABOLIC, FLUID AND ELECTROLYTES - ADULT  Goal: Electrolytes maintained within normal limits  Description: INTERVENTIONS:  - Monitor labs and assess patient for signs and symptoms of electrolyte imbalances  - Administer electrolyte replacement as ordered  - Monitor response to electrolyte replacements, including repeat lab results as appropriate  - Instruct patient on fluid and nutrition as appropriate  Outcome: Progressing     Problem: METABOLIC, FLUID AND ELECTROLYTES - ADULT  Goal: Fluid balance maintained  Description: INTERVENTIONS:  - Monitor labs   - Monitor I/O and WT  - Instruct patient on fluid and nutrition as appropriate  - Assess for signs & symptoms of volume excess or deficit  Outcome: Progressing     Problem: METABOLIC, FLUID AND ELECTROLYTES - ADULT  Goal: Glucose maintained within target range  Description: INTERVENTIONS:  - Monitor Blood Glucose as ordered  - Assess for signs and symptoms of hyperglycemia and hypoglycemia  - Administer ordered medications to maintain glucose within target range  - Assess nutritional intake and initiate nutrition service referral as needed  Outcome: Progressing     Problem: HEMATOLOGIC - ADULT  Goal: Maintains hematologic stability  Description: INTERVENTIONS  - Assess for signs and symptoms of bleeding or hemorrhage  - Monitor labs  - Administer supportive blood products/factors as ordered and appropriate  Outcome: Progressing     Problem: MUSCULOSKELETAL - ADULT  Goal: Maintain or return mobility to safest level of function  Description: INTERVENTIONS:  - Assess patient's ability to carry out ADLs; assess patient's baseline for ADL function and identify physical deficits which impact ability to perform ADLs (bathing, care of mouth/teeth, toileting, grooming, dressing, etc )  - Assess/evaluate cause of self-care deficits   - Assess range of motion  - Assess patient's mobility  - Assess patient's need for assistive devices and provide as appropriate  - Encourage maximum independence but intervene and supervise when necessary  - Involve family in performance of ADLs  - Assess for home care needs following discharge   - Consider OT consult to assist with ADL evaluation and planning for discharge  - Provide patient education as appropriate  Outcome: Progressing     Problem: MUSCULOSKELETAL - ADULT  Goal: Maintain proper alignment of affected body part  Description: INTERVENTIONS:  - Support, maintain and protect limb and body alignment  - Provide patient/ family with appropriate education  Outcome: Progressing

## 2022-12-14 NOTE — ASSESSMENT & PLAN NOTE
· POA: AEB tachycardia, tachypnea, leukopenia, lactic acidosis > 4, GERMAN  · In setting of intraabdominal infection/peritonitis  · Imaging as noted above  · COVID/FLU/RSV negative  · Blood cultures w/NGTD  · Peritoneal fluid culture w/gamma hemolytic Strep  · Anaerobic culture w/Bacteroides fragilis   · Lactic acid 4 3 - has since cleared   · Procalcitonin 8 29 - 6 02  · Is s/p aggressive IVF resuscitation  · Continue IV Ciprofloxacin, Flagyl, Diflucan D#4  · Monitor fever and WBC curve  · Trend procalcitonin and follow-up cultures

## 2022-12-14 NOTE — DISCHARGE INSTR - OTHER ORDERS
Skin and Ostomy Care Plan:  1  Apply skin nourishing cream to the skin daily  2  Ehob offloading cushion to chair when OOB  3  Elevate heels off of bed with pillows to offload  4  Apply Hydraguard to b/l heels, buttocks and sacrum BID and PRN  5  Turn and reposition patient Q2 hours  6  Empty ostomy pouch when 1/3-1/2 full of stool or gas  Change pouch every 3 days and PRN with leakage  Cleanse skin with warm water and washcloth, pat dry, apply 3M No sting barrier film to peristomal skin   Use 2 piece moldable ostomy pouch per patient preference

## 2022-12-14 NOTE — ASSESSMENT & PLAN NOTE
· New onset AF w/RVR as high as 225 in AM of 12/13  · No previous history of AF  · Suspect this is 2/2 septic shock/peritonitis vs pulmonary vascular congestion w/concurrent anxiety and pain  · Refractory to IV Lopressor 5 mg x2, IV Cardizem 15 mg x1, IV Amiodarone bolus + gtt initiation  · Improved s/p additional IV Cardizem 20 mg x1 + gtt initiation as well as initiation of PO Lopressor 25 mg BID  · Continue w/PRN diuresis  · Continue AC w/Heparin gtt - will likely need DOAC on discharge  · Check TTE  · Continue cardiac monitoring and PRN ECG

## 2022-12-15 PROBLEM — I42.9 CARDIOMYOPATHY (HCC): Status: ACTIVE | Noted: 2022-12-15

## 2022-12-15 LAB
AMMONIA PLAS-SCNC: 43 UMOL/L (ref 18–72)
ANION GAP SERPL CALCULATED.3IONS-SCNC: 5 MMOL/L (ref 4–13)
ANION GAP SERPL CALCULATED.3IONS-SCNC: 9 MMOL/L (ref 4–13)
APTT PPP: 41 SECONDS (ref 23–37)
APTT PPP: 59 SECONDS (ref 23–37)
APTT PPP: 80 SECONDS (ref 23–37)
BACTERIA SPEC BFLD CULT: ABNORMAL
BASE EX.OXY STD BLDV CALC-SCNC: 60.4 % (ref 60–80)
BASE EXCESS BLDV CALC-SCNC: -1.1 MMOL/L
BUN SERPL-MCNC: 17 MG/DL (ref 5–25)
BUN SERPL-MCNC: 22 MG/DL (ref 5–25)
CA-I BLD-SCNC: 1.03 MMOL/L (ref 1.12–1.32)
CA-I BLD-SCNC: 1.05 MMOL/L (ref 1.12–1.32)
CALCIUM SERPL-MCNC: 8.1 MG/DL (ref 8.4–10.2)
CALCIUM SERPL-MCNC: 8.2 MG/DL (ref 8.4–10.2)
CHLORIDE SERPL-SCNC: 101 MMOL/L (ref 96–108)
CHLORIDE SERPL-SCNC: 102 MMOL/L (ref 96–108)
CO2 SERPL-SCNC: 25 MMOL/L (ref 21–32)
CO2 SERPL-SCNC: 26 MMOL/L (ref 21–32)
CREAT SERPL-MCNC: 1.09 MG/DL (ref 0.6–1.3)
CREAT SERPL-MCNC: 1.27 MG/DL (ref 0.6–1.3)
ERYTHROCYTE [DISTWIDTH] IN BLOOD BY AUTOMATED COUNT: 13.7 % (ref 11.6–15.1)
GFR SERPL CREATININE-BSD FRML MDRD: 54 ML/MIN/1.73SQ M
GFR SERPL CREATININE-BSD FRML MDRD: 66 ML/MIN/1.73SQ M
GLUCOSE SERPL-MCNC: 103 MG/DL (ref 65–140)
GLUCOSE SERPL-MCNC: 110 MG/DL (ref 65–140)
GLUCOSE SERPL-MCNC: 114 MG/DL (ref 65–140)
GLUCOSE SERPL-MCNC: 122 MG/DL (ref 65–140)
GLUCOSE SERPL-MCNC: 145 MG/DL (ref 65–140)
GRAM STN SPEC: ABNORMAL
HCO3 BLDV-SCNC: 26.6 MMOL/L (ref 24–30)
HCT VFR BLD AUTO: 37.6 % (ref 36.5–49.3)
HGB BLD-MCNC: 11.7 G/DL (ref 12–17)
MAGNESIUM SERPL-MCNC: 2.1 MG/DL (ref 1.9–2.7)
MAGNESIUM SERPL-MCNC: 2.1 MG/DL (ref 1.9–2.7)
MCH RBC QN AUTO: 28.5 PG (ref 26.8–34.3)
MCHC RBC AUTO-ENTMCNC: 31.1 G/DL (ref 31.4–37.4)
MCV RBC AUTO: 92 FL (ref 82–98)
O2 CT BLDV-SCNC: 9.6 ML/DL
PCO2 BLDV: 59.1 MM HG (ref 42–50)
PH BLDV: 7.27 [PH] (ref 7.3–7.4)
PHOSPHATE SERPL-MCNC: 2.9 MG/DL (ref 2.3–4.1)
PHOSPHATE SERPL-MCNC: 3.9 MG/DL (ref 2.3–4.1)
PLATELET # BLD AUTO: 223 THOUSANDS/UL (ref 149–390)
PMV BLD AUTO: 9.3 FL (ref 8.9–12.7)
PO2 BLDV: 36.3 MM HG (ref 35–45)
POTASSIUM SERPL-SCNC: 3.9 MMOL/L (ref 3.5–5.3)
POTASSIUM SERPL-SCNC: 4 MMOL/L (ref 3.5–5.3)
PROCALCITONIN SERPL-MCNC: 2.05 NG/ML
RBC # BLD AUTO: 4.1 MILLION/UL (ref 3.88–5.62)
SODIUM SERPL-SCNC: 132 MMOL/L (ref 135–147)
SODIUM SERPL-SCNC: 136 MMOL/L (ref 135–147)
WBC # BLD AUTO: 13.88 THOUSAND/UL (ref 4.31–10.16)

## 2022-12-15 RX ORDER — HYDROMORPHONE HCL/PF 1 MG/ML
0.5 SYRINGE (ML) INJECTION EVERY 4 HOURS PRN
Status: DISCONTINUED | OUTPATIENT
Start: 2022-12-15 | End: 2022-12-16

## 2022-12-15 RX ORDER — CALCIUM GLUCONATE 20 MG/ML
2 INJECTION, SOLUTION INTRAVENOUS ONCE
Status: COMPLETED | OUTPATIENT
Start: 2022-12-15 | End: 2022-12-15

## 2022-12-15 RX ORDER — POLYETHYLENE GLYCOL 3350 17 G/17G
17 POWDER, FOR SOLUTION ORAL DAILY
Status: DISCONTINUED | OUTPATIENT
Start: 2022-12-15 | End: 2022-12-17

## 2022-12-15 RX ORDER — LORAZEPAM 2 MG/ML
1 INJECTION INTRAMUSCULAR EVERY 8 HOURS PRN
Status: DISCONTINUED | OUTPATIENT
Start: 2022-12-15 | End: 2022-12-15

## 2022-12-15 RX ORDER — ALBUMIN, HUMAN INJ 5% 5 %
25 SOLUTION INTRAVENOUS ONCE
Status: COMPLETED | OUTPATIENT
Start: 2022-12-15 | End: 2022-12-16

## 2022-12-15 RX ORDER — FUROSEMIDE 10 MG/ML
40 INJECTION INTRAMUSCULAR; INTRAVENOUS ONCE
Status: COMPLETED | OUTPATIENT
Start: 2022-12-15 | End: 2022-12-15

## 2022-12-15 RX ORDER — ALPRAZOLAM 0.5 MG/1
0.5 TABLET ORAL ONCE
Status: COMPLETED | OUTPATIENT
Start: 2022-12-15 | End: 2022-12-15

## 2022-12-15 RX ORDER — NALOXONE HYDROCHLORIDE 0.4 MG/ML
0.1 INJECTION, SOLUTION INTRAMUSCULAR; INTRAVENOUS; SUBCUTANEOUS ONCE
Status: COMPLETED | OUTPATIENT
Start: 2022-12-15 | End: 2022-12-15

## 2022-12-15 RX ADMIN — ALBUMIN (HUMAN) 25 G: 12.5 INJECTION, SOLUTION INTRAVENOUS at 17:19

## 2022-12-15 RX ADMIN — CIPROFLOXACIN 400 MG: 2 INJECTION, SOLUTION INTRAVENOUS at 20:53

## 2022-12-15 RX ADMIN — Medication 1000 UNITS: at 09:04

## 2022-12-15 RX ADMIN — HEPARIN SODIUM 16.1 UNITS/KG/HR: 10000 INJECTION, SOLUTION INTRAVENOUS at 14:43

## 2022-12-15 RX ADMIN — SODIUM CHLORIDE, PRESERVATIVE FREE 0.04 MG: 5 INJECTION INTRAVENOUS at 19:48

## 2022-12-15 RX ADMIN — HYDROMORPHONE HYDROCHLORIDE 0.5 MG: 1 INJECTION, SOLUTION INTRAMUSCULAR; INTRAVENOUS; SUBCUTANEOUS at 03:26

## 2022-12-15 RX ADMIN — KETOROLAC TROMETHAMINE 15 MG: 30 INJECTION, SOLUTION INTRAMUSCULAR; INTRAVENOUS at 05:39

## 2022-12-15 RX ADMIN — CIPROFLOXACIN 400 MG: 2 INJECTION, SOLUTION INTRAVENOUS at 09:04

## 2022-12-15 RX ADMIN — NALXONE HYDROCHLORIDE 0.1 MG: 0.4 INJECTION INTRAMUSCULAR; INTRAVENOUS; SUBCUTANEOUS at 20:01

## 2022-12-15 RX ADMIN — GABAPENTIN 600 MG: 600 TABLET, FILM COATED ORAL at 21:02

## 2022-12-15 RX ADMIN — FUROSEMIDE 40 MG: 10 INJECTION, SOLUTION INTRAMUSCULAR; INTRAVENOUS at 12:00

## 2022-12-15 RX ADMIN — PANTOPRAZOLE SODIUM 40 MG: 40 INJECTION, POWDER, LYOPHILIZED, FOR SOLUTION INTRAVENOUS at 20:53

## 2022-12-15 RX ADMIN — KETOROLAC TROMETHAMINE 15 MG: 30 INJECTION, SOLUTION INTRAMUSCULAR; INTRAVENOUS at 12:01

## 2022-12-15 RX ADMIN — ALPRAZOLAM 0.5 MG: 0.5 TABLET ORAL at 04:09

## 2022-12-15 RX ADMIN — CARISOPRODOL 350 MG: 350 TABLET ORAL at 12:01

## 2022-12-15 RX ADMIN — OXYCODONE HYDROCHLORIDE 10 MG: 10 TABLET ORAL at 12:02

## 2022-12-15 RX ADMIN — CARISOPRODOL 350 MG: 350 TABLET ORAL at 09:03

## 2022-12-15 RX ADMIN — CALCIUM GLUCONATE 2 G: 20 INJECTION, SOLUTION INTRAVENOUS at 20:52

## 2022-12-15 RX ADMIN — POLYETHYLENE GLYCOL 3350 17 G: 17 POWDER, FOR SOLUTION ORAL at 09:04

## 2022-12-15 RX ADMIN — PANTOPRAZOLE SODIUM 40 MG: 40 INJECTION, POWDER, LYOPHILIZED, FOR SOLUTION INTRAVENOUS at 09:04

## 2022-12-15 RX ADMIN — ATORVASTATIN CALCIUM 20 MG: 10 TABLET, FILM COATED ORAL at 09:03

## 2022-12-15 RX ADMIN — DILTIAZEM HYDROCHLORIDE 14 MG/HR: 5 INJECTION, SOLUTION INTRAVENOUS at 10:04

## 2022-12-15 RX ADMIN — METRONIDAZOLE 500 MG: 500 INJECTION, SOLUTION INTRAVENOUS at 14:46

## 2022-12-15 RX ADMIN — GABAPENTIN 600 MG: 600 TABLET, FILM COATED ORAL at 09:04

## 2022-12-15 RX ADMIN — HYDROMORPHONE HYDROCHLORIDE 1 MG: 1 INJECTION, SOLUTION INTRAMUSCULAR; INTRAVENOUS; SUBCUTANEOUS at 05:48

## 2022-12-15 RX ADMIN — METOPROLOL TARTRATE 75 MG: 25 TABLET, FILM COATED ORAL at 20:53

## 2022-12-15 RX ADMIN — METRONIDAZOLE 500 MG: 500 INJECTION, SOLUTION INTRAVENOUS at 23:50

## 2022-12-15 RX ADMIN — HYDROMORPHONE HYDROCHLORIDE 0.5 MG: 1 INJECTION, SOLUTION INTRAMUSCULAR; INTRAVENOUS; SUBCUTANEOUS at 13:09

## 2022-12-15 RX ADMIN — ONDANSETRON 4 MG: 2 INJECTION INTRAMUSCULAR; INTRAVENOUS at 03:26

## 2022-12-15 RX ADMIN — METOPROLOL TARTRATE 75 MG: 25 TABLET, FILM COATED ORAL at 09:04

## 2022-12-15 RX ADMIN — METRONIDAZOLE 500 MG: 500 INJECTION, SOLUTION INTRAVENOUS at 07:26

## 2022-12-15 NOTE — ASSESSMENT & PLAN NOTE
· Suspect this is is multifactorial in setting of atelectasis vs mild pulmonary vascular congestion vs post-operative pain  · CXR on 12/13 w/mildly increased interstitial markings bilaterally - may indicate mild pulmonary edema  · Respiratory status improved w/IV diuresis - continue w/PRN diuresis  · Now weaned to RA - continue to maintain SpO2 > 90%  · Continue scheduled Xopenex/HTS nebs given thick secretions  · Aggressive pulmonary hygiene  · Encourage cough, deep breathing, IS, ambulation

## 2022-12-15 NOTE — RESPIRATORY THERAPY NOTE
RT Protocol Note  Marcha Hamman 78 y o  male MRN: [id number]  Unit/Bed#: ICU 10-01 Encounter: [id number]    Assessment    Principal Problem:    Peritonitis ([facility] [address] )  Active Problems:    Chronic pain    Continuous opioid dependence ([facility] )    Hyperlipidemia    Benign essential hypertension    Insomnia    Status post aortic valve replacement with bioprosthetic valve    Rheumatoid arthritis with rheumatoid factor, unspecified (HCC)    Chronic kidney disease, stage 2 (mild)    Septic shock (HCC)    Acute respiratory failure with hypoxia ([facility] [address] )    New onset atrial fibrillation (HCC)      Home Pulmonary Medications:  none       Past Medical History:   Diagnosis Date    Aortic stenosis     Aortic stenosis, severe     Arthritis     Carpal tunnel syndrome     Chronic back pain     Chronic pain     Chronic, continuous use of opioids     Hyperlipidemia     Hypertension     Hypertrophy of prostate     Insomnia     Large bowel obstruction (HCC)     Peripheral neuropathy     RA (rheumatoid arthritis) ([facility] )     Umbilical hernia      Social History     Socioeconomic History    Marital status: /Civil Union     Spouse name: None    Number of children: None    Years of education: None    Highest education level: None   Occupational History    Occupation: Retired   Tobacco Use    Smoking status: Never    Smokeless tobacco: Never   Vaping Use    Vaping Use: Never used   Substance and Sexual Activity    Alcohol use: Not Currently    Drug use: No    Sexual activity: Yes   Other Topics Concern    None   Social History Narrative    None     Social Determinants of Health     Financial Resource Strain: Low Risk     Difficulty of Paying Living Expenses: Not very hard   Food Insecurity: No Food Insecurity    Worried About Running Out of Food in the Last Year: Never true    Ran Out of Food in the Last Year: Never true   Transportation Needs: No Transportation Needs    Lack of Transportation (Medical): No    Lack of Transportation (Non-Medical): No   Physical Activity: Inactive    Days of Exercise per Week: 0 days    Minutes of Exercise per Session: 0 min   Stress: No Stress Concern Present    Feeling of Stress : Not at all   Social Connections: Not on file   Intimate Partner Violence: Not At Risk    Fear of Current or Ex-Partner: No    Emotionally Abused: No    Physically Abused: No    Sexually Abused: No   Housing Stability: Low Risk     Unable to Pay for Housing in the Last Year: No    Number of Places Lived in the Last Year: 1    Unstable Housing in the Last Year: No       Subjective         Objective    Physical Exam:   Assessment Type: (P) Assess only  General Appearance: (P) Awake  Respiratory Pattern: (P) Normal  Chest Assessment: (P) Chest expansion symmetrical  Bilateral Breath Sounds: (P) Clear    Vitals:  Blood pressure 168/86, pulse (!) 133, temperature 97 9 °F (36 6 °C), temperature source Tympanic, resp  rate 21, height 5' 7" (1 702 m), weight 91 kg (200 lb 9 9 oz), SpO2 95 %  Imaging and other studies: I have personally reviewed pertinent reports  O2 Device: room air     Plan    Respiratory Plan: (P) Discontinue Protocol  Airway Clearance Plan: (P) Discontinue Protocol     Resp Comments: (P) Pt performing over one liter on IS  Airway clearance protocal discontinued  Pt with clear BS, no sob, no pulm hx  No prn UDN therapy indicated  Pt taken off respiratory protocal

## 2022-12-15 NOTE — NURSING NOTE
Remains Oob in the chair  Ambulated with walker and assist of 2 out into the rasmussen and back into the room ~ 40 ft  Tolerated well   DAMASO drain removed earlier by Dr Melburn Canavan

## 2022-12-15 NOTE — ASSESSMENT & PLAN NOTE
· New onset AF w/RVR as high as 225 in AM of 12/13  · No previous history of AF  · Suspect this is 2/2 septic shock/peritonitis vs pulmonary vascular congestion w/concurrent anxiety and pain  · TTE from 12/14 revealed EF 35% w/mild concentric hypertrophy, moderately reduced systolic function in global manner; mildly dilated RV w/moderately reduced systolic function; mild MR/TR, RVSP 48  · Refractory to IV Lopressor 5 mg x2, IV Cardizem 15 mg x1, IV Amiodarone bolus + gtt initiation  · Improved s/p additional IV Cardizem 20 mg x1 + gtt initiation as well as initiation of PO Lopressor 25 mg BID  · Continue to wean Cardizem gtt as able  · PO Lopressor uptitrated to 50 mg BID  · Continue w/PRN diuresis  · Continue AC w/Heparin gtt - will likely need DOAC on discharge  · Continue cardiac monitoring and PRN ECG

## 2022-12-15 NOTE — ASSESSMENT & PLAN NOTE
- Per documentation began around 2 AM on 12/13/2022  -We will continue with rate control strategy at this time  -Continue anticoagulation with heparin infusion  -Continue to monitor patient clinically

## 2022-12-15 NOTE — ASSESSMENT & PLAN NOTE
· POA: AEB tachycardia, tachypnea, leukopenia, lactic acidosis > 4, GERMAN  · In setting of intraabdominal infection/peritonitis  · Imaging as noted above  · COVID/FLU/RSV negative  · Blood cultures w/NGTD  · Peritoneal fluid culture w/gamma hemolytic Strep  · Anaerobic culture w/Bacteroides fragilis   · Lactic acid 4 3 - has since cleared   · Procalcitonin 8 29 - 6 02 - 3 36  · Is s/p aggressive IVF resuscitation  · Continue IV Ciprofloxacin, Flagyl, Diflucan D#5  · Monitor fever and WBC curve  · Trend procalcitonin and follow-up cultures

## 2022-12-15 NOTE — PROGRESS NOTES
[facility]  68   Progress Note - Iván Call 1945, 78 y o  male MRN: [id number]  Unit/Bed#: ICU 10-01 Encounter: [id number]  Primary Care Provider: Pam Rodriguez DO   Date and time admitted to hospital: 12/11/2022  5:19 AM    * Peritonitis [facility])  Assessment & Plan  · Presented to ED on 12/11 w/acute onset abd pain concerning for acute abdomen   · W/hx of sigmoid diverticulitis s/p Polo's procedure and colostomy with planned reversal scheduled for 01/2023  · CT A/P on admission revealed extensive inflammatory changes involving jejunum and proximal-mid ileum, compatible with severe enteritis; there is focal free air surrounding lower midline ileum just above urinary bladder with extensive inflammatory changes; few mildly dilated loops of jejunum within left upper abdomen, likely 2/2 extensive inflammatory changes with possible superimposed low-grade partial SBO; small amount of free air and free fluid in remainder of abdomen and pelvis; stomach and proximal esophagus are distended and fluid-filled, presumably secondary to delayed transit  · OR on 12/11 for EX LAP,  EXTENSIVE MELANI, APPENDECTOMY, DRAINAGE OF ABDOMINAL ABSCESS - today is POD#4  · Peritoneal fluid culture w/gamma hemolytic Strep  · Anaerobic culture w/Bacteroides fragilis   · Continue IV Ciprofloxacin, Flagyl, Diflucan D#5  · NGT removed on 12/13 - tolerating clear liquid diet - would advance as tolerated today pending surgery's recommendations  · Home scheduled pain regimen restarted - continue PRN Dilaudid for acute surgical pain as well as scheduled IV Toradol  · Monitor incision closely - dressing changes per surgery team  · Monitor ostomy/drain output     Septic shock ([address] )  Assessment & Plan  · POA: AEB tachycardia, tachypnea, leukopenia, lactic acidosis > 4, GERMAN  · In setting of intraabdominal infection/peritonitis  · Imaging as noted above  · COVID/FLU/RSV negative  · Blood cultures w/NGTD  · Peritoneal fluid culture w/gamma hemolytic Strep  · Anaerobic culture w/Bacteroides fragilis   · Lactic acid 4 3 - has since cleared   · Procalcitonin 8 29 - 6 02 - 3 36  · Is s/p aggressive IVF resuscitation  · Continue IV Ciprofloxacin, Flagyl, Diflucan D#5  · Monitor fever and WBC curve  · Trend procalcitonin and follow-up cultures    New onset atrial fibrillation (HCC)  Assessment & Plan  · New onset AF w/RVR as high as 225 in AM of 12/13  · No previous history of AF  · Suspect this is 2/2 septic shock/peritonitis vs pulmonary vascular congestion w/concurrent anxiety and pain  · TTE from 12/14 revealed EF 35% w/mild concentric hypertrophy, moderately reduced systolic function in global manner; mildly dilated RV w/moderately reduced systolic function; mild MR/TR, RVSP 48  · Refractory to IV Lopressor 5 mg x2, IV Cardizem 15 mg x1, IV Amiodarone bolus + gtt initiation  · Improved s/p additional IV Cardizem 20 mg x1 + gtt initiation as well as initiation of PO Lopressor 25 mg BID  · Continue to wean Cardizem gtt as able  · PO Lopressor uptitrated to 50 mg BID  · Continue w/PRN diuresis  · Continue AC w/Heparin gtt - will likely need DOAC on discharge  · Continue cardiac monitoring and PRN ECG    Acute respiratory failure with hypoxia (HCC)  Assessment & Plan  · Suspect this is is multifactorial in setting of atelectasis vs mild pulmonary vascular congestion vs post-operative pain  · CXR on 12/13 w/mildly increased interstitial markings bilaterally - may indicate mild pulmonary edema  · Respiratory status improved w/IV diuresis - continue w/PRN diuresis  · Now weaned to RA - continue to maintain SpO2 > 90%  · Continue scheduled Xopenex/HTS nebs given thick secretions  · Aggressive pulmonary hygiene  · Encourage cough, deep breathing, IS, ambulation    Chronic kidney disease, stage 2 (mild)  Assessment & Plan  Lab Results   Component Value Date    EGFR 68 12/14/2022    EGFR 60 12/13/2022    EGFR 69 12/13/2022    CREATININE 1 06 12/14/2022 CREATININE 1 18 12/13/2022    CREATININE 1 05 12/13/2022     · Baseline creatinine appears to be around 0 8 - 1 1  · Trend renal indices  · Monitor I/O  · Daily weights    Rheumatoid arthritis with rheumatoid factor, unspecified (HCC)  Assessment & Plan  · Continue pain regimen as noted above    Status post aortic valve replacement with bioprosthetic valve  Assessment & Plan  · Is s/p AVR in 2018   · Holding ASA while fresh post-op - will discuss restarting w/surgery team    Insomnia  Assessment & Plan  · Continue home Trazodone    Benign essential hypertension  Assessment & Plan  · Currently holding home Cozaar - would consider restarting if becomes hypertensive  · Trend BPs    Hyperlipidemia  Assessment & Plan  · Continue home statin    Continuous opioid dependence (HCC)  Assessment & Plan  · Takes Oxycodone 10 mg QID and PRN Percocet  mg Q4H  · PDMP reviewed   · Continue home Oxycodone 10 mg QID  · Currently w/PRN IV Dilaudid for post-operative pain    Chronic pain  Assessment & Plan  · In setting of RA/OA   · Continue Soma 350 mg QID, Neurontin 600 mg TID, Oxycodone 10 mg QID, and PRN Percocet  mg Q4H  · Continue PRN IV Dilaudid for post-operative pain as well as scheduled IV Toradol  · Continue splinting w/pillow for pain      ----------------------------------------------------------------------------------------  HPI/24hr events:     · PO Lopressor uptitrated to 50 mg BID   · Received IV Lasix 40 mg x1  · Anxious overnight - received Xanax x1    Patient appropriate for transfer out of the ICU today?: No  Disposition: Continue Stepdown Level 1 level of care   Code Status: Level 1 - Full Code  ---------------------------------------------------------------------------------------  SUBJECTIVE  "I'm feel really full, like I have gas " Continues to have abdominal pain  Denies SOB       Review of Systems  Review of systems was reviewed and negative unless stated above in HPI/24-hour events ---------------------------------------------------------------------------------------  OBJECTIVE    Vitals   Vitals:    22 2310 12/15/22 0000 12/15/22 0100 12/15/22 0200   BP: 138/73 147/67 135/84 141/81   BP Location:       Pulse: (!) 118 (!) 106 95 90   Resp: 21 19 20 (!) 25   Temp:       TempSrc:       SpO2: 95% 94% 95% 95%   Weight:       Height:         Temp (24hrs), Av °F (37 2 °C), Min:97 8 °F (36 6 °C), Max:100 6 °F (38 1 °C)  Current: Temperature: (!) 100 6 °F (38 1 °C)          Respiratory:  SpO2: SpO2: 95 %, SpO2 Activity: SpO2 Activity: At Rest, SpO2 Device: O2 Device: None (Room air)  Nasal Cannula O2 Flow Rate (L/min): 3 L/min    Invasive/non-invasive ventilation settings   Respiratory    Lab Data (Last 4 hours)    None         O2/Vent Data (Last 4 hours)    None                Physical Exam  Vitals and nursing note reviewed  Constitutional:       General: He is awake  Appearance: He is ill-appearing  HENT:      Head: Normocephalic and atraumatic  Eyes:      Pupils: Pupils are equal, round, and reactive to light  Cardiovascular:      Rate and Rhythm: Tachycardia present  Rhythm irregularly irregular  Pulses: Normal pulses  Heart sounds: Normal heart sounds  Comments: AF  Pulmonary:      Effort: Pulmonary effort is normal       Breath sounds: Examination of the right-lower field reveals rales  Examination of the left-lower field reveals rales  Decreased breath sounds and rales present  Abdominal:      General: Bowel sounds are decreased  There is distension  Palpations: Abdomen is soft  Tenderness: There is abdominal tenderness  Musculoskeletal:         General: Normal range of motion  Cervical back: Normal range of motion and neck supple  Right lower le+ Pitting Edema present  Left lower le+ Pitting Edema present  Skin:     General: Skin is warm and dry  Capillary Refill: Capillary refill takes less than 2 seconds  Neurological:      General: No focal deficit present  Mental Status: He is alert and oriented to person, place, and time  Psychiatric:         Behavior: Behavior is cooperative           Laboratory and Diagnostics:  Results from last 7 days   Lab Units 12/14/22  0528 12/13/22  1950 12/13/22  0507 12/12/22  0629 12/11/22  1911 12/11/22  0620   WBC Thousand/uL 15 40* 15 33* 11 25* 11 36* 5 25 2 99*   HEMOGLOBIN g/dL 12 1 11 7* 12 0 13 4 14 2 16 2   HEMATOCRIT % 37 4 36 6 37 9 42 0 44 8 52 2*   PLATELETS Thousands/uL 201 206 170 188 215 251   NEUTROS PCT %  --   --   --   --   --  85*   BANDS PCT %  --   --   --   --  30*  --    MONOS PCT %  --   --   --   --   --  8   MONO PCT %  --   --   --   --  3*  --      Results from last 7 days   Lab Units 12/14/22  0528 12/13/22  1758 12/13/22  0507 12/12/22  1853 12/12/22  0629 12/11/22  1911 12/11/22  0620   SODIUM mmol/L 136 135 137 135 135 136 138   POTASSIUM mmol/L 3 8 3 4* 4 1 4 0 3 8 4 4 3 8   CHLORIDE mmol/L 103 102 104 104 105 106 100   CO2 mmol/L 25 27 26 26 24 21 26   ANION GAP mmol/L 8 6 7 5 6 9 12   BUN mg/dL 16 15 16 13 12 15 19   CREATININE mg/dL 1 06 1 18 1 05 1 12 0 96 0 96 1 28   CALCIUM mg/dL 8 5 8 5 8 5 8 6 8 1* 7 9* 9 5   GLUCOSE RANDOM mg/dL 95 117 112 114 110 113 185*   ALT U/L 12  --  10  --  12  --  18   AST U/L 14  --  14  --  17  --  17   ALK PHOS U/L 44  --  35  --  27*  --  54   ALBUMIN g/dL 3 1*  --  3 0*  --  3 3*  --  4 3   TOTAL BILIRUBIN mg/dL 0 54  --  0 74  --  0 68  --  0 57     Results from last 7 days   Lab Units 12/14/22  0528 12/13/22  1758 12/13/22  0507 12/12/22  1853 12/12/22  0629   MAGNESIUM mg/dL 2 3 1 8* 2 0 1 9 1 3*   PHOSPHORUS mg/dL 2 4 2 9 1 9* 2 3 2 7      Results from last 7 days   Lab Units 12/14/22  2311 12/14/22  1531 12/14/22  0806 12/14/22  0159 12/13/22  1950   INR   --   --   --   --  1 32*   PTT seconds 60* 159* 53* 62* 42*          Results from last 7 days   Lab Units 12/12/22  0629 12/11/22 2033 12/11/22  0620   LACTIC ACID mmol/L 1 6 2 6* 4 3*     ABG:    VBG:    Results from last 7 days   Lab Units 12/14/22  0528 12/13/22  0507 12/12/22  0629   PROCALCITONIN ng/ml 3 36* 6 02* 8 29*       Micro  Results from last 7 days   Lab Units 12/11/22  1155 12/11/22  0831   BLOOD CULTURE   --  No Growth at 72 hrs  No Growth at 72 hrs  GRAM STAIN RESULT  2+ Polys*  2+ Gram positive cocci in pairs*  2+ Gram positive cocci in chains*  Rare Gram negative rods*  --    BODY FLUID CULTURE, STERILE  3+ Growth of Gamma Hemolytic Streptococcus NOT Enterococcus  2+ Growth of Escherichia coli*  1+ Growth of  --        EKG: AF  Imaging: I have personally reviewed pertinent reports  and I have personally reviewed pertinent films in PACS    Intake and Output  I/O       12/13 0701 12/14 0700 12/14 0701  12/15 0700    P  O  240 1265    I V  (mL/kg) 332 9 (3 7) 128 9 (1 4)    IV Piggyback 650 55    Total Intake(mL/kg) 1222 9 (13 5) 1448 9 (16)    Urine (mL/kg/hr) 2650 (1 2) 1125 (0 5)    Emesis/NG output 150     Drains 50 90    Other 100     Stool  0    Total Output 2950 1215    Net -1727 1 +233 9          Unmeasured Urine Occurrence  1 x          Height and Weights   Height: 5' 7" (170 2 cm)  IBW (Ideal Body Weight): 66 1 kg  Body mass index is 31 32 kg/m²  Weight (last 2 days)     Date/Time Weight    12/14/22 0758 90 7 (200)    12/14/22 0730 90 7 (200)    Comment rows:    OBSERV: Awake alert having Echo at bedside at 12/14/22 0730    12/14/22 0539 90 9 (200 4)    12/13/22 0600 93 6 (206 35)     Weight: checked weight 3x at 12/13/22 0600            Nutrition       Diet Orders   (From admission, onward)             Start     Ordered    12/13/22 1124  Diet Clear Liquid  Diet effective now        Comments: Patient may have ice chips and small sips of water  References:    Nutrtion Support Algorithm Enteral vs  Parenteral   Question Answer Comment   Diet Type Clear Liquid    RD to adjust diet per protocol?  No 12/13/22 1127                  Active Medications  Scheduled Meds:  Current Facility-Administered Medications   Medication Dose Route Frequency Provider Last Rate   • albuterol  2 5 mg Nebulization Q4H PRN Jay Hong MD     • atorvastatin  20 mg Oral Daily PEDRO Paul     • carisoprodol  350 mg Oral 4x Daily PEDRO Maguire     • cholecalciferol  1,000 Units Oral Daily PEDRO Paul     • ciprofloxacin  400 mg Intravenous Q12H MISTY Schrader-C 400 mg (12/14/22 2017)   • diltiazem  1-15 mg/hr Intravenous Titrated PEDRO Maguire 9 mg/hr (12/14/22 2016)   • fluconazole  400 mg Intravenous Q24H MISTY Schrader-C 400 mg (12/14/22 1540)   • gabapentin  600 mg Oral TID PEDRO Maguire     • heparin (porcine)  3-20 Units/kg/hr (Order-Specific) Intravenous Titrated PEDRO Maguire 10 1 Units/kg/hr (12/14/22 1700)   • HYDROmorphone  0 5 mg Intravenous Q4H PRN PEDRO Paul      Or   • HYDROmorphone  1 mg Intravenous Q4H PRN PEDRO Paul     • HYDROmorphone  0 5 mg Intravenous Q4H PRN PEDRO Paul     • insulin lispro  1-5 Units Subcutaneous Q6H [address] PEDRO Maguire     • ketorolac  15 mg Intravenous Q6H [address] Brigitte Stover PA-C     • metoprolol tartrate  50 mg Oral Q12H [address] Chad Connors MD     • metroNIDAZOLE  500 mg Intravenous Q8H SHIRA SchraderC 500 mg (12/14/22 2310)   • ondansetron  4 mg Intravenous Q6H PRN Diamond Rizo PA-C     • oxyCODONE  10 mg Oral Q6H PEDRO Maguire     • pantoprazole  40 mg Intravenous Q12H [address] Diamond Rizo PA-C     • perflutren lipid microsphere  1 mL/min Intravenous Once in imaging Delvis Armas MD     • phenol  1 spray Mouth/Throat Q2H PRN Brigittecanelo Espinosa PA-C     • traZODone  100 mg Oral HS PEDRO Perry       Continuous Infusions:  diltiazem, 1-15 mg/hr, Last Rate: 9 mg/hr (12/14/22 2016)  heparin (porcine), 3-20 Units/kg/hr (Order-Specific), Last Rate: 10 1 Units/kg/hr (12/14/22 1700)      PRN Meds:   albuterol, 2 5 mg, Q4H PRN  HYDROmorphone, 0 5 mg, Q4H PRN   Or  HYDROmorphone, 1 mg, Q4H PRN  HYDROmorphone, 0 5 mg, Q4H PRN  ondansetron, 4 mg, Q6H PRN  perflutren lipid microsphere, 1 mL/min, Once in imaging  phenol, 1 spray, Q2H PRN        Invasive Devices Review  Invasive Devices     Peripheral Intravenous Line  Duration           Long-Dwell Peripheral IV (Midline) 88/72/11 Left Basilic 2 days    Peripheral IV 12/13/22 Right;Ventral (anterior) Forearm 1 day          Drain  Duration           Colostomy Descending/sigmoid -- days    Closed/Suction Drain Right RLQ Bulb 19 Fr  116 days    Colostomy Descending/sigmoid  days    Closed/Suction Drain Lateral;Right RLQ Bulb 19 Fr  3 days                Rationale for remaining devices: N/A  ---------------------------------------------------------------------------------------  Advance Directive and Living Will:      Power of :    POLST:    ---------------------------------------------------------------------------------------  Care Time Delivered:   Upon my evaluation, this patient had a high probability of imminent or life-threatening deterioration due to peritonitis, septic shock, new onset AF, which required my direct attention, intervention, and personal management  I have personally provided 20 minutes (0502 to 0522) of critical care time, exclusive of procedures, teaching, family meetings, and any prior time recorded by providers other than myself  Lisa Mercy, CRNP      Portions of the record may have been created with voice recognition software  Occasional wrong word or "sound a like" substitutions may have occurred due to the inherent limitations of voice recognition software    Read the chart carefully and recognize, using context, where substitutions have occurred

## 2022-12-15 NOTE — ASSESSMENT & PLAN NOTE
- We will increase metoprolol therapy to 75 mg twice daily to assist also in rate control strategy  -Patient currently on diltiazem infusion as well  -Continue to monitor clinically

## 2022-12-15 NOTE — CONSULTS
[address] 1945, 78 y o  male MRN: [id number]  Unit/Bed#: ICU 10-01 Encounter: [id number]  Primary Care Provider: Yogi Michelle DO   Date and time admitted to hospital: 12/11/2022  5:19 AM    Inpatient consult to Cardiology  Consult performed by: Christiano Reyes DO  Consult ordered by: PEDRO Scott          Cardiomyopathy [facility])  Assessment & Plan  - Newly diagnosed during his hospitalization  -Unclear etiology at this time possibly seen in the setting of significant sepsis however will need an ischemic evaluation in the outpatient setting once stable  -We will continue to uptitrate medical therapy as patient tolerates  New onset atrial fibrillation [facility])  Assessment & Plan  - Per documentation began around 2 AM on 12/13/2022  -We will continue with rate control strategy at this time  -Continue anticoagulation with heparin infusion  -Continue to monitor patient clinically    Acute respiratory failure with hypoxia (HCC)  Assessment & Plan  - Seen in the setting of severe sepsis with peritonitis and now newly diagnosed cardiomyopathy along with atrial fibrillation and RVR  -Would continue as needed diuresis and would recommend dose today  -Continue nasal cannula oxygen and wean as tolerated    Septic shock (HCC)  Assessment & Plan  - Lactic acidosis improved  -Continue antibiotics and plan of care per surgery critical care team    Benign essential hypertension  Assessment & Plan  - We will increase metoprolol therapy to 75 mg twice daily to assist also in rate control strategy  -Patient currently on diltiazem infusion as well  -Continue to monitor clinically    * Peritonitis ([facility] [address] )  Assessment & Plan  - Plan of care per surgery and critical care team    Other summary comments:   -We will uptitrate metoprolol therapy to 75 mg twice daily and can continue Cardizem infusion at this time with up titration for rate control strategy    -If up titration of beta-blocker therapy does help we will down titrate Cardizem infusion with eventual transition to oral therapy however if blood pressures do not tolerate will consider initiation of digoxin at that time  -Would continue with daily diuresis as patient does appear mildly volume overloaded on exam today with Lasix however would monitor renal function and electrolytes and replete with goal potassium 4 0, magnesium 2 0   -Unclear whether cardiomyopathy at this time is seen in the setting of significant sepsis versus tachycardia mediated however will need ischemic evaluation in the outpatient setting once hemodynamically stable  -Continue heparin infusion at this time for anticoagulation in case patient needs to return to operating room  -We will continue to monitor patient on telemetry at this time  Outpatient Cardiologist: Dr Keily Mcduffie    HPI: Tamy Ashley is a 78y o  year old male history of hypertension, hyperlipidemia, s/p aortic valve replacement in 2017 along with nonobstructive coronary artery disease who had chronic pain and perforated sigmoid diverticulitis s/p Polo's procedure in August 2022 who presented to [facility] 12/11/2022 with acute onset abdominal pain  Patient was found to have significant extensive inflammatory changes on CT imaging with elevated lactic acid level and underwent ex lap with lysis of adhesions and washout on 12/11/2022  Unfortunately on the morning of 12/13/2022 at approximately 2 AM patient went into atrial fibrillation with rapid ventricular response    Various medications were tried including IV amiodarone with initiation of infusion and then discontinuation as it did not appear to be improving patient's heart rates, IV Lopressor and eventually patient was able to be placed on Cardizem infusion by critical care team   Transthoracic echocardiogram was performed which showed a newly reduced left ventricular systolic function along with reduced right ventricular function and elevated RV systolic pressure   -Currently at this time patient denies any chest pain, palpitations, lightheadedness or dizziness, loss of consciousness or shortness of breath however he does have nasal cannula oxygen in place  He states that while he does not notice palpitations or issue on watching the monitor he does notice his heart rate seems to correlate with his abdominal pain as it worsens  EKG:   Atrial fibrillation on telemetry heart rates 120-140 bpm    MOST  RECENT CARDIAC IMAGING:   -Transthoracic echocardiogram 12/14/2022 showing biventricular systolic function moderately reduced estimated LVEF 35% with a mildly dilated right ventricle and reduced right ventricular systolic function with dilated left and right atria and a normally functioning bioprosthetic aortic valve in place with mild mitral regurgitation and mild tricuspid regurgitation with elevated right ventricular systolic pressure at 48 mmHg  Review of Systems:   Review of Systems   Constitutional: Negative for chills, diaphoresis, fatigue and fever  HENT: Negative for trouble swallowing and voice change  Eyes: Negative for pain and redness  Respiratory: Negative for shortness of breath and wheezing  Cardiovascular: Negative for chest pain, palpitations and leg swelling  Gastrointestinal: Positive for abdominal distention and abdominal pain  Negative for diarrhea and vomiting  Genitourinary: Negative for dysuria  Musculoskeletal: Positive for arthralgias  Negative for neck pain and neck stiffness  Skin: Negative for rash  Neurological: Negative for dizziness, syncope, light-headedness and headaches  Psychiatric/Behavioral: Negative for agitation and confusion  All other systems reviewed and are negative        Historical Information   Past Medical History:   Diagnosis Date   • Aortic stenosis    • Aortic stenosis, severe    • Arthritis    • Carpal tunnel syndrome    • Chronic back pain    • Chronic pain    • Chronic, continuous use of opioids    • Hyperlipidemia    • Hypertension    • Hypertrophy of prostate    • Insomnia    • Large bowel obstruction (HCC)    • Peripheral neuropathy    • RA (rheumatoid arthritis) ([facility] [address] )    • Umbilical hernia      Past Surgical History:   Procedure Laterality Date   • ADENOIDECTOMY     • AORTIC VALVE REPLACEMENT  10/17/2017   • BASAL CELL CARCINOMA EXCISION     • CARDIAC CATHETERIZATION      LAST ASSESSED: 20OCT2017   • CARPAL TUNNEL RELEASE Bilateral     left   • CATARACT EXTRACTION     • EXPLORATORY LAPAROTOMY W/ BOWEL RESECTION N/A 12/11/2022    Procedure: LAPAROTOMY EXPLORATORY,  EXTENSIVE LYSIS OF ADHESIONS, APPENDECTOMY, DRAINAGE OF ABDOMINAL ABCESS;  Surgeon: Cielo Soares MD;  Location: [facility];  Service: General   • FL CYSTOGRAM  8/29/2022   • HERNIA REPAIR      inguinal bilateral -umbilical    • LAMINECTOMY      DECOMPRESS, FACETECTOMY, FORAMINOTOMY LUMBAR SEG   • LAPAROTOMY N/A 8/20/2022    Procedure: LAPAROTOMY EXPLORATORY, sigmoid colon resection,colostomy;  Surgeon:  Cielo Soares MD;  Location: [facility];  Service: General   • NEUROPLASTY / TRANSPOSITION MEDIAN NERVE AT CARPAL TUNNEL     • IL RPLCMT AORTIC VALVE OPN W/STENTLESS TISSUE VALVE N/A 10/17/2017    Procedure: AORTIC VALVE REPLACEMENT with 23MM MAGNAEASE TISSUE VALVE; INTRA-OP SHANDRA;  Surgeon: Faiza Carbajal MD;  Location: [facility];  Service: Cardiac Surgery   • TONSILLECTOMY       Social History     Substance and Sexual Activity   Alcohol Use Not Currently     Social History     Substance and Sexual Activity   Drug Use No     Social History     Tobacco Use   Smoking Status Never   Smokeless Tobacco Never       Family History:   Family History   Problem Relation Age of Onset   • Stroke Mother    • Diabetes Mother    • Hypertension Mother    • Heart disease Father    • Hypertension Father    • Heart attack Father    • Coronary artery disease Family    • Colon polyps Neg Hx    • Colon cancer Neg Hx        Meds/Allergies   all current active meds have been reviewed  Medications Prior to Admission   Medication   • aspirin 81 MG tablet   • atorvastatin (LIPITOR) 20 mg tablet   • betamethasone dipropionate (DIPROSONE) 0 05 % cream   • carisoprodol (SOMA) 350 mg tablet   • Cholecalciferol (VITAMIN D3) 1000 units CAPS   • diclofenac sodium (VOLTAREN) 1 %   • fluocinonide (LIDEX) 0 05 % external solution   • gabapentin (NEURONTIN) 600 MG tablet   • ketoconazole (NIZORAL) 2 % cream   • losartan (COZAAR) 50 mg tablet   • naloxone (NARCAN) 4 mg/0 1 mL nasal spray   • oxyCODONE (ROXICODONE) 10 MG TABS   • oxyCODONE-acetaminophen (PERCOCET)  mg per tablet   • Secukinumab 150 MG/ML SOAJ   • traZODone (DESYREL) 300 MG tablet       Allergies   Allergen Reactions   • Penicillins Anaphylaxis   • Quinine Derivatives Other (See Comments)     High fever       Objective   Vitals: Blood pressure 123/95, pulse (!) 149, temperature 97 9 °F (36 6 °C), temperature source Tympanic, resp  rate (!) 26, height 5' 7" (1 702 m), weight 91 kg (200 lb 9 9 oz), SpO2 95 %  , Body mass index is 31 42 kg/m² ,   Orthostatic Blood Pressures    Flowsheet Row Most Recent Value   Blood Pressure 123/95 filed at 12/15/2022 0800   Patient Position - Orthostatic VS Lying filed at 12/15/2022 2742          Systolic (91KSB), AOS:532 , Min:90 , CESAR:385     Diastolic (50TJC), BML:34, Min:55, Max:97      Physical Exam:  Physical Exam  Vitals reviewed  Constitutional:       General: He is not in acute distress  Appearance: He is not diaphoretic  HENT:      Head: Normocephalic and atraumatic  Comments: Nasal cannula oxygen in place  Eyes:      General:         Right eye: No discharge  Left eye: No discharge  Neck:      Comments: Trachea midline, no JVD present  Cardiovascular:      Heart sounds: No friction rub        Comments: Tachycardic, irregularly irregular rate and rhythm  Pulmonary:      Effort: No respiratory distress  Breath sounds: No wheezing  Comments: Decreased breath sounds bilaterally  Chest:      Chest wall: No tenderness  Abdominal:      General: Bowel sounds are normal       Tenderness: There is abdominal tenderness  Musculoskeletal:      Right lower leg: Edema (trace pedal edema) present  Left lower leg: Edema (trace pedal edema) present  Skin:     General: Skin is warm and dry  Neurological:      Mental Status: He is alert  Comments: Awake, alert, able to answer questions appropriately, able to move extremities bilaterally  Psychiatric:         Mood and Affect: Mood normal          Behavior: Behavior normal          Lab Results:     Troponins:      BNP:       CBC :   Results from last 7 days   Lab Units 12/15/22  0429 12/14/22  0528   WBC Thousand/uL 13 88* 15 40*   HEMOGLOBIN g/dL 11 7* 12 1   HEMATOCRIT % 37 6 37 4   MCV fL 92 90   PLATELETS Thousands/uL 223 201     TSH:     CMP:   Results from last 7 days   Lab Units 12/15/22  0429 12/14/22  0528 12/13/22  1758 12/13/22  0507   POTASSIUM mmol/L 3 9 3 8   < > 4 1   CHLORIDE mmol/L 102 103   < > 104   CO2 mmol/L 25 25   < > 26   BUN mg/dL 17 16   < > 16   CREATININE mg/dL 1 09 1 06   < > 1 05   AST U/L  --  14  --  14   ALT U/L  --  12  --  10   EGFR ml/min/1 73sq m 66 68   < > 69    < > = values in this interval not displayed       Lipid Profile:     Coags:   Results from last 7 days   Lab Units 12/13/22  1950   INR  1 32*

## 2022-12-15 NOTE — UTILIZATION REVIEW
Continued Stay Review    Date: 12/15/22                          Current Patient Class: inpatient  Current Level of Care: ICU    HPI:77 y o  male initially admitted on 12/11/22 Peritonitis      Assessment/Plan:  Pt c/o feeling "full of gas" and abdominal pain  Pt anxious overnight and received Xanax x1  Continue IV Ciprofloxacin, Flagyl, Diflucan D#5  Monitor fever and WBC, trend procal and fu on cxs  NGT removed on 12/13, tolerating clear liquid diet, advance as tolerated today if surgery recommends  Continue home pain regimen and scheduled toradol  Monitor incision and dsg changes per sx team   Monitor ostomy/drain output  Continue to wean cardizem as able,  po lopressor increased to 50 mg bid, received IV Lasix and continue diuresis  Continue heparin drip, inc spirometry, monitor IO and daily wts, hold home ASA, continue ICU level of care      Vital Signs:   Date/Time Temp Pulse Resp BP MAP (mmHg) SpO2 Calculated FIO2 (%) - Nasal Cannula Nasal Cannula O2 Flow Rate (L/min) O2 Device Patient Position - Orthostatic VS   12/15/22 1000 -- 111 Abnormal  -- 97/68 78 -- 28 2 L/min Nasal cannula Sitting   12/15/22 0809 -- -- -- -- -- 95 % 28 2 L/min Nasal cannula --   12/15/22 0800 -- 149 Abnormal  26 Abnormal  123/95 106 95 % -- -- -- --   12/15/22 0700 97 9 °F (36 6 °C) 133 Abnormal  -- -- -- -- 28 2 L/min Nasal cannula Lying   12/15/22 0500 -- 141 Abnormal  21 168/86 110 94 % -- -- -- --   12/15/22 0400 98 6 °F (37 °C) 120 Abnormal  22 146/85 105 94 % -- -- -- --   12/15/22 0300 -- 110 Abnormal  20 149/97 115 96 % -- -- -- --   12/15/22 0200 -- 90 25 Abnormal  141/81 106 95 % -- -- -- --   12/15/22 0100 -- 95 20 135/84 101 95 % -- -- -- --   12/15/22 0000 99 6 °F (37 6 °C) 106 Abnormal  19 147/67 96 94 % -- -- -- --   12/14/22 2310 -- 118 Abnormal  21 138/73 99 95 % -- -- -- --   12/14/22 2300 -- 98 20 138/73 99 94 % -- -- -- --   12/14/22 2100 -- 122 Abnormal  20 105/80 89 93 % -- -- -- --   12/14/22 2000 -- 131 Abnormal  20 123/75 92 92 % -- -- -- --   12/14/22 1959 100 6 °F (38 1 °C) Abnormal  138 Abnormal  21 123/75 92 94 % -- -- -- --   12/14/22 1900 -- 129 Abnormal  21 114/84 96 87 % Abnormal  -- -- -- --   12/14/22 1800 -- 143 Abnormal  27 Abnormal  119/72 89 90 % -- -- None (Room air) --   12/14/22 1759 -- 134 Abnormal  25 Abnormal  119/72 89 90 % -- -- -- --   12/14/22 1700 -- 123 Abnormal  27 Abnormal  149/77 105 90 % -- -- -- --   12/14/22 1600 -- 103 23 Abnormal  138/70 94 87 % Abnormal  -- -- -- --   12/14/22 1500 98 8 °F (37 1 °C) 106 Abnormal  22 90/55 68 90 % -- -- -- --   12/14/22 1444 -- 109 Abnormal  28 Abnormal  106/61 80 91 % -- -- -- --   12/14/22 1400 -- 93 20 99/68 77 91 % -- -- None (Room air) --   12/14/22 1300 -- 94 24 Abnormal  113/71 88 91 % -- -- -- --   12/14/22 1200 -- 115 Abnormal  29 Abnormal  131/82 103 91 % -- -- None (Room air) --       Pertinent Labs/Diagnostic Results:   Results from last 7 days   Lab Units 12/11/22  0805   SARS-COV-2  Negative     Results from last 7 days   Lab Units 12/15/22  0429 12/14/22  0528 12/13/22  1950 12/13/22  0507 12/12/22  0629 12/11/22  1911 12/11/22  0620   WBC Thousand/uL 13 88* 15 40* 15 33* 11 25* 11 36* 5 25 2 99*   HEMOGLOBIN g/dL 11 7* 12 1 11 7* 12 0 13 4 14 2 16 2   HEMATOCRIT % 37 6 37 4 36 6 37 9 42 0 44 8 52 2*   PLATELETS Thousands/uL 223 201 206 170 188 215 251   NEUTROS ABS Thousands/µL  --   --   --   --   --   --  2 52   BANDS PCT %  --   --   --   --   --  30*  --          Results from last 7 days   Lab Units 12/15/22  0429 12/14/22  0528 12/13/22  1758 12/13/22  0507 12/12/22  1853 12/12/22  0629   SODIUM mmol/L 136 136 135 137 135 135   POTASSIUM mmol/L 3 9 3 8 3 4* 4 1 4 0 3 8   CHLORIDE mmol/L 102 103 102 104 104 105   CO2 mmol/L 25 25 27 26 26 24   ANION GAP mmol/L 9 8 6 7 5 6   BUN mg/dL 17 16 15 16 13 12   CREATININE mg/dL 1 09 1 06 1 18 1 05 1 12 0 96   EGFR ml/min/1 73sq m 66 68 60 69 63 77   CALCIUM mg/dL 8 2* 8 5 8 5 8 5 8 6 8 1*   CALCIUM, IONIZED mmol/L 1 05* 1 07*  --  1 16 1 10* 1 05*   MAGNESIUM mg/dL 2 1 2 3 1 8* 2 0 1 9 1 3*   PHOSPHORUS mg/dL 2 9 2 4 2 9 1 9* 2 3 2 7     Results from last 7 days   Lab Units 12/14/22  0528 12/13/22  0507 12/12/22  0629 12/11/22  0620   AST U/L 14 14 17 17   ALT U/L 12 10 12 18   ALK PHOS U/L 44 35 27* 54   TOTAL PROTEIN g/dL 5 8* 5 3* 5 1* 7 0   ALBUMIN g/dL 3 1* 3 0* 3 3* 4 3   TOTAL BILIRUBIN mg/dL 0 54 0 74 0 68 0 57     Results from last 7 days   Lab Units 12/14/22  2319 12/14/22  1756 12/14/22  1201 12/14/22  0535 12/13/22  2355 12/13/22  1726 12/13/22  1218 12/13/22  0612 12/13/22  0020 12/12/22  1753 12/12/22  1139 12/12/22  0630   POC GLUCOSE mg/dl 120 124 105 97 124 106 138 111 107 106 110 102     Results from last 7 days   Lab Units 12/15/22  0429 12/14/22  0528 12/13/22  1758 12/13/22  0507 12/12/22  1853 12/12/22  0629 12/11/22  1911 12/11/22  0620   GLUCOSE RANDOM mg/dL 103 95 117 112 114 110 113 185*     Results from last 7 days   Lab Units 12/15/22  0429 12/14/22  2311 12/14/22  1531 12/14/22  0159 12/13/22  1950   PROTIME seconds  --   --   --   --  16 3*   INR   --   --   --   --  1 32*   PTT seconds 41* 60* 159*   < > 42*    < > = values in this interval not displayed  Results from last 7 days   Lab Units 12/15/22  0429 12/14/22  0528 12/13/22  0507 12/12/22  0629   PROCALCITONIN ng/ml 2 05* 3 36* 6 02* 8 29*     Results from last 7 days   Lab Units 12/12/22  0629 12/11/22  2033 12/11/22  0620   LACTIC ACID mmol/L 1 6 2 6* 4 3*     Results from last 7 days   Lab Units 12/11/22  0620   LIPASE u/L 32     Results from last 7 days   Lab Units 12/11/22  0805   INFLUENZA A PCR  Negative   INFLUENZA B PCR  Negative     Results from last 7 days   Lab Units 12/11/22  1155 12/11/22  0831   BLOOD CULTURE   --  No Growth at 72 hrs  No Growth at 72 hrs     GRAM STAIN RESULT  2+ Polys*  2+ Gram positive cocci in pairs*  2+ Gram positive cocci in chains*  Rare Gram negative rods*  --    BODY FLUID CULTURE, STERILE  3+ Growth of Gamma Hemolytic Streptococcus NOT Enterococcus  2+ Growth of - Strain 1 - Escherichia coli*  1+ Growth of - Strain 2 - Escherichia coli*  1+ Growth of  --      Medications:   Scheduled Medications:  atorvastatin, 20 mg, Oral, Daily  carisoprodol, 350 mg, Oral, 4x Daily  cholecalciferol, 1,000 Units, Oral, Daily  ciprofloxacin, 400 mg, Intravenous, Q12H  fluconazole, 400 mg, Intravenous, Q24H  furosemide, 40 mg, Intravenous, Once  gabapentin, 600 mg, Oral, TID  insulin lispro, 1-5 Units, Subcutaneous, Q6H ANDREA  ketorolac, 15 mg, Intravenous, Q6H ANDREA  metoprolol tartrate, 75 mg, Oral, Q12H ANDREA  metroNIDAZOLE, 500 mg, Intravenous, Q8H  oxyCODONE, 10 mg, Oral, Q6H  pantoprazole, 40 mg, Intravenous, Q12H ANDREA  polyethylene glycol, 17 g, Oral, Daily  traZODone, 100 mg, Oral, HS      Continuous IV Infusions:  diltiazem, 1-15 mg/hr, Intravenous, Titrated  heparin (porcine), 3-20 Units/kg/hr (Order-Specific), Intravenous, Titrated      PRN Meds:  HYDROmorphone, 0 5 mg, Intravenous, Q4H PRN   Or  HYDROmorphone, 1 mg, Intravenous, Q4H PRN  HYDROmorphone, 0 5 mg, Intravenous, Q4H PRN  LORazepam, 1 mg, Intravenous, Q8H PRN  ondansetron, 4 mg, Intravenous, Q6H PRN  perflutren lipid microsphere, 1 mL/min, Intravenous, Once in imaging  phenol, 1 spray, Mouth/Throat, Q2H PRN        Discharge Plan: d    Network Utilization Review Department  ATTENTION: Please call with any questions or concerns to [phone] and carefully listen to the prompts so that you are directed to the right person  All voicemails are confidential   Celeste Jenkins all requests for admission clinical reviews, approved or denied determinations and any other requests to dedicated fax number below belonging to the campus where the patient is receiving treatment   List of dedicated fax numbers for the Facilities:  FACILITY NAME UR FAX NUMBER   ADMISSION DENIALS (Administrative/Medical Necessity) [phone]   [facility] [address] (Maternity/NICU/Pediatrics) Kendy Andrade [address] [facility] [facility] [place] [address] [phone]   [address] [address] [facility] [address] 5 [facility] [phone]

## 2022-12-15 NOTE — PROGRESS NOTES
Monty [facility]  68   Progress Note - Jean Peralta 1945, 78 y o  male MRN: [id number]  Unit/Bed#: ICU 10-01 Encounter: [id number]  Primary Care Provider: Yogi Michelle DO   Date and time admitted to hospital: 12/11/2022  5:19 AM    * Peritonitis [facility])  Assessment & Plan  79 yo M hx aortic valve replacement, chronic pain, and recent dolan's procedure for diverticulitis now POD#4 s/p ex lap, MELANI, appendectomy and RLQ abscess drainage  Ongoing pain, tolerating clears, belching, some output from stoma  OOB to chair, midline clean, partially open, serous output, DAMASO cloudy pink, stoma viable with scant stool  T 97 9,  (A  Fib), RR 26, BP 97/68, SpO2 95% on NC  WB C 13 (15), Hgb 11, Plt 223, Cr 1 09      A/P by Organ System:  Neuro- scheduled oxy 10 Q6, soma, gabapentin, trazodone, and finishing course of Toradol, as needed dilaudid and ativan, consider scheduled tylenol  Cardio- cardiomyopathy, chf, new onset A fib with RVR on heparin gtt per SLIM/CC and cardizem gtt, Cardiology following, scheduled metroprolol/lipitor  Pulm- acute post-op respiratory insufficiency on NC working with IS and ambulation, pulmonary edema by CXR, lasix 40 x 1 today  GI- s/p ex lap MELANI, monitor for ileus post-op, advanced to fulls, monitor colostomy, protonix 40 mg BID currently, add Miralax daily, f/u path from appe  - s/p Ambriz removal, voiding spontaneously, good UOP  Renal- BUN/Cr stable  FEN- IVF from cardizem/heparin gtt, lasix 40 x 1, wt is up 10kg since admit with peripheral edema, lytes stable, full liquid diet will add ensure  Heme- hgb/plt stable post-op  ID- sepsis with shock, 2/2 peritonitis from RLQ abscess, POD#4 from source control, cultures polymicrobial, but sensitive to cipro/flagyl, when finalized will d/c diflucan, WBC 13 from 15, afebrile, monitor wound and DAMASO output  Endo- SSI for pre-diabetes, glucose WNL  Msk/Skin- open wound to midline, daily cleanse/dressing change, stoma appliance change Q 3 days, PT/OT, OOB, ambulation encouraged  VTE- SCD/heparin GTT    Disposition: PT advised rehab, declined by patient, plan for home with C in process, not yet stable for d/c  Subjective/Objective   Chief Complaint: pain    Subjective: ongoing pain to abdomen, belching which helps with distention, minimal output from stoma, OOB to chair, using IS, breathing comfortably, reports edema to feet, no N/V, tolerating clears    Objective: heparin gtt and cardizem gtt for a fib with RVR    Blood pressure 97/68, pulse (!) 111, temperature 97 9 °F (36 6 °C), temperature source Tympanic, resp  rate (!) 26, height 5' 7" (1 702 m), weight 91 kg (200 lb 9 9 oz), SpO2 95 %  ,Body mass index is 31 42 kg/m²  Intake/Output Summary (Last 24 hours) at 12/15/2022 1044  Last data filed at 12/15/2022 1004  Gross per 24 hour   Intake 1661 1 ml   Output 1425 ml   Net 236 1 ml       Invasive Devices     Peripheral Intravenous Line  Duration           Long-Dwell Peripheral IV (Midline) 20/04/02 Left Basilic 2 days    Peripheral IV 12/13/22 Right;Ventral (anterior) Forearm 2 days          Drain  Duration           Colostomy Descending/sigmoid -- days    Closed/Suction Drain Right RLQ Bulb 19 Fr  116 days    Colostomy Descending/sigmoid  days    Closed/Suction Drain Lateral;Right RLQ Bulb 19 Fr  3 days                Physical Exam  Vitals and nursing note reviewed  Constitutional:       General: He is not in acute distress  Appearance: He is well-developed  He is not diaphoretic  Comments: Upright in chair, comfortable, on NC, pleasant, conversational   HENT:      Head: Normocephalic and atraumatic  Eyes:      Conjunctiva/sclera: Conjunctivae normal       Pupils: Pupils are equal, round, and reactive to light  Cardiovascular:      Rate and Rhythm: Tachycardia present  Rhythm irregular  Pulmonary:      Effort: Pulmonary effort is normal  No respiratory distress     Abdominal:      Comments: Soft, mild generalized TTP worst on right side, midline clean/partially open, colostomy appliance intact, scant stool noted in appliance, DAMASO with cloudy pink discharge   Genitourinary:     Comments: Scrotal edema  Musculoskeletal:         General: Normal range of motion  Cervical back: Normal range of motion  Right lower leg: Edema present  Left lower leg: Edema present  Skin:     General: Skin is warm and dry  Capillary Refill: Capillary refill takes less than 2 seconds  Neurological:      Mental Status: He is alert and oriented to person, place, and time  Psychiatric:         Behavior: Behavior normal            Lab, Imaging and other studies:  I have personally reviewed pertinent lab results    , CBC:   Lab Results   Component Value Date    WBC 13 88 (H) 12/15/2022    HGB 11 7 (L) 12/15/2022    HCT 37 6 12/15/2022    MCV 92 12/15/2022     12/15/2022    MCH 28 5 12/15/2022    MCHC 31 1 (L) 12/15/2022    RDW 13 7 12/15/2022    MPV 9 3 12/15/2022   , CMP:   Lab Results   Component Value Date    SODIUM 136 12/15/2022    K 3 9 12/15/2022     12/15/2022    CO2 25 12/15/2022    BUN 17 12/15/2022    CREATININE 1 09 12/15/2022    CALCIUM 8 2 (L) 12/15/2022    EGFR 66 12/15/2022     VTE Pharmacologic Prophylaxis: Heparin  VTE Mechanical Prophylaxis: sequential compression device

## 2022-12-15 NOTE — NUTRITION
12/15/22 1121   Adequacy of Intake   Nutrition Modality PO   Current PO Intake   Current Diet Order Surgical, full liquid   Current Meal Intake Suboptimal, but improving   Estimated calorie intake compared to estimated need Nutrition needs not met at this time  PES Statement   Oral or Nutritional Support Intake (2) Inadequate oral intake NI-2 1   Related to GI distress/pain   As evidenced by: NPO/CL > 3 days   Recommendations/Interventions   Summary Presents on 12/11 with abdominal pain  Colostomy in place and reversal scheduled for January 2023  Underwent exploratory laparotomy, appendectomy, and drainage of abdominal abscess on 12/11  Pt with +2 BL LE and UE edema - impacting weight  Pt reports having a good appetite PTA, having 2 meals daily (breakfast and dinner)  Pt reports having no chewing or swallowing difficulties  Per chart review and pt report, he is tolerating a liquid diet  Anticipating diet advancement as clinically appropriate  Pt with no nutrition questions at this time  Recommending low fiber diet with transition to regular diet as able  Interventions/Recommendations Diet to advance   Patient Nutrition Goals   Goal Adequate hydration; Adequate intake;Meet PO needs

## 2022-12-15 NOTE — ASSESSMENT & PLAN NOTE
79 yo M hx aortic valve replacement, chronic pain, and recent dolan's procedure for diverticulitis now POD#4 s/p ex lap, MELANI, appendectomy and RLQ abscess drainage  Ongoing pain, tolerating clears, belching, some output from stoma  OOB to chair, midline clean, partially open, serous output, DAMASO cloudy pink, stoma viable with scant stool  T 97 9,  (A  Fib), RR 26, BP 97/68, SpO2 95% on NC  WB C 13 (15), Hgb 11, Plt 223, Cr 1 09  A/P by Organ System:  Neuro- scheduled oxy 10 Q6, soma, gabapentin, trazodone, and finishing course of Toradol, as needed dilaudid and ativan, consider scheduled tylenol  Cardio- cardiomyopathy, chf, new onset A fib with RVR on heparin gtt per SLIM/CC and cardizem gtt, Cardiology following, scheduled metroprolol/lipitor  Pulm- acute post-op respiratory insufficiency on NC working with IS and ambulation, pulmonary edema by CXR, lasix 40 x 1 today  GI- s/p ex lap MELANI, monitor for ileus post-op, advanced to fulls, monitor colostomy, protonix 40 mg BID currently, add Miralax daily, f/u path from appe  - s/p Ambriz removal, voiding spontaneously, good UOP  Renal- BUN/Cr stable  FEN- IVF from cardizem/heparin gtt, lasix 40 x 1, wt is up 10kg since admit with peripheral edema, lytes stable, full liquid diet will add ensure  Heme- hgb/plt stable post-op  ID- sepsis with shock, 2/2 peritonitis from RLQ abscess, POD#4 from source control, cultures polymicrobial, but sensitive to cipro/flagyl, when finalized will d/c diflucan, WBC 13 from 15, afebrile, monitor wound and DAMASO output  Endo- SSI for pre-diabetes, glucose WNL  Msk/Skin- open wound to midline, daily cleanse/dressing change, stoma appliance change Q 3 days, PT/OT, OOB, ambulation encouraged  VTE- SCD/heparin GTT    Disposition: PT advised rehab, declined by patient, plan for home with C in process, not yet stable for d/c

## 2022-12-15 NOTE — ASSESSMENT & PLAN NOTE
- Seen in the setting of severe sepsis with peritonitis and now newly diagnosed cardiomyopathy along with atrial fibrillation and RVR  -Would continue as needed diuresis and would recommend dose today  -Continue nasal cannula oxygen and wean as tolerated

## 2022-12-15 NOTE — PROGRESS NOTES
Pt complaining of nausea and bloating, , pain still the same as before  Pt states he feels bloated  Like is is "full from eating"    Ostomy still has scant amount of serous fluid no stool noted   Mahad drain minimal  Drainage  Pt refused po meds due to feeling gave pt PRN Hydromorphone  Discussed with NP   Bowel sounds still hypo active/ rare    HR remains elevated low 100's pt states it is related to he anxiety  Admin xanax  With little relief     Will continue to monitor

## 2022-12-15 NOTE — ASSESSMENT & PLAN NOTE
- Newly diagnosed during his hospitalization  -Unclear etiology at this time possibly seen in the setting of significant sepsis however will need an ischemic evaluation in the outpatient setting once stable  -We will continue to uptitrate medical therapy as patient tolerates

## 2022-12-15 NOTE — ASSESSMENT & PLAN NOTE
· Presented to ED on 12/11 w/acute onset abd pain concerning for acute abdomen   · W/hx of sigmoid diverticulitis s/p Polo's procedure and colostomy with planned reversal scheduled for 01/2023  · CT A/P on admission revealed extensive inflammatory changes involving jejunum and proximal-mid ileum, compatible with severe enteritis; there is focal free air surrounding lower midline ileum just above urinary bladder with extensive inflammatory changes; few mildly dilated loops of jejunum within left upper abdomen, likely 2/2 extensive inflammatory changes with possible superimposed low-grade partial SBO; small amount of free air and free fluid in remainder of abdomen and pelvis; stomach and proximal esophagus are distended and fluid-filled, presumably secondary to delayed transit  · OR on 12/11 for EX LAP,  EXTENSIVE MELANI, APPENDECTOMY, DRAINAGE OF ABDOMINAL ABSCESS - today is POD#4  · Peritoneal fluid culture w/gamma hemolytic Strep  · Anaerobic culture w/Bacteroides fragilis   · Continue IV Ciprofloxacin, Flagyl, Diflucan D#5  · NGT removed on 12/13 - tolerating clear liquid diet - would advance as tolerated today pending surgery's recommendations  · Home scheduled pain regimen restarted - continue PRN Dilaudid for acute surgical pain as well as scheduled IV Toradol  · Monitor incision closely - dressing changes per surgery team  · Monitor ostomy/drain output

## 2022-12-15 NOTE — NURSING NOTE
BP lower  - Cardizem titrated down HR 70 -  80's  S [place] Np aware ; Pt opens eyes to name but falls back to sleep

## 2022-12-15 NOTE — ASSESSMENT & PLAN NOTE
Lab Results   Component Value Date    EGFR 68 12/14/2022    EGFR 60 12/13/2022    EGFR 69 12/13/2022    CREATININE 1 06 12/14/2022    CREATININE 1 18 12/13/2022    CREATININE 1 05 12/13/2022     · Baseline creatinine appears to be around 0 8 - 1 1  · Trend renal indices  · Monitor I/O  · Daily weights

## 2022-12-15 NOTE — NURSING NOTE
Sleeping in the chair  Awakened and assisted back to bed Pt appears very sleepy and had a hard time concentrating when  getting into the bed   Repositioned

## 2022-12-16 ENCOUNTER — APPOINTMENT (INPATIENT)
Dept: CT IMAGING | Facility: HOSPITAL | Age: 77
End: 2022-12-16

## 2022-12-16 LAB
ANION GAP SERPL CALCULATED.3IONS-SCNC: 6 MMOL/L (ref 4–13)
APTT PPP: 33 SECONDS (ref 23–37)
APTT PPP: 48 SECONDS (ref 23–37)
APTT PPP: 64 SECONDS (ref 23–37)
APTT PPP: 97 SECONDS (ref 23–37)
BACTERIA BLD CULT: NORMAL
BACTERIA BLD CULT: NORMAL
BASE EX.OXY STD BLDV CALC-SCNC: 38.7 % (ref 60–80)
BASE EXCESS BLDV CALC-SCNC: -0.4 MMOL/L
BUN SERPL-MCNC: 21 MG/DL (ref 5–25)
CALCIUM SERPL-MCNC: 8.3 MG/DL (ref 8.4–10.2)
CHLORIDE SERPL-SCNC: 99 MMOL/L (ref 96–108)
CO2 SERPL-SCNC: 25 MMOL/L (ref 21–32)
CREAT SERPL-MCNC: 1.21 MG/DL (ref 0.6–1.3)
ERYTHROCYTE [DISTWIDTH] IN BLOOD BY AUTOMATED COUNT: 13.9 % (ref 11.6–15.1)
GFR SERPL CREATININE-BSD FRML MDRD: 58 ML/MIN/1.73SQ M
GLUCOSE SERPL-MCNC: 107 MG/DL (ref 65–140)
GLUCOSE SERPL-MCNC: 115 MG/DL (ref 65–140)
GLUCOSE SERPL-MCNC: 122 MG/DL (ref 65–140)
GLUCOSE SERPL-MCNC: 91 MG/DL (ref 65–140)
HCO3 BLDV-SCNC: 25.5 MMOL/L (ref 24–30)
HCT VFR BLD AUTO: 35.4 % (ref 36.5–49.3)
HGB BLD-MCNC: 10.9 G/DL (ref 12–17)
MAGNESIUM SERPL-MCNC: 2.1 MG/DL (ref 1.9–2.7)
MCH RBC QN AUTO: 28.6 PG (ref 26.8–34.3)
MCHC RBC AUTO-ENTMCNC: 30.8 G/DL (ref 31.4–37.4)
MCV RBC AUTO: 93 FL (ref 82–98)
O2 CT BLDV-SCNC: 6.5 ML/DL
PCO2 BLDV: 47 MM HG (ref 42–50)
PH BLDV: 7.35 [PH] (ref 7.3–7.4)
PHOSPHATE SERPL-MCNC: 3.1 MG/DL (ref 2.3–4.1)
PLATELET # BLD AUTO: 224 THOUSANDS/UL (ref 149–390)
PMV BLD AUTO: 9.8 FL (ref 8.9–12.7)
PO2 BLDV: 23.4 MM HG (ref 35–45)
POTASSIUM SERPL-SCNC: 4.3 MMOL/L (ref 3.5–5.3)
RBC # BLD AUTO: 3.81 MILLION/UL (ref 3.88–5.62)
SODIUM SERPL-SCNC: 130 MMOL/L (ref 135–147)
WBC # BLD AUTO: 9.47 THOUSAND/UL (ref 4.31–10.16)

## 2022-12-16 RX ORDER — DILTIAZEM HYDROCHLORIDE 60 MG/1
60 TABLET, FILM COATED ORAL EVERY 6 HOURS SCHEDULED
Status: DISCONTINUED | OUTPATIENT
Start: 2022-12-16 | End: 2022-12-16

## 2022-12-16 RX ORDER — METOPROLOL TARTRATE 50 MG/1
50 TABLET, FILM COATED ORAL EVERY 8 HOURS
Status: DISCONTINUED | OUTPATIENT
Start: 2022-12-17 | End: 2022-12-18

## 2022-12-16 RX ORDER — DIGOXIN 0.25 MG/ML
250 INJECTION INTRAMUSCULAR; INTRAVENOUS EVERY 6 HOURS
Status: COMPLETED | OUTPATIENT
Start: 2022-12-17 | End: 2022-12-17

## 2022-12-16 RX ORDER — METOPROLOL TARTRATE 5 MG/5ML
5 INJECTION INTRAVENOUS ONCE
Status: COMPLETED | OUTPATIENT
Start: 2022-12-16 | End: 2022-12-16

## 2022-12-16 RX ORDER — FUROSEMIDE 10 MG/ML
40 INJECTION INTRAMUSCULAR; INTRAVENOUS ONCE
Status: COMPLETED | OUTPATIENT
Start: 2022-12-16 | End: 2022-12-16

## 2022-12-16 RX ORDER — KETOROLAC TROMETHAMINE 30 MG/ML
15 INJECTION, SOLUTION INTRAMUSCULAR; INTRAVENOUS EVERY 6 HOURS PRN
Status: DISCONTINUED | OUTPATIENT
Start: 2022-12-16 | End: 2022-12-16

## 2022-12-16 RX ORDER — DIGOXIN 0.25 MG/ML
250 INJECTION INTRAMUSCULAR; INTRAVENOUS EVERY 6 HOURS
Status: DISCONTINUED | OUTPATIENT
Start: 2022-12-16 | End: 2022-12-16

## 2022-12-16 RX ORDER — INSULIN LISPRO 100 [IU]/ML
1-5 INJECTION, SOLUTION INTRAVENOUS; SUBCUTANEOUS
Status: DISCONTINUED | OUTPATIENT
Start: 2022-12-16 | End: 2022-12-16

## 2022-12-16 RX ORDER — CIPROFLOXACIN 250 MG/1
500 TABLET, FILM COATED ORAL EVERY 12 HOURS SCHEDULED
Status: DISCONTINUED | OUTPATIENT
Start: 2022-12-16 | End: 2022-12-18

## 2022-12-16 RX ORDER — DIGOXIN 0.25 MG/ML
250 INJECTION INTRAMUSCULAR; INTRAVENOUS ONCE
Status: COMPLETED | OUTPATIENT
Start: 2022-12-16 | End: 2022-12-16

## 2022-12-16 RX ORDER — DIGOXIN 0.25 MG/ML
500 INJECTION INTRAMUSCULAR; INTRAVENOUS ONCE
Status: COMPLETED | OUTPATIENT
Start: 2022-12-17 | End: 2022-12-16

## 2022-12-16 RX ORDER — METRONIDAZOLE 500 MG/1
500 TABLET ORAL EVERY 8 HOURS SCHEDULED
Status: DISCONTINUED | OUTPATIENT
Start: 2022-12-16 | End: 2022-12-18

## 2022-12-16 RX ORDER — DIGOXIN 0.25 MG/ML
250 INJECTION INTRAMUSCULAR; INTRAVENOUS EVERY 6 HOURS
Status: CANCELLED | OUTPATIENT
Start: 2022-12-17 | End: 2022-12-17

## 2022-12-16 RX ORDER — CIPROFLOXACIN 250 MG/1
500 TABLET, FILM COATED ORAL EVERY 12 HOURS SCHEDULED
Status: DISCONTINUED | OUTPATIENT
Start: 2022-12-16 | End: 2022-12-16

## 2022-12-16 RX ADMIN — CARISOPRODOL 350 MG: 350 TABLET ORAL at 21:25

## 2022-12-16 RX ADMIN — PANTOPRAZOLE SODIUM 40 MG: 40 INJECTION, POWDER, LYOPHILIZED, FOR SOLUTION INTRAVENOUS at 21:25

## 2022-12-16 RX ADMIN — TRAZODONE HYDROCHLORIDE 100 MG: 100 TABLET ORAL at 21:24

## 2022-12-16 RX ADMIN — ATORVASTATIN CALCIUM 20 MG: 10 TABLET, FILM COATED ORAL at 08:42

## 2022-12-16 RX ADMIN — CARISOPRODOL 350 MG: 350 TABLET ORAL at 17:41

## 2022-12-16 RX ADMIN — DIGOXIN 250 MCG: 0.25 INJECTION INTRAMUSCULAR; INTRAVENOUS at 17:33

## 2022-12-16 RX ADMIN — METOPROLOL TARTRATE 75 MG: 25 TABLET, FILM COATED ORAL at 16:19

## 2022-12-16 RX ADMIN — METRONIDAZOLE 500 MG: 500 TABLET ORAL at 21:24

## 2022-12-16 RX ADMIN — OXYCODONE HYDROCHLORIDE 10 MG: 10 TABLET ORAL at 11:59

## 2022-12-16 RX ADMIN — METRONIDAZOLE 500 MG: 500 INJECTION, SOLUTION INTRAVENOUS at 07:34

## 2022-12-16 RX ADMIN — CIPROFLOXACIN HYDROCHLORIDE 500 MG: 250 TABLET, FILM COATED ORAL at 21:24

## 2022-12-16 RX ADMIN — METRONIDAZOLE 500 MG: 500 TABLET ORAL at 15:01

## 2022-12-16 RX ADMIN — FUROSEMIDE 40 MG: 10 INJECTION, SOLUTION INTRAMUSCULAR; INTRAVENOUS at 08:39

## 2022-12-16 RX ADMIN — POLYETHYLENE GLYCOL 3350 17 G: 17 POWDER, FOR SOLUTION ORAL at 10:16

## 2022-12-16 RX ADMIN — OXYCODONE HYDROCHLORIDE 10 MG: 10 TABLET ORAL at 17:41

## 2022-12-16 RX ADMIN — DIGOXIN 500 MCG: 0.25 INJECTION INTRAMUSCULAR; INTRAVENOUS at 23:56

## 2022-12-16 RX ADMIN — CIPROFLOXACIN 400 MG: 2 INJECTION, SOLUTION INTRAVENOUS at 08:45

## 2022-12-16 RX ADMIN — GABAPENTIN 600 MG: 600 TABLET, FILM COATED ORAL at 21:24

## 2022-12-16 RX ADMIN — HEPARIN SODIUM 16.1 UNITS/KG/HR: 10000 INJECTION, SOLUTION INTRAVENOUS at 13:16

## 2022-12-16 RX ADMIN — DILTIAZEM HYDROCHLORIDE 60 MG: 60 TABLET, FILM COATED ORAL at 12:19

## 2022-12-16 RX ADMIN — PANTOPRAZOLE SODIUM 40 MG: 40 INJECTION, POWDER, LYOPHILIZED, FOR SOLUTION INTRAVENOUS at 08:39

## 2022-12-16 RX ADMIN — Medication 1000 UNITS: at 10:16

## 2022-12-16 RX ADMIN — METOPROLOL TARTRATE 5 MG: 5 INJECTION, SOLUTION INTRAVENOUS at 04:04

## 2022-12-16 RX ADMIN — GABAPENTIN 600 MG: 600 TABLET, FILM COATED ORAL at 16:19

## 2022-12-16 RX ADMIN — METOPROLOL TARTRATE 75 MG: 25 TABLET, FILM COATED ORAL at 08:42

## 2022-12-16 RX ADMIN — METOPROLOL TARTRATE 50 MG: 50 TABLET ORAL at 23:56

## 2022-12-16 RX ADMIN — HYDROMORPHONE HYDROCHLORIDE 0.5 MG: 1 INJECTION, SOLUTION INTRAMUSCULAR; INTRAVENOUS; SUBCUTANEOUS at 02:39

## 2022-12-16 NOTE — ASSESSMENT & PLAN NOTE
· TTE from 12/14 revealed EF 35% w/mild concentric hypertrophy, moderately reduced systolic function in global manner, mildly dilated RV w/moderately reduced systolic function; mild MR/TR, RVSP 48  · Will need ischemic evaluation when medically stable  · Cardiology following  · Continue BB and PRN diuresis  · Strict I/O  · Daily weights

## 2022-12-16 NOTE — UTILIZATION REVIEW
Continued Stay Review    Date: 12/16/22                          Current Patient Class: Inpatient  Current Level of Care: ICU    HPI:77 y o  male initially admitted on 12/11/22 Peritonitis       Assessment/Plan:  PO Lopressor uptitrated to 75 mg BID  Received IV Narcan overnight for somnolence  Required BIPAP for short period 2/2 respiratory acidosis  Tolerating full liquid diet  Received additional IV Lopressor 5 mg x1 for uncontrolled AF  Pt tachycardic, decreased breath sounds with rales noted, abdominal distention and tenderness with midline abd incision, ostomy stoma pink, BL LE trace edema  Surgery team doing dsg changes, continue to monitor incision and ostomy  Continue IV ABX, monitor fever and WBC, trend procal and fu on cxs  Continue strict IO and daily wts, continue BB and diuresis prn, cardiology following  Continue neb txs, encourage coughing and deep breathing, inc spirometry, and ambulation  Trend renal function, trend BPs, IV Dilaudid prn, hold home percocet  Continue diltiazem and heparin infusions   Continue ICU level of care      Vital Signs:   Date/Time Temp Pulse Resp BP MAP (mmHg) SpO2 Calculated FIO2 (%) - Nasal Cannula Nasal Cannula O2 Flow Rate (L/min) O2 Device O2 Interface Device Patient Position - Orthostatic VS   12/16/22 1000 -- 116 Abnormal  -- 102/75 85 99 % 40 5 L/min Nasal cannula -- --   12/16/22 0945 -- 112 Abnormal  -- 108/70 80 99 % -- -- -- -- --   12/16/22 0915 -- 122 Abnormal  -- 110/78 91 98 % -- -- -- -- --   12/16/22 0900 -- 116 Abnormal  -- 120/79 95 99 % -- -- -- -- --   12/16/22 0842 -- 133 Abnormal  -- 121/62 -- -- -- -- -- -- --   12/16/22 0800 -- 142 Abnormal  -- 130/65 82 98 % -- -- -- -- --   12/16/22 0745 -- 129 Abnormal  20 117/66 80 90 % -- -- None (Room air) -- Lying   12/16/22 0700 99 2 °F (37 3 °C) -- -- -- -- -- -- -- -- -- --   12/16/22 0659 99 2 °F (37 3 °C) 131 Abnormal  24 Abnormal  134/101 Abnormal  -- 90 % -- -- None (Room air) -- Lying   12/16/22 0425 -- -- -- -- -- 91 % -- -- -- -- --   12/16/22 0400 -- 112 Abnormal  16 122/68 85 92 % -- -- -- -- --   12/16/22 0300 -- 122 Abnormal  23 Abnormal  118/69 85 90 % -- -- -- -- --   12/16/22 0200 -- 124 Abnormal  16 118/64 83 89 % Abnormal  -- -- -- -- --   12/16/22 0100 -- 102 15 117/73 79 90 % -- -- -- -- --   12/16/22 0000 -- 127 Abnormal  19 116/84 96 94 % -- -- -- -- --   12/15/22 2300 -- 112 Abnormal  14 113/64 82 97 % -- -- -- -- --   12/15/22 2200 -- 94 13 104/60 75 97 % -- -- -- -- --   12/15/22 2100 -- 94 13 97/61 74 95 % -- -- -- -- --   12/15/22 2053 -- 96 -- -- -- -- -- -- -- -- --   12/15/22 2000 -- 86 13 87/55 Abnormal  64 Abnormal  96 % -- -- -- Full face mask --   12/15/22 1900 -- 83 10 Abnormal  99/54 70 95 % -- -- -- -- --   12/15/22 1830 -- 80 9 Abnormal  94/59 68 95 % 28 2 L/min Nasal cannula -- --   12/15/22 1815 -- 78 9 Abnormal  87/56 Abnormal  66 95 % -- -- -- -- --   12/15/22 1800 -- 79 9 Abnormal  85/50 Abnormal  61 Abnormal  95 % -- -- -- -- --   12/15/22 1745 -- 82 9 Abnormal  88/50 Abnormal  64 Abnormal  95 % -- -- -- -- --   12/15/22 1730 -- 79 8 Abnormal  89/56 Abnormal  68 94 % -- -- -- -- --   12/15/22 1715 -- 81 8 Abnormal  75/52 Abnormal  60 Abnormal  95 % -- -- -- -- --   12/15/22 1700 -- 82 10 Abnormal  79/50 Abnormal  60 Abnormal  92 % -- -- -- -- --   12/15/22 1645 -- 82 11 Abnormal  82/51 Abnormal  62 Abnormal  94 % -- -- -- -- --   12/15/22 1639 -- 83 10 Abnormal  82/50 Abnormal  57 Abnormal  92 % -- -- -- -- --   12/15/22 1615 -- -- -- -- -- -- 28 2 L/min Nasal cannula -- --   12/15/22 1600 -- 80 10 Abnormal  87/52 Abnormal  65 87 % Abnormal  28 2 L/min  Nasal cannula -- --   Nasal Cannula O2 Flow Rate (L/min): resumed at 12/15/22 1600   12/15/22 1554 -- 83 11 Abnormal  82/52 Abnormal  62 Abnormal  91 % -- -- -- -- Lying   12/15/22 1547 98 5 °F (36 9 °C) -- -- -- -- -- -- -- -- -- --   12/15/22 1415 -- 96 19 109/61 77 94 % -- -- -- -- --   12/15/22 1300 -- 85 24 Abnormal  109/68 85 92 % -- -- -- -- --   12/15/22 1201 96 5 °F (35 8 °C) Abnormal  97 28 Abnormal  113/66 78 93 % -- -- None (Room air) -- Sitting   12/15/22 1100 97 8 °F (36 6 °C) 107 Abnormal  26 Abnormal  101/68 80 96 % -- -- -- -- --     Pertinent Labs/Diagnostic Results:   Results from last 7 days   Lab Units 12/11/22  0805   SARS-COV-2  Negative     Results from last 7 days   Lab Units 12/16/22  0450 12/15/22  0429 12/14/22  0528 12/13/22  1950 12/13/22  0507 12/12/22  0629 12/11/22  1911 12/11/22  0620   WBC Thousand/uL 9 47 13 88* 15 40* 15 33* 11 25*   < > 5 25 2 99*   HEMOGLOBIN g/dL 10 9* 11 7* 12 1 11 7* 12 0   < > 14 2 16 2   HEMATOCRIT % 35 4* 37 6 37 4 36 6 37 9   < > 44 8 52 2*   PLATELETS Thousands/uL 224 223 201 206 170   < > 215 251   NEUTROS ABS Thousands/µL  --   --   --   --   --   --   --  2 52   BANDS PCT %  --   --   --   --   --   --  30*  --     < > = values in this interval not displayed           Results from last 7 days   Lab Units 12/16/22  0450 12/15/22  1941 12/15/22  0429 12/14/22  0528 12/13/22  1758 12/13/22  0507 12/12/22  1853   SODIUM mmol/L 130* 132* 136 136 135 137 135   POTASSIUM mmol/L 4 3 4 0 3 9 3 8 3 4* 4 1 4 0   CHLORIDE mmol/L 99 101 102 103 102 104 104   CO2 mmol/L 25 26 25 25 27 26 26   ANION GAP mmol/L 6 5 9 8 6 7 5   BUN mg/dL 21 22 17 16 15 16 13   CREATININE mg/dL 1 21 1 27 1 09 1 06 1 18 1 05 1 12   EGFR ml/min/1 73sq m 58 54 66 68 60 69 63   CALCIUM mg/dL 8 3* 8 1* 8 2* 8 5 8 5 8 5 8 6   CALCIUM, IONIZED mmol/L  --  1 03* 1 05* 1 07*  --  1 16 1 10*   MAGNESIUM mg/dL 2 1 2 1 2 1 2 3 1 8* 2 0 1 9   PHOSPHORUS mg/dL 3 1 3 9 2 9 2 4 2 9 1 9* 2 3     Results from last 7 days   Lab Units 12/15/22  1955 12/14/22  0528 12/13/22  0507 12/12/22  0629 12/11/22  0620   AST U/L  --  14 14 17 17   ALT U/L  --  12 10 12 18   ALK PHOS U/L  --  44 35 27* 54   TOTAL PROTEIN g/dL  --  5 8* 5 3* 5 1* 7 0   ALBUMIN g/dL  --  3 1* 3 0* 3 3* 4 3   TOTAL BILIRUBIN mg/dL  --  0 54 0 74 0 68 0  57   AMMONIA umol/L 43  --   --   --   --      Results from last 7 days   Lab Units 12/15/22  1945 12/15/22  1714 12/15/22  1155 12/14/22  2319 12/14/22  1756 12/14/22  1201 12/14/22  0535 12/13/22  2355 12/13/22  1726 12/13/22  1218 12/13/22  0612 12/13/22  0020   POC GLUCOSE mg/dl 110 122 145* 120 124 105 97 124 106 138 111 107     Results from last 7 days   Lab Units 12/16/22  0450 12/15/22  1941 12/15/22  0429 12/14/22  0528 12/13/22  1758 12/13/22  0507 12/12/22  1853 12/12/22  0629 12/11/22  1911 12/11/22  0620   GLUCOSE RANDOM mg/dL 107 114 103 95 117 112 114 110 113 185*     Results from last 7 days   Lab Units 12/16/22  0806 12/15/22  1941   PH IGLESIA  7 352 7 271*   PCO2 IGLESIA mm Hg 47 0 59 1*   PO2 IGLESIA mm Hg 23 4* 36 3   HCO3 IGLESIA mmol/L 25 5 26 6   BASE EXC IGLESIA mmol/L -0 4 -1 1   O2 CONTENT IGLESIA ml/dL 6 5 9 6   O2 HGB, VENOUS % 38 7* 60 4     Results from last 7 days   Lab Units 12/16/22  0450 12/16/22  0108 12/15/22  1832 12/14/22  0159 12/13/22  1950   PROTIME seconds  --   --   --   --  16 3*   INR   --   --   --   --  1 32*   PTT seconds 48* 97* 80*   < > 42*    < > = values in this interval not displayed  Results from last 7 days   Lab Units 12/15/22  0429 12/14/22  0528 12/13/22  0507 12/12/22  0629   PROCALCITONIN ng/ml 2 05* 3 36* 6 02* 8 29*     Results from last 7 days   Lab Units 12/12/22  0629 12/11/22  2033 12/11/22  0620   LACTIC ACID mmol/L 1 6 2 6* 4 3*     Results from last 7 days   Lab Units 12/11/22  0620   LIPASE u/L 32     Results from last 7 days   Lab Units 12/11/22  0805   INFLUENZA A PCR  Negative   INFLUENZA B PCR  Negative     Results from last 7 days   Lab Units 12/11/22  1155 12/11/22  0831   BLOOD CULTURE   --  No Growth After 4 Days  No Growth After 4 Days     GRAM STAIN RESULT  2+ Polys*  2+ Gram positive cocci in pairs*  2+ Gram positive cocci in chains*  Rare Gram negative rods*  --    BODY FLUID CULTURE, STERILE  3+ Growth of Gamma Hemolytic Streptococcus NOT Enterococcus  2+ Growth of - Strain 1 - Escherichia coli*  1+ Growth of - Strain 2 - Escherichia coli*  1+ Growth of  --      Medications:   Scheduled Medications:  atorvastatin, 20 mg, Oral, Daily  carisoprodol, 350 mg, Oral, 4x Daily  cholecalciferol, 1,000 Units, Oral, Daily  ciprofloxacin, 400 mg, Intravenous, Q12H  gabapentin, 600 mg, Oral, TID  insulin lispro, 1-5 Units, Subcutaneous, Q6H ANDREA  metoprolol tartrate, 75 mg, Oral, Q12H ANDREA  metroNIDAZOLE, 500 mg, Intravenous, Q8H  oxyCODONE, 10 mg, Oral, Q6H  pantoprazole, 40 mg, Intravenous, Q12H ANDREA  polyethylene glycol, 17 g, Oral, Daily  traZODone, 100 mg, Oral, HS      Continuous IV Infusions:  diltiazem, 1-15 mg/hr, Intravenous, Titrated  heparin (porcine), 3-20 Units/kg/hr (Order-Specific), Intravenous, Titrated      PRN Meds:  ondansetron, 4 mg, Intravenous, Q6H PRN  perflutren lipid microsphere, 1 mL/min, Intravenous, Once in imaging  phenol, 1 spray, Mouth/Throat, Q2H PRN        Discharge Plan: d    Network Utilization Review Department  ATTENTION: Please call with any questions or concerns to [phone] and carefully listen to the prompts so that you are directed to the right person  All voicemails are confidential   Farrel Bodily all requests for admission clinical reviews, approved or denied determinations and any other requests to dedicated fax number below belonging to the campus where the patient is receiving treatment   List of dedicated fax numbers for the Facilities:  [address] DENIALS (Administrative/Medical Necessity) [phone]   [address] (Maternity/NICU/Pediatrics) Kendy Andrade 172 [phone]   [place] [address] [phone]   [address] 310 [facility] 134 960 [facility] [phone]

## 2022-12-16 NOTE — ASSESSMENT & PLAN NOTE
- Newly diagnosed during his hospitalization  -Unclear etiology at this time possibly seen in the setting of significant sepsis however will need an ischemic evaluation in the outpatient setting once stable  -Patient appears volume overloaded on exam today would give IV diuresis today and monitor for urinary output response  -Will uptitrate medical therapy as patient tolerates

## 2022-12-16 NOTE — PROGRESS NOTES
Carolann 47  Progress Note - Jimbo Moore 1945, 78 y o  male MRN: [id number]  Unit/Bed#: ICU 10-01 Encounter: [id number]  Primary Care Provider: Tayler Goldstein DO   Date and time admitted to hospital: 12/11/2022  5:19 AM    Cardiomyopathy [facility])  Assessment & Plan  - Newly diagnosed during his hospitalization  -Unclear etiology at this time possibly seen in the setting of significant sepsis however will need an ischemic evaluation in the outpatient setting once stable  -Patient appears volume overloaded on exam today would give IV diuresis today and monitor for urinary output response  -Will uptitrate medical therapy as patient tolerates  New onset atrial fibrillation [facility])  Assessment & Plan  - Per documentation began around 2 AM on 12/13/2022  -Rates were better controlled yesterday however altered mental status  -Rates now in the 100-130 bpm range  -We will see if patient is able to tolerate oral therapy however if not may need to revert to intravenous Cardizem  -If patient's blood pressure remains soft but is able to tolerate metoprolol could then consider addition of digoxin if able to tolerate oral intake    Acute respiratory failure with hypoxia (HCC)  Assessment & Plan  - Seen in the setting of severe sepsis with peritonitis and now newly diagnosed cardiomyopathy along with atrial fibrillation and RVR  -Would continue daily IV diuresis at least as patient does appear more volume overloaded today  -Continue to monitor patient clinically    Septic shock (HCC)  Assessment & Plan  - Lactic acidosis improved  -Continue antibiotic therapy along with plan of care per surgery and critical care team    Benign essential hypertension  Assessment & Plan  -Blood pressure soft however does appear to be tolerating metoprolol tartrate 75 mg twice daily  -Fortunately in setting of altered mental status may need to revert back to intravenous therapy    -If patient's mental status improves and is able to tolerate oral therapy then could consider digoxin as well as blood pressure remains soft but heart rates elevated  * Peritonitis [facility])  Assessment & Plan  - Plan of care per surgery and critical care team      Outpatient Cardiologist: Dr Emerson Reason      Subjective:   Patient seen and examined  Per patient he denies any chest pain, palpitations, lightheadedness or dizziness, loss of consciousness or shortness of breath  He denies any abdominal discomfort at this time but is only oriented to self on questioning  Summary comments:  Continue current medical therapy with up titration as patient's blood pressure tolerates  If patient's mental status deteriorates further and he is unable to tolerate oral medications may need to revert back to intravenous therapy however if rates do not become better controlled with uptitrated beta-blocker therapy may need to consider addition of digoxin orally but will need to monitor patient closely   -Would also recommend IV diuresis today and monitoring strict I's and O's as patient does appear volume overloaded on exam    Telemetry/ECG/Cardiac testing:   Atrial fibrillation heart rate 100-130 bpm range    Vitals: Blood pressure 121/62, pulse (!) 133, temperature 99 2 °F (37 3 °C), temperature source Tympanic, resp   rate 20, height 5' 7" (1 702 m), weight 93 8 kg (206 lb 12 7 oz), SpO2 98 % ,   Orthostatic Blood Pressures    Flowsheet Row Most Recent Value   Blood Pressure 121/62 filed at 12/16/2022 0842   Patient Position - Orthostatic VS Lying filed at 12/16/2022 0745      ,   Weight (last 2 days)     Date/Time Weight    12/16/22 0535 93 8 (206 79)    12/16/22 0445 93 8 (206 79)    12/15/22 0830 --    Comment rows:    OBSERV: OOB to chair at 12/15/22 0830    12/15/22 0558 91 (200 62)    12/14/22 0758 90 7 (200)    12/14/22 0730 90 7 (200)    Comment rows:    OBSERV: Awake alert having Echo at bedside at 12/14/22 0730    12/14/22 0539 90 9 (200 4)          Physical Exam:  Physical Exam  Vitals reviewed  Constitutional:       Appearance: He is not diaphoretic  Comments: Somnolent   HENT:      Head: Normocephalic and atraumatic  Eyes:      General:         Right eye: No discharge  Left eye: No discharge  Neck:      Comments: Trachea midline, JVD present  Cardiovascular:      Heart sounds: No friction rub  Comments: Irregularly irregular rate and rhythm, S1/S2 present  Pulmonary:      Effort: No respiratory distress  Breath sounds: No wheezing  Comments: Decreased breath sounds bilaterally  Chest:      Chest wall: No tenderness  Abdominal:      General: There is distension  Tenderness: There is no abdominal tenderness  Comments: Hypoactive bowel sounds present   Musculoskeletal:      Right lower leg: Edema present  Left lower leg: Edema present  Skin:     General: Skin is warm and dry  Neurological:      Mental Status: He is alert        Comments: Able to be awakened and answer simple questions however only oriented to self but able to follow simple commands   Psychiatric:      Comments: Pleasant mood, pleasant affect         Medications:      Current Facility-Administered Medications:   •  atorvastatin (LIPITOR) tablet 20 mg, 20 mg, Oral, Daily, PEDRO Paul, 20 mg at 12/16/22 9833  •  carisoprodol (SOMA) tablet 350 mg, 350 mg, Oral, 4x Daily, PEDRO Day, 350 mg at 12/15/22 1201  •  cholecalciferol (VITAMIN D3) tablet 1,000 Units, 1,000 Units, Oral, Daily, PEDRO Paul, 1,000 Units at 12/15/22 8713  •  ciprofloxacin (CIPRO) IVPB (premix in 5% dextrose) 400 mg 200 mL, 400 mg, Intravenous, Q12H, Sandra Morrison PA-C, Last Rate: 200 mL/hr at 12/16/22 0845, 400 mg at 12/16/22 0845  •  diltiazem (CARDIZEM) 125 mg in sodium chloride 0 9 % 125 mL infusion, 1-15 mg/hr, Intravenous, Titrated, PEDRO Day, Stopped at 12/15/22 1845  •  gabapentin (NEURONTIN) tablet 600 mg, 600 mg, Oral, TID, PEDRO Moura, 600 mg at 12/15/22 2102  •  heparin (porcine) 25,000 units in 0 45% NaCl 250 mL infusion (premix), 3-20 Units/kg/hr (Order-Specific), Intravenous, Titrated, PEDRO Moura, Stopped at 12/16/22 6794  •  insulin lispro (HumaLOG) 100 units/mL subcutaneous injection 1-5 Units, 1-5 Units, Subcutaneous, Q6H [facility] **AND** Fingerstick Glucose (POCT), , , Q6H, PEDRO Perry  •  metoprolol tartrate (LOPRESSOR) tablet 75 mg, 75 mg, Oral, Q12H [facility], Juan Jose Khan, DO, 75 mg at 12/16/22 1375  •  metroNIDAZOLE (FLAGYL) IVPB (premix) 500 mg 100 mL, 500 mg, Intravenous, Q8H, Bhavna Walters PA-C, Stopped at 12/16/22 4661  •  [facility] injection 4 mg, 4 mg, Intravenous, Q6H PRN, Bhavna Walters PA-C, 4 mg at 12/15/22 2460  •  oxyCODONE (ROXICODONE) immediate release tablet 10 mg, 10 mg, Oral, Q6H, PEDRO Perry, 10 mg at 12/15/22 1202  •  pantoprazole (PROTONIX) injection 40 mg, 40 mg, Intravenous, Q12H [facility], Bhavna Walters PA-C, 40 mg at 12/16/22 3536  •  perflutren lipid microsphere (DEFINITY) injection, 1 mL/min, Intravenous, Once in imaging, Ada Lloyd MD  •  phenol (CHLORASEPTIC) 1 4 % mucosal liquid 1 spray, 1 spray, Mouth/Throat, Q2H PRN, Brigitte Stover PA-C, 1 spray at 12/12/22 1343  •  polyethylene glycol (MIRALAX) packet 17 g, 17 g, Oral, Daily, Kaylen Wells PA-C, 17 g at 12/15/22 9747  •  traZODone (DESYREL) tablet 100 mg, 100 mg, Oral, HS, Karen Moy, CRNP, 100 mg at 12/14/22 2122     Labs & Results:    Troponins:         BNP:     CBC with diff:   Results from last 7 days   Lab Units 12/16/22  0450 12/15/22  0429   WBC Thousand/uL 9 47 13 88*   HEMOGLOBIN g/dL 10 9* 11 7*   HEMATOCRIT % 35 4* 37 6   MCV fL 93 92   PLATELETS Thousands/uL 224 223     TSH:     CMP:   Results from last 7 days   Lab Units 12/16/22  0450 12/15/22  1941 12/15/22  0429 12/14/22  0528 12/13/22  1758 12/13/22  0507   POTASSIUM mmol/L 4 3 4 0   < > 3 8 < > 4 1   CHLORIDE mmol/L 99 101   < > 103   < > 104   CO2 mmol/L 25 26   < > 25   < > 26   BUN mg/dL 21 22   < > 16   < > 16   CREATININE mg/dL 1 21 1 27   < > 1 06   < > 1 05   AST U/L  --   --   --  14  --  14   ALT U/L  --   --   --  12  --  10   EGFR ml/min/1 73sq m 58 54   < > 68   < > 69    < > = values in this interval not displayed       Lipid Profile:     Coags:   Results from last 7 days   Lab Units 12/13/22  1950   INR  1 32*

## 2022-12-16 NOTE — ASSESSMENT & PLAN NOTE
· POA: AEB tachycardia, tachypnea, leukopenia, lactic acidosis > 4, GERMAN  · In setting of intraabdominal infection/peritonitis  · Imaging as noted above  · COVID/FLU/RSV negative  · Blood cultures w/NGTD  · Peritoneal fluid culture w/gamma hemolytic Strep  · Anaerobic culture w/Bacteroides fragilis   · Lactic acid 4 3 - has since cleared   · Procalcitonin 8 29 - 6 02 - 3 36 - 2 05  · Is s/p aggressive IVF resuscitation  · Continue IV Ciprofloxacin, Flagyl D#6  · Monitor fever and WBC curve  · Trend procalcitonin and follow-up cultures

## 2022-12-16 NOTE — PROGRESS NOTES
Progress Note - General Surgery   Joie Bloch 78 y o  male MRN: [id number]  Unit/Bed#: ICU 10-01 Encounter: [id number]    Assessment:  72-year-old male with past medical history significant for AS s/p valve replacement, chronic pain with continuous use of opioids, HLD, HTN, RA, perforated sigmoid diverticulitis s/p Polo's procedure (August 2022) for reversal in January 2023 presenting with sepsis secondary to abdominal abscess   Now POD#5 s/p exploratory laparotomy with appendectomy, drainage of abscess, and extensive lysis of adhesions  Episode of somnolence requiring narcan last evening  Noted to have respiratory acidosis on blood gas last evening   -Tmax 99 2, HR a fib with rates 110s to 140s but was previously controlled to 70s to 80s, low RR previously on 2L O2 via NC, episodes of hypotension with SBP 70s to 80s  - cc, additional 425 cc this morning after straight cath   -WBC 9 47   -Hgb 10 9   -Cr 1 21, BUN 21  -hyponetremia, 130   -other electrolytes stable  -cultures positive for mixed migel, bacteroides,  E coli  -BC x 2 NG at 4 days  -CT head 12/16/2022 with "No acute intracranial abnormality "    Plan:  Full liquid diet with ensures as tolerated however held on breakfast tray with disorientation this morning    IV cipro/flagyl (day 6), IV diflucan dc'd 12/15  DAMASO dc'd 12/15  Daily dressing change to midline incision  Ostomy care  Monitor bowel function, started on miralax 12/15  Medical management per critical care, appreciate recommendations  Cardiology following, appreciate recommendations   Incentive spirometry   To be evaluated at bedside by attending     Subjective/Objective   Subjective: Patient become somnolent yesterday afternoon/evening requiring narcan x 2 and BiPAP for respiratory acidosis  Also with fall while adjusting in chair  No head strike reported  Patient disoriented this morning        Objective:   Blood pressure (!) 134/101, pulse (!) 131, temperature 99 2 °F (37 3 °C), temperature source Tympanic, resp  rate (!) 24, height 5' 7" (1 702 m), weight 93 8 kg (206 lb 12 7 oz), SpO2 90 %  ,Body mass index is 32 39 kg/m²  Intake/Output Summary (Last 24 hours) at 12/16/2022 0802  Last data filed at 12/16/2022 0600  Gross per 24 hour   Intake 2129 28 ml   Output 525 ml   Net 1604 28 ml       Invasive Devices     Peripheral Intravenous Line  Duration           Long-Dwell Peripheral IV (Midline) 30/56/48 Left Basilic 3 days    Peripheral IV 12/13/22 Right;Ventral (anterior) Forearm 2 days          Drain  Duration           Colostomy Descending/sigmoid -- days    Colostomy Descending/sigmoid  days              Physical Exam  Vitals and nursing note reviewed  Constitutional:       General: He is not in acute distress  Appearance: He is obese  He is not ill-appearing or toxic-appearing  HENT:      Head: Normocephalic and atraumatic  Cardiovascular:      Rate and Rhythm: Tachycardia present  Rhythm irregular  Pulses: Normal pulses  Heart sounds: Normal heart sounds  Pulmonary:      Effort: Pulmonary effort is normal    Abdominal:      General: Bowel sounds are decreased  There is distension  Palpations: Abdomen is soft  Tenderness: There is abdominal tenderness  There is no guarding or rebound  Comments: Midline incision with minimal amount of serous drainage, staples and sutures otherwise in place and intact; DAMASO site is c/d/i with dressing in place; ostomy is pink/viable with well formed green stool in bag   Musculoskeletal:         General: Normal range of motion  Right lower leg: Edema present  Left lower leg: Edema present  Skin:     General: Skin is warm and dry  Coloration: Skin is pale  Neurological:      Mental Status: He is lethargic and disoriented  Lab, Imaging and other studies:  I have personally reviewed pertinent lab results      CBC:   Lab Results   Component Value Date    WBC 9 47 12/16/2022    HGB 10 9 (L) 12/16/2022    HCT 35 4 (L) 12/16/2022    MCV 93 12/16/2022     12/16/2022    MCH 28 6 12/16/2022    MCHC 30 8 (L) 12/16/2022    RDW 13 9 12/16/2022    MPV 9 8 12/16/2022     CMP:   Lab Results   Component Value Date    SODIUM 130 (L) 12/16/2022    K 4 3 12/16/2022    CL 99 12/16/2022    CO2 25 12/16/2022    BUN 21 12/16/2022    CREATININE 1 21 12/16/2022    CALCIUM 8 3 (L) 12/16/2022    EGFR 58 12/16/2022     VTE Pharmacologic Prophylaxis: Heparin  VTE Mechanical Prophylaxis: sequential compression device    Savage Lofton PA-C

## 2022-12-16 NOTE — PROGRESS NOTES
-- Patient:  -- MRN: [id number]  -- Aidin Request ID: [id number]  -- Level of care reserved: [address] [facility]  -- Partner Reserved:  Mitzi Iglesias 81, [facility], [address] [phone]  -- Clinical needs requested:  -- Geography searched: [phone]  -- Start of Service:  -- Request sent: 6:28pm EST on 12/14/2022 by Alexia Dietz  -- Partner reserved: 2:33pm EST on 12/16/2022 by Alexia Dietz  -- Choice list shared: 8:04am EST on 12/16/2022 by Gearline Camel

## 2022-12-16 NOTE — OCCUPATIONAL THERAPY NOTE
12/16/22 1040   OT Last Visit   OT Visit Date 12/16/22   Note Type   Note Type Treatment  (completed [id number])   Pain Assessment   Pain Assessment Tool 0-10   Pain Score No Pain   Restrictions/Precautions   Weight Bearing Precautions Per Order No   Other Precautions Telemetry;Multiple lines;O2;Fall Risk   ADL   Toileting Comments patient asked to use urinal during session and he thought he could manage same > however he partially peed outside of it (linens then changed by PCA and myself)   Bed Mobility   Rolling R 3  Moderate assistance   Additional items Bedrails; Increased time required;Verbal cues; Assist x 1   Rolling L 3  Moderate assistance   Additional items Assist x 1;Bedrails; Increased time required;Verbal cues   Therapeutic Exercise - ROM   UE-ROM Yes   ROM- Right Upper Extremities   R Shoulder AAROM;PROM; Flexion; Horizontal ABduction  (AND pro/re-traction)   R Elbow Elbow flexion;Elbow extension  (in forward and reverse)   R Weight/Reps/Sets 10 reps shoulders and 15 reps elbows   ROM - Left Upper Extremities    L Weight/Reps/Sets all exers  same as RUE above   Coordination   Gross Motor weakened but appears [facility] [place]   Dexterity briefly encouraged flex/ext of digits to L hand (noting min  edema to same)  (*Pillow prop provided, for hand above heart level)   Cognition   Overall Cognitive Status WFL   Arousal/Participation Responsive; Cooperative   Attention Within functional limits   Following Commands Follows one step commands with increased time or repetition   Activity Tolerance   Activity Tolerance Patient limited by fatigue   Medical Staff Made Aware nurse aware of session's content   Assessment   Assessment Patient participated in Skilled OT session this date with interventions consisting of therapeutic exercise to: increase functional use of BUEs, increase BUE muscle strength  and  therapeutic activities to: increase activity tolerance    Patient agreeable to OT treatment session, upon arrival patient was found seated in bed (back supported)  *Spoke with Irena ROBIN prior to seeing patient - she approved of Light/mostly passive UE exercise session  Patient requiring verbal cues for correct technique, verbal cues for pacing thru activity steps, one step directives and frequent rest periods, and self-care assistance as noted in flow sheet/AM-PAC  Patient continues to be functioning below baseline level, occupational performance remains limited secondary to factors listed above and increased risk for falls and injury  From OT standpoint, recommendation at time of d/c would be post-acute rehabilitation services  Patient to benefit from continued Occupational Therapy treatment while in the hospital to address deficits as defined above and maximize level of functional independence with ADLs and functional mobility  Plan   Treatment Interventions Functional transfer training;UE strengthening/ROM; Patient/family training; Activityengagement; Energy conservation   Goal Expiration Date 12/23/22   OT Treatment Day 1   Recommendation   OT Discharge Recommendation Post acute rehabilitation services   Additional Comments 2 The patient's raw score on the AM-PAC Daily Activity inpatient short form is 16, standardized score is 35 96, less than 39 4  Patients at this level are likely to benefit from discharge to post-acute rehabilitation services  Please refer to the recommendation of the Occupational Therapist for safe discharge planning     AM-PAC Daily Activity Inpatient   Lower Body Dressing 1   Bathing 2   Toileting 2   Upper Body Dressing 3   Grooming 4   Eating 4   Daily Activity Raw Score 16   Daily Activity Standardized Score (Calc for Raw Score >=11) 35 96   AM-[facility] Applied Cognition Inpatient   Following a Speech/Presentation 4   Understanding Ordinary Conversation 4   Taking Medications 4   Remembering Where Things Are Placed or Put Away 4   Remembering List of 4-5 Errands 4   Taking Care of Complicated Tasks 4   Applied Cognition Raw Score 24   Applied Cognition Standardized Score 62 21     Lorrine Forts, DONALDSON/L

## 2022-12-16 NOTE — ASSESSMENT & PLAN NOTE
- Per documentation began around 2 AM on 12/13/2022  -Rates were better controlled yesterday however altered mental status  -Rates now in the 100-130 bpm range  -We will see if patient is able to tolerate oral therapy however if not may need to revert to intravenous Cardizem    -If patient's blood pressure remains soft but is able to tolerate metoprolol could then consider addition of digoxin if able to tolerate oral intake

## 2022-12-16 NOTE — PLAN OF CARE
Problem: Potential for Falls  Goal: Patient will remain free of falls  Description: INTERVENTIONS:  - Educate patient/family on patient safety including physical limitations  - Instruct patient to call for assistance with activity   - Consult OT/PT to assist with strengthening/mobility   - Keep Call bell within reach  - Keep bed low and locked with side rails adjusted as appropriate  - Keep care items and personal belongings within reach  - Initiate and maintain comfort rounds  - Make Fall Risk Sign visible to staff  - Offer Toileting every 2 Hours, in advance of need  - Initiate/Maintain bed alarm  - Obtain necessary fall risk management equipment:   - Apply yellow socks and bracelet for high fall risk patients  - Consider moving patient to room near nurses station  12/16/2022 1421 by Franko Perla, RN  Outcome: Progressing  12/16/2022 1014 by Franko Perla, RN  Outcome: Progressing

## 2022-12-16 NOTE — ASSESSMENT & PLAN NOTE
· Presented to ED on 12/11 w/acute onset abd pain concerning for acute abdomen   · W/hx of sigmoid diverticulitis s/p Polo's procedure and colostomy with planned reversal scheduled for 01/2023  · CT A/P on admission revealed extensive inflammatory changes involving jejunum and proximal-mid ileum, compatible with severe enteritis; there is focal free air surrounding lower midline ileum just above urinary bladder with extensive inflammatory changes; few mildly dilated loops of jejunum within left upper abdomen, likely 2/2 extensive inflammatory changes with possible superimposed low-grade partial SBO; small amount of free air and free fluid in remainder of abdomen and pelvis; stomach and proximal esophagus are distended and fluid-filled, presumably secondary to delayed transit  · OR on 12/11 for EX LAP,  EXTENSIVE MELANI, APPENDECTOMY, DRAINAGE OF ABDOMINAL ABSCESS - today is POD#5  · Peritoneal fluid culture w/gamma hemolytic Strep  · Anaerobic culture w/Bacteroides fragilis   · Continue IV Ciprofloxacin, Flagyl, D#6  · NGT removed on 12/13 - tolerating full liquid diet - would advance as tolerated today pending surgery's recommendations  · Home scheduled pain regimen restarted - continue PRN Dilaudid for acute surgical pain   · Monitor incision closely - dressing changes per surgery team  · Monitor ostomy

## 2022-12-16 NOTE — ASSESSMENT & PLAN NOTE
- Seen in the setting of severe sepsis with peritonitis and now newly diagnosed cardiomyopathy along with atrial fibrillation and RVR  -Would continue daily IV diuresis at least as patient does appear more volume overloaded today  -Continue to monitor patient clinically

## 2022-12-16 NOTE — CASE MANAGEMENT
Case Management Discharge Planning Note    Patient name Camila Means  Location ICU 10/ICU 10-01 MRN [id number]  : 1945 Date 2022       Current Admission Date: 2022  Current Admission Diagnosis:Peritonitis [facility])   Patient Active Problem List    Diagnosis Date Noted   • Cardiomyopathy ([address] ) 12/15/2022   • New onset atrial fibrillation ([address] ) 2022   • Septic shock ([address] ) 2022   • Acute respiratory failure with hypoxia ([address] ) 2022   • Peritonitis ([address] ) 2022   • Diverticulitis of colon 2022   • SBO (small bowel obstruction) ([address] ) 2022   • Primary osteoarthritis of right shoulder 2022   • Chronic kidney disease, stage 2 (mild) 2022   • High risk medication use 2020   • Rheumatoid arthritis with rheumatoid factor, unspecified ([address] ) 2020   • Atherosclerosis of native coronary artery of native heart without angina pectoris 2019   • Status post aortic valve replacement with bioprosthetic valve 2018   • Arthritis    • Chronic pain    • Continuous opioid dependence ([address] )    • Hyperlipidemia    • Benign essential hypertension    • Insomnia    • Peripheral neuropathy    • Carpal tunnel syndrome 2015   • Psoriasis with arthropathy ([facility] ) 2015   • Arthralgia of lower leg 2013   • Hyperglycemia 10/25/2012   • Primary localized osteoarthrosis of shoulder region 10/09/2012   • Psoriasis 10/09/2012   • Atopic dermatitis 2012   • Low back pain 2012      LOS (days): 5  Geometric Mean LOS (GMLOS) (days): 9 60  Days to GMLOS:4 3     OBJECTIVE:  Risk of Unplanned Readmission Score: 27 67         Current admission status: Inpatient   Preferred Pharmacy:   [address] [place] [address]  Phone: [phone] Fax: [phone] Sofia Crews #[id number], [phone] [address], [address], [place]  [address] 1682 [facility] [address]  Phone: [phone] Fax: [phone] [address] [place] [address]  Phone: [phone] Fax: [phone]    Primary Care Provider: Usha Lynch DO    Primary Insurance: [facility]  Secondary Insurance:     DISCHARGE DETAILS:    Discharge planning discussed with[de-identified] patient and wife at the bedside  Freedom of Choice: Yes  Comments - Freedom of Choice: recommendation is for rehab   - pt has delined rehab and is in agreement with [facility]- pt and wife were given list of accepting  agencies- [facility] was their choice  CM contacted family/caregiver?: Yes             Contacts  Patient Contacts: wife Jersey Hearn  Relationship to Patient[de-identified] Family  Contact Method:  In Person  Reason/Outcome: Discharge [address]         Is the patient interested in [facility] at discharge?: Yes  [address] requested[de-identified] Nursing, Occupational Therapy, Physical [address] [place] [address] Ave Name[de-identified] Other ([address] [place] [address] home care)  [address] [place] [address] Provider[de-identified] PCP  Home Health Services Needed[de-identified] Post-Op Care and Assessment, Strengthening/Theraputic Exercises to Improve Function, Wound/Ostomy Care, Other (comment) (vs, cardiac assessment, assess for s/s of infection, review medications)         Other Referral/Resources/Interventions Provided:  Interventions: [facility]  Referral Comments: referrals were sent  pt was given choices of [facility] accepting agencies and his choice is [facility]    Would you like to participate in our [address] service program?  : No - Declined    Treatment Team Recommendation: Home with [address] (home with spouse & [address] follow up- family)

## 2022-12-16 NOTE — ASSESSMENT & PLAN NOTE
· New onset AF w/RVR as high as 225 in AM of 12/13  · No previous history of AF  · Suspect this is 2/2 septic shock/peritonitis vs pulmonary vascular congestion w/concurrent anxiety and pain  · TTE from 12/14 revealed EF 35% w/mild concentric hypertrophy, moderately reduced systolic function in global manner; mildly dilated RV w/moderately reduced systolic function; mild MR/TR, RVSP 48  · Initially refractory to IV Lopressor 5 mg x2, IV Cardizem 15 mg x1, IV Amiodarone bolus + gtt initiation  · Cardizem gtt weaned off on 12/15  · PO Lopressor uptitrated to 75 mg BID  · Continue w/PRN diuresis  · Continue AC w/Heparin gtt - will likely need DOAC on discharge  · Cardiology following  · Continue cardiac monitoring and PRN ECG

## 2022-12-16 NOTE — PROGRESS NOTES
[address]  Progress Note - Marcha Hamman 1945, 78 y o  male MRN: [id number]  Unit/Bed#: ICU 10-01 Encounter: [id number]  Primary Care Provider: Sayda Granado DO   Date and time admitted to hospital: 12/11/2022  5:19 AM    * Peritonitis [facility])  Assessment & Plan  · Presented to ED on 12/11 w/acute onset abd pain concerning for acute abdomen   · W/hx of sigmoid diverticulitis s/p Polo's procedure and colostomy with planned reversal scheduled for 01/2023  · CT A/P on admission revealed extensive inflammatory changes involving jejunum and proximal-mid ileum, compatible with severe enteritis; there is focal free air surrounding lower midline ileum just above urinary bladder with extensive inflammatory changes; few mildly dilated loops of jejunum within left upper abdomen, likely 2/2 extensive inflammatory changes with possible superimposed low-grade partial SBO; small amount of free air and free fluid in remainder of abdomen and pelvis; stomach and proximal esophagus are distended and fluid-filled, presumably secondary to delayed transit  · OR on 12/11 for EX LAP,  EXTENSIVE MELANI, APPENDECTOMY, DRAINAGE OF ABDOMINAL ABSCESS - today is POD#5  · Peritoneal fluid culture w/gamma hemolytic Strep  · Anaerobic culture w/Bacteroides fragilis   · Continue IV Ciprofloxacin, Flagyl, D#6  · NGT removed on 12/13 - tolerating full liquid diet - would advance as tolerated today pending surgery's recommendations  · Home scheduled pain regimen restarted - continue PRN Dilaudid for acute surgical pain   · Monitor incision closely - dressing changes per surgery team  · Monitor ostomy    Septic shock ([facility] [address] )  Assessment & Plan  · POA: AEB tachycardia, tachypnea, leukopenia, lactic acidosis > 4, GERMAN  · In setting of intraabdominal infection/peritonitis  · Imaging as noted above  · COVID/FLU/RSV negative  · Blood cultures w/NGTD  · Peritoneal fluid culture w/gamma hemolytic Strep  · Anaerobic culture w/Bacteroides fragilis   · Lactic acid 4 3 - has since cleared   · Procalcitonin 8 29 - 6 02 - 3 36 - 2 05  · Is s/p aggressive IVF resuscitation  · Continue IV Ciprofloxacin, Flagyl D#6  · Monitor fever and WBC curve  · Trend procalcitonin and follow-up cultures    Cardiomyopathy [facility])  Assessment & Plan  · TTE from 12/14 revealed EF 35% w/mild concentric hypertrophy, moderately reduced systolic function in global manner, mildly dilated RV w/moderately reduced systolic function; mild MR/TR, RVSP 48  · Will need ischemic evaluation when medically stable  · Cardiology following  · Continue BB and PRN diuresis  · Strict I/O  · Daily weights    New onset atrial fibrillation (HCC)  Assessment & Plan  · New onset AF w/RVR as high as 225 in AM of 12/13  · No previous history of AF  · Suspect this is 2/2 septic shock/peritonitis vs pulmonary vascular congestion w/concurrent anxiety and pain  · TTE from 12/14 revealed EF 35% w/mild concentric hypertrophy, moderately reduced systolic function in global manner; mildly dilated RV w/moderately reduced systolic function; mild MR/TR, RVSP 48  · Initially refractory to IV Lopressor 5 mg x2, IV Cardizem 15 mg x1, IV Amiodarone bolus + gtt initiation  · Cardizem gtt weaned off on 12/15  · PO Lopressor uptitrated to 75 mg BID  · Continue w/PRN diuresis  · Continue AC w/Heparin gtt - will likely need DOAC on discharge  · Cardiology following  · Continue cardiac monitoring and PRN ECG    Acute respiratory failure with hypoxia (HCC)  Assessment & Plan  · Suspect this is is multifactorial in setting of atelectasis vs mild pulmonary vascular congestion vs post-operative pain  · CXR on 12/13 w/mildly increased interstitial markings bilaterally - may indicate mild pulmonary edema  · Respiratory status improved w/IV diuresis - continue w/PRN diuresis  · Now weaned to RA - continue to maintain SpO2 > 90%  · Continue scheduled Xopenex/HTS nebs given thick secretions  · Aggressive pulmonary hygiene  · Encourage cough, deep breathing, IS, ambulation    Chronic kidney disease, stage 2 (mild)  Assessment & Plan  Lab Results   Component Value Date    EGFR 58 12/16/2022    EGFR 54 12/15/2022    EGFR 66 12/15/2022    CREATININE 1 21 12/16/2022    CREATININE 1 27 12/15/2022    CREATININE 1 09 12/15/2022     · Baseline creatinine appears to be around 0 8 - 1 1  · Trend renal indices  · Monitor I/O  · Daily weights    Rheumatoid arthritis with rheumatoid factor, unspecified (HCC)  Assessment & Plan  · Continue pain regimen as noted above    Status post aortic valve replacement with bioprosthetic valve  Assessment & Plan  · Is s/p AVR in 2018   · Holding ASA while fresh post-op - will discuss restarting w/surgery team    Insomnia  Assessment & Plan  · Continue home Trazodone    Benign essential hypertension  Assessment & Plan  · Currently holding home Cozaar - would consider restarting if becomes hypertensive  · Trend BPs    Hyperlipidemia  Assessment & Plan  · Continue home statin    Continuous opioid dependence (HCC)  Assessment & Plan  · Takes Oxycodone 10 mg QID and PRN Percocet  mg Q4H  · PDMP reviewed   · Continue home Oxycodone 10 mg QID    Chronic pain  Assessment & Plan  · In setting of RA/OA   · Continue home Soma 350 mg QID, Neurontin 600 mg TID, Oxycodone 10 mg QID  · Holding home PRN Percocet  mg Q4H  · PRN IV Dilaudid as patient required IV Narcan overnight for somnolence  · Continue splinting w/pillow for pain      ----------------------------------------------------------------------------------------  HPI/24hr events:     · PO Lopressor uptitrated to 75 mg BID  · Received IV Narcan overnight for somnolence  · Required BIPAP for short period 2/2 respiratory acidosis  · Tolerating full liquid diet  · Received additional IV Lopressor 5 mg x1 for uncontrolled AF    Patient appropriate for transfer out of the ICU today?: No  Disposition: Continue Stepdown Level 1 level of care   Code Status: Level 1 - Full Code  ---------------------------------------------------------------------------------------  SUBJECTIVE  "I'm doing ok " Denies pain, SOB  Review of Systems  Review of systems was reviewed and negative unless stated above in HPI/24-hour events   ---------------------------------------------------------------------------------------  OBJECTIVE    Vitals   Vitals:    22 0445 22 0535 22 0659 22 0700   BP:   (!) 134/101    BP Location:   Left arm    Pulse:   (!) 131    Resp:   (!) 24    Temp:   99 2 °F (37 3 °C) 99 2 °F (37 3 °C)   TempSrc:   Tympanic Tympanic   SpO2:   90%    Weight: 93 8 kg (206 lb 12 7 oz) 93 8 kg (206 lb 12 7 oz)     Height:         Temp (24hrs), Av 2 °F (36 8 °C), Min:96 5 °F (35 8 °C), Max:99 2 °F (37 3 °C)  Current: Temperature: 99 2 °F (37 3 °C)          Respiratory:  SpO2: SpO2: 90 %, SpO2 Activity: SpO2 Activity: At Rest, SpO2 Device: O2 Device: None (Room air)  Nasal Cannula O2 Flow Rate (L/min): 2 L/min    Invasive/non-invasive ventilation settings   Respiratory    Lab Data (Last 4 hours)    None         O2/Vent Data (Last 4 hours)    None                Physical Exam  Vitals and nursing note reviewed  Constitutional:       General: He is awake  He is not in acute distress  HENT:      Head: Normocephalic  Mouth/Throat:      Mouth: Mucous membranes are dry  Eyes:      Pupils: Pupils are equal, round, and reactive to light  Cardiovascular:      Rate and Rhythm: Tachycardia present  Rhythm irregularly irregular  Pulses: Normal pulses  Heart sounds: Normal heart sounds  Comments: AF  Pulmonary:      Breath sounds: Examination of the right-lower field reveals rales  Examination of the left-lower field reveals rales  Decreased breath sounds and rales present  Abdominal:      General: Bowel sounds are normal  There is distension  Palpations: Abdomen is soft  Tenderness: There is abdominal tenderness  Comments: Midline abdominal incision; ostomy stoma pink     Musculoskeletal:      Cervical back: Normal range of motion and neck supple  Right lower leg: Edema present  Left lower leg: Edema present  Comments: Bilateral LE w/trace edema   Skin:     General: Skin is warm and dry  Capillary Refill: Capillary refill takes less than 2 seconds  Neurological:      General: No focal deficit present  Mental Status: He is alert and oriented to person, place, and time  Psychiatric:         Behavior: Behavior is cooperative           Laboratory and Diagnostics:  Results from last 7 days   Lab Units 12/16/22  0450 12/15/22  0429 12/14/22  0528 12/13/22  1950 12/13/22  0507 12/12/22  0629 12/11/22  1911 12/11/22  0620   WBC Thousand/uL 9 47 13 88* 15 40* 15 33* 11 25* 11 36* 5 25 2 99*   HEMOGLOBIN g/dL 10 9* 11 7* 12 1 11 7* 12 0 13 4 14 2 16 2   HEMATOCRIT % 35 4* 37 6 37 4 36 6 37 9 42 0 44 8 52 2*   PLATELETS Thousands/uL 224 223 201 206 170 188 215 251   NEUTROS PCT %  --   --   --   --   --   --   --  85*   BANDS PCT %  --   --   --   --   --   --  30*  --    MONOS PCT %  --   --   --   --   --   --   --  8   MONO PCT %  --   --   --   --   --   --  3*  --      Results from last 7 days   Lab Units 12/16/22  0450 12/15/22  1941 12/15/22  0429 12/14/22  0528 12/13/22  1758 12/13/22  0507 12/12/22  1853 12/12/22  0629 12/11/22  1911 12/11/22  0620   SODIUM mmol/L 130* 132* 136 136 135 137 135 135   < > 138   POTASSIUM mmol/L 4 3 4 0 3 9 3 8 3 4* 4 1 4 0 3 8   < > 3 8   CHLORIDE mmol/L 99 101 102 103 102 104 104 105   < > 100   CO2 mmol/L 25 26 25 25 27 26 26 24   < > 26   ANION GAP mmol/L 6 5 9 8 6 7 5 6   < > 12   BUN mg/dL 21 22 17 16 15 16 13 12   < > 19   CREATININE mg/dL 1 21 1 27 1 09 1 06 1 18 1 05 1 12 0 96   < > 1 28   CALCIUM mg/dL 8 3* 8 1* 8 2* 8 5 8 5 8 5 8 6 8 1*   < > 9 5   GLUCOSE RANDOM mg/dL 107 114 103 95 117 112 114 110   < > 185*   ALT U/L  --   --   --  12  --  10  --  12  -- 18   AST U/L  --   --   --  14  --  14  --  17  --  17   ALK PHOS U/L  --   --   --  44  --  35  --  27*  --  54   ALBUMIN g/dL  --   --   --  3 1*  --  3 0*  --  3 3*  --  4 3   TOTAL BILIRUBIN mg/dL  --   --   --  0 54  --  0 74  --  0 68  --  0 57    < > = values in this interval not displayed  Results from last 7 days   Lab Units 12/16/22  0450 12/15/22  1941 12/15/22  0429 12/14/22  0528 12/13/22  1758 12/13/22  0507 12/12/22  1853   MAGNESIUM mg/dL 2 1 2 1 2 1 2 3 1 8* 2 0 1 9   PHOSPHORUS mg/dL 3 1 3 9 2 9 2 4 2 9 1 9* 2 3      Results from last 7 days   Lab Units 12/16/22  0450 12/16/22  0108 12/15/22  1832 12/15/22  1131 12/15/22  0429 12/14/22  2311 12/14/22  1531 12/14/22  0159 12/13/22  1950   INR   --   --   --   --   --   --   --   --  1 32*   PTT seconds 48* 97* 80* 59* 41* 60* 159*   < > 42*    < > = values in this interval not displayed  Results from last 7 days   Lab Units 12/12/22  0629 12/11/22  2033 12/11/22  0620   LACTIC ACID mmol/L 1 6 2 6* 4 3*     ABG:    VBG:  Results from last 7 days   Lab Units 12/15/22  1941   PH IGLESIA  7 271*   PCO2 IGLESIA mm Hg 59 1*   PO2 IGLESIA mm Hg 36 3   HCO3 IGLESIA mmol/L 26 6   BASE EXC IGLESIA mmol/L -1 1     Results from last 7 days   Lab Units 12/15/22  0429 12/14/22  0528 12/13/22  0507 12/12/22  0629   PROCALCITONIN ng/ml 2 05* 3 36* 6 02* 8 29*       Micro  Results from last 7 days   Lab Units 12/11/22  1155 12/11/22  0831   BLOOD CULTURE   --  No Growth After 4 Days  No Growth After 4 Days  GRAM STAIN RESULT  2+ Polys*  2+ Gram positive cocci in pairs*  2+ Gram positive cocci in chains*  Rare Gram negative rods*  --    BODY FLUID CULTURE, STERILE  3+ Growth of Gamma Hemolytic Streptococcus NOT Enterococcus  2+ Growth of - Strain 1 - Escherichia coli*  1+ Growth of - Strain 2 - Escherichia coli*  1+ Growth of  --        EKG: AF  Imaging: I have personally reviewed pertinent reports     and I have personally reviewed pertinent films in PACS    Intake and Output  I/O       12/14 0701  12/15 0700 12/15 0701  12/16 0700 12/16 0701 12/17 0700    P  O  1265 1112     I V  (mL/kg) 447 (4 9) 417 3 (4 4)     IV Piggyback 55 600     Total Intake(mL/kg) 1767 (19 4) 2129 3 (22 7)     Urine (mL/kg/hr) 1375 (0 6) 525 (0 2)     Emesis/NG output       Drains 90      Other       Stool 0      Total Output 1465 525     Net +302 +1604 3            Unmeasured Urine Occurrence 1 x            Height and Weights   Height: 5' 7" (170 2 cm)  IBW (Ideal Body Weight): 66 1 kg  Body mass index is 32 39 kg/m²  Weight (last 2 days)     Date/Time Weight    12/16/22 0535 93 8 (206 79)    12/16/22 0445 93 8 (206 79)    12/15/22 0830 --    Comment rows:    OBSERV: OOB to chair at 12/15/22 0830    12/15/22 0558 91 (200 62)    12/14/22 0758 90 7 (200)    12/14/22 0730 90 7 (200)    Comment rows:    OBSERV: Awake alert having Echo at bedside at 12/14/22 0730    12/14/22 0539 90 9 (200 4)            Nutrition       Diet Orders   (From admission, onward)             Start     Ordered    12/15/22 1037  Dietary nutrition supplements  Once        Question Answer Comment   Select Supplement: Ensure Compact-Vanilla    Frequency Lunch        12/15/22 1037    12/15/22 0813  Diet Surgical; Full Liquid  Diet effective now        Comments: Patient may have ice chips and small sips of water  References:    Nutrtion Support Algorithm Enteral vs  Parenteral   Question Answer Comment   Diet Type Surgical    Surgical Full Liquid    RD to adjust diet per protocol?  No        12/15/22 0814                  Active Medications  Scheduled Meds:  Current Facility-Administered Medications   Medication Dose Route Frequency Provider Last Rate   • atorvastatin  20 mg Oral Daily PEDRO Paul     • carisoprodol  350 mg Oral 4x Daily PEDRO Rubio     • cholecalciferol  1,000 Units Oral Daily PEDRO Paul     • ciprofloxacin  400 mg Intravenous Q12H León Waller PA-C 400 mg (12/15/22 2053)   • diltiazem  1-15 mg/hr Intravenous Titrated [place] Allis, CRNP Stopped (12/15/22 1845)   • gabapentin  600 mg Oral TID [place] Allis, CRNP     • heparin (porcine)  3-20 Units/kg/hr (Order-Specific) Intravenous Titrated [place] Allis, CRNP Stopped (12/16/22 0650)   • insulin lispro  1-5 Units Subcutaneous Q6H [address] PEDRO Perry     • metoprolol tartrate  75 mg Oral Q12H [address] Sukhi Araya DO     • metroNIDAZOLE  500 mg Intravenous Q8H Kayden Fernandez PAChonC 500 mg (12/16/22 0734)   • ondansetron  4 mg Intravenous Q6H PRN Kayden Fernandez PA-C     • oxyCODONE  10 mg Oral Q6H [place] Allis, CRNP     • pantoprazole  40 mg Intravenous Q12H [address] Kayden Fernandez PA-C     • perflutren lipid microsphere  1 mL/min Intravenous Once in imaging Laureen Waller MD     • phenol  1 spray Mouth/Throat Q2H PRN Brigitte May Jose PainVINI     • polyethylene glycol  17 g Oral Daily Anastacia Hatchet, PA-C     • traZODone  100 mg Oral HS PEDRO Perry       Continuous Infusions:  diltiazem, 1-15 mg/hr, Last Rate: Stopped (12/15/22 1845)  heparin (porcine), 3-20 Units/kg/hr (Order-Specific), Last Rate: Stopped (12/16/22 0650)      PRN Meds:   ondansetron, 4 mg, Q6H PRN  perflutren lipid microsphere, 1 mL/min, Once in imaging  phenol, 1 spray, Q2H PRN        Invasive Devices Review  Invasive Devices     Peripheral Intravenous Line  Duration           Long-Dwell Peripheral IV (Midline) 02/26/55 Left Basilic 3 days    Peripheral IV 12/13/22 Right;Ventral (anterior) Forearm 2 days          Drain  Duration           Colostomy Descending/sigmoid -- days    Colostomy Descending/sigmoid  days                Rationale for remaining devices: N/A  ---------------------------------------------------------------------------------------  Advance Directive and Living Will:      Power of :    POLST: ---------------------------------------------------------------------------------------  Care Time Delivered:   Upon my evaluation, this patient had a high probability of imminent or life-threatening deterioration due to peritonitis, septic shock, which required my direct attention, intervention, and personal management  I have personally provided 30 minutes (0601 to 0631) of critical care time, exclusive of procedures, teaching, family meetings, and any prior time recorded by providers other than myself  PEDRO Floyd      Portions of the record may have been created with voice recognition software  Occasional wrong word or "sound a like" substitutions may have occurred due to the inherent limitations of voice recognition software    Read the chart carefully and recognize, using context, where substitutions have occurred

## 2022-12-16 NOTE — ASSESSMENT & PLAN NOTE
· In setting of RA/OA   · Continue home Soma 350 mg QID, Neurontin 600 mg TID, Oxycodone 10 mg QID  · Holding home PRN Percocet  mg Q4H  · PRN IV Dilaudid as patient required IV Narcan overnight for somnolence  · Continue splinting w/pillow for pain

## 2022-12-16 NOTE — PLAN OF CARE
Problem: Prexisting or High Potential for Compromised Skin Integrity  Goal: Skin integrity is maintained or improved  Description: INTERVENTIONS:  - Identify patients at risk for skin breakdown  - Assess and monitor skin integrity  - Assess and monitor nutrition and hydration status  - Monitor labs   - Assess for incontinence   - Turn and reposition patient  - Assist with mobility/ambulation  - Relieve pressure over bony prominences  - Avoid friction and shearing  - Provide appropriate hygiene as needed including keeping skin clean and dry  - Evaluate need for skin moisturizer/barrier cream  - Collaborate with interdisciplinary team   - Patient/family teaching  - Consider wound care consult   Outcome: Progressing     Problem: MOBILITY - ADULT  Goal: Maintain or return to baseline ADL function  Description: INTERVENTIONS:  - Educate patient/family on patient safety including physical limitations  - Instruct patient to call for assistance with activity   - Consult OT/PT to assist with strengthening/mobility   - Keep Call bell within reach  - Keep bed low and locked with side rails adjusted as appropriate  - Keep care items and personal belongings within reach  - Initiate and maintain comfort rounds  - Make Fall Risk Sign visible to staff  - Offer Toileting every 2 Hours, in advance of need  - Initiate/Maintain bed alarm  - Obtain necessary fall risk management equipment:   - Apply yellow socks and bracelet for high fall risk patients  - Consider moving patient to room near nurses station  Outcome: Progressing  Goal: Maintains/Returns to pre admission functional level  Description: INTERVENTIONS:  - Perform BMAT or MOVE assessment daily    - Set and communicate daily mobility goal to care team and patient/family/caregiver     - Collaborate with rehabilitation services on mobility goals if consulted  - Perform Range of Motion 3  Problem: PAIN - ADULT  Goal: Verbalizes/displays adequate comfort level or baseline comfort level  Description: Interventions:  - Encourage patient to monitor pain and request assistance  - Assess pain using appropriate pain scale  - Administer analgesics based on type and severity of pain and evaluate response  - Implement non-pharmacological measures as appropriate and evaluate response  - Consider cultural and social influences on pain and pain management  - Notify physician/advanced practitioner if interventions unsuccessful or patient reports new pain  Outcome: Progressing     Problem: INFECTION - ADULT  Goal: Absence or prevention of progression during hospitalization  Description: INTERVENTIONS:  - Assess and monitor for signs and symptoms of infection  - Monitor lab/diagnostic results  - Monitor all insertion sites, i e  indwelling lines, tubes, and drains  - Monitor endotracheal if appropriate and nasal secretions for changes in amount and color  - [place] appropriate cooling/warming therapies per order  - Administer medications as ordered  - Instruct and encourage patient and family to use good hand hygiene technique  - Identify and instruct in appropriate isolation precautions for identified infection/condition  Outcome: Progressing  Goal: Absence of fever/infection during neutropenic period  Description: INTERVENTIONS:  - Monitor WBC    Outcome: Progressing    times a day  - Reposition patient every 2 hours    - Dangle patient 3 times a day  - Stand patient 3 times a day  - Ambulate patient 3 times a day  - Out of bed to chair 3 times a day   - Out of bed for meals 3 times a day  - Out of bed for toileting  - Record patient progress and toleration of activity level   Outcome: Progressing

## 2022-12-16 NOTE — ASSESSMENT & PLAN NOTE
-Blood pressure soft however does appear to be tolerating metoprolol tartrate 75 mg twice daily  -Fortunately in setting of altered mental status may need to revert back to intravenous therapy  -If patient's mental status improves and is able to tolerate oral therapy then could consider digoxin as well as blood pressure remains soft but heart rates elevated

## 2022-12-16 NOTE — PLAN OF CARE
Problem: OCCUPATIONAL THERAPY ADULT  Goal: Performs self-care activities at highest level of function for planned discharge setting  See evaluation for individualized goals  Description: Treatment Interventions: ADL retraining, Functional transfer training, Endurance training, Patient/family training, Equipment evaluation/education, Compensatory technique education, Energy conservation, Activityengagement    See flowsheet documentation for full assessment, interventions and recommendations  Outcome: Progressing  Note: Limitation: Decreased ADL status, Decreased Safe judgement during ADL, Decreased endurance, Decreased self-care trans, Decreased high-level ADLs  Prognosis: Good  Assessment: Patient participated in Skilled OT session this date with interventions consisting of therapeutic exercise to: increase functional use of BUEs, increase BUE muscle strength  and  therapeutic activities to: increase activity tolerance   Patient agreeable to OT treatment session, upon arrival patient was found seated in bed (back supported)  *Spoke with Irena ROBIN prior to seeing patient - she approved of Light/mostly passive UE exercise session  Patient requiring verbal cues for correct technique, verbal cues for pacing thru activity steps, one step directives and frequent rest periods, and self-care assistance as noted in flow sheet/AM-PAC  Patient continues to be functioning below baseline level, occupational performance remains limited secondary to factors listed above and increased risk for falls and injury  From OT standpoint, recommendation at time of d/c would be post-acute rehabilitation services  Patient to benefit from continued Occupational Therapy treatment while in the hospital to address deficits as defined above and maximize level of functional independence with ADLs and functional mobility       OT Discharge Recommendation: Post acute rehabilitation services        [place], DONALDSON/L

## 2022-12-16 NOTE — ASSESSMENT & PLAN NOTE
Lab Results   Component Value Date    EGFR 58 12/16/2022    EGFR 54 12/15/2022    EGFR 66 12/15/2022    CREATININE 1 21 12/16/2022    CREATININE 1 27 12/15/2022    CREATININE 1 09 12/15/2022     · Baseline creatinine appears to be around 0 8 - 1 1  · Trend renal indices  · Monitor I/O  · Daily weights

## 2022-12-16 NOTE — ASSESSMENT & PLAN NOTE
· Takes Oxycodone 10 mg QID and PRN Percocet  mg Q4H  · PDMP reviewed   · Continue home Oxycodone 10 mg QID

## 2022-12-16 NOTE — PROGRESS NOTES
Staff entered room and Kamilah Goodwin found pt to be on the floor leaning on his left elbow  Heparin drip stopped immediately  Pt  was awake and alert and answered all questions  Patient quickly assessed for neuro status  Pt  Denies hitting his head or having any injury  He stated he was trying to move to the bottom of the chair to void and slipped off  Jackquline Gain, NP called immediately to bedside  Pt  Assessed by her then moved to wheelchair with the assistance of three people  Pt  VS in range for the patient  Pt  Taken to CT

## 2022-12-16 NOTE — ASSESSMENT & PLAN NOTE
- Lactic acidosis improved  -Continue antibiotic therapy along with plan of care per surgery and critical care team

## 2022-12-17 LAB
ANION GAP SERPL CALCULATED.3IONS-SCNC: 7 MMOL/L (ref 4–13)
ANION GAP SERPL CALCULATED.3IONS-SCNC: 7 MMOL/L (ref 4–13)
APTT PPP: 66 SECONDS (ref 23–37)
APTT PPP: 73 SECONDS (ref 23–37)
BUN SERPL-MCNC: 15 MG/DL (ref 5–25)
BUN SERPL-MCNC: 16 MG/DL (ref 5–25)
CA-I BLD-SCNC: 1.09 MMOL/L (ref 1.12–1.32)
CALCIUM SERPL-MCNC: 8.2 MG/DL (ref 8.4–10.2)
CALCIUM SERPL-MCNC: 8.4 MG/DL (ref 8.4–10.2)
CHLORIDE SERPL-SCNC: 98 MMOL/L (ref 96–108)
CHLORIDE SERPL-SCNC: 99 MMOL/L (ref 96–108)
CO2 SERPL-SCNC: 26 MMOL/L (ref 21–32)
CO2 SERPL-SCNC: 29 MMOL/L (ref 21–32)
CREAT SERPL-MCNC: 0.98 MG/DL (ref 0.6–1.3)
CREAT SERPL-MCNC: 0.99 MG/DL (ref 0.6–1.3)
ERYTHROCYTE [DISTWIDTH] IN BLOOD BY AUTOMATED COUNT: 13.8 % (ref 11.6–15.1)
GFR SERPL CREATININE-BSD FRML MDRD: 74 ML/MIN/1.73SQ M
GFR SERPL CREATININE-BSD FRML MDRD: 75 ML/MIN/1.73SQ M
GLUCOSE SERPL-MCNC: 110 MG/DL (ref 65–140)
GLUCOSE SERPL-MCNC: 122 MG/DL (ref 65–140)
GLUCOSE SERPL-MCNC: 152 MG/DL (ref 65–140)
HCT VFR BLD AUTO: 34.3 % (ref 36.5–49.3)
HGB BLD-MCNC: 10.8 G/DL (ref 12–17)
MAGNESIUM SERPL-MCNC: 1.8 MG/DL (ref 1.9–2.7)
MAGNESIUM SERPL-MCNC: 2.1 MG/DL (ref 1.9–2.7)
MCH RBC QN AUTO: 28.5 PG (ref 26.8–34.3)
MCHC RBC AUTO-ENTMCNC: 31.5 G/DL (ref 31.4–37.4)
MCV RBC AUTO: 91 FL (ref 82–98)
PHOSPHATE SERPL-MCNC: 2.8 MG/DL (ref 2.3–4.1)
PHOSPHATE SERPL-MCNC: 2.9 MG/DL (ref 2.3–4.1)
PLATELET # BLD AUTO: 255 THOUSANDS/UL (ref 149–390)
PMV BLD AUTO: 9.8 FL (ref 8.9–12.7)
POTASSIUM SERPL-SCNC: 3.7 MMOL/L (ref 3.5–5.3)
POTASSIUM SERPL-SCNC: 3.9 MMOL/L (ref 3.5–5.3)
RBC # BLD AUTO: 3.79 MILLION/UL (ref 3.88–5.62)
SODIUM SERPL-SCNC: 132 MMOL/L (ref 135–147)
SODIUM SERPL-SCNC: 134 MMOL/L (ref 135–147)
WBC # BLD AUTO: 9.3 THOUSAND/UL (ref 4.31–10.16)

## 2022-12-17 RX ORDER — FUROSEMIDE 10 MG/ML
40 INJECTION INTRAMUSCULAR; INTRAVENOUS ONCE
Status: COMPLETED | OUTPATIENT
Start: 2022-12-17 | End: 2022-12-17

## 2022-12-17 RX ORDER — MAGNESIUM SULFATE HEPTAHYDRATE 40 MG/ML
2 INJECTION, SOLUTION INTRAVENOUS ONCE
Status: COMPLETED | OUTPATIENT
Start: 2022-12-17 | End: 2022-12-17

## 2022-12-17 RX ORDER — POTASSIUM CHLORIDE 20 MEQ/1
20 TABLET, EXTENDED RELEASE ORAL ONCE
Status: COMPLETED | OUTPATIENT
Start: 2022-12-17 | End: 2022-12-17

## 2022-12-17 RX ORDER — DIGOXIN 0.25 MG/ML
125 INJECTION INTRAMUSCULAR; INTRAVENOUS DAILY
Status: DISCONTINUED | OUTPATIENT
Start: 2022-12-18 | End: 2022-12-18

## 2022-12-17 RX ADMIN — METOPROLOL TARTRATE 50 MG: 50 TABLET ORAL at 09:26

## 2022-12-17 RX ADMIN — OXYCODONE HYDROCHLORIDE 10 MG: 10 TABLET ORAL at 12:36

## 2022-12-17 RX ADMIN — METRONIDAZOLE 500 MG: 500 TABLET ORAL at 21:42

## 2022-12-17 RX ADMIN — ONDANSETRON 4 MG: 2 INJECTION INTRAMUSCULAR; INTRAVENOUS at 22:50

## 2022-12-17 RX ADMIN — Medication 1000 UNITS: at 09:26

## 2022-12-17 RX ADMIN — POTASSIUM CHLORIDE 20 MEQ: 1500 TABLET, EXTENDED RELEASE ORAL at 07:14

## 2022-12-17 RX ADMIN — GABAPENTIN 600 MG: 600 TABLET, FILM COATED ORAL at 17:19

## 2022-12-17 RX ADMIN — PANTOPRAZOLE SODIUM 40 MG: 40 INJECTION, POWDER, LYOPHILIZED, FOR SOLUTION INTRAVENOUS at 21:42

## 2022-12-17 RX ADMIN — MAGNESIUM SULFATE HEPTAHYDRATE 2 G: 40 INJECTION, SOLUTION INTRAVENOUS at 07:21

## 2022-12-17 RX ADMIN — METOPROLOL TARTRATE 50 MG: 50 TABLET ORAL at 17:19

## 2022-12-17 RX ADMIN — CARISOPRODOL 350 MG: 350 TABLET ORAL at 12:36

## 2022-12-17 RX ADMIN — FUROSEMIDE 40 MG: 10 INJECTION, SOLUTION INTRAMUSCULAR; INTRAVENOUS at 09:25

## 2022-12-17 RX ADMIN — PANTOPRAZOLE SODIUM 40 MG: 40 INJECTION, POWDER, LYOPHILIZED, FOR SOLUTION INTRAVENOUS at 09:25

## 2022-12-17 RX ADMIN — CARISOPRODOL 350 MG: 350 TABLET ORAL at 09:26

## 2022-12-17 RX ADMIN — METRONIDAZOLE 500 MG: 500 TABLET ORAL at 13:43

## 2022-12-17 RX ADMIN — TRAZODONE HYDROCHLORIDE 100 MG: 100 TABLET ORAL at 21:43

## 2022-12-17 RX ADMIN — CARISOPRODOL 350 MG: 350 TABLET ORAL at 21:43

## 2022-12-17 RX ADMIN — ATORVASTATIN CALCIUM 20 MG: 10 TABLET, FILM COATED ORAL at 09:26

## 2022-12-17 RX ADMIN — OXYCODONE HYDROCHLORIDE 10 MG: 10 TABLET ORAL at 05:51

## 2022-12-17 RX ADMIN — OXYCODONE HYDROCHLORIDE 10 MG: 10 TABLET ORAL at 17:20

## 2022-12-17 RX ADMIN — GABAPENTIN 600 MG: 600 TABLET, FILM COATED ORAL at 09:26

## 2022-12-17 RX ADMIN — CIPROFLOXACIN HYDROCHLORIDE 500 MG: 250 TABLET, FILM COATED ORAL at 21:43

## 2022-12-17 RX ADMIN — CIPROFLOXACIN HYDROCHLORIDE 500 MG: 250 TABLET, FILM COATED ORAL at 09:26

## 2022-12-17 RX ADMIN — CARISOPRODOL 350 MG: 350 TABLET ORAL at 17:20

## 2022-12-17 RX ADMIN — HEPARIN SODIUM 16.1 UNITS/KG/HR: 10000 INJECTION, SOLUTION INTRAVENOUS at 05:51

## 2022-12-17 RX ADMIN — DIGOXIN 250 MCG: 0.25 INJECTION INTRAMUSCULAR; INTRAVENOUS at 12:38

## 2022-12-17 RX ADMIN — GABAPENTIN 600 MG: 600 TABLET, FILM COATED ORAL at 21:43

## 2022-12-17 RX ADMIN — METRONIDAZOLE 500 MG: 500 TABLET ORAL at 05:52

## 2022-12-17 RX ADMIN — DIGOXIN 250 MCG: 0.25 INJECTION INTRAMUSCULAR; INTRAVENOUS at 17:20

## 2022-12-17 RX ADMIN — DIGOXIN 250 MCG: 0.25 INJECTION INTRAMUSCULAR; INTRAVENOUS at 05:52

## 2022-12-17 RX ADMIN — HEPARIN SODIUM 16.1 UNITS/KG/HR: 10000 INJECTION, SOLUTION INTRAVENOUS at 22:56

## 2022-12-17 NOTE — ASSESSMENT & PLAN NOTE
· Presented to ED on 12/11 w/acute onset abd pain concerning for acute abdomen   · W/hx of sigmoid diverticulitis s/p Polo's procedure and colostomy with planned reversal scheduled for 01/2023  · CT A/P on admission revealed extensive inflammatory changes involving jejunum and proximal-mid ileum, compatible with severe enteritis; there is focal free air surrounding lower midline ileum just above urinary bladder with extensive inflammatory changes; few mildly dilated loops of jejunum within left upper abdomen, likely 2/2 extensive inflammatory changes with possible superimposed low-grade partial SBO; small amount of free air and free fluid in remainder of abdomen and pelvis; stomach and proximal esophagus are distended and fluid-filled, presumably secondary to delayed transit  · OR on 12/11 for EX LAP,  EXTENSIVE MELANI, APPENDECTOMY, DRAINAGE OF ABDOMINAL ABSCESS - today is POD#5  · Peritoneal fluid culture w/gamma hemolytic Strep  · Anaerobic culture w/Bacteroides fragilis   · Continue IV Ciprofloxacin, Flagyl  · NGT removed on 12/13 - tolerating full liquid diet - would advance as tolerated today pending surgery's recommendations  · Home scheduled pain regimen restarted - continue PRN Dilaudid for acute surgical pain   · Monitor incision closely - dressing changes per surgery team  · Monitor ostomy

## 2022-12-17 NOTE — PLAN OF CARE
Problem: Potential for Falls  Goal: Patient will remain free of falls  Description: INTERVENTIONS:  - Educate patient/family on patient safety including physical limitations  - Instruct patient to call for assistance with activity   - Consult OT/PT to assist with strengthening/mobility   - Keep Call bell within reach  - Keep bed low and locked with side rails adjusted as appropriate  - Keep care items and personal belongings within reach  - Initiate and maintain comfort rounds  - Make Fall Risk Sign visible to staff  - Offer Toileting every 2 Hours, in advance of need  - Initiate/Maintain bed alarm  - Obtain necessary fall risk management equipment:   - Apply yellow socks and bracelet for high fall risk patients  - Consider moving patient to room near nurses station  Outcome: Progressing     Problem: Prexisting or High Potential for Compromised Skin Integrity  Goal: Skin integrity is maintained or improved  Description: INTERVENTIONS:  - Identify patients at risk for skin breakdown  - Assess and monitor skin integrity  - Assess and monitor nutrition and hydration status  - Monitor labs   - Assess for incontinence   - Turn and reposition patient  - Assist with mobility/ambulation  - Relieve pressure over bony prominences  - Avoid friction and shearing  - Provide appropriate hygiene as needed including keeping skin clean and dry  - Evaluate need for skin moisturizer/barrier cream  - Collaborate with interdisciplinary team   - Patient/family teaching  - Consider wound care consult   Outcome: Progressing     Problem: MOBILITY - ADULT  Goal: Maintain or return to baseline ADL function  Description: INTERVENTIONS:  - Educate patient/family on patient safety including physical limitations  - Instruct patient to call for assistance with activity   - Consult OT/PT to assist with strengthening/mobility   - Keep Call bell within reach  - Keep bed low and locked with side rails adjusted as appropriate  - Keep care items and personal belongings within reach  - Initiate and maintain comfort rounds  - Make Fall Risk Sign visible to staff  - Offer Toileting every 2 Hours, in advance of need  - Initiate/Maintain bed alarm  - Obtain necessary fall risk management equipment:   - Apply yellow socks and bracelet for high fall risk patients  - Consider moving patient to room near nurses station  Outcome: Progressing     Problem: PAIN - ADULT  Goal: Verbalizes/displays adequate comfort level or baseline comfort level  Description: Interventions:  - Encourage patient to monitor pain and request assistance  - Assess pain using appropriate pain scale  - Administer analgesics based on type and severity of pain and evaluate response  - Implement non-pharmacological measures as appropriate and evaluate response  - Consider cultural and social influences on pain and pain management  - Notify physician/advanced practitioner if interventions unsuccessful or patient reports new pain  Outcome: Progressing     Problem: INFECTION - ADULT  Goal: Absence or prevention of progression during hospitalization  Description: INTERVENTIONS:  - Assess and monitor for signs and symptoms of infection  - Monitor lab/diagnostic results  - Monitor all insertion sites, i e  indwelling lines, tubes, and drains  - Monitor endotracheal if appropriate and nasal secretions for changes in amount and color  - [place] appropriate cooling/warming therapies per order  - Administer medications as ordered  - Instruct and encourage patient and family to use good hand hygiene technique  - Identify and instruct in appropriate isolation precautions for identified infection/condition  Outcome: Progressing     Problem: INFECTION - ADULT  Goal: Absence of fever/infection during neutropenic period  Description: INTERVENTIONS:  - Monitor WBC    Outcome: Progressing     Problem: SAFETY ADULT  Goal: Patient will remain free of falls  Description: INTERVENTIONS:  - Educate patient/family on patient safety including physical limitations  - Instruct patient to call for assistance with activity   - Consult OT/PT to assist with strengthening/mobility   - Keep Call bell within reach  - Keep bed low and locked with side rails adjusted as appropriate  - Keep care items and personal belongings within reach  - Initiate and maintain comfort rounds  - Make Fall Risk Sign visible to staff  - Offer Toileting every 2 Hours, in advance of need  - Initiate/Maintain bed alarm  - Obtain necessary fall risk management equipment:   - Apply yellow socks and bracelet for high fall risk patients  - Consider moving patient to room near nurses station  Outcome: Progressing     Problem: SAFETY ADULT  Goal: Maintain or return to baseline ADL function  Description: INTERVENTIONS:  - Educate patient/family on patient safety including physical limitations  - Instruct patient to call for assistance with activity   - Consult OT/PT to assist with strengthening/mobility   - Keep Call bell within reach  - Keep bed low and locked with side rails adjusted as appropriate  - Keep care items and personal belongings within reach  - Initiate and maintain comfort rounds  - Make Fall Risk Sign visible to staff  - Offer Toileting every 2 Hours, in advance of need  - Initiate/Maintain bed alarm  - Obtain necessary fall risk management equipment:   - Apply yellow socks and bracelet for high fall risk patients  - Consider moving patient to room near nurses station  Outcome: Progressing     Problem: DISCHARGE PLANNING  Goal: Discharge to home or other facility with appropriate resources  Description: INTERVENTIONS:  - Identify barriers to discharge w/patient and caregiver  - Arrange for needed discharge resources and transportation as appropriate  - Identify discharge learning needs (meds, wound care, etc )  - Arrange for interpretive services to assist at discharge as needed  - Refer to Case Management Department for coordinating discharge planning if the patient needs post-hospital services based on physician/advanced practitioner order or complex needs related to functional status, cognitive ability, or social support system  Outcome: Progressing     Problem: Knowledge Deficit  Goal: Patient/family/caregiver demonstrates understanding of disease process, treatment plan, medications, and discharge instructions  Description: Complete learning assessment and assess knowledge base    Interventions:  - Provide teaching at level of understanding  - Provide teaching via preferred learning methods  Outcome: Progressing     Problem: CARDIOVASCULAR - ADULT  Goal: Maintains optimal cardiac output and hemodynamic stability  Description: INTERVENTIONS:  - Monitor I/O, vital signs and rhythm  - Monitor for S/S and trends of decreased cardiac output  - Administer and titrate ordered vasoactive medications to optimize hemodynamic stability  - Assess quality of pulses, skin color and temperature  - Assess for signs of decreased coronary artery perfusion  - Instruct patient to report change in severity of symptoms  Outcome: Progressing     Problem: RESPIRATORY - ADULT  Goal: Achieves optimal ventilation and oxygenation  Description: INTERVENTIONS:  - Assess for changes in respiratory status  - Assess for changes in mentation and behavior  - Position to facilitate oxygenation and minimize respiratory effort  - Oxygen administered by appropriate delivery if ordered  - Initiate smoking cessation education as indicated  - Encourage broncho-pulmonary hygiene including cough, deep breathe, Incentive Spirometry  - Assess the need for suctioning and aspirate as needed  - Assess and instruct to report SOB or any respiratory difficulty  - Respiratory Therapy support as indicated  Outcome: Progressing     Problem: GASTROINTESTINAL - ADULT  Goal: Minimal or absence of nausea and/or vomiting  Description: INTERVENTIONS:  - Administer IV fluids if ordered to ensure adequate hydration  - Maintain NPO status until nausea and vomiting are resolved  - Nasogastric tube if ordered  - Administer ordered antiemetic medications as needed  - Provide nonpharmacologic comfort measures as appropriate  - Advance diet as tolerated, if ordered  - Consider nutrition services referral to assist patient with adequate nutrition and appropriate food choices  Outcome: Progressing     Problem: GASTROINTESTINAL - ADULT  Goal: Maintains or returns to baseline bowel function  Description: INTERVENTIONS:  - Assess bowel function  - Encourage oral fluids to ensure adequate hydration  - Administer IV fluids if ordered to ensure adequate hydration  - Administer ordered medications as needed  - Encourage mobilization and activity  - Consider nutritional services referral to assist patient with adequate nutrition and appropriate food choices  Outcome: Progressing     Problem: GASTROINTESTINAL - ADULT  Goal: Maintains adequate nutritional intake  Description: INTERVENTIONS:  - Monitor percentage of each meal consumed  - Identify factors contributing to decreased intake, treat as appropriate  - Assist with meals as needed  - Monitor I&O, weight, and lab values if indicated  - Obtain nutrition services referral as needed  Outcome: Progressing     Problem: GASTROINTESTINAL - ADULT  Goal: Establish and maintain optimal ostomy function  Description: INTERVENTIONS:  - Assess bowel function  - Encourage oral fluids to ensure adequate hydration  - Administer IV fluids if ordered to ensure adequate hydration   - Administer ordered medications as needed  - Encourage mobilization and activity  - Nutrition services referral to assist patient with appropriate food choices  - Assess stoma site  - Consider wound care consult   Outcome: Progressing     Problem: GASTROINTESTINAL - ADULT  Goal: Oral mucous membranes remain intact  Description: INTERVENTIONS  - Assess oral mucosa and hygiene practices  - Implement preventative oral hygiene regimen  - Implement oral medicated treatments as ordered  - Initiate Nutrition services referral as needed  Outcome: Progressing     Problem: METABOLIC, FLUID AND ELECTROLYTES - ADULT  Goal: Electrolytes maintained within normal limits  Description: INTERVENTIONS:  - Monitor labs and assess patient for signs and symptoms of electrolyte imbalances  - Administer electrolyte replacement as ordered  - Monitor response to electrolyte replacements, including repeat lab results as appropriate  - Instruct patient on fluid and nutrition as appropriate  Outcome: Progressing     Problem: METABOLIC, FLUID AND ELECTROLYTES - ADULT  Goal: Fluid balance maintained  Description: INTERVENTIONS:  - Monitor labs   - Monitor I/O and WT  - Instruct patient on fluid and nutrition as appropriate  - Assess for signs & symptoms of volume excess or deficit  Outcome: Progressing     Problem: METABOLIC, FLUID AND ELECTROLYTES - ADULT  Goal: Glucose maintained within target range  Description: INTERVENTIONS:  - Monitor Blood Glucose as ordered  - Assess for signs and symptoms of hyperglycemia and hypoglycemia  - Administer ordered medications to maintain glucose within target range  - Assess nutritional intake and initiate nutrition service referral as needed  Outcome: Progressing     Problem: HEMATOLOGIC - ADULT  Goal: Maintains hematologic stability  Description: INTERVENTIONS  - Assess for signs and symptoms of bleeding or hemorrhage  - Monitor labs  - Administer supportive blood products/factors as ordered and appropriate  Outcome: Progressing     Problem: MUSCULOSKELETAL - ADULT  Goal: Maintain or return mobility to safest level of function  Description: INTERVENTIONS:  - Assess patient's ability to carry out ADLs; assess patient's baseline for ADL function and identify physical deficits which impact ability to perform ADLs (bathing, care of mouth/teeth, toileting, grooming, dressing, etc )  - Assess/evaluate cause of self-care deficits - Assess range of motion  - Assess patient's mobility  - Assess patient's need for assistive devices and provide as appropriate  - Encourage maximum independence but intervene and supervise when necessary  - Involve family in performance of ADLs  - Assess for home care needs following discharge   - Consider OT consult to assist with ADL evaluation and planning for discharge  - Provide patient education as appropriate  Outcome: Progressing     Problem: Nutrition/Hydration-ADULT  Goal: Nutrient/Hydration intake appropriate for improving, restoring or maintaining nutritional needs  Description: Monitor and assess patient's nutrition/hydration status for malnutrition  Collaborate with interdisciplinary team and initiate plan and interventions as ordered  Monitor patient's weight and dietary intake as ordered or per policy  Utilize nutrition screening tool and intervene as necessary  Determine patient's food preferences and provide high-protein, high-caloric foods as appropriate       INTERVENTIONS:  - Monitor oral intake, urinary output, labs, and treatment plans  - Assess nutrition and hydration status and recommend course of action  - Evaluate amount of meals eaten  - Assist patient with eating if necessary   - Allow adequate time for meals  - Recommend/ encourage appropriate diets, oral nutritional supplements, and vitamin/mineral supplements  - Order, calculate, and assess calorie counts as needed  - Recommend, monitor, and adjust tube feedings and TPN/PPN based on assessed needs  - Assess need for intravenous fluids  - Provide specific nutrition/hydration education as appropriate  - Include patient/family/caregiver in decisions related to nutrition  Outcome: Progressing

## 2022-12-17 NOTE — ASSESSMENT & PLAN NOTE
77 yo M hx aortic valve replacement, chronic pain, and recent dolan's procedure for diverticulitis now POD#6 s/p ex lap, MELANI, appendectomy and RLQ abscess drainage  Pain controlled, tolerating diet, stoma functioning, working on mobility  Midline clean and partially open, TTP on right side, stoma with liquid stool  Temp 97 8, T-max 100 9, heart rate 133, /76, 98% on nasal cannula  Potassium 3 9, magnesium 1 8, hemoglobin 10 8, white count 9 3, platelets 753    A/P by Organ System:  Neuro- scheduled oxy 10 Q6, soma, gabapentin, trazodone  Since fall, Dilaudid and Ativan discontinued  Cardio- cardiomyopathy, chf, new onset A fib with RVR on heparin gtt per SLIM/CC, Cardiology following, scheduled metroprolol/lipitor, on Digoxin  Pulm- acute post-op respiratory insufficiency on NC working with IS and ambulation, pulmonary edema by CXR, ongoing diuresis    GI- s/p ex lap MELANI appendectomy, abscess draingae, monitor for ileus post-op, advanced to regular 2 gm NA, monitor colostomy, protonix 40 mg BID currently, d/c Miralax, f/u path from appe  - s/p Ambriz removal, voiding spontaneously, good UOP  Renal- BUN/Cr stable  FEN- IVF from heparin gtt, lasix again today, wt is up 12kg since admit with peripheral edema, potassium magnesium replaced today, regular diet with ensure  Heme- hgb/plt stable post-op  ID- sepsis with shock, 2/2 peritonitis from RLQ abscess, POD#6 from source control, cultures polymicrobial, but sensitive to cipro/flagyl which are now PO, WBC 9, afebrile, monitor wound  Endo- SSI for pre-diabetes, glucose WNL  Msk/Skin- open wound to midline, daily cleanse/dressing change, stoma appliance change Q 3 days, PT/OT, OOB, ambulation encouraged  VTE- SCD/heparin GTT    Disposition: PT advised rehab, declined by patient, plan for home with [facility] in process, surgically getting closer towards potential discharge, however await input from medicine and cardiology due to his ongoing A  fib with RVR and fluid overload

## 2022-12-17 NOTE — PROGRESS NOTES
[facility] [address]  Progress Note - Joie Bloch 1945, 78 y o  male MRN: [id number]  Unit/Bed#: ICU 10-01 Encounter: [id number]  Primary Care Provider: João Hsu DO   Date and time admitted to hospital: 12/11/2022  5:19 AM    * Peritonitis [facility])  Assessment & Plan  79 yo M hx aortic valve replacement, chronic pain, and recent dolan's procedure for diverticulitis now POD#6 s/p ex lap, MELANI, appendectomy and RLQ abscess drainage  Pain controlled, tolerating diet, stoma functioning, working on mobility  Midline clean and partially open, TTP on right side, stoma with liquid stool  Temp 97 8, T-max 100 9, heart rate 133, /76, 98% on nasal cannula  Potassium 3 9, magnesium 1 8, hemoglobin 10 8, white count 9 3, platelets 692    A/P by Organ System:  Neuro- scheduled oxy 10 Q6, soma, gabapentin, trazodone  Since fall, Dilaudid and Ativan discontinued  Cardio- cardiomyopathy, chf, new onset A fib with RVR on heparin gtt per SLIM/CC, Cardiology following, scheduled metroprolol/lipitor, on Digoxin  Pulm- acute post-op respiratory insufficiency on NC working with IS and ambulation, pulmonary edema by CXR, ongoing diuresis    GI- s/p ex lap MELANI appendectomy, abscess draingae, monitor for ileus post-op, advanced to regular 2 gm NA, monitor colostomy, protonix 40 mg BID currently, d/c Miralax, f/u path from appe  - s/p Ambriz removal, voiding spontaneously, good UOP  Renal- BUN/Cr stable  FEN- IVF from heparin gtt, lasix again today, wt is up 12kg since admit with peripheral edema, potassium magnesium replaced today, regular diet with ensure  Heme- hgb/plt stable post-op  ID- sepsis with shock, 2/2 peritonitis from RLQ abscess, POD#6 from source control, cultures polymicrobial, but sensitive to cipro/flagyl which are now PO, WBC 9, afebrile, monitor wound  Endo- SSI for pre-diabetes, glucose WNL  Msk/Skin- open wound to midline, daily cleanse/dressing change, stoma appliance change Q 3 days, PT/OT, OOB, ambulation encouraged  VTE- SCD/heparin GTT    Disposition: PT advised rehab, declined by patient, plan for home with [facility] in process, surgically getting closer towards potential discharge, however await input from medicine and cardiology due to his ongoing A  fib with RVR and fluid overload  Subjective/Objective   Chief Complaint: ok    Subjective: Reports feeling okay today, making progress towards discharge, pain controlled on his scheduled meds, no nausea or vomiting on his full liquid diet, having liquid output from his colostomy, voiding urine spontaneously, working on mobility with plans to get out of bed to chair today  Objective: no overnight events    Blood pressure 158/76, pulse (!) 133, temperature 97 8 °F (36 6 °C), temperature source Tympanic, resp  rate 20, height 5' 7" (1 702 m), weight 93 1 kg (205 lb 4 oz), SpO2 98 %  ,Body mass index is 32 15 kg/m²  Intake/Output Summary (Last 24 hours) at 12/17/2022 1054  Last data filed at 12/17/2022 1044  Gross per 24 hour   Intake 1046 37 ml   Output 5625 ml   Net -4578 63 ml       Invasive Devices     Peripheral Intravenous Line  Duration           Long-Dwell Peripheral IV (Midline) 04/74/52 Left Basilic 4 days    Peripheral IV 12/13/22 Right;Ventral (anterior) Forearm 4 days          Drain  Duration           Colostomy Descending/sigmoid -- days    Colostomy Descending/sigmoid  days                Physical Exam  Vitals and nursing note reviewed  Constitutional:       General: He is not in acute distress  Appearance: He is well-developed  He is not diaphoretic  HENT:      Head: Normocephalic and atraumatic  Eyes:      Conjunctiva/sclera: Conjunctivae normal       Pupils: Pupils are equal, round, and reactive to light  Cardiovascular:      Rate and Rhythm: Tachycardia present  Rhythm irregular  Heart sounds: No murmur heard  Comments: Peripheral edema noted    Pulmonary:      Effort: Pulmonary effort is normal  No respiratory distress  Breath sounds: Normal breath sounds  No wheezing or rales  Abdominal:      Comments: Mild distention, hypoactive bowel sounds, focal tenderness on the right flank, midline clean and partially open with serous output on the dressing, colostomy left lower quadrant with thin liquid brown stool  No rigidity, soft and compressible  Musculoskeletal:         General: Normal range of motion  Cervical back: Normal range of motion  Skin:     General: Skin is warm and dry  Capillary Refill: Capillary refill takes less than 2 seconds  Neurological:      Mental Status: He is alert and oriented to person, place, and time  Psychiatric:         Behavior: Behavior normal            Lab, Imaging and other studies:  I have personally reviewed pertinent lab results    , CBC:   Lab Results   Component Value Date    WBC 9 30 12/17/2022    HGB 10 8 (L) 12/17/2022    HCT 34 3 (L) 12/17/2022    MCV 91 12/17/2022     12/17/2022    MCH 28 5 12/17/2022    MCHC 31 5 12/17/2022    RDW 13 8 12/17/2022    MPV 9 8 12/17/2022   , CMP:   Lab Results   Component Value Date    SODIUM 132 (L) 12/17/2022    K 3 9 12/17/2022    CL 99 12/17/2022    CO2 26 12/17/2022    BUN 16 12/17/2022    CREATININE 0 99 12/17/2022    CALCIUM 8 2 (L) 12/17/2022    EGFR 74 12/17/2022     VTE Pharmacologic Prophylaxis: Heparin  VTE Mechanical Prophylaxis: sequential compression device

## 2022-12-17 NOTE — ASSESSMENT & PLAN NOTE
· New onset AF w/RVR as high as 225 in AM of 12/13  · No previous history of AF  · Suspect this is 2/2 septic shock/peritonitis vs pulmonary vascular congestion w/concurrent anxiety and pain  · TTE from 12/14 revealed EF 35% w/mild concentric hypertrophy, moderately reduced systolic function in global manner; mildly dilated RV w/moderately reduced systolic function; mild MR/TR, RVSP 48  · Initially refractory to IV Lopressor 5 mg x2, IV Cardizem 15 mg x1, IV Amiodarone bolus + gtt initiation  · Cardizem gtt weaned off on 12/15  · PO Lopressor  · Started on digoxin load on 12/16  · Continue w/PRN diuresis  · Continue AC w/Heparin gtt - will likely need DOAC on discharge  · Cardiology following  · Continue cardiac monitoring and PRN ECG

## 2022-12-17 NOTE — PROGRESS NOTES
Patient seen and evaluated by Critical Care today and deemed to be appropriate for transfer to Stepdown Level 2  Spoke to [employer] Susan from General Surgery to accept patient transfer  Patient did not move from ICU on the day of transfer  See progress note from 12/17/22 for the most up-to-date treatment therapy plans  Critical care can be contacted via [employer] with any questions or concerns

## 2022-12-17 NOTE — PROGRESS NOTES
PROGRESS NOTE - Cardiology  Gt Crowley 78 y o  male MRN: [id number]  Unit/Bed#: ICU 10-01 Encounter: [id number]  Assessment/Plan   Cardiomyopathy  - New diagnosed during this admission  Etiologies to consider: In setting of significant sepsis  Left heart cath from 2017 showed mild nonobstructive CAD with 25% stenosis of proximal LAD  Will need ischemic evaluation once stable  Also might be atrial fibrillation driven  - Volume status: Euvolemic  Would recommend to keep at least net even with as needed diuresis  - Hemodynamics: on metoprolol tatrate 50 mg 3 times daily  Now patient with elevated maps thus can consider starting GDMT such as adding low-dose losartan  Was taking losartan 50 mg daily   - Sudden cardiac death prevention: We will reevaluate LV function next week to further decide for need for ischemic eval and LifeVest prior to be discharged  New onset atrial fibrillation  - Rate/rhythm control: Still poorly controlled  On metoprolol tatrate milligram tid  Getting loaded with digoxin  If not controlled with digoxin, consider switching to amiodarone  Previously on diltiazem drip but not optimal given new cardiomyopathy  - Anticoagulation: Continue heparin drip and switch to DOAC once acceptable from surgical standpoint    Peritonitis complicated with sepsis  - On antibiotics  Restarted p o  medications and diet    S/p aortic valve replacement with bioprosthetic valve  -Echo during this admission normal functioning valve    Nonobstructive CAD  - On left heart cath from 2017  Continue home atorvastatin, aspirin (if acceptable from surgical standpoint)    Interval History: Patient continues being in ICU, atrial fibrillation was poorly controlled ventricular response  Is on metoprolol and being loaded with digoxin  Review of Systems  ROS as noted above, otherwise 12 point review of systems was performed and is negative       Historical Information   Past Medical History:   Diagnosis Date • Aortic stenosis    • Aortic stenosis, severe    • Arthritis    • Carpal tunnel syndrome    • Chronic back pain    • Chronic pain    • Chronic, continuous use of opioids    • Hyperlipidemia    • Hypertension    • Hypertrophy of prostate    • Insomnia    • Large bowel obstruction (HCC)    • Peripheral neuropathy    • RA (rheumatoid arthritis) ([facility] [address] )    • Umbilical hernia      Past Surgical History:   Procedure Laterality Date   • ADENOIDECTOMY     • AORTIC VALVE REPLACEMENT  10/17/2017   • BASAL CELL CARCINOMA EXCISION     • CARDIAC CATHETERIZATION      LAST ASSESSED: 20OCT2017   • CARPAL TUNNEL RELEASE Bilateral     left   • CATARACT EXTRACTION     • EXPLORATORY LAPAROTOMY W/ BOWEL RESECTION N/A 12/11/2022    Procedure: LAPAROTOMY EXPLORATORY,  EXTENSIVE LYSIS OF ADHESIONS, APPENDECTOMY, DRAINAGE OF ABDOMINAL ABCESS;  Surgeon: Nae Alexander MD;  Location: [facility];  Service: General   • FL CYSTOGRAM  8/29/2022   • HERNIA REPAIR      inguinal bilateral -umbilical    • IR MIDLINE PLACEMENT  12/12/2022   • LAMINECTOMY      DECOMPRESS, FACETECTOMY, FORAMINOTOMY LUMBAR SEG   • LAPAROTOMY N/A 8/20/2022    Procedure: LAPAROTOMY EXPLORATORY, sigmoid colon resection,colostomy;  Surgeon:  Nae Alexander MD;  Location: [facility];  Service: General   • NEUROPLASTY / TRANSPOSITION MEDIAN NERVE AT CARPAL TUNNEL     • VA RPLCMT AORTIC VALVE OPN W/STENTLESS TISSUE VALVE N/A 10/17/2017    Procedure: AORTIC VALVE REPLACEMENT with 23MM MAGNAEASE TISSUE VALVE; INTRA-OP SHANDRA;  Surgeon: Tabatha Knight MD;  Location: [facility];  Service: Cardiac Surgery   • TONSILLECTOMY       Social History     Substance and Sexual Activity   Alcohol Use Not Currently     Social History     Substance and Sexual Activity   Drug Use No     Social History     Tobacco Use   Smoking Status Never   Smokeless Tobacco Never       Meds/Allergies   Hospital Medications:   Current Facility-Administered Medications   Medication Dose Route Frequency • atorvastatin (LIPITOR) tablet 20 mg  20 mg Oral Daily   • carisoprodol (SOMA) tablet 350 mg  350 mg Oral 4x Daily   • cholecalciferol (VITAMIN D3) tablet 1,000 Units  1,000 Units Oral Daily   • ciprofloxacin (CIPRO) tablet 500 mg  500 mg Oral Q12H [address]   • digoxin (LANOXIN) injection 250 mcg  250 mcg Intravenous Q6H   • gabapentin (NEURONTIN) tablet 600 mg  600 mg Oral TID   • heparin (porcine) 25,000 units in 0 45% NaCl 250 mL infusion (premix)  3-20 Units/kg/hr (Order-Specific) Intravenous Titrated   • metoprolol tartrate (LOPRESSOR) tablet 50 mg  50 mg Oral Q8H   • metroNIDAZOLE (FLAGYL) tablet 500 mg  500 mg Oral Q8H [address]   • ondansetron (ZOFRAN) injection 4 mg  4 mg Intravenous Q6H PRN   • oxyCODONE (ROXICODONE) immediate release tablet 10 mg  10 mg Oral Q6H   • pantoprazole (PROTONIX) injection 40 mg  40 mg Intravenous Q12H [address]   • perflutren lipid microsphere (DEFINITY) injection  1 mL/min Intravenous Once in imaging   • traZODone (DESYREL) tablet 100 mg  100 mg Oral HS     Home Medications:   Medications Prior to Admission   Medication   • aspirin 81 MG tablet   • atorvastatin (LIPITOR) 20 mg tablet   • betamethasone dipropionate (DIPROSONE) 0 05 % cream   • carisoprodol (SOMA) 350 mg tablet   • Cholecalciferol (VITAMIN D3) 1000 units CAPS   • diclofenac sodium (VOLTAREN) 1 %   • fluocinonide (LIDEX) 0 05 % external solution   • gabapentin (NEURONTIN) 600 MG tablet   • ketoconazole (NIZORAL) 2 % cream   • losartan (COZAAR) 50 mg tablet   • naloxone (NARCAN) 4 mg/0 1 mL nasal spray   • oxyCODONE (ROXICODONE) 10 MG TABS   • oxyCODONE-acetaminophen (PERCOCET)  mg per tablet   • Secukinumab 150 MG/ML SOAJ   • traZODone (DESYREL) 300 MG tablet     Allergies   Allergen Reactions   • Penicillins Anaphylaxis   • Quinine Derivatives Other (See Comments)     High fever     Objective   Vitals: Blood pressure 158/76, pulse (!) 133, temperature 97 8 °F (36 6 °C), temperature source Temporal, resp   rate 20, height 5' 7" (1 702 m), weight 93 1 kg (205 lb 4 oz), SpO2 98 %  Orthostatic Blood Pressures    Flowsheet Row Most Recent Value   Blood Pressure 158/76 filed at 12/17/2022 0600   Patient Position - Orthostatic VS Lying filed at 12/16/2022 1600          Intake/Output Summary (Last 24 hours) at 12/17/2022 1220  Last data filed at 12/17/2022 1153  Gross per 24 hour   Intake 1046 37 ml   Output 5625 ml   Net -4578 63 ml     Invasive Devices     Peripheral Intravenous Line  Duration           Long-Dwell Peripheral IV (Midline) 85/62/88 Left Basilic 4 days    Peripheral IV 12/13/22 Right;Ventral (anterior) Forearm 4 days          Drain  Duration           Colostomy Descending/sigmoid -- days    Colostomy Descending/sigmoid  days              Physical Exam   GEN: NAD, alert and oriented, well appearing  SKIN: dry without significant lesions or rashes  HEENT: NCAT, PERRL, EOMs intact  NECK: No JVD or carotid bruits appreciated  CARDIOVASCULAR: Irregular, tachycardia, normal S1, S2 without murmurs, rubs, or gallops appreciated  LUNGS: Clear to auscultation bilaterally without wheezes, rhonchi, or rales  ABDOMEN: Soft, nontender, nondistended  EXTREMITIES/VASCULAR: perfused without clubbing, cyanosis, or edema b/l  PSYCH: Normal mood and affect  NEURO: CN ll-Xll grossly intact    Lab Results: I have personally reviewed pertinent lab results      Results from last 7 days   Lab Units 12/17/22  0502 12/16/22  0450 12/15/22  0429   WBC Thousand/uL 9 30 9 47 13 88*   HEMOGLOBIN g/dL 10 8* 10 9* 11 7*   HEMATOCRIT % 34 3* 35 4* 37 6   PLATELETS Thousands/uL 255 224 223     Results from last 7 days   Lab Units 12/17/22  0502 12/16/22  0450 12/15/22  1941   POTASSIUM mmol/L 3 9 4 3 4 0   CHLORIDE mmol/L 99 99 101   CO2 mmol/L 26 25 26   BUN mg/dL 16 21 22   CREATININE mg/dL 0 99 1 21 1 27   CALCIUM mg/dL 8 2* 8 3* 8 1*     Results from last 7 days   Lab Units 12/17/22  0502 12/17/22  0112 12/16/22  1824 12/14/22  0159 12/13/22  1950 INR   --   --   --   --  1 32*   PTT seconds 73* 66* 64*   < > 42*    < > = values in this interval not displayed  Results from last 7 days   Lab Units 12/17/22  0502 12/16/22  0450 12/15/22  1941   MAGNESIUM mg/dL 1 8* 2 1 2 1     Imaging: I have personally reviewed pertinent reports

## 2022-12-17 NOTE — PROGRESS NOTES
[address]  Progress Note - Jimbo Moore 1945, 78 y o  male MRN: [id number]  Unit/Bed#: ICU 10-01 Encounter: [id number]  Primary Care Provider: Tayler Goldstein DO   Date and time admitted to hospital: 12/11/2022  5:19 AM    * Peritonitis [facility])  Assessment & Plan  · Presented to ED on 12/11 w/acute onset abd pain concerning for acute abdomen   · W/hx of sigmoid diverticulitis s/p Polo's procedure and colostomy with planned reversal scheduled for 01/2023  · CT A/P on admission revealed extensive inflammatory changes involving jejunum and proximal-mid ileum, compatible with severe enteritis; there is focal free air surrounding lower midline ileum just above urinary bladder with extensive inflammatory changes; few mildly dilated loops of jejunum within left upper abdomen, likely 2/2 extensive inflammatory changes with possible superimposed low-grade partial SBO; small amount of free air and free fluid in remainder of abdomen and pelvis; stomach and proximal esophagus are distended and fluid-filled, presumably secondary to delayed transit  · OR on 12/11 for EX LAP,  EXTENSIVE MELANI, APPENDECTOMY, DRAINAGE OF ABDOMINAL ABSCESS - today is POD#5  · Peritoneal fluid culture w/gamma hemolytic Strep  · Anaerobic culture w/Bacteroides fragilis   · Continue IV Ciprofloxacin, Flagyl  · NGT removed on 12/13 - tolerating full liquid diet - would advance as tolerated today pending surgery's recommendations  · Home scheduled pain regimen restarted - continue PRN Dilaudid for acute surgical pain   · Monitor incision closely - dressing changes per surgery team  · Monitor ostomy    Septic shock ([facility] [address] )  Assessment & Plan  · POA: AEB tachycardia, tachypnea, leukopenia, lactic acidosis > 4, GERMAN  · In setting of intraabdominal infection/peritonitis  · Imaging as noted above  · COVID/FLU/RSV negative  · Blood cultures w/NGTD  · Peritoneal fluid culture w/gamma hemolytic Strep  · Anaerobic culture w/Bacteroides fragilis   · Lactic acid 4 3 - has since cleared   · Procalcitonin 8 29 - 6 02 - 3 36 - 2 05  · Is s/p aggressive IVF resuscitation  · Continue IV Ciprofloxacin, Flagyl D#6  · Monitor fever and WBC curve  · Trend procalcitonin and follow-up cultures    Cardiomyopathy [facility])  Assessment & Plan  · TTE from 12/14 revealed EF 35% w/mild concentric hypertrophy, moderately reduced systolic function in global manner, mildly dilated RV w/moderately reduced systolic function; mild MR/TR, RVSP 48  · Will need ischemic evaluation when medically stable  · Cardiology following  · Continue BB and PRN diuresis  · Strict I/O  · Daily weights    New onset atrial fibrillation (HCC)  Assessment & Plan  · New onset AF w/RVR as high as 225 in AM of 12/13  · No previous history of AF  · Suspect this is 2/2 septic shock/peritonitis vs pulmonary vascular congestion w/concurrent anxiety and pain  · TTE from 12/14 revealed EF 35% w/mild concentric hypertrophy, moderately reduced systolic function in global manner; mildly dilated RV w/moderately reduced systolic function; mild MR/TR, RVSP 48  · Initially refractory to IV Lopressor 5 mg x2, IV Cardizem 15 mg x1, IV Amiodarone bolus + gtt initiation  · Cardizem gtt weaned off on 12/15  · PO Lopressor  · Started on digoxin load on 12/16  · Continue w/PRN diuresis  · Continue AC w/Heparin gtt - will likely need DOAC on discharge  · Cardiology following  · Continue cardiac monitoring and PRN ECG    Acute respiratory failure with hypoxia (HCC)  Assessment & Plan  · Suspect this is is multifactorial in setting of atelectasis vs mild pulmonary vascular congestion vs post-operative pain  · CXR on 12/13 w/mildly increased interstitial markings bilaterally - may indicate mild pulmonary edema  · Respiratory status improved w/IV diuresis - continue w/PRN diuresis  · Now weaned to RA - continue to maintain SpO2 > 90%  · Continue scheduled Xopenex/HTS nebs given thick secretions  · Aggressive pulmonary hygiene  · Encourage cough, deep breathing, IS, ambulation    Chronic kidney disease, stage 2 (mild)  Assessment & Plan  Lab Results   Component Value Date    EGFR 58 12/16/2022    EGFR 54 12/15/2022    EGFR 66 12/15/2022    CREATININE 1 21 12/16/2022    CREATININE 1 27 12/15/2022    CREATININE 1 09 12/15/2022     · Baseline creatinine appears to be around 0 8 - 1 1  · Trend renal indices  · Monitor I/O  · Daily weights    Rheumatoid arthritis with rheumatoid factor, unspecified (HCC)  Assessment & Plan  · Continue pain regimen as noted above    Status post aortic valve replacement with bioprosthetic valve  Assessment & Plan  · Is s/p AVR in 2018   · Holding ASA while fresh post-op - will discuss restarting w/surgery team    Insomnia  Assessment & Plan  · Continue home Trazodone    Benign essential hypertension  Assessment & Plan  · Currently holding home Cozaar - would consider restarting if becomes hypertensive  · Trend BPs    Hyperlipidemia  Assessment & Plan  · Continue home statin    Continuous opioid dependence (HCC)  Assessment & Plan  · Takes Oxycodone 10 mg QID and PRN Percocet  mg Q4H  · PDMP reviewed   · Continue home Oxycodone 10 mg QID    Chronic pain  Assessment & Plan  · In setting of RA/OA   · Continue home Soma 350 mg QID, Neurontin 600 mg TID, Oxycodone 10 mg QID  · Holding home PRN Percocet  mg Q4H  · PRN IV Dilaudid as patient required IV Narcan overnight for somnolence  · Continue splinting w/pillow for pain      ----------------------------------------------------------------------------------------  HPI/24hr events:   Remains in afib RVR, started on digoxin  Patient appropriate for transfer out of the ICU today?: No  Disposition: Continue Stepdown Level 1 level of care   Code Status: Level 1 - Full Code  ---------------------------------------------------------------------------------------  SUBJECTIVE  Patient complains of residual dull abdominal pain and chronic pain   No other acute complaints at this time    Review of Systems  Review of systems was reviewed and negative unless stated above in HPI/24-hour events   ---------------------------------------------------------------------------------------  OBJECTIVE    Vitals   Vitals:    22 1745 22 1800 22 1900 22 2300   BP: 131/84 (!) 139/103 141/86 134/83   BP Location:       Pulse: (!) 127 (!) 121 (!) 122 (!) 121   Resp:       Temp:       TempSrc:       SpO2: 98% 99% 98% 99%   Weight:       Height:         Temp (24hrs), Av 7 °F (37 6 °C), Min:99 2 °F (37 3 °C), Max:100 9 °F (38 3 °C)  Current: Temperature: 99 2 °F (37 3 °C)          Respiratory:  SpO2: SpO2: 99 %, SpO2 Activity: SpO2 Activity: At Rest  Nasal Cannula O2 Flow Rate (L/min): 5 L/min    Invasive/non-invasive ventilation settings   Respiratory    Lab Data (Last 4 hours)    None         O2/Vent Data (Last 4 hours)    None                Physical Exam  Constitutional:       General: He is not in acute distress  Eyes:      Pupils: Pupils are equal, round, and reactive to light  Cardiovascular:      Rate and Rhythm: Tachycardia present  Rhythm irregular  Pulmonary:      Effort: Pulmonary effort is normal    Abdominal:      Palpations: Abdomen is soft  Comments: Generalized TTP, c/d/i midline dressing, ostomy w/ brown stool   Musculoskeletal:         General: Swelling present  Skin:     General: Skin is warm and dry  Neurological:      General: No focal deficit present  Mental Status: He is oriented to person, place, and time               Laboratory and Diagnostics:  Results from last 7 days   Lab Units 22  0450 12/15/22  0429 22  0528 22  1950 22  0507 22  0629 22  1911 22  0620   WBC Thousand/uL 9 47 13 88* 15 40* 15 33* 11 25* 11 36* 5 25 2 99*   HEMOGLOBIN g/dL 10 9* 11 7* 12 1 11 7* 12 0 13 4 14 2 16 2   HEMATOCRIT % 35 4* 37 6 37 4 36 6 37 9 42 0 44 8 52 2*   PLATELETS Thousands/uL 224 223 201 206 170 188 215 251   NEUTROS PCT %  --   --   --   --   --   --   --  85*   BANDS PCT %  --   --   --   --   --   --  30*  --    MONOS PCT %  --   --   --   --   --   --   --  8   MONO PCT %  --   --   --   --   --   --  3*  --      Results from last 7 days   Lab Units 12/16/22  0450 12/15/22  1941 12/15/22  0429 12/14/22  0528 12/13/22  1758 12/13/22  0507 12/12/22  1853 12/12/22  0629 12/11/22  1911 12/11/22  0620   SODIUM mmol/L 130* 132* 136 136 135 137 135 135   < > 138   POTASSIUM mmol/L 4 3 4 0 3 9 3 8 3 4* 4 1 4 0 3 8   < > 3 8   CHLORIDE mmol/L 99 101 102 103 102 104 104 105   < > 100   CO2 mmol/L 25 26 25 25 27 26 26 24   < > 26   ANION GAP mmol/L 6 5 9 8 6 7 5 6   < > 12   BUN mg/dL 21 22 17 16 15 16 13 12   < > 19   CREATININE mg/dL 1 21 1 27 1 09 1 06 1 18 1 05 1 12 0 96   < > 1 28   CALCIUM mg/dL 8 3* 8 1* 8 2* 8 5 8 5 8 5 8 6 8 1*   < > 9 5   GLUCOSE RANDOM mg/dL 107 114 103 95 117 112 114 110   < > 185*   ALT U/L  --   --   --  12  --  10  --  12  --  18   AST U/L  --   --   --  14  --  14  --  17  --  17   ALK PHOS U/L  --   --   --  44  --  35  --  27*  --  54   ALBUMIN g/dL  --   --   --  3 1*  --  3 0*  --  3 3*  --  4 3   TOTAL BILIRUBIN mg/dL  --   --   --  0 54  --  0 74  --  0 68  --  0 57    < > = values in this interval not displayed  Results from last 7 days   Lab Units 12/16/22  0450 12/15/22  1941 12/15/22  0429 12/14/22  0528 12/13/22  1758 12/13/22  0507 12/12/22  1853   MAGNESIUM mg/dL 2 1 2 1 2 1 2 3 1 8* 2 0 1 9   PHOSPHORUS mg/dL 3 1 3 9 2 9 2 4 2 9 1 9* 2 3      Results from last 7 days   Lab Units 12/16/22  1824 12/16/22  1148 12/16/22  0450 12/16/22  0108 12/15/22  1832 12/15/22  1131 12/15/22  0429 12/14/22  0159 12/13/22  1950   INR   --   --   --   --   --   --   --   --  1 32*   PTT seconds 64* 33 48* 97* 80* 59* 41*   < > 42*    < > = values in this interval not displayed            Results from last 7 days   Lab Units 12/12/22  0629 12/11/22 2033 12/11/22  4234 LACTIC ACID mmol/L 1 6 2 6* 4 3*     ABG:    VBG:  Results from last 7 days   Lab Units 12/16/22  0806   PH IGLESIA  7 352   PCO2 IGLESIA mm Hg 47 0   PO2 IGLESIA mm Hg 23 4*   HCO3 IGLESIA mmol/L 25 5   BASE EXC IGLESIA mmol/L -0 4     Results from last 7 days   Lab Units 12/15/22  0429 12/14/22  0528 12/13/22  0507 12/12/22  0629   PROCALCITONIN ng/ml 2 05* 3 36* 6 02* 8 29*       Micro  Results from last 7 days   Lab Units 12/11/22  1155 12/11/22  0831   BLOOD CULTURE   --  No Growth After 5 Days  No Growth After 5 Days  GRAM STAIN RESULT  2+ Polys*  2+ Gram positive cocci in pairs*  2+ Gram positive cocci in chains*  Rare Gram negative rods*  --    BODY FLUID CULTURE, STERILE  3+ Growth of Gamma Hemolytic Streptococcus NOT Enterococcus  2+ Growth of - Strain 1 - Escherichia coli*  1+ Growth of - Strain 2 - Escherichia coli*  1+ Growth of  --        EKG: AF RVR  Imaging: I have personally reviewed pertinent reports  Intake and Output  I/O       12/15 0701  12/16 0700 12/16 0701  12/17 0700    P  O  1112 360    I V  (mL/kg) 483 9 (5 2) 98 1 (1)    IV Piggyback 600     Total Intake(mL/kg) 2195 9 (23 4) 458 1 (4 9)    Urine (mL/kg/hr) 525 (0 2) 1800 (0 8)    Stool  425    Total Output 525 2225    Net +1670 9 -1766 9                Height and Weights   Height: 5' 7" (170 2 cm)  IBW (Ideal Body Weight): 66 1 kg  Body mass index is 32 39 kg/m²    Weight (last 2 days)     Date/Time Weight    12/16/22 0535 93 8 (206 79)    12/16/22 0445 93 8 (206 79)    12/15/22 0830 --    Comment rows:    OBSERV: OOB to chair at 12/15/22 0830    12/15/22 0558 91 (200 62)            Nutrition       Diet Orders   (From admission, onward)             Start     Ordered    12/16/22 1400  Dietary nutrition supplements  Once        Question Answer Comment   Select Supplement: Ensure Compact-Chocolate    Frequency 10, 2, HS        12/16/22 1402    12/16/22 9461  Diet Surgical; Surgical Soft/Lite Meal  Diet effective now        Comments: Patient may have ice chips and small sips of water  References:    Nutrtion Support Algorithm Enteral vs  Parenteral   Question Answer Comment   Diet Type Surgical    Surgical Surgical Soft/Lite Meal    RD to adjust diet per protocol?  No        12/16/22 1140                  Active Medications  Scheduled Meds:  Current Facility-Administered Medications   Medication Dose Route Frequency Provider Last Rate   • atorvastatin  20 mg Oral Daily PEDRO Paul     • carisoprodol  350 mg Oral 4x Daily PEDRO Oquendo     • cholecalciferol  1,000 Units Oral Daily PEDRO Paul     • ciprofloxacin  500 mg Oral Q12H [address] Tiarra Jiang MD     • digoxin  250 mcg Intravenous Q6H [address] [place], [place]     • gabapentin  600 mg Oral TID PEDRO Oquendo     • heparin (porcine)  3-20 Units/kg/hr (Order-Specific) Intravenous Titrated PEDRO Oquendo 16 1 Units/kg/hr (12/16/22 1316)   • metoprolol tartrate  50 mg Oral Q8H [address] [place], [place]     • metroNIDAZOLE  500 mg Oral [facility] Tiarra Jiang MD     • ondansetron  4 mg Intravenous Q6H PRN Mayank Simon PA-C     • oxyCODONE  10 mg Oral Q6H PEDRO Oquendo     • pantoprazole  40 mg Intravenous Q12H [address] Mayank Simon PA-C     • perflutren lipid microsphere  1 mL/min Intravenous Once in imaging Frankie Luo MD     • polyethylene glycol  17 g Oral Daily Cortney Stevens PA-C     • traZODone  100 mg Oral HS PEDRO Perry       Continuous Infusions:  heparin (porcine), 3-20 Units/kg/hr (Order-Specific), Last Rate: 16 1 Units/kg/hr (12/16/22 1316)      PRN Meds:   ondansetron, 4 mg, Q6H PRN  perflutren lipid microsphere, 1 mL/min, Once in imaging        Invasive Devices Review  Invasive Devices     Peripheral Intravenous Line  Duration           Long-Dwell Peripheral IV (Midline) 30/91/97 Left Basilic 4 days    Peripheral IV 12/13/22 Right;Ventral (anterior) Forearm 3 days          Drain  Duration Colostomy Descending/sigmoid -- days    Colostomy Descending/sigmoid  days                Rationale for remaining devices: Access  ---------------------------------------------------------------------------------------  Advance Directive and Living Will:      Power of :    POLST:    ---------------------------------------------------------------------------------------  Care Time Delivered:   No Critical Care time spent       LAUREL PECK PA-C      Portions of the record may have been created with voice recognition software  Occasional wrong word or "sound a like" substitutions may have occurred due to the inherent limitations of voice recognition software    Read the chart carefully and recognize, using context, where substitutions have occurred

## 2022-12-18 VITALS
BODY MASS INDEX: 31.42 KG/M2 | RESPIRATION RATE: 18 BRPM | SYSTOLIC BLOOD PRESSURE: 155 MMHG | OXYGEN SATURATION: 94 % | HEART RATE: 106 BPM | HEIGHT: 67 IN | DIASTOLIC BLOOD PRESSURE: 74 MMHG | WEIGHT: 200.18 LBS | TEMPERATURE: 97.6 F

## 2022-12-18 LAB
ALBUMIN SERPL BCP-MCNC: 2.8 G/DL (ref 3.5–5)
ALP SERPL-CCNC: 53 U/L (ref 34–104)
ALT SERPL W P-5'-P-CCNC: 15 U/L (ref 7–52)
ANION GAP SERPL CALCULATED.3IONS-SCNC: 7 MMOL/L (ref 4–13)
APTT PPP: 80 SECONDS (ref 23–37)
AST SERPL W P-5'-P-CCNC: 18 U/L (ref 13–39)
BASOPHILS # BLD AUTO: 0.02 THOUSANDS/ÂΜL (ref 0–0.1)
BASOPHILS NFR BLD AUTO: 0 % (ref 0–1)
BILIRUB SERPL-MCNC: 0.32 MG/DL (ref 0.2–1)
BUN SERPL-MCNC: 13 MG/DL (ref 5–25)
CA-I BLD-SCNC: 1.08 MMOL/L (ref 1.12–1.32)
CALCIUM ALBUM COR SERPL-MCNC: 8.8 MG/DL (ref 8.3–10.1)
CALCIUM SERPL-MCNC: 7.8 MG/DL (ref 8.4–10.2)
CHLORIDE SERPL-SCNC: 101 MMOL/L (ref 96–108)
CO2 SERPL-SCNC: 28 MMOL/L (ref 21–32)
CREAT SERPL-MCNC: 0.91 MG/DL (ref 0.6–1.3)
DIGOXIN SERPL-MCNC: 1.8 NG/ML (ref 0.8–2)
EOSINOPHIL # BLD AUTO: 0.15 THOUSAND/ÂΜL (ref 0–0.61)
EOSINOPHIL NFR BLD AUTO: 2 % (ref 0–6)
ERYTHROCYTE [DISTWIDTH] IN BLOOD BY AUTOMATED COUNT: 13.5 % (ref 11.6–15.1)
GFR SERPL CREATININE-BSD FRML MDRD: 82 ML/MIN/1.73SQ M
GLUCOSE SERPL-MCNC: 131 MG/DL (ref 65–140)
HCT VFR BLD AUTO: 36.2 % (ref 36.5–49.3)
HGB BLD-MCNC: 11.7 G/DL (ref 12–17)
IMM GRANULOCYTES # BLD AUTO: 0.13 THOUSAND/UL (ref 0–0.2)
IMM GRANULOCYTES NFR BLD AUTO: 1 % (ref 0–2)
LYMPHOCYTES # BLD AUTO: 1.62 THOUSANDS/ÂΜL (ref 0.6–4.47)
LYMPHOCYTES NFR BLD AUTO: 16 % (ref 14–44)
MAGNESIUM SERPL-MCNC: 1.8 MG/DL (ref 1.9–2.7)
MCH RBC QN AUTO: 28.9 PG (ref 26.8–34.3)
MCHC RBC AUTO-ENTMCNC: 32.3 G/DL (ref 31.4–37.4)
MCV RBC AUTO: 89 FL (ref 82–98)
MONOCYTES # BLD AUTO: 1.08 THOUSAND/ÂΜL (ref 0.17–1.22)
MONOCYTES NFR BLD AUTO: 11 % (ref 4–12)
NEUTROPHILS # BLD AUTO: 7.2 THOUSANDS/ÂΜL (ref 1.85–7.62)
NEUTS SEG NFR BLD AUTO: 70 % (ref 43–75)
NRBC BLD AUTO-RTO: 0 /100 WBCS
PHOSPHATE SERPL-MCNC: 2.5 MG/DL (ref 2.3–4.1)
PLATELET # BLD AUTO: 367 THOUSANDS/UL (ref 149–390)
PMV BLD AUTO: 9.3 FL (ref 8.9–12.7)
POTASSIUM SERPL-SCNC: 3.9 MMOL/L (ref 3.5–5.3)
PROCALCITONIN SERPL-MCNC: 0.44 NG/ML
PROT SERPL-MCNC: 5 G/DL (ref 6.4–8.4)
RBC # BLD AUTO: 4.05 MILLION/UL (ref 3.88–5.62)
SODIUM SERPL-SCNC: 136 MMOL/L (ref 135–147)
WBC # BLD AUTO: 10.2 THOUSAND/UL (ref 4.31–10.16)

## 2022-12-18 RX ORDER — FUROSEMIDE 40 MG/1
40 TABLET ORAL ONCE
Status: COMPLETED | OUTPATIENT
Start: 2022-12-18 | End: 2022-12-18

## 2022-12-18 RX ORDER — POTASSIUM CHLORIDE 20 MEQ/1
20 TABLET, EXTENDED RELEASE ORAL ONCE
Status: COMPLETED | OUTPATIENT
Start: 2022-12-18 | End: 2022-12-18

## 2022-12-18 RX ORDER — METOPROLOL SUCCINATE 50 MG/1
50 TABLET, EXTENDED RELEASE ORAL 2 TIMES DAILY
Status: DISCONTINUED | OUTPATIENT
Start: 2022-12-18 | End: 2022-12-19

## 2022-12-18 RX ORDER — LOSARTAN POTASSIUM 25 MG/1
25 TABLET ORAL DAILY
Status: DISCONTINUED | OUTPATIENT
Start: 2022-12-18 | End: 2022-12-18

## 2022-12-18 RX ORDER — DIGOXIN 125 MCG
125 TABLET ORAL DAILY
Status: DISCONTINUED | OUTPATIENT
Start: 2022-12-19 | End: 2022-12-21

## 2022-12-18 RX ORDER — ONDANSETRON 2 MG/ML
4 INJECTION INTRAMUSCULAR; INTRAVENOUS ONCE
Status: DISCONTINUED | OUTPATIENT
Start: 2022-12-18 | End: 2022-12-20

## 2022-12-18 RX ADMIN — CARISOPRODOL 350 MG: 350 TABLET ORAL at 17:38

## 2022-12-18 RX ADMIN — PANTOPRAZOLE SODIUM 40 MG: 40 INJECTION, POWDER, LYOPHILIZED, FOR SOLUTION INTRAVENOUS at 08:50

## 2022-12-18 RX ADMIN — OXYCODONE HYDROCHLORIDE 10 MG: 10 TABLET ORAL at 17:38

## 2022-12-18 RX ADMIN — Medication 1000 UNITS: at 08:51

## 2022-12-18 RX ADMIN — DIGOXIN 125 MCG: 0.25 INJECTION INTRAMUSCULAR; INTRAVENOUS at 08:50

## 2022-12-18 RX ADMIN — PANTOPRAZOLE SODIUM 40 MG: 40 INJECTION, POWDER, LYOPHILIZED, FOR SOLUTION INTRAVENOUS at 21:01

## 2022-12-18 RX ADMIN — MAGNESIUM OXIDE TAB 400 MG (241.3 MG ELEMENTAL MG) 400 MG: 400 (241.3 MG) TAB at 12:46

## 2022-12-18 RX ADMIN — METRONIDAZOLE 500 MG: 500 TABLET ORAL at 05:47

## 2022-12-18 RX ADMIN — METOPROLOL SUCCINATE 50 MG: 50 TABLET, EXTENDED RELEASE ORAL at 17:38

## 2022-12-18 RX ADMIN — GABAPENTIN 600 MG: 600 TABLET, FILM COATED ORAL at 21:01

## 2022-12-18 RX ADMIN — OXYCODONE HYDROCHLORIDE 10 MG: 10 TABLET ORAL at 05:47

## 2022-12-18 RX ADMIN — CARISOPRODOL 350 MG: 350 TABLET ORAL at 12:46

## 2022-12-18 RX ADMIN — TRAZODONE HYDROCHLORIDE 100 MG: 100 TABLET ORAL at 21:01

## 2022-12-18 RX ADMIN — OXYCODONE HYDROCHLORIDE 10 MG: 10 TABLET ORAL at 23:20

## 2022-12-18 RX ADMIN — ATORVASTATIN CALCIUM 20 MG: 10 TABLET, FILM COATED ORAL at 08:51

## 2022-12-18 RX ADMIN — FUROSEMIDE 40 MG: 40 TABLET ORAL at 12:46

## 2022-12-18 RX ADMIN — APIXABAN 5 MG: 5 TABLET, FILM COATED ORAL at 17:38

## 2022-12-18 RX ADMIN — CARISOPRODOL 350 MG: 350 TABLET ORAL at 21:01

## 2022-12-18 RX ADMIN — ONDANSETRON 4 MG: 2 INJECTION INTRAMUSCULAR; INTRAVENOUS at 01:56

## 2022-12-18 RX ADMIN — MAGNESIUM OXIDE TAB 400 MG (241.3 MG ELEMENTAL MG) 400 MG: 400 (241.3 MG) TAB at 17:38

## 2022-12-18 RX ADMIN — GABAPENTIN 600 MG: 600 TABLET, FILM COATED ORAL at 15:27

## 2022-12-18 RX ADMIN — POTASSIUM CHLORIDE 20 MEQ: 1500 TABLET, EXTENDED RELEASE ORAL at 12:47

## 2022-12-18 RX ADMIN — METOPROLOL TARTRATE 50 MG: 50 TABLET ORAL at 07:56

## 2022-12-18 RX ADMIN — OXYCODONE HYDROCHLORIDE 10 MG: 10 TABLET ORAL at 12:47

## 2022-12-18 RX ADMIN — METOPROLOL TARTRATE 50 MG: 50 TABLET ORAL at 00:55

## 2022-12-18 RX ADMIN — CARISOPRODOL 350 MG: 350 TABLET ORAL at 08:51

## 2022-12-18 RX ADMIN — ONDANSETRON 4 MG: 2 INJECTION INTRAMUSCULAR; INTRAVENOUS at 10:25

## 2022-12-18 RX ADMIN — GABAPENTIN 600 MG: 600 TABLET, FILM COATED ORAL at 08:51

## 2022-12-18 NOTE — PROGRESS NOTES
PROGRESS NOTE - Cardiology  Juan Miguel Reid 78 y o  male MRN: [id number]  Unit/Bed#: ICU 10-01 Encounter: [id number]    Assessment/Plan   Cardiomyopathy  - New diagnosed during this admission  Etiologies to consider: In setting of significant sepsis, atrial fibrillation or progression of CAD (Left heart cath from 2017 showed mild nonobstructive CAD with 25% stenosis of proximal LAD)  -  Volume status: mildly hypervolemic  Would recommend to keep at least net even with as needed diuresis  -  Hemodynamics: on metoprolol tatrate 50 mg 3 times daily  Rates are better controlled but given elevated blood pressures will increase metoprolol to succinate 50 mg bid  If blood pressure cont being stable will start GDMT   - Sudden cardiac death prevention: TTE during this admission with LVEF 35 %  We will reevaluate LV function once heart rate is better controlled to further decide for need for ischemic eval and LifeVest prior to be discharged  Life vest might be challenging with all surgical bands      New onset atrial fibrillation  - Rate/rhythm control: improved rate control  On metoprolol tatrate  50 milligram tid    s/p digoxin load  Will increase metoprolol to succ 50 mg bid and can switch digoxin to oral once diarrhea resolves  Previously on diltiazem drip but not optimal given new cardiomyopathy  If still in afib can undergo outpt cardioversion   - Anticoagulation: Continue heparin drip and switch to DOAC once acceptable from surgical standpoint     Peritonitis in setting of appendicitis, abscesses complicated with sepsis  - s/p surgical intervention  Restarted p o  medications and diet  management per surgical team      S/p aortic valve replacement with bioprosthetic valve  -Echo during this admission with normal functioning valve     Nonobstructive CAD  - On left heart cath from 2017    Continue home atorvastatin, aspirin (if acceptable from surgical standpoint)     Interval History:  Remains in atrial fibrillation but with controlled rates  Slowly increase oral intake  4 3 L of UOT, overall net negative  Review of Systems  ROS as noted above, otherwise 12 point review of systems was performed and is negative  Historical Information   Past Medical History:   Diagnosis Date   • Aortic stenosis    • Aortic stenosis, severe    • Arthritis    • Carpal tunnel syndrome    • Chronic back pain    • Chronic pain    • Chronic, continuous use of opioids    • Hyperlipidemia    • Hypertension    • Hypertrophy of prostate    • Insomnia    • Large bowel obstruction (HCC)    • Peripheral neuropathy    • RA (rheumatoid arthritis) ([facility] [address] )    • Umbilical hernia      Past Surgical History:   Procedure Laterality Date   • ADENOIDECTOMY     • AORTIC VALVE REPLACEMENT  10/17/2017   • BASAL CELL CARCINOMA EXCISION     • CARDIAC CATHETERIZATION      LAST ASSESSED: 20OCT2017   • CARPAL TUNNEL RELEASE Bilateral     left   • CATARACT EXTRACTION     • EXPLORATORY LAPAROTOMY W/ BOWEL RESECTION N/A 12/11/2022    Procedure: LAPAROTOMY EXPLORATORY,  EXTENSIVE LYSIS OF ADHESIONS, APPENDECTOMY, DRAINAGE OF ABDOMINAL ABCESS;  Surgeon: Jumana Alonzo MD;  Location: [facility];  Service: General   • FL CYSTOGRAM  8/29/2022   • HERNIA REPAIR      inguinal bilateral -umbilical    • IR MIDLINE PLACEMENT  12/12/2022   • LAMINECTOMY      DECOMPRESS, FACETECTOMY, FORAMINOTOMY LUMBAR SEG   • LAPAROTOMY N/A 8/20/2022    Procedure: LAPAROTOMY EXPLORATORY, sigmoid colon resection,colostomy;  Surgeon:  Jumana Alonzo MD;  Location: [facility];  Service: General   • NEUROPLASTY / TRANSPOSITION MEDIAN NERVE AT CARPAL TUNNEL     • [place] RPLCMT AORTIC VALVE OPN W/STENTLESS TISSUE VALVE N/A 10/17/2017    Procedure: AORTIC VALVE REPLACEMENT with 23MM MAGNAEASE TISSUE VALVE; INTRA-OP SHANDRA;  Surgeon: Janette Houser MD;  Location: [facility];  Service: Cardiac Surgery   • TONSILLECTOMY       Social History     Substance and Sexual Activity   Alcohol Use Not Currently Social History     Substance and Sexual Activity   Drug Use No     Social History     Tobacco Use   Smoking Status Never   Smokeless Tobacco Never     Meds/Allergies   Hospital Medications:   Current Facility-Administered Medications   Medication Dose Route Frequency   • atorvastatin (LIPITOR) tablet 20 mg  20 mg Oral Daily   • carisoprodol (SOMA) tablet 350 mg  350 mg Oral 4x Daily   • cholecalciferol (VITAMIN D3) tablet 1,000 Units  1,000 Units Oral Daily   • digoxin (LANOXIN) injection 125 mcg  125 mcg Intravenous Daily   • gabapentin (NEURONTIN) tablet 600 mg  600 mg Oral TID   • heparin (porcine) 25,000 units in 0 45% NaCl 250 mL infusion (premix)  3-20 Units/kg/hr (Order-Specific) Intravenous Titrated   • metoprolol tartrate (LOPRESSOR) tablet 50 mg  50 mg Oral Q8H   • ondansetron (ZOFRAN) injection 4 mg  4 mg Intravenous Q6H PRN   • ondansetron (ZOFRAN) injection 4 mg  4 mg Intravenous Once   • oxyCODONE (ROXICODONE) immediate release tablet 10 mg  10 mg Oral Q6H   • pantoprazole (PROTONIX) injection 40 mg  40 mg Intravenous Q12H ANDREA   • perflutren lipid microsphere (DEFINITY) injection  1 mL/min Intravenous Once in imaging   • traZODone (DESYREL) tablet 100 mg  100 mg Oral HS     Home Medications:   Medications Prior to Admission   Medication   • aspirin 81 MG tablet   • atorvastatin (LIPITOR) 20 mg tablet   • betamethasone dipropionate (DIPROSONE) 0 05 % cream   • carisoprodol (SOMA) 350 mg tablet   • Cholecalciferol (VITAMIN D3) 1000 units CAPS   • diclofenac sodium (VOLTAREN) 1 %   • fluocinonide (LIDEX) 0 05 % external solution   • gabapentin (NEURONTIN) 600 MG tablet   • ketoconazole (NIZORAL) 2 % cream   • losartan (COZAAR) 50 mg tablet   • naloxone (NARCAN) 4 mg/0 1 mL nasal spray   • oxyCODONE (ROXICODONE) 10 MG TABS   • oxyCODONE-acetaminophen (PERCOCET)  mg per tablet   • Secukinumab 150 MG/ML SOAJ   • traZODone (DESYREL) 300 MG tablet     Allergies   Allergen Reactions   • Penicillins Anaphylaxis   • Quinine Derivatives Other (See Comments)     High fever     Objective   Vitals: Blood pressure 140/77, pulse (!) 110, temperature 98 2 °F (36 8 °C), temperature source Tympanic, resp  rate 22, height 5' 7" (1 702 m), weight 90 8 kg (200 lb 2 8 oz), SpO2 95 %  Orthostatic Blood Pressures    Flowsheet Row Most Recent Value   Blood Pressure 140/77 filed at 12/18/2022 0800   Patient Position - Orthostatic VS Lying filed at 12/18/2022 0800          Intake/Output Summary (Last 24 hours) at 12/18/2022 1044  Last data filed at 12/18/2022 1001  Gross per 24 hour   Intake 926 76 ml   Output 3975 ml   Net -3048 24 ml     Invasive Devices     Peripheral Intravenous Line  Duration           Long-Dwell Peripheral IV (Midline) 32/02/63 Left Basilic 5 days    Peripheral IV 12/13/22 Right;Ventral (anterior) Forearm 5 days          Drain  Duration           Colostomy Descending/sigmoid -- days    Colostomy Descending/sigmoid  days              Physical Exam   GEN: NAD, alert and oriented, well appearing  SKIN: dry without significant lesions or rashes  HEENT: NCAT, PERRL, EOMs intact  NECK: No JVD or carotid bruits appreciated  CARDIOVASCULAR: irregular, normal S1, S2 without murmurs, rubs, or gallops appreciated  LUNGS: Clear to auscultation bilaterally without wheezes, rhonchi, or rales  ABDOMEN: post-surgical bands  EXTREMITIES/VASCULAR: perfused without clubbing, cyanosis, or edema b/l  PSYCH: Normal mood and affect  NEURO: CN ll-Xll grossly intact    Lab Results: I have personally reviewed pertinent lab results      Results from last 7 days   Lab Units 12/18/22  0547 12/17/22  0502 12/16/22  0450   WBC Thousand/uL 10 20* 9 30 9 47   HEMOGLOBIN g/dL 11 7* 10 8* 10 9*   HEMATOCRIT % 36 2* 34 3* 35 4*   PLATELETS Thousands/uL 367 255 224     Results from last 7 days   Lab Units 12/18/22  0547 12/17/22  1714 12/17/22  0502   POTASSIUM mmol/L 3 9 3 7 3 9   CHLORIDE mmol/L 101 98 99   CO2 mmol/L 28 29 26   BUN mg/dL 13 15 16   CREATININE mg/dL 0 91 0 98 0 99   CALCIUM mg/dL 7 8* 8 4 8 2*     Results from last 7 days   Lab Units 12/18/22  0547 12/17/22  0502 12/17/22  0112 12/14/22  0159 12/13/22  1950   INR   --   --   --   --  1 32*   PTT seconds 80* 73* 66*   < > 42*    < > = values in this interval not displayed  Results from last 7 days   Lab Units 12/18/22  0547 12/17/22  1714 12/17/22  0502   MAGNESIUM mg/dL 1 8* 2 1 1 8*     Imaging: I have personally reviewed pertinent reports

## 2022-12-18 NOTE — ASSESSMENT & PLAN NOTE
· Resume losartan at a decrease dose 25 mg daily (instead of 50 mg daily at home)   · May titrate up to home dose if BP tolerates

## 2022-12-18 NOTE — ASSESSMENT & PLAN NOTE
• Presented to ED on 12/11 w/acute onset abdomina pain concerning for acute abdomen   • W/hx of sigmoid diverticulitis s/p Polo's procedure and colostomy with planned reversal scheduled for 01/2023  • CT A/P on admission revealed extensive inflammatory changes involving jejunum and proximal-mid ileum, compatible with severe enteritis; there is focal free air surrounding lower midline ileum just above urinary bladder with extensive inflammatory changes; few mildly dilated loops of jejunum within left upper abdomen, likely 2/2 extensive inflammatory changes with possible superimposed low-grade partial SBO; small amount of free air and free fluid in remainder of abdomen and pelvis; stomach and proximal esophagus are distended and fluid-filled, presumably secondary to delayed transit  • OR on 12/11 for EX LAP,  EXTENSIVE MELANI, APPENDECTOMY, DRAINAGE OF ABDOMINAL ABSCESS - today is POD#6  • Peritoneal fluid culture w/gamma hemolytic Strep and anaerobic culture w/Bacteroides fragilis   • Received Ciprofloxacin, Flagyl through 12/17  • NGT removed on 12/13 - tolerating diet   • Pain management   • Treatment per surgical team

## 2022-12-18 NOTE — ASSESSMENT & PLAN NOTE
• Suspect this is is multifactorial in setting of atelectasis vs mild pulmonary vascular congestion vs post-operative pain  • CXR on 12/13 w/mildly increased interstitial markings bilaterally - may indicate mild pulmonary edema  • Respiratory status improved w/IV diuresis - continue w/PRN diuresis  • Now weaned to RA - continue to maintain SpO2 > 90%  • Aggressive pulmonary hygiene  • Encourage cough, deep breathing, IS, ambulation

## 2022-12-18 NOTE — ASSESSMENT & PLAN NOTE
77 yo M hx aortic valve replacement, chronic pain, and recent dolan's procedure for diverticulitis now POD#7 s/p ex lap, MELANI, appendectomy and RLQ abscess drainage  Feeling well, hoping for d/c home soon  Abdomen soft, mild right flank tenderness, midline dry, stoma with liquid stool  T 98 2, HR , /77, RR 22, SpO2 95%  WBC 10 (9), Hgb 11, BUN 13, Cr 0 91  Assessment:  RLQ abscess with peritonitis    Plan:  Surgically stable, abx d/c'd, tolerating regular diet with colostomy output, pain controlled on home regimen  SLIM/Cardiology following for his A  Fib, at this time can transition to [address]  Plan for home with [facility] when medically cleared

## 2022-12-18 NOTE — UTILIZATION REVIEW
Continued Stay Review    Date: 12/17/2022 12/18/2022                         Current Patient Class: Inpatient  Current Level of Care: Level 2 stepdown    HPI:77 y o  male initially admitted on  12/11/2022    Assessment/Plan: Peritonitis  POD# (12/11/2022) s/p ex lap  Extensive MELANI, Appendectomy, Drainage of Abdominal abscrss  12/17/2022  POD #7    Remains in afib RVR, started on digoxin  Remains in afib RVR, started on digoxin  Analgesia Scheduled & PRN  Continue IV Abx  Monitor incision closely  Monitor ostomy  Follow up on Cultures:  Peritoneal fluid culture w/gamma hemolytic Strep, Anaerobic culture w/Bacteroides fragilis  Continue IV flds  Daily weight,  Strict I&O  Monitor fever and WBC curve  Continue IV heparin  12/18/2022   POD #8   Monitor off ABX  Advancing diet as tolerated - watch colostomy output  Analgesia control PRN  Continue IV heparin to be transitioned to DOAC,        Vital Signs:   Date/Time Temp Pulse Resp BP MAP (mmHg) SpO2 Calculated FIO2 (%) - Nasal Cannula Nasal Cannula O2 Flow Rate (L/min) O2 Device Patient Position - Orthostatic VS   12/18/22 0800 -- 110 Abnormal  22 140/77 100 95 % -- -- None (Room air) Lying   12/18/22 0748 98 2 °F (36 8 °C) -- -- -- -- -- -- -- -- --   12/18/22 0600 -- 104 23 Abnormal  129/66 92 94 % -- -- -- --   12/18/22 0400 -- 84 22 133/62 89 94 % -- -- -- --   12/18/22 0200 -- 97 20 137/83 105 95 % -- -- -- --   12/18/22 0100 -- 97 18 -- -- 96 % -- -- -- --   12/18/22 0000 -- 93 23 Abnormal  141/75 99 94 % -- -- -- --   12/17/22 2348 98 °F (36 7 °C) -- -- -- -- -- -- -- -- --   12/17/22 2200 -- 89 22 134/87 103 95 % -- -- -- --   12/17/22 2000 99 4 °F (37 4 °C) 95 21 150/93 117 94 % -- -- -- --   12/17/22 1800 -- 90 21 173/83 Abnormal  117 96 % -- -- -- --   12/17/22 1600 -- 94 18 152/81 108 93 % -- -- -- --   12/17/22 1500 98 8 °F (37 1 °C) 98 21 129/69 93 95 % -- -- None (Room air) Lying   12/17/22 1400 -- 96 17 155/86 109 97 % -- -- -- -- 12/17/22 1300 -- 95 -- 134/84 104 98 % 28 2 L/min Nasal cannula --   12/17/22 1200 -- 103 -- 135/67 95 96 % 28 2 L/min Nasal cannula --   12/17/22 1100 97 8 °F (36 6 °C) 139 Abnormal  20 153/92 116 99 % -- -- -- --   12/17/22 1000 -- 122 Abnormal  -- 147/93 109 97 % -- -- -- --   12/17/22 0900 -- 135 Abnormal  24 Abnormal  127/81 100 99 % -- -- -- --   12/17/22 0800 -- 125 Abnormal  24 Abnormal  169/101 Abnormal  123 97 % 28 2 L/min Nasal cannula --   12/17/22 0700 97 8 °F (36 6 °C) -- -- -- -- -- -- -- -- --   12/17/22 0600 -- 133 Abnormal  -- 158/76 108 98 % -- -- -- --   12/17/22 0500 -- 135 Abnormal  -- 158/73 101 99 % -- -- -- --   12/17/22 0400 -- 110 Abnormal  -- 137/67 93 99 % -- -- -- --   12/17/22 0300 -- 139 Abnormal  -- 126/89 101 94 % -- -- -- --   12/17/22 0200 -- 120 Abnormal  -- 125/71 91 94 % -- -- -- --   12/17/22 0100 98 9 °F (37 2 °C) 126 Abnormal  -- 143/72 101 95 % -- -- -- --       Pertinent Labs/Diagnostic Results:     Results from last 7 days   Lab Units 12/18/22  0547 12/17/22  0502 12/16/22  0450 12/15/22  0429 12/14/22  0528 12/12/22  0629 12/11/22  1911   WBC Thousand/uL 10 20* 9 30 9 47 13 88* 15 40*   < > 5 25   HEMOGLOBIN g/dL 11 7* 10 8* 10 9* 11 7* 12 1   < > 14 2   HEMATOCRIT % 36 2* 34 3* 35 4* 37 6 37 4   < > 44 8   PLATELETS Thousands/uL 367 255 224 223 201   < > 215   NEUTROS ABS Thousands/µL 7 20  --   --   --   --   --   --    BANDS PCT %  --   --   --   --   --   --  30*    < > = values in this interval not displayed       Results from last 7 days   Lab Units 12/18/22  0547 12/17/22  1714 12/17/22  0502 12/16/22  0450 12/15/22  1941 12/15/22  0429 12/14/22  0528   SODIUM mmol/L 136 134* 132* 130* 132* 136 136   POTASSIUM mmol/L 3 9 3 7 3 9 4 3 4 0 3 9 3 8   CHLORIDE mmol/L 101 98 99 99 101 102 103   CO2 mmol/L 28 29 26 25 26 25 25   ANION GAP mmol/L 7 7 7 6 5 9 8   BUN mg/dL 13 15 16 21 22 17 16   CREATININE mg/dL 0 91 0 98 0 99 1 21 1 27 1 09 1 06   EGFR ml/min/1 73sq m 82 75 74 58 54 66 68   CALCIUM mg/dL 7 8* 8 4 8 2* 8 3* 8 1* 8 2* 8 5   CALCIUM, IONIZED mmol/L 1 08* 1 09*  --   --  1 03* 1 05* 1 07*   MAGNESIUM mg/dL 1 8* 2 1 1 8* 2 1 2 1 2 1 2 3   PHOSPHORUS mg/dL 2 5 2 8 2 9 3 1 3 9 2 9 2 4     Results from last 7 days   Lab Units 12/18/22  0547 12/15/22  1955 12/14/22  0528 12/13/22  0507 12/12/22  0629   AST U/L 18  --  14 14 17   ALT U/L 15  --  12 10 12   ALK PHOS U/L 53  --  44 35 27*   TOTAL PROTEIN g/dL 5 0*  --  5 8* 5 3* 5 1*   ALBUMIN g/dL 2 8*  --  3 1* 3 0* 3 3*   TOTAL BILIRUBIN mg/dL 0 32  --  0 54 0 74 0 68   AMMONIA umol/L  --  43  --   --   --      Results from last 7 days   Lab Units 12/17/22  0114 12/16/22  1147 12/16/22  0113 12/15/22  2345 12/15/22  1945 12/15/22  1714 12/15/22  1155 12/14/22  2319 12/14/22  1756 12/14/22  1201 12/14/22  0535 12/13/22  2355   POC GLUCOSE mg/dl 122 91 122 115 110 122 145* 120 124 105 97 124     Results from last 7 days   Lab Units 12/18/22  0547 12/17/22  1714 12/17/22  0502 12/16/22  0450 12/15/22  1941 12/15/22  0429 12/14/22  0528 12/13/22  1758 12/13/22  0507 12/12/22  1853 12/12/22  0629 12/11/22  1911   GLUCOSE RANDOM mg/dL 131 152* 110 107 114 103 95 117 112 114 110 113     Results from last 7 days   Lab Units 12/16/22  0806 12/15/22  1941   PH IGLESIA  7 352 7 271*   PCO2 IGLESIA mm Hg 47 0 59 1*   PO2 IGLESIA mm Hg 23 4* 36 3   HCO3 IGLESIA mmol/L 25 5 26 6   BASE EXC IGLESIA mmol/L -0 4 -1 1   O2 CONTENT IGLESIA ml/dL 6 5 9 6   O2 HGB, VENOUS % 38 7* 60 4     Results from last 7 days   Lab Units 12/18/22  0547 12/17/22  0502 12/17/22  0112 12/14/22  0159 12/13/22  1950   PROTIME seconds  --   --   --   --  16 3*   INR   --   --   --   --  1 32*   PTT seconds 80* 73* 66*   < > 42*    < > = values in this interval not displayed       Results from last 7 days   Lab Units 12/18/22  0547 12/15/22  0429 12/14/22  0528 12/13/22  0507 12/12/22  0629   PROCALCITONIN ng/ml 0 44* 2 05* 3 36* 6 02* 8 29*     Results from last 7 days   Lab Units 12/12/22  0629 12/11/22  2033   LACTIC ACID mmol/L 1 6 2 6*     Results from last 7 days   Lab Units 12/18/22  0547   DIGOXIN LVL ng/mL 1 8     Results from last 7 days   Lab Units 12/11/22  1155   GRAM STAIN RESULT  2+ Polys*  2+ Gram positive cocci in pairs*  2+ Gram positive cocci in chains*  Rare Gram negative rods*   BODY FLUID CULTURE, STERILE  3+ Growth of Gamma Hemolytic Streptococcus NOT Enterococcus  2+ Growth of - Strain 1 - Escherichia coli*  1+ Growth of - Strain 2 - Escherichia coli*  1+ Growth of     Medications:   Scheduled Medications:  atorvastatin, 20 mg, Oral, Daily  carisoprodol, 350 mg, Oral, 4x Daily  cholecalciferol, 1,000 Units, Oral, Daily  digoxin, 125 mcg, Intravenous, Daily  gabapentin, 600 mg, Oral, TID  metoprolol tartrate, 50 mg, Oral, Q8H  ondansetron, 4 mg, Intravenous, Once  oxyCODONE, 10 mg, Oral, Q6H  pantoprazole, 40 mg, Intravenous, Q12H ANDREA  traZODone, 100 mg, Oral, HS      Continuous IV Infusions:  heparin (porcine), 3-20 Units/kg/hr (Order-Specific), Intravenous, Titrated      PRN Meds:  ondansetron, 4 mg, Intravenous, Q6H PRN   X 1 dose 12/17;  X 2 doses 12/18  perflutren lipid microsphere, 1 mL/min, Intravenous, Once in imaging        Discharge Plan: D    Network Utilization Review Department  ATTENTION: Please call with any questions or concerns to [phone] and carefully listen to the prompts so that you are directed to the right person  All voicemails are confidential   Chrissy Muniz all requests for admission clinical reviews, approved or denied determinations and any other requests to dedicated fax number below belonging to the campus where the patient is receiving treatment   List of dedicated fax numbers for the Facilities:  [address] DENIALS (Administrative/Medical Necessity) [phone]   [address] (Maternity/NICU/Pediatrics) 582 [facility] Ani Fleeting [phone]   [facility] [phone] [address] [place] [id number] [place] [address] [phone] [address] [place] [facility] 134 815 [facility] [phone]

## 2022-12-18 NOTE — ASSESSMENT & PLAN NOTE
Lab Results   Component Value Date    EGFR 82 12/18/2022    EGFR 75 12/17/2022    EGFR 74 12/17/2022    CREATININE 0 91 12/18/2022    CREATININE 0 98 12/17/2022    CREATININE 0 99 12/17/2022   · GERMAN due to sepsis- resolved with IVF/abx/surgical intervention   · Baseline Cr 0 8-1 0 mg/dL   · Currently at baseline  · Monitor renal function

## 2022-12-18 NOTE — PROGRESS NOTES
Carolann 47  Progress Note - Iván Call 1945, 78 y o  male MRN: [id number]  Unit/Bed#: ICU 10-01 Encounter: [id number]  Primary Care Provider: Pam Rodriguez DO   Date and time admitted to hospital: 12/11/2022  5:19 AM    * Peritonitis [facility])  Assessment & Plan  • Presented to ED on 12/11 w/acute onset abdomina pain concerning for acute abdomen   • W/hx of sigmoid diverticulitis s/p Polo's procedure and colostomy with planned reversal scheduled for 01/2023  • CT A/P on admission revealed extensive inflammatory changes involving jejunum and proximal-mid ileum, compatible with severe enteritis; there is focal free air surrounding lower midline ileum just above urinary bladder with extensive inflammatory changes; few mildly dilated loops of jejunum within left upper abdomen, likely 2/2 extensive inflammatory changes with possible superimposed low-grade partial SBO; small amount of free air and free fluid in remainder of abdomen and pelvis; stomach and proximal esophagus are distended and fluid-filled, presumably secondary to delayed transit  • OR on 12/11 for EX LAP,  EXTENSIVE MELANI, APPENDECTOMY, DRAINAGE OF ABDOMINAL ABSCESS - today is POD#6  • Peritoneal fluid culture w/gamma hemolytic Strep and anaerobic culture w/Bacteroides fragilis   • Received Ciprofloxacin, Flagyl through 12/17  • NGT removed on 12/13 - tolerating diet   • Pain management   • Treatment per surgical team        New onset atrial fibrillation [facility])  Assessment & Plan  • New onset AF w/RVR as high as 225  • No previous history of AF  • Suspect this is 2/2 septic shock/peritonitis   • TTE (12/14) revealed EF 35% w/mild concentric hypertrophy, moderately reduced systolic function in global manner; mildly dilated RV w/moderately reduced systolic function; mild MR/TR, RVSP 48  • Initially refractory to IV Lopressor 5 mg x2, IV Cardizem 15 mg x1, IV Amiodarone bolus + gtt initiation  • Cardizem gtt weaned off on 12/15  • PO Lopressor  • Started on digoxin load on 12/16  • Cardiology input appreciated   • Continue with Toprol 50 mg twice daily, digoxin 125 mcg daily  • Heparin gtt transition to eliquis 5 mg BID (12/18)  Eliquis will cost $500  Discussed this with the patient and his wife   Will ask CM to assist tomorrow if there is any option to help lower cost      Acute respiratory failure with hypoxia [facility])  Assessment & Plan  • Suspect this is is multifactorial in setting of atelectasis vs mild pulmonary vascular congestion vs post-operative pain  • CXR on 12/13 w/mildly increased interstitial markings bilaterally - may indicate mild pulmonary edema  • Respiratory status improved w/IV diuresis - continue w/PRN diuresis  • Now weaned to RA - continue to maintain SpO2 > 90%  • Aggressive pulmonary hygiene  • Encourage cough, deep breathing, IS, ambulation    Septic shock (HCC)  Assessment & Plan  • POA: AEB tachycardia, tachypnea, leukopenia, lactic acidosis > 4, GERMAN  • In setting of intraabdominal infection/peritonitis  • COVID/FLU/RSV negative  • Blood cultures w/NGTD  • Peritoneal fluid culture w/gamma hemolytic Strep  • Anaerobic culture w/Bacteroides fragilis   • Procalcitonin 8 29 - 6 02 - 3 36 - 2 05  • Is s/p aggressive IVF resuscitation  • Received IV Ciprofloxacin, Flagyl   • Monitor fever and WBC curve  • Sepsis parameters much improved   • Continue to monitor off antibiotics        Chronic kidney disease, stage 2 (mild)  Assessment & Plan  Lab Results   Component Value Date    EGFR 82 12/18/2022    EGFR 75 12/17/2022    EGFR 74 12/17/2022    CREATININE 0 91 12/18/2022    CREATININE 0 98 12/17/2022    CREATININE 0 99 12/17/2022   · GERMAN due to sepsis- resolved with IVF/abx/surgical intervention   · Baseline Cr 0 8-1 0 mg/dL   · Currently at baseline  · Monitor renal function     Benign essential hypertension  Assessment & Plan  · Resume losartan at a decrease dose 25 mg daily (instead of 50 mg daily at home)   · May titrate up to home dose if BP tolerates     Continuous opioid dependence (HCC)  Assessment & Plan  · Setting of chronic pain due to RA/OA  · [place] PDMP reviewed  · Continue with home Soma 350 mg 3 times daily, Neurontin 600 mg 3 times daily, oxycodone 10 mg QID      VTE Prophylaxis:  Apixaban (Eliquis)    Patient Centered Rounds: I have performed bedside rounds with nursing staff today  Discussions with Specialists or Other Care Team Provider: surgery, cardiology   Education and Discussions with Family / Patient: patient and wife at bedside     Current Length of Stay: 7 day(s)    Current Patient Status: Inpatient   Certification Statement: The patient will continue to require additional inpatient hospital stay due to peritonitis    Discharge Plan: per primary team     Code Status: Level 1 - Full Code    Subjective:   Feeling okay  Denies any pain currently  Objective:     Vitals:   Temp (24hrs), Av 4 °F (36 9 °C), Min:97 4 °F (36 3 °C), Max:99 4 °F (37 4 °C)    Temp:  [97 4 °F (36 3 °C)-99 4 °F (37 4 °C)] 97 4 °F (36 3 °C)  HR:  [] 89  Resp:  [18-23] 19  BP: (129-173)/(62-93) 158/81  SpO2:  [93 %-97 %] 97 %  Body mass index is 31 35 kg/m²  Input and Output Summary (last 24 hours):        Intake/Output Summary (Last 24 hours) at 2022 1418  Last data filed at 2022 1338  Gross per 24 hour   Intake 726 76 ml   Output 3850 ml   Net -3123 24 ml       Physical Exam:   Physical Exam    Additional Data:     Labs:    Results from last 7 days   Lab Units 22  0547   WBC Thousand/uL 10 20*   HEMOGLOBIN g/dL 11 7*   HEMATOCRIT % 36 2*   PLATELETS Thousands/uL 367   NEUTROS PCT % 70   LYMPHS PCT % 16   MONOS PCT % 11   EOS PCT % 2     Results from last 7 days   Lab Units 22  0547   SODIUM mmol/L 136   POTASSIUM mmol/L 3 9   CHLORIDE mmol/L 101   CO2 mmol/L 28   BUN mg/dL 13   CREATININE mg/dL 0 91   CALCIUM mg/dL 7 8*   ALK PHOS U/L 53   ALT U/L 15   AST U/L 18     Results from last 7 days   Lab Units 12/13/22  1950   INR  1 32*     Results from last 7 days   Lab Units 12/17/22  0114 12/16/22  1147 12/16/22  0113 12/15/22  2345 12/15/22  1945 12/15/22  1714 12/15/22  1155 12/14/22  2319 12/14/22  1756 12/14/22  1201 12/14/22  0535 12/13/22  2355   POC GLUCOSE mg/dl 122 91 122 115 110 122 145* 120 124 105 97 124           * I Have Reviewed All Lab Data Listed Above  * Additional Pertinent Lab Tests Reviewed: Julio César 68 Admission  Reviewed    Imaging:  Imaging Reports Reviewed Today Include: n/a     Recent Cultures (last 7 days):           Last 24 Hours Medication List:   Current Facility-Administered Medications   Medication Dose Route Frequency Provider Last Rate   • apixaban  5 mg Oral BID Ant DimitrVINI jiménez     • atorvastatin  20 mg Oral Daily Arvis SnowPEDRO tony     • carisoprodol  350 mg Oral 4x Daily Arvis SnowPEDRO tony     • cholecalciferol  1,000 Units Oral Daily Arvis SnowPEDRO tony     • [START ON 12/19/2022] digoxin  125 mcg Oral Daily Ant Dimitriaclare, VINI     • gabapentin  600 mg Oral TID Arvis SnowPEDRO tony     • losartan  25 mg Oral Daily PEDRO Parra     • magnesium oxide  400 mg Oral BID Ant DimitriadisVINI     • metoprolol succinate  50 mg Oral BID Ant DimitriaVINI sparks     • ondansetron  4 mg Intravenous Q6H PRN Arvis PEDRO Duran     • ondansetron  4 mg Intravenous Once Jimmy Reza PA-C     • oxyCODONE  10 mg Oral Q6H PEDRO Perry     • pantoprazole  40 mg Intravenous Q12H [facility] PEDRO Perry     • perflutren lipid microsphere  1 mL/min Intravenous Once in imaging Arvis SnowPEDRO tony     • traZODone  100 mg Oral HS Arvis PEDRO Duran          Today, Patient Was Seen By: PEDRO Parra    ** Please Note: Dictation voice to text software may have been used in the creation of this document   **

## 2022-12-18 NOTE — PROGRESS NOTES
Monty U  68   Progress Note - Jean Peralta 1945, 78 y o  male MRN: [id number]  Unit/Bed#: ICU 10-01 Encounter: [id number]  Primary Care Provider: Yogi Michelle DO   Date and time admitted to hospital: 12/11/2022  5:19 AM    * Peritonitis [facility])  Assessment & Plan  77 yo M hx aortic valve replacement, chronic pain, and recent dolan's procedure for diverticulitis now POD#7 s/p ex lap, MELANI, appendectomy and RLQ abscess drainage  Feeling well, hoping for d/c home soon  Abdomen soft, mild right flank tenderness, midline dry, stoma with liquid stool  T 98 2, HR , /77, RR 22, SpO2 95%  WBC 10 (9), Hgb 11, BUN 13, Cr 0 91  Assessment:  RLQ abscess with peritonitis    Plan:  Surgically stable, abx d/c'd, tolerating regular diet with colostomy output, pain controlled on home regimen  SLIM/Cardiology following for his A  Fib, at this time can transition to [address]  Plan for home with [facility] 80 when medically cleared  Addendum:  Discussed with cardiologist  Appreciate recommendations  Metoprolol tartrate transitioned to succinate  Digoxin IV transitioned to PO for tomorrow  Heparin gtt d/c'd will start Eliquis tonight  Subjective/Objective   Chief Complaint: none    Subjective: feeling well enough to go home, frustrated with length of stay, pain controlled, tolerating diet, ongoing colostomy output, slowly mobilizing in and out to chair, voiding urine, no chest pain or dyspnea    Objective: no overnight events    Blood pressure 140/77, pulse (!) 110, temperature 98 2 °F (36 8 °C), temperature source Tympanic, resp  rate 22, height 5' 7" (1 702 m), weight 90 8 kg (200 lb 2 8 oz), SpO2 95 %  ,Body mass index is 31 35 kg/m²        Intake/Output Summary (Last 24 hours) at 12/18/2022 1013  Last data filed at 12/18/2022 0801  Gross per 24 hour   Intake 926 76 ml   Output 3875 ml   Net -2948 24 ml       Invasive Devices     Peripheral Intravenous Line  Duration           Long-Dwell Peripheral IV (Midline) 30/41/41 Left Basilic 5 days    Peripheral IV 12/13/22 Right;Ventral (anterior) Forearm 5 days          Drain  Duration           Colostomy Descending/sigmoid -- days    Colostomy Descending/sigmoid  days                Physical Exam  Vitals and nursing note reviewed  Constitutional:       General: He is not in acute distress  Appearance: He is well-developed  He is not diaphoretic  HENT:      Head: Normocephalic and atraumatic  Eyes:      Conjunctiva/sclera: Conjunctivae normal       Pupils: Pupils are equal, round, and reactive to light  Cardiovascular:      Rate and Rhythm: Rhythm irregular  Pulmonary:      Effort: Pulmonary effort is normal  No respiratory distress  Breath sounds: Normal breath sounds  Abdominal:      Comments: Soft, round, compressible, midline dressing clean and dry, mild-moderate TTP in right abdomen, stoma with liquid brown stool in LLQ  Musculoskeletal:         General: Normal range of motion  Cervical back: Normal range of motion  Skin:     General: Skin is warm and dry  Capillary Refill: Capillary refill takes less than 2 seconds  Neurological:      Mental Status: He is alert and oriented to person, place, and time  Psychiatric:         Behavior: Behavior normal            Lab, Imaging and other studies:  I have personally reviewed pertinent lab results    , CBC:   Lab Results   Component Value Date    WBC 10 20 (H) 12/18/2022    HGB 11 7 (L) 12/18/2022    HCT 36 2 (L) 12/18/2022    MCV 89 12/18/2022     12/18/2022    MCH 28 9 12/18/2022    MCHC 32 3 12/18/2022    RDW 13 5 12/18/2022    MPV 9 3 12/18/2022    NRBC 0 12/18/2022   , CMP:   Lab Results   Component Value Date    SODIUM 136 12/18/2022    K 3 9 12/18/2022     12/18/2022    CO2 28 12/18/2022    BUN 13 12/18/2022    CREATININE 0 91 12/18/2022    CALCIUM 7 8 (L) 12/18/2022    AST 18 12/18/2022    ALT 15 12/18/2022    ALKPHOS 53 12/18/2022    EGFR 82 12/18/2022 VTE Pharmacologic Prophylaxis: Heparin  VTE Mechanical Prophylaxis: sequential compression device

## 2022-12-18 NOTE — ASSESSMENT & PLAN NOTE
• POA: AEB tachycardia, tachypnea, leukopenia, lactic acidosis > 4, GERMAN  • In setting of intraabdominal infection/peritonitis  • COVID/FLU/RSV negative  • Blood cultures w/NGTD  • Peritoneal fluid culture w/gamma hemolytic Strep  • Anaerobic culture w/Bacteroides fragilis   • Procalcitonin 8 29 - 6 02 - 3 36 - 2 05  • Is s/p aggressive IVF resuscitation  • Received IV Ciprofloxacin, Flagyl   • Monitor fever and WBC curve  • Sepsis parameters much improved   • Continue to monitor off antibiotics

## 2022-12-19 ENCOUNTER — APPOINTMENT (INPATIENT)
Dept: NON INVASIVE DIAGNOSTICS | Facility: HOSPITAL | Age: 77
End: 2022-12-19

## 2022-12-19 LAB
ANION GAP SERPL CALCULATED.3IONS-SCNC: 7 MMOL/L (ref 4–13)
AORTIC VALVE MEAN VELOCITY: 13 M/S
APICAL FOUR CHAMBER EJECTION FRACTION: 33 %
AV LVOT MEAN GRADIENT: 3 MMHG
AV LVOT PEAK GRADIENT: 5 MMHG
AV MEAN GRADIENT: 8 MMHG
AV PEAK GRADIENT: 15 MMHG
AV VELOCITY RATIO: 0.6
BUN SERPL-MCNC: 11 MG/DL (ref 5–25)
CALCIUM SERPL-MCNC: 8.4 MG/DL (ref 8.4–10.2)
CHLORIDE SERPL-SCNC: 100 MMOL/L (ref 96–108)
CO2 SERPL-SCNC: 27 MMOL/L (ref 21–32)
CREAT SERPL-MCNC: 0.89 MG/DL (ref 0.6–1.3)
DOP CALC AO PEAK VEL: 1.89 M/S
DOP CALC AO VTI: 29.03 CM
DOP CALC LVOT PEAK VEL VTI: 16.82 CM
DOP CALC LVOT PEAK VEL: 1.13 M/S
ERYTHROCYTE [DISTWIDTH] IN BLOOD BY AUTOMATED COUNT: 13.5 % (ref 11.6–15.1)
GFR SERPL CREATININE-BSD FRML MDRD: 83 ML/MIN/1.73SQ M
GLUCOSE SERPL-MCNC: 123 MG/DL (ref 65–140)
HCT VFR BLD AUTO: 37.2 % (ref 36.5–49.3)
HGB BLD-MCNC: 11.8 G/DL (ref 12–17)
MAGNESIUM SERPL-MCNC: 1.8 MG/DL (ref 1.9–2.7)
MCH RBC QN AUTO: 28.4 PG (ref 26.8–34.3)
MCHC RBC AUTO-ENTMCNC: 31.7 G/DL (ref 31.4–37.4)
MCV RBC AUTO: 90 FL (ref 82–98)
PLATELET # BLD AUTO: 424 THOUSANDS/UL (ref 149–390)
PMV BLD AUTO: 9.1 FL (ref 8.9–12.7)
POTASSIUM SERPL-SCNC: 3.9 MMOL/L (ref 3.5–5.3)
RA PRESSURE ESTIMATED: 5 MMHG
RBC # BLD AUTO: 4.15 MILLION/UL (ref 3.88–5.62)
RV PSP: 41 MMHG
SL CV LV EF: 35
SODIUM SERPL-SCNC: 134 MMOL/L (ref 135–147)
TR MAX PG: 36 MMHG
TR PEAK VELOCITY: 3 M/S
TRICUSPID VALVE PEAK REGURGITATION VELOCITY: 3.01 M/S
WBC # BLD AUTO: 12.75 THOUSAND/UL (ref 4.31–10.16)

## 2022-12-19 RX ORDER — METOPROLOL SUCCINATE 50 MG/1
100 TABLET, EXTENDED RELEASE ORAL 2 TIMES DAILY
Status: DISCONTINUED | OUTPATIENT
Start: 2022-12-19 | End: 2022-12-20

## 2022-12-19 RX ORDER — DILTIAZEM HYDROCHLORIDE 5 MG/ML
20 INJECTION INTRAVENOUS ONCE
Status: COMPLETED | OUTPATIENT
Start: 2022-12-19 | End: 2022-12-19

## 2022-12-19 RX ORDER — HYDROXYZINE HYDROCHLORIDE 25 MG/1
25 TABLET, FILM COATED ORAL EVERY 6 HOURS PRN
Status: DISCONTINUED | OUTPATIENT
Start: 2022-12-19 | End: 2022-12-23 | Stop reason: HOSPADM

## 2022-12-19 RX ORDER — MAGNESIUM SULFATE 1 G/100ML
1 INJECTION INTRAVENOUS ONCE
Status: COMPLETED | OUTPATIENT
Start: 2022-12-19 | End: 2022-12-19

## 2022-12-19 RX ORDER — DILTIAZEM HYDROCHLORIDE 5 MG/ML
INJECTION INTRAVENOUS
Status: DISPENSED
Start: 2022-12-19 | End: 2022-12-20

## 2022-12-19 RX ORDER — METOPROLOL TARTRATE 50 MG/1
50 TABLET, FILM COATED ORAL ONCE
Status: COMPLETED | OUTPATIENT
Start: 2022-12-19 | End: 2022-12-19

## 2022-12-19 RX ADMIN — ATORVASTATIN CALCIUM 20 MG: 10 TABLET, FILM COATED ORAL at 08:16

## 2022-12-19 RX ADMIN — ONDANSETRON 4 MG: 2 INJECTION INTRAMUSCULAR; INTRAVENOUS at 17:57

## 2022-12-19 RX ADMIN — GABAPENTIN 600 MG: 600 TABLET, FILM COATED ORAL at 20:30

## 2022-12-19 RX ADMIN — ONDANSETRON 4 MG: 2 INJECTION INTRAMUSCULAR; INTRAVENOUS at 11:42

## 2022-12-19 RX ADMIN — APIXABAN 5 MG: 5 TABLET, FILM COATED ORAL at 17:57

## 2022-12-19 RX ADMIN — DILTIAZEM HYDROCHLORIDE 20 MG: 5 INJECTION INTRAVENOUS at 22:14

## 2022-12-19 RX ADMIN — METOPROLOL SUCCINATE 100 MG: 50 TABLET, EXTENDED RELEASE ORAL at 20:30

## 2022-12-19 RX ADMIN — OXYCODONE HYDROCHLORIDE 10 MG: 10 TABLET ORAL at 05:21

## 2022-12-19 RX ADMIN — HYDROXYZINE HYDROCHLORIDE 25 MG: 25 TABLET, FILM COATED ORAL at 03:44

## 2022-12-19 RX ADMIN — OXYCODONE HYDROCHLORIDE 10 MG: 10 TABLET ORAL at 11:27

## 2022-12-19 RX ADMIN — CARISOPRODOL 350 MG: 350 TABLET ORAL at 17:57

## 2022-12-19 RX ADMIN — TRAZODONE HYDROCHLORIDE 100 MG: 100 TABLET ORAL at 21:03

## 2022-12-19 RX ADMIN — CARISOPRODOL 350 MG: 350 TABLET ORAL at 21:03

## 2022-12-19 RX ADMIN — Medication 1000 UNITS: at 08:16

## 2022-12-19 RX ADMIN — APIXABAN 5 MG: 5 TABLET, FILM COATED ORAL at 08:16

## 2022-12-19 RX ADMIN — MAGNESIUM SULFATE HEPTAHYDRATE 1 G: 1 INJECTION, SOLUTION INTRAVENOUS at 11:27

## 2022-12-19 RX ADMIN — PANTOPRAZOLE SODIUM 40 MG: 40 INJECTION, POWDER, LYOPHILIZED, FOR SOLUTION INTRAVENOUS at 08:16

## 2022-12-19 RX ADMIN — PANTOPRAZOLE SODIUM 40 MG: 40 INJECTION, POWDER, LYOPHILIZED, FOR SOLUTION INTRAVENOUS at 20:29

## 2022-12-19 RX ADMIN — GABAPENTIN 600 MG: 600 TABLET, FILM COATED ORAL at 17:57

## 2022-12-19 RX ADMIN — OXYCODONE HYDROCHLORIDE 10 MG: 10 TABLET ORAL at 23:05

## 2022-12-19 RX ADMIN — METOPROLOL TARTRATE 50 MG: 50 TABLET ORAL at 11:27

## 2022-12-19 RX ADMIN — DIGOXIN 125 MCG: 125 TABLET ORAL at 08:16

## 2022-12-19 RX ADMIN — CARISOPRODOL 350 MG: 350 TABLET ORAL at 08:16

## 2022-12-19 RX ADMIN — METOPROLOL SUCCINATE 50 MG: 50 TABLET, EXTENDED RELEASE ORAL at 08:16

## 2022-12-19 RX ADMIN — GABAPENTIN 600 MG: 600 TABLET, FILM COATED ORAL at 08:16

## 2022-12-19 RX ADMIN — OXYCODONE HYDROCHLORIDE 10 MG: 10 TABLET ORAL at 17:57

## 2022-12-19 RX ADMIN — CARISOPRODOL 350 MG: 350 TABLET ORAL at 11:27

## 2022-12-19 NOTE — ASSESSMENT & PLAN NOTE
· Setting of chronic pain due to RA/OA  · [place]  · Continue with home Soma 350 mg 3 times daily, Neurontin 600 mg 3 times daily, oxycodone 10 mg 4 times daily

## 2022-12-19 NOTE — PROGRESS NOTES
[facility]    Cardiology Progress Note  Nicholas Arroyo 78 y o  male   YOB: 1945 MRN: [id number]  Unit/Bed#: ICU 10-01 Encounter: [id number]      Subjective:   No significant events overnight  No current complaints of chest pain, shortness of breath  He reports mild abdominal discomfort at the surgical site, but overall is doing better  Assessments  77-year-old gentleman originally had perforated diverticulitis in August 2022, for which he had surgical resection and colostomy placement, came back into the hospital with complaints of severe right lower quadrant abdominal pain and was found to have appendicitis  He underwent surgical resection for the same and was being monitored postoperatively  He was noted to have atrial fibrillation, and cardiology was consulted for assistance with management of the same  He was additionally found to have new onset cardiomyopathy with an EF of 35%      Principal Problem:    Peritonitis ([address] )  Active Problems:    Continuous opioid dependence ([facility] [address] )    Hyperlipidemia    Benign essential hypertension    Insomnia    Status post aortic valve replacement with bioprosthetic valve    Rheumatoid arthritis with rheumatoid factor, unspecified (HCC)    Chronic kidney disease, stage 2 (mild)    Septic shock (HCC)    Acute respiratory failure with hypoxia ([facility] [address] )    New onset atrial fibrillation ([facility] [address] )    Cardiomyopathy [facility])    Outpatient cardiologist is Dr Kim Means  New-onset atrial fibrillation with RVR  · Unknown duration, but has been persistent for the last 48+ hours  · Rate v rhythm control  · Rates were relatively better controlled yesterday, but now again elevated in the 140s to 150s today  · Currently on metoprolol succinate 50 mg twice daily + Digoxin 0 12 5 mg daily  · Increase metoprolol succinate to 100 mg twice daily  · With his reduced EF and new-onset Afib in setting of infection/stress --> He will benefit from eventual rhythm control / cardioversion  · He wishes to be discharged at the earliest, hopefully tomorrow --> can follow up outpatient with Dr Rachel to plan on cardioversion, post-resolution of acute infectious/surgical issues  · Stroke prophylaxis  · Currently on IV heparin drip  · he is now cleared for oral anticoagulation with DOAC's from the surgical standpoint and is being transitioned to Eliquis    New-onset cardiomyopathy - LVEF 35%  · Etiology  · No ischemic symptoms  · Possibly related to rapid atrial fibrillation/stress  · Repeat TTE done today, is limited due to tachycardia, but overall appears unchanged  · Continue to optimize rate control for A  Fib  · Increase metoprolol to 100 mg twice daily  · Will benefit from outpatient nuclear stress testing to rule out ischemia    S/p bio-AVR  · Normally functioning bio AVR on TTE  · Monitor clinically    Review of Systems   All other systems reviewed and are negative  Telemetry Review: Afib, HR in 100-150    Objective:   Vitals: Blood pressure 130/74, pulse 94, temperature 97 6 °F (36 4 °C), temperature source Temporal, resp  rate (!) 23, height 5' 7" (1 702 m), weight 90 7 kg (200 lb), SpO2 95 %  , Body mass index is 31 32 kg/m² ,   Orthostatic Blood Pressures    Flowsheet Row Most Recent Value   Blood Pressure 130/74 filed at 2022 0816   Patient Position - Orthostatic VS Lying filed at 2022 5384         Systolic (61OYG), YHE:763 , Min:130 , AAK:518     Diastolic (50JPL), ZV, Min:65, Max:81    Wt Readings from Last 5 Encounters:   22 90 7 kg (200 lb)   10/31/22 88 5 kg (195 lb)   10/18/22 89 4 kg (197 lb)   22 84 8 kg (187 lb)   22 85 3 kg (188 lb)     I/O        07 07 07 07 07 07    P  O  786 440     I V  (mL/kg) 333 5 (3 7) 155 9 (1 7)     Total Intake(mL/kg) 1119 5 (12 3) 595 9 (6 6)     Urine (mL/kg/hr) 4300 (2) 2560 (1 2)     Stool 1700 675     Total Output 6000 3235     Net -4254 4 -4942 2 Physical Exam  Vitals and nursing note reviewed  Constitutional:       General: He is not in acute distress  Appearance: He is well-developed  He is ill-appearing  HENT:      Head: Normocephalic and atraumatic  Nose: No congestion  Eyes:      General: No scleral icterus  Conjunctiva/sclera: Conjunctivae normal    Neck:      Vascular: No carotid bruit or JVD  Cardiovascular:      Rate and Rhythm: Tachycardia present  Rhythm irregular  Heart sounds: Normal heart sounds  No murmur heard  No friction rub  No gallop  Pulmonary:      Effort: Pulmonary effort is normal  No respiratory distress  Breath sounds: Normal breath sounds  No wheezing or rales  Chest:      Chest wall: No tenderness  Abdominal:      General: There is no distension  Palpations: Abdomen is soft  Tenderness: There is no abdominal tenderness  Comments: Left lower side colostomy bag noted in place  Central abdominal wound dressing noted in place  Musculoskeletal:         General: No swelling, tenderness or deformity  Cervical back: Neck supple  No muscular tenderness  Right lower leg: No edema  Left lower leg: No edema  Skin:     General: Skin is warm  Neurological:      General: No focal deficit present  Mental Status: He is alert and oriented to person, place, and time  Mental status is at baseline  Psychiatric:         Mood and Affect: Mood normal          Behavior: Behavior normal          Thought Content:  Thought content normal          Laboratory Results: personally reviewed        CBC with diff:   Results from last 7 days   Lab Units 12/19/22  0521 12/18/22  0547 12/17/22  0502 12/16/22  0450 12/15/22  0429 12/14/22  0528 12/13/22  1950   WBC Thousand/uL 12 75* 10 20* 9 30 9 47 13 88* 15 40* 15 33*   HEMOGLOBIN g/dL 11 8* 11 7* 10 8* 10 9* 11 7* 12 1 11 7*   HEMATOCRIT % 37 2 36 2* 34 3* 35 4* 37 6 37 4 36 6   MCV fL 90 89 91 93 92 90 91 PLATELETS Thousands/uL 424* 367 255 224 223 201 206   MCH pg 28 4 28 9 28 5 28 6 28 5 29 2 29 0   MCHC g/dL 31 7 32 3 31 5 30 8* 31 1* 32 4 32 0   RDW % 13 5 13 5 13 8 13 9 13 7 13 6 13 4   MPV fL 9 1 9 3 9 8 9 8 9 3 9 5 9 3   NRBC AUTO /100 WBCs  --  0  --   --   --   --   --          CMP:  Results from last 7 days   Lab Units 12/19/22  0521 12/18/22  0547 12/17/22  1714 12/17/22  0502 12/16/22  0450 12/15/22  1941 12/15/22  0429 12/14/22  0528 12/13/22  1758 12/13/22  0507   POTASSIUM mmol/L 3 9 3 9 3 7 3 9 4 3 4 0 3 9 3 8   < > 4 1   CHLORIDE mmol/L 100 101 98 99 99 101 102 103   < > 104   CO2 mmol/L 27 28 29 26 25 26 25 25   < > 26   BUN mg/dL 11 13 15 16 21 22 17 16   < > 16   CREATININE mg/dL 0 89 0 91 0 98 0 99 1 21 1 27 1 09 1 06   < > 1 05   CALCIUM mg/dL 8 4 7 8* 8 4 8 2* 8 3* 8 1* 8 2* 8 5   < > 8 5   AST U/L  --  18  --   --   --   --   --  14  --  14   ALT U/L  --  15  --   --   --   --   --  12  --  10   ALK PHOS U/L  --  53  --   --   --   --   --  44  --  35   EGFR ml/min/1 73sq m 83 82 75 74 58 54 66 68   < > 69    < > = values in this interval not displayed  BMP:  Results from last 7 days   Lab Units 12/19/22  0521 12/18/22  0547 12/17/22 1714 12/17/22  0502 12/16/22  0450 12/15/22  1941 12/15/22  0429   POTASSIUM mmol/L 3 9 3 9 3 7 3 9 4 3 4 0 3 9   CHLORIDE mmol/L 100 101 98 99 99 101 102   CO2 mmol/L 27 28 29 26 25 26 25   BUN mg/dL 11 13 15 16 21 22 17   CREATININE mg/dL 0 89 0 91 0 98 0 99 1 21 1 27 1 09   CALCIUM mg/dL 8 4 7 8* 8 4 8 2* 8 3* 8 1* 8 2*       BNP: No results for input(s): BNP in the last 72 hours      Magnesium:   Results from last 7 days   Lab Units 12/19/22  0521 12/18/22  0547 12/17/22  1714 12/17/22  0502 12/16/22  0450 12/15/22  1941 12/15/22  0429   MAGNESIUM mg/dL 1 8* 1 8* 2 1 1 8* 2 1 2 1 2 1       Coags:   Results from last 7 days   Lab Units 12/18/22  0547 12/17/22  0502 12/17/22  0112 12/16/22  1824 12/16/22  1148 12/16/22  0450 12/16/22  0108 12/14/22  0159 12/13/22  1950   PTT seconds 80* 73* 66* 64* 33 48* 97*   < > 42*   INR   --   --   --   --   --   --   --   --  1 32*    < > = values in this interval not displayed         TSH:        Hemoglobin A1C       Lipid Profile:       Meds/Allergies   all current active meds have been reviewed and current meds:   Current Facility-Administered Medications   Medication Dose Route Frequency   • apixaban (ELIQUIS) tablet 5 mg  5 mg Oral BID   • atorvastatin (LIPITOR) tablet 20 mg  20 mg Oral Daily   • carisoprodol (SOMA) tablet 350 mg  350 mg Oral 4x Daily   • cholecalciferol (VITAMIN D3) tablet 1,000 Units  1,000 Units Oral Daily   • digoxin (LANOXIN) tablet 125 mcg  125 mcg Oral Daily   • gabapentin (NEURONTIN) tablet 600 mg  600 mg Oral TID   • hydrOXYzine HCL (ATARAX) tablet 25 mg  25 mg Oral Q6H PRN   • metoprolol succinate (TOPROL-XL) 24 hr tablet 50 mg  50 mg Oral BID   • ondansetron (ZOFRAN) injection 4 mg  4 mg Intravenous Q6H PRN   • ondansetron (ZOFRAN) injection 4 mg  4 mg Intravenous Once   • oxyCODONE (ROXICODONE) immediate release tablet 10 mg  10 mg Oral Q6H   • pantoprazole (PROTONIX) injection 40 mg  40 mg Intravenous Q12H ANDREA   • perflutren lipid microsphere (DEFINITY) injection  1 mL/min Intravenous Once in imaging   • traZODone (DESYREL) tablet 100 mg  100 mg Oral HS     Medications Prior to Admission   Medication   • aspirin 81 MG tablet   • atorvastatin (LIPITOR) 20 mg tablet   • betamethasone dipropionate (DIPROSONE) 0 05 % cream   • carisoprodol (SOMA) 350 mg tablet   • Cholecalciferol (VITAMIN D3) 1000 units CAPS   • diclofenac sodium (VOLTAREN) 1 %   • fluocinonide (LIDEX) 0 05 % external solution   • gabapentin (NEURONTIN) 600 MG tablet   • ketoconazole (NIZORAL) 2 % cream   • losartan (COZAAR) 50 mg tablet   • naloxone (NARCAN) 4 mg/0 1 mL nasal spray   • oxyCODONE (ROXICODONE) 10 MG TABS   • oxyCODONE-acetaminophen (PERCOCET)  mg per tablet   • Secukinumab 150 MG/ML SOAJ   • traZODone (DESYREL) 300 MG tablet            Cardiac testing: reviewed  Results for orders placed during the hospital encounter of 17    Echo complete with contrast if indicated    Narrative  [address]  [place] [address], [place]  [phone]    Transthoracic Echocardiogram  2D, M-mode, Doppler, and Color Doppler    Study date:  08-Mar-2017    Patient: Pamella Abdi  MR number: [id number]  Account number: [de-identified]  : [id number]  Age: 73 years  Gender: Male  Status: Outpatient  Location: Echo lab  Height: 67 in  Weight: 214 5 lb  BP: 146/ 80 mmHg    Diagnoses: 785 2 - CARDIAC MURMURS NEC    Sonographer:  Mica Haney RDCS  Primary Physician:  Raiza Pratt DO  Referring Physician:  Raiza Pratt DO  Group:  [facility]  Interpreting Physician:  Kenia Vigil MD    SUMMARY    LEFT VENTRICLE:  Size was normal   Systolic function was normal  Ejection fraction was estimated to be 60 %  There were no regional wall motion abnormalities  Wall thickness was increased  There was mild concentric hypertrophy  LEFT ATRIUM:  The atrium was mildly dilated  MITRAL VALVE:  There was mild annular calcification  There was mild regurgitation  AORTIC VALVE:  The valve was probably trileaflet  Leaflets exhibited moderate calcification and markedly reduced cuspal separation  There was severe stenosis  There was mild regurgitation  Valve peak gradient was 62 mmHg  Valve mean gradient was 35 mmHg  Aortic valve area was 0 6 cm squared by the continuity equation  TRICUSPID VALVE:  There was mild regurgitation  Estimated peak PA pressure was 40 mmHg  HISTORY: PRIOR HISTORY: HYPERLIPIDEMIA, CHRONIC BACK PAIN, MURMUR, AORTIC STENOSIS    PROCEDURE: The procedure was performed in the echo lab  This was a routine study  The transthoracic approach was used  The study included complete 2D imaging, M-mode, complete spectral Doppler, and color Doppler  Images were obtained from  the parasternal, apical, subcostal, and suprasternal notch acoustic windows  Image quality was adequate  LEFT VENTRICLE: Size was normal  Systolic function was normal  Ejection fraction was estimated to be 60 %  There were no regional wall motion abnormalities  Wall thickness was increased  There was mild concentric hypertrophy  RIGHT VENTRICLE: The size was normal  Systolic function was normal  Wall thickness was normal     LEFT ATRIUM: The atrium was mildly dilated  RIGHT ATRIUM: Size was normal     MITRAL VALVE: There was mild annular calcification  DOPPLER: There was mild regurgitation  AORTIC VALVE: The valve was probably trileaflet  Leaflets exhibited moderate calcification and markedly reduced cuspal separation  DOPPLER: There was severe stenosis  There was mild regurgitation  TRICUSPID VALVE: DOPPLER: There was mild regurgitation  Estimated peak PA pressure was 40 mmHg  PULMONIC VALVE: Not well visualized  PERICARDIUM: There was no pericardial effusion  The pericardium was normal in appearance  AORTA: The root exhibited normal size      MEASUREMENT TABLES    DOPPLER MEASUREMENTS  Aortic valve   (Reference normals)  Peak gradient   62 mmHg   (--)  Mean gradient   35 mmHg   (--)  Valve area, cont   0 6 cm squared   (--)    SYSTEM MEASUREMENT TABLES    2D  %FS: 41 41 %  AV Diam: 3 19 cm  EDV(Teich): 154 63 ml  EF(Teich): 71 67 %  ESV(Teich): 43 81 ml  IVSd: 0 99 cm  LA Area: 21 71 cm2  LA Diam: 4 42 cm  LVEDV MOD A4C: 133 34 ml  LVEF MOD A4C: 33 55 %  LVESV MOD A4C: 88 61 ml  LVIDd: 5 62 cm  LVIDs: 3 29 cm  LVLd A4C: 7 54 cm  LVLs A4C: 6 64 cm  LVOT Diam: 2 06 cm  LVPWd: 1 15 cm  RA Area: 11 96 cm2  RV DIAM: 3 35 cm  SV MOD A4C: 44 74 ml  SV(Teich): 110 82 ml    CW  AR Dec Bennett: 2 21 m/s2  AR Dec Time: 2293 88 ms  AR PHT: 665 23 ms  AR Vmax: 4 61 m/s  AR maxP 11 mmHg  AV VTI: 86 81 cm  AV Vmax: 3 81 m/s  AV Vmax: 3 94 m/s  AV Vmean: 2 8 m/s  AV maxPG: 57 99 mmHg  AV maxP 23 mmHg  AV meanP 69 mmHg  PV Vmax: 1 19 m/s  PV maxP 67 mmHg  TR Vmax: 3 07 m/s  TR maxP 58 mmHg    MM  TAPSE: 2 41 cm    PW  PHILLY (VTI): 0 61 cm2  PHILLY Vmax: 0 55 cm2  PHILLY Vmax: 0 57 cm2  PHILLY Vmax, Pt: 0 54 cm2  PHILLY Vmax, Pt: 0 56 cm2  E': 0 09 m/s  E/E': 9 8  LVOT VTI: 15 74 cm  LVOT Vmax: 0 64 m/s  LVOT Vmax: 0 65 m/s  LVOT Vmean: 0 48 m/s  LVOT maxP 65 mmHg  LVOT maxP 71 mmHg  LVOT meanP 02 mmHg  MV A Anirudh: 0 69 m/s  MV Dec Millard: 5 17 m/s2  MV DecT: 178 9 ms  MV E Anirudh: 0 93 m/s  MV E/A Ratio: 1 35  RVOT Vmax: 0 49 m/s  RVOT maxP 98 mmHg    [employer] Accredited Echocardiography Laboratory    Prepared and electronically signed by    Cornelia Olszewski, MD  Signed 08-Mar-2017 14:11:26    No results found for this or any previous visit  No results found for this or any previous visit  No results found for this or any previous visit

## 2022-12-19 NOTE — PROGRESS NOTES
[facility]  Progress Note - Bouchra Mantle 1945, 78 y o  male MRN: [id number]  Unit/Bed#: ICU 10-01 Encounter: [id number]  Primary Care Provider: Sobeida Carlson DO   Date and time admitted to hospital: 12/11/2022  5:19 AM    * Peritonitis [facility])  Assessment & Plan  • Presented to ED on 12/11 w/acute onset abdominal pain concerning for acute abdomen   • W/hx of sigmoid diverticulitis s/p Polo's procedure and colostomy with planned reversal scheduled for 01/2023  • CT A/P on admission revealed extensive inflammatory changes involving jejunum and proximal-mid ileum, compatible with severe enteritis; there is focal free air surrounding lower midline ileum just above urinary bladder with extensive inflammatory changes; few mildly dilated loops of jejunum within left upper abdomen, likely 2/2 extensive inflammatory changes with possible superimposed low-grade partial SBO; small amount of free air and free fluid in remainder of abdomen and pelvis; stomach and proximal esophagus are distended and fluid-filled, presumably secondary to delayed transit  • OR on 12/11 for EX LAP,  EXTENSIVE MELANI, APPENDECTOMY, DRAINAGE OF ABDOMINAL ABSCESS   • Peritoneal fluid culture w/gamma hemolytic Strep and anaerobic culture w/Bacteroides fragilis   • Received Ciprofloxacin, Flagyl through 12/17  • NGT removed on 12/13 - tolerating diet   • Pain management   • Treatment per surgical team        New onset atrial fibrillation [facility])  Assessment & Plan  • New onset AF w/RVR as high as 225  • No previous history of AF  • Suspect this is 2/2 septic shock/peritonitis   • TTE (12/14) revealed EF 35% w/mild concentric hypertrophy, moderately reduced systolic function in global manner; mildly dilated RV w/moderately reduced systolic function; mild MR/TR, RVSP 48  • Initially refractory to IV Lopressor 5 mg x2, IV Cardizem 15 mg x1, IV Amiodarone bolus + gtt initiation  • Cardizem gtt weaned off on 12/15  • PO Lopressor  • Started on digoxin load on 12/16  • Cardiology input appreciated   • Cardiology increased Toprol 50 mg twice daily to 100 mg twice daily today 12/19, continue digoxin 125 mcg daily  • Heparin gtt transition to eliquis 5 mg BID on 12/18  Eliquis will cost $500, Xarelto will cost $30  Will transition to Xarelto tomorrow  Acute respiratory failure with hypoxia [facility])  Assessment & Plan  • Suspect this is is multifactorial in setting of atelectasis vs mild pulmonary vascular congestion vs post-operative pain  • CXR on 12/13 w/mildly increased interstitial markings bilaterally - may indicate mild pulmonary edema  • Respiratory status improved w/IV diuresis - continue w/PRN diuresis  • Weaned to RA - continue to maintain SpO2 > 90%  • Aggressive pulmonary hygiene  • Encourage cough, deep breathing, IS, ambulation    Septic shock (HCC)  Assessment & Plan  • POA: AEB tachycardia, tachypnea, leukopenia, lactic acidosis > 4, GERMAN  • In setting of intraabdominal infection/peritonitis  • COVID/FLU/RSV negative  • Blood cultures w/NGTD  • Peritoneal fluid culture w/gamma hemolytic Strep  • Anaerobic culture w/Bacteroides fragilis   • Procalcitonin 8 29 - 6 02 - 3 36 - 2 05- 0 44  • Is s/p aggressive IVF resuscitation  • Received IV Ciprofloxacin, Flagyl   • Monitor fever and WBC curve  • Sepsis parameters much improved   • Continue to monitor off antibiotics        Chronic kidney disease, stage 2 (mild)  Assessment & Plan  Lab Results   Component Value Date    EGFR 83 12/19/2022    EGFR 82 12/18/2022    EGFR 75 12/17/2022    CREATININE 0 89 12/19/2022    CREATININE 0 91 12/18/2022    CREATININE 0 98 12/17/2022   · GERMAN due to sepsis- resolved with IVF/abx/surgical intervention   · Baseline Cr 0 8-1 0 mg/dL   · Currently at baseline  · Monitor renal function     Benign essential hypertension  Assessment & Plan  · Hold home losartan for now as metoprolol is being uptitrated       Continuous opioid dependence [facility])  Assessment & Plan  · Setting of chronic pain due to RA/OA  · [place] PDMP reviewed  · Continue with home Soma 350 mg 3 times daily, Neurontin 600 mg 3 times daily, oxycodone 10 mg 4 times daily        VTE Pharmacologic Prophylaxis: VTE Score: 8 High Risk (Score >/= 5) - Pharmacological DVT Prophylaxis Ordered: apixaban (Eliquis)  Sequential Compression Devices Ordered  Patient Centered Rounds: I performed bedside rounds with nursing staff today  Discussions with Specialists or Other Care Team Provider: nursing, CM, general surgery    Education and Discussions with Family / Patient: Updated  (wife) at bedside  Time Spent for Care: 20 minutes  More than 50% of total time spent on counseling and coordination of care as described above  Current Length of Stay: 8 day(s)  Current Patient Status: Inpatient   Certification Statement: The patient will continue to require additional inpatient hospital stay due to adjustment of medications and continued monitoring on telemetry  Discharge Plan: Anticipate discharge in 24-48 hrs to home  Code Status: Level 1 - Full Code    Subjective: The patient was seen and examined  The patient states he's ready to go home  He was noted to have HR in the 120's earlier  He denies any palpitations or chest pain  Objective:     Vitals:   Temp (24hrs), Av 8 °F (36 6 °C), Min:97 6 °F (36 4 °C), Max:98 2 °F (36 8 °C)    Temp:  [97 6 °F (36 4 °C)-98 2 °F (36 8 °C)] 97 6 °F (36 4 °C)  HR:  [] 134  Resp:  [18-24] 23  BP: (130-170)/(65-86) 170/86  SpO2:  [94 %-98 %] 95 %  Body mass index is 31 32 kg/m²  Input and Output Summary (last 24 hours): Intake/Output Summary (Last 24 hours) at 2022 1722  Last data filed at 2022 1617  Gross per 24 hour   Intake 240 ml   Output 3060 ml   Net -2820 ml       Physical Exam:   Physical Exam  Vitals and nursing note reviewed  Constitutional:       Appearance: Normal appearance     Cardiovascular: Rate and Rhythm: Tachycardia present  Rhythm irregularly irregular  Pulmonary:      Effort: Pulmonary effort is normal       Breath sounds: Normal breath sounds  Abdominal:      Palpations: Abdomen is soft  Tenderness: There is no abdominal tenderness  Skin:     General: Skin is warm and dry  Neurological:      General: No focal deficit present  Mental Status: He is alert and oriented to person, place, and time  Psychiatric:         Attention and Perception: Attention normal          Mood and Affect: Mood normal          Speech: Speech normal          Behavior: Behavior is cooperative            Additional Data:     Labs:  Results from last 7 days   Lab Units 12/19/22  0521 12/18/22  0547   WBC Thousand/uL 12 75* 10 20*   HEMOGLOBIN g/dL 11 8* 11 7*   HEMATOCRIT % 37 2 36 2*   PLATELETS Thousands/uL 424* 367   NEUTROS PCT %  --  70   LYMPHS PCT %  --  16   MONOS PCT %  --  11   EOS PCT %  --  2     Results from last 7 days   Lab Units 12/19/22  0521 12/18/22  0547   SODIUM mmol/L 134* 136   POTASSIUM mmol/L 3 9 3 9   CHLORIDE mmol/L 100 101   CO2 mmol/L 27 28   BUN mg/dL 11 13   CREATININE mg/dL 0 89 0 91   ANION GAP mmol/L 7 7   CALCIUM mg/dL 8 4 7 8*   ALBUMIN g/dL  --  2 8*   TOTAL BILIRUBIN mg/dL  --  0 32   ALK PHOS U/L  --  53   ALT U/L  --  15   AST U/L  --  18   GLUCOSE RANDOM mg/dL 123 131     Results from last 7 days   Lab Units 12/13/22  1950   INR  1 32*     Results from last 7 days   Lab Units 12/17/22  0114 12/16/22  1147 12/16/22  0113 12/15/22  2345 12/15/22  1945 12/15/22  1714 12/15/22  1155 12/14/22  2319 12/14/22  1756 12/14/22  1201 12/14/22  0535 12/13/22  2355   POC GLUCOSE mg/dl 122 91 122 115 110 122 145* 120 124 105 97 124         Results from last 7 days   Lab Units 12/18/22  0547 12/15/22  0429 12/14/22  0528 12/13/22  0507   PROCALCITONIN ng/ml 0 44* 2 05* 3 36* 6 02*       Lines/Drains:  Invasive Devices     Peripheral Intravenous Line  Duration           Long-Dwell Peripheral IV (Midline) 52/03/57 Left Basilic 7 days          Drain  Duration           Colostomy Descending/sigmoid -- days    Colostomy Descending/sigmoid  days                      Imaging: No pertinent imaging reviewed  Recent Cultures (last 7 days):         Last 24 Hours Medication List:   Current Facility-Administered Medications   Medication Dose Route Frequency Provider Last Rate   • apixaban  5 mg Oral BID Aimee Pfeiffer PA-C     • atorvastatin  20 mg Oral Daily Louvenia PEDRO Cosme     • carisoprodol  350 mg Oral 4x Daily Louvenia DuePEDRO esposito     • cholecalciferol  1,000 Units Oral Daily Louvenia PEDRO Cosme     • digoxin  125 mcg Oral Daily Ant Davis PA-C     • gabapentin  600 mg Oral TID ShaniceuvPEDRO Heath     • hydrOXYzine HCL  25 mg Oral Q6H PRN Chio Gunderson PA-C     • metoprolol succinate  100 mg Oral BID Rafael Crockett MD     • ondansetron  4 mg Intravenous Q6H PRN Louvenia PEDRO Cosme     • ondansetron  4 mg Intravenous Once Chiostacia Gunderson PA-C     • oxyCODONE  10 mg Oral Q6H Louvenia PEDRO Cosme     • pantoprazole  40 mg Intravenous Q12H [address] PEDRO Perry     • perflutren lipid microsphere  1 mL/min Intravenous Once in imaging PEDRO Pa     • [START ON 12/20/2022] rivaroxaban  20 mg Oral Daily With Breakfast Aimee Pfeiffer PA-C     • traZODone  100 mg Oral HS LouvPEDRO Heath          Today, Patient Was Seen By: Aimee Pfeiffer PA-C    **Please Note: This note may have been constructed using a voice recognition system  **

## 2022-12-19 NOTE — ASSESSMENT & PLAN NOTE
• New onset AF w/RVR as high as 225  • No previous history of AF  • Suspect this is 2/2 septic shock/peritonitis   • TTE (12/14) revealed EF 35% w/mild concentric hypertrophy, moderately reduced systolic function in global manner; mildly dilated RV w/moderately reduced systolic function; mild MR/TR, RVSP 48  • Initially refractory to IV Lopressor 5 mg x2, IV Cardizem 15 mg x1, IV Amiodarone bolus + gtt initiation  • Cardizem gtt weaned off on 12/15  • PO Lopressor  • Started on digoxin load on 12/16  • Cardiology input appreciated   • Cardiology increased Toprol 50 mg twice daily to 100 mg twice daily today 12/19, continue digoxin 125 mcg daily  • Heparin gtt transition to eliquis 5 mg BID on 12/18  Eliquis will cost $500, Xarelto will cost $30  Will transition to Xarelto tomorrow

## 2022-12-19 NOTE — PROGRESS NOTES
Progress Note - General Surgery   Rama Cuevas 78 y o  male MRN: [id number]  Unit/Bed#: ICU 10-01 Encounter: [id number]    Assessment:  81-year-old male with past medical history significant for AS s/p valve replacement, chronic pain with continuous use of opioids, HLD, HTN, RA, perforated sigmoid diverticulitis s/p Polo's procedure (August 2022) for reversal in January 2023 presenting with sepsis secondary to abdominal abscess   Now POD#8 s/p exploratory laparotomy with appendectomy, drainage of abscess, and extensive lysis of adhesions  Episode of somnolence requiring narcan last evening  Noted to have respiratory acidosis on blood gas last evening   -Afebrile, HR 80s to 110s, other vital signs stable on room air   -UO 2 5L   -stool 675 cc  -WBC 12 27  -Hgb 11 8  -Cr 0 89   -borderline hyponatremia, 134   -body fluid culture/gram stain positive for mixed migel, bacteroides, e coli     Plan:  Surgically stable for discharge, awaiting medical/cardiac clearance   Transitioned to Eliquis, SLIM to also send for pricing on Xarelto   Diet as tolerated with nutritional suppl  Antiemetics and analgesics prn  Daily wound care to midline incision  Ostomy care   Evaluated at bedside with attending    Subjective/Objective   Subjective: No acute overnight events  Patient overall doing well with no acute complaints  Tolerating diet without increase in abdominal pain, nausea, vomiting  Functioning ostomy  Objective:   Blood pressure 141/65, pulse (!) 117, temperature 97 6 °F (36 4 °C), temperature source Temporal, resp  rate (!) 23, height 5' 7" (1 702 m), weight 90 8 kg (200 lb 2 8 oz), SpO2 95 %  ,Body mass index is 31 35 kg/m²        Intake/Output Summary (Last 24 hours) at 12/19/2022 0809  Last data filed at 12/19/2022 0700  Gross per 24 hour   Intake 352 62 ml   Output 3035 ml   Net -2682 38 ml       Invasive Devices     Peripheral Intravenous Line  Duration           Long-Dwell Peripheral IV (Midline) 12/12/22 Left Basilic 6 days          Drain  Duration           Colostomy Descending/sigmoid -- days    Colostomy Descending/sigmoid  days              Physical Exam  Vitals and nursing note reviewed  Constitutional:       General: He is not in acute distress  Appearance: Normal appearance  He is obese  He is not ill-appearing or toxic-appearing  HENT:      Head: Normocephalic and atraumatic  Cardiovascular:      Rate and Rhythm: Regular rhythm  Tachycardia present  Pulses: Normal pulses  Heart sounds: Normal heart sounds  Pulmonary:      Effort: Pulmonary effort is normal       Breath sounds: Normal breath sounds  Abdominal:      General: The ostomy site is clean  Bowel sounds are normal  There is distension (mild)  Palpations: Abdomen is soft  Tenderness: There is no abdominal tenderness  There is no guarding or rebound  Comments: Midline incision with minimal amount of green/serous crusted drainage, no fluctuance or purulent drainage   Musculoskeletal:         General: Normal range of motion  Right lower leg: No edema  Left lower leg: No edema  Skin:     General: Skin is warm and dry  Capillary Refill: Capillary refill takes less than 2 seconds  Neurological:      General: No focal deficit present  Mental Status: He is alert and oriented to person, place, and time  Psychiatric:         Mood and Affect: Mood normal          Behavior: Behavior normal          Thought Content: Thought content normal        Lab, Imaging and other studies:  I have personally reviewed pertinent lab results      CBC:   Lab Results   Component Value Date    WBC 12 75 (H) 12/19/2022    HGB 11 8 (L) 12/19/2022    HCT 37 2 12/19/2022    MCV 90 12/19/2022     (H) 12/19/2022    MCH 28 4 12/19/2022    MCHC 31 7 12/19/2022    RDW 13 5 12/19/2022    MPV 9 1 12/19/2022     CMP:   Lab Results   Component Value Date    SODIUM 134 (L) 12/19/2022    K 3 9 12/19/2022     12/19/2022 CO2 27 12/19/2022    BUN 11 12/19/2022    CREATININE 0 89 12/19/2022    CALCIUM 8 4 12/19/2022    EGFR 83 12/19/2022     VTE Pharmacologic Prophylaxis: apixaban (Eliquis)  VTE Mechanical Prophylaxis: sequential compression device    Chet Krueger PA-C

## 2022-12-19 NOTE — ASSESSMENT & PLAN NOTE
Lab Results   Component Value Date    EGFR 83 12/19/2022    EGFR 82 12/18/2022    EGFR 75 12/17/2022    CREATININE 0 89 12/19/2022    CREATININE 0 91 12/18/2022    CREATININE 0 98 12/17/2022   · GERMAN due to sepsis- resolved with IVF/abx/surgical intervention   · Baseline Cr 0 8-1 0 mg/dL   · Currently at baseline  · Monitor renal function negative

## 2022-12-19 NOTE — ASSESSMENT & PLAN NOTE
• POA: AEB tachycardia, tachypnea, leukopenia, lactic acidosis > 4, GERMAN  • In setting of intraabdominal infection/peritonitis  • COVID/FLU/RSV negative  • Blood cultures w/NGTD  • Peritoneal fluid culture w/gamma hemolytic Strep  • Anaerobic culture w/Bacteroides fragilis   • Procalcitonin 8 29 - 6 02 - 3 36 - 2 05- 0 44  • Is s/p aggressive IVF resuscitation  • Received IV Ciprofloxacin, Flagyl   • Monitor fever and WBC curve  • Sepsis parameters much improved   • Continue to monitor off antibiotics

## 2022-12-19 NOTE — CONSULTS
[address] 1945, 78 y o  male MRN: [id number]  Unit/Bed#: ICU 10-01 Encounter: [id number]  Primary Care Provider: Seth Joel DO   Date and time admitted to hospital: 12/11/2022  5:19 AM    Inpatient consult to Internal Medicine  Consult performed by: Sergio Lorenzana DO  Consult ordered by: PEDRO Cisneros      Please see previously documented progress note

## 2022-12-19 NOTE — CASE MANAGEMENT
Case Management Discharge Planning Note    Patient name Nenita Bledsoe ICU 10/ICU 10-01 MRN [id number]  : 1945 Date 2022       Current Admission Date: 2022  Current Admission Diagnosis:Peritonitis [facility])   Patient Active Problem List    Diagnosis Date Noted   • Cardiomyopathy ([address] ) 12/15/2022   • New onset atrial fibrillation ([address] ) 2022   • Septic shock ([address] ) 2022   • Acute respiratory failure with hypoxia ([address] ) 2022   • Peritonitis ([address] ) 2022   • Diverticulitis of colon 2022   • SBO (small bowel obstruction) ([address] ) 2022   • Primary osteoarthritis of right shoulder 2022   • Chronic kidney disease, stage 2 (mild) 2022   • High risk medication use 2020   • Rheumatoid arthritis with rheumatoid factor, unspecified ([address] ) 2020   • Atherosclerosis of native coronary artery of native heart without angina pectoris 2019   • Status post aortic valve replacement with bioprosthetic valve 2018   • Arthritis    • Chronic pain    • Continuous opioid dependence ([address] )    • Hyperlipidemia    • Benign essential hypertension    • Insomnia    • Peripheral neuropathy    • Carpal tunnel syndrome 2015   • Psoriasis with arthropathy ([facility] ) 2015   • Arthralgia of lower leg 2013   • Hyperglycemia 10/25/2012   • Primary localized osteoarthrosis of shoulder region 10/09/2012   • Psoriasis 10/09/2012   • Atopic dermatitis 2012   • Low back pain 2012      LOS (days): 8  Geometric Mean LOS (GMLOS) (days): 9 60  Days to GMLOS:1 2     OBJECTIVE:  Risk of Unplanned Readmission Score: 23 25         Current admission status: Inpatient   Preferred Pharmacy:   [address] [place] [address]  Phone: [phone] Fax: [phone] [facility] #[id number], [address] [address]  Phone: [phone] Fax: [phone] - [place], [address] [address]  Phone: [phone] Fax: [phone]    Primary Care Provider: Raiza Pratt DO    Primary Insurance: [facility]  Secondary Insurance:     DISCHARGE DETAILS:    Discharge planning discussed with[de-identified] patient and wife at the bedside  Freedom of Choice: Yes  Comments - Freedom of Choice: recommendation was for rehab- pt declined - pt was in agreement for [facility]hc- referrals were sent and pt was given choices and his choice was for [facility]  CM contacted family/caregiver?: Yes             Contacts  Patient Contacts: wife Mikhail Mcgee  Relationship to Patient[de-identified] Family  Contact Method: In Person  Reason/Outcome: Discharge [address] [place]         Is the patient interested in [facility] at discharge?: Yes    DME Referral Provided  Referral made for DME?: No    Other Referral/Resources/Interventions Provided:  Interventions: HHC, Prescription Price Check  Referral Comments:  [facility] was made aware of the d/c planned for tomorrow,  cm called [facility]  they do need to order the [address] for tomorrow  copay is  $30 00 and I made the pt and wife aware    Would you like to participate in our [address] service program?  : No - Declined    Treatment Team Recommendation: Home with [address] [facility] (home with spouse & [facility] & outpt follow up -wife)                                   IMM Given (Date):: 12/19/22  IMM Given to[de-identified] Patient

## 2022-12-19 NOTE — UTILIZATION REVIEW
Continued Stay Review    Date: 12/19/22                          Current Patient Class: inpatient  Current Level of Care: ICU     HPI:77 y o  male initially admitted on 12/11/22 Peritonitis POD#8 s/p exploratory laparotomy with appendectomy, drainage of abscess, and extensive lysis of adhesions  Peritoneal fluid culture w/gamma hemolytic Strep and anaerobic culture w/Bacteroides fragilis  Received Ciprofloxacin, Flagyl through 12/17  Assessment/Plan:  NGT removed on 12/13, tolerating diet without increase in abdominal pain, nausea, vomiting  Functioning ostomy  Pt tachycardic, Abd remains distended, midline incision with minimal amount of green/serous crusted drainage, no fluctuance or purulent drainage  Transitioned to Eliquis, continue pain and nausea control prn, daily wound care to midline incision, ostomy care  Pt weaned to room air, encourage inc spirometry, ambulation, coughing and deep breathing  Continue to monitor renal function      Vital Signs:   Date/Time Temp Pulse Resp BP MAP (mmHg) SpO2 Calculated FIO2 (%) - Nasal Cannula Nasal Cannula O2 Flow Rate (L/min) O2 Device Patient Position - Orthostatic VS   12/19/22 1127 -- 134 Abnormal  -- 170/86 -- -- -- -- -- --   12/19/22 0816 -- 94 -- 130/74 -- -- -- -- -- --   12/19/22 0713 -- 94 -- 141/65 -- -- -- -- -- --   12/19/22 0700 97 6 °F (36 4 °C) 117 Abnormal  23 Abnormal  141/65 94 95 % -- -- -- Lying   12/19/22 0400 98 2 °F (36 8 °C) 102 23 Abnormal  138/79 98 95 % -- -- -- --   12/19/22 0200 -- 104 22 136/67 96 94 % -- -- -- --   12/19/22 0000 97 8 °F (36 6 °C) 105 24 Abnormal  140/65 94 95 % -- -- -- --   12/18/22 2200 -- 106 Abnormal  18 155/74 106 94 % -- -- -- --   12/18/22 2000 97 6 °F (36 4 °C) 92 21 141/75 103 95 % -- -- -- --   12/18/22 1800 -- 89 19 150/74 104 98 % -- -- -- --   12/18/22 1458 97 5 °F (36 4 °C) -- -- -- -- -- -- -- -- --   12/18/22 1300 -- 106 Abnormal  23 Abnormal  145/68 98 97 % -- -- -- --   12/18/22 1247 97 4 °F (36 3 °C) Abnormal  -- -- -- -- -- -- -- -- --   12/18/22 1000 -- 89 19 158/81 109 97 % -- -- -- --   12/18/22 0800 -- 110 Abnormal  22 140/77 100 95 % -- -- None (Room air) Lying   12/18/22 0748 98 2 °F (36 8 °C) -- -- -- -- -- -- -- -- --   12/18/22 0600 -- 104 23 Abnormal  129/66 92 94 % -- -- -- --   12/18/22 0400 -- 84 22 133/62 89 94 % -- -- -- --   12/18/22 0200 -- 97 20 137/83 105 95 % -- -- -- --   12/18/22 0100 -- 97 18 -- -- 96 % -- -- -- --   12/18/22 0000 -- 93 23 Abnormal  141/75 99 94 % -- -- -- --   12/17/22 2348 98 °F (36 7 °C) -- -- -- -- -- -- -- -- --   12/17/22 2200 -- 89 22 134/87 103 95 % -- -- -- --   12/17/22 2000 99 4 °F (37 4 °C) 95 21 150/93 117 94 % -- -- -- --   12/17/22 1800 -- 90 21 173/83 Abnormal  117 96 % -- -- -- --   12/17/22 1600 -- 94 18 152/81 108 93 % -- -- -- --   12/17/22 1500 98 8 °F (37 1 °C) 98 21 129/69 93 95 % -- -- None (Room air) Lying   12/17/22 1400 -- 96 17 155/86 109 97 % -- -- -- --   12/17/22 1300 -- 95 -- 134/84 104 98 % 28 2 L/min Nasal cannula --   12/17/22 1200 -- 103 -- 135/67 95 96 % 28 2 L/min Nasal cannula --   12/17/22 1100 97 8 °F (36 6 °C) 139 Abnormal  20 153/92 116 99 % -- -- -- --   12/17/22 1000 -- 122 Abnormal  -- 147/93 109 97 % -- -- -- --   12/17/22 0900 -- 135 Abnormal  24 Abnormal  127/81 100 99 % -- -- -- --   12/17/22 0800 -- 125 Abnormal  24 Abnormal  169/101 Abnormal  123 97 % 28 2 L/min Nasal cannula --   12/17/22 0700 97 8 °F (36 6 °C) -- -- -- -- -- -- -- -- --   12/17/22 0600 -- 133 Abnormal  -- 158/76 108 98 % -- -- -- --   12/17/22 0500 -- 135 Abnormal  -- 158/73 101 99 % -- -- -- --   12/17/22 0400 -- 110 Abnormal  -- 137/67 93 99 % -- -- -- --   12/17/22 0300 -- 139 Abnormal  -- 126/89 101 94 % -- -- -- --   12/17/22 0200 -- 120 Abnormal  -- 125/71 91 94 % -- -- -- --   12/17/22 0100 98 9 °F (37 2 °C) 126 Abnormal  -- 143/72 101 95 % -- -- -- --       Pertinent Labs/Diagnostic Results:       Results from last 7 days   Lab Units 12/19/22  0521 12/18/22  0547 12/17/22  0502 12/16/22  0450 12/15/22  0429   WBC Thousand/uL 12 75* 10 20* 9 30 9 47 13 88*   HEMOGLOBIN g/dL 11 8* 11 7* 10 8* 10 9* 11 7*   HEMATOCRIT % 37 2 36 2* 34 3* 35 4* 37 6   PLATELETS Thousands/uL 424* 367 255 224 223   NEUTROS ABS Thousands/µL  --  7 20  --   --   --          Results from last 7 days   Lab Units 12/19/22  0521 12/18/22  0547 12/17/22  1714 12/17/22  0502 12/16/22  0450 12/15/22  1941 12/15/22  0429 12/14/22  0528   SODIUM mmol/L 134* 136 134* 132* 130* 132* 136 136   POTASSIUM mmol/L 3 9 3 9 3 7 3 9 4 3 4 0 3 9 3 8   CHLORIDE mmol/L 100 101 98 99 99 101 102 103   CO2 mmol/L 27 28 29 26 25 26 25 25   ANION GAP mmol/L 7 7 7 7 6 5 9 8   BUN mg/dL 11 13 15 16 21 22 17 16   CREATININE mg/dL 0 89 0 91 0 98 0 99 1 21 1 27 1 09 1 06   EGFR ml/min/1 73sq m 83 82 75 74 58 54 66 68   CALCIUM mg/dL 8 4 7 8* 8 4 8 2* 8 3* 8 1* 8 2* 8 5   CALCIUM, IONIZED mmol/L  --  1 08* 1 09*  --   --  1 03* 1 05* 1 07*   MAGNESIUM mg/dL 1 8* 1 8* 2 1 1 8* 2 1 2 1 2 1 2 3   PHOSPHORUS mg/dL  --  2 5 2 8 2 9 3 1 3 9 2 9 2 4     Results from last 7 days   Lab Units 12/18/22  0547 12/15/22  1955 12/14/22  0528 12/13/22  0507   AST U/L 18  --  14 14   ALT U/L 15  --  12 10   ALK PHOS U/L 53  --  44 35   TOTAL PROTEIN g/dL 5 0*  --  5 8* 5 3*   ALBUMIN g/dL 2 8*  --  3 1* 3 0*   TOTAL BILIRUBIN mg/dL 0 32  --  0 54 0 74   AMMONIA umol/L  --  43  --   --      Results from last 7 days   Lab Units 12/17/22  0114 12/16/22  1147 12/16/22  0113 12/15/22  2345 12/15/22  1945 12/15/22  1714 12/15/22  1155 12/14/22  2319 12/14/22  1756 12/14/22  1201 12/14/22  0535 12/13/22  2355   POC GLUCOSE mg/dl 122 91 122 115 110 122 145* 120 124 105 97 124     Results from last 7 days   Lab Units 12/19/22  0521 12/18/22  0547 12/17/22  1714 12/17/22  0502 12/16/22  0450 12/15/22  1941 12/15/22  0429 12/14/22  0528 12/13/22  1758 12/13/22  0507 12/12/22  1853   GLUCOSE RANDOM mg/dL 123 131 152* 110 107 114 103 95 117 112 114     Results from last 7 days   Lab Units 12/16/22  0806 12/15/22  1941   PH IGLESIA  7 352 7 271*   PCO2 IGLESIA mm Hg 47 0 59 1*   PO2 IGLESIA mm Hg 23 4* 36 3   HCO3 IGLESIA mmol/L 25 5 26 6   BASE EXC IGLESIA mmol/L -0 4 -1 1   O2 CONTENT IGLESIA ml/dL 6 5 9 6   O2 HGB, VENOUS % 38 7* 60 4     Results from last 7 days   Lab Units 12/18/22  0547 12/17/22  0502 12/17/22  0112 12/14/22  0159 12/13/22  1950   PROTIME seconds  --   --   --   --  16 3*   INR   --   --   --   --  1 32*   PTT seconds 80* 73* 66*   < > 42*    < > = values in this interval not displayed  Results from last 7 days   Lab Units 12/18/22  0547 12/15/22  0429 12/14/22  0528 12/13/22  0507   PROCALCITONIN ng/ml 0 44* 2 05* 3 36* 6 02*     Results from last 7 days   Lab Units 12/18/22  0547   DIGOXIN LVL ng/mL 1 8     Medications:   Scheduled Medications:  apixaban, 5 mg, Oral, BID  atorvastatin, 20 mg, Oral, Daily  carisoprodol, 350 mg, Oral, 4x Daily  cholecalciferol, 1,000 Units, Oral, Daily  digoxin, 125 mcg, Oral, Daily  gabapentin, 600 mg, Oral, TID  metoprolol succinate, 100 mg, Oral, BID  ondansetron, 4 mg, Intravenous, Once  oxyCODONE, 10 mg, Oral, Q6H  pantoprazole, 40 mg, Intravenous, Q12H [facility]  [START ON 12/20/2022] rivaroxaban, 20 mg, Oral, Daily With Breakfast  traZODone, 100 mg, Oral, HS      Continuous IV Infusions: none     PRN Meds:  hydrOXYzine HCL, 25 mg, Oral, Q6H PRN  ondansetron, 4 mg, Intravenous, Q6H PRN  perflutren lipid microsphere, 1 mL/min, Intravenous, Once in imaging        Discharge Plan: tbd    Network Utilization Review Department  ATTENTION: Please call with any questions or concerns to [phone] and carefully listen to the prompts so that you are directed to the right person  All voicemails are confidential   Ashleigh Kyle all requests for admission clinical reviews, approved or denied determinations and any other requests to dedicated fax number below belonging to the campus where the patient is receiving treatment  List of dedicated fax numbers for the Facilities:  [address] [place] [address] DENIALS (Administrative/Medical Necessity) [phone]   [address]  (Maternity/NICU/Pediatrics) [phone]   [address] Gerda Clarke [phone]   Stacey Rowan 77 [phone]   [address] [place] [address] Bridgett Hale 28 [phone]   1554 [facility] Pierre Magaña [facility] 134 815 [facility] [phone]

## 2022-12-19 NOTE — ASSESSMENT & PLAN NOTE
• Presented to ED on 12/11 w/acute onset abdominal pain concerning for acute abdomen   • W/hx of sigmoid diverticulitis s/p Polo's procedure and colostomy with planned reversal scheduled for 01/2023  • CT A/P on admission revealed extensive inflammatory changes involving jejunum and proximal-mid ileum, compatible with severe enteritis; there is focal free air surrounding lower midline ileum just above urinary bladder with extensive inflammatory changes; few mildly dilated loops of jejunum within left upper abdomen, likely 2/2 extensive inflammatory changes with possible superimposed low-grade partial SBO; small amount of free air and free fluid in remainder of abdomen and pelvis; stomach and proximal esophagus are distended and fluid-filled, presumably secondary to delayed transit  • OR on 12/11 for EX LAP,  EXTENSIVE MELANI, APPENDECTOMY, DRAINAGE OF ABDOMINAL ABSCESS   • Peritoneal fluid culture w/gamma hemolytic Strep and anaerobic culture w/Bacteroides fragilis   • Received Ciprofloxacin, Flagyl through 12/17  • NGT removed on 12/13 - tolerating diet   • Pain management   • Treatment per surgical team

## 2022-12-19 NOTE — ASSESSMENT & PLAN NOTE
• Suspect this is is multifactorial in setting of atelectasis vs mild pulmonary vascular congestion vs post-operative pain  • CXR on 12/13 w/mildly increased interstitial markings bilaterally - may indicate mild pulmonary edema  • Respiratory status improved w/IV diuresis - continue w/PRN diuresis  • Weaned to RA - continue to maintain SpO2 > 90%  • Aggressive pulmonary hygiene  • Encourage cough, deep breathing, IS, ambulation

## 2022-12-19 NOTE — PROGRESS NOTES
-- Patient:  -- MRN: [id number]  -- Aidin Request ID: [id number]  -- Level of care reserved: [facility]  -- Partner Reserved:  Mitzi Iglesias [address], [facility], [address] [phone]  -- Clinical needs requested:  -- Geography searched: [id number]  -- Start of Service:  -- Request sent: 6:28pm EST on 12/14/2022 by Isaac Russell  -- Partner reserved: 2:33pm EST on 12/16/2022 by Isaac Russell  -- Choice list shared: 8:04am EST on 12/16/2022 by August Wood

## 2022-12-20 LAB
ANION GAP SERPL CALCULATED.3IONS-SCNC: 6 MMOL/L (ref 4–13)
BASOPHILS # BLD AUTO: 0.04 THOUSANDS/ÂΜL (ref 0–0.1)
BASOPHILS NFR BLD AUTO: 0 % (ref 0–1)
BUN SERPL-MCNC: 10 MG/DL (ref 5–25)
CALCIUM SERPL-MCNC: 8.8 MG/DL (ref 8.4–10.2)
CHLORIDE SERPL-SCNC: 99 MMOL/L (ref 96–108)
CO2 SERPL-SCNC: 31 MMOL/L (ref 21–32)
CREAT SERPL-MCNC: 0.94 MG/DL (ref 0.6–1.3)
EOSINOPHIL # BLD AUTO: 0.11 THOUSAND/ÂΜL (ref 0–0.61)
EOSINOPHIL NFR BLD AUTO: 1 % (ref 0–6)
ERYTHROCYTE [DISTWIDTH] IN BLOOD BY AUTOMATED COUNT: 13.7 % (ref 11.6–15.1)
GFR SERPL CREATININE-BSD FRML MDRD: 78 ML/MIN/1.73SQ M
GLUCOSE SERPL-MCNC: 125 MG/DL (ref 65–140)
HCT VFR BLD AUTO: 38 % (ref 36.5–49.3)
HGB BLD-MCNC: 12.1 G/DL (ref 12–17)
IMM GRANULOCYTES # BLD AUTO: 0.14 THOUSAND/UL (ref 0–0.2)
IMM GRANULOCYTES NFR BLD AUTO: 1 % (ref 0–2)
LYMPHOCYTES # BLD AUTO: 1.95 THOUSANDS/ÂΜL (ref 0.6–4.47)
LYMPHOCYTES NFR BLD AUTO: 14 % (ref 14–44)
MAGNESIUM SERPL-MCNC: 2 MG/DL (ref 1.9–2.7)
MCH RBC QN AUTO: 28.3 PG (ref 26.8–34.3)
MCHC RBC AUTO-ENTMCNC: 31.8 G/DL (ref 31.4–37.4)
MCV RBC AUTO: 89 FL (ref 82–98)
MONOCYTES # BLD AUTO: 1.1 THOUSAND/ÂΜL (ref 0.17–1.22)
MONOCYTES NFR BLD AUTO: 8 % (ref 4–12)
NEUTROPHILS # BLD AUTO: 10.51 THOUSANDS/ÂΜL (ref 1.85–7.62)
NEUTS SEG NFR BLD AUTO: 76 % (ref 43–75)
NRBC BLD AUTO-RTO: 0 /100 WBCS
PLATELET # BLD AUTO: 455 THOUSANDS/UL (ref 149–390)
PMV BLD AUTO: 8.9 FL (ref 8.9–12.7)
POTASSIUM SERPL-SCNC: 4 MMOL/L (ref 3.5–5.3)
PROCALCITONIN SERPL-MCNC: 0.15 NG/ML
RBC # BLD AUTO: 4.27 MILLION/UL (ref 3.88–5.62)
SODIUM SERPL-SCNC: 136 MMOL/L (ref 135–147)
WBC # BLD AUTO: 13.85 THOUSAND/UL (ref 4.31–10.16)

## 2022-12-20 RX ORDER — METOPROLOL SUCCINATE 50 MG/1
150 TABLET, EXTENDED RELEASE ORAL 2 TIMES DAILY
Status: DISCONTINUED | OUTPATIENT
Start: 2022-12-20 | End: 2022-12-23

## 2022-12-20 RX ORDER — DILTIAZEM HYDROCHLORIDE 5 MG/ML
20 INJECTION INTRAVENOUS ONCE
Status: COMPLETED | OUTPATIENT
Start: 2022-12-20 | End: 2022-12-20

## 2022-12-20 RX ORDER — METOPROLOL TARTRATE 5 MG/5ML
2.5 INJECTION INTRAVENOUS ONCE
Status: COMPLETED | OUTPATIENT
Start: 2022-12-20 | End: 2022-12-20

## 2022-12-20 RX ORDER — DILTIAZEM HYDROCHLORIDE 5 MG/ML
15 INJECTION INTRAVENOUS ONCE
Status: COMPLETED | OUTPATIENT
Start: 2022-12-20 | End: 2022-12-20

## 2022-12-20 RX ORDER — DIGOXIN 125 MCG
125 TABLET ORAL ONCE
Status: COMPLETED | OUTPATIENT
Start: 2022-12-20 | End: 2022-12-20

## 2022-12-20 RX ADMIN — OXYCODONE HYDROCHLORIDE 10 MG: 10 TABLET ORAL at 11:52

## 2022-12-20 RX ADMIN — ONDANSETRON 4 MG: 2 INJECTION INTRAMUSCULAR; INTRAVENOUS at 08:12

## 2022-12-20 RX ADMIN — ATORVASTATIN CALCIUM 20 MG: 10 TABLET, FILM COATED ORAL at 08:12

## 2022-12-20 RX ADMIN — RIVAROXABAN 20 MG: 10 TABLET, FILM COATED ORAL at 08:12

## 2022-12-20 RX ADMIN — METOPROLOL SUCCINATE 100 MG: 50 TABLET, EXTENDED RELEASE ORAL at 08:13

## 2022-12-20 RX ADMIN — DIGOXIN 125 MCG: 125 TABLET ORAL at 09:46

## 2022-12-20 RX ADMIN — GABAPENTIN 600 MG: 600 TABLET, FILM COATED ORAL at 08:12

## 2022-12-20 RX ADMIN — DILTIAZEM HYDROCHLORIDE 15 MG: 5 INJECTION INTRAVENOUS at 09:27

## 2022-12-20 RX ADMIN — OXYCODONE HYDROCHLORIDE 10 MG: 10 TABLET ORAL at 05:24

## 2022-12-20 RX ADMIN — Medication 1000 UNITS: at 08:12

## 2022-12-20 RX ADMIN — ONDANSETRON 4 MG: 2 INJECTION INTRAMUSCULAR; INTRAVENOUS at 15:21

## 2022-12-20 RX ADMIN — GABAPENTIN 600 MG: 600 TABLET, FILM COATED ORAL at 20:10

## 2022-12-20 RX ADMIN — METOPROLOL TARTRATE 2.5 MG: 5 INJECTION, SOLUTION INTRAVENOUS at 20:09

## 2022-12-20 RX ADMIN — CARISOPRODOL 350 MG: 350 TABLET ORAL at 08:12

## 2022-12-20 RX ADMIN — PANTOPRAZOLE SODIUM 40 MG: 40 INJECTION, POWDER, LYOPHILIZED, FOR SOLUTION INTRAVENOUS at 20:09

## 2022-12-20 RX ADMIN — DIGOXIN 125 MCG: 125 TABLET ORAL at 08:13

## 2022-12-20 RX ADMIN — OXYCODONE HYDROCHLORIDE 10 MG: 10 TABLET ORAL at 17:08

## 2022-12-20 RX ADMIN — CARISOPRODOL 350 MG: 350 TABLET ORAL at 17:08

## 2022-12-20 RX ADMIN — ONDANSETRON 4 MG: 2 INJECTION INTRAMUSCULAR; INTRAVENOUS at 01:56

## 2022-12-20 RX ADMIN — METOPROLOL SUCCINATE 150 MG: 50 TABLET, EXTENDED RELEASE ORAL at 20:10

## 2022-12-20 RX ADMIN — GABAPENTIN 600 MG: 600 TABLET, FILM COATED ORAL at 15:21

## 2022-12-20 RX ADMIN — CARISOPRODOL 350 MG: 350 TABLET ORAL at 21:43

## 2022-12-20 RX ADMIN — CARISOPRODOL 350 MG: 350 TABLET ORAL at 11:52

## 2022-12-20 RX ADMIN — PANTOPRAZOLE SODIUM 40 MG: 40 INJECTION, POWDER, LYOPHILIZED, FOR SOLUTION INTRAVENOUS at 08:13

## 2022-12-20 RX ADMIN — DILTIAZEM HYDROCHLORIDE 20 MG: 5 INJECTION INTRAVENOUS at 01:53

## 2022-12-20 RX ADMIN — TRAZODONE HYDROCHLORIDE 100 MG: 100 TABLET ORAL at 21:43

## 2022-12-20 NOTE — OCCUPATIONAL THERAPY NOTE
12/20/22 1244   OT Last Visit   OT Visit Date 12/20/22   Note Type   Note Type Treatment   Pain Assessment   Pain Assessment Tool 0-10   Pain Score 9   Pain Location/Orientation Location: Abdomen   Pain Onset/Description Onset: Ongoing;Frequency: Constant/Continuous   Patient's Stated Pain Goal No pain   Hospital Pain Intervention(s) Medication (See MAR); Ambulation/increased activity; Emotional support   Restrictions/Precautions   Weight Bearing Precautions Per Order No   Other Precautions Telemetry;Multiple lines; Fall Risk   Bed Mobility   Supine to Sit 4  Minimal assistance   Additional items Assist x 1;HOB elevated; Bedrails; Increased time required;Verbal cues;LE management   Sit to Supine 4  Minimal assistance   Additional items Assist x 1;HOB elevated; Bedrails; Increased time required;Verbal cues;LE management   Additional Comments Pt sits unsupported EOB 3-4 mins with no LOB or dizziness noted  Transfers   Sit to Stand 4  Minimal assistance   Additional items Assist x 1;Bedrails; Increased time required;Verbal cues   Stand to Sit 4  Minimal assistance   Additional items Assist x 1;Bedrails; Increased time required;Verbal cues   Stand pivot 4  Minimal assistance   Additional items Assist x 1; Increased time required;Verbal cues   Additional Comments VCs for hand placement  Functional Mobility   Functional Mobility 4  Minimal assistance   Additional Comments Pt ambulates 40 feet x 2 with Min A x 1  Additional items Rolling walker   Therapeutic Excerise-Strength   UE Strength Yes   Right Upper Extremity- Strength   R Shoulder Flexion; Extension;Horizontal ABduction  (horiz add, scapular pro/ret)   R Elbow Elbow flexion;Elbow extension   R Wrist Wrist flexion;Wrist extension  (wrist rotation)   R Weight/Reps/Sets 1# weight x 10 reps   Left Upper Extremity-Strength   L Weights/Reps/Sets TE same as R UE above   Cognition   Overall Cognitive Status WFL   Arousal/Participation Alert; Responsive; Cooperative Attention Within functional limits   Orientation Level Oriented X4   Memory Within functional limits   Following Commands Follows one step commands without difficulty   Comments Pt agreeable to OT Treatment  Activity Tolerance   Activity Tolerance Patient limited by fatigue;Patient limited by pain   Assessment   Assessment Patient participated in Skilled OT session this date with interventions consisting of functional transfer training, Energy Conservation techniques, therapeutic exercise to: increase functional use of BUEs, increase BUE muscle strength  and increase dynamic sit/ stand balance during functional activity    Patient agreeable to OT treatment session, upon arrival patient was found supine in bed, alert, responsive  and in no apparent distress  Patient performs UB TE using 1# weight x 10 reps all directions as detailed in flow sheet  Following TE, patient transfers supine <> Sit EOB with Min A x 1 and use of bedrails  Patient sits unsupported EOB 3-4 min with no LOB or dizziness noted  Patient then transfers sit to stand and ambulates 40 feet x 2 with Min A/CGA x 1 and VCs for hand placement  Wife present during session and commented patient completed the distance he needs to make at home from front door to his chair  Patient was very motivated and hopeful to go home tomorrow  Patient did report pain 9/10 in abdomen  Session ended with patient returned to supine in bed, all needs met, and call bell within reach  In comparison to previous session, patient with improvements in UB strength, endurance, functional transfers and mobility  Patient requiring verbal cues for safety, verbal cues for correct technique, verbal cues for pacing thru activity steps and frequent rest periods  Patient performance  demonstrated good carryover of learned techniques and strategies to facilitate safety during functional tasks   Patient continues to be functioning below baseline level, occupational performance remains limited secondary to factors listed above and increased risk for falls and injury  From OT standpoint, recommendation at time of d/c would be Home OT  Patient to benefit from continued Occupational Therapy treatment while in the hospital to address deficits as defined above and maximize level of functional independence with ADLs and functional mobility in order to return to PLOF  Plan   Treatment Interventions UE strengthening/ROM; Functional transfer training;Energy conservation   Goal Expiration Date 12/23/22   OT Treatment Day 2   OT Frequency 3-5x/wk   Recommendation   OT Discharge Recommendation Home with home health rehabilitation   AM-PAC Daily Activity Inpatient   Lower Body Dressing 1   Bathing 2   Toileting 2   Upper Body Dressing 3   Grooming 4   Eating 4   Daily Activity Raw Score 16   Daily Activity Standardized Score (Calc for Raw Score >=11) 35 96   AM-PAC Applied Cognition Inpatient   Following a Speech/Presentation 4   Understanding Ordinary Conversation 4   Taking Medications 4   Remembering Where Things Are Placed or Put Away 4   Remembering List of 4-5 Errands 4   Taking Care of Complicated Tasks 4   Applied Cognition Raw Score 24   Applied Cognition Standardized Score 62 21   Catrachito Bergman

## 2022-12-20 NOTE — PLAN OF CARE
Problem: OCCUPATIONAL THERAPY ADULT  Goal: Performs self-care activities at highest level of function for planned discharge setting  See evaluation for individualized goals  Description: Treatment Interventions: ADL retraining, Functional transfer training, Endurance training, Patient/family training, Equipment evaluation/education, Compensatory technique education, Energy conservation, Activityengagement    See flowsheet documentation for full assessment, interventions and recommendations  Outcome: Progressing  Note: Limitation: Decreased ADL status, Decreased Safe judgement during ADL, Decreased endurance, Decreased self-care trans, Decreased high-level ADLs  Prognosis: Good  Assessment: Patient participated in Skilled OT session this date with interventions consisting of functional transfer training, Energy Conservation techniques, therapeutic exercise to: increase functional use of BUEs, increase BUE muscle strength  and increase dynamic sit/ stand balance during functional activity    Patient agreeable to OT treatment session, upon arrival patient was found supine in bed, alert, responsive  and in no apparent distress  Patient performs UB TE using 1# weight x 10 reps all directions as detailed in flow sheet  Following TE, patient transfers supine <> Sit EOB with Min A x 1 and use of bedrails  Patient sits unsupported EOB 3-4 min with no LOB or dizziness noted  Patient then transfers sit to stand and ambulates 40 feet x 2 with Min A/CGA x 1 and VCs for hand placement  Wife present during session and commented patient completed the distance he needs to make at home from front door to his chair  Patient was very motivated and hopeful to go home tomorrow  Patient did report pain 9/10 in abdomen  Session ended with patient returned to supine in bed, all needs met, and call bell within reach   In comparison to previous session, patient with improvements in UB strength, endurance, functional transfers and mobility  Patient requiring verbal cues for safety, verbal cues for correct technique, verbal cues for pacing thru activity steps and frequent rest periods  Patient performance  demonstrated good carryover of learned techniques and strategies to facilitate safety during functional tasks  Patient continues to be functioning below baseline level, occupational performance remains limited secondary to factors listed above and increased risk for falls and injury  From OT standpoint, recommendation at time of d/c would be Home OT  Patient to benefit from continued Occupational Therapy treatment while in the hospital to address deficits as defined above and maximize level of functional independence with ADLs and functional mobility in order to return to OF       OT Discharge Recommendation: Home with home health rehabilitation

## 2022-12-20 NOTE — NURSING NOTE
Hospitalist made aware patient has sustained -180's afib on monitor  Patient was sleeping at the time, denies chest pain or dizziness  2214 Cardizem 20mg IVP given    2220 Patients HR 80-90's afib on monitor

## 2022-12-20 NOTE — ASSESSMENT & PLAN NOTE
Lab Results   Component Value Date    EGFR 78 12/20/2022    EGFR 83 12/19/2022    EGFR 82 12/18/2022    CREATININE 0 94 12/20/2022    CREATININE 0 89 12/19/2022    CREATININE 0 91 12/18/2022   · GERMAN due to sepsis- resolved with IVF/abx/surgical intervention   · Baseline Cr 0 8-1 0 mg/dL   · Currently at baseline  · Monitor renal function

## 2022-12-20 NOTE — ASSESSMENT & PLAN NOTE
• POA: AEB tachycardia, tachypnea, leukopenia, lactic acidosis > 4, GERMAN  • In setting of intraabdominal infection/peritonitis  • COVID/FLU/RSV negative  • Blood cultures w/NGTD  • Peritoneal fluid culture w/gamma hemolytic Strep  • Anaerobic culture w/Bacteroides fragilis   • Procalcitonin 8 29 - 6 02 - 3 36 - 2 05- 0 44 - 0 15  • s/p aggressive IVF resuscitation  • Received IV Ciprofloxacin, Flagyl   • Monitor fever and WBC curve  • WBC trending up 10 - 12 75 - 13 85  • Sepsis parameters much improved   • Continue to monitor off antibiotics

## 2022-12-20 NOTE — UTILIZATION REVIEW
Continued Stay Review    Date: 12/20/22                          Current Patient Class: inpatient  Current Level of Care: ICU    HPI:77 y o  male initially admitted on 12/11/22 Peritonitis s/p exploratory laparotomy with appendectomy, drainage of abscess, and extensive lysis of adhesions  Peritoneal fluid culture w/gamma hemolytic Strep and anaerobic culture w/Bacteroides fragilis  Received Ciprofloxacin, Flagyl through 12/17  Assessment/Plan: Patient with a fib with RVR with rates 170s to 180s but otherwise asymptomatic  Tolerating diet without abdominal pain, nausea, vomiting  Functioning ostomy  Wound cleansed and redressed at bedside by general sx  Diet as tolerated, transition to Xarelto from [employer] today, ID consult placed, trend WBC and fever      Vital Signs:   Date/Time Temp Pulse Resp BP MAP (mmHg) SpO2 O2 Device Patient Position - Orthostatic VS   12/20/22 1500 97 5 °F (36 4 °C) 107 Abnormal  19 133/78 100 97 % None (Room air) Lying   12/20/22 1100 97 8 °F (36 6 °C) -- -- -- -- -- -- --   12/20/22 0800 -- 146 Abnormal  19 174/80 Abnormal   107 96 % None (Room air) --   BP: am medications given at 12/20/22 0800   12/20/22 0700 98 2 °F (36 8 °C) -- -- -- -- -- -- --   12/20/22 0400 -- 124 Abnormal  17 162/73 105 92 % -- --   12/20/22 0200 -- 80 19 116/59 81 91 % -- --   12/20/22 0155 -- 151 Abnormal  18 -- -- 93 % -- --   12/20/22 0150 -- 152 Abnormal  24 Abnormal  -- -- 93 % -- --   12/20/22 0145 -- 158 Abnormal  26 Abnormal  -- -- 94 % -- --   12/20/22 0000 98 4 °F (36 9 °C) 90 20 124/60 87 92 % None (Room air) Lying   12/19/22 2215 -- 107 Abnormal  26 Abnormal  -- -- 93 % -- --   12/19/22 2210 -- 179 Abnormal  8 Abnormal  -- -- 94 % -- --   12/19/22 2205 -- 173 Abnormal  20 -- -- 92 % -- --   12/19/22 2000 98 6 °F (37 °C) 116 Abnormal  23 Abnormal  132/68 90 92 % None (Room air) Lying     Pertinent Labs/Diagnostic Results:       Results from last 7 days   Lab Units 12/20/22  0524 12/19/22  8251 12/18/22  0547 12/17/22  0502 12/16/22  0450   WBC Thousand/uL 13 85* 12 75* 10 20* 9 30 9 47   HEMOGLOBIN g/dL 12 1 11 8* 11 7* 10 8* 10 9*   HEMATOCRIT % 38 0 37 2 36 2* 34 3* 35 4*   PLATELETS Thousands/uL 455* 424* 367 255 224   NEUTROS ABS Thousands/µL 10 51*  --  7 20  --   --          Results from last 7 days   Lab Units 12/20/22  0524 12/19/22  0521 12/18/22  0547 12/17/22  1714 12/17/22  0502 12/16/22  0450 12/15/22  1941 12/15/22  0429 12/14/22  0528   SODIUM mmol/L 136 134* 136 134* 132* 130* 132* 136 136   POTASSIUM mmol/L 4 0 3 9 3 9 3 7 3 9 4 3 4 0 3 9 3 8   CHLORIDE mmol/L 99 100 101 98 99 99 101 102 103   CO2 mmol/L 31 27 28 29 26 25 26 25 25   ANION GAP mmol/L 6 7 7 7 7 6 5 9 8   BUN mg/dL 10 11 13 15 16 21 22 17 16   CREATININE mg/dL 0 94 0 89 0 91 0 98 0 99 1 21 1 27 1 09 1 06   EGFR ml/min/1 73sq m 78 83 82 75 74 58 54 66 68   CALCIUM mg/dL 8 8 8 4 7 8* 8 4 8 2* 8 3* 8 1* 8 2* 8 5   CALCIUM, IONIZED mmol/L  --   --  1 08* 1 09*  --   --  1 03* 1 05* 1 07*   MAGNESIUM mg/dL 2 0 1 8* 1 8* 2 1 1 8* 2 1 2 1 2 1 2 3   PHOSPHORUS mg/dL  --   --  2 5 2 8 2 9 3 1 3 9 2 9 2 4     Results from last 7 days   Lab Units 12/18/22  0547 12/15/22  1955 12/14/22  0528   AST U/L 18  --  14   ALT U/L 15  --  12   ALK PHOS U/L 53  --  44   TOTAL PROTEIN g/dL 5 0*  --  5 8*   ALBUMIN g/dL 2 8*  --  3 1*   TOTAL BILIRUBIN mg/dL 0 32  --  0 54   AMMONIA umol/L  --  43  --      Results from last 7 days   Lab Units 12/17/22  0114 12/16/22  1147 12/16/22  0113 12/15/22  2345 12/15/22  1945 12/15/22  1714 12/15/22  1155 12/14/22  2319 12/14/22  1756 12/14/22  1201 12/14/22  0535 12/13/22  2355   POC GLUCOSE mg/dl 122 91 122 115 110 122 145* 120 124 105 97 124     Results from last 7 days   Lab Units 12/20/22  0524 12/19/22  0521 12/18/22  0547 12/17/22  1714 12/17/22  0502 12/16/22  0450 12/15/22  1941 12/15/22  0429 12/14/22  0528 12/13/22  1758   GLUCOSE RANDOM mg/dL 125 123 131 152* 110 107 114 103 95 117     Results from last 7 days   Lab Units 12/16/22  0806 12/15/22  1941   PH IGLESIA  7 352 7 271*   PCO2 IGLESIA mm Hg 47 0 59 1*   PO2 IGLESIA mm Hg 23 4* 36 3   HCO3 IGLESIA mmol/L 25 5 26 6   BASE EXC IGLESIA mmol/L -0 4 -1 1   O2 CONTENT IGLESIA ml/dL 6 5 9 6   O2 HGB, VENOUS % 38 7* 60 4     Results from last 7 days   Lab Units 12/18/22  0547 12/17/22  0502 12/17/22  0112 12/14/22  0159 12/13/22  1950   PROTIME seconds  --   --   --   --  16 3*   INR   --   --   --   --  1 32*   PTT seconds 80* 73* 66*   < > 42*    < > = values in this interval not displayed  Results from last 7 days   Lab Units 12/20/22  0524 12/18/22  0547 12/15/22  0429 12/14/22  0528   PROCALCITONIN ng/ml 0 15 0 44* 2 05* 3 36*     Results from last 7 days   Lab Units 12/18/22  0547   DIGOXIN LVL ng/mL 1 8       Medications:   Scheduled Medications:  atorvastatin, 20 mg, Oral, Daily  carisoprodol, 350 mg, Oral, 4x Daily  cholecalciferol, 1,000 Units, Oral, Daily  digoxin, 125 mcg, Oral, Daily  gabapentin, 600 mg, Oral, TID  metoprolol succinate, 100 mg, Oral, BID  ondansetron, 4 mg, Intravenous, Once  oxyCODONE, 10 mg, Oral, Q6H  pantoprazole, 40 mg, Intravenous, Q12H ANDREA  rivaroxaban, 20 mg, Oral, Daily With Breakfast  traZODone, 100 mg, Oral, HS      Continuous IV Infusions: none     PRN Meds:  hydrOXYzine HCL, 25 mg, Oral, Q6H PRN  ondansetron, 4 mg, Intravenous, Q6H PRN  perflutren lipid microsphere, 1 mL/min, Intravenous, Once in imaging        Discharge Plan: tbd    Network Utilization Review Department  ATTENTION: Please call with any questions or concerns to [phone] and carefully listen to the prompts so that you are directed to the right person  All voicemails are confidential   Zethaisie Dievandana all requests for admission clinical reviews, approved or denied determinations and any other requests to dedicated fax number below belonging to the campus where the patient is receiving treatment   List of dedicated fax numbers for the Facilities:  [facility] ADMISSION DENIALS (Administrative/Medical Necessity) [phone]   [address] (Maternity/NICU/Pediatrics) Kendy Andrade [address] Tricia Rowan  [phone]   [address] [address] [facility] [place] [address] [facility] [address] [facility] [place] [address] [phone]

## 2022-12-20 NOTE — PLAN OF CARE
Problem: Potential for Falls  Goal: Patient will remain free of falls  Description: INTERVENTIONS:  - Educate patient/family on patient safety including physical limitations  - Instruct patient to call for assistance with activity   - Consult OT/PT to assist with strengthening/mobility   - Keep Call bell within reach  - Keep bed low and locked with side rails adjusted as appropriate  - Keep care items and personal belongings within reach  - Initiate and maintain comfort rounds  - Make Fall Risk Sign visible to staff  - Offer Toileting every 2 Hours, in advance of need  - Initiate/Maintain bed alarm  - Obtain necessary fall risk management equipment:   - Apply yellow socks and bracelet for high fall risk patients  - Consider moving patient to room near nurses station  Outcome: Progressing     Problem: Prexisting or High Potential for Compromised Skin Integrity  Goal: Skin integrity is maintained or improved  Description: INTERVENTIONS:  - Identify patients at risk for skin breakdown  - Assess and monitor skin integrity  - Assess and monitor nutrition and hydration status  - Monitor labs   - Assess for incontinence   - Turn and reposition patient  - Assist with mobility/ambulation  - Relieve pressure over bony prominences  - Avoid friction and shearing  - Provide appropriate hygiene as needed including keeping skin clean and dry  - Evaluate need for skin moisturizer/barrier cream  - Collaborate with interdisciplinary team   - Patient/family teaching  - Consider wound care consult   Outcome: Progressing     Problem: MOBILITY - ADULT  Goal: Maintain or return to baseline ADL function  Description: INTERVENTIONS:  - Educate patient/family on patient safety including physical limitations  - Instruct patient to call for assistance with activity   - Consult OT/PT to assist with strengthening/mobility   - Keep Call bell within reach  - Keep bed low and locked with side rails adjusted as appropriate  - Keep care items and personal belongings within reach  - Initiate and maintain comfort rounds  - Make Fall Risk Sign visible to staff  - Offer Toileting every 2 Hours, in advance of need  - Initiate/Maintain bed alarm  - Obtain necessary fall risk management equipment:   - Apply yellow socks and bracelet for high fall risk patients  - Consider moving patient to room near nurses station  Outcome: Progressing     Problem: MOBILITY - ADULT  Goal: Maintains/Returns to pre admission functional level  Description: INTERVENTIONS:  - Perform BMAT or MOVE assessment daily    - Set and communicate daily mobility goal to care team and patient/family/caregiver     - Collaborate with rehabilitation services on mobility goals if consulted  - Dangle patient 3 times a day  - Stand patient 3 times a day  - Ambulate patient 3 times a day  - Out of bed to chair 3 times a day   - Out of bed for meals 3  Problem: Prexisting or High Potential for Compromised Skin Integrity  Goal: Skin integrity is maintained or improved  Description: INTERVENTIONS:  - Identify patients at risk for skin breakdown  - Assess and monitor skin integrity  - Assess and monitor nutrition and hydration status  - Monitor labs   - Assess for incontinence   - Turn and reposition patient  - Assist with mobility/ambulation  - Relieve pressure over bony prominences  - Avoid friction and shearing  - Provide appropriate hygiene as needed including keeping skin clean and dry  - Evaluate need for skin moisturizer/barrier cream  - Collaborate with interdisciplinary team   - Patient/family teaching  - Consider wound care consult   Outcome: Progressing     Problem: MOBILITY - ADULT  Goal: Maintain or return to baseline ADL function  Description: INTERVENTIONS:  - Educate patient/family on patient safety including physical limitations  - Instruct patient to call for assistance with activity   - Consult OT/PT to assist with strengthening/mobility   - Keep Call bell within reach  - Keep bed low and locked with side rails adjusted as appropriate  - Keep care items and personal belongings within reach  - Initiate and maintain comfort rounds  - Make Fall Risk Sign visible to staff  - Offer Toileting every 2 Hours, in advance of need  - Initiate/Maintain bed alarm  - Obtain necessary fall risk management equipment:   - Apply yellow socks and bracelet for high fall risk patients  - Consider moving patient to room near nurses station  Outcome: Progressing     Problem: MOBILITY - ADULT  Goal: Maintains/Returns to pre admission functional level  Description: INTERVENTIONS:  - Perform BMAT or MOVE assessment daily    - Set and communicate daily mobility goal to care team and patient/family/caregiver     - Collaborate with rehabilitation services on mobility goals if consulted  - Dangle patient 3 times a day  - Stand patient 3 times a day  - Ambulate patient 3 times a day  - Out of bed to chair 3 times a day   - Out of bed for meals 3  Problem: PAIN - ADULT  Goal: Verbalizes/displays adequate comfort level or baseline comfort level  Description: Interventions:  - Encourage patient to monitor pain and request assistance  - Assess pain using appropriate pain scale  - Administer analgesics based on type and severity of pain and evaluate response  - Implement non-pharmacological measures as appropriate and evaluate response  - Consider cultural and social influences on pain and pain management  - Notify physician/advanced practitioner if interventions unsuccessful or patient reports new pain  Outcome: Progressing     Problem: INFECTION - ADULT  Goal: Absence of fever/infection during neutropenic period  Description: INTERVENTIONS:  - Monitor WBC    Outcome: Progressing     Problem: SAFETY ADULT  Goal: Patient will remain free of falls  Description: INTERVENTIONS:  - Educate patient/family on patient safety including physical limitations  - Instruct patient to call for assistance with activity   - Consult OT/PT to assist with strengthening/mobility   - Keep Call bell within reach  - Keep bed low and locked with side rails adjusted as appropriate  - Keep care items and personal belongings within reach  - Initiate and maintain comfort rounds  - Make Fall Risk Sign visible to staff  - Offer Toileting every 2 Hours, in advance of need  - Initiate/Maintain bed alarm  - Obtain necessary fall risk management equipment:   - Apply yellow socks and bracelet for high fall risk patients  - Consider moving patient to room near nurses station  Outcome: Progressing     Problem: SAFETY ADULT  Goal: Maintain or return to baseline ADL function  Description: INTERVENTIONS:  - Educate patient/family on patient safety including physical limitations  - Instruct patient to call for assistance with activity   - Consult OT/PT to assist with strengthening/mobility   - Keep Call bell within reach  - Keep bed low and locked with side rails adjusted as appropriate  - Keep care items and personal belongings within reach  - Initiate and maintain comfort rounds  - Make Fall Risk Sign visible to staff  - Offer Toileting every 2 Hours, in advance of need  - Initiate/Maintain bed alarm  - Obtain necessary fall risk management equipment:   - Apply yellow socks and bracelet for high fall risk patients  - Consider moving patient to room near nurses station  Outcome: Progressing     Problem: DISCHARGE PLANNING  Goal: Discharge to home or other facility with appropriate resources  Description: INTERVENTIONS:  - Identify barriers to discharge w/patient and caregiver  - Arrange for needed discharge resources and transportation as appropriate  - Identify discharge learning needs (meds, wound care, etc )  - Arrange for interpretive services to assist at discharge as needed  - Refer to Case Management Department for coordinating discharge planning if the patient needs post-hospital services based on physician/advanced practitioner order or complex needs related to functional status, cognitive ability, or social support system  Outcome: Progressing    times a day  - Out of bed for toileting  - Record patient progress and toleration of activity level   Outcome: Progressing    times a day  - Out of bed for toileting  - Record patient progress and toleration of activity level   Outcome: Progressing

## 2022-12-20 NOTE — ASSESSMENT & PLAN NOTE
• New onset AF w/RVR as high as 225  • No previous history of AF  • Suspect this is 2/2 septic shock/peritonitis   • TTE (12/14) revealed EF 35% w/mild concentric hypertrophy, moderately reduced systolic function in global manner; mildly dilated RV w/moderately reduced systolic function; mild MR/TR, RVSP 48  • Initially refractory to IV Lopressor 5 mg x2, IV Cardizem 15 mg x1, IV Amiodarone bolus + gtt initiation  • Cardizem gtt weaned off on 12/15  • Started on digoxin load on 12/16  • Cardiology input appreciated   • Cardiology increased Toprol 100 mg twice daily to 150 mg twice daily today 12/20, continue digoxin 125 mcg daily  • Heparin gtt transition to Xarelto today 12/20

## 2022-12-20 NOTE — PROGRESS NOTES
Progress Note - General Surgery   Rama Cuevas 78 y o  male MRN: [id number]  Unit/Bed#: ICU 10-01 Encounter: [id number]    Assessment:  81-year-old male with past medical history significant for AS s/p valve replacement, chronic pain with continuous use of opioids, HLD, HTN, RA, perforated sigmoid diverticulitis s/p Polo's procedure (August 2022) for reversal in January 2023 presenting with sepsis secondary to abdominal abscess   Now POD#9 s/p exploratory laparotomy with appendectomy, drainage of abscess, and extensive lysis of adhesions  -Afebrile, HR up to 170s to 180s, RR 20s, other vital signs stable on room air   -UO 2 6L  -Stool 400 cc   -WBC 13 85   -Hgb 12 1  -Cr 0 94   -electrolytes unremarkable     Plan:  Wound cleansed and redressed at bedside   Diet as tolerated   Transition to Xarelto from [facility] today   Medical management per SLIM, appreciate recommendations   ID consult placed   Cardiology following, appreciate recommendations   Trend fever/WBC curve   To be evaluated at bedside by attending    Subjective/Objective   Subjective: Patient with a fib with RVR with rates 170s to 180s but otherwise asymptomatic  Tolerating diet without abdominal pain, nausea, vomiting  Functioning ostomy  Objective:   Blood pressure (!) 174/80, pulse (!) 146, temperature 98 2 °F (36 8 °C), temperature source Temporal, resp  rate 19, height 5' 7" (1 702 m), weight 88 1 kg (194 lb 3 6 oz), SpO2 96 %  ,Body mass index is 30 42 kg/m²  Intake/Output Summary (Last 24 hours) at 12/20/2022 0936  Last data filed at 12/20/2022 5709  Gross per 24 hour   Intake 318 ml   Output 3400 ml   Net -3082 ml       Invasive Devices     Peripheral Intravenous Line  Duration           Long-Dwell Peripheral IV (Midline) 62/76/77 Left Basilic 7 days          Drain  Duration           Colostomy Descending/sigmoid -- days    Colostomy Descending/sigmoid  days              Physical Exam  Vitals reviewed     Constitutional: General: He is not in acute distress  Appearance: Normal appearance  He is obese  He is not ill-appearing or toxic-appearing  HENT:      Head: Normocephalic and atraumatic  Cardiovascular:      Rate and Rhythm: Tachycardia present  Rhythm irregular  Pulses: Normal pulses  Heart sounds: Normal heart sounds  Pulmonary:      Effort: Pulmonary effort is normal       Breath sounds: Normal breath sounds  Abdominal:      General: Bowel sounds are normal  There is no distension  Palpations: Abdomen is soft  Tenderness: There is abdominal tenderness (incisional )  There is no guarding or rebound  Comments: Midline incision with increased serous drainage and crusting, cleansed at bedside and redressed; previous DAMASO site with healing wound    Musculoskeletal:         General: Normal range of motion  Right lower leg: Edema present  Left lower leg: Edema present  Skin:     General: Skin is warm and dry  Neurological:      General: No focal deficit present  Mental Status: He is alert and oriented to person, place, and time  Psychiatric:         Mood and Affect: Mood normal          Behavior: Behavior normal          Thought Content: Thought content normal        Lab, Imaging and other studies:  I have personally reviewed pertinent lab results      CBC:   Lab Results   Component Value Date    WBC 13 85 (H) 12/20/2022    HGB 12 1 12/20/2022    HCT 38 0 12/20/2022    MCV 89 12/20/2022     (H) 12/20/2022    MCH 28 3 12/20/2022    MCHC 31 8 12/20/2022    RDW 13 7 12/20/2022    MPV 8 9 12/20/2022    NRBC 0 12/20/2022     CMP:   Lab Results   Component Value Date    SODIUM 136 12/20/2022    K 4 0 12/20/2022    CL 99 12/20/2022    CO2 31 12/20/2022    BUN 10 12/20/2022    CREATININE 0 94 12/20/2022    CALCIUM 8 8 12/20/2022    EGFR 78 12/20/2022     VTE Pharmacologic Prophylaxis: Xarelto  VTE Mechanical Prophylaxis: sequential compression device    Abdias Aquino PA-C

## 2022-12-20 NOTE — ASSESSMENT & PLAN NOTE
• Presented to ED on 12/11 w/acute onset abdominal pain concerning for acute abdomen   • W/hx of sigmoid diverticulitis s/p Polo's procedure and colostomy with planned reversal scheduled for 01/2023  • CT A/P on admission revealed extensive inflammatory changes involving jejunum and proximal-mid ileum, compatible with severe enteritis; there is focal free air surrounding lower midline ileum just above urinary bladder with extensive inflammatory changes; few mildly dilated loops of jejunum within left upper abdomen, likely 2/2 extensive inflammatory changes with possible superimposed low-grade partial SBO; small amount of free air and free fluid in remainder of abdomen and pelvis; stomach and proximal esophagus are distended and fluid-filled, presumably secondary to delayed transit  • OR on 12/11 for EX LAP,  EXTENSIVE MELANI, APPENDECTOMY, DRAINAGE OF ABDOMINAL ABSCESS   • Peritoneal fluid culture w/gamma hemolytic Strep and anaerobic culture w/Bacteroides fragilis   • Received Ciprofloxacin, Flagyl through 12/17  • NGT removed on 12/13 - tolerating diet   • Pain management   • Treatment per surgical team   • ID consulted as leukocytosis worsening- afib uncontrolled

## 2022-12-20 NOTE — PLAN OF CARE
Problem: Potential for Falls  Goal: Patient will remain free of falls  Description: INTERVENTIONS:  - Educate patient/family on patient safety including physical limitations  - Instruct patient to call for assistance with activity   - Consult OT/PT to assist with strengthening/mobility   - Keep Call bell within reach  - Keep bed low and locked with side rails adjusted as appropriate  - Keep care items and personal belongings within reach  - Initiate and maintain comfort rounds  - Make Fall Risk Sign visible to staff  - Offer Toileting every 2 Hours, in advance of need  - Initiate/Maintain bed alarm  - Obtain necessary fall risk management equipment:   - Apply yellow socks and bracelet for high fall risk patients  - Consider moving patient to room near nurses station  Outcome: Progressing     Problem: Prexisting or High Potential for Compromised Skin Integrity  Goal: Skin integrity is maintained or improved  Description: INTERVENTIONS:  - Identify patients at risk for skin breakdown  - Assess and monitor skin integrity  - Assess and monitor nutrition and hydration status  - Monitor labs   - Assess for incontinence   - Turn and reposition patient  - Assist with mobility/ambulation  - Relieve pressure over bony prominences  - Avoid friction and shearing  - Provide appropriate hygiene as needed including keeping skin clean and dry  - Evaluate need for skin moisturizer/barrier cream  - Collaborate with interdisciplinary team   - Patient/family teaching  - Consider wound care consult   Outcome: Progressing     Problem: MOBILITY - ADULT  Goal: Maintain or return to baseline ADL function  Description: INTERVENTIONS:  - Educate patient/family on patient safety including physical limitations  - Instruct patient to call for assistance with activity   - Consult OT/PT to assist with strengthening/mobility   - Keep Call bell within reach  - Keep bed low and locked with side rails adjusted as appropriate  - Keep care items and personal belongings within reach  - Initiate and maintain comfort rounds  - Make Fall Risk Sign visible to staff  - Offer Toileting every 2 Hours, in advance of need  - Initiate/Maintain bed alarm  - Obtain necessary fall risk management equipment:   - Apply yellow socks and bracelet for high fall risk patients  - Consider moving patient to room near nurses station  Outcome: Progressing  Goal: Maintains/Returns to pre admission functional level  Description: INTERVENTIONS:  - Perform BMAT or MOVE assessment daily    - Set and communicate daily mobility goal to care team and patient/family/caregiver     - Collaborate with rehabilitation services on mobility goals if consulted  - Out of bed for toileting  - Record patient progress and toleration of activity level   Outcome: Progressing     Problem: SAFETY ADULT  Goal: Patient will remain free of falls  Description: INTERVENTIONS:  - Educate patient/family on patient safety including physical limitations  - Instruct patient to call for assistance with activity   - Consult OT/PT to assist with strengthening/mobility   - Keep Call bell within reach  - Keep bed low and locked with side rails adjusted as appropriate  - Keep care items and personal belongings within reach  - Initiate and maintain comfort rounds  - Make Fall Risk Sign visible to staff  - Offer Toileting every 2 Hours, in advance of need  - Initiate/Maintain bed alarm  - Obtain necessary fall risk management equipment:   - Apply yellow socks and bracelet for high fall risk patients  - Consider moving patient to room near nurses station  Outcome: Progressing  Goal: Maintain or return to baseline ADL function  Description: INTERVENTIONS:  - Educate patient/family on patient safety including physical limitations  - Instruct patient to call for assistance with activity   - Consult OT/PT to assist with strengthening/mobility   - Keep Call bell within reach  - Keep bed low and locked with side rails adjusted as appropriate  - Keep care items and personal belongings within reach  - Initiate and maintain comfort rounds  - Make Fall Risk Sign visible to staff  - Offer Toileting every 2 Hours, in advance of need  - Initiate/Maintain bed alarm  - Obtain necessary fall risk management equipment:   - Apply yellow socks and bracelet for high fall risk patients  - Consider moving patient to room near nurses station  Outcome: Progressing  Goal: Maintains/Returns to pre admission functional level  Description: INTERVENTIONS:  - Perform BMAT or MOVE assessment daily    - Set and communicate daily mobility goal to care team and patient/family/caregiver     - Collaborate with rehabilitation services on mobility goals if consulted  - Out of bed for toileting  - Record patient progress and toleration of activity level   Outcome: Progressing

## 2022-12-20 NOTE — DISCHARGE INSTR - AVS FIRST PAGE
DISCHARGE INSTRUCTIONS:   Light activity   No heavy lifting, limit lifting to 15-20 pounds for 2 weeks   No limitations for diet   Please take medications as prescribed   Please take acetaminophen/ibuprofen scheduled every 4-6 hours for pain  Please take narcotic pain medication as needed for severe pain   Do not drive if taking narcotic pain medication     Please do not remove sutures or staples prematurely  Please remove dressings and change dressing over midline incision daily  You may shower but do not scrub or submerge incisions    Please notify general surgery office if you experience:  Fever over 101 5F  Persistent nausea or vomiting  Severe uncontrolled pain  Redness, tenderness, or signs of infection near incisions (pain, swelling, redness, yellow/green drainage)  Active or persistent bleeding from incisions  Chest pain, shortness of breath     Please call to schedule a follow up appointment in general surgery office in 2 weeks   Please call our office with any additional questions or concerns

## 2022-12-20 NOTE — PROGRESS NOTES
[facility]    Cardiology Progress Note  Rufus Goyal 78 y o  male   YOB: 1945 MRN: [id number]  Unit/Bed#: ICU 10-01 Encounter: [id number]      Subjective:   His heart rate has continued to be elevated even yesterday evening and overnight, and he needed additional dose of IV Cardizem 20 overnight and this morning to help with rate control  He remains afebrile and does report mild early increased abdominal pain, but no major acute complaints    Assessments  81-year-old gentleman originally had perforated diverticulitis in August 2022, for which he had surgical resection and colostomy placement, came back into the hospital with complaints of severe right lower quadrant abdominal pain and was found to have appendicitis  He underwent surgical resection for the same and was being monitored postoperatively  He was noted to have atrial fibrillation, and cardiology was consulted for assistance with management of the same  He was additionally found to have new onset cardiomyopathy with an EF of 35%      Principal Problem:    Peritonitis ([address] )  Active Problems:    Continuous opioid dependence ([facility] [address] )    Hyperlipidemia    Benign essential hypertension    Insomnia    Status post aortic valve replacement with bioprosthetic valve    Rheumatoid arthritis with rheumatoid factor, unspecified (HCC)    Chronic kidney disease, stage 2 (mild)    Septic shock (HCC)    Acute respiratory failure with hypoxia ([facility] [address] )    New onset atrial fibrillation ([facility] [address] )    Cardiomyopathy [facility])    Outpatient cardiologist is Dr Joshua Lozada  New-onset atrial fibrillation with RVR  · Unknown duration, but has been persistent for the last 48+ hours  · Rate v rhythm control  · Rates remain fluctuant, and were up to 120-150s last night despite uptitration of metoprolol --> received a dose of IV cardizem 20 overnight & this morning  · Currently on metoprolol succinate 100 mg twice daily + Digoxin 0 125 mg daily  · With low EF, would prefer to avoid cardizem for now  · Increase Digoxin to 0 25 mg/0 125 mg alternate days --> give extra digoxin 0 125 mg today  · His WBC count, platelet count are uptrending, and his continued RVR may be secondary to developing infection --> defer to primary surgical service / ID regarding further evaluation or treatment for same  · If continued difficulty with rate control, then may benefit from inpatient SHANDRA-cardioversion attempt  · Stroke prophylaxis  · Transitioned to PO eliquis 5 mg bid  · Continue same    New-onset cardiomyopathy - LVEF 35%  · Etiology  · No ischemic symptoms  · Possibly related to rapid atrial fibrillation/stress  · Repeat TTE done today, is limited due to tachycardia, but overall appears unchanged  · Continue to optimize rate control for A  Fib  · Increase metoprolol to 100 mg twice daily  · Will benefit from outpatient nuclear stress testing to rule out ischemia    S/p bio-AVR  · Normally functioning bio AVR on TTE  · Monitor clinically    Review of Systems   All other systems reviewed and are negative  Telemetry Review: Afib, HR in  at time of my evaluation, but has been periodically up to 140-150 overnight    Objective:   Vitals: Blood pressure (!) 174/80, pulse (!) 146, temperature 98 2 °F (36 8 °C), temperature source Temporal, resp  rate 19, height 5' 7" (1 702 m), weight 88 1 kg (194 lb 3 6 oz), SpO2 96 %  , Body mass index is 30 42 kg/m² ,   Orthostatic Blood Pressures    Flowsheet Row Most Recent Value   Blood Pressure 174/80  [am medications given] filed at 12/20/2022 0800   Patient Position - Orthostatic VS Lying filed at 12/20/2022 6913         Systolic (21JJG), SWH:514 , Min:116 , PAS:060     Diastolic (41VMT), NDB:19, Min:59, Max:86    Wt Readings from Last 5 Encounters:   12/20/22 88 1 kg (194 lb 3 6 oz)   10/31/22 88 5 kg (195 lb)   10/18/22 89 4 kg (197 lb)   09/12/22 84 8 kg (187 lb)   08/20/22 85 3 kg (188 lb)     I/O       12/17 0701  12/18 0700 12/18 0701  12/19 0700 12/19 0701  12/20 0700    P  O  786 440     I V  (mL/kg) 333 5 (3 7) 155 9 (1 7)     Total Intake(mL/kg) 1119 5 (12 3) 595 9 (6 6)     Urine (mL/kg/hr) 4300 (2) 2560 (1 2)     Stool 1700 675     Total Output 6000 3235     Net -4889 7 -7019 1                      Physical Exam  Vitals and nursing note reviewed  Constitutional:       General: He is not in acute distress  Appearance: He is well-developed  He is ill-appearing  HENT:      Head: Normocephalic and atraumatic  Nose: No congestion  Eyes:      General: No scleral icterus  Conjunctiva/sclera: Conjunctivae normal    Neck:      Vascular: No carotid bruit or JVD  Cardiovascular:      Rate and Rhythm: Tachycardia present  Rhythm irregular  Heart sounds: Normal heart sounds  No murmur heard  No friction rub  No gallop  Pulmonary:      Effort: Pulmonary effort is normal  No respiratory distress  Breath sounds: Normal breath sounds  No wheezing or rales  Chest:      Chest wall: No tenderness  Abdominal:      General: There is no distension  Palpations: Abdomen is soft  Tenderness: There is no abdominal tenderness  Comments: Left lower side colostomy bag noted in place  Central abdominal wound dressing noted in place  Musculoskeletal:         General: No swelling, tenderness or deformity  Cervical back: Neck supple  No muscular tenderness  Right lower leg: No edema  Left lower leg: No edema  Skin:     General: Skin is warm  Neurological:      General: No focal deficit present  Mental Status: He is alert and oriented to person, place, and time  Mental status is at baseline  Psychiatric:         Mood and Affect: Mood normal          Behavior: Behavior normal          Thought Content:  Thought content normal          Laboratory Results: personally reviewed        CBC with diff:   Results from last 7 days   Lab Units 12/20/22  0524 12/19/22  0521 12/18/22  0547 12/17/22  0502 12/16/22  0450 12/15/22  0429 12/14/22  0528   WBC Thousand/uL 13 85* 12 75* 10 20* 9 30 9 47 13 88* 15 40*   HEMOGLOBIN g/dL 12 1 11 8* 11 7* 10 8* 10 9* 11 7* 12 1   HEMATOCRIT % 38 0 37 2 36 2* 34 3* 35 4* 37 6 37 4   MCV fL 89 90 89 91 93 92 90   PLATELETS Thousands/uL 455* 424* 367 255 224 223 201   MCH pg 28 3 28 4 28 9 28 5 28 6 28 5 29 2   MCHC g/dL 31 8 31 7 32 3 31 5 30 8* 31 1* 32 4   RDW % 13 7 13 5 13 5 13 8 13 9 13 7 13 6   MPV fL 8 9 9 1 9 3 9 8 9 8 9 3 9 5   NRBC AUTO /100 WBCs 0  --  0  --   --   --   --          CMP:  Results from last 7 days   Lab Units 12/20/22  0524 12/19/22  0521 12/18/22  0547 12/17/22  1714 12/17/22  0502 12/16/22  0450 12/15/22  1941 12/15/22  0429 12/14/22  0528   POTASSIUM mmol/L 4 0 3 9 3 9 3 7 3 9 4 3 4 0   < > 3 8   CHLORIDE mmol/L 99 100 101 98 99 99 101   < > 103   CO2 mmol/L 31 27 28 29 26 25 26   < > 25   BUN mg/dL 10 11 13 15 16 21 22   < > 16   CREATININE mg/dL 0 94 0 89 0 91 0 98 0 99 1 21 1 27   < > 1 06   CALCIUM mg/dL 8 8 8 4 7 8* 8 4 8 2* 8 3* 8 1*   < > 8 5   AST U/L  --   --  18  --   --   --   --   --  14   ALT U/L  --   --  15  --   --   --   --   --  12   ALK PHOS U/L  --   --  53  --   --   --   --   --  44   EGFR ml/min/1 73sq m 78 83 82 75 74 58 54   < > 68    < > = values in this interval not displayed  BMP:  Results from last 7 days   Lab Units 12/20/22  0524 12/19/22  0521 12/18/22  0547 12/17/22  1714 12/17/22  0502 12/16/22  0450 12/15/22  1941   POTASSIUM mmol/L 4 0 3 9 3 9 3 7 3 9 4 3 4 0   CHLORIDE mmol/L 99 100 101 98 99 99 101   CO2 mmol/L 31 27 28 29 26 25 26   BUN mg/dL 10 11 13 15 16 21 22   CREATININE mg/dL 0 94 0 89 0 91 0 98 0 99 1 21 1 27   CALCIUM mg/dL 8 8 8 4 7 8* 8 4 8 2* 8 3* 8 1*       BNP: No results for input(s): BNP in the last 72 hours      Magnesium:   Results from last 7 days   Lab Units 12/20/22  0524 12/19/22  0521 12/18/22  0547 12/17/22  1714 12/17/22  0502 12/16/22  0450 12/15/22  1941 MAGNESIUM mg/dL 2 0 1 8* 1 8* 2 1 1 8* 2 1 2 1       Coags:   Results from last 7 days   Lab Units 12/18/22  0547 12/17/22  0502 12/17/22  0112 12/16/22  1824 12/16/22  1148 12/16/22  0450 12/16/22  0108 12/14/22  0159 12/13/22  1950   PTT seconds 80* 73* 66* 64* 33 48* 97*   < > 42*   INR   --   --   --   --   --   --   --   --  1 32*    < > = values in this interval not displayed         TSH:        Hemoglobin A1C       Lipid Profile:       Meds/Allergies   all current active meds have been reviewed and current meds:   Current Facility-Administered Medications   Medication Dose Route Frequency   • atorvastatin (LIPITOR) tablet 20 mg  20 mg Oral Daily   • carisoprodol (SOMA) tablet 350 mg  350 mg Oral 4x Daily   • cholecalciferol (VITAMIN D3) tablet 1,000 Units  1,000 Units Oral Daily   • digoxin (LANOXIN) tablet 125 mcg  125 mcg Oral Daily   • diltiazem (CARDIZEM) 25 mg/5 mL injection **ADS Override Pull**       • gabapentin (NEURONTIN) tablet 600 mg  600 mg Oral TID   • hydrOXYzine HCL (ATARAX) tablet 25 mg  25 mg Oral Q6H PRN   • metoprolol succinate (TOPROL-XL) 24 hr tablet 100 mg  100 mg Oral BID   • ondansetron (ZOFRAN) injection 4 mg  4 mg Intravenous Q6H PRN   • ondansetron (ZOFRAN) injection 4 mg  4 mg Intravenous Once   • oxyCODONE (ROXICODONE) immediate release tablet 10 mg  10 mg Oral Q6H   • pantoprazole (PROTONIX) injection 40 mg  40 mg Intravenous Q12H ANDREA   • perflutren lipid microsphere (DEFINITY) injection  1 mL/min Intravenous Once in imaging   • rivaroxaban (XARELTO) tablet 20 mg  20 mg Oral Daily With Breakfast   • traZODone (DESYREL) tablet 100 mg  100 mg Oral HS     Medications Prior to Admission   Medication   • aspirin 81 MG tablet   • atorvastatin (LIPITOR) 20 mg tablet   • betamethasone dipropionate (DIPROSONE) 0 05 % cream   • carisoprodol (SOMA) 350 mg tablet   • Cholecalciferol (VITAMIN D3) 1000 units CAPS   • diclofenac sodium (VOLTAREN) 1 %   • fluocinonide (LIDEX) 0 05 % external solution   • gabapentin (NEURONTIN) 600 MG tablet   • ketoconazole (NIZORAL) 2 % cream   • losartan (COZAAR) 50 mg tablet   • naloxone (NARCAN) 4 mg/0 1 mL nasal spray   • oxyCODONE (ROXICODONE) 10 MG TABS   • oxyCODONE-acetaminophen (PERCOCET)  mg per tablet   • Secukinumab 150 MG/ML SOAJ   • traZODone (DESYREL) 300 MG tablet            Cardiac testing: reviewed  Results for orders placed during the hospital encounter of 17    Echo complete with contrast if indicated    Narrative  [address]  [address] [place]  [phone]    Transthoracic Echocardiogram  2D, M-mode, Doppler, and Color Doppler    Study date:  08-Mar-2017    Patient: Jensen Richards  MR number: [id number]  Account number: [de-identified]  : [id number]  Age: 73 years  Gender: Male  Status: Outpatient  Location: Echo lab  Height: 67 in  Weight: 214 5 lb  BP: 146/ 80 mmHg    Diagnoses: 785 2 - CARDIAC MURMURS NEC    Sonographer:  Nayan Leslie RDCS  Primary Physician:  Jessica Lopez DO  Referring Physician:  Jessica Lopez DO  Group:  [facility]  Interpreting Physician:  Florin Lott MD    SUMMARY    LEFT VENTRICLE:  Size was normal   Systolic function was normal  Ejection fraction was estimated to be 60 %  There were no regional wall motion abnormalities  Wall thickness was increased  There was mild concentric hypertrophy  LEFT ATRIUM:  The atrium was mildly dilated  MITRAL VALVE:  There was mild annular calcification  There was mild regurgitation  AORTIC VALVE:  The valve was probably trileaflet  Leaflets exhibited moderate calcification and markedly reduced cuspal separation  There was severe stenosis  There was mild regurgitation  Valve peak gradient was 62 mmHg  Valve mean gradient was 35 mmHg  Aortic valve area was 0 6 cm squared by the continuity equation  TRICUSPID VALVE:  There was mild regurgitation    Estimated peak PA pressure was 40 mmHg     HISTORY: PRIOR HISTORY: HYPERLIPIDEMIA, CHRONIC BACK PAIN, MURMUR, AORTIC STENOSIS    PROCEDURE: The procedure was performed in the echo lab  This was a routine study  The transthoracic approach was used  The study included complete 2D imaging, M-mode, complete spectral Doppler, and color Doppler  Images were obtained from  the parasternal, apical, subcostal, and suprasternal notch acoustic windows  Image quality was adequate  LEFT VENTRICLE: Size was normal  Systolic function was normal  Ejection fraction was estimated to be 60 %  There were no regional wall motion abnormalities  Wall thickness was increased  There was mild concentric hypertrophy  RIGHT VENTRICLE: The size was normal  Systolic function was normal  Wall thickness was normal     LEFT ATRIUM: The atrium was mildly dilated  RIGHT ATRIUM: Size was normal     MITRAL VALVE: There was mild annular calcification  DOPPLER: There was mild regurgitation  AORTIC VALVE: The valve was probably trileaflet  Leaflets exhibited moderate calcification and markedly reduced cuspal separation  DOPPLER: There was severe stenosis  There was mild regurgitation  TRICUSPID VALVE: DOPPLER: There was mild regurgitation  Estimated peak PA pressure was 40 mmHg  PULMONIC VALVE: Not well visualized  PERICARDIUM: There was no pericardial effusion  The pericardium was normal in appearance  AORTA: The root exhibited normal size      MEASUREMENT TABLES    DOPPLER MEASUREMENTS  Aortic valve   (Reference normals)  Peak gradient   62 mmHg   (--)  Mean gradient   35 mmHg   (--)  Valve area, cont   0 6 cm squared   (--)    SYSTEM MEASUREMENT TABLES    2D  %FS: 41 41 %  AV Diam: 3 19 cm  EDV(Teich): 154 63 ml  EF(Teich): 71 67 %  ESV(Teich): 43 81 ml  IVSd: 0 99 cm  LA Area: 21 71 cm2  LA Diam: 4 42 cm  LVEDV MOD A4C: 133 34 ml  LVEF MOD A4C: 33 55 %  LVESV MOD A4C: 88 61 ml  LVIDd: 5 62 cm  LVIDs: 3 29 cm  LVLd A4C: 7 54 cm  LVLs A4C: 6 64 cm  LVOT Diam: 2 06 cm  LVPWd: 1 15 cm  RA Area: 11 96 cm2  RV DIAM: 3 35 cm  SV MOD A4C: 44 74 ml  SV(Teich): 110 82 ml    CW  AR Dec Moore: 2 21 m/s2  AR Dec Time: 2293 88 ms  AR PHT: 665 23 ms  AR Vmax: 4 61 m/s  AR maxP 11 mmHg  AV VTI: 86 81 cm  AV Vmax: 3 81 m/s  AV Vmax: 3 94 m/s  AV Vmean: 2 8 m/s  AV maxP 99 mmHg  AV maxP 23 mmHg  AV meanP 69 mmHg  PV Vmax: 1 19 m/s  PV maxP 67 mmHg  TR Vmax: 3 07 m/s  TR maxP 58 mmHg    MM  TAPSE: 2 41 cm    PW  PHILLY (VTI): 0 61 cm2  PHILLY Vmax: 0 55 cm2  PHILLY Vmax: 0 57 cm2  PHILLY Vmax, Pt: 0 54 cm2  PHILLY Vmax, Pt: 0 56 cm2  E': 0 09 m/s  E/E': 9 8  LVOT VTI: 15 74 cm  LVOT Vmax: 0 64 m/s  LVOT Vmax: 0 65 m/s  LVOT Vmean: 0 48 m/s  LVOT maxP 65 mmHg  LVOT maxP 71 mmHg  LVOT meanP 02 mmHg  MV A Anirudh: 0 69 m/s  MV Dec Moore: 5 17 m/s2  MV DecT: 178 9 ms  MV E Anirudh: 0 93 m/s  MV E/A Ratio: 1 35  RVOT Vmax: 0 49 m/s  RVOT maxP 98 mmHg    [facility] Accredited Echocardi[facility] Laboratory    Prepared and electronically signed by    Florin Lott MD  Signed 08-Mar-2017 14:11:26    No results found for this or any previous visit  No results found for this or any previous visit  No results found for this or any previous visit

## 2022-12-20 NOTE — PROGRESS NOTES
Monty U  68   Progress Note - Wood Moser 1945, 78 y o  male MRN: [id number]  Unit/Bed#: ICU 10-01 Encounter: [id number]  Primary Care Provider: Jessica Lopez DO   Date and time admitted to hospital: 12/11/2022  5:19 AM    * Peritonitis [facility])  Assessment & Plan  • Presented to ED on 12/11 w/acute onset abdominal pain concerning for acute abdomen   • W/hx of sigmoid diverticulitis s/p Polo's procedure and colostomy with planned reversal scheduled for 01/2023  • CT A/P on admission revealed extensive inflammatory changes involving jejunum and proximal-mid ileum, compatible with severe enteritis; there is focal free air surrounding lower midline ileum just above urinary bladder with extensive inflammatory changes; few mildly dilated loops of jejunum within left upper abdomen, likely 2/2 extensive inflammatory changes with possible superimposed low-grade partial SBO; small amount of free air and free fluid in remainder of abdomen and pelvis; stomach and proximal esophagus are distended and fluid-filled, presumably secondary to delayed transit  • OR on 12/11 for EX LAP,  EXTENSIVE MELANI, APPENDECTOMY, DRAINAGE OF ABDOMINAL ABSCESS   • Peritoneal fluid culture w/gamma hemolytic Strep and anaerobic culture w/Bacteroides fragilis   • Received Ciprofloxacin, Flagyl through 12/17  • NGT removed on 12/13 - tolerating diet   • Pain management   • Treatment per surgical team   • ID consulted as leukocytosis worsening- afib uncontrolled        New onset atrial fibrillation ([facility] [address] )  Assessment & Plan  • New onset AF w/RVR as high as 225  • No previous history of AF  • Suspect this is 2/2 septic shock/peritonitis   • TTE (12/14) revealed EF 35% w/mild concentric hypertrophy, moderately reduced systolic function in global manner; mildly dilated RV w/moderately reduced systolic function; mild MR/TR, RVSP 48  • Initially refractory to IV Lopressor 5 mg x2, IV Cardizem 15 mg x1, IV Amiodarone bolus + gtt initiation  • Cardizem gtt weaned off on 12/15  • Started on digoxin load on 12/16  • Cardiology input appreciated   • Cardiology increased Toprol 100 mg twice daily to 150 mg twice daily today 12/20, continue digoxin 125 mcg daily  • Heparin gtt transition to Xarelto today 12/20  Acute respiratory failure with hypoxia [facility])  Assessment & Plan  • Suspect this is is multifactorial in setting of atelectasis vs mild pulmonary vascular congestion vs post-operative pain  • CXR on 12/13 w/mildly increased interstitial markings bilaterally - may indicate mild pulmonary edema  • Respiratory status improved w/IV diuresis - continue w/PRN diuresis  • Weaned to RA - continue to maintain SpO2 > 90%  • Aggressive pulmonary hygiene  • Encourage cough, deep breathing, IS, ambulation    Septic shock (HCC)  Assessment & Plan  • POA: AEB tachycardia, tachypnea, leukopenia, lactic acidosis > 4, GERMAN  • In setting of intraabdominal infection/peritonitis  • COVID/FLU/RSV negative  • Blood cultures w/NGTD  • Peritoneal fluid culture w/gamma hemolytic Strep  • Anaerobic culture w/Bacteroides fragilis   • Procalcitonin 8 29 - 6 02 - 3 36 - 2 05- 0 44 - 0 15  • s/p aggressive IVF resuscitation  • Received IV Ciprofloxacin, Flagyl   • Monitor fever and WBC curve  • WBC trending up 10 - 12 75 - 13 85  • Sepsis parameters much improved   • Continue to monitor off antibiotics        Chronic kidney disease, stage 2 (mild)  Assessment & Plan  Lab Results   Component Value Date    EGFR 78 12/20/2022    EGFR 83 12/19/2022    EGFR 82 12/18/2022    CREATININE 0 94 12/20/2022    CREATININE 0 89 12/19/2022    CREATININE 0 91 12/18/2022   · GERMAN due to sepsis- resolved with IVF/abx/surgical intervention   · Baseline Cr 0 8-1 0 mg/dL   · Currently at baseline  · Monitor renal function     Benign essential hypertension  Assessment & Plan  · Hold home losartan for now as metoprolol is being uptitrated       Continuous opioid dependence ([facility] [address] )  Assessment & Plan  · Setting of chronic pain due to RA/OA  · [place] PDMP reviewed  · Continue with home Soma 350 mg 3 times daily, Neurontin 600 mg 3 times daily, oxycodone 10 mg 4 times daily        VTE Pharmacologic Prophylaxis: VTE Score: 8 High Risk (Score >/= 5) - Pharmacological DVT Prophylaxis Ordered: rivaroxaban (Xarelto)  Sequential Compression Devices Ordered  Patient Centered Rounds: I performed bedside rounds with nursing staff today  Discussions with Specialists or Other Care Team Provider: nursing, general surgery, CM    Education and Discussions with Family / Patient: patient  Time Spent for Care: 20 minutes  More than 50% of total time spent on counseling and coordination of care as described above  Current Length of Stay: 9 day(s)  Current Patient Status: Inpatient   Certification Statement: The patient will continue to require additional inpatient hospital stay due to adjustment of medications, monitoring HR, ID consult  Discharge Plan: Anticipate discharge in 24-48 hrs to home with home services  Code Status: Level 1 - Full Code    Subjective: The patient was seen and examined  The patient states he's feeling well  He denies any complaints  His HR is elevated into the 140's earlier today  Objective:     Vitals:   Temp (24hrs), Av 1 °F (36 7 °C), Min:97 5 °F (36 4 °C), Max:98 6 °F (37 °C)    Temp:  [97 5 °F (36 4 °C)-98 6 °F (37 °C)] 97 5 °F (36 4 °C)  HR:  [] 142  Resp:  [8-26] 22  BP: (116-174)/(57-80) 155/69  SpO2:  [91 %-97 %] 95 %  Body mass index is 30 42 kg/m²  Input and Output Summary (last 24 hours): Intake/Output Summary (Last 24 hours) at 2022 1806  Last data filed at 2022 1504  Gross per 24 hour   Intake 118 ml   Output 2250 ml   Net -2132 ml       Physical Exam:   Physical Exam  Vitals and nursing note reviewed  Constitutional:       General: He is awake  Appearance: Normal appearance     Cardiovascular:      Rate and Rhythm: Tachycardia present  Rhythm irregularly irregular  Pulmonary:      Effort: Pulmonary effort is normal       Breath sounds: Normal breath sounds  Abdominal:      Palpations: Abdomen is soft  Tenderness: There is no abdominal tenderness  Skin:     General: Skin is warm and dry  Neurological:      General: No focal deficit present  Mental Status: He is alert and oriented to person, place, and time  Psychiatric:         Attention and Perception: Attention normal          Mood and Affect: Mood normal          Speech: Speech normal          Behavior: Behavior is cooperative  Additional Data:     Labs:  Results from last 7 days   Lab Units 12/20/22  0524   WBC Thousand/uL 13 85*   HEMOGLOBIN g/dL 12 1   HEMATOCRIT % 38 0   PLATELETS Thousands/uL 455*   NEUTROS PCT % 76*   LYMPHS PCT % 14   MONOS PCT % 8   EOS PCT % 1     Results from last 7 days   Lab Units 12/20/22  0524 12/19/22  0521 12/18/22  0547   SODIUM mmol/L 136   < > 136   POTASSIUM mmol/L 4 0   < > 3 9   CHLORIDE mmol/L 99   < > 101   CO2 mmol/L 31   < > 28   BUN mg/dL 10   < > 13   CREATININE mg/dL 0 94   < > 0 91   ANION GAP mmol/L 6   < > 7   CALCIUM mg/dL 8 8   < > 7 8*   ALBUMIN g/dL  --   --  2 8*   TOTAL BILIRUBIN mg/dL  --   --  0 32   ALK PHOS U/L  --   --  53   ALT U/L  --   --  15   AST U/L  --   --  18   GLUCOSE RANDOM mg/dL 125   < > 131    < > = values in this interval not displayed       Results from last 7 days   Lab Units 12/13/22  1950   INR  1 32*     Results from last 7 days   Lab Units 12/17/22  0114 12/16/22  1147 12/16/22  0113 12/15/22  2345 12/15/22  1945 12/15/22  1714 12/15/22  1155 12/14/22  2319 12/14/22  1756 12/14/22  1201 12/14/22  0535 12/13/22  2355   POC GLUCOSE mg/dl 122 91 122 115 110 122 145* 120 124 105 97 124         Results from last 7 days   Lab Units 12/20/22  0524 12/18/22  0547 12/15/22  0429 12/14/22  0528   PROCALCITONIN ng/ml 0 15 0 44* 2 05* 3 36*       Lines/Drains:  Invasive Devices Peripheral Intravenous Line  Duration           Long-Dwell Peripheral IV (Midline) 72/92/96 Left Basilic 8 days          Drain  Duration           Colostomy Descending/sigmoid -- days    Colostomy Descending/sigmoid  days                      Imaging: No pertinent imaging reviewed  Recent Cultures (last 7 days):         Last 24 Hours Medication List:   Current Facility-Administered Medications   Medication Dose Route Frequency Provider Last Rate   • atorvastatin  20 mg Oral Daily Maciej Blocker, CRNP     • carisoprodol  350 mg Oral 4x Daily Maciej Blocker, CRNP     • cholecalciferol  1,000 Units Oral Daily Maciej Blocker, CRNP     • digoxin  125 mcg Oral Daily Ant Davis PA-C     • gabapentin  600 mg Oral TID Maciej Blocker, ARIADNANP     • hydrOXYzine HCL  25 mg Oral Q6H PRN Nanetta FractionVINI     • metoprolol succinate  150 mg Oral BID Elizabeth Mcdaniels MD     • ondansetron  4 mg Intravenous Q6H PRN Maciej Blocker, CRNP     • ondansetron  4 mg Intravenous Once Nanetta FractionVINI     • oxyCODONE  10 mg Oral Q6H Maciej Blocker, ARIADNANP     • pantoprazole  40 mg Intravenous Q12H [facility] PEDRO Perry     • perflutren lipid microsphere  1 mL/min Intravenous Once in imaging Maciej Blocker, PEDRO     • rivaroxaban  20 mg Oral Daily With Breakfast Ace Mcfarland PA-C     • traZODone  100 mg Oral HS Maciej Blocker, PEDRO          Today, Patient Was Seen By: Ace Mcfarland PA-C    **Please Note: This note may have been constructed using a voice recognition system  **

## 2022-12-21 ENCOUNTER — APPOINTMENT (INPATIENT)
Dept: CT IMAGING | Facility: HOSPITAL | Age: 77
End: 2022-12-21

## 2022-12-21 LAB
BASOPHILS # BLD AUTO: 0.05 THOUSANDS/ÂΜL (ref 0–0.1)
BASOPHILS NFR BLD AUTO: 0 % (ref 0–1)
EOSINOPHIL # BLD AUTO: 0.18 THOUSAND/ÂΜL (ref 0–0.61)
EOSINOPHIL NFR BLD AUTO: 1 % (ref 0–6)
ERYTHROCYTE [DISTWIDTH] IN BLOOD BY AUTOMATED COUNT: 13.6 % (ref 11.6–15.1)
HCT VFR BLD AUTO: 38.2 % (ref 36.5–49.3)
HGB BLD-MCNC: 12.1 G/DL (ref 12–17)
IMM GRANULOCYTES # BLD AUTO: 0.11 THOUSAND/UL (ref 0–0.2)
IMM GRANULOCYTES NFR BLD AUTO: 1 % (ref 0–2)
LYMPHOCYTES # BLD AUTO: 2.48 THOUSANDS/ÂΜL (ref 0.6–4.47)
LYMPHOCYTES NFR BLD AUTO: 17 % (ref 14–44)
MCH RBC QN AUTO: 28.4 PG (ref 26.8–34.3)
MCHC RBC AUTO-ENTMCNC: 31.7 G/DL (ref 31.4–37.4)
MCV RBC AUTO: 90 FL (ref 82–98)
MONOCYTES # BLD AUTO: 1.08 THOUSAND/ÂΜL (ref 0.17–1.22)
MONOCYTES NFR BLD AUTO: 7 % (ref 4–12)
NEUTROPHILS # BLD AUTO: 10.68 THOUSANDS/ÂΜL (ref 1.85–7.62)
NEUTS SEG NFR BLD AUTO: 74 % (ref 43–75)
NRBC BLD AUTO-RTO: 0 /100 WBCS
PLATELET # BLD AUTO: 519 THOUSANDS/UL (ref 149–390)
PMV BLD AUTO: 9 FL (ref 8.9–12.7)
RBC # BLD AUTO: 4.26 MILLION/UL (ref 3.88–5.62)
WBC # BLD AUTO: 14.58 THOUSAND/UL (ref 4.31–10.16)

## 2022-12-21 RX ORDER — ACETAMINOPHEN 325 MG/1
650 TABLET ORAL EVERY 6 HOURS PRN
Status: DISCONTINUED | OUTPATIENT
Start: 2022-12-21 | End: 2022-12-23 | Stop reason: HOSPADM

## 2022-12-21 RX ORDER — DIGOXIN 125 MCG
250 TABLET ORAL DAILY
Status: DISCONTINUED | OUTPATIENT
Start: 2022-12-22 | End: 2022-12-23 | Stop reason: HOSPADM

## 2022-12-21 RX ORDER — DILTIAZEM HYDROCHLORIDE 120 MG/1
120 CAPSULE, COATED, EXTENDED RELEASE ORAL DAILY
Status: DISCONTINUED | OUTPATIENT
Start: 2022-12-21 | End: 2022-12-22

## 2022-12-21 RX ORDER — DIGOXIN 125 MCG
125 TABLET ORAL ONCE
Status: COMPLETED | OUTPATIENT
Start: 2022-12-21 | End: 2022-12-21

## 2022-12-21 RX ADMIN — METOPROLOL SUCCINATE 150 MG: 50 TABLET, EXTENDED RELEASE ORAL at 08:32

## 2022-12-21 RX ADMIN — ACETAMINOPHEN 650 MG: 325 TABLET ORAL at 22:21

## 2022-12-21 RX ADMIN — IOHEXOL 100 ML: 350 INJECTION, SOLUTION INTRAVENOUS at 10:14

## 2022-12-21 RX ADMIN — OXYCODONE HYDROCHLORIDE 10 MG: 10 TABLET ORAL at 00:30

## 2022-12-21 RX ADMIN — RIVAROXABAN 20 MG: 10 TABLET, FILM COATED ORAL at 08:33

## 2022-12-21 RX ADMIN — GABAPENTIN 600 MG: 600 TABLET, FILM COATED ORAL at 20:19

## 2022-12-21 RX ADMIN — PANTOPRAZOLE SODIUM 40 MG: 40 INJECTION, POWDER, LYOPHILIZED, FOR SOLUTION INTRAVENOUS at 08:34

## 2022-12-21 RX ADMIN — CARISOPRODOL 350 MG: 350 TABLET ORAL at 11:42

## 2022-12-21 RX ADMIN — CARISOPRODOL 350 MG: 350 TABLET ORAL at 21:05

## 2022-12-21 RX ADMIN — DIGOXIN 125 MCG: 125 TABLET ORAL at 08:33

## 2022-12-21 RX ADMIN — TRAZODONE HYDROCHLORIDE 100 MG: 100 TABLET ORAL at 21:05

## 2022-12-21 RX ADMIN — CARISOPRODOL 350 MG: 350 TABLET ORAL at 17:41

## 2022-12-21 RX ADMIN — OXYCODONE HYDROCHLORIDE 10 MG: 10 TABLET ORAL at 17:41

## 2022-12-21 RX ADMIN — OXYCODONE HYDROCHLORIDE 10 MG: 10 TABLET ORAL at 06:22

## 2022-12-21 RX ADMIN — GABAPENTIN 600 MG: 600 TABLET, FILM COATED ORAL at 08:33

## 2022-12-21 RX ADMIN — CARISOPRODOL 350 MG: 350 TABLET ORAL at 08:33

## 2022-12-21 RX ADMIN — DILTIAZEM HYDROCHLORIDE 120 MG: 120 CAPSULE, COATED, EXTENDED RELEASE ORAL at 11:42

## 2022-12-21 RX ADMIN — DIGOXIN 125 MCG: 125 TABLET ORAL at 10:28

## 2022-12-21 RX ADMIN — PANTOPRAZOLE SODIUM 40 MG: 40 INJECTION, POWDER, LYOPHILIZED, FOR SOLUTION INTRAVENOUS at 20:19

## 2022-12-21 RX ADMIN — METOPROLOL SUCCINATE 150 MG: 50 TABLET, EXTENDED RELEASE ORAL at 20:19

## 2022-12-21 RX ADMIN — Medication 1000 UNITS: at 08:33

## 2022-12-21 RX ADMIN — OXYCODONE HYDROCHLORIDE 10 MG: 10 TABLET ORAL at 11:42

## 2022-12-21 RX ADMIN — GABAPENTIN 600 MG: 600 TABLET, FILM COATED ORAL at 17:41

## 2022-12-21 RX ADMIN — ONDANSETRON 4 MG: 2 INJECTION INTRAMUSCULAR; INTRAVENOUS at 17:41

## 2022-12-21 RX ADMIN — OXYCODONE HYDROCHLORIDE 10 MG: 10 TABLET ORAL at 23:05

## 2022-12-21 RX ADMIN — ATORVASTATIN CALCIUM 20 MG: 10 TABLET, FILM COATED ORAL at 08:33

## 2022-12-21 NOTE — PROGRESS NOTES
[facility]    Cardiology Progress Note  Tal Leger 78 y o  male   YOB: 1945 MRN: [id number]  Unit/Bed#: ICU 10-01 Encounter: [id number]      Subjective:   Unfortunately his heart rate continues to be elevated in the 120s to 150s despite up titration and maximization of the blockers and digoxin   No fevers, and abdominal discomfort appears mild  His WBC and platelet count continues to trend up    Assessments  29-year-old gentleman originally had perforated diverticulitis in August 2022, for which he had surgical resection and colostomy placement, came back into the hospital with complaints of severe right lower quadrant abdominal pain and was found to have appendicitis  He underwent surgical resection for the same and was being monitored postoperatively  He was noted to have atrial fibrillation, and cardiology was consulted for assistance with management of the same  He was additionally found to have new onset cardiomyopathy with an EF of 35%      Principal Problem:    Peritonitis ([address] )  Active Problems:    Continuous opioid dependence ([address] )    Hyperlipidemia    Benign essential hypertension    Insomnia    Status post aortic valve replacement with bioprosthetic valve    Rheumatoid arthritis with rheumatoid factor, unspecified (HCC)    Chronic kidney disease, stage 2 (mild)    Septic shock (HCC)    Acute respiratory failure with hypoxia ([address] )    New onset atrial fibrillation ([facility] [address] )    Cardiomyopathy [facility])    Outpatient cardiologist is Dr Radha Delgado  New-onset atrial fibrillation with RVR  · Unknown duration, but has been persistent for the last 48+ hours  · Rate v rhythm control  · Unfortunately heart rate remains difficult to control despite further titration of metoprolol succinate  · There is concern for underlying abdominal infection-CT abdomen was completed today, and ID consultation is pending  · Currently on metoprolol 600 mg twice daily, digoxin 0 25/0 25 mg alternate days  · Will increase digoxin to 0 25 mg daily  · He did respond very well to Cardizem before, and did not have any issues with hypotension with it despite high IV dose of 20 mg  · At this point with elevated pressures and difficult to control rates, although not ideal, will add low-dose Cardizem  mg daily  · If continued difficulty with rate control, then may benefit from inpatient SHANDRA-cardioversion attempt --> else will get this completed outpatient  · Stroke prophylaxis  · Started on xarelto --> Continue same    New-onset cardiomyopathy - LVEF 35%  · Etiology  · No ischemic symptoms  · Possibly related to rapid atrial fibrillation/stress  · Optimize rate control for A  fib as above  · He will benefit from outpatient ischemic evaluation with nuclear stress test as well    S/p bio-AVR  · Normally functioning bio AVR on TTE  · Monitor clinically    Discussed extensively with patient and his wife was at bedside    Review of Systems   All other systems reviewed and are negative  Telemetry Review: Afib, HR in  at time of my evaluation, but has been periodically up to 140-150 overnight    Objective:   Vitals: Blood pressure 126/92, pulse (!) 142, temperature (!) 97 4 °F (36 3 °C), temperature source Tympanic, resp  rate 21, height 5' 7" (1 702 m), weight 82 1 kg (181 lb), SpO2 97 %  , Body mass index is 28 35 kg/m² ,   Orthostatic Blood Pressures    Flowsheet Row Most Recent Value   Blood Pressure 126/92 filed at 2022 1004   Patient Position - Orthostatic VS Lying filed at 2022 4738         Systolic (07LQG), BN , Min:125 , FHN:822     Diastolic (44MML), NMC:81, Min:57, Max:92    Wt Readings from Last 5 Encounters:   22 82 1 kg (181 lb)   10/31/22 88 5 kg (195 lb)   10/18/22 89 4 kg (197 lb)   22 84 8 kg (187 lb)   22 85 3 kg (188 lb)     I/O        0701   0700  0701   0700  0701   0700    P  O  786 440     I V  (mL/kg) 333 5 (3 7) 155 9 (1 7)     Total Intake(mL/kg) 1119 5 (12 3) 595 9 (6 6)     Urine (mL/kg/hr) 4300 (2) 2560 (1 2)     Stool 1700 675     Total Output 6000 3235     Net -4885 9 -3356 1                    Physical Exam  Vitals and nursing note reviewed  Constitutional:       General: He is not in acute distress  Appearance: He is well-developed  He is ill-appearing  HENT:      Head: Normocephalic and atraumatic  Nose: No congestion  Eyes:      General: No scleral icterus  Conjunctiva/sclera: Conjunctivae normal    Neck:      Vascular: No carotid bruit or JVD  Cardiovascular:      Rate and Rhythm: Tachycardia present  Rhythm irregular  Heart sounds: Normal heart sounds  No murmur heard  No friction rub  No gallop  Pulmonary:      Effort: Pulmonary effort is normal  No respiratory distress  Breath sounds: Normal breath sounds  No wheezing or rales  Chest:      Chest wall: No tenderness  Abdominal:      General: There is no distension  Palpations: Abdomen is soft  Tenderness: There is no abdominal tenderness  Comments: Left lower side colostomy bag noted in place  Central abdominal wound dressing noted in place  Musculoskeletal:         General: No swelling, tenderness or deformity  Cervical back: Neck supple  No muscular tenderness  Right lower leg: No edema  Left lower leg: No edema  Skin:     General: Skin is warm  Neurological:      General: No focal deficit present  Mental Status: He is alert and oriented to person, place, and time  Mental status is at baseline  Psychiatric:         Mood and Affect: Mood normal          Behavior: Behavior normal          Thought Content:  Thought content normal          Laboratory Results: personally reviewed        CBC with diff:   Results from last 7 days   Lab Units 12/21/22  0332 12/20/22  0524 12/19/22  0521 12/18/22  0547 12/17/22  0502 12/16/22  0450 12/15/22  0429   WBC Thousand/uL 14 58* 13 85* 12 75* 10 20* 9 30 9 47 13 88*   HEMOGLOBIN g/dL 12 1 12 1 11 8* 11 7* 10 8* 10 9* 11 7*   HEMATOCRIT % 38 2 38 0 37 2 36 2* 34 3* 35 4* 37 6   MCV fL 90 89 90 89 91 93 92   PLATELETS Thousands/uL 519* 455* 424* 367 255 224 223   MCH pg 28 4 28 3 28 4 28 9 28 5 28 6 28 5   MCHC g/dL 31 7 31 8 31 7 32 3 31 5 30 8* 31 1*   RDW % 13 6 13 7 13 5 13 5 13 8 13 9 13 7   MPV fL 9 0 8 9 9 1 9 3 9 8 9 8 9 3   NRBC AUTO /100 WBCs 0 0  --  0  --   --   --          CMP:  Results from last 7 days   Lab Units 12/20/22  0524 12/19/22  0521 12/18/22  0547 12/17/22 1714 12/17/22  0502 12/16/22 0450 12/15/22  1941   POTASSIUM mmol/L 4 0 3 9 3 9 3 7 3 9 4 3 4 0   CHLORIDE mmol/L 99 100 101 98 99 99 101   CO2 mmol/L 31 27 28 29 26 25 26   BUN mg/dL 10 11 13 15 16 21 22   CREATININE mg/dL 0 94 0 89 0 91 0 98 0 99 1 21 1 27   CALCIUM mg/dL 8 8 8 4 7 8* 8 4 8 2* 8 3* 8 1*   AST U/L  --   --  18  --   --   --   --    ALT U/L  --   --  15  --   --   --   --    ALK PHOS U/L  --   --  53  --   --   --   --    EGFR ml/min/1 73sq m 78 83 82 75 74 58 54         BMP:  Results from last 7 days   Lab Units 12/20/22  0524 12/19/22  0521 12/18/22  0547 12/17/22  1714 12/17/22  0502 12/16/22 0450 12/15/22  1941   POTASSIUM mmol/L 4 0 3 9 3 9 3 7 3 9 4 3 4 0   CHLORIDE mmol/L 99 100 101 98 99 99 101   CO2 mmol/L 31 27 28 29 26 25 26   BUN mg/dL 10 11 13 15 16 21 22   CREATININE mg/dL 0 94 0 89 0 91 0 98 0 99 1 21 1 27   CALCIUM mg/dL 8 8 8 4 7 8* 8 4 8 2* 8 3* 8 1*       BNP: No results for input(s): BNP in the last 72 hours      Magnesium:   Results from last 7 days   Lab Units 12/20/22  0524 12/19/22  0521 12/18/22  0547 12/17/22  1714 12/17/22  0502 12/16/22  0450 12/15/22  1941   MAGNESIUM mg/dL 2 0 1 8* 1 8* 2 1 1 8* 2 1 2 1       Coags:   Results from last 7 days   Lab Units 12/18/22  0547 12/17/22  0502 12/17/22  0112 12/16/22  1824 12/16/22  1148 12/16/22  0450 12/16/22  0108   PTT seconds 80* 73* 66* 64* 33 48* 97*       TSH:        Hemoglobin A1C Lipid Profile:       Meds/Allergies   all current active meds have been reviewed and current meds:   Current Facility-Administered Medications   Medication Dose Route Frequency   • atorvastatin (LIPITOR) tablet 20 mg  20 mg Oral Daily   • carisoprodol (SOMA) tablet 350 mg  350 mg Oral 4x Daily   • cholecalciferol (VITAMIN D3) tablet 1,000 Units  1,000 Units Oral Daily   • digoxin (LANOXIN) tablet 125 mcg  125 mcg Oral Daily   • gabapentin (NEURONTIN) tablet 600 mg  600 mg Oral TID   • hydrOXYzine HCL (ATARAX) tablet 25 mg  25 mg Oral Q6H PRN   • metoprolol succinate (TOPROL-XL) 24 hr tablet 150 mg  150 mg Oral BID   • ondansetron (ZOFRAN) injection 4 mg  4 mg Intravenous Q6H PRN   • oxyCODONE (ROXICODONE) immediate release tablet 10 mg  10 mg Oral Q6H   • pantoprazole (PROTONIX) injection 40 mg  40 mg Intravenous Q12H ANDREA   • perflutren lipid microsphere (DEFINITY) injection  1 mL/min Intravenous Once in imaging   • rivaroxaban (XARELTO) tablet 20 mg  20 mg Oral Daily With Breakfast   • traZODone (DESYREL) tablet 100 mg  100 mg Oral HS     Medications Prior to Admission   Medication   • aspirin 81 MG tablet   • atorvastatin (LIPITOR) 20 mg tablet   • betamethasone dipropionate (DIPROSONE) 0 05 % cream   • carisoprodol (SOMA) 350 mg tablet   • Cholecalciferol (VITAMIN D3) 1000 units CAPS   • diclofenac sodium (VOLTAREN) 1 %   • fluocinonide (LIDEX) 0 05 % external solution   • gabapentin (NEURONTIN) 600 MG tablet   • ketoconazole (NIZORAL) 2 % cream   • losartan (COZAAR) 50 mg tablet   • naloxone (NARCAN) 4 mg/0 1 mL nasal spray   • oxyCODONE (ROXICODONE) 10 MG TABS   • oxyCODONE-acetaminophen (PERCOCET)  mg per tablet   • Secukinumab 150 MG/ML SOAJ   • traZODone (DESYREL) 300 MG tablet            Cardiac testing: reviewed  Results for orders placed during the hospital encounter of 03/08/17    Echo complete with contrast if indicated    Narrative  [address]  [address] [place], [place] [address]  [phone]    Transthoracic Echocardiogram  2D, M-mode, Doppler, and Color Doppler    Study date:  08-Mar-2017    Patient: Supa Arthur  MR number: [id number]  Account number: [de-identified]  : [id number]  Age: 73 years  Gender: Male  Status: Outpatient  Location: Echo lab  Height: 67 in  Weight: 214 5 lb  BP: 146/ 80 mmHg    Diagnoses: 785 2 - CARDIAC MURMURS NEC    Sonographer:  María Napier RDCS  Primary Physician:  Damion Cole DO  Referring Physician:  Damion Cole DO  Group:  [facility]  Interpreting Physician:  Dexter Haque MD    SUMMARY    LEFT VENTRICLE:  Size was normal   Systolic function was normal  Ejection fraction was estimated to be 60 %  There were no regional wall motion abnormalities  Wall thickness was increased  There was mild concentric hypertrophy  LEFT ATRIUM:  The atrium was mildly dilated  MITRAL VALVE:  There was mild annular calcification  There was mild regurgitation  AORTIC VALVE:  The valve was probably trileaflet  Leaflets exhibited moderate calcification and markedly reduced cuspal separation  There was severe stenosis  There was mild regurgitation  Valve peak gradient was 62 mmHg  Valve mean gradient was 35 mmHg  Aortic valve area was 0 6 cm squared by the continuity equation  TRICUSPID VALVE:  There was mild regurgitation  Estimated peak PA pressure was 40 mmHg  HISTORY: PRIOR HISTORY: HYPERLIPIDEMIA, CHRONIC BACK PAIN, MURMUR, AORTIC STENOSIS    PROCEDURE: The procedure was performed in the echo lab  This was a routine study  The transthoracic approach was used  The study included complete 2D imaging, M-mode, complete spectral Doppler, and color Doppler  Images were obtained from  the parasternal, apical, subcostal, and suprasternal notch acoustic windows  Image quality was adequate  LEFT VENTRICLE: Size was normal  Systolic function was normal  Ejection fraction was estimated to be 60 %   There were no regional wall motion abnormalities  Wall thickness was increased  There was mild concentric hypertrophy  RIGHT VENTRICLE: The size was normal  Systolic function was normal  Wall thickness was normal     LEFT ATRIUM: The atrium was mildly dilated  RIGHT ATRIUM: Size was normal     MITRAL VALVE: There was mild annular calcification  DOPPLER: There was mild regurgitation  AORTIC VALVE: The valve was probably trileaflet  Leaflets exhibited moderate calcification and markedly reduced cuspal separation  DOPPLER: There was severe stenosis  There was mild regurgitation  TRICUSPID VALVE: DOPPLER: There was mild regurgitation  Estimated peak PA pressure was 40 mmHg  PULMONIC VALVE: Not well visualized  PERICARDIUM: There was no pericardial effusion  The pericardium was normal in appearance  AORTA: The root exhibited normal size      MEASUREMENT TABLES    DOPPLER MEASUREMENTS  Aortic valve   (Reference normals)  Peak gradient   62 mmHg   (--)  Mean gradient   35 mmHg   (--)  Valve area, cont   0 6 cm squared   (--)    SYSTEM MEASUREMENT TABLES    2D  %FS: 41 41 %  AV Diam: 3 19 cm  EDV(Teich): 154 63 ml  EF(Teich): 71 67 %  ESV(Teich): 43 81 ml  IVSd: 0 99 cm  LA Area: 21 71 cm2  LA Diam: 4 42 cm  LVEDV MOD A4C: 133 34 ml  LVEF MOD A4C: 33 55 %  LVESV MOD A4C: 88 61 ml  LVIDd: 5 62 cm  LVIDs: 3 29 cm  LVLd A4C: 7 54 cm  LVLs A4C: 6 64 cm  LVOT Diam: 2 06 cm  LVPWd: 1 15 cm  RA Area: 11 96 cm2  RV DIAM: 3 35 cm  SV MOD A4C: 44 74 ml  SV(Teich): 110 82 ml    CW  AR Dec [place]: 2 21 m/s2  AR Dec Time: 2293 88 ms  AR PHT: 665 23 ms  AR Vmax: 4 61 m/s  AR maxP 11 mmHg  AV VTI: 86 81 cm  AV Vmax: 3 81 m/s  AV Vmax: 3 94 m/s  AV Vmean: 2 8 m/s  AV maxP 99 mmHg  AV maxP 23 mmHg  AV meanP 69 mmHg  PV Vmax: 1 19 m/s  PV maxP 67 mmHg  TR Vmax: 3 07 m/s  TR maxP 58 mmHg    MM  TAPSE: 2 41 cm    PW  PHILLY (VTI): 0 61 cm2  PHILLY Vmax: 0 55 cm2  PHILLY Vmax: 0 57 cm2  PIHLLY Vmax, Pt: 0 54 cm2  PHILLY Vmax, Pt: 0 56 cm2  E': 0 09 m/s  E/E': 9 8  LVOT VTI: 15 74 cm  LVOT Vmax: 0 64 m/s  LVOT Vmax: 0 65 m/s  LVOT Vmean: 0 48 m/s  LVOT maxP 65 mmHg  LVOT maxP 71 mmHg  LVOT meanP 02 mmHg  MV A Anirudh: 0 69 m/s  MV Dec [place]: 5 17 m/s2  MV DecT: 178 9 ms  MV E Anirudh: 0 93 m/s  MV E/A Ratio: 1 35  RVOT Vmax: 0 49 m/s  RVOT maxP 98 mmHg    [facility]    Prepared and electronically signed by    Candace Cornejo MD  Signed 08-Mar-2017 14:11:26    No results found for this or any previous visit  No results found for this or any previous visit  No results found for this or any previous visit

## 2022-12-21 NOTE — ASSESSMENT & PLAN NOTE
• Suspect this is is multifactorial in setting of atelectasis vs mild pulmonary vascular congestion vs post-operative pain  • CXR on 12/13 w/mildly increased interstitial markings bilaterally - may indicate mild pulmonary edema  • Respiratory status improved w/IV diuresis - continue w/PRN diuresis  • Weaned to RA - continue to maintain SpO2 > 90%  • Encourage cough, deep breathing, IS, ambulation

## 2022-12-21 NOTE — OCCUPATIONAL THERAPY NOTE
12/21/22 1129   OT Last Visit   OT Visit Date 12/21/22   Note Type   Note Type Treatment   Pain Assessment   Pain Assessment Tool 0-10   Pain Score 7   Pain Location/Orientation Location: Abdomen   Pain Onset/Description Onset: Ongoing   Patient's Stated Pain Goal No pain   Hospital Pain Intervention(s) Medication (See MAR); Repositioned; Ambulation/increased activity   Restrictions/Precautions   Weight Bearing Precautions Per Order No   Other Precautions Telemetry;Multiple lines; Fall Risk   ADL   Grooming Assistance 5  Supervision/Setup   Grooming Deficit Setup;Verbal cueing;Supervision/safety; Increased time to complete;Wash/dry hands; Wash/dry face;Brushing hair   Toileting Comments Patinet requested to freshen up but declined full ADL as wife was present and would assist later  Bed Mobility   Supine to Sit 4  Minimal assistance   Additional items Assist x 1;HOB elevated; Bedrails; Increased time required;Verbal cues   Additional Comments Pt sat unsupported EOB 3-4 mins with no LOB or dizziness  Transfers   Sit to Stand   (CGA)   Additional items Assist x 1;Bedrails; Increased time required;Verbal cues   Stand to Sit   (CGA)   Additional items Assist x 1; Armrests; Increased time required;Verbal cues   Stand pivot   (CGA)   Additional items Assist x 1; Increased time required;Verbal cues   Functional Mobility   Functional Mobility   (CGA)   Additional Comments Pt ambulates 55 feet x 2 with CGA x 1 and good safety noted  Additional items Rolling walker   Subjective   Subjective "I am ready to get up and walk!"   Cognition   Overall Cognitive Status [facility]   Arousal/Participation Alert; Responsive; Cooperative   Attention Within functional limits   Orientation Level Oriented X4   Memory Within functional limits   Following Commands Follows all commands and directions without difficulty   Comments Pt agreeable to OT treatment     Activity Tolerance   Activity Tolerance Patient limited by fatigue;Patient limited by pain Assessment   Assessment Patient participated in Skilled OT session this date with interventions consisting of functional transfer training, ADL re training with the use of correct body mechnaics and Energy Conservation techniques   Patient agreeable to OT treatment session, upon arrival patient was found supine in bed, alert, responsive  and in no apparent distress  Patient transfers supine to sit EOB with Min A x 1  Patient then sits unsupported EOB 3-4 mmins with no LOB or dizziness reported  Patient transfers sit to stand and ambulates 55 feet x 2 with CGA x 1 and VCs for hand placement  Once returned to room, patient requested to freshen up and was set up to bathe face/hands and complete grooming and hygiene tasks as detailed in flow sheet  Patient declined full ADL as wife was present and would assist patient with later  Patient declined further activity at this time  Session ended with patient seated OOB to recliner, all needs met, and call bell within reach  In comparison to previous session, patient with improvements in self care ADL performance, functional transfers/mobility, and endurance  Patient requiring verbal cues for correct technique, verbal cues for pacing thru activity steps and ocassional safety reminders  Patient performance  demonstrated good carryover of learned techniques and strategies to facilitate safety during functional tasks  Patient continues to be functioning below baseline level, occupational performance remains limited secondary to factors listed above and increased risk for falls and injury  From OT standpoint, recommendation at time of d/c would be Home OT  Patient to benefit from continued Occupational Therapy treatment while in the hospital to address deficits as defined above and maximize level of functional independence with ADLs and functional mobility in order to return to PLOF     Plan   Treatment Interventions ADL retraining;Functional transfer training;Energy conservation   Goal Expiration Date 12/23/22   OT Treatment Day 3   OT Frequency 3-5x/wk   Recommendation   OT Discharge Recommendation Home with home health rehabilitation   AM-PAC Daily Activity Inpatient   Lower Body Dressing 1   Bathing 2   Toileting 2   Upper Body Dressing 3   Grooming 4   Eating 4   Daily Activity Raw Score 16   Daily Activity Standardized Score (Calc for Raw Score >=11) 35 96   AM-PAC Applied Cognition Inpatient   Following a Speech/Presentation 4   Understanding Ordinary Conversation 4   Taking Medications 4   Remembering Where Things Are Placed or Put Away 4   Remembering List of 4-5 Errands 4   Taking Care of Complicated Tasks 4   Applied Cognition Raw Score 24   Applied Cognition Standardized Score 62 21     Juanita Bergman

## 2022-12-21 NOTE — ASSESSMENT & PLAN NOTE
• New onset AF w/RVR as high as 225  • No previous history of AF  • Suspect this is 2/2 septic shock/peritonitis   • TTE (12/14) revealed EF 35% w/mild concentric hypertrophy, moderately reduced systolic function in global manner; mildly dilated RV w/moderately reduced systolic function; mild MR/TR, RVSP 48  • Initially refractory to IV Lopressor 5 mg x2, IV Cardizem 15 mg x1, IV Amiodarone bolus + gtt initiation  • Cardizem gtt weaned off on 12/15  • Started on digoxin load on 12/16  • Cardiology input appreciated   • Cardiology increased Toprol 100 mg twice daily to 150 mg twice daily on 12/20, continue digoxin 125 mcg daily  HR still uncontrolled, possibly due to worsening infection   • Heparin gtt initially- started on [id number]

## 2022-12-21 NOTE — PROGRESS NOTES
Progress Note - General Surgery   Valentino Dyke 78 y o  male MRN: [id number]  Unit/Bed#: ICU 10-01 Encounter: [id number]      ASSESSMENT:  82-year-old male with past medical history significant for AS s/p valve replacement, chronic pain with continuous use of opioids, HLD, HTN, RA, perforated sigmoid diverticulitis s/p Polo's procedure (August 2022) for reversal in January 2023 presenting with sepsis secondary to abdominal abscess   Now POD#10 s/p exploratory laparotomy with appendectomy, drainage of abscess, and extensive lysis of adhesions  AF, elevated -140s  WBC 14 58 from 13 85  UO: 2 1L  Ostomy fx, viable, output: 375 cc liquid/formed brown stool  Midline wound with staple granulating in, no signs of infection  Midline dressing redressed  CTAP impression:  "Mild interval improvement of diffuse enteritis  Uncomplicated colonic diverticulosis  Left lower quadrant colostomy with few small associated air locules, likely postsurgical   New gallbladder wall edema, nonspecific  Dedicated right upper quadrant ultrasound can be considered for further evaluation  Small bilateral pleural effusions with subjacent atelectasis"    PLAN:  Appreciate SLIM assistance with medical management  Cardiology following, appreciate recs  ID consult pending   Mild WBC bump, continued elevated HR, CTAP contrast ordered to evaluate for intra-abdominal infection  Overall unremarkable, no current plans to start abx and no need for IR drainage/surgery  Will require another day to monitor HR and adjust rate controlled meds per Cardiology  Continue Xarelto    Low sodium diet as tolerated, nutrition supplements  Encourage IS use, oob ambulation  Dr Klein Credit present for rounds      SUBJECTIVE:  Anxious to go home  Would like to be home by Xmas  Feels good  Denies CP/palpitations  Admits to sweats overnight  No abdominal pain  Having ostomy fx  No issues voiding  OOB ambulating as able        OBJECTIVE:   Vitals:  Blood pressure 140/89, pulse (!) 127, temperature (!) 97 2 °F (36 2 °C), temperature source Tympanic, resp  rate (!) 23, height 5' 7" (1 702 m), weight 82 1 kg (181 lb), SpO2 96 %  ,Body mass index is 28 35 kg/m²  I/Os:    Intake/Output Summary (Last 24 hours) at 12/21/2022 1234  Last data filed at 12/21/2022 0600  Gross per 24 hour   Intake 100 ml   Output 1950 ml   Net -1850 ml       Lines/Drains:  Invasive Devices     Peripheral Intravenous Line  Duration           Long-Dwell Peripheral IV (Midline) 26/01/76 Left Basilic 8 days          Drain  Duration           Colostomy Descending/sigmoid -- days    Colostomy Descending/sigmoid  days                Physical Exam  Constitutional:       General: He is not in acute distress  Appearance: He is diaphoretic  Cardiovascular:      Rate and Rhythm: Normal rate  Rhythm irregular  Pulmonary:      Breath sounds: No wheezing, rhonchi or rales  Abdominal:      General: Bowel sounds are normal  There is no distension  Palpations: Abdomen is soft  Tenderness: There is no abdominal tenderness  There is no guarding or rebound  Comments: Midline incision with staple C/D/I, wound bed granulating in, no active drainage, scant serosanguinous on 4x4 when dressing removed    Ostomy pink, fx, stool in bag   Musculoskeletal:         General: No tenderness  Right lower leg: No edema  Left lower leg: No edema  Skin:     General: Skin is warm  Neurological:      Mental Status: He is alert  Diagnostics:  I have personally reviewed pertinent lab results  CT head wo contrast    Result Date: 12/16/2022  Impression: No acute intracranial abnormality  Workstation performed: [id number]     XR chest portable ICU    Result Date: 12/13/2022  Impression: Mildly increased interstitial markings bilaterally may indicate mild pulmonary edema  Enteric tube in adequate position  The study was marked in [facility] for immediate notification   Workstation performed: [id number]     CT abdomen pelvis with contrast    Result Date: 12/11/2022  Impression: 1  Extensive inflammatory changes involving the jejunum and proximal-mid ileum, compatible with severe enteritis  Infectious and ischemic etiologies may be considered  In particular, there is focal free air surrounding the lower midline ileum just above the urinary bladder with extensive inflammatory changes  There are a few mildly dilated loops of jejunum and within the left upper abdomen, likely secondary to extensive inflammatory changes with possible superimposed low-grade partial small bowel  obstruction  There is a small amount of free air and free fluid in the remainder of the abdomen and pelvis  The stomach and proximal esophagus are distended and fluid-filled, presumably secondary to delayed transit  2   Postsurgical changes in the anterior abdominal wall with interval left lower quadrant colostomy  I personally discussed this study with Dr Stefania Sylvester of the ED on 12/11/2022 at 8:47 AM  Workstation performed: [id number]     IR midline placement    Result Date: 12/16/2022  Impression: Technically successful placement of midline using sonographic guidance  This is the end of the clinically relevant portion of this report  Subsequent information is for compliance, documentation, and coding purposes  In the process of informed consent, information was communicated, verbally, to the patient regarding the procedure  The patient was informed of; the name of the procedure, nature of the procedure, nature of the condition, risks, benefits, alternatives, and potential complications, as well as the consequences of not having the procedure  All the patient's questions were answered  Informed consent was signed  Quoted risks include infection, as well as a 5% risk of thrombosis of the entry vein  Chlorhexidine and alcohol was used for cleansing and sterile preparation of the skin    This was allowed to dry prior to the application of sterile draping  Timeout was performed, with all team members present, and in agreement  Confirmation of patient, procedure, laterally, allergies, and all needed equipment was performed  When ultrasound was used for needle entry guidance, into the vein, static and real-time images of needle entry are obtained, and archived   PROCEDURE: Mid line PREPROCEDURE DIAGNOSIS: Please see "history ", above POSTPROCEDURE DIAGNOSIS: Same ANTIBIOTICS: None SPECIMEN: None ESTIMATED BLOOD LOSS: None ANESTHESIA: Local Workstation performed: [id number]     Recent Results (from the past 36 hour(s))   CBC and differential    Collection Time: 12/20/22  5:24 AM   Result Value Ref Range    WBC 13 85 (H) 4 31 - 10 16 Thousand/uL    RBC 4 27 3 88 - 5 62 Million/uL    Hemoglobin 12 1 12 0 - 17 0 g/dL    Hematocrit 38 0 36 5 - 49 3 %    MCV 89 82 - 98 fL    MCH 28 3 26 8 - 34 3 pg    MCHC 31 8 31 4 - 37 4 g/dL    RDW 13 7 11 6 - 15 1 %    MPV 8 9 8 9 - 12 7 fL    Platelets 297 (H) 454 - 390 Thousands/uL    nRBC 0 /100 WBCs    Neutrophils Relative 76 (H) 43 - 75 %    Immat GRANS % 1 0 - 2 %    Lymphocytes Relative 14 14 - 44 %    Monocytes Relative 8 4 - 12 %    Eosinophils Relative 1 0 - 6 %    Basophils Relative 0 0 - 1 %    Neutrophils Absolute 10 51 (H) 1 85 - 7 62 Thousands/µL    Immature Grans Absolute 0 14 0 00 - 0 20 Thousand/uL    Lymphocytes Absolute 1 95 0 60 - 4 47 Thousands/µL    Monocytes Absolute 1 10 0 17 - 1 22 Thousand/µL    Eosinophils Absolute 0 11 0 00 - 0 61 Thousand/µL    Basophils Absolute 0 04 0 00 - 0 10 Thousands/µL   Basic metabolic panel    Collection Time: 12/20/22  5:24 AM   Result Value Ref Range    Sodium 136 135 - 147 mmol/L    Potassium 4 0 3 5 - 5 3 mmol/L    Chloride 99 96 - 108 mmol/L    CO2 31 21 - 32 mmol/L    ANION GAP 6 4 - 13 mmol/L    BUN 10 5 - 25 mg/dL    Creatinine 0 94 0 60 - 1 30 mg/dL    Glucose 125 65 - 140 mg/dL    Calcium 8 8 8 4 - 10 2 mg/dL    eGFR 78 ml/min/1 73sq m Magnesium    Collection Time: 12/20/22  5:24 AM   Result Value Ref Range    Magnesium 2 0 1 9 - 2 7 mg/dL   Procalcitonin    Collection Time: 12/20/22  5:24 AM   Result Value Ref Range    Procalcitonin 0 15 <=0 25 ng/ml   CBC and differential    Collection Time: 12/21/22  3:32 AM   Result Value Ref Range    WBC 14 58 (H) 4 31 - 10 16 Thousand/uL    RBC 4 26 3 88 - 5 62 Million/uL    Hemoglobin 12 1 12 0 - 17 0 g/dL    Hematocrit 38 2 36 5 - 49 3 %    MCV 90 82 - 98 fL    MCH 28 4 26 8 - 34 3 pg    MCHC 31 7 31 4 - 37 4 g/dL    RDW 13 6 11 6 - 15 1 %    MPV 9 0 8 9 - 12 7 fL    Platelets 417 (H) 428 - 390 Thousands/uL    nRBC 0 /100 WBCs    Neutrophils Relative 74 43 - 75 %    Immat GRANS % 1 0 - 2 %    Lymphocytes Relative 17 14 - 44 %    Monocytes Relative 7 4 - 12 %    Eosinophils Relative 1 0 - 6 %    Basophils Relative 0 0 - 1 %    Neutrophils Absolute 10 68 (H) 1 85 - 7 62 Thousands/µL    Immature Grans Absolute 0 11 0 00 - 0 20 Thousand/uL    Lymphocytes Absolute 2 48 0 60 - 4 47 Thousands/µL    Monocytes Absolute 1 08 0 17 - 1 22 Thousand/µL    Eosinophils Absolute 0 18 0 00 - 0 61 Thousand/µL    Basophils Absolute 0 05 0 00 - 0 10 Thousands/µL       Current Medications:  Scheduled Meds:  Current Facility-Administered Medications   Medication Dose Route Frequency Provider Last Rate   • atorvastatin  20 mg Oral Daily Yesenia MoPEDRO leach     • carisoprodol  350 mg Oral 4x Daily Yesenia MoangisPEDRO     • cholecalciferol  1,000 Units Oral Daily Yesenia MoPEDRO leach     • [START ON 12/22/2022] digoxin  250 mcg Oral Daily Alexis Burkett MD     • diltiazem  120 mg Oral Daily Alexis Burkett MD     • gabapentin  600 mg Oral TID PEDRO Lozano     • hydrOXYzine HCL  25 mg Oral Q6H PRN Shannon Singh PA-C     • metoprolol succinate  150 mg Oral BID Alexis Burkett MD     • ondansetron  4 mg Intravenous Q6H PRN PEDRO Lozano     • oxyCODONE  10 mg Oral Q6H PEDRO Montoya     • pantoprazole  40 mg Intravenous Q12H [address] PEDRO Perry     • perflutren lipid microsphere  1 mL/min Intravenous Once in imaging PEDRO Montoya     • rivaroxaban  20 mg Oral Daily With Breakfast Ayaz Coy PA-C     • traZODone  100 mg Oral HS PEDRO Perry       Continuous Infusions:   PRN Meds:  •  hydrOXYzine HCL  •  ondansetron  •  perflutren lipid microsphere      VINI Parikh  12/21/2022

## 2022-12-21 NOTE — CASE MANAGEMENT
Case Management Discharge Planning Note    Patient name Brandi Toure  Location ICU 10/ICU 10-01 MRN [id number]  : 1945 Date 2022       Current Admission Date: 2022  Current Admission Diagnosis:Peritonitis [facility])   Patient Active Problem List    Diagnosis Date Noted   • Cardiomyopathy ([address] ) 12/15/2022   • New onset atrial fibrillation ([address] ) 2022   • Septic shock ([address] ) 2022   • Acute respiratory failure with hypoxia ([address] ) 2022   • Peritonitis ([address] ) 2022   • Diverticulitis of colon 2022   • SBO (small bowel obstruction) ([address] ) 2022   • Primary osteoarthritis of right shoulder 2022   • Chronic kidney disease, stage 2 (mild) 2022   • High risk medication use 2020   • Rheumatoid arthritis with rheumatoid factor, unspecified ([address] ) 2020   • Atherosclerosis of native coronary artery of native heart without angina pectoris 2019   • Status post aortic valve replacement with bioprosthetic valve 2018   • Arthritis    • Chronic pain    • Continuous opioid dependence ([address] )    • Hyperlipidemia    • Benign essential hypertension    • Insomnia    • Peripheral neuropathy    • Carpal tunnel syndrome 2015   • Psoriasis with arthropathy ([address] ) 2015   • Arthralgia of lower leg 2013   • Hyperglycemia 10/25/2012   • Primary localized osteoarthrosis of shoulder region 10/09/2012   • Psoriasis 10/09/2012   • Atopic dermatitis 2012   • Low back pain 2012      LOS (days): 10  Geometric Mean LOS (GMLOS) (days): 9 60  Days to GMLOS:-0 7     OBJECTIVE:  Risk of Unplanned Readmission Score: 19 96         Current admission status: Inpatient   Preferred Pharmacy:   [address] [place] [address]  Phone: [phone] Fax: [phone] [facility] Room #[id number], [address] [place], [place] [address] [facility] [address]  Phone: [phone] Fax: [phone] [address] [place] [address], [address] [place] [address]  Phone: [phone] Fax: [phone]    Primary Care Provider: Renate Stack DO    Primary Insurance: Kelly Ashford [facility] REP  Secondary Insurance:     DISCHARGE DETAILS:          Comments - Freedom of Choice: pt has declined rehab   - pt has been accepted by [address] pt & wife's choice                     Requested [address]         Is the patient interested in [address] at discharge?: Yes         Other Referral/Resources/Interventions Provided:  Interventions: Prescription Price Check, [address]  Referral Comments:  [facility] when [facility] for d/c-  pt has hr 140's  and leuckocytosis-  ID conmsult ordered- pt has a $30 00 copay for xarelto and he was given a 30 day free coupon    Would you like to participate in our [address] service program?  : No - Declined    Treatment Team Recommendation: Home with [facility] (home with wife and hhc & outpt follow up- family)

## 2022-12-21 NOTE — PLAN OF CARE
Problem: CARDIOVASCULAR - ADULT  Goal: Absence of cardiac dysrhythmias or at baseline rhythm  Description: INTERVENTIONS:  - Continuous cardiac monitoring, vital signs, obtain 12 lead EKG if ordered  - Administer antiarrhythmic and heart rate control medications as ordered  - Monitor electrolytes and administer replacement therapy as ordered  Outcome: Progressing   Continues with Afib; Cardiology aware    Possible outpt Cardioversion per Cardiology

## 2022-12-21 NOTE — CONSULTS
REQUIRED DOCUMENTATION:     1  This service was provided via Telemedicine  2  Provider located at [facility]  3  TeleMed provider: Hugo Herrera MD   4  Identify all parties in room with patient during tele consult:  Patient, wife  5  After connecting through [employer], patient was identified by name and date of birth and assistant checked wristband  Patient was then informed that this was a Telemedicine visit and that the exam was being conducted confidentially over secure lines  My office door was closed  No one else was in the room  Patient acknowledged consent and understanding of privacy and security of the Telemedicine visit, and gave us permission to have the assistant stay in the room in order to assist with the history and to conduct the exam   I informed the patient that I have reviewed their record in Epic and presented the opportunity for them to ask any questions regarding the visit today  The patient agreed to participate  TeleConsultation - Infectious Disease   Tal Leger 78 y o  male MRN: [id number]  Unit/Bed#: ICU 10-01 Encounter: [id number]      IMPRESSION & RECOMMENDATIONS:     1  Septic shock  POA with tachycardia, tachypnea, lactic acidosis  Due to intra-abdominal abscess/peritonitis  Patient is status post Polo's procedure for sigmoid diverticulitis August 2022  Status post exploratory laparotomy, lysis of adhesions, appendectomy, drainage of right lower quadrant abscess 12/11/22  He completed a 7 day course ciprofloxacin, fluconazole, metronidazole 12/17  Culture from the OR showed growth of 2 E  Coli strains, gamma hemolytic Streptococcus, and Bacteroides fragilis  Over the past 3 days the patient has had worsening leukocytosis and tachycardia, but is otherwise clinically improving and hemodynamically stable  Repeat CT abdomen/pelvis today did not show any new source of intra-abdominal infection, but did show some gallbladder wall edema    The surgical incision is healing well, without any signs of surgical site infection  The patient is on room air without any evidence of pneumonia  There is no other clear source of infection  Leukocytosis may be reactive in setting of postop A  fib with RVR  -for now, monitor clinically off antibiotics    -if patient develops fever, hemodynamic instability, check blood cultures and restart antibiotics with cefepime and Flagyl   -If worsening leukocytosis, check right upper quadrant ultrasound tomorrow   -Check CBC with differential and LFTs tomorrow   -Monitor fever curve, white blood cell count   -Appreciate surgical follow-up    2  Intra-abdominal abscess/peritonitis  History of sigmoid diverticulitis status post Polo's procedure in August 2022  Now presented with right lower quadrant abscess, significant enteritis, possible small bowel obstruction    Now POD#10 s/p exploratory laparotomy with appendectomy, drainage of abscess, and extensive lysis of adhesions 12/11/22  Or cultures with polymicrobial growth of GI migel  Received 7 days of ciprofloxacin, metronidazole, and fluconazole, which do not cover gram-positive organisms such as strep  However, repeat CT abdomen pelvis did not show any source of new infection or residual abscess     -Surgical follow-up   -If clinical worsening as above, restart antibiotics with cefepime and metronidazole which provide better intra-abdominal coverage   -Monitor clinical exam    3  A  fib with RVR  New onset postop  Cardiology following  Rates remain elevated  TTE no valvular clot or vegetations present, EF 35%   -Management per cardiology   -Consider CTA to assess for postop PE, although would expect hypoxia as well    4  GERMAN on CKD  Likely due to sepsis on admission  Creatinine now at baseline   -Dose adjust antibiotics as needed   -Monitor creatinine    5  Penicillin allergy  Reports anaphylaxis as a child    Patient has likely outgrown this reaction; regardless there is a very low chance of cross-reactivity with cephalosporins so they are typically safe event with a true significant penicillin allergy  -If patient does require restarting antibiotics, favor cefepime over fluoroquinolones for better intra-abdominal coverage with close monitoring    Extensive review of the medical records in [facility] was performed including review of the notes, radiographs, and laboratory results    Discussed the above management plan in detail with the primary service, the patient and his wife at bedside  ID will follow  I spent 45 minutes in evaluation of the patient of which 25 minutes was in counseling/coordination of care    HISTORY OF PRESENT ILLNESS:  Reason for Consult: leukocytosis   HPI: Krysta Rodriguez is a 78y o  year old male with a history of rheumatoid arthritis, aortic valve replacement with bioprosthetic valve, sigmoid diverticulitis s/p Polo's procedure with colostomy in August 2022 who presented to the [address] [place] [address] emergency department on 12/11/2022 with acute onset abdominal pain  He was originally planned to have reversal of the colostomy in January 2023  CT A/P in the ED showed extensive inflammatory changes involving the jejunum and proximal to mid ileum compatible with severe enteritis, possible low-grade partial small bowel obstruction  The patient was taken to the operating room that evening with surgery for exploratory laparotomy, lysis of adhesions, appendectomy, drainage of right lower quadrant abscess  The patient was started on ciprofloxacin, fluconazole, metronidazole and completed a 7-day postop course of antibiotics on 12/17  Culture from the OR showed growth of 2 E  Coli strains, gamma hemolytic Streptococcus, and Bacteroides fragilis  Clinically, the patient is improving and is now tolerating a diet  He denies abdominal pain, but does have some nausea with p o  intake  He has no fevers or chills    However, over the last 3 days the patient has had worsening leukocytosis from 10 2 to 14 58, and A  fib with RVR  Therefore, ID is consulted for further evaluation of leukocytosis  A repeat CT abdomen/pelvis with contrast was performed today which showed improvement of diffuse enteritis and no acute infectious findings  Patient is frustrated by his hospital stay and desires to go home  Images of his midline abdominal incision were reviewed; the incision is healing well without any signs of infection  REVIEW OF SYSTEMS:  A complete 12 point system-based review of systems is negative other than that noted in the HPI  PAST MEDICAL HISTORY:  Past Medical History:   Diagnosis Date   • Aortic stenosis    • Aortic stenosis, severe    • Arthritis    • Carpal tunnel syndrome    • Chronic back pain    • Chronic pain    • Chronic, continuous use of opioids    • Hyperlipidemia    • Hypertension    • Hypertrophy of prostate    • Insomnia    • Large bowel obstruction (HCC)    • Peripheral neuropathy    • RA (rheumatoid arthritis) ([facility])    • Umbilical hernia      Past Surgical History:   Procedure Laterality Date   • ADENOIDECTOMY     • AORTIC VALVE REPLACEMENT  10/17/2017   • BASAL CELL CARCINOMA EXCISION     • CARDIAC CATHETERIZATION      LAST ASSESSED: 20OCT2017   • CARPAL TUNNEL RELEASE Bilateral     left   • CATARACT EXTRACTION     • EXPLORATORY LAPAROTOMY W/ BOWEL RESECTION N/A 12/11/2022    Procedure: LAPAROTOMY EXPLORATORY,  EXTENSIVE LYSIS OF ADHESIONS, APPENDECTOMY, DRAINAGE OF ABDOMINAL ABCESS;  Surgeon: Juana Alamo MD;  Location: [facility];  Service: General   • FL CYSTOGRAM  8/29/2022   • HERNIA REPAIR      inguinal bilateral -umbilical    • IR MIDLINE PLACEMENT  12/12/2022   • LAMINECTOMY      DECOMPRESS, FACETECTOMY, FORAMINOTOMY LUMBAR SEG   • LAPAROTOMY N/A 8/20/2022    Procedure: LAPAROTOMY EXPLORATORY, sigmoid colon resection,colostomy;  Surgeon:  Juana Alamo MD;  Location: [facility];  Service: General   • NEUROPLASTY / TRANSPOSITION MEDIAN NERVE AT CARPAL TUNNEL     • WI RPLCMT AORTIC VALVE OPN W/STENTLESS TISSUE VALVE N/A 10/17/2017    Procedure: AORTIC VALVE REPLACEMENT with 23MM MAGNAEASE TISSUE VALVE; INTRA-OP SHANDRA;  Surgeon: Akbar Isabel MD;  Location: [facility];  Service: Cardiac Surgery   • TONSILLECTOMY         FAMILY HISTORY:  Non-contributory    SOCIAL HISTORY:  Social History   Social History     Substance and Sexual Activity   Alcohol Use Not Currently     Social History     Substance and Sexual Activity   Drug Use No     Social History     Tobacco Use   Smoking Status Never   Smokeless Tobacco Never       ALLERGIES:  Allergies   Allergen Reactions   • Penicillins Anaphylaxis   • Quinine Derivatives Other (See Comments)     High fever       MEDICATIONS:  All current active medications have been reviewed      PHYSICAL EXAM:  Temp:  [96 4 °F (35 8 °C)-97 9 °F (36 6 °C)] 97 9 °F (36 6 °C)  HR:  [100-148] 127  Resp:  [16-42] 23  BP: (125-159)/(57-92) 126/92  SpO2:  [93 %-97 %] 96 %  Temp (24hrs), Av 3 °F (36 3 °C), Min:96 4 °F (35 8 °C), Max:97 9 °F (36 6 °C)  Current: Temperature: 97 9 °F (36 6 °C)    Intake/Output Summary (Last 24 hours) at 2022 1116  Last data filed at 2022 0600  Gross per 24 hour   Intake 100 ml   Output 1950 ml   Net -1850 ml       Documented physical exam has been primarily done by the patient's nurse and/or the primary service due to limited examination abilities on telemedicine    General Appearance:  Appearing well, nontoxic, and in no distress   Head:  Normocephalic, without obvious abnormality, atraumatic   Eyes:  Conjunctiva pink and sclera anicteric, both eyes   Nose: Nares normal, mucosa normal, no drainage   Throat: Oropharynx moist without lesions   Neck: Supple, symmetrical, no adenopathy   Back:   Symmetric, no curvature, ROM normal, no CVA tenderness   Lungs:   Clear to auscultation bilaterally, respirations unlabored   Chest Wall:  No tenderness or deformity   Heart: Tachycardia   Abdomen:    Midline abdominal incision with staples C/D/I, no erythema or drainage  Ostomy in place with stool    Extremities: No cyanosis, clubbing or edema   Skin: No rashes or lesions  No draining wounds noted  Lymph nodes: Cervical, supraclavicular nodes normal   Neurologic: Alert and oriented times 3, extremity strength 5/5 and symmetric       LABS, IMAGING, & OTHER STUDIES:  Lab Results:  I have personally reviewed pertinent labs  Results from last 7 days   Lab Units 12/21/22  0332 12/20/22  0524 12/19/22  0521   WBC Thousand/uL 14 58* 13 85* 12 75*   HEMOGLOBIN g/dL 12 1 12 1 11 8*   PLATELETS Thousands/uL 519* 455* 424*     Results from last 7 days   Lab Units 12/20/22  0524 12/19/22  0521 12/18/22  0547   POTASSIUM mmol/L 4 0   < > 3 9   CHLORIDE mmol/L 99   < > 101   CO2 mmol/L 31   < > 28   BUN mg/dL 10   < > 13   CREATININE mg/dL 0 94   < > 0 91   EGFR ml/min/1 73sq m 78   < > 82   CALCIUM mg/dL 8 8   < > 7 8*   AST U/L  --   --  18   ALT U/L  --   --  15   ALK PHOS U/L  --   --  53    < > = values in this interval not displayed  Results from last 7 days   Lab Units 12/20/22  0524 12/18/22  0547 12/15/22  0429   PROCALCITONIN ng/ml 0 15 0 44* 2 05*       Imaging Studies:   I have personally reviewed pertinent imaging study reports and images in PACS  C/T A/P today: no intraabdominal abscess    Other Studies:   I have personally reviewed pertinent reports

## 2022-12-21 NOTE — NURSING NOTE
Continues in rapid Afib 130-140s; Hospitalist made aware and 2 5mg IV Lopresoor per order  PICC flushes easily  Voids in urinal  Ostomy patent for loose/liquid stool

## 2022-12-21 NOTE — ASSESSMENT & PLAN NOTE
• Presented to ED on 12/11 w/acute onset abdominal pain concerning for acute abdomen   • W/hx of sigmoid diverticulitis s/p Polo's procedure and colostomy with planned reversal scheduled for 01/2023  • CT A/P on admission revealed extensive inflammatory changes involving jejunum and proximal-mid ileum, compatible with severe enteritis; there is focal free air surrounding lower midline ileum just above urinary bladder with extensive inflammatory changes; few mildly dilated loops of jejunum within left upper abdomen, likely 2/2 extensive inflammatory changes with possible superimposed low-grade partial SBO; small amount of free air and free fluid in remainder of abdomen and pelvis; stomach and proximal esophagus are distended and fluid-filled, presumably secondary to delayed transit  • OR on 12/11 for EX LAP,  EXTENSIVE MELANI, APPENDECTOMY, DRAINAGE OF ABDOMINAL ABSCESS   • Peritoneal fluid culture w/gamma hemolytic Strep and anaerobic culture w/Bacteroides fragilis   • Received Ciprofloxacin, Flagyl through 12/17  • NGT removed on 12/13 - tolerating diet   • Pain management   • Treatment per surgical team   • ID consulted as leukocytosis worsening- afib uncontrolled   • Will repeat CT A/P with IV contrast to rule out possible abscess

## 2022-12-21 NOTE — UTILIZATION REVIEW
Continued Stay Review    Date: 12/21/22                           Current Patient Class: inpatient  Current Level of Care: ICU     HPI:77 y o  male initially admitted on 12/11/22 Peritonitis s/p exploratory laparotomy with appendectomy, drainage of abscess, and extensive lysis of adhesions  Peritoneal fluid culture w/gamma hemolytic Strep and anaerobic culture w/Bacteroides fragilis  Received Ciprofloxacin, Flagyl through 12/17  NGT removed on 12/13  Assessment/Plan:  The patient is feeling frustrated and wanting to go home  Pt continues to be tachycardic  ID consulted as leukocytosis worsening, afib uncontrolled  Repeat CT AP to ro possible abscess  Midline wound is clean and healing  Cardizem drip weaned off on 12/15, digoxin loaded on 12/16, Cardiology increased Toprol 100 mg twice daily to 150 mg twice daily on 12/20, continue digoxin 125 mcg daily  HR still uncontrolled, possibly due to worsening infection  Started on Xarelto on 12/20  Pt weaned to RA, continue to monitor O2 sats, encourage coughing and deep breathing, inc spirometry and ambulation  Continue to monitor fever and WBC, renal function      Vital Signs:   Date/Time Temp Pulse Resp BP MAP (mmHg) SpO2 O2 Device Patient Position - Orthostatic VS   12/21/22 1142 -- -- -- 140/89 -- -- -- --   12/21/22 1128 97 2 °F (36 2 °C) Abnormal  -- -- -- -- -- -- --   12/21/22 1028 -- 127 Abnormal  -- -- -- -- -- --   12/21/22 0833 -- 142 Abnormal  -- -- -- -- -- --   12/21/22 0832 -- 131 Abnormal  -- 126/92 -- -- -- --   12/21/22 0700 97 9 °F (36 6 °C) 126 Abnormal  23 Abnormal  126/92 97 96 % -- Lying   12/21/22 0622 -- 148 Abnormal  21 -- -- 97 % -- --   12/21/22 0600 -- 147 Abnormal  22 159/67 97 97 % None (Room air) Lying   12/21/22 0500 -- 104 24 Abnormal  -- -- 96 % None (Room air) --   12/21/22 0400 -- 102 24 Abnormal  137/64 92 96 % None (Room air) Lying   12/21/22 0300 -- 146 Abnormal  31 Abnormal  -- -- 97 % None (Room air) --   12/21/22 0200 -- 102 23 Abnormal  137/65 93 93 % None (Room air) Lying   12/21/22 0100 97 4 °F (36 3 °C) Abnormal  122 Abnormal  42 Abnormal  -- -- 93 % None (Room air) --   12/21/22 0030 -- 131 Abnormal  23 Abnormal  -- -- 93 % -- --   12/21/22 0000 -- 143 Abnormal  24 Abnormal  150/71 102 95 % None (Room air) Lying   12/20/22 2300 -- 132 Abnormal  21 -- -- 96 % None (Room air) --   12/20/22 2200 -- 140 Abnormal  21 136/65 93 95 % None (Room air) Lying   12/20/22 2100 -- 117 Abnormal  22 -- -- 94 % None (Room air) --   12/20/22 2000 96 4 °F (35 8 °C) Abnormal  134 Abnormal  24 Abnormal  143/63 91 95 % None (Room air) Lying   12/20/22 1900 -- 147 Abnormal  16 -- -- 94 % None (Room air) --   12/20/22 1600 -- 142 Abnormal  22 155/69 99 95 % -- --     Pertinent Labs/Diagnostic Results:       Results from last 7 days   Lab Units 12/21/22  0332 12/20/22  0524 12/19/22  0521 12/18/22  0547 12/17/22  0502   WBC Thousand/uL 14 58* 13 85* 12 75* 10 20* 9 30   HEMOGLOBIN g/dL 12 1 12 1 11 8* 11 7* 10 8*   HEMATOCRIT % 38 2 38 0 37 2 36 2* 34 3*   PLATELETS Thousands/uL 519* 455* 424* 367 255   NEUTROS ABS Thousands/µL 10 68* 10 51*  --  7 20  --          Results from last 7 days   Lab Units 12/20/22  0524 12/19/22  0521 12/18/22  0547 12/17/22  1714 12/17/22  0502 12/16/22  0450 12/15/22  1941 12/15/22  0429   SODIUM mmol/L 136 134* 136 134* 132* 130* 132* 136   POTASSIUM mmol/L 4 0 3 9 3 9 3 7 3 9 4 3 4 0 3 9   CHLORIDE mmol/L 99 100 101 98 99 99 101 102   CO2 mmol/L 31 27 28 29 26 25 26 25   ANION GAP mmol/L 6 7 7 7 7 6 5 9   BUN mg/dL 10 11 13 15 16 21 22 17   CREATININE mg/dL 0 94 0 89 0 91 0 98 0 99 1 21 1 27 1 09   EGFR ml/min/1 73sq m 78 83 82 75 74 58 54 66   CALCIUM mg/dL 8 8 8 4 7 8* 8 4 8 2* 8 3* 8 1* 8 2*   CALCIUM, IONIZED mmol/L  --   --  1 08* 1 09*  --   --  1 03* 1 05*   MAGNESIUM mg/dL 2 0 1 8* 1 8* 2 1 1 8* 2 1 2 1 2 1   PHOSPHORUS mg/dL  --   --  2 5 2 8 2 9 3 1 3 9 2 9     Results from last 7 days   Lab Units 12/18/22  0547 12/15/22  1955   AST U/L 18  --    ALT U/L 15  --    ALK PHOS U/L 53  --    TOTAL PROTEIN g/dL 5 0*  --    ALBUMIN g/dL 2 8*  --    TOTAL BILIRUBIN mg/dL 0 32  --    AMMONIA umol/L  --  43     Results from last 7 days   Lab Units 12/17/22  0114 12/16/22  1147 12/16/22  0113 12/15/22  2345 12/15/22  1945 12/15/22  1714 12/15/22  1155 12/14/22  2319 12/14/22  1756   POC GLUCOSE mg/dl 122 91 122 115 110 122 145* 120 124     Results from last 7 days   Lab Units 12/20/22  0524 12/19/22  0521 12/18/22  0547 12/17/22  1714 12/17/22  0502 12/16/22  0450 12/15/22  1941 12/15/22  0429   GLUCOSE RANDOM mg/dL 125 123 131 152* 110 107 114 103     Results from last 7 days   Lab Units 12/16/22  0806 12/15/22  1941   PH IGLESIA  7 352 7 271*   PCO2 IGLESIA mm Hg 47 0 59 1*   PO2 IGLESIA mm Hg 23 4* 36 3   HCO3 IGLESIA mmol/L 25 5 26 6   BASE EXC IGLESIA mmol/L -0 4 -1 1   O2 CONTENT IGLESIA ml/dL 6 5 9 6   O2 HGB, VENOUS % 38 7* 60 4     Results from last 7 days   Lab Units 12/18/22  0547 12/17/22  0502 12/17/22  0112   PTT seconds 80* 73* 66*         Results from last 7 days   Lab Units 12/20/22  0524 12/18/22  0547 12/15/22  0429   PROCALCITONIN ng/ml 0 15 0 44* 2 05*     Results from last 7 days   Lab Units 12/18/22  0547   DIGOXIN LVL ng/mL 1 8     Medications:   Scheduled Medications:  atorvastatin, 20 mg, Oral, Daily  carisoprodol, 350 mg, Oral, 4x Daily  cholecalciferol, 1,000 Units, Oral, Daily  [START ON 12/22/2022] digoxin, 250 mcg, Oral, Daily  diltiazem, 120 mg, Oral, Daily  gabapentin, 600 mg, Oral, TID  metoprolol succinate, 150 mg, Oral, BID  oxyCODONE, 10 mg, Oral, Q6H  pantoprazole, 40 mg, Intravenous, Q12H ANDREA  rivaroxaban, 20 mg, Oral, Daily With Breakfast  traZODone, 100 mg, Oral, HS      Continuous IV Infusions: none     PRN Meds:  hydrOXYzine HCL, 25 mg, Oral, Q6H PRN  ondansetron, 4 mg, Intravenous, Q6H PRN  perflutren lipid microsphere, 1 mL/min, Intravenous, Once in imaging        Discharge Plan: tbd    Network Utilization Review Department  ATTENTION: Please call with any questions or concerns to [phone] and carefully listen to the prompts so that you are directed to the right person  All voicemails are confidential   Nadeem Gonzalez all requests for admission clinical reviews, approved or denied determinations and any other requests to dedicated fax number below belonging to the campus where the patient is receiving treatment   List of dedicated fax numbers for the Facilities:  [address] DENIALS (Administrative/Medical Necessity) [phone]   [address] (Maternity/NICU/Pediatrics) [phone]   [address] Gerda Clarke [phone]   [facility] [phone]   [address] [address] [address] [facility] 28 [phone]   [facility]9 [facility] Pierre Magaña [facility] 134 815 [facility] [phone]

## 2022-12-21 NOTE — PLAN OF CARE
Problem: OCCUPATIONAL THERAPY ADULT  Goal: Performs self-care activities at highest level of function for planned discharge setting  See evaluation for individualized goals  Description: Treatment Interventions: ADL retraining, Functional transfer training, Endurance training, Patient/family training, Equipment evaluation/education, Compensatory technique education, Energy conservation, Activityengagement    See flowsheet documentation for full assessment, interventions and recommendations  Outcome: Progressing  Note: Limitation: Decreased ADL status, Decreased Safe judgement during ADL, Decreased endurance, Decreased self-care trans, Decreased high-level ADLs  Prognosis: Good  Assessment: Patient participated in Skilled OT session this date with interventions consisting of functional transfer training, ADL re training with the use of correct body mechnaics and Energy Conservation techniques   Patient agreeable to OT treatment session, upon arrival patient was found supine in bed, alert, responsive  and in no apparent distress  Patient transfers supine to sit EOB with Min A x 1  Patient then sits unsupported EOB 3-4 mmins with no LOB or dizziness reported  Patient transfers sit to stand and ambulates 55 feet x 2 with CGA x 1 and VCs for hand placement  Once returned to room, patient requested to freshen up and was set up to bathe face/hands and complete grooming and hygiene tasks as detailed in flow sheet  Patient declined full ADL as wife was present and would assist patient with later  Patient declined further activity at this time  Session ended with patient seated OOB to recliner, all needs met, and call bell within reach  In comparison to previous session, patient with improvements in self care ADL performance, functional transfers/mobility, and endurance  Patient requiring verbal cues for correct technique, verbal cues for pacing thru activity steps and ocassional safety reminders   Patient performance demonstrated good carryover of learned techniques and strategies to facilitate safety during functional tasks  Patient continues to be functioning below baseline level, occupational performance remains limited secondary to factors listed above and increased risk for falls and injury  From OT standpoint, recommendation at time of d/c would be Home OT  Patient to benefit from continued Occupational Therapy treatment while in the hospital to address deficits as defined above and maximize level of functional independence with ADLs and functional mobility in order to return to PLOF       OT Discharge Recommendation: Home with home health rehabilitation

## 2022-12-21 NOTE — PROGRESS NOTES
[address]  Progress Note - Susanna Pro 1945, 78 y o  male MRN: [id number]  Unit/Bed#: ICU 10-01 Encounter: [id number]  Primary Care Provider: Rose Valverde DO   Date and time admitted to hospital: 12/11/2022  5:19 AM    * Peritonitis [facility])  Assessment & Plan  • Presented to ED on 12/11 w/acute onset abdominal pain concerning for acute abdomen   • W/hx of sigmoid diverticulitis s/p Polo's procedure and colostomy with planned reversal scheduled for 01/2023  • CT A/P on admission revealed extensive inflammatory changes involving jejunum and proximal-mid ileum, compatible with severe enteritis; there is focal free air surrounding lower midline ileum just above urinary bladder with extensive inflammatory changes; few mildly dilated loops of jejunum within left upper abdomen, likely 2/2 extensive inflammatory changes with possible superimposed low-grade partial SBO; small amount of free air and free fluid in remainder of abdomen and pelvis; stomach and proximal esophagus are distended and fluid-filled, presumably secondary to delayed transit  • OR on 12/11 for EX LAP,  EXTENSIVE MELANI, APPENDECTOMY, DRAINAGE OF ABDOMINAL ABSCESS   • Peritoneal fluid culture w/gamma hemolytic Strep and anaerobic culture w/Bacteroides fragilis   • Received Ciprofloxacin, Flagyl through 12/17  • NGT removed on 12/13 - tolerating diet   • Pain management   • Treatment per surgical team   • ID consulted as leukocytosis worsening- afib uncontrolled   • Will repeat CT A/P with IV contrast to rule out possible abscess       New onset atrial fibrillation ([address] )  Assessment & Plan  • New onset AF w/RVR as high as 225  • No previous history of AF  • Suspect this is 2/2 septic shock/peritonitis   • TTE (12/14) revealed EF 35% w/mild concentric hypertrophy, moderately reduced systolic function in global manner; mildly dilated RV w/moderately reduced systolic function; mild MR/TR, RVSP 48  • Initially refractory to IV Lopressor 5 mg x2, IV Cardizem 15 mg x1, IV Amiodarone bolus + gtt initiation  • Cardizem gtt weaned off on 12/15  • Started on digoxin load on 12/16  • Cardiology input appreciated   • Cardiology increased Toprol 100 mg twice daily to 150 mg twice daily on 12/20, continue digoxin 125 mcg daily  HR still uncontrolled, possibly due to worsening infection   • Heparin gtt initially- started on Nplyjzs22/20  Acute respiratory failure with hypoxia [facility])  Assessment & Plan  • Suspect this is is multifactorial in setting of atelectasis vs mild pulmonary vascular congestion vs post-operative pain  • CXR on 12/13 w/mildly increased interstitial markings bilaterally - may indicate mild pulmonary edema  • Respiratory status improved w/IV diuresis - continue w/PRN diuresis  • Weaned to RA - continue to maintain SpO2 > 90%  • Encourage cough, deep breathing, IS, ambulation    Septic shock (HCC)  Assessment & Plan  • POA: AEB tachycardia, tachypnea, leukopenia, lactic acidosis > 4, GERMAN  • In setting of intraabdominal infection/peritonitis  • COVID/FLU/RSV negative  • Blood cultures w/NGTD  • Peritoneal fluid culture w/gamma hemolytic Strep  • Anaerobic culture w/Bacteroides fragilis   • Procalcitonin 8 29 - 6 02 - 3 36 - 2 05- 0 44 - 0 15  • s/p aggressive IVF resuscitation  • Received IV Ciprofloxacin, Flagyl   • Monitor fever and WBC curve  • WBC trending up 10 - 12 75 - 13 85 - 14 58  • ID consulted       Chronic kidney disease, stage 2 (mild)  Assessment & Plan  Lab Results   Component Value Date    EGFR 78 12/20/2022    EGFR 83 12/19/2022    EGFR 82 12/18/2022    CREATININE 0 94 12/20/2022    CREATININE 0 89 12/19/2022    CREATININE 0 91 12/18/2022   · GERMAN due to sepsis- resolved with IVF/abx/surgical intervention   · Baseline Cr 0 8-1 0 mg/dL   · Currently at baseline  · Monitor renal function     Benign essential hypertension  Assessment & Plan  · Hold home losartan for now as metoprolol is being uptitrated  Continuous opioid dependence ([facility] [address] )  Assessment & Plan  · Setting of chronic pain due to RA/OA  · [place] PDMP reviewed  · Continue with home Soma 350 mg 3 times daily, Neurontin 600 mg 3 times daily, oxycodone 10 mg 4 times daily        VTE Pharmacologic Prophylaxis: VTE Score: 8 High Risk (Score >/= 5) - Pharmacological DVT Prophylaxis Ordered: rivaroxaban (Xarelto)  Sequential Compression Devices Ordered  Patient Centered Rounds: I performed bedside rounds with nursing staff today  Discussions with Specialists or Other Care Team Provider: nursing, CM    Education and Discussions with Family / Patient: patient  Time Spent for Care: 20 minutes  More than 50% of total time spent on counseling and coordination of care as described above  Current Length of Stay: 10 day(s)  Current Patient Status: Inpatient   Certification Statement: The patient will continue to require additional inpatient hospital stay due to need for repeat CT scan, ID consult for possible worsening infection  Discharge Plan: pending clinical course    Code Status: Level 1 - Full Code    Subjective: The patient was seen and examined  The patient is feeling frustrated and wanting to go home  He denies any worsening pain  His HR continues to be elevated, currently around 115 bpm     Objective:     Vitals:   Temp (24hrs), Av 3 °F (36 3 °C), Min:96 4 °F (35 8 °C), Max:97 8 °F (36 6 °C)    Temp:  [96 4 °F (35 8 °C)-97 8 °F (36 6 °C)] 97 4 °F (36 3 °C)  HR:  [100-148] 142  Resp:  [16-42] 21  BP: (125-159)/(57-92) 126/92  SpO2:  [93 %-97 %] 97 %  Body mass index is 28 35 kg/m²  Input and Output Summary (last 24 hours): Intake/Output Summary (Last 24 hours) at 2022 3772  Last data filed at 2022 0600  Gross per 24 hour   Intake 100 ml   Output 2150 ml   Net -2050 ml       Physical Exam:   Physical Exam  Vitals and nursing note reviewed  Constitutional:       General: He is awake  Appearance: Normal appearance  Cardiovascular:      Rate and Rhythm: Normal rate and regular rhythm  Pulmonary:      Effort: Pulmonary effort is normal       Breath sounds: Normal breath sounds  Abdominal:      Palpations: Abdomen is soft  Tenderness: There is abdominal tenderness in the right lower quadrant  Comments: Dressing midline   Skin:     General: Skin is warm and dry  Neurological:      General: No focal deficit present  Mental Status: He is alert and oriented to person, place, and time  Psychiatric:         Attention and Perception: Attention normal          Mood and Affect: Mood normal          Speech: Speech normal          Behavior: Behavior is cooperative  Additional Data:     Labs:  Results from last 7 days   Lab Units 12/21/22  0332   WBC Thousand/uL 14 58*   HEMOGLOBIN g/dL 12 1   HEMATOCRIT % 38 2   PLATELETS Thousands/uL 519*   NEUTROS PCT % 74   LYMPHS PCT % 17   MONOS PCT % 7   EOS PCT % 1     Results from last 7 days   Lab Units 12/20/22  0524 12/19/22  0521 12/18/22  0547   SODIUM mmol/L 136   < > 136   POTASSIUM mmol/L 4 0   < > 3 9   CHLORIDE mmol/L 99   < > 101   CO2 mmol/L 31   < > 28   BUN mg/dL 10   < > 13   CREATININE mg/dL 0 94   < > 0 91   ANION GAP mmol/L 6   < > 7   CALCIUM mg/dL 8 8   < > 7 8*   ALBUMIN g/dL  --   --  2 8*   TOTAL BILIRUBIN mg/dL  --   --  0 32   ALK PHOS U/L  --   --  53   ALT U/L  --   --  15   AST U/L  --   --  18   GLUCOSE RANDOM mg/dL 125   < > 131    < > = values in this interval not displayed           Results from last 7 days   Lab Units 12/17/22  0114 12/16/22  1147 12/16/22  0113 12/15/22  2345 12/15/22  1945 12/15/22  1714 12/15/22  1155 12/14/22  2319 12/14/22  1756 12/14/22  1201   POC GLUCOSE mg/dl 122 91 122 115 110 122 145* 120 124 105         Results from last 7 days   Lab Units 12/20/22  0524 12/18/22  0547 12/15/22  0429   PROCALCITONIN ng/ml 0 15 0 44* 2 05*       Lines/Drains:  Invasive Devices     Peripheral Intravenous Line  Duration Long-Dwell Peripheral IV (Midline) 97/47/03 Left Basilic 8 days          Drain  Duration           Colostomy Descending/sigmoid -- days    Colostomy Descending/sigmoid  days                      Imaging: No pertinent imaging reviewed  Recent Cultures (last 7 days):         Last 24 Hours Medication List:   Current Facility-Administered Medications   Medication Dose Route Frequency Provider Last Rate   • atorvastatin  20 mg Oral Daily PEDRO Epstein     • carisoprodol  350 mg Oral 4x Daily PEDRO Epstein     • cholecalciferol  1,000 Units Oral Daily PEDRO Epstein     • digoxin  125 mcg Oral Daily Ant Davis PA-C     • gabapentin  600 mg Oral TID PEDRO Epstein     • hydrOXYzine HCL  25 mg Oral Q6H PRN Sonya Starr PA-C     • metoprolol succinate  150 mg Oral BID Cortez Muro MD     • ondansetron  4 mg Intravenous Q6H PRN PEDRO Epstein     • oxyCODONE  10 mg Oral Q6H PEDRO Perry     • pantoprazole  40 mg Intravenous Q12H [address] PEDRO Perry     • perflutren lipid microsphere  1 mL/min Intravenous Once in imaging PEDRO Epstein     • rivaroxaban  20 mg Oral Daily With Breakfast Harshad Spain PA-C     • traZODone  100 mg Oral HS PEDRO Epstein          Today, Patient Was Seen By: Harshad Spain PA-C    **Please Note: This note may have been constructed using a voice recognition system  **

## 2022-12-21 NOTE — ASSESSMENT & PLAN NOTE
• POA: AEB tachycardia, tachypnea, leukopenia, lactic acidosis > 4, GERMAN  • In setting of intraabdominal infection/peritonitis  • COVID/FLU/RSV negative  • Blood cultures w/NGTD  • Peritoneal fluid culture w/gamma hemolytic Strep  • Anaerobic culture w/Bacteroides fragilis   • Procalcitonin 8 29 - 6 02 - 3 36 - 2 05- 0 44 - 0 15  • s/p aggressive IVF resuscitation  • Received IV Ciprofloxacin, Flagyl   • Monitor fever and WBC curve  • WBC trending up 10 - 12 75 - 13 85 - 14 58  • ID consulted

## 2022-12-22 ENCOUNTER — APPOINTMENT (INPATIENT)
Dept: ULTRASOUND IMAGING | Facility: HOSPITAL | Age: 77
End: 2022-12-22

## 2022-12-22 ENCOUNTER — APPOINTMENT (INPATIENT)
Dept: NON INVASIVE DIAGNOSTICS | Facility: HOSPITAL | Age: 77
End: 2022-12-22

## 2022-12-22 LAB
ALBUMIN SERPL BCP-MCNC: 3.2 G/DL (ref 3.5–5)
ALP SERPL-CCNC: 60 U/L (ref 34–104)
ALT SERPL W P-5'-P-CCNC: 26 U/L (ref 7–52)
ANION GAP SERPL CALCULATED.3IONS-SCNC: 7 MMOL/L (ref 4–13)
AST SERPL W P-5'-P-CCNC: 24 U/L (ref 13–39)
BASOPHILS # BLD AUTO: 0.04 THOUSANDS/ÂΜL (ref 0–0.1)
BASOPHILS NFR BLD AUTO: 0 % (ref 0–1)
BILIRUB SERPL-MCNC: 0.37 MG/DL (ref 0.2–1)
BUN SERPL-MCNC: 10 MG/DL (ref 5–25)
CALCIUM ALBUM COR SERPL-MCNC: 9.3 MG/DL (ref 8.3–10.1)
CALCIUM SERPL-MCNC: 8.7 MG/DL (ref 8.4–10.2)
CHLORIDE SERPL-SCNC: 99 MMOL/L (ref 96–108)
CO2 SERPL-SCNC: 29 MMOL/L (ref 21–32)
CREAT SERPL-MCNC: 0.92 MG/DL (ref 0.6–1.3)
EOSINOPHIL # BLD AUTO: 0.12 THOUSAND/ÂΜL (ref 0–0.61)
EOSINOPHIL NFR BLD AUTO: 1 % (ref 0–6)
ERYTHROCYTE [DISTWIDTH] IN BLOOD BY AUTOMATED COUNT: 13.6 % (ref 11.6–15.1)
GFR SERPL CREATININE-BSD FRML MDRD: 81 ML/MIN/1.73SQ M
GLUCOSE SERPL-MCNC: 130 MG/DL (ref 65–140)
HCT VFR BLD AUTO: 36.3 % (ref 36.5–49.3)
HGB BLD-MCNC: 11.8 G/DL (ref 12–17)
IMM GRANULOCYTES # BLD AUTO: 0.1 THOUSAND/UL (ref 0–0.2)
IMM GRANULOCYTES NFR BLD AUTO: 1 % (ref 0–2)
LYMPHOCYTES # BLD AUTO: 1.72 THOUSANDS/ÂΜL (ref 0.6–4.47)
LYMPHOCYTES NFR BLD AUTO: 10 % (ref 14–44)
MCH RBC QN AUTO: 28.9 PG (ref 26.8–34.3)
MCHC RBC AUTO-ENTMCNC: 32.5 G/DL (ref 31.4–37.4)
MCV RBC AUTO: 89 FL (ref 82–98)
MONOCYTES # BLD AUTO: 1.36 THOUSAND/ÂΜL (ref 0.17–1.22)
MONOCYTES NFR BLD AUTO: 8 % (ref 4–12)
NEUTROPHILS # BLD AUTO: 13.69 THOUSANDS/ÂΜL (ref 1.85–7.62)
NEUTS SEG NFR BLD AUTO: 80 % (ref 43–75)
NRBC BLD AUTO-RTO: 0 /100 WBCS
PLATELET # BLD AUTO: 532 THOUSANDS/UL (ref 149–390)
PMV BLD AUTO: 8.7 FL (ref 8.9–12.7)
POTASSIUM SERPL-SCNC: 4.3 MMOL/L (ref 3.5–5.3)
PROT SERPL-MCNC: 6.4 G/DL (ref 6.4–8.4)
RBC # BLD AUTO: 4.09 MILLION/UL (ref 3.88–5.62)
SODIUM SERPL-SCNC: 135 MMOL/L (ref 135–147)
WBC # BLD AUTO: 17.03 THOUSAND/UL (ref 4.31–10.16)

## 2022-12-22 RX ORDER — DILTIAZEM HYDROCHLORIDE 240 MG/1
240 CAPSULE, COATED, EXTENDED RELEASE ORAL DAILY
Status: DISCONTINUED | OUTPATIENT
Start: 2022-12-23 | End: 2022-12-23

## 2022-12-22 RX ORDER — DILTIAZEM HYDROCHLORIDE 120 MG/1
120 CAPSULE, COATED, EXTENDED RELEASE ORAL ONCE
Status: COMPLETED | OUTPATIENT
Start: 2022-12-22 | End: 2022-12-22

## 2022-12-22 RX ADMIN — CARISOPRODOL 350 MG: 350 TABLET ORAL at 21:27

## 2022-12-22 RX ADMIN — OXYCODONE HYDROCHLORIDE 10 MG: 10 TABLET ORAL at 13:09

## 2022-12-22 RX ADMIN — Medication 1000 UNITS: at 08:16

## 2022-12-22 RX ADMIN — OXYCODONE HYDROCHLORIDE 10 MG: 10 TABLET ORAL at 17:55

## 2022-12-22 RX ADMIN — CARISOPRODOL 350 MG: 350 TABLET ORAL at 08:16

## 2022-12-22 RX ADMIN — GABAPENTIN 600 MG: 600 TABLET, FILM COATED ORAL at 21:27

## 2022-12-22 RX ADMIN — GABAPENTIN 600 MG: 600 TABLET, FILM COATED ORAL at 17:56

## 2022-12-22 RX ADMIN — ONDANSETRON 4 MG: 2 INJECTION INTRAMUSCULAR; INTRAVENOUS at 11:13

## 2022-12-22 RX ADMIN — PANTOPRAZOLE SODIUM 40 MG: 40 INJECTION, POWDER, LYOPHILIZED, FOR SOLUTION INTRAVENOUS at 08:56

## 2022-12-22 RX ADMIN — PANTOPRAZOLE SODIUM 40 MG: 40 INJECTION, POWDER, LYOPHILIZED, FOR SOLUTION INTRAVENOUS at 21:27

## 2022-12-22 RX ADMIN — DILTIAZEM HYDROCHLORIDE 120 MG: 120 CAPSULE, COATED, EXTENDED RELEASE ORAL at 13:10

## 2022-12-22 RX ADMIN — DILTIAZEM HYDROCHLORIDE 120 MG: 120 CAPSULE, COATED, EXTENDED RELEASE ORAL at 08:16

## 2022-12-22 RX ADMIN — CARISOPRODOL 350 MG: 350 TABLET ORAL at 13:11

## 2022-12-22 RX ADMIN — TRAZODONE HYDROCHLORIDE 100 MG: 100 TABLET ORAL at 21:27

## 2022-12-22 RX ADMIN — GABAPENTIN 600 MG: 600 TABLET, FILM COATED ORAL at 08:16

## 2022-12-22 RX ADMIN — OXYCODONE HYDROCHLORIDE 10 MG: 10 TABLET ORAL at 05:18

## 2022-12-22 RX ADMIN — RIVAROXABAN 20 MG: 10 TABLET, FILM COATED ORAL at 08:16

## 2022-12-22 RX ADMIN — METOPROLOL SUCCINATE 150 MG: 50 TABLET, EXTENDED RELEASE ORAL at 08:16

## 2022-12-22 RX ADMIN — DIGOXIN 250 MCG: 125 TABLET ORAL at 08:16

## 2022-12-22 RX ADMIN — METOPROLOL SUCCINATE 150 MG: 50 TABLET, EXTENDED RELEASE ORAL at 21:26

## 2022-12-22 RX ADMIN — CARISOPRODOL 350 MG: 350 TABLET ORAL at 17:55

## 2022-12-22 RX ADMIN — ATORVASTATIN CALCIUM 20 MG: 10 TABLET, FILM COATED ORAL at 08:16

## 2022-12-22 NOTE — PLAN OF CARE
Problem: Potential for Falls  Goal: Patient will remain free of falls  Description: INTERVENTIONS:  - Educate patient/family on patient safety including physical limitations  - Instruct patient to call for assistance with activity   - Consult OT/PT to assist with strengthening/mobility   - Keep Call bell within reach  - Keep bed low and locked with side rails adjusted as appropriate  - Keep care items and personal belongings within reach  - Initiate and maintain comfort rounds  - Make Fall Risk Sign visible to staff  - Offer Toileting every 2 Hours, in advance of need  - Initiate/Maintain bed alarm  - Obtain necessary fall risk management equipment:   - Apply yellow socks and bracelet for high fall risk patients  - Consider moving patient to room near nurses station  Outcome: Progressing     Problem: Prexisting or High Potential for Compromised Skin Integrity  Goal: Skin integrity is maintained or improved  Description: INTERVENTIONS:  - Identify patients at risk for skin breakdown  - Assess and monitor skin integrity  - Assess and monitor nutrition and hydration status  - Monitor labs   - Assess for incontinence   - Turn and reposition patient  - Assist with mobility/ambulation  - Relieve pressure over bony prominences  - Avoid friction and shearing  - Provide appropriate hygiene as needed including keeping skin clean and dry  - Evaluate need for skin moisturizer/barrier cream  - Collaborate with interdisciplinary team   - Patient/family teaching  - Consider wound care consult   Outcome: Progressing     Problem: MOBILITY - ADULT  Goal: Maintains/Returns to pre admission functional level  Description: INTERVENTIONS:  - Perform BMAT or MOVE assessment daily    - Set and communicate daily mobility goal to care team and patient/family/caregiver     - Collaborate with rehabilitation services on mobility goals if consulted  - Dangle patient 3 times a day  - Stand patient 3 times a day  - Ambulate patient 3 times a day  - Out of bed to chair 3 times a day   - Out of bed for meals 3 times a day  - Out of bed for toileting  - Record patient progress and toleration of activity level   Outcome: Progressing     Problem: PAIN - ADULT  Goal: Verbalizes/displays adequate comfort level or baseline comfort level  Description: Interventions:  - Encourage patient to monitor pain and request assistance  - Assess pain using appropriate pain scale  - Administer analgesics based on type and severity of pain and evaluate response  - Implement non-pharmacological measures as appropriate and evaluate response  - Consider cultural and social influences on pain and pain management  - Notify physician/advanced practitioner if interventions unsuccessful or patient reports new pain  Outcome: Progressing     Problem: INFECTION - ADULT  Goal: Absence of fever/infection during neutropenic period  Description: INTERVENTIONS:  - Monitor WBC    Outcome: Progressing     Problem: SAFETY ADULT  Goal: Maintain or return to baseline ADL function  Description: INTERVENTIONS:  - Educate patient/family on patient safety including physical limitations  - Instruct patient to call for assistance with activity   - Consult OT/PT to assist with strengthening/mobility   - Keep Call bell within reach  - Keep bed low and locked with side rails adjusted as appropriate  - Keep care items and personal belongings within reach  - Initiate and maintain comfort rounds  - Make Fall Risk Sign visible to staff  - Offer Toileting every 2 Hours, in advance of need  - Initiate/Maintain bed alarm  - Obtain necessary fall risk management equipment:   - Apply yellow socks and bracelet for high fall risk patients  - Consider moving patient to room near nurses station  Outcome: Progressing     Problem: SAFETY ADULT  Goal: Maintains/Returns to pre admission functional level  Description: INTERVENTIONS:  - Perform BMAT or MOVE assessment daily    - Set and communicate daily mobility goal to care team and patient/family/caregiver  - Collaborate with rehabilitation services on mobility goals if consulted  - Dangle patient 3 times a day  - Stand patient 3 times a day  - Ambulate patient 3 times a day  - Out of bed to chair 3 times a day   - Out of bed for meals 3  Problem: Knowledge Deficit  Goal: Patient/family/caregiver demonstrates understanding of disease process, treatment plan, medications, and discharge instructions  Description: Complete learning assessment and assess knowledge base    Interventions:  - Provide teaching at level of understanding  - Provide teaching via preferred learning methods  Outcome: Progressing     Problem: CARDIOVASCULAR - ADULT  Goal: Maintains optimal cardiac output and hemodynamic stability  Description: INTERVENTIONS:  - Monitor I/O, vital signs and rhythm  - Monitor for S/S and trends of decreased cardiac output  - Administer and titrate ordered vasoactive medications to optimize hemodynamic stability  - Assess quality of pulses, skin color and temperature  - Assess for signs of decreased coronary artery perfusion  - Instruct patient to report change in severity of symptoms  Outcome: Progressing     Problem: CARDIOVASCULAR - ADULT  Goal: Absence of cardiac dysrhythmias or at baseline rhythm  Description: INTERVENTIONS:  - Continuous cardiac monitoring, vital signs, obtain 12 lead EKG if ordered  - Administer antiarrhythmic and heart rate control medications as ordered  - Monitor electrolytes and administer replacement therapy as ordered  Outcome: Progressing     Problem: RESPIRATORY - ADULT  Goal: Achieves optimal ventilation and oxygenation  Description: INTERVENTIONS:  - Assess for changes in respiratory status  - Assess for changes in mentation and behavior  - Position to facilitate oxygenation and minimize respiratory effort  - Oxygen administered by appropriate delivery if ordered  - Initiate smoking cessation education as indicated  - Encourage broncho-pulmonary hygiene including cough, deep breathe, Incentive Spirometry  - Assess the need for suctioning and aspirate as needed  - Assess and instruct to report SOB or any respiratory difficulty  - Respiratory Therapy support as indicated  Outcome: Progressing     Problem: GASTROINTESTINAL - ADULT  Goal: Minimal or absence of nausea and/or vomiting  Description: INTERVENTIONS:  - Administer IV fluids if ordered to ensure adequate hydration  - Maintain NPO status until nausea and vomiting are resolved  - Nasogastric tube if ordered  - Administer ordered antiemetic medications as needed  - Provide nonpharmacologic comfort measures as appropriate  - Advance diet as tolerated, if ordered  - Consider nutrition services referral to assist patient with adequate nutrition and appropriate food choices  Outcome: Progressing     Problem: GASTROINTESTINAL - ADULT  Goal: Maintains or returns to baseline bowel function  Description: INTERVENTIONS:  - Assess bowel function  - Encourage oral fluids to ensure adequate hydration  - Administer IV fluids if ordered to ensure adequate hydration  - Administer ordered medications as needed  - Encourage mobilization and activity  - Consider nutritional services referral to assist patient with adequate nutrition and appropriate food choices  Outcome: Progressing     Problem: GASTROINTESTINAL - ADULT  Goal: Maintains adequate nutritional intake  Description: INTERVENTIONS:  - Monitor percentage of each meal consumed  - Identify factors contributing to decreased intake, treat as appropriate  - Assist with meals as needed  - Monitor I&O, weight, and lab values if indicated  - Obtain nutrition services referral as needed  Outcome: Progressing     Problem: GASTROINTESTINAL - ADULT  Goal: Establish and maintain optimal ostomy function  Description: INTERVENTIONS:  - Assess bowel function  - Encourage oral fluids to ensure adequate hydration  - Administer IV fluids if ordered to ensure adequate hydration   - Administer ordered medications as needed  - Encourage mobilization and activity  - Nutrition services referral to assist patient with appropriate food choices  - Assess stoma site  - Consider wound care consult   Outcome: Progressing     Problem: GENITOURINARY - ADULT  Goal: Maintains or returns to baseline urinary function  Description: INTERVENTIONS:  - Assess urinary function  - Encourage oral fluids to ensure adequate hydration if ordered  - Administer IV fluids as ordered to ensure adequate hydration  - Administer ordered medications as needed  - Offer frequent toileting  - Follow urinary retention protocol if ordered  Outcome: Progressing     Problem: METABOLIC, FLUID AND ELECTROLYTES - ADULT  Goal: Electrolytes maintained within normal limits  Description: INTERVENTIONS:  - Monitor labs and assess patient for signs and symptoms of electrolyte imbalances  - Administer electrolyte replacement as ordered  - Monitor response to electrolyte replacements, including repeat lab results as appropriate  - Instruct patient on fluid and nutrition as appropriate  Outcome: Progressing     Problem: METABOLIC, FLUID AND ELECTROLYTES - ADULT  Goal: Fluid balance maintained  Description: INTERVENTIONS:  - Monitor labs   - Monitor I/O and WT  - Instruct patient on fluid and nutrition as appropriate  - Assess for signs & symptoms of volume excess or deficit  Outcome: Progressing     Problem: HEMATOLOGIC - ADULT  Goal: Maintains hematologic stability  Description: INTERVENTIONS  - Assess for signs and symptoms of bleeding or hemorrhage  - Monitor labs  - Administer supportive blood products/factors as ordered and appropriate  Outcome: Progressing     Problem: Nutrition/Hydration-ADULT  Goal: Nutrient/Hydration intake appropriate for improving, restoring or maintaining nutritional needs  Description: Monitor and assess patient's nutrition/hydration status for malnutrition   Collaborate with interdisciplinary team and initiate plan and interventions as ordered  Monitor patient's weight and dietary intake as ordered or per policy  Utilize nutrition screening tool and intervene as necessary  Determine patient's food preferences and provide high-protein, high-caloric foods as appropriate       INTERVENTIONS:  - Monitor oral intake, urinary output, labs, and treatment plans  - Assess nutrition and hydration status and recommend course of action  - Evaluate amount of meals eaten  - Assist patient with eating if necessary   - Allow adequate time for meals  - Recommend/ encourage appropriate diets, oral nutritional supplements, and vitamin/mineral supplements  - Order, calculate, and assess calorie counts as needed  - Recommend, monitor, and adjust tube feedings and TPN/PPN based on assessed needs  - Assess need for intravenous fluids  - Provide specific nutrition/hydration education as appropriate  - Include patient/family/caregiver in decisions related to nutrition  Outcome: Progressing    times a day  - Out of bed for toileting  - Record patient progress and toleration of activity level   Outcome: Progressing

## 2022-12-22 NOTE — PROGRESS NOTES
Progress Note - General Surgery   Nathalie Montesinos 78 y o  male MRN: [id number]  Unit/Bed#: ICU 10-01 Encounter: [id number]      ASSESSMENT:  19-year-old male with PMH AS s/p valve replacement, chronic pain with continuous use of opioids, HLD, HTN, RA, perforated sigmoid diverticulitis s/p Polo's procedure (August 2022) for reversal in January 2023 presenting with sepsis secondary to abdominal abscess   Now POD#11 s/p exploratory laparotomy with appendectomy, drainage of abscess, and extensive lysis of adhesions      Leukocytosis rising, trend: 17, 14, 13, 12, 10  Afebrile  No abdominal, urinary, pulmonary sx  Midline incision healing, appears clean, no signs of infection  Has been off abx for 6 days (was on Cipro, Flagyl for total 5 days; wound cx grew E  Coli/Bacteriodes fragilis)    Afib: AC on Xeralato, tachycardia persists (HR 130s while at rest) with increase in digoxin and addition of low dose Cardizem  Hypoalbuminemia 3 2, on ensure supplements, nutrition following     Cr at baseline  Had GERMAN on admission, now resolved  Hx CKD  UO: 2 3L     Ostomy viable, fx  Stool output: 85 cc  Stool and gas in bag, bag without leak  PLAN:  ID following-appreciate recs  Blood cx ordered  RUQ pending  Monitor off abx for now, no source currently identified  (Consider addition of IV Cefepime/Flagyl if develops fever/hypotension )  Bilateral venous doppler order to r/o DVT  SLIM/Cardiology following for Afib--will need continued monitoring of HR and possible medication adjustments  Cardiac diet as tolerated, nutrition supplements   I/O, daily weights   AM CBC, trend WBC/fever curse/vitals  Midline wound daily dressing change/light packing   Ostomy care, change per nursing Q 3 days  PT/OT  Encouraged OOB ambulation/IS use  Dispo planning: Home with [facility]    SUBJECTIVE:  Feels well  Tolerating diet, low appetite for the taste of the food here, drinking the supplements  No issues voiding   Denies dysuria/urinary freq/urgency  Having ostomy output  Abdominal pain minimal, no changes, similar generalized post-op pain  Denies RUQ pain, post-prandial pain  C/o some nausea sometimes, but not significant and no vomiting  No pain/drainage at the midline incision  Denies calf pain/tenderness  Denies CP/palpiations  Denies cough/SOB  OOB/ambulating as able, but has been in bed most often  Using IS  OBJECTIVE:   Vitals:  Blood pressure 138/87, pulse (!) 130, temperature 98 5 °F (36 9 °C), temperature source Tympanic, resp  rate (!) 23, height 5' 7" (1 702 m), weight 81 kg (178 lb 9 2 oz), SpO2 96 %  ,Body mass index is 27 97 kg/m²  I/Os:    Intake/Output Summary (Last 24 hours) at 12/22/2022 1243  Last data filed at 12/22/2022 1231  Gross per 24 hour   Intake 690 ml   Output 1885 ml   Net -1195 ml       Lines/Drains:  Invasive Devices     Peripheral Intravenous Line  Duration           Long-Dwell Peripheral IV (Midline) 36/35/89 Left Basilic 9 days          Drain  Duration           Colostomy Descending/sigmoid -- days    Colostomy Descending/sigmoid  days                Physical Exam  Constitutional:       General: He is not in acute distress  HENT:      Head: Normocephalic and atraumatic  Cardiovascular:      Rate and Rhythm: Tachycardia present  Rhythm irregular  Heart sounds: No murmur heard  Pulmonary:      Effort: Pulmonary effort is normal       Breath sounds: No wheezing, rhonchi or rales  Abdominal:      General: There is no distension  Palpations: Abdomen is soft  Tenderness: There is abdominal tenderness (mild at epigastric region/LLQ)  There is no guarding or rebound        Comments: Nontender RUQ with deep palpation  Negative Song's sign   Nontender to percussion  Hypoactive bowel sounds  Ostomy pink, viable, fx with stool/gas in bag  Midline incision with staples healing well, no active drainage, no exudate on dressing, minimal SS fluid on 4x4 packing when removed Musculoskeletal:         General: No tenderness  Right lower leg: No edema  Left lower leg: No edema  Skin:     General: Skin is warm and dry  Neurological:      General: No focal deficit present  Mental Status: He is alert  Diagnostics:  I have personally reviewed pertinent lab results  CT head wo contrast    Result Date: 12/16/2022  Impression: No acute intracranial abnormality  Workstation performed: [id number]     XR chest portable ICU    Result Date: 12/13/2022  Impression: Mildly increased interstitial markings bilaterally may indicate mild pulmonary edema  Enteric tube in adequate position  The study was marked in [facility] for immediate notification  Workstation performed: [id number]     CT abdomen pelvis with contrast    Result Date: 12/11/2022  Impression: 1  Extensive inflammatory changes involving the jejunum and proximal-mid ileum, compatible with severe enteritis  Infectious and ischemic etiologies may be considered  In particular, there is focal free air surrounding the lower midline ileum just above the urinary bladder with extensive inflammatory changes  There are a few mildly dilated loops of jejunum and within the left upper abdomen, likely secondary to extensive inflammatory changes with possible superimposed low-grade partial small bowel  obstruction  There is a small amount of free air and free fluid in the remainder of the abdomen and pelvis  The stomach and proximal esophagus are distended and fluid-filled, presumably secondary to delayed transit  2   Postsurgical changes in the anterior abdominal wall with interval left lower quadrant colostomy  I personally discussed this study with Dr Alessandra Moreno of the ED on 12/11/2022 at 8:47 AM  Workstation performed: [id number]     IR midline placement    Result Date: 12/16/2022  Impression: Technically successful placement of midline using sonographic guidance   This is the end of the clinically relevant portion of this report  Subsequent information is for compliance, documentation, and coding purposes  In the process of informed consent, information was communicated, verbally, to the patient regarding the procedure  The patient was informed of; the name of the procedure, nature of the procedure, nature of the condition, risks, benefits, alternatives, and potential complications, as well as the consequences of not having the procedure  All the patient's questions were answered  Informed consent was signed  Quoted risks include infection, as well as a 5% risk of thrombosis of the entry vein  Chlorhexidine and alcohol was used for cleansing and sterile preparation of the skin  This was allowed to dry prior to the application of sterile draping  Timeout was performed, with all team members present, and in agreement  Confirmation of patient, procedure, laterally, allergies, and all needed equipment was performed  When ultrasound was used for needle entry guidance, into the vein, static and real-time images of needle entry are obtained, and archived   PROCEDURE: Mid line PREPROCEDURE DIAGNOSIS: Please see "history ", above POSTPROCEDURE DIAGNOSIS: Same ANTIBIOTICS: None SPECIMEN: None ESTIMATED BLOOD LOSS: None ANESTHESIA: Local Workstation performed: [id number]     Recent Results (from the past 36 hour(s))   CBC and differential    Collection Time: 12/21/22  3:32 AM   Result Value Ref Range    WBC 14 58 (H) 4 31 - 10 16 Thousand/uL    RBC 4 26 3 88 - 5 62 Million/uL    Hemoglobin 12 1 12 0 - 17 0 g/dL    Hematocrit 38 2 36 5 - 49 3 %    MCV 90 82 - 98 fL    MCH 28 4 26 8 - 34 3 pg    MCHC 31 7 31 4 - 37 4 g/dL    RDW 13 6 11 6 - 15 1 %    MPV 9 0 8 9 - 12 7 fL    Platelets 197 (H) 579 - 390 Thousands/uL    nRBC 0 /100 WBCs    Neutrophils Relative 74 43 - 75 %    Immat GRANS % 1 0 - 2 %    Lymphocytes Relative 17 14 - 44 %    Monocytes Relative 7 4 - 12 %    Eosinophils Relative 1 0 - 6 %    Basophils Relative 0 0 - 1 % Neutrophils Absolute 10 68 (H) 1 85 - 7 62 Thousands/µL    Immature Grans Absolute 0 11 0 00 - 0 20 Thousand/uL    Lymphocytes Absolute 2 48 0 60 - 4 47 Thousands/µL    Monocytes Absolute 1 08 0 17 - 1 22 Thousand/µL    Eosinophils Absolute 0 18 0 00 - 0 61 Thousand/µL    Basophils Absolute 0 05 0 00 - 0 10 Thousands/µL   CBC and differential    Collection Time: 12/22/22  5:19 AM   Result Value Ref Range    WBC 17 03 (H) 4 31 - 10 16 Thousand/uL    RBC 4 09 3 88 - 5 62 Million/uL    Hemoglobin 11 8 (L) 12 0 - 17 0 g/dL    Hematocrit 36 3 (L) 36 5 - 49 3 %    MCV 89 82 - 98 fL    MCH 28 9 26 8 - 34 3 pg    MCHC 32 5 31 4 - 37 4 g/dL    RDW 13 6 11 6 - 15 1 %    MPV 8 7 (L) 8 9 - 12 7 fL    Platelets 442 (H) 785 - 390 Thousands/uL    nRBC 0 /100 WBCs    Neutrophils Relative 80 (H) 43 - 75 %    Immat GRANS % 1 0 - 2 %    Lymphocytes Relative 10 (L) 14 - 44 %    Monocytes Relative 8 4 - 12 %    Eosinophils Relative 1 0 - 6 %    Basophils Relative 0 0 - 1 %    Neutrophils Absolute 13 69 (H) 1 85 - 7 62 Thousands/µL    Immature Grans Absolute 0 10 0 00 - 0 20 Thousand/uL    Lymphocytes Absolute 1 72 0 60 - 4 47 Thousands/µL    Monocytes Absolute 1 36 (H) 0 17 - 1 22 Thousand/µL    Eosinophils Absolute 0 12 0 00 - 0 61 Thousand/µL    Basophils Absolute 0 04 0 00 - 0 10 Thousands/µL   Comprehensive metabolic panel    Collection Time: 12/22/22  5:19 AM   Result Value Ref Range    Sodium 135 135 - 147 mmol/L    Potassium 4 3 3 5 - 5 3 mmol/L    Chloride 99 96 - 108 mmol/L    CO2 29 21 - 32 mmol/L    ANION GAP 7 4 - 13 mmol/L    BUN 10 5 - 25 mg/dL    Creatinine 0 92 0 60 - 1 30 mg/dL    Glucose 130 65 - 140 mg/dL    Calcium 8 7 8 4 - 10 2 mg/dL    Corrected Calcium 9 3 8 3 - 10 1 mg/dL    AST 24 13 - 39 U/L    ALT 26 7 - 52 U/L    Alkaline Phosphatase 60 34 - 104 U/L    Total Protein 6 4 6 4 - 8 4 g/dL    Albumin 3 2 (L) 3 5 - 5 0 g/dL    Total Bilirubin 0 37 0 20 - 1 00 mg/dL    eGFR 81 ml/min/1 73sq m       Current Medications:  Scheduled Meds:  Current Facility-Administered Medications   Medication Dose Route Frequency Provider Last Rate   • acetaminophen  650 mg Oral Q6H PRN Edith Heart PA-C     • atorvastatin  20 mg Oral Daily PEDRO Espinoza     • carisoprodol  350 mg Oral 4x Daily PEDRO Espinoza     • cholecalciferol  1,000 Units Oral Daily PEDRO Espinoza     • digoxin  250 mcg Oral Daily Alexis Mahmood MD     • diltiazem  120 mg Oral Once Briseida Anaya MD     • [START ON 12/23/2022] diltiazem  240 mg Oral Daily Briseida Anaya MD     • gabapentin  600 mg Oral TID PEDRO Espinoza     • hydrOXYzine HCL  25 mg Oral Q6H PRN David Gan PA-C     • metoprolol succinate  150 mg Oral BID Mariola Mahmood MD     • ondansetron  4 mg Intravenous Q6H PRN PEDRO Espinoza     • oxyCODONE  10 mg Oral Q6H PEDRO Espinoza     • pantoprazole  40 mg Intravenous Q12H [address] PEDRO Perry     • perflutren lipid microsphere  1 mL/min Intravenous Once in imaging PEDRO Espinoza     • rivaroxaban  20 mg Oral Daily With Breakfast Claudell Guadeloupe, PA-C     • traZODone  100 mg Oral HS PEDRO Perry       Continuous Infusions:   PRN Meds:  •  acetaminophen  •  hydrOXYzine HCL  •  ondansetron  •  perflutren lipid microsphere      VINI Parikh  12/22/2022

## 2022-12-22 NOTE — PROGRESS NOTES
Progress Note - Infectious Disease   Rufus Goyal 78 y o  male MRN: [id number]  Unit/Bed#: ICU 10-01 Encounter: [id number]      Impression/Plan:  1  Septic shock  POA with tachycardia, tachypnea, lactic acidosis  Due to intra-abdominal abscess/peritonitis  Patient is status post Polo's procedure for sigmoid diverticulitis August 2022  Status post exploratory laparotomy, lysis of adhesions, appendectomy, drainage of right lower quadrant abscess 12/11/22  He completed a 7 day course ciprofloxacin, fluconazole, metronidazole 12/17  Culture from the OR showed growth of 2 E  Coli strains, gamma hemolytic Streptococcus, and Bacteroides fragilis  He then developed worsening leukocytosis and tachycardia, but is otherwise clinically improving and hemodynamically stable  Repeat CT abdomen/pelvis 12/21 did not show any new source of intra-abdominal infection, but did show some gallbladder wall edema  The surgical incision is healing well, without any signs of surgical site infection  The patient is on room air without any evidence of pneumonia  - Given increasing WBC, would check RUQ US to better assess gallbladder as CT showed wall edema; though no current RUQ pain on exam  - will check blood cultures  - as hemodynamically stable and afebrile, hold off on antibiotics currently  - if develops new fever or hypotension then would start Cefepime and Metronidazole  - consider assessing for DVTs, may cause reactive leukocytosis  - daily CBC to monitor WBC, trend fever curve     2  Intra-abdominal abscess/peritonitis  History of sigmoid diverticulitis status post Polo's procedure in August 2022  Now presented with right lower quadrant abscess, significant enteritis, possible small bowel obstruction    Now POD#10 s/p exploratory laparotomy with appendectomy, drainage of abscess, and extensive lysis of adhesions 12/11/22  Or cultures with polymicrobial growth of GI migel    Received 7 days of ciprofloxacin, metronidazole, and fluconazole, which do not cover gram-positive organisms such as strep  However, repeat CT abdomen pelvis did not show any source of new infection or residual abscess      -Surgical follow-up  -If clinical worsening as above, restart antibiotics with cefepime and metronidazole which provide better intra-abdominal coverage  -Monitor clinical exam     3   A  fib with RVR  New onset postop  Cardiology following  Rates remain elevated  TTE no valvular clot or vegetations present, EF 35%     -Management per cardiology   -Consider CTA to assess for postop PE, although would expect hypoxia as well     4  GERMAN on CKD  Likely due to sepsis on admission  Creatinine now at baseline    -Dose adjust antibiotics as needed  -Monitor creatinine     5  Penicillin allergy  Reports anaphylaxis as a child  Patient has likely outgrown this reaction; regardless there is a very low chance of cross-reactivity with cephalosporins so they are typically safe event with a true significant penicillin allergy  - If patient does require restarting antibiotics, favor cefepime over fluoroquinolones for better intra-abdominal coverage with close monitoring    Antibiotics:  none    Subjective:  Patient denies cough, SOB, headache, sore throat, abd pain, emesis  Output from colostomy has been normal  No pain from left arm midline  No other joint pain  Per nursing patient does have some nausea after eating  No sacral wounds  Objective:  Vitals:  Temp:  [97 2 °F (36 2 °C)-99 6 °F (37 6 °C)] 99 3 °F (37 4 °C)  HR:  [] 90  Resp:  [15-31] 15  BP: (117-140)/(58-92) 117/58  SpO2:  [91 %-97 %] 91 %  Temp (24hrs), Av 4 °F (36 9 °C), Min:97 2 °F (36 2 °C), Max:99 6 °F (37 6 °C)  Current: Temperature: 99 3 °F (37 4 °C)    Physical Exam:   General Appearance:  Alert, interactive, nontoxic, no acute distress  Throat: Oropharynx moist without lesions      Lungs:   Clear to auscultation bilaterally; no wheezes, rhonchi or rales; respirations unlabored   Heart:  RRR; no murmur, rub or gallop   Abdomen:   Soft, non-tender, midline abdominal incision site without active drainage  Extremities: No clubbing, cyanosis or edema   Skin: No new rashes or lesions  No draining wounds noted  Labs:    All pertinent labs and imaging studies were personally reviewed  Results from last 7 days   Lab Units 12/22/22  0519 12/21/22  0332 12/20/22  0524   WBC Thousand/uL 17 03* 14 58* 13 85*   HEMOGLOBIN g/dL 11 8* 12 1 12 1   PLATELETS Thousands/uL 532* 519* 455*     Results from last 7 days   Lab Units 12/22/22  0519 12/20/22  0524 12/19/22  0521 12/18/22  0547   SODIUM mmol/L 135 136 134* 136   POTASSIUM mmol/L 4 3 4 0 3 9 3 9   CHLORIDE mmol/L 99 99 100 101   CO2 mmol/L 29 31 27 28   BUN mg/dL 10 10 11 13   CREATININE mg/dL 0 92 0 94 0 89 0 91   EGFR ml/min/1 73sq m 81 78 83 82   CALCIUM mg/dL 8 7 8 8 8 4 7 8*   AST U/L 24  --   --  18   ALT U/L 26  --   --  15   ALK PHOS U/L 60  --   --  53     Results from last 7 days   Lab Units 12/20/22  0524 12/18/22  0547   PROCALCITONIN ng/ml 0 15 0 44*                   Micro:        Imaging:          Susan Hayden MD  [facility]

## 2022-12-22 NOTE — NUTRITION
12/22/22 1241   Current PO Intake   Current Diet Order Regular diet, 2 g Na   Nutrition Supplements Ensure Compact  (TID)   Current Meal Intake 0-25%;25-50%;50-75%   Estimated calorie intake compared to estimated need Nutrition needs not met at this time  Recommendations/Interventions   Summary Diet advanced from clear liquids per pt tolerance  Ordered for regular diet with 2 g Na restriction  Miralax started 12/15  Diuresis ongoing  12/19 colostomy appliance change  85 mL ostomy output per I/O's on 12/21  Pt reports depressed appetite  Agreeable to Ensure High Protein TID to encourage PO intake at meals  RD to pend order for supplement change  Interventions/Recommendations Continue current diet order;Supplement adjust;Initiate daily weight;Monitor I & O's   Education Assessment   Education Education not indicated at this time;Patient/caregiver not appropriate for education at this time   Patient Nutrition Goals   Goal Adequate hydration; Adequate intake;Meet PO needs

## 2022-12-22 NOTE — CASE MANAGEMENT
Case Management Discharge Planning Note    Patient name Shae Chou  Location ICU 10/ICU 10-01 MRN [id number]  : 1945 Date 2022       Current Admission Date: 2022  Current Admission Diagnosis:Peritonitis [facility])   Patient Active Problem List    Diagnosis Date Noted   • Cardiomyopathy ([address] ) 12/15/2022   • New onset atrial fibrillation ([address] ) 2022   • Septic shock ([address] ) 2022   • Acute respiratory failure with hypoxia ([address] ) 2022   • Peritonitis ([address] ) 2022   • Diverticulitis of colon 2022   • SBO (small bowel obstruction) ([address] ) 2022   • Primary osteoarthritis of right shoulder 2022   • Chronic kidney disease, stage 2 (mild) 2022   • High risk medication use 2020   • Rheumatoid arthritis with rheumatoid factor, unspecified ([address] ) 2020   • Atherosclerosis of native coronary artery of native heart without angina pectoris 2019   • Status post aortic valve replacement with bioprosthetic valve 2018   • Arthritis    • Chronic pain    • Continuous opioid dependence ([address] )    • Hyperlipidemia    • Benign essential hypertension    • Insomnia    • Peripheral neuropathy    • Carpal tunnel syndrome 2015   • Psoriasis with arthropathy ([address] ) 2015   • Arthralgia of lower leg 2013   • Hyperglycemia 10/25/2012   • Primary localized osteoarthrosis of shoulder region 10/09/2012   • Psoriasis 10/09/2012   • Atopic dermatitis 2012   • Low back pain 2012      LOS (days): 11  Geometric Mean LOS (GMLOS) (days): 9 60  Days to GMLOS:-1 7     OBJECTIVE:  Risk of Unplanned Readmission Score: 22 27         Current admission status: Inpatient   Preferred Pharmacy:   [facility] - [address] [address]  Phone: [phone] Fax: [phone] [facility] #[id number], [address] [place] [address], [address], [place], [place]  [address] [facility] [address]  Phone: [phone] Fax: [phone] [facility] [place], [address] [place] [address]  Phone: [phone] Fax: [phone]    Primary Care Provider: Ivana Ayaal DO    Primary Insurance: Mike Calvillo [facility] REP  Secondary Insurance:     DISCHARGE DETAILS:    Discharge planning discussed with[de-identified] patient and wife  Freedom of Choice: Yes  Comments - Freedom of Choice: rehab recommended- pt has declined rehab and is agreeable to [facility]- pt has been accepted by [facility]  CM contacted family/caregiver?: Yes             Contacts  Patient Contacts: wife Nicole Salazar  Relationship to Patient[de-identified] Family (wife)  Reason/Outcome: Discharge [address]         Is the patient interested in [facility] at discharge?: Yes    DME Referral Provided  Referral made for DME?: No    Other Referral/Resources/Interventions Provided:  Interventions: HHC, Prescription Price Check  Referral Comments: cm made [facility] pt is not staobe for d/c today due to uncontrolled afib and elevated wbc    pt has a 30 day free coupon and Xarelto copay $30 00    Would you like to participate in our [address] service program?  : No - Declined    Treatment Team Recommendation: Home with [address] ([facility]ht spouse & [facility])                                   IMM Given (Date):: 12/22/22  IMM Given to[de-identified] Patient

## 2022-12-22 NOTE — ASSESSMENT & PLAN NOTE
• POA: AEB tachycardia, tachypnea, leukopenia, lactic acidosis > 4, GERMAN  • In setting of intraabdominal infection/peritonitis  • COVID/FLU/RSV negative  • Blood cultures w/NGTD  • Peritoneal fluid culture w/gamma hemolytic Strep  • Anaerobic culture w/Bacteroides fragilis   • Procalcitonin 8 29 - 6 02 - 3 36 - 2 05- 0 44 - 0 15  • s/p aggressive IVF resuscitation  • Received IV Ciprofloxacin, Flagyl- completed course on 12/17/22  • Monitor fever and WBC curve  • WBC trending up 10 - 12 75 - 13 85 - 14 58 - 17 03  • ID consultation appreciated blood cultures drawn today- continue to hold antibiotics

## 2022-12-22 NOTE — PROGRESS NOTES
[address]  Progress Note - Krysta Rodriguez 1945, 78 y o  male MRN: [id number]  Unit/Bed#: ICU 10-01 Encounter: [id number]  Primary Care Provider: Seth Joel DO   Date and time admitted to hospital: 12/11/2022  5:19 AM    * Peritonitis [facility])  Assessment & Plan  • Presented to ED on 12/11 w/acute onset abdominal pain concerning for acute abdomen   • W/hx of sigmoid diverticulitis s/p Polo's procedure and colostomy with planned reversal scheduled for 01/2023  • CT A/P on admission revealed extensive inflammatory changes involving jejunum and proximal-mid ileum, compatible with severe enteritis; there is focal free air surrounding lower midline ileum just above urinary bladder with extensive inflammatory changes; few mildly dilated loops of jejunum within left upper abdomen, likely 2/2 extensive inflammatory changes with possible superimposed low-grade partial SBO; small amount of free air and free fluid in remainder of abdomen and pelvis; stomach and proximal esophagus are distended and fluid-filled, presumably secondary to delayed transit  • OR on 12/11 for EX LAP,  EXTENSIVE MELANI, APPENDECTOMY, DRAINAGE OF ABDOMINAL ABSCESS   • Peritoneal fluid culture w/gamma hemolytic Strep and anaerobic culture w/Bacteroides fragilis   • Received Ciprofloxacin, Flagyl through 12/17  • NGT removed on 12/13 - tolerating diet   • Pain management   • Treatment per surgical team   • ID consultation appreciated- hold antibiotics for now- check blood cultures x 2  · CT A/P w/ IV contrast: Mild interval improvement of diffuse enteritis  New gallbladder wall edema, nonspecific     · US gallbladder ordered       New onset atrial fibrillation [facility])  Assessment & Plan  • New onset AF w/RVR as high as 225  • No previous history of AF  • Suspect this is 2/2 septic shock/peritonitis   • TTE (12/14) revealed EF 35% w/mild concentric hypertrophy, moderately reduced systolic function in global manner; mildly dilated RV w/moderately reduced systolic function; mild MR/TR, RVSP 48  • Initially refractory to IV Lopressor 5 mg x2, IV Cardizem 15 mg x1, IV Amiodarone bolus + gtt initiation  • Cardizem gtt weaned off on 12/15  • Started on digoxin load on 12/16  • Cardiology input appreciated   • Cardiology increased Toprol to 150 mg twice daily on 12/20, increased digoxin to 250  mcg daily on 12/21  HR still uncontrolled, possibly due to worsening infection   • Heparin gtt initially- started on Yaabhiy85/20       Acute respiratory failure with hypoxia [facility])  Assessment & Plan  • Suspect this is is multifactorial in setting of atelectasis vs mild pulmonary vascular congestion vs post-operative pain  • CXR on 12/13 w/mildly increased interstitial markings bilaterally - may indicate mild pulmonary edema  • Respiratory status improved w/IV diuresis - continue w/PRN diuresis  • Weaned to RA - continue to maintain SpO2 > 90%  • Encourage cough, deep breathing, IS, ambulation    Septic shock (HCC)  Assessment & Plan  • POA: AEB tachycardia, tachypnea, leukopenia, lactic acidosis > 4, GERMAN  • In setting of intraabdominal infection/peritonitis  • COVID/FLU/RSV negative  • Blood cultures w/NGTD  • Peritoneal fluid culture w/gamma hemolytic Strep  • Anaerobic culture w/Bacteroides fragilis   • Procalcitonin 8 29 - 6 02 - 3 36 - 2 05- 0 44 - 0 15  • s/p aggressive IVF resuscitation  • Received IV Ciprofloxacin, Flagyl- completed course on 12/17/22  • Monitor fever and WBC curve  • WBC trending up 10 - 12 75 - 13 85 - 14 58 - 17 03  • ID consultation appreciated blood cultures drawn today- continue to hold antibiotics         Chronic kidney disease, stage 2 (mild)  Assessment & Plan  Lab Results   Component Value Date    EGFR 81 12/22/2022    EGFR 78 12/20/2022    EGFR 83 12/19/2022    CREATININE 0 92 12/22/2022    CREATININE 0 94 12/20/2022    CREATININE 0 89 12/19/2022   · GERMAN due to sepsis- resolved with IVF/abx/surgical intervention   · Baseline Cr 0 8-1 0 mg/dL   · Currently at baseline  · Monitor renal function     Benign essential hypertension  Assessment & Plan  · Hold home losartan for now as metoprolol is being uptitrated  Continuous opioid dependence ([facility] [address] )  Assessment & Plan  · Setting of chronic pain due to RA/OA  · [place] PDMP reviewed  · Continue with home Soma 350 mg 3 times daily, Neurontin 600 mg 3 times daily, oxycodone 10 mg 4 times daily        VTE Pharmacologic Prophylaxis: VTE Score: 8 High Risk (Score >/= 5) - Pharmacological DVT Prophylaxis Ordered: rivaroxaban (Xarelto)  Sequential Compression Devices Ordered  Patient Centered Rounds: I performed bedside rounds with nursing staff today  Discussions with Specialists or Other Care Team Provider: nursing, CM, general surgery    Education and Discussions with Family / Patient: patient  Time Spent for Care: 20 minutes  More than 50% of total time spent on counseling and coordination of care as described above  Current Length of Stay: 11 day(s)  Current Patient Status: Inpatient   Certification Statement: The patient will continue to require additional inpatient hospital stay due to continued workup for infection with elevated WBC  Discharge Plan: [facility] is following this patient on consult  They are not yet medically stable for discharge secondary to worsening leukocytosis and uncontrolled Afib  Code Status: Level 1 - Full Code    Subjective: The patient was seen and examined  The patient states he feels well  He denies any RUQ pain  He admits to intermittent nausea but believes this is due to the food  His HR is elevated in the low 100's to 120's  Objective:     Vitals:   Temp (24hrs), Av 4 °F (36 9 °C), Min:97 2 °F (36 2 °C), Max:99 6 °F (37 6 °C)    Temp:  [97 2 °F (36 2 °C)-99 6 °F (37 6 °C)] 98 5 °F (36 9 °C)  HR:  [] 130  Resp:  [15-31] 15  BP: (117-140)/(58-89) 138/87  SpO2:  [91 %-97 %] 91 %  Body mass index is 27 97 kg/m²       Input and Output Summary (last 24 hours): Intake/Output Summary (Last 24 hours) at 12/22/2022 1033  Last data filed at 12/22/2022 6645  Gross per 24 hour   Intake 690 ml   Output 2085 ml   Net -1395 ml       Physical Exam:   Physical Exam  Vitals and nursing note reviewed  Constitutional:       Appearance: Normal appearance  Cardiovascular:      Rate and Rhythm: Tachycardia present  Rhythm irregularly irregular  Pulmonary:      Effort: Pulmonary effort is normal       Breath sounds: Normal breath sounds  Abdominal:      Palpations: Abdomen is soft  Tenderness: There is abdominal tenderness in the right lower quadrant  There is no guarding or rebound  Skin:     General: Skin is warm and dry  Neurological:      General: No focal deficit present  Mental Status: He is alert and oriented to person, place, and time  Psychiatric:         Attention and Perception: Attention normal          Mood and Affect: Mood normal          Speech: Speech normal          Behavior: Behavior is cooperative            Additional Data:     Labs:  Results from last 7 days   Lab Units 12/22/22  0519   WBC Thousand/uL 17 03*   HEMOGLOBIN g/dL 11 8*   HEMATOCRIT % 36 3*   PLATELETS Thousands/uL 532*   NEUTROS PCT % 80*   LYMPHS PCT % 10*   MONOS PCT % 8   EOS PCT % 1     Results from last 7 days   Lab Units 12/22/22  0519   SODIUM mmol/L 135   POTASSIUM mmol/L 4 3   CHLORIDE mmol/L 99   CO2 mmol/L 29   BUN mg/dL 10   CREATININE mg/dL 0 92   ANION GAP mmol/L 7   CALCIUM mg/dL 8 7   ALBUMIN g/dL 3 2*   TOTAL BILIRUBIN mg/dL 0 37   ALK PHOS U/L 60   ALT U/L 26   AST U/L 24   GLUCOSE RANDOM mg/dL 130         Results from last 7 days   Lab Units 12/17/22  0114 12/16/22  1147 12/16/22  0113 12/15/22  2345 12/15/22  1945 12/15/22  1714 12/15/22  1155   POC GLUCOSE mg/dl 122 91 122 115 110 122 145*         Results from last 7 days   Lab Units 12/20/22  0524 12/18/22  0547   PROCALCITONIN ng/ml 0 15 0 44*       Lines/Drains:  Invasive Devices Peripheral Intravenous Line  Duration           Long-Dwell Peripheral IV (Midline) 68/45/73 Left Basilic 9 days          Drain  Duration           Colostomy Descending/sigmoid -- days    Colostomy Descending/sigmoid  days                      Imaging: No pertinent imaging reviewed  Recent Cultures (last 7 days):         Last 24 Hours Medication List:   Current Facility-Administered Medications   Medication Dose Route Frequency Provider Last Rate   • acetaminophen  650 mg Oral Q6H PRN Nella Garcia PA-C     • atorvastatin  20 mg Oral Daily PEDRO Bragg     • carisoprodol  350 mg Oral 4x Daily PEDRO Bragg     • cholecalciferol  1,000 Units Oral Daily PEDRO Bragg     • digoxin  250 mcg Oral Daily Abhijeet Dietz MD     • diltiazem  120 mg Oral Daily Alexis Tapia MD     • gabapentin  600 mg Oral TID PEDRO Bragg     • hydrOXYzine HCL  25 mg Oral Q6H PRN Obie Medel PA-C     • metoprolol succinate  150 mg Oral BID Abhijeet Dietz MD     • ondansetron  4 mg Intravenous Q6H PRN PEDRO Bragg     • oxyCODONE  10 mg Oral Q6H PEDRO Bragg     • pantoprazole  40 mg Intravenous Q12H [address] PEDRO Perry     • perflutren lipid microsphere  1 mL/min Intravenous Once in imaging PEDRO Bragg     • rivaroxaban  20 mg Oral Daily With Breakfast Esteban Patricio PA-C     • traZODone  100 mg Oral HS PEDRO Bragg          Today, Patient Was Seen By: Esteban Patricio PA-C    **Please Note: This note may have been constructed using a voice recognition system  **

## 2022-12-22 NOTE — QUICK NOTE
RUQ US reviewed  Discussed results with Dr Kathy Gilman  Low suspicion for acute cholecystitis  Ok to have cardiac diet

## 2022-12-22 NOTE — UTILIZATION REVIEW
Continued Stay Review    Date: 12/22/22                          Current Patient Class: inpatient  Current Level of Care: ICU    HPI:77 y o  male initially admitted on 12/11/22 Peritonitis s/p exploratory laparotomy with appendectomy, drainage of abscess, and extensive lysis of adhesions  Peritoneal fluid culture w/gamma hemolytic Strep and anaerobic culture w/Bacteroides fragilis  Received Ciprofloxacin, Flagyl through 12/17  NGT removed on 12/13  Assessment/Plan:  Pt admits to intermittent nausea  HR remains elevated in low 100-120s  ID following, hold abx for now and check blood cxs x2, monitor fever and WBCs trending upwards  CTAP completed yesterday, Mild interval improvement of diffuse enteritis  New gallbladder wall edema, nonspecific  US gallbladder ordered  Cardiology following increased Toprol to 150 mg twice daily on 12/20, increased digoxin to 250  mcg daily on 12/21  HR still uncontrolled, possibly due to worsening infection  Encourage coughing and deep breathing, inc spirometry  Monitor renal function, hold home losartan, continue home pain regimen  12/22 ID Consult:  Given increasing WBC, would check RUQ US to better assess gallbladder as CT showed wall edema; though no current RUQ pain on exam  Check blood cxs, hold abx currently, daily CBC, trend fever curve      Vital Signs:   Date/Time Temp Pulse Resp BP MAP (mmHg) SpO2 O2 Device Patient Position - Orthostatic VS   12/22/22 0816 -- 130 Abnormal  -- 138/87 -- -- -- --   12/22/22 0805 98 5 °F (36 9 °C) -- -- -- -- -- -- --   12/22/22 0800 98 5 °F (36 9 °C) 133 Abnormal  23 Abnormal  137/86 107 96 % -- Lying   12/22/22 0400 99 3 °F (37 4 °C) 90 15 117/58 77 91 % None (Room air) --   12/22/22 0200 99 6 °F (37 6 °C) 91 25 Abnormal  136/87 107 93 % -- --   12/22/22 0000 98 4 °F (36 9 °C) 83 20 119/67 85 93 % None (Room air) --   12/21/22 2100 -- -- 20 -- -- -- -- --   12/21/22 2000 -- 118 Abnormal  22 126/80 98 94 % None (Room air) Lying 12/21/22 1906 98 2 °F (36 8 °C) 110 Abnormal  22 -- -- 96 % -- --   12/21/22 1818 98 1 °F (36 7 °C) -- -- -- -- -- -- --   12/21/22 1517 97 9 °F (36 6 °C) 97 26 Abnormal  -- -- 95 % -- --   12/21/22 1500 -- 100 31 Abnormal  -- -- 95 % -- --   12/21/22 1400 -- 93 23 Abnormal  126/60 87 -- -- --   12/21/22 1300 -- 115 Abnormal  28 Abnormal  -- -- 97 % -- --       Pertinent Labs/Diagnostic Results:   CT abdomen pelvis w contrast   Final Result by Chip Narayanan MD (12/21 1055)      Mild interval improvement of diffuse enteritis  Uncomplicated colonic diverticulosis  Left lower quadrant colostomy with few small associated air locules, likely postsurgical    New gallbladder wall edema, nonspecific  Dedicated right upper quadrant ultrasound can be considered for further evaluation  Small bilateral pleural effusions with subjacent atelectasis              Workstation performed: [id number]                 Results from last 7 days   Lab Units 12/22/22  0519 12/21/22  0332 12/20/22  0524 12/19/22  0521 12/18/22  0547   WBC Thousand/uL 17 03* 14 58* 13 85* 12 75* 10 20*   HEMOGLOBIN g/dL 11 8* 12 1 12 1 11 8* 11 7*   HEMATOCRIT % 36 3* 38 2 38 0 37 2 36 2*   PLATELETS Thousands/uL 532* 519* 455* 424* 367   NEUTROS ABS Thousands/µL 13 69* 10 68* 10 51*  --  7 20         Results from last 7 days   Lab Units 12/22/22  0519 12/20/22  0524 12/19/22  0521 12/18/22  0547 12/17/22  1714 12/17/22  0502 12/16/22  0450 12/15/22  1941   SODIUM mmol/L 135 136 134* 136 134* 132* 130* 132*   POTASSIUM mmol/L 4 3 4 0 3 9 3 9 3 7 3 9 4 3 4 0   CHLORIDE mmol/L 99 99 100 101 98 99 99 101   CO2 mmol/L 29 31 27 28 29 26 25 26   ANION GAP mmol/L 7 6 7 7 7 7 6 5   BUN mg/dL 10 10 11 13 15 16 21 22   CREATININE mg/dL 0 92 0 94 0 89 0 91 0 98 0 99 1 21 1 27   EGFR ml/min/1 73sq m 81 78 83 82 75 74 58 54   CALCIUM mg/dL 8 7 8 8 8 4 7 8* 8 4 8 2* 8 3* 8 1*   CALCIUM, IONIZED mmol/L  --   --   --  1 08* 1 09*  --   --  1 03*   MAGNESIUM mg/dL  --  2 0 1 8* 1 8* 2 1 1 8* 2 1 2 1   PHOSPHORUS mg/dL  --   --   --  2 5 2 8 2 9 3 1 3 9     Results from last 7 days   Lab Units 12/22/22  0519 12/18/22  0547 12/15/22  1955   AST U/L 24 18  --    ALT U/L 26 15  --    ALK PHOS U/L 60 53  --    TOTAL PROTEIN g/dL 6 4 5 0*  --    ALBUMIN g/dL 3 2* 2 8*  --    TOTAL BILIRUBIN mg/dL 0 37 0 32  --    AMMONIA umol/L  --   --  43     Results from last 7 days   Lab Units 12/17/22  0114 12/16/22  1147 12/16/22  0113 12/15/22  2345 12/15/22  1945 12/15/22  1714 12/15/22  1155   POC GLUCOSE mg/dl 122 91 122 115 110 122 145*     Results from last 7 days   Lab Units 12/22/22  0519 12/20/22  0524 12/19/22  0521 12/18/22  0547 12/17/22  1714 12/17/22  0502 12/16/22  0450 12/15/22  1941   GLUCOSE RANDOM mg/dL 130 125 123 131 152* 110 107 114     Results from last 7 days   Lab Units 12/16/22  0806 12/15/22  1941   PH IGLESIA  7 352 7 271*   PCO2 IGLESIA mm Hg 47 0 59 1*   PO2 IGLESIA mm Hg 23 4* 36 3   HCO3 IGLESIA mmol/L 25 5 26 6   BASE EXC IGLESIA mmol/L -0 4 -1 1   O2 CONTENT IGLESIA ml/dL 6 5 9 6   O2 HGB, VENOUS % 38 7* 60 4     Results from last 7 days   Lab Units 12/18/22  0547 12/17/22  0502 12/17/22  0112   PTT seconds 80* 73* 66*         Results from last 7 days   Lab Units 12/20/22  0524 12/18/22  0547   PROCALCITONIN ng/ml 0 15 0 44*     Results from last 7 days   Lab Units 12/18/22  0547   DIGOXIN LVL ng/mL 1 8     Medications:   Scheduled Medications:  atorvastatin, 20 mg, Oral, Daily  carisoprodol, 350 mg, Oral, 4x Daily  cholecalciferol, 1,000 Units, Oral, Daily  digoxin, 250 mcg, Oral, Daily  diltiazem, 120 mg, Oral, Daily  gabapentin, 600 mg, Oral, TID  metoprolol succinate, 150 mg, Oral, BID  oxyCODONE, 10 mg, Oral, Q6H  pantoprazole, 40 mg, Intravenous, Q12H ANDREA  rivaroxaban, 20 mg, Oral, Daily With Breakfast  traZODone, 100 mg, Oral, HS      Continuous IV Infusions: none     PRN Meds:  acetaminophen, 650 mg, Oral, Q6H PRN  hydrOXYzine HCL, 25 mg, Oral, Q6H PRN  ondansetron, 4 mg, Intravenous, Q6H PRN  perflutren lipid microsphere, 1 mL/min, Intravenous, Once in imaging        Discharge Plan: tbd    Network Utilization Review Department  ATTENTION: Please call with any questions or concerns to [phone] and carefully listen to the prompts so that you are directed to the right person  All voicemails are confidential   Cornelius Oar all requests for admission clinical reviews, approved or denied determinations and any other requests to dedicated fax number below belonging to the campus where the patient is receiving treatment   List of dedicated fax numbers for the Facilities:  [address] DENIALS (Administrative/Medical Necessity) [phone]   [address] (Maternity/NICU/Pediatrics) [phone]   91 Gerda Clarke [phone]   [facility] [phone]   [address] [address] [facility] [phone]   1559 [facility] [place] Archana [facility] 134 815 [facility] [phone] none known

## 2022-12-22 NOTE — ASSESSMENT & PLAN NOTE
• New onset AF w/RVR as high as 225  • No previous history of AF  • Suspect this is 2/2 septic shock/peritonitis   • TTE (12/14) revealed EF 35% w/mild concentric hypertrophy, moderately reduced systolic function in global manner; mildly dilated RV w/moderately reduced systolic function; mild MR/TR, RVSP 48  • Initially refractory to IV Lopressor 5 mg x2, IV Cardizem 15 mg x1, IV Amiodarone bolus + gtt initiation  • Cardizem gtt weaned off on 12/15  • Started on digoxin load on 12/16  • Cardiology input appreciated   • Cardiology increased Toprol to 150 mg twice daily on 12/20, increased digoxin to 250  mcg daily on 12/21  HR still uncontrolled, possibly due to worsening infection   • Heparin gtt initially- started on Prwugma92/20

## 2022-12-22 NOTE — ASSESSMENT & PLAN NOTE
Newly diagnosed during his hospitalization-  Will need to be reassessed at some point when patient is overall improved

## 2022-12-22 NOTE — OCCUPATIONAL THERAPY NOTE
12/22/22 1004   OT Last Visit   OT Visit Date 12/22/22   Note Type   Note Type Treatment   Pain Assessment   Pain Assessment Tool 0-10   Pain Score 9   Pain Location/Orientation Orientation: Left; Location: Leg  ("from sciatica")   Restrictions/Precautions   Weight Bearing Precautions Per Order No   Other Precautions Telemetry;Multiple lines; Fall Risk   Lifestyle   Reciprocal Relationships wife present for session - she is very involved with his basic cares, assists in the room   Intrinsic Gratification playing with his dogs ([employer] and [employer])   ADL   Where Assessed   (declined doing self-care at this time - has been progressing with this)   Bed Mobility   Supine to Sit 4  Minimal assistance   Additional items Assist x 1;HOB elevated; Bedrails; Increased time required  (*also has a 'routine' with his wife for this transfer)   Additional Comments patient ended session in recliner in anticipation of eating lunch there   Transfers   Sit to Stand   (CGA)   Additional items Assist x 1;Bedrails; Increased time required   Stand to Sit   (CGA)   Additional items Assist x 1; Armrests; Increased time required;Verbal cues   Functional Mobility   Functional Mobility   (CGA)   Additional Comments Pt  walked to window at end of hallway (total distance of ambulation was 100 feet, to which patient stated "that will get me to my garage")   (*navigated turns and easy obstacles well)   Additional items Rolling walker   Therapeutic Excerise-Strength   UE Strength Yes   Right Upper Extremity- Strength   R Shoulder Other (Comment)  (pro/re-traction)   R Elbow Elbow flexion;Elbow extension  (in forward and reverse;  also: supin/pron-ation)   R Weight/Reps/Sets 1 pound weight - 10 reps each exer  Left Upper Extremity-Strength   L Weights/Reps/Sets all exers   same as RUE above   Coordination   Gross Motor appears WFL  (patient does limit extended use of shoulders secondary to arthritis and "crepitus")   Subjective   Subjective "I really want to take a walk"  Cognition   Overall Cognitive Status WFL   Arousal/Participation Alert; Cooperative   Attention Within functional limits   Orientation Level Oriented X4   Following Commands Follows all commands and directions without difficulty   Comments patient stated "I really wish I was home" when getting to look out of window at end of hallway (during ambulation)   Activity Tolerance   Activity Tolerance Patient limited by fatigue;Patient limited by pain  (decreasing limitation)   Medical Staff Made Aware Nurse Molly Chaparro aware of session's content   Assessment   Assessment Patient participated in Skilled OT session this date with interventions consisting of ADL re training with the use of correct body mechnaics, safety awareness and fall prevention techniques, therapeutic exercise to: increase functional use of BUEs, increase BUE muscle strength ,  therapeutic activities to: increase activity tolerance, increase dynamic sit/ stand balance during functional activity , increase postural control, increase trunk control and increase OOB/ sitting tolerance   Patient agreeable to OT treatment session, upon arrival patient was found seated in bed (back supported)  At end of session, patient was seated in recliner - wife present in room  In comparison to previous session, patient with improvements in activity tolerance and functional mobility  Patient requiring ocassional safety reminders and occasional rest periods  Patient continues to be functioning below baseline level, occupational performance remains limited secondary to factors listed above and increased risk for falls and injury  From OT standpoint, recommendation at time of d/c would be home with home health rehabilitation  Patient to benefit from continued Occupational Therapy treatment while in the hospital to address deficits as defined above and maximize level of functional independence with ADLs and functional mobility     Plan   Treatment Interventions Functional transfer training;UE strengthening/ROM; Endurance training;Patient/family training; Activityengagement   Goal Expiration Date 12/23/22   OT Treatment Day 4   Recommendation   OT Discharge Recommendation Home with home health rehabilitation   Additional Comments 2 The patient's raw score on the AM-PAC Daily Activity inpatient short form is 16, standardized score is 35 96, less than 39 4  Patients at this level are likely to benefit from discharge to post-acute rehabilitation services  Please refer to the recommendation of the Occupational Therapist for safe discharge planning  AM-PAC Daily Activity Inpatient   Lower Body Dressing 1   Bathing 2   Toileting 2   Upper Body Dressing 3   Grooming 4   Eating 4   Daily Activity Raw Score 16   Daily Activity Standardized Score (Calc for Raw Score >=11) 35 96   AM-[facility] Applied Cognition Inpatient   Following a Speech/Presentation 4   Understanding Ordinary Conversation 4   Taking Medications 4   Remembering Where Things Are Placed or Put Away 4   Remembering List of 4-5 Errands 4   Taking Care of Complicated Tasks 4   Applied Cognition Raw Score 24   Applied Cognition Standardized Score 62 21     [employer], DONALDSON/L  *patient and wife verbalized appreciation of services

## 2022-12-22 NOTE — PLAN OF CARE
Problem: OCCUPATIONAL THERAPY ADULT  Goal: Performs self-care activities at highest level of function for planned discharge setting  See evaluation for individualized goals  Description: Treatment Interventions: ADL retraining, Functional transfer training, Endurance training, Patient/family training, Equipment evaluation/education, Compensatory technique education, Energy conservation, Activityengagement    See flowsheet documentation for full assessment, interventions and recommendations  Outcome: Progressing  Note: Limitation: Decreased ADL status, Decreased Safe judgement during ADL, Decreased endurance, Decreased self-care trans, Decreased high-level ADLs  Prognosis: Good  Assessment: Patient participated in Skilled OT session this date with interventions consisting of ADL re training with the use of correct body mechnaics, safety awareness and fall prevention techniques, therapeutic exercise to: increase functional use of BUEs, increase BUE muscle strength ,  therapeutic activities to: increase activity tolerance, increase dynamic sit/ stand balance during functional activity , increase postural control, increase trunk control and increase OOB/ sitting tolerance   Patient agreeable to OT treatment session, upon arrival patient was found seated in bed (back supported)  At end of session, patient was seated in recliner - wife present in room  In comparison to previous session, patient with improvements in activity tolerance and functional mobility  Patient requiring ocassional safety reminders and occasional rest periods  Patient continues to be functioning below baseline level, occupational performance remains limited secondary to factors listed above and increased risk for falls and injury  From OT standpoint, recommendation at time of d/c would be home with home health rehabilitation    Patient to benefit from continued Occupational Therapy treatment while in the hospital to address deficits as defined above and maximize level of functional independence with ADLs and functional mobility       OT Discharge Recommendation: Home with home health rehabilitation        ANIKA Appiah/DONNIE

## 2022-12-22 NOTE — ASSESSMENT & PLAN NOTE
• Presented to ED on 12/11 w/acute onset abdominal pain concerning for acute abdomen   • W/hx of sigmoid diverticulitis s/p Polo's procedure and colostomy with planned reversal scheduled for 01/2023  • CT A/P on admission revealed extensive inflammatory changes involving jejunum and proximal-mid ileum, compatible with severe enteritis; there is focal free air surrounding lower midline ileum just above urinary bladder with extensive inflammatory changes; few mildly dilated loops of jejunum within left upper abdomen, likely 2/2 extensive inflammatory changes with possible superimposed low-grade partial SBO; small amount of free air and free fluid in remainder of abdomen and pelvis; stomach and proximal esophagus are distended and fluid-filled, presumably secondary to delayed transit  • OR on 12/11 for EX LAP,  EXTENSIVE MELANI, APPENDECTOMY, DRAINAGE OF ABDOMINAL ABSCESS   • Peritoneal fluid culture w/gamma hemolytic Strep and anaerobic culture w/Bacteroides fragilis   • Received Ciprofloxacin, Flagyl through 12/17  • NGT removed on 12/13 - tolerating diet   • Pain management   • Treatment per surgical team   • ID consultation appreciated- hold antibiotics for now- check blood cultures x 2  · CT A/P w/ IV contrast: Mild interval improvement of diffuse enteritis  New gallbladder wall edema, nonspecific     · US gallbladder ordered

## 2022-12-22 NOTE — PROGRESS NOTES
Monty U  68   Progress Note - Mike Fowler 1945, 78 y o  male MRN: [id number]  Unit/Bed#: ICU 10-01 Encounter: [id number]  Primary Care Provider: Ivana Ayala DO   Date and time admitted to hospital: 12/11/2022  5:19 AM    Cardiomyopathy [facility])  Assessment & Plan  Newly diagnosed during his hospitalization-  Will need to be reassessed at some point when patient is overall improved  New onset atrial fibrillation [facility])  Assessment & Plan  Heart rate still running on the faster side  Will increase the diltiazem dose  Status post aortic valve replacement with bioprosthetic valve  Assessment & Plan  Is s/p AVR in 2018  Valve functioning well  Outpatient Cardiologist: Makenzie Brambila [facility])      Subjective:   Patient seen and examined  Walking a bit and eating but still on the weak side  Summary comments:  Undergoing further imaging regarding further elevation in white blood cell count as well as diminished bowel sounds  In the meantime will increase the diltiazem dose  Telemetry/ECG/Cardiac testing:   Atrial fibrillation with moderate to rapid ventricular response  TTE 12/19/2022: LVEF 35%  Global hypokinesia  Normal functioning aortic valve prosthesis  Vitals: Blood pressure 138/87, pulse (!) 130, temperature 98 5 °F (36 9 °C), temperature source Tympanic, resp  rate (!) 23, height 5' 7" (1 702 m), weight 81 kg (178 lb 9 2 oz), SpO2 96 % ,   Orthostatic Blood Pressures    Flowsheet Row Most Recent Value   Blood Pressure 138/87 filed at 12/22/2022 0816   Patient Position - Orthostatic VS Lying filed at 12/22/2022 0800      ,   Weight (last 2 days)     Date/Time Weight    12/22/22 0805 81 (178 57)    12/22/22 0600 81 (178 57)    12/21/22 0600 82 1 (181)    12/21/22 0300 82 1 (181)    12/20/22 0600 88 1 (194 23)          Physical Exam:    General:  Normal appearance in no distress  Eyes:  Anicteric  Oral mucosa:  Moist   Neck:  No JV D   Carotid upstrokes are brisk without bruits  No masses  Chest:  Clear to auscultation and percussion  Cardiac: Irregularly irregular  No palpable PMI  Normal S1 and S2  No murmur gallop or rub  Abdomen:  Soft and nontender but diminished bowel sounds  No palpable organomegaly or aortic enlargement  Extremities:  No peripheral edema  Neuro:  Grossly symmetric  Psych:  Alert and oriented x3        Medications:      Current Facility-Administered Medications:   •  acetaminophen (TYLENOL) tablet 650 mg, 650 mg, Oral, Q6H PRN, Neri Lopez PA-C, 650 mg at 12/21/22 2221  •  atorvastatin (LIPITOR) tablet 20 mg, 20 mg, Oral, Daily, PEDRO Olivo, 20 mg at 12/22/22 5889  •  carisoprodol (SOMA) tablet 350 mg, 350 mg, Oral, 4x Daily, PEDRO Olivo, 350 mg at 12/22/22 1311  •  cholecalciferol (VITAMIN D3) tablet 1,000 Units, 1,000 Units, Oral, Daily, PEDRO Olivo, 1,000 Units at 12/22/22 6125  •  digoxin (LANOXIN) tablet 250 mcg, 250 mcg, Oral, Daily, Kiara Wofl MD, 250 mcg at 12/22/22 0816  •  [START ON 12/23/2022] diltiazem (CARDIZEM CD) 24 hr capsule 240 mg, 240 mg, Oral, Daily, Roscoe Gomez MD  •  gabapentin (NEURONTIN) tablet 600 mg, 600 mg, Oral, TID, PEDRO Olivo, 600 mg at 12/22/22 1238  •  hydrOXYzine HCL (ATARAX) tablet 25 mg, 25 mg, Oral, Q6H PRN, Yana Mac PA-C, 25 mg at 12/19/22 0344  •  metoprolol succinate (TOPROL-XL) 24 hr tablet 150 mg, 150 mg, Oral, BID, Kiara Wolf MD, 150 mg at 12/22/22 0816  •  [facility]) injection 4 mg, 4 mg, Intravenous, Q6H PRN, PEDRO Olivo, 4 mg at 12/22/22 1113  •  oxyCODONE (ROXICODONE) immediate release tablet 10 mg, 10 mg, Oral, Q6H, PEDRO Perry, 10 mg at 12/22/22 1309  •  pantoprazole (PROTONIX) injection 40 mg, 40 mg, Intravenous, Q12H Marivel MENDEZ CRNP, 40 mg at 12/22/22 0856  •  perflutren lipid microsphere (DEFINITY) injection, 1 mL/min, Intravenous, Once in imaging, Janiya Haines, CRNP  •  rivaroxaban (XARELTO) tablet 20 mg, 20 mg, Oral, Daily With Breakfast, Farhan Bruce PA-C, 20 mg at 12/22/22 3530  •  traZODone (DESYREL) tablet 100 mg, 100 mg, Oral, HS, Janiya Haines, CRNP, 100 mg at 12/21/22 2105     Labs & Results:    Troponins:         BNP:     CBC with diff:   Results from last 7 days   Lab Units 12/22/22  0519 12/21/22  0332   WBC Thousand/uL 17 03* 14 58*   HEMOGLOBIN g/dL 11 8* 12 1   HEMATOCRIT % 36 3* 38 2   MCV fL 89 90   PLATELETS Thousands/uL 532* 519*     TSH:     CMP:   Results from last 7 days   Lab Units 12/22/22  0519 12/20/22  0524 12/19/22  0521 12/18/22  0547   POTASSIUM mmol/L 4 3 4 0   < > 3 9   CHLORIDE mmol/L 99 99   < > 101   CO2 mmol/L 29 31   < > 28   BUN mg/dL 10 10   < > 13   CREATININE mg/dL 0 92 0 94   < > 0 91   AST U/L 24  --   --  18   ALT U/L 26  --   --  15   EGFR ml/min/1 73sq m 81 78   < > 82    < > = values in this interval not displayed       Lipid Profile:     Coags:

## 2022-12-22 NOTE — ASSESSMENT & PLAN NOTE
Lab Results   Component Value Date    EGFR 81 12/22/2022    EGFR 78 12/20/2022    EGFR 83 12/19/2022    CREATININE 0 92 12/22/2022    CREATININE 0 94 12/20/2022    CREATININE 0 89 12/19/2022   · GERMAN due to sepsis- resolved with IVF/abx/surgical intervention   · Baseline Cr 0 8-1 0 mg/dL   · Currently at baseline  · Monitor renal function

## 2022-12-23 ENCOUNTER — TELEPHONE (OUTPATIENT)
Dept: CARDIOLOGY CLINIC | Facility: CLINIC | Age: 77
End: 2022-12-23

## 2022-12-23 ENCOUNTER — TRANSITIONAL CARE MANAGEMENT (OUTPATIENT)
Dept: FAMILY MEDICINE CLINIC | Facility: CLINIC | Age: 77
End: 2022-12-23

## 2022-12-23 VITALS
DIASTOLIC BLOOD PRESSURE: 61 MMHG | RESPIRATION RATE: 24 BRPM | SYSTOLIC BLOOD PRESSURE: 118 MMHG | OXYGEN SATURATION: 95 % | HEIGHT: 67 IN | HEART RATE: 103 BPM | BODY MASS INDEX: 28.03 KG/M2 | TEMPERATURE: 97.6 F | WEIGHT: 178.57 LBS

## 2022-12-23 PROBLEM — R65.21 SEPTIC SHOCK (HCC): Status: RESOLVED | Noted: 2022-12-12 | Resolved: 2022-12-23

## 2022-12-23 PROBLEM — J96.01 ACUTE RESPIRATORY FAILURE WITH HYPOXIA (HCC): Status: RESOLVED | Noted: 2022-12-12 | Resolved: 2022-12-23

## 2022-12-23 PROBLEM — K65.9 PERITONITIS (HCC): Status: RESOLVED | Noted: 2022-12-11 | Resolved: 2022-12-23

## 2022-12-23 PROBLEM — A41.9 SEPTIC SHOCK (HCC): Status: RESOLVED | Noted: 2022-12-12 | Resolved: 2022-12-23

## 2022-12-23 LAB
ERYTHROCYTE [DISTWIDTH] IN BLOOD BY AUTOMATED COUNT: 13.6 % (ref 11.6–15.1)
HCT VFR BLD AUTO: 36.2 % (ref 36.5–49.3)
HGB BLD-MCNC: 11.4 G/DL (ref 12–17)
MCH RBC QN AUTO: 28.3 PG (ref 26.8–34.3)
MCHC RBC AUTO-ENTMCNC: 31.5 G/DL (ref 31.4–37.4)
MCV RBC AUTO: 90 FL (ref 82–98)
PLATELET # BLD AUTO: 521 THOUSANDS/UL (ref 149–390)
PMV BLD AUTO: 8.7 FL (ref 8.9–12.7)
RBC # BLD AUTO: 4.03 MILLION/UL (ref 3.88–5.62)
WBC # BLD AUTO: 13.82 THOUSAND/UL (ref 4.31–10.16)

## 2022-12-23 RX ORDER — DILTIAZEM HYDROCHLORIDE 300 MG/1
300 CAPSULE, COATED, EXTENDED RELEASE ORAL DAILY
Qty: 30 CAPSULE | Refills: 5 | Status: SHIPPED | OUTPATIENT
Start: 2022-12-24 | End: 2023-01-07

## 2022-12-23 RX ORDER — HYDROXYZINE HYDROCHLORIDE 25 MG/1
25 TABLET, FILM COATED ORAL EVERY 6 HOURS PRN
Qty: 15 TABLET | Refills: 1 | Status: SHIPPED | OUTPATIENT
Start: 2022-12-23 | End: 2023-01-22

## 2022-12-23 RX ORDER — METOPROLOL SUCCINATE 50 MG/1
100 TABLET, EXTENDED RELEASE ORAL 2 TIMES DAILY
Status: DISCONTINUED | OUTPATIENT
Start: 2022-12-23 | End: 2022-12-23 | Stop reason: HOSPADM

## 2022-12-23 RX ORDER — OMEPRAZOLE 20 MG/1
20 CAPSULE, DELAYED RELEASE ORAL DAILY
Qty: 30 CAPSULE | Refills: 2 | Status: SHIPPED | OUTPATIENT
Start: 2022-12-23 | End: 2023-03-23

## 2022-12-23 RX ORDER — DIGOXIN 250 MCG
250 TABLET ORAL DAILY
Qty: 30 TABLET | Refills: 5 | Status: SHIPPED | OUTPATIENT
Start: 2022-12-24 | End: 2023-01-07

## 2022-12-23 RX ORDER — METOPROLOL SUCCINATE 100 MG/1
100 TABLET, EXTENDED RELEASE ORAL 2 TIMES DAILY
Qty: 60 TABLET | Refills: 5 | Status: SHIPPED | OUTPATIENT
Start: 2022-12-23 | End: 2023-06-21

## 2022-12-23 RX ADMIN — ATORVASTATIN CALCIUM 20 MG: 10 TABLET, FILM COATED ORAL at 08:47

## 2022-12-23 RX ADMIN — METOPROLOL SUCCINATE 150 MG: 50 TABLET, EXTENDED RELEASE ORAL at 08:47

## 2022-12-23 RX ADMIN — Medication 1000 UNITS: at 08:47

## 2022-12-23 RX ADMIN — PANTOPRAZOLE SODIUM 40 MG: 40 INJECTION, POWDER, LYOPHILIZED, FOR SOLUTION INTRAVENOUS at 08:47

## 2022-12-23 RX ADMIN — RIVAROXABAN 20 MG: 10 TABLET, FILM COATED ORAL at 08:46

## 2022-12-23 RX ADMIN — GABAPENTIN 600 MG: 600 TABLET, FILM COATED ORAL at 08:47

## 2022-12-23 RX ADMIN — CARISOPRODOL 350 MG: 350 TABLET ORAL at 08:47

## 2022-12-23 RX ADMIN — OXYCODONE HYDROCHLORIDE 10 MG: 10 TABLET ORAL at 05:00

## 2022-12-23 RX ADMIN — DILTIAZEM HYDROCHLORIDE 240 MG: 240 CAPSULE, COATED, EXTENDED RELEASE ORAL at 08:47

## 2022-12-23 RX ADMIN — DIGOXIN 250 MCG: 125 TABLET ORAL at 08:47

## 2022-12-23 NOTE — ASSESSMENT & PLAN NOTE
Heart rate better today  Will tweak his AV node blockade dosing today  All reviewed with patient and his wife

## 2022-12-23 NOTE — PLAN OF CARE
Problem: OCCUPATIONAL THERAPY ADULT  Goal: Performs self-care activities at highest level of function for planned discharge setting  See evaluation for individualized goals  Description: Treatment Interventions: ADL retraining, Functional transfer training, Endurance training, Patient/family training, Equipment evaluation/education, Compensatory technique education, Energy conservation, Activityengagement    See flowsheet documentation for full assessment, interventions and recommendations  Outcome: Progressing  Note: Limitation: Decreased ADL status, Decreased Safe judgement during ADL, Decreased endurance, Decreased self-care trans, Decreased high-level ADLs  Prognosis: Good  Assessment: Patient participated in Skilled OT session this date with interventions consisting of functional transfer training, Energy Conservation techniques and  therapeutic activities to: increase activity tolerance   Patient agreeable to OT treatment session, upon arrival patient was found supine in bed, alert, responsive  and in no apparent distress  Patient transfers supine to sit EOB with Min A x 1 and sits unsupported EOB 1-2 mins with no LOB or dizziness  Patient then transfers sit to stand and ambulates 75 feet x 2 with CGA x 1  Patient with improved functional transfers/mobility, rebeca distance and endurance during mobility  Upon return to room, patient seated OOB to recliner to end session   Patient declined further activity at this time, stating he would "like to rest now " Session ended with pateint seated OOB to recliner, all needs met, and call bell within reach  In comparison to previous session, patient with improvements in endurance, functional transfers, and functional mobility   Patient requiring verbal cues for correct technique, verbal cues for pacing thru activity steps, frequent rest periods and ocassional safety reminders   Patient performance  demonstrated good carryover of learned techniques and strategies to facilitate safety during functional tasks  Patient continues to be functioning below baseline level, occupational performance remains limited secondary to factors listed above and increased risk for falls and injury  From OT standpoint, recommendation at time of d/c would be Home OT  Patient to benefit from continued Occupational Therapy treatment while in the hospital to address deficits as defined above and maximize level of functional independence with ADLs and functional mobility in order to return to PLOF       OT Discharge Recommendation: Home with home health rehabilitation

## 2022-12-23 NOTE — INCIDENTAL FINDINGS
The following findings require follow up:  Radiographic finding   Finding: edema of gallbladder   Follow up required: follow up with Dr Dominik Garcia next week   Follow up should be done within 1  week(s)    Please notify the following clinician to assist with the follow up:   Dr oDminik Garcia

## 2022-12-23 NOTE — OCCUPATIONAL THERAPY NOTE
12/23/22 1046   OT Last Visit   OT Visit Date 12/23/22   Note Type   Note Type Treatment   Pain Assessment   Pain Assessment Tool 0-10   Pain Score 6   Pain Location/Orientation Location: Abdomen   Pain Onset/Description Onset: Ongoing;Frequency: Constant/Continuous   Patient's Stated Pain Goal No pain   Hospital Pain Intervention(s) Medication (See MAR); Repositioned; Ambulation/increased activity   Restrictions/Precautions   Weight Bearing Precautions Per Order No   Other Precautions Telemetry;Multiple lines; Fall Risk   Lifestyle   Reciprocal Relationships Wife present during session  Bed Mobility   Supine to Sit 4  Minimal assistance   Additional items Assist x 1;HOB elevated; Bedrails; Increased time required;Verbal cues   Additional Comments Pt sits unsupported EOB 1-2 mins with no LOB or dizziness  Transfers   Sit to Stand   (CGA)   Additional items Assist x 1;Bedrails; Increased time required;Verbal cues   Stand to Sit   (CGA)   Additional items Assist x 1; Armrests; Increased time required;Verbal cues   Stand pivot   (CGA)   Additional items Assist x 1; Increased time required;Verbal cues   Functional Mobility   Functional Mobility   (CGA)   Additional Comments Patient ambulates 75 feet x 2 with CGA; good safety noted during mobility and navigating obstacles in hallway  Additional items Rolling walker   Subjective   Subjective "I am hopefully going home today!"   Cognition   Overall Cognitive Status [facility]   Arousal/Participation Alert; Responsive; Cooperative   Attention Within functional limits   Orientation Level Oriented X4   Memory Within functional limits   Following Commands Follows all commands and directions without difficulty   Comments Pt agreeable to OT treatment,   Activity Tolerance   Activity Tolerance Patient limited by fatigue   Assessment   Assessment Patient participated in Skilled OT session this date with interventions consisting of functional transfer training, Energy Conservation techniques and  therapeutic activities to: increase activity tolerance   Patient agreeable to OT treatment session, upon arrival patient was found supine in bed, alert, responsive  and in no apparent distress  Patient transfers supine to sit EOB with Min A x 1 and sits unsupported EOB 1-2 mins with no LOB or dizziness  Patient then transfers sit to stand and ambulates 75 feet x 2 with CGA x 1  Patient with improved functional transfers/mobility, rebeca distance and endurance during mobility  Upon return to room, patient seated OOB to recliner to end session   Patient declined further activity at this time, stating he would "like to rest now " Session ended with pateint seated OOB to recliner, all needs met, and call bell within reach  In comparison to previous session, patient with improvements in endurance, functional transfers, and functional mobility   Patient requiring verbal cues for correct technique, verbal cues for pacing thru activity steps, frequent rest periods and ocassional safety reminders  Patient performance  demonstrated good carryover of learned techniques and strategies to facilitate safety during functional tasks  Patient continues to be functioning below baseline level, occupational performance remains limited secondary to factors listed above and increased risk for falls and injury  From OT standpoint, recommendation at time of d/c would be Home OT  Patient to benefit from continued Occupational Therapy treatment while in the hospital to address deficits as defined above and maximize level of functional independence with ADLs and functional mobility in order to return to OF     Plan   Treatment Interventions Functional transfer training;Energy conservation   Goal Expiration Date 12/23/22   OT Treatment Day 5   OT Frequency 3-5x/wk   Recommendation   OT Discharge Recommendation Home with home health rehabilitation   AM-PAC Daily Activity Inpatient   Lower Body Dressing 1   Bathing 2   Toileting 2   Upper Body Dressing 3   Grooming 4   Eating 4   Daily Activity Raw Score 16   Daily Activity Standardized Score (Calc for Raw Score >=11) 35 96   AM-PAC Applied Cognition Inpatient   Following a Speech/Presentation 4   Understanding Ordinary Conversation 4   Taking Medications 4   Remembering Where Things Are Placed or Put Away 4   Remembering List of 4-5 Errands 4   Taking Care of Complicated Tasks 4   Applied Cognition Raw Score 24   Applied Cognition Standardized Score 62 21     Kasandra Bergman

## 2022-12-23 NOTE — ASSESSMENT & PLAN NOTE
77 yo M hx aortic valve replacement, chronic pain, and recent dolan's procedure for diverticulitis now POD#12 s/p ex lap, MELANI, appendectomy and RLQ abscess drainage  Feeling well, no complaints, would like to go home  Well appearing, lungs clear, abdomen soft, non-tender  AFVSS on room air  WBC 17 -> 13  Assessment:  RLQ abscess with peritonitis    Plan: Will discuss with Dr Arden Avila regarding discharge planning

## 2022-12-23 NOTE — PROGRESS NOTES
Monty U  68   Progress Note - Reg Kuhn 1945, 78 y o  male MRN: [id number]  Unit/Bed#: ICU 10-01 Encounter: [id number]  Primary Care Provider: Sukhi De Leon DO   Date and time admitted to hospital: 12/11/2022  5:19 AM    * Peritonitis [facility])  Assessment & Plan  77 yo M hx aortic valve replacement, chronic pain, and recent dolan's procedure for diverticulitis now POD#12 s/p ex lap, MELANI, appendectomy and RLQ abscess drainage  Feeling well, no complaints, would like to go home  Well appearing, lungs clear, abdomen soft, non-tender  AFVSS on room air  WBC 17 -> 13  Assessment:  RLQ abscess with peritonitis    Plan: Will discuss with Dr Joseph Matson regarding discharge planning  Subjective/Objective   Chief Complaint: none    Subjective: feels well, hopefull for d/c home today    Objective: no overnight events    Blood pressure 107/56, pulse 80, temperature 97 6 °F (36 4 °C), temperature source Tympanic, resp  rate 18, height 5' 7" (1 702 m), weight 81 kg (178 lb 9 2 oz), SpO2 91 %  ,Body mass index is 27 97 kg/m²  Intake/Output Summary (Last 24 hours) at 12/23/2022 0836  Last data filed at 12/23/2022 5147  Gross per 24 hour   Intake 0 ml   Output 825 ml   Net -825 ml       Invasive Devices     Peripheral Intravenous Line  Duration           Long-Dwell Peripheral IV (Midline) 77/64/66 Left Basilic 10 days          Drain  Duration           Colostomy Descending/sigmoid -- days    Colostomy Descending/sigmoid  days                Physical Exam  Vitals and nursing note reviewed  Constitutional:       General: He is not in acute distress  Appearance: He is well-developed  He is not diaphoretic  HENT:      Head: Normocephalic and atraumatic  Eyes:      Conjunctiva/sclera: Conjunctivae normal       Pupils: Pupils are equal, round, and reactive to light  Cardiovascular:      Rate and Rhythm: Normal rate  Rhythm irregular     Pulmonary:      Effort: Pulmonary effort is normal  No respiratory distress  Breath sounds: Normal breath sounds  Abdominal:      General: Abdomen is flat  Palpations: Abdomen is soft  Comments: Nontender, dressing clean, stoma functioning   Musculoskeletal:         General: Normal range of motion  Cervical back: Normal range of motion  Right lower leg: No edema  Left lower leg: No edema  Skin:     General: Skin is warm and dry  Capillary Refill: Capillary refill takes less than 2 seconds  Neurological:      Mental Status: He is alert and oriented to person, place, and time  Psychiatric:         Behavior: Behavior normal            Lab, Imaging and other studies:  I have personally reviewed pertinent lab results    , CBC:   Lab Results   Component Value Date    WBC 13 82 (H) 12/23/2022    HGB 11 4 (L) 12/23/2022    HCT 36 2 (L) 12/23/2022    MCV 90 12/23/2022     (H) 12/23/2022    MCH 28 3 12/23/2022    MCHC 31 5 12/23/2022    RDW 13 6 12/23/2022    MPV 8 7 (L) 12/23/2022     VTE Pharmacologic Prophylaxis: Reason for no pharmacologic prophylaxis xarelto  VTE Mechanical Prophylaxis: sequential compression device

## 2022-12-23 NOTE — NURSING NOTE
All belongings sent with patient  VSS at this time, AVS reviewed with patient and wife, no questions at this time

## 2022-12-23 NOTE — PROGRESS NOTES
[address]  Progress Note - Aydee Carrillo 1945, 78 y o  male MRN: [id number]  Unit/Bed#: ICU 10-01 Encounter: [id number]  Primary Care Provider: Renate Stack DO   Date and time admitted to hospital: 12/11/2022  5:19 AM    Cardiomyopathy [facility])  Assessment & Plan  Newly diagnosed during his hospitalization-  Will need to be reassessed at some point when patient is overall improved  New onset atrial fibrillation [facility])  Assessment & Plan  Heart rate better today  Will tweak his AV node blockade dosing today  All reviewed with patient and his wife  Status post aortic valve replacement with bioprosthetic valve  Assessment & Plan   s/p AVR in 2018  Valve functioning well  * Peritonitis ([address] )  Assessment & Plan  Clinically improved today  Outpatient Cardiologist: Kingston Watts      Subjective:   Patient seen and examined  No significant events overnight  Summary comments:  Generally the patient looks better today  Heart rate is better controlled  He is walking a bit and eating  Abdominal exam is improved as well  Metoprolol lowered to 100 twice daily  Diltiazem will be 300 once daily starting tomorrow  Patient should be seen and either his usual cardiologist office or in my office in 2 weeks to check on rate and ultimately will need repeat echo regarding ejection fraction  Telemetry/ECG/Cardiac testing:   Atrial fibrillation with mean heart rate approximately 95/min today  TTE 12/19/2022: LVEF 35%  Global hypokinesia  Normal functioning aortic valve prosthesis  Vitals: Blood pressure 118/61, pulse 103, temperature 97 6 °F (36 4 °C), temperature source Tympanic, resp   rate (!) 24, height 5' 7" (1 702 m), weight 81 kg (178 lb 9 2 oz), SpO2 95 % ,   Orthostatic Blood Pressures    Flowsheet Row Most Recent Value   Blood Pressure 118/61 filed at 12/23/2022 4699   Patient Position - Orthostatic VS Lying filed at 12/22/2022 0800      ,   Weight (last 2 days) Date/Time Weight    12/23/22 0800 81 (178 57)    12/22/22 0805 81 (178 57)    12/22/22 0600 81 (178 57)    12/21/22 0600 82 1 (181)    12/21/22 0300 82 1 (181)          Physical Exam:    General:  Normal appearance in no distress  Eyes:  Anicteric  Oral mucosa:  Moist   Neck:  No JV D  Carotid upstrokes are brisk without bruits  No masses  Chest:  Clear to auscultation and percussion  Cardiac: Irregularly irregular  No palpable PMI  Normal S1 and S2  No murmur gallop or rub  Abdomen:  Soft and nontender  No palpable organomegaly or aortic enlargement  Extremities:  No peripheral edema  Neuro:  Grossly symmetric  Psych:  Alert and oriented x3        Medications:      Current Facility-Administered Medications:   •  acetaminophen (TYLENOL) tablet 650 mg, 650 mg, Oral, Q6H PRN, Lucero Montesinos PA-C, 650 mg at 12/21/22 2221  •  atorvastatin (LIPITOR) tablet 20 mg, 20 mg, Oral, Daily, Maciej Blocker, CRNP, 20 mg at 12/23/22 7251  •  carisoprodol (SOMA) tablet 350 mg, 350 mg, Oral, 4x Daily, Maciej Blocker, CRNP, 350 mg at 12/23/22 4966  •  cholecalciferol (VITAMIN D3) tablet 1,000 Units, 1,000 Units, Oral, Daily, Maciej Blocker, CRNP, 1,000 Units at 12/23/22 2632  •  digoxin (LANOXIN) tablet 250 mcg, 250 mcg, Oral, Daily, Neville Collet, MD, 250 mcg at 12/23/22 0847  •  [START ON 12/24/2022] diltiazem (CARDIZEM CD) 24 hr capsule 300 mg, 300 mg, Oral, Daily, Tiesha Marley MD  •  gabapentin (NEURONTIN) tablet 600 mg, 600 mg, Oral, TID, Maciej Blocker, CRNP, 600 mg at 12/23/22 3076  •  hydrOXYzine HCL (ATARAX) tablet 25 mg, 25 mg, Oral, Q6H PRN, SHIRA KimC, 25 mg at 12/19/22 0344  •  metoprolol succinate (TOPROL-XL) 24 hr tablet 100 mg, 100 mg, Oral, BID, Tiesha Marley MD  •  ondansetron [facility] injection 4 mg, 4 mg, Intravenous, Q6H PRN, PEDRO Cabrera, 4 mg at 12/22/22 1113  •  oxyCODONE (ROXICODONE) immediate release tablet 10 mg, 10 mg, Oral, Q6H, Haze Lamona, CRNP, 10 mg at 12/23/22 0500  •  pantoprazole (PROTONIX) injection 40 mg, 40 mg, Intravenous, Q12H [facility], PEDRO Ballard, 40 mg at 12/23/22 6273  •  perflutren lipid microsphere (DEFINITY) injection, 1 mL/min, Intravenous, Once in imaging, PEDRO Cisneros  •  rivaroxaban (XARELTO) tablet 20 mg, 20 mg, Oral, Daily With Breakfast, Jerzy Amaya PA-C, 20 mg at 12/23/22 0846  •  traZODone (DESYREL) tablet 100 mg, 100 mg, Oral, HS, PEDRO Cisneros, 100 mg at 12/22/22 2127     Labs & Results:    Troponins:         BNP:     CBC with diff:   Results from last 7 days   Lab Units 12/23/22  0442 12/22/22  0519   WBC Thousand/uL 13 82* 17 03*   HEMOGLOBIN g/dL 11 4* 11 8*   HEMATOCRIT % 36 2* 36 3*   MCV fL 90 89   PLATELETS Thousands/uL 521* 532*     TSH:     CMP:   Results from last 7 days   Lab Units 12/22/22  0519 12/20/22  0524 12/19/22  0521 12/18/22  0547   POTASSIUM mmol/L 4 3 4 0   < > 3 9   CHLORIDE mmol/L 99 99   < > 101   CO2 mmol/L 29 31   < > 28   BUN mg/dL 10 10   < > 13   CREATININE mg/dL 0 92 0 94   < > 0 91   AST U/L 24  --   --  18   ALT U/L 26  --   --  15   EGFR ml/min/1 73sq m 81 78   < > 82    < > = values in this interval not displayed       Lipid Profile:     Coags:

## 2022-12-23 NOTE — DISCHARGE SUMMARY
[address]  Discharge- Brenda Lugo 1945, 78 y o  male MRN: [id number]  Unit/Bed#: ICU 10-01 Encounter: [id number]  Primary Care Provider: Shaun Broussard DO   Date and time admitted to hospital: 12/11/2022  5:19 AM    * Peritonitis (HCC)-resolved as of 12/23/2022  Assessment & Plan  79 yo M hx aortic valve replacement, chronic pain, and recent dolan's procedure for diverticulitis now POD#12 s/p ex lap, MELANI, appendectomy and RLQ abscess drainage  Feeling well, no complaints, would like to go home  Well appearing, lungs clear, abdomen soft, non-tender  AFVSS on room air  WBC 17 -> 13  Assessment:  RLQ abscess with peritonitis    Plan: Will discuss with Dr Piyush Rosales regarding discharge planning  Addendum:  Discharged to home, rx sent to pharmacy, [facility], outpatient follow-up  Medical Problems     Resolved Problems  Date Reviewed: 12/22/2022          Resolved    * (Principal) Peritonitis ([facility] ) 12/23/2022     Resolved by  Abdirahman Robertson MD    Septic shock [facility]) 12/23/2022     Resolved by  Abdirahman Robertson MD    Acute respiratory failure with hypoxia ([facility] ) 12/23/2022     Resolved by  Abdirahman Robertson MD          Admission Date:   Admission Orders (From admission, onward)     Ordered        12/11/22 0933  Inpatient Admission  Once                        Admitting Diagnosis: Acute abdomen [R10 0]  Stomach pain [R10 9]  Abdominal pain [R10 9]  Colostomy in place [facility]) [Z93 3]  Status post aortic valve replacement with bioprosthetic valve [Z95 3]  Stage 3 chronic kidney disease, unspecified whether stage 3a or 3b CKD ([facility] ) [N18 30]    HPI: 59-year-old male with recent Dolan's procedure for diverticular disease presenting with acute abdominal pain and CT findings suggesting right lower quadrant abscess  General surgery consulted and advised emergent operation including exploratory laparotomy, abscess drainage, appendectomy      Procedures Performed:   Orders Placed This Encounter   Procedures   • Critical Care       Summary of Hospital Course: Procedure completed as planned, postop course complicated by multiple factors including early postop ileus, difficulty with pain control, new onset A  fib with RVR and newly diagnosed cardiomyopathy, and persistently elevated white blood cell count  He was seen in consultation by internal medicine, cardiology, infectious disease  He was slowly transitioned to pain control with his home regimen, a regular diet with normal output from his colostomy  His midline incision initially treated with jeffy that after removal were read dressed daily  Due to persistent leukocytosis and a postoperative CT scan was obtained to r/o infection  Gallbladder edema was noted, however patient was tolerating a regular diet at the time  No additional antibiotics were indicated  On day of discharge found to be clinically, hemodynamically table with down trending WBC for which decision made together to send patient home with [facility] arrangements and new Rx for adjusted cardiac meds and anticoagulation  Significant Findings, Care, Treatment and Services Provided: RLQ abscess, new A  Fib RVR and cardiomegaly    Complications: none    Condition at Discharge: stable         Discharge instructions/Information to patient and family:   See after visit summary for information provided to patient and family  Provisions for Follow-Up Care:  See after visit summary for information related to follow-up care and any pertinent home health orders  PCP: Valencia Matute DO    Disposition: Home    Planned Readmission: No    Discharge Statement   I spent 5 minutes discharging the patient  This time was spent on the day of discharge  I had direct contact with the patient on the day of discharge  Additional documentation is required if more than 30 minutes were spent on discharge       Discharge Medications:  See after visit summary for reconciled discharge medications provided to patient and family

## 2022-12-23 NOTE — UTILIZATION REVIEW
Notification of Discharge   This is a Notification of Discharge from our facility [address]  Please be advised that this patient has been discharge from our facility  Below you will find the admission and discharge date and time including the patient’s disposition  UTILIZATION REVIEW CONTACT:  Darrell Pereira  Utilization   Network Utilization Review Department  Phone: [phone] carefully listen to the prompts  All voicemails are confidential   Email: [email or web address]     PHYSICIAN ADVISORY SERVICES:  FOR [id number] REVIEW - MEDICAL NECESSITY DENIAL  Phone: [phone]  Fax: [phone]  Email: [email or web address]     PRESENTATION DATE: 12/11/2022  5:19 AM  OBERVATION ADMISSION DATE:   INPATIENT ADMISSION DATE: 12/11/22  9:33 AM   DISCHARGE DATE: 12/23/2022  1:00 PM  DISPOSITION: Home/Self Care Home/Self Care      IMPORTANT INFORMATION:  Send all requests for admission clinical reviews, approved or denied determinations and any other requests to dedicated fax number below belonging to the campus where the patient is receiving treatment   List of dedicated fax numbers:  [address] [facility] [address] [facility] [address] DENIALS (Administrative/Medical Necessity) [phone]   [address] [place] [address] (Maternity/NICU/Pediatrics) [phone]   [facility] [phone]   [facility] [address] [phone]   [address] [phone]   [address] [facility] [phone]   [address] [address] [facility] [address] [phone]   [address] [place], [place] [address]ten[facility] [place] [facility] [phone]   [facility]  [phone] [address] [facility] [phone]   [address] [place] [address] [address]  [phone]

## 2022-12-23 NOTE — PROGRESS NOTES
Progress Note - [facility] Infectious Disease   Reg Kuhn 78 y o  male MRN: [id number]  Unit/Bed#: ICU 10-01 Encounter: [id number]      IMPRESSION & RECOMMENDATIONS:   1   Septic shock  Presents on admission e/b tachycardia, tachypnea, lactic acidosis   Due to intra-abdominal abscess/peritonitis   Patient is status post Polo's procedure for sigmoid diverticulitis August 2022  S/p ex lap, MELANI, appendectomy, drainage of RLQ abscess 12/11/22  He completed a 7 day course ciprofloxacin, fluconazole, metronidazole 12/17   Culture from the OR showed growth of 2 E  Coli strains, gamma hemolytic Streptococcus, and Bacteroides fragilis   He then developed worsening leukocytosis and tachycardia, but is otherwise clinically improving and hemodynamically stable   Repeat CT abdomen/pelvis 12/21 did not show any new source of intra-abdominal infection, but did show some gallbladder wall edema  RUQ U/S shows contracted GB, low suspicion for acute chandrika  The surgical incision is healing well, without any signs of surgical site infection   Leukocytosis now trending down and he remains afebrile    -continue to monitor off antibiotics   -follow up blood cultures from 12/22   -if develops new fever or hypotension then would start Cefepime and Metronidazole  -daily CBC to monitor WBC, trend fever curve     2   Intra-abdominal abscess/peritonitis   History of sigmoid diverticulitis status post Polo's procedure in August 2022   Now presented with RLQ abscess, significant enteritis, possible SBO  S/p ex lap with appendectomy, drainage of abscess, and extensive MELANI 12/11/22   OR cultures with polymicrobial growth of GI migel   Received 7 days of ciprofloxacin, metronidazole, and fluconazole, which do not cover gram-positive organisms such as strep   However, repeat CT abdomen pelvis did not show any source of new infection or residual abscess    -continue to monitor off antibiotics   -if clinical worsening, restart antibiotics with cefepime and metronidazole which provide better intra-abdominal coverage  -serial abdominal exams   -close surgical follow up      3   A  fib with RVR    New onset postop   Cardiology following  Remains in RVR  TTE no valvular clot or vegetations present, EF 35%   -management per cardiology   -consider CTA to assess for postop PE, although would expect hypoxia as well     4   GERMAN on CKD   Likely due to sepsis on admission   Creatinine now at baseline   -dose adjust antibiotics as needed  -monitor creatinine     5   Penicillin allergy   Reports anaphylaxis as a child  Samantha Banana has likely outgrown this reaction  Low risk of cross-reactivity with cephalosporins   -if patient does require restarting antibiotics, favor cefepime over FQ for better intra-abdominal coverage  -monitor for adverse drug reactions     Antibiotics:  No systemic abx   POD11    We will see patient again 22 if here  Please call ID on call physician in meantime if questions  I have discussed the above management plan in detail with patient  I have discussed the above management plan in detail with patient's RN, and the primary service, surgery/SLIM  Subjective:  Patient has no fever, chills, sweats; no nausea, vomiting, diarrhea; no cough, shortness of breath; some pain in abdomen though tolerating diet with minimal nausea  Wants to go home  Objective:  Vitals:  Temp:  [97 6 °F (36 4 °C)-98 8 °F (37 1 °C)] 97 6 °F (36 4 °C)  HR:  [] 80  Resp:  [7-23] 18  BP: ()/(52-87) 107/56  SpO2:  [91 %-96 %] 91 %  Temp (24hrs), Av 4 °F (36 9 °C), Min:97 6 °F (36 4 °C), Max:98 8 °F (37 1 °C)  Current: Temperature: 97 6 °F (36 4 °C)    PHYSICAL EXAM:  General Appearance:  Appearing well, nontoxic, and in no distress, laying flat in bed    HEENT: Normocephalic, without obvious abnormality, atraumatic  Conjunctiva pink and sclera anicteric  Oropharynx moist without lesions       Lungs:   Clear to auscultation bilaterally, respirations unlabored   Heart:  irreg irreg rhythm, tachycardic; no murmur, rub or gallop   Abdomen:   Soft, non-tender, non-distended, positive bowel sounds, LLQ ostomy in place, midline abdominal scar with mild dehiscence and right lower quad trochar incision with serous drainage    Extremities: No cyanosis, clubbing or edema   Musculoskeletal: Back symmetric without curvature, ROM normal     : No CVA or suprapubic tenderness  No song  Skin: No rashes or lesions  No draining wounds noted  Peripheral IV intact without evidence of erythema, warmth, or exudate  LABS, IMAGING, & OTHER STUDIES:  Lab Results:  I have personally reviewed pertinent labs  Results from last 7 days   Lab Units 12/23/22  0442 12/22/22  0519 12/21/22  0332   WBC Thousand/uL 13 82* 17 03* 14 58*   HEMOGLOBIN g/dL 11 4* 11 8* 12 1   PLATELETS Thousands/uL 521* 532* 519*     Results from last 7 days   Lab Units 12/22/22  0519 12/20/22  0524 12/19/22  0521 12/18/22  0547   SODIUM mmol/L 135 136 134* 136   POTASSIUM mmol/L 4 3 4 0 3 9 3 9   CHLORIDE mmol/L 99 99 100 101   CO2 mmol/L 29 31 27 28   BUN mg/dL 10 10 11 13   CREATININE mg/dL 0 92 0 94 0 89 0 91   EGFR ml/min/1 73sq m 81 78 83 82   CALCIUM mg/dL 8 7 8 8 8 4 7 8*   AST U/L 24  --   --  18   ALT U/L 26  --   --  15   ALK PHOS U/L 60  --   --  53     Results from last 7 days   Lab Units 12/22/22  0854   BLOOD CULTURE  Received in Microbiology Lab  Culture in Progress  Received in Microbiology Lab  Culture in Progress       Results from last 7 days   Lab Units 12/20/22  0524 12/18/22  0547   PROCALCITONIN ng/ml 0 15 0 44*

## 2022-12-23 NOTE — ASSESSMENT & PLAN NOTE
79 yo M hx aortic valve replacement, chronic pain, and recent dolan's procedure for diverticulitis now POD#12 s/p ex lap, MELANI, appendectomy and RLQ abscess drainage  Feeling well, no complaints, would like to go home  Well appearing, lungs clear, abdomen soft, non-tender  AFVSS on room air  WBC 17 -> 13  Assessment:  RLQ abscess with peritonitis    Plan: Will discuss with Dr Joseph Matson regarding discharge planning  Addendum:  Discharged to home, rx sent to pharmacy, [address], outpatient follow-up

## 2022-12-23 NOTE — CASE MANAGEMENT
Case Management Discharge Planning Note    Patient name Brenda Lugo  Location ICU 10/ICU 10-01 MRN [id number]  : 1945 Date 2022       Current Admission Date: 2022  Current Admission Diagnosis:Status post aortic valve replacement with bioprosthetic valve   Patient Active Problem List    Diagnosis Date Noted   • Cardiomyopathy ([facility] ) 12/15/2022   • New onset atrial fibrillation ([facility] ) 2022   • Diverticulitis of colon 2022   • SBO (small bowel obstruction) ([facility] ) 2022   • Primary osteoarthritis of right shoulder 2022   • Chronic kidney disease, stage 2 (mild) 2022   • High risk medication use 2020   • Rheumatoid arthritis with rheumatoid factor, unspecified [facility] ) 2020   • Atherosclerosis of native coronary artery of native heart without angina pectoris 2019   • Status post aortic valve replacement with bioprosthetic valve 2018   • Arthritis    • Chronic pain    • Continuous opioid dependence ([facility] )    • Hyperlipidemia    • Benign essential hypertension    • Insomnia    • Peripheral neuropathy    • Carpal tunnel syndrome 2015   • Psoriasis with arthropathy ([facility] ) 2015   • Arthralgia of lower leg 2013   • Hyperglycemia 10/25/2012   • Primary localized osteoarthrosis of shoulder region 10/09/2012   • Psoriasis 10/09/2012   • Atopic dermatitis 2012   • Low back pain 2012      LOS (days): 12  Geometric Mean LOS (GMLOS) (days): 9 60  Days to GMLOS:-2 5     OBJECTIVE:  Risk of Unplanned Readmission Score: 23 64         Current admission status: Inpatient   Preferred Pharmacy:   [address] [place] [address] 90, [address] [address]  Phone: [phone] Fax: [phone] [facility] [id number], [address] [address]  Phone: [phone] Fax: [phone] MISTY Schmitz - [facility]  Phone: [phone] Fax: [phone]    Primary Care Provider: Daniel Galloway DO    Primary Insurance: [facility]  Secondary Insurance:     DISCHARGE DETAILS:    Discharge planning discussed with[de-identified] patient and wife at the bedside  Freedom of Choice: Yes  Comments - Freedom of Choice: pt declined rehab- pt was accepted by [address]      pt and family are in agreement with the d/c and d/c plan  CM contacted family/caregiver?: Yes  Were Treatment Team discharge recommendations reviewed with patient/caregiver?: Yes  Did patient/caregiver verbalize understanding of patient care needs?: Yes  Were patient/caregiver advised of the risks associated with not following Treatment Team discharge recommendations?: Yes    Contacts  Patient Contacts: wife Janiya Chau  Relationship to Patient[de-identified] Family (wife)  Contact Method:  In Person  Reason/Outcome: Discharge [address]         Is the patient interested in [facility] at discharge?: Yes    DME Referral Provided  Referral made for DME?: No    Other Referral/Resources/Interventions Provided:  Interventions: Prescription Price Check, HHC  Referral Comments: All care [facility] was made aware of the d/c avs was faqxed to [phone]-  pt was given free 30 day coupon of zarelto  copay $30 00 pharmacy has in stock    Would you like to participate in our [address] service program?  : No - Declined    Treatment Team Recommendation: Home with [address] (home with spouse & [facility] care [facility] & outpt follow up-wife)  Discharge Destination Plan[de-identified] Home with [address] (home with spouse & [facility] care [facility] & outpt follow up)  Transport at Discharge : Automobile, Family                       Accompanied by: Family member           Family notified[de-identified] wife at [facility] bedside

## 2022-12-23 NOTE — TELEPHONE ENCOUNTER
Mr Anayeli Vasquez is discharged today from [facility]  Seen there by Dr Bharti Smith  New diagnosis A FIB  Mrs Anayeli Vasquez called to schedule a hospital follow up with you  Goes to [facility] office but said she would see you at any location when advised you had no available appts open there  I did schedule the patient with Fredda Course in [facility] for the hospital follow up on 1/9, but Mrs Anayeli Vasquez wanted me to ask if you would overbook at any office location to be seen by you  They are aware you are out of the office until 12/27  Please advise

## 2022-12-27 ENCOUNTER — OFFICE VISIT (OUTPATIENT)
Dept: SURGERY | Facility: CLINIC | Age: 77
End: 2022-12-27

## 2022-12-27 VITALS — TEMPERATURE: 98.2 F

## 2022-12-27 DIAGNOSIS — R11.2 POST-OPERATIVE NAUSEA AND VOMITING: Primary | ICD-10-CM

## 2022-12-27 DIAGNOSIS — Z98.890 POST-OPERATIVE NAUSEA AND VOMITING: Primary | ICD-10-CM

## 2022-12-27 LAB
BACTERIA BLD CULT: NORMAL
BACTERIA BLD CULT: NORMAL

## 2022-12-27 RX ORDER — ONDANSETRON 4 MG/1
4 TABLET, FILM COATED ORAL EVERY 8 HOURS PRN
Qty: 20 TABLET | Refills: 0 | Status: SHIPPED | OUTPATIENT
Start: 2022-12-27

## 2022-12-27 NOTE — PROGRESS NOTES
Assessment/Plan:    No problem-specific Assessment & Plan notes found for this encounter  Diagnoses and all orders for this visit:    Post-operative nausea and vomiting  -     ondansetron (ZOFRAN) 4 mg tablet; Take 1 tablet (4 mg total) by mouth every 8 (eight) hours as needed for nausea or vomiting          Subjective:      Patient ID: Steven Ashley is a 78 y o  male  The patient is a 21-year-old white male status post sigmoid resection 5 months ago for complicated diverticulitis who is 16 days out from exploratory laparotomy for acute abdomen  He was found to have a pelvic abscess which was contiguous with the distal ileum and appendix  He underwent appendectomy, and drainage of the abscess  This was complicated by atrial fibrillation with rapid ventricular response and a prolonged hospitalization  The final pathology report reveals periappendicitis, and the cause of the pelvic abscess remains unclear, but is clearly related to the distal small bowel  At time of surgery he did not have fat creeping or induration of the bowel to suggest chronic Crohn's disease  The patient was discharged from the hospital 4 days ago and returns to have the sutures out from his midline incision  The patient is afebrile, he is eating but has a poor appetite, and is feeling quite washed out  On exam his midline incision is healing  The nylon sutures around the umbilicus were left in place, the skin clips in the midline were removed  His colostomy is pink and functional   His breath sounds are clear and equal   His pulse is irregularly irregular, with a normal rate  I will see the patient back in 2 weeks for recheck, and to remove the remaining nylon skin sutures  The patient does have follow-up with his primary care physician, and with his cardiologist in the interim            The following portions of the patient's history were reviewed and updated as appropriate: allergies, current medications, past family history, past medical history, past social history, past surgical history and problem list     Review of Systems   Constitutional: Positive for fatigue  Negative for chills and fever  Respiratory: Negative for cough  Cardiovascular: Positive for palpitations  Gastrointestinal: Negative for nausea and vomiting  Genitourinary: Negative for difficulty urinating  Skin: Positive for wound  Neurological: Positive for weakness           Objective:      Temp 98 2 °F (36 8 °C) (Temporal)          Physical Exam  Abdominal:

## 2022-12-27 NOTE — PATIENT INSTRUCTIONS
The pathology report is included below, there was periappendicitis, but not acute appendicitis  The incision is healing OK, but I am going to keep the Nylon sutures in for now  Please follow up on Monday January 9th at the [place] office to have the remaining sutures out  Please keep a gauze poked between the sutures to wick away any drainage  Wash the wound with soap and water, and apply moisturizer  Call if you are having any problems  Case Report   Surgical Pathology Report                         Case: [id number]                                    Authorizing Provider:  Nini Small MD    Collected:           12/11/2022 1246               Ordering Location:     [facility] Received:            12/11/2022 1603                                      Operating Room                                                                Pathologist:           Blayne Zuñiga DO                                                             Specimen:    Appendix, APPENDIX                                                                         Final Diagnosis   A  Appendix, appendectomy:  -Periappendicitis  Electronically signed by Blayne Zuñiga DO on 12/15/2022 at 12:37 PM   Additional Information    All reported additional testing was performed with appropriately reactive controls  These tests were developed and their performance characteristics determined by [facility] or appropriate performing facility, though some tests may be performed on tissues which have not been validated for performance characteristics (such as staining performed on alcohol exposed cell blocks and decalcified tissues)  Results should be interpreted with caution and in the context of the patients’ clinical condition  These tests may not be cleared or approved by the [place]  Food and Drug Administration, though the FDA has determined that such clearance or approval is not necessary   These tests are used for clinical purposes and they should not be regarded as investigational or for research  This laboratory has been approved by [facility], designated as a high-complexity laboratory and is qualified to perform these tests  Interpretation performed at [place] [address]   Gross Description    A  The specimen is received in formalin, labeled with the patient's name and hospital number, and is designated " appendix"  The specimen consists of a vermiform appendix measuring 5 7 cm in length by 0 7 cm in average diameter  The serosa is tan-purple, smooth and glistening  No perforations are identified  The margin of resection is inked blue and is included in the submitted sections  The lumen contains focal areas of hemorrhage  The appendiceal wall averages 0 2 cm in thickness and appears unremarkable  The entire appendix is submitted for histologic examination in 5 cassettes  Note: The estimated total formalin fixation time based upon information provided by the submitting clinician and the standard processing schedule is under 72 hours

## 2022-12-29 ENCOUNTER — OFFICE VISIT (OUTPATIENT)
Dept: FAMILY MEDICINE CLINIC | Facility: CLINIC | Age: 77
End: 2022-12-29

## 2022-12-29 VITALS
SYSTOLIC BLOOD PRESSURE: 130 MMHG | HEART RATE: 61 BPM | TEMPERATURE: 98.4 F | WEIGHT: 169.6 LBS | DIASTOLIC BLOOD PRESSURE: 86 MMHG | OXYGEN SATURATION: 97 % | RESPIRATION RATE: 18 BRPM | BODY MASS INDEX: 26.62 KG/M2 | HEIGHT: 67 IN

## 2022-12-29 DIAGNOSIS — R11.0 CHRONIC NAUSEA: Primary | ICD-10-CM

## 2022-12-29 DIAGNOSIS — K56.609 SBO (SMALL BOWEL OBSTRUCTION) (HCC): ICD-10-CM

## 2022-12-29 DIAGNOSIS — R53.81 PHYSICAL DECONDITIONING: ICD-10-CM

## 2022-12-29 DIAGNOSIS — Z93.3 COLOSTOMY IN PLACE (HCC): ICD-10-CM

## 2022-12-29 DIAGNOSIS — I48.91 NEW ONSET ATRIAL FIBRILLATION (HCC): ICD-10-CM

## 2022-12-29 DIAGNOSIS — F11.20 CONTINUOUS OPIOID DEPENDENCE (HCC): ICD-10-CM

## 2022-12-29 DIAGNOSIS — M48.061 SPINAL STENOSIS OF LUMBAR REGION, UNSPECIFIED WHETHER NEUROGENIC CLAUDICATION PRESENT: ICD-10-CM

## 2022-12-29 RX ORDER — OXYCODONE AND ACETAMINOPHEN 10; 325 MG/1; MG/1
1 TABLET ORAL EVERY 4 HOURS PRN
Qty: 120 TABLET | Refills: 0 | Status: SHIPPED | OUTPATIENT
Start: 2022-12-29

## 2022-12-29 RX ORDER — PROCHLORPERAZINE MALEATE 5 MG/1
5 TABLET ORAL EVERY 6 HOURS PRN
Qty: 30 TABLET | Refills: 0 | Status: SHIPPED | OUTPATIENT
Start: 2022-12-29

## 2022-12-29 RX ORDER — ONDANSETRON HYDROCHLORIDE 8 MG/1
8 TABLET, FILM COATED ORAL EVERY 8 HOURS PRN
Qty: 60 TABLET | Refills: 1 | Status: SHIPPED | OUTPATIENT
Start: 2022-12-29

## 2022-12-29 RX ORDER — OXYCODONE HYDROCHLORIDE 10 MG/1
TABLET ORAL
Qty: 120 TABLET | Refills: 0 | Status: SHIPPED | OUTPATIENT
Start: 2022-12-29

## 2022-12-29 NOTE — PROGRESS NOTES
Assessment/Plan:     Diagnoses and all orders for this visit:    Chronic nausea  -     ondansetron (ZOFRAN) 8 mg tablet; Take 1 tablet (8 mg total) by mouth every 8 (eight) hours as needed for nausea or vomiting  -     prochlorperazine (COMPAZINE) 5 mg tablet; Take 1 tablet (5 mg total) by mouth every 6 (six) hours as needed for nausea or vomiting    SBO (small bowel obstruction) (HCC)    Colostomy in place [facility])    Physical deconditioning    Continuous opioid dependence ([facility] [address] )    New onset atrial fibrillation ([facility] [address] )    Spinal stenosis of lumbar region, unspecified whether neurogenic claudication present  -     oxyCODONE (ROXICODONE) 10 MG TABS; Take 1 tablet 4 times daily  -     oxyCODONE-acetaminophen (PERCOCET)  mg per tablet; Take 1 tablet by mouth every 4 (four) hours as needed for moderate pain Max Daily Amount: 6 tablets      Patient's biggest issue is the chronic nausea that he has  The Zofran to 4 mg is not helping will increase to 8 mg  Also add Compazine as needed  Patient will follow up with his cardiologist and surgeon  Pain meds refilled  Patient is having physical therapy come to his house  He will follow-up with me in 2 to 3 months or sooner if needed      Subjective:     Chief Complaint   Patient presents with   • Transition of Care Management     [facility] 12/11-12/23 peritonitis  Feeling nauseous, zofran not helping       TCM Call     Date and time call was made  12/23/2022  4:06 PM    Hospital care reviewed  Records reviewed    Patient was hospitialized at  [address]    Date of Admission  12/11/22    Date of discharge  12/23/22    Diagnosis  Peritonitis    Disposition  Home    Were the patients medications reviewed and updated  Yes    Current Symptoms  Fatigue    Fatigue severity  Moderate      TCM Call     Post hospital issues  Reduced activity    Should patient be enrolled in anticoag monitoring? No    Scheduled for follow up?   Yes    Patients specialists  Other (comment)    Other specialists names  Toy Andre MD General Surgery    Did you obtain your prescribed medications  Yes    Why were you unable to obtain your medications  No new meds prescribed    Do you need help managing your prescriptions or medications  No    Is transportation to your appointment needed  No    I have advised the patient to call PCP with any new or worsening symptoms  Eusebia Goodrich LPN    Living Arrangements  Spouse or Significiant other    Support System  Spouse    The type of support provided  Physical; Financial; Emotional    Do you have social support  Yes, as much as I need    Are you recieving any outpatient services  No    Are you recieving home care services  Yes    Types of home care services  Nurse visit    Are you using any community resources  No    Current waiver services  No    Have you fallen in the last 12 months  No    Interperter language line needed  No    Counseling  Patient; Family    Counseling topics  patient and family education             Patient ID: Mike Fowler is a 78 y o  male  Patient presents today for hospitalization  Patient had a peritonitis issue  During this episode he developed A  fib  Today his biggest issue is deconditioning from being in the hospital for so long  And he has having significant chronic nausea  He is eating and he is not vomiting but his nauseous feeling is significantly affecting him      The following portions of the patient's history were reviewed and updated as appropriate: allergies, current medications, past family history, past medical history, past social history, past surgical history and problem list     Review of Systems   Constitutional: Negative  HENT: Negative  Eyes: Negative  Respiratory: Negative  Cardiovascular: Negative  Gastrointestinal: Positive for nausea  Endocrine: Negative  Genitourinary: Negative  Musculoskeletal: Negative  Skin: Negative  Allergic/Immunologic: Negative  Neurological: Negative  Hematological: Negative  Psychiatric/Behavioral: Negative  All other systems reviewed and are negative  Objective:    Vitals:    12/29/22 1051   BP: 130/86   BP Location: Right arm   Patient Position: Sitting   Cuff Size: Standard   Pulse: 61   Resp: 18   Temp: 98 4 °F (36 9 °C)   TempSrc: Tympanic   SpO2: 97%   Weight: 76 9 kg (169 lb 9 6 oz)   Height: 5' 7" (1 702 m)          Physical Exam  Vitals and nursing note reviewed  Constitutional:       General: He is not in acute distress  Appearance: He is well-developed  HENT:      Head: Normocephalic  Right Ear: External ear normal       Left Ear: External ear normal       Nose: Nose normal    Eyes:      General:         Right eye: No discharge  Left eye: No discharge  Conjunctiva/sclera: Conjunctivae normal       Pupils: Pupils are equal, round, and reactive to light  Cardiovascular:      Rate and Rhythm: Normal rate and regular rhythm  Heart sounds: Normal heart sounds  Pulmonary:      Effort: Pulmonary effort is normal       Breath sounds: Normal breath sounds  Abdominal:      General: Bowel sounds are normal  There is no distension  Palpations: Abdomen is soft  Tenderness: There is no abdominal tenderness  Comments: Colostomy bag in place with no leakage or issues   Musculoskeletal:         General: Normal range of motion  Cervical back: Normal range of motion  Skin:     General: Skin is warm and dry  Findings: No rash  Neurological:      Mental Status: He is alert and oriented to person, place, and time  Cranial Nerves: No cranial nerve deficit  Psychiatric:         Behavior: Behavior normal          Thought Content:  Thought content normal          Judgment: Judgment normal

## 2023-01-03 ENCOUNTER — OFFICE VISIT (OUTPATIENT)
Dept: CARDIOLOGY CLINIC | Facility: CLINIC | Age: 78
End: 2023-01-03

## 2023-01-03 VITALS
HEIGHT: 67 IN | BODY MASS INDEX: 26.68 KG/M2 | OXYGEN SATURATION: 97 % | DIASTOLIC BLOOD PRESSURE: 60 MMHG | HEART RATE: 97 BPM | WEIGHT: 170 LBS | SYSTOLIC BLOOD PRESSURE: 114 MMHG

## 2023-01-03 DIAGNOSIS — E78.2 MIXED HYPERLIPIDEMIA: ICD-10-CM

## 2023-01-03 DIAGNOSIS — I48.91 NEW ONSET ATRIAL FIBRILLATION (HCC): Primary | ICD-10-CM

## 2023-01-03 DIAGNOSIS — I10 BENIGN ESSENTIAL HYPERTENSION: ICD-10-CM

## 2023-01-03 DIAGNOSIS — Z95.3 STATUS POST AORTIC VALVE REPLACEMENT WITH BIOPROSTHETIC VALVE: ICD-10-CM

## 2023-01-03 DIAGNOSIS — I25.10 ATHEROSCLEROSIS OF NATIVE CORONARY ARTERY OF NATIVE HEART WITHOUT ANGINA PECTORIS: ICD-10-CM

## 2023-01-03 DIAGNOSIS — I42.9 CARDIOMYOPATHY, UNSPECIFIED TYPE (HCC): ICD-10-CM

## 2023-01-03 NOTE — PROGRESS NOTES
[facility]  Follow up note  Ravinder Hernandez 78 y o  male MRN: [id number]        Problems    1  New onset atrial fibrillation (HCC)  POCT ECG    AMB extended holter monitor      2  Atherosclerosis of native coronary artery of native heart without angina pectoris        3  Mixed hyperlipidemia        4  Benign essential hypertension        5  Cardiomyopathy, unspecified type ([address] )        6  Status post aortic valve replacement with bioprosthetic valve            Impression:    Suspected tachycardia mediated cardiomyopathy  LVEF 35% discovered at the time of hospitalization 12/14/2022  Likely due to Jim Valeria  Weights are stable, in fact he has been losing weight after his hospitalization  Volume status is euvolemic, not requiring a diuretic    Atrial fibrillation  Significant A  fib with RVR upon presentation of appendicitis/peritonitis  Rate control strategy implemented, metoprolol uptitrated,  digoxin added  Anticoagulation with Xarelto 20 mg daily was prescribed   he is very concerned about his weight loss, his weakness and debility since his hospitalization, and would like to hold on plan for rhythm control/cardioversion at this time  Bioprosthetic AVR  Normal functioning bio AVR by recent echo 12/14/2022    Hyperlipidemia  Lipids controlled    Hypertension  Excellent    CAD  Nonobstructive/minimal at the time of his AVR in 2017  No symptoms, did not present with any significant ischemic symptoms at the time of his cardiomyopathy diagnosis   will not pursue ischemic testing until follow-up echo obtained to assess persistence or improvement in cardiomyopathy    Plan:     Will proceed with 1 week Zio patch  If heart rates are well controlled, I will give him about a month to recover from his recent hospitalization, but would not want to go much further before providing cardioversion plan, and informed him that allowing long-term persistence of A  fib will make it even more difficult to maintain rhythm  His wife purchased Maxine Hodgson, asked her to use this daily and if heart rate is over 100, to call the office  1 month follow-up        HPI:   Rvainder Hernandez is a 78y o  year old male  with rheumatoid arthritis, bioprosthetic AVR for severe aortic stenosis, hypertension, hyperlipidemia, decreased libido/fatigue who follows up in my office  Unfortunately he was hospitalized again in December with abdominal issues, peritonitis, abscess, possible appendicitis, underwent surgical correction, he had a 13-day hospital stay, many days in the ICU, between sepsis/peritonitis/new diagnosis of A  fib/RVR he was found to have a cardiomyopathy, global, moderate, EF 35% but without manifestations of CHF  He denies edema, orthopnea, and was not placed on a diuretic during hospitalization or at discharge  His A  fib rates are reasonably controlled in the office, he is on an extensive regimen of metoprolol succinate 100 mg twice daily, diltiazem 300 mg daily, digoxin and has a heart rate of 97  He does not have any symptoms related to his atrial fibrillation whatsoever  His blood pressure is controlled  He is on losartan from an afterload reduction standpoint  This is a chronic medication  He did not have any ischemic symptoms or an ischemic work-up that showed any ischemic cause of his cardiomyopathy  Review of Systems   Constitutional: Positive for fatigue  HENT: Negative  Eyes: Negative  Respiratory: Negative  Cardiovascular: Negative  Gastrointestinal: Negative  Endocrine: Negative  Genitourinary: Negative  Musculoskeletal: Negative  Skin: Negative  Neurological: Positive for weakness  Hematological: Negative  Psychiatric/Behavioral: Negative            Past Medical History:   Diagnosis Date   • Aortic stenosis    • Aortic stenosis, severe    • Arthritis    • Carpal tunnel syndrome    • Chronic back pain    • Chronic pain    • Chronic, continuous use of opioids    • Hyperlipidemia    • Hypertension    • Hypertrophy of prostate    • Insomnia    • Large bowel obstruction (HCC)    • Peripheral neuropathy    • RA (rheumatoid arthritis) (HCC)    • Umbilical hernia      Social History     Substance and Sexual Activity   Alcohol Use Not Currently     Social History     Substance and Sexual Activity   Drug Use No     Social History     Tobacco Use   Smoking Status Never   Smokeless Tobacco Never       Allergies:   Allergies   Allergen Reactions   • Penicillins Anaphylaxis   • Quinine Derivatives Other (See Comments)     High fever       Medications:     Current Outpatient Medications:   •  atorvastatin (LIPITOR) 20 mg tablet, take 1 tablet by mouth once daily, Disp: 90 tablet, Rfl: 1  •  betamethasone dipropionate (DIPROSONE) 0 05 % cream, Apply topically, Disp: , Rfl:   •  carisoprodol (SOMA) 350 mg tablet, Take 1 tablet (350 mg total) by mouth 4 (four) times a day, Disp: 360 tablet, Rfl: 1  •  Cholecalciferol (VITAMIN D3) 1000 units CAPS, Take 1 tablet by mouth daily, Disp: , Rfl:   •  diclofenac sodium (VOLTAREN) 1 %, Apply 2 g topically 4 (four) times a day, Disp: 5 Tube, Rfl: 3  •  digoxin (LANOXIN) 0 25 mg tablet, Take 1 tablet (250 mcg total) by mouth daily Do not start before December 24, 2022 , Disp: 30 tablet, Rfl: 5  •  diltiazem (CARDIZEM CD) 300 mg 24 hr capsule, Take 1 capsule (300 mg total) by mouth daily Do not start before December 24, 2022 , Disp: 30 capsule, Rfl: 5  •  fluocinonide (LIDEX) 0 05 % external solution, Apply topically daily, Disp: 60 mL, Rfl: 3  •  gabapentin (NEURONTIN) 600 MG tablet, take 1 tablet by mouth three times a day (MORNING, NOON, AND BEDTIME), Disp: , Rfl:   •  hydrOXYzine HCL (ATARAX) 25 mg tablet, Take 1 tablet (25 mg total) by mouth every 6 (six) hours as needed for anxiety, Disp: 15 tablet, Rfl: 1  •  ketoconazole (NIZORAL) 2 % cream, Apply topically 2 (two) times a day, Disp: 30 g, Rfl: 1  •  losartan (COZAAR) 50 mg tablet, TAKE 1 TABLET BY MOUTH EVERY DAY, Disp: 90 tablet, Rfl: 3  •  metoprolol succinate (TOPROL-XL) 100 mg 24 hr tablet, Take 1 tablet (100 mg total) by mouth 2 (two) times a day, Disp: 60 tablet, Rfl: 5  •  naloxone (NARCAN) 4 mg/0 1 mL nasal spray, Administer 1 spray into a nostril  If no response after 2-3 minutes, give another dose in the other nostril using a new spray , Disp: 1 each, Rfl: 1  •  omeprazole (PriLOSEC) 20 mg delayed release capsule, Take 1 capsule (20 mg total) by mouth daily, Disp: 30 capsule, Rfl: 2  •  ondansetron (ZOFRAN) 4 mg tablet, Take 1 tablet (4 mg total) by mouth every 8 (eight) hours as needed for nausea or vomiting, Disp: 20 tablet, Rfl: 0  •  ondansetron (ZOFRAN) 8 mg tablet, Take 1 tablet (8 mg total) by mouth every 8 (eight) hours as needed for nausea or vomiting, Disp: 60 tablet, Rfl: 1  •  oxyCODONE (ROXICODONE) 10 MG TABS, Take 1 tablet 4 times daily, Disp: 120 tablet, Rfl: 0  •  oxyCODONE-acetaminophen (PERCOCET)  mg per tablet, Take 1 tablet by mouth every 4 (four) hours as needed for moderate pain Max Daily Amount: 6 tablets, Disp: 120 tablet, Rfl: 0  •  prochlorperazine (COMPAZINE) 5 mg tablet, Take 1 tablet (5 mg total) by mouth every 6 (six) hours as needed for nausea or vomiting, Disp: 30 tablet, Rfl: 0  •  rivaroxaban (Xarelto) 20 mg tablet, Take 1 tablet (20 mg total) by mouth daily after dinner, Disp: 30 tablet, Rfl: 0  •  traZODone (DESYREL) 300 MG tablet, 2 tablets at bedtime, Disp: 180 tablet, Rfl: 1  •  Secukinumab 150 MG/ML SOAJ, , Disp: , Rfl:       Vitals:    01/03/23 1344   BP: 114/60   Pulse: 97   SpO2: 97%     Weight (last 2 days)     Date/Time Weight    01/03/23 1344 77 1 (170)        Physical Exam  Constitutional:       General: He is not in acute distress  Appearance: Normal appearance  He is well-developed  He is not diaphoretic  HENT:      Head: Normocephalic and atraumatic  Eyes:      General: No scleral icterus       Conjunctiva/sclera: Conjunctivae normal  Pupils: Pupils are equal, round, and reactive to light  Neck:      Thyroid: No thyromegaly  Vascular: No JVD  Cardiovascular:      Rate and Rhythm: Normal rate  Rhythm irregular  Heart sounds: Normal heart sounds  No murmur heard  No friction rub  No gallop  Pulmonary:      Effort: Pulmonary effort is normal  No respiratory distress  Breath sounds: Normal breath sounds  No wheezing or rales  Abdominal:      General: Bowel sounds are normal  There is no distension  Palpations: Abdomen is soft  Tenderness: There is no abdominal tenderness  Musculoskeletal:         General: No deformity  Normal range of motion  Cervical back: Normal range of motion and neck supple  Right lower leg: No edema  Left lower leg: No edema  Skin:     General: Skin is warm and dry  Findings: No erythema or rash  Neurological:      General: No focal deficit present  Mental Status: He is alert and oriented to person, place, and time  Cranial Nerves: No cranial nerve deficit  Motor: No weakness             Laboratory Studies:  Lab Results   Component Value Date    HGBA1C 6 0 (H) 03/30/2022    HGBA1C 5 7 (H) 10/15/2020    HGBA1C 5 9 10/10/2017    K 4 3 12/22/2022    K 4 0 12/20/2022    K 3 9 12/19/2022    CL 99 12/22/2022    CL 99 12/20/2022     12/19/2022    CO2 29 12/22/2022    CO2 31 12/20/2022    CO2 27 12/19/2022    CO2 27 10/17/2017    CO2 27 10/17/2017    CO2 28 10/17/2017    GLUCOSE 125 10/17/2017    GLUCOSE 133 10/17/2017    GLUCOSE 163 (H) 10/17/2017    CREATININE 0 92 12/22/2022    CREATININE 0 94 12/20/2022    CREATININE 0 89 12/19/2022    BUN 10 12/22/2022    BUN 10 12/20/2022    BUN 11 12/19/2022    MG 2 0 12/20/2022    MG 1 8 (L) 12/19/2022    MG 1 8 (L) 12/18/2022    PHOS 2 5 12/18/2022    PHOS 2 8 12/17/2022    PHOS 2 9 12/17/2022     Lab Results   Component Value Date    WBC 13 82 (H) 12/23/2022    RBC 4 03 12/23/2022    HGB 11 4 (L) 12/23/2022 HCT 36 2 (L) 12/23/2022    MCV 90 12/23/2022    MCH 28 3 12/23/2022    RDW 13 6 12/23/2022     (H) 12/23/2022     Lipid Profile:   No results found for: CHOL  Lab Results   Component Value Date    HDL 46 03/30/2022    HDL 52 09/20/2018    HDL 50 09/29/2017     Lab Results   Component Value Date    LDLCALC 86 03/30/2022    LDLCALC 88 09/20/2018    LDLCALC 78 09/29/2017     Lab Results   Component Value Date    TRIG 95 03/30/2022    TRIG 122 09/20/2018    TRIG 84 09/29/2017         Cardiac testing:     EKG reviewed personally:     Results for orders placed or performed in visit on 01/03/23   POCT ECG    Impression    Atrial fibrillation, heart rate 97, nonspecific T wave abnormality       Echocardiogram  12/14/2022-personally reviewed-EF 35%, global hypokinesis  12/19/2022-personally reviewed-EF 35%, global hypokinesis      Aayush Dorantes MD    Portions of the record may have been created with voice recognition software   Occasional wrong word or "sound a like" substitutions may have occurred due to the inherent limitations of voice recognition software   Read the chart carefully and recognize, using context, where substitutions have occurred

## 2023-01-07 ENCOUNTER — NURSE TRIAGE (OUTPATIENT)
Dept: OTHER | Facility: OTHER | Age: 78
End: 2023-01-07

## 2023-01-07 NOTE — Clinical Note
Please get patient in for an EKG Monday, looks like he may have converted to sinus rhythm/sinus bradycardia over the weekend

## 2023-01-07 NOTE — PROGRESS NOTES
He called today, heart rate was noted to be in the 40s and 50s, blood pressure normal  He was just in my office last week, with A  fib  I have asked him to discontinue diltiazem, discontinue digoxin  If heart rate tomorrow morning is greater than 60, resume metoprolol succinate 100 mg twice a day  If blood pressures greater than 007 systolic tomorrow morning, can continue taking losartan, all meds were held today  [facility] emailed to me shows probable sinus bradycardia around 50 bpm   He will not need to pursue with outpatient cardioversion that was previously scheduled if in fact he is back in sinus rhythm  I will have him report to the office for an EKG on Monday to confirm his rhythm    Currently wearing a Zio patch, but will not have those results back for another week or so

## 2023-01-07 NOTE — TELEPHONE ENCOUNTER
Reason for Disposition  • Patient sounds very sick or weak to the triager    Answer Assessment - Initial Assessment Questions  1  DESCRIPTION: "Please describe your heart rate or heartbeat that you are having" (e g , fast/slow, regular/irregular, skipped or extra beats, "palpitations")      slow  2  ONSET: "When did it start?" (Minutes, hours or days)       yesterday  3  DURATION: "How long does it last" (e g , seconds, minutes, hours)      Since yesterday  4  PATTERN "Does it come and go, or has it been constant since it started?"  "Does it get worse with exertion?"   "Are you feeling it now?"      Denies symptoms  5  TAP: "Using your hand, can you tap out what you are feeling on a chair or table in front of you, so that I can hear?" (Note: not all patients can do this)          6  HEART RATE: "Can you tell me your heart rate?" "How many beats in 15 seconds?"  (Note: not all patients can do this)        46  7  RECURRENT SYMPTOM: "Have you ever had this before?" If Yes, ask: "When was the last time?" and "What happened that time?"       Denies  8  CAUSE: "What do you think is causing the palpitations?"      NA  9  CARDIAC HISTORY: "Do you have any history of heart disease?" (e g , heart attack, angina, bypass surgery, angioplasty, arrhythmia)       Afib  10   OTHER SYMPTOMS: "Do you have any other symptoms?" (e g , dizziness, chest pain, sweating, difficulty breathing)        /56    Protocols used: HEART RATE AND HEARTBEAT QUESTIONS-[facility]

## 2023-01-07 NOTE — TELEPHONE ENCOUNTER
Regardin heart rate  ----- Message from Jerica Milton sent at 2023  8:48 AM EST -----  " My  is on a couple of meds for afib, my husbands heart rate is 46

## 2023-01-09 ENCOUNTER — CLINICAL SUPPORT (OUTPATIENT)
Dept: CARDIOLOGY CLINIC | Facility: CLINIC | Age: 78
End: 2023-01-09

## 2023-01-09 VITALS
RESPIRATION RATE: 18 BRPM | DIASTOLIC BLOOD PRESSURE: 60 MMHG | WEIGHT: 167.5 LBS | HEIGHT: 67 IN | BODY MASS INDEX: 26.29 KG/M2 | SYSTOLIC BLOOD PRESSURE: 128 MMHG | OXYGEN SATURATION: 97 % | HEART RATE: 62 BPM

## 2023-01-09 DIAGNOSIS — I48.91 NEW ONSET ATRIAL FIBRILLATION (HCC): Primary | ICD-10-CM

## 2023-01-09 NOTE — PROGRESS NOTES
Patient here for EKG for low heart rates over the weekend currently he is holding diltiazem and digoxin  Yesterday patient took metoprolol 100mg once in the am for hr of 62 in the evening hr was 56 so second dose was held  He took Losartan 50mg this morning for bp of 134/68 yesterday bp was 126/56 so losartan was held , no meds on Saturday

## 2023-01-10 ENCOUNTER — OFFICE VISIT (OUTPATIENT)
Dept: SURGERY | Facility: CLINIC | Age: 78
End: 2023-01-10

## 2023-01-10 ENCOUNTER — TELEPHONE (OUTPATIENT)
Dept: CARDIOLOGY CLINIC | Facility: CLINIC | Age: 78
End: 2023-01-10

## 2023-01-10 VITALS — TEMPERATURE: 96.6 F

## 2023-01-10 DIAGNOSIS — K65.9 PERITONITIS (HCC): Primary | ICD-10-CM

## 2023-01-10 NOTE — ASSESSMENT & PLAN NOTE
The patient is 4 weeks out from laparotomy with drainage of pelvic abscess and appendectomy  His atrial fibrillation has spontaneously converted, and his cardiac medications are being de-escalated  On exam he looks to be doing much better, appears stronger, his midline wound is healing uneventfully, and the remaining nylon skin sutures were removed  He will follow-up in approximately 4 weeks and discuss when to proceed with flexible sigmoidoscopy and reversal of his Polo's procedure at that time  The patient relates that he is in no hurry to have another operation, and the timing would be at least 3 months from the date of his previous laparotomy

## 2023-01-10 NOTE — TELEPHONE ENCOUNTER
Called patient's wife with instructions per Dr Roz August  Verbally understood she will call the office if she has any further concerns  4

## 2023-01-10 NOTE — TELEPHONE ENCOUNTER
----- Message from Sudhir Knox MD sent at 1/9/2023  5:28 PM EST -----  No med changes for now, continue the metoprolol, he converted to normal sinus rhythm, I am glad to see that  I do not want to make any more aggressive changes at this time, but at follow-up in the future we will discuss possibly being able to down titrate the metoprolol   ----- Message -----  From: Heather Valentine RN  Sent: 1/9/2023   2:42 PM EST  To: Sudhir Knox MD    Please see EKG performed today in office  I can call his wife with any further instructions   Thanks Marleni Billingsley

## 2023-01-10 NOTE — PROGRESS NOTES
Assessment/Plan:    Peritonitis ([facility] [address] )  The patient is 4 weeks out from laparotomy with drainage of pelvic abscess and appendectomy  His atrial fibrillation has spontaneously converted, and his cardiac medications are being de-escalated  On exam he looks to be doing much better, appears stronger, his midline wound is healing uneventfully, and the remaining nylon skin sutures were removed  He will follow-up in approximately 4 weeks and discuss when to proceed with flexible sigmoidoscopy and reversal of his Polo's procedure at that time  The patient relates that he is in no hurry to have another operation, and the timing would be at least 3 months from the date of his previous laparotomy  Diagnoses and all orders for this visit:    Peritonitis ([facility] [address] )          Subjective:      Patient ID: Macie Soares is a 78 y o  male  The patient is a 53-year-old white male who presents in follow-up 4 weeks out from emergency exploratory laparotomy for peritonitis associated with an abscess in his right lower quadrant/pelvis with contiguous periappendicitis  This hospitalization was complicated by atrial fibrillation and new diagnosis of cardiomyopathy  The patient is now doing well, feels stronger, he reports that he is converted back to sinus rhythm, and that his cardiac meds are being deescalated  On exam his midline incision is healing uneventfully  The remaining nylon skin sutures were removed, and the restrictions on his postop activity were liberalized, but he is still to avoid any strenuous activity or heavy lifting  I have asked him to follow-up in approximately 4 weeks to discuss rescheduling his flexible sigmoidoscopy, and reversal of his Polo's procedure        The following portions of the patient's history were reviewed and updated as appropriate: allergies, current medications, past family history, past medical history, past social history, past surgical history and problem list     Review of Systems   Constitutional: Negative for chills and fever  Respiratory: Negative  Cardiovascular: Negative for chest pain and palpitations  Skin: Positive for wound  Objective:      Temp (!) 96 6 °F (35 9 °C) (Temporal)          Physical Exam  Constitutional:       General: He is not in acute distress  Appearance: He is ill-appearing  Eyes:      Pupils: Pupils are equal, round, and reactive to light  Cardiovascular:      Rate and Rhythm: Normal rate and regular rhythm  Pulmonary:      Effort: Pulmonary effort is normal       Breath sounds: Normal breath sounds  Abdominal:       Skin:     General: Skin is warm and dry

## 2023-01-10 NOTE — PATIENT INSTRUCTIONS
I would suggest using cocoa butter on the scar    Please return in one month to set up your colostomy reversal

## 2023-01-11 DIAGNOSIS — G47.00 INSOMNIA, UNSPECIFIED TYPE: ICD-10-CM

## 2023-01-11 DIAGNOSIS — R11.0 CHRONIC NAUSEA: ICD-10-CM

## 2023-01-11 RX ORDER — TRAZODONE HYDROCHLORIDE 300 MG/1
TABLET ORAL
Qty: 180 TABLET | Refills: 1 | Status: SHIPPED | OUTPATIENT
Start: 2023-01-11

## 2023-01-11 RX ORDER — PROCHLORPERAZINE MALEATE 5 MG/1
5 TABLET ORAL EVERY 6 HOURS PRN
Qty: 30 TABLET | Refills: 0 | Status: SHIPPED | OUTPATIENT
Start: 2023-01-11
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
## 2023-01-16 ENCOUNTER — TELEPHONE (OUTPATIENT)
Dept: CARDIOLOGY CLINIC | Facility: CLINIC | Age: 78
End: 2023-01-16

## 2023-01-16 ENCOUNTER — CLINICAL SUPPORT (OUTPATIENT)
Dept: CARDIOLOGY CLINIC | Facility: CLINIC | Age: 78
End: 2023-01-16

## 2023-01-16 DIAGNOSIS — I48.91 NEW ONSET ATRIAL FIBRILLATION (HCC): ICD-10-CM

## 2023-01-16 NOTE — TELEPHONE ENCOUNTER
Patient's blood pressure has been staying around 113/66 and 107/66 so he has been holding losartan for the past 5 days  Also his heart rate on his Kardia device is in the 50's consistently , he only took metoprolol one day over the past 5 days for a heart rate of 63-66  zio patch results are final as of today and sent to you for interpretation   Please advise

## 2023-01-18 ENCOUNTER — OFFICE VISIT (OUTPATIENT)
Dept: RHEUMATOLOGY | Facility: CLINIC | Age: 78
End: 2023-01-18

## 2023-01-18 VITALS
SYSTOLIC BLOOD PRESSURE: 130 MMHG | WEIGHT: 167 LBS | DIASTOLIC BLOOD PRESSURE: 78 MMHG | HEIGHT: 67 IN | BODY MASS INDEX: 26.21 KG/M2

## 2023-01-18 DIAGNOSIS — M05.9 RHEUMATOID ARTHRITIS WITH RHEUMATOID FACTOR, UNSPECIFIED (HCC): ICD-10-CM

## 2023-01-18 DIAGNOSIS — L21.9 SEBORRHEA: ICD-10-CM

## 2023-01-18 DIAGNOSIS — M19.90 ARTHRITIS: ICD-10-CM

## 2023-01-18 DIAGNOSIS — L40.9 PSORIASIS: ICD-10-CM

## 2023-01-18 DIAGNOSIS — M54.32 SCIATICA OF LEFT SIDE: ICD-10-CM

## 2023-01-18 DIAGNOSIS — K55.8 OTHER VASCULAR DISORDERS OF INTESTINE (HCC): ICD-10-CM

## 2023-01-18 DIAGNOSIS — L40.50 PSORIASIS WITH ARTHROPATHY (HCC): Primary | ICD-10-CM

## 2023-01-18 RX ORDER — GABAPENTIN 600 MG/1
600 TABLET ORAL 3 TIMES DAILY
Qty: 270 TABLET | Refills: 3 | Status: SHIPPED | OUTPATIENT
Start: 2023-01-18

## 2023-01-18 RX ORDER — FLUOCINONIDE TOPICAL SOLUTION USP, 0.05% 0.5 MG/ML
SOLUTION TOPICAL DAILY
Qty: 60 ML | Refills: 6 | Status: SHIPPED | OUTPATIENT
Start: 2023-01-18

## 2023-01-18 RX ORDER — BETAMETHASONE DIPROPIONATE 0.5 MG/G
CREAM TOPICAL 2 TIMES DAILY
Qty: 45 G | Refills: 6 | Status: SHIPPED | OUTPATIENT
Start: 2023-01-18

## 2023-01-18 NOTE — PATIENT INSTRUCTIONS
Do labs  Continue betamethasone and fluocinonide as needed for psoriasis  Continue gabapentin up to 3 times a day as needed for back pain  Use diclofenac gel as needed for joint pain  Will consider Felicie Stager, or sulfasalazine in the future    Return to clinic in 6 months    Psoriatic Arthritis in Adults    What is psoriatic arthritis? -- Psoriatic arthritis is a condition that causes joint pain, swelling, and stiffness  It happens in people who have a long-term skin condition called psoriasis  People with psoriasis have patches of thick, red skin that are often covered by silver or white scales  Doctors don't know what causes psoriasis or psoriatic arthritis  What are the symptoms of psoriatic arthritis? -- Psoriatic arthritis causes pain, stiffness, and swelling in the affected joints  It can also affect the spine in some people  Because of the joint and spine problems, people can have trouble moving their body  Stiffness in the joints or low back is usually worse in the morning and lasts 30 minutes or longer  It usually gets better with exercise  Psoriatic arthritis can affect joints on one or both sides of the body  It usually affects more than one joint  In addition to joint symptoms (and the skin symptoms of psoriasis), people sometimes have other symptoms  These can include:  ? Swelling of a finger or toe, or the hands or feet  ? Swelling and pain in the back of the ankle or in the heel   ? Nail symptoms - The nails can look "pitted," as if they were pricked by a pin  The nail can also come up off the nail bed  ? Eye pain or redness  Is there a test for psoriatic arthritis? -- Yes  Your doctor or nurse will ask about your symptoms and do an exam  He or she will order X-rays of your painful joints  He or she might order an imaging test called an MRI  Imaging tests create pictures of the inside of the body    To check that another condition isn't causing your symptoms, your doctor or nurse might also order:  ?Blood tests  ? Lab tests on a sample of fluid from a swollen joint - To get a sample of fluid, the doctor will put a thin needle in your joint  How is psoriatic arthritis treated? -- There is no cure for psoriatic arthritis, but different treatments can help ease and control symptoms  Treatment for joint symptoms usually involves one or more of the following:  ?Medicines called nonsteroidal antiinflammatory drugs, or "NSAIDs" for short - Examples of NSAIDs are aspirin, ibuprofen (sample brand names: Advil, Motrin), and naproxen (sample brand name: Aleve)  ? Medicines that are usually used to treat other types of arthritis - Some of these include methotrexate and leflunomide  ? Medicines that block a substance called tumor necrosis factor, or "TNF" for short - TNF plays a role in psoriasis and psoriatic arthritis  Medicines that block TNF are called "anti-TNF" medicines  Examples include etanercept (brand name: Enbrel) and adalimumab (brand name: Humira)  ?Other medicines - If the options above don't help, your doctor might suggest trying a different medicine  Examples include ustekinumab (brand name: Nkechi Taylor), secukinumab (brand name: Cosentyx), tofacitinib (brand name: Laurent Mcbride), abatacept (brand name: Richa Powers), and apremilast (brand name: Barbara Flood)  ? Shots of medicines called steroids that go into the painful joint - These are not the same as the steroids some athletes take illegally  These steroids help reduce swelling and pain  ? Heat - Heat, especially in the morning, can help reduce pain and stiffness  Do not use heat for longer than 20 minutes at a time  Also, do not use anything too hot that could burn your skin  ? Physical and occupational therapy - This involves learning exercises, movements, and ways of doing everyday tasks  ? Special shoe inserts (called "orthotics") - These can help keep your feet, ankles, and knees in the proper position    ?Treatment for psoriatic arthritis is usually long term  That's because even after symptoms get better, they sometimes return later on  Is there anything I can do on my own to feel better? -- Yes  It is very important that you stay active  You might want to avoid being active because you are in pain  But this can make things worse  It can make your muscles weak and your joints stiffer than they already are  Your doctor, nurse, or physical therapist can help you figure out which activities and exercises are right for you

## 2023-01-30 DIAGNOSIS — M48.061 SPINAL STENOSIS OF LUMBAR REGION, UNSPECIFIED WHETHER NEUROGENIC CLAUDICATION PRESENT: ICD-10-CM

## 2023-01-30 RX ORDER — OXYCODONE AND ACETAMINOPHEN 10; 325 MG/1; MG/1
1 TABLET ORAL EVERY 4 HOURS PRN
Qty: 120 TABLET | Refills: 0 | Status: SHIPPED | OUTPATIENT
Start: 2023-01-30

## 2023-02-01 DIAGNOSIS — M48.061 SPINAL STENOSIS OF LUMBAR REGION, UNSPECIFIED WHETHER NEUROGENIC CLAUDICATION PRESENT: ICD-10-CM

## 2023-02-01 RX ORDER — OXYCODONE HYDROCHLORIDE 10 MG/1
TABLET ORAL
Qty: 120 TABLET | Refills: 0 | Status: SHIPPED | OUTPATIENT
Start: 2023-02-01

## 2023-02-01 NOTE — ASSESSMENT & PLAN NOTE
Pt has been resched for [facility].    • New onset AF w/RVR as high as 225  • No previous history of AF  • Suspect this is 2/2 septic shock/peritonitis   • TTE (12/14) revealed EF 35% w/mild concentric hypertrophy, moderately reduced systolic function in global manner; mildly dilated RV w/moderately reduced systolic function; mild MR/TR, RVSP 48  • Initially refractory to IV Lopressor 5 mg x2, IV Cardizem 15 mg x1, IV Amiodarone bolus + gtt initiation  • Cardizem gtt weaned off on 12/15  • PO Lopressor  • Started on digoxin load on 12/16  • Cardiology input appreciated   • Continue with Toprol 50 mg twice daily, digoxin 125 mcg daily  • Heparin gtt transition to eliquis 5 mg BID (12/18)  Eliquis will cost $500  Discussed this with the patient and his wife   Will ask CM to assist tomorrow if there is any option to help lower cost

## 2023-02-08 ENCOUNTER — OFFICE VISIT (OUTPATIENT)
Dept: CARDIOLOGY CLINIC | Facility: CLINIC | Age: 78
End: 2023-02-08

## 2023-02-08 VITALS
WEIGHT: 157 LBS | DIASTOLIC BLOOD PRESSURE: 70 MMHG | HEART RATE: 70 BPM | BODY MASS INDEX: 24.64 KG/M2 | SYSTOLIC BLOOD PRESSURE: 110 MMHG | HEIGHT: 67 IN

## 2023-02-08 DIAGNOSIS — I42.9 CARDIOMYOPATHY, UNSPECIFIED TYPE (HCC): ICD-10-CM

## 2023-02-08 DIAGNOSIS — I48.91 NEW ONSET ATRIAL FIBRILLATION (HCC): ICD-10-CM

## 2023-02-08 DIAGNOSIS — Z95.3 STATUS POST AORTIC VALVE REPLACEMENT WITH BIOPROSTHETIC VALVE: ICD-10-CM

## 2023-02-08 DIAGNOSIS — I25.10 ATHEROSCLEROSIS OF NATIVE CORONARY ARTERY OF NATIVE HEART WITHOUT ANGINA PECTORIS: ICD-10-CM

## 2023-02-08 DIAGNOSIS — I10 BENIGN ESSENTIAL HYPERTENSION: ICD-10-CM

## 2023-02-08 DIAGNOSIS — E78.2 MIXED HYPERLIPIDEMIA: Primary | ICD-10-CM

## 2023-02-08 RX ORDER — METOPROLOL SUCCINATE 50 MG/1
50 TABLET, EXTENDED RELEASE ORAL DAILY
Qty: 30 TABLET | Refills: 5 | Status: SHIPPED | OUTPATIENT
Start: 2023-02-08 | End: 2023-08-07

## 2023-02-08 NOTE — PROGRESS NOTES
[facility]  Follow up note  Steven Ashley 78 y o  male MRN: [id number]        Problems    1  Mixed hyperlipidemia        2  Atherosclerosis of native coronary artery of native heart without angina pectoris        3  New onset atrial fibrillation (HCC)  rivaroxaban (Xarelto) 20 mg tablet    metoprolol succinate (TOPROL-XL) 50 mg 24 hr tablet      4  Cardiomyopathy, unspecified type ([address] )  Echo follow up/limited w/ contrast if indicated    metoprolol succinate (TOPROL-XL) 50 mg 24 hr tablet      5  Benign essential hypertension        6  Status post aortic valve replacement with bioprosthetic valve            Impression:    Suspected tachycardia mediated cardiomyopathy  LVEF 35% discovered at the time of hospitalization 12/14/2022  Likely due to A  Fib/RVR/sepsis  No decompensated CHF, has not required diuretic therapy  He is on losartan, metoprolol  I do anticipate recovery of heart function    Atrial fibrillation  Significant A  fib with RVR upon presentation of appendicitis/peritonitis  Rate control strategy was implemented  A Xarelto implemented  Zio patch placed at my last visit, within days he had converted to  normal sinus rhythm, and has not had any obvious recurrence of A-fib, using Kardia device at home, no high rate episodes, occasional A-fib possible alarms probably related to PACs which are noted on exam today    Bioprosthetic AVR  Normal functioning by echo 12/22    Hyperlipidemia  Controlled    Hypertension  Blood pressure is controlled    CAD  Nonobstructive at time of AVR    Plan: Will obtain a repeat echo at the end of the month  If EF returns to normal, we will have no issue with clearing him to have reversal of his ostomy  If EF does not recover, may need to consider ischemic testing prior to repeat surgery to rule out CAD as an underlying cause, although minimal nonobstructive disease was noted back at the time of his AVR    Eventual repeat Zio patch and review of his Kardia device at next visit to discuss discontinuation of Xarelto if there is no A-fib recurrence  3-month follow-up      HPI:   Valentino Dyke is a 78y o  year old male  with rheumatoid arthritis, bioprosthetic AVR for severe aortic stenosis, hypertension, hyperlipidemia, peritonitis/sepsis/A-fib with RVR resulting in likely tachycardia mediated cardiomyopathy 12/22, status post ex lap, currently with ostomy, slowly recovering, who is persistent rate controlled atrial fibrillation converted to normal sinus rhythm on a rate control strategy in December, confirmed during Zio patch x2 weeks  He is using Kardia device, has not had any obvious recurrence of atrial fibrillation  Heart rates almost persistently in the 60s  His rate control strategy was dramatically reduced from Cardizem, digoxin, high-dose metoprolol down to just Toprol succinate 50 mg a day  He continues on Xarelto  He does not have a prior history of atrial fibrillation  Blood pressures are well controlled  He is slowly recovering, improving his strength, still not ready to have his ostomy reversed, sees his surgeon next week to discuss further  Review of Systems   Constitutional: Negative for appetite change, diaphoresis, fatigue and fever  Respiratory: Negative for chest tightness, shortness of breath and wheezing  Cardiovascular: Negative for chest pain, palpitations and leg swelling  Gastrointestinal: Negative for abdominal pain and blood in stool  Musculoskeletal: Positive for arthralgias and gait problem  Negative for joint swelling  Skin: Negative for rash  Neurological: Positive for weakness  Negative for dizziness, syncope and light-headedness           Past Medical History:   Diagnosis Date   • Aortic stenosis    • Aortic stenosis, severe    • Arthritis    • Carpal tunnel syndrome    • Chronic back pain    • Chronic pain    • Chronic, continuous use of opioids    • Hyperlipidemia    • Hypertension    • Hypertrophy of prostate    • Insomnia    • Large bowel obstruction (HCC)    • Peripheral neuropathy    • RA (rheumatoid arthritis) (HCC)    • Umbilical hernia      Social History     Substance and Sexual Activity   Alcohol Use Not Currently     Social History     Substance and Sexual Activity   Drug Use No     Social History     Tobacco Use   Smoking Status Never   Smokeless Tobacco Never       Allergies: Allergies   Allergen Reactions   • Penicillins Anaphylaxis   • Quinine Derivatives Other (See Comments)     High fever       Medications:     Current Outpatient Medications:   •  atorvastatin (LIPITOR) 20 mg tablet, take 1 tablet by mouth once daily, Disp: 90 tablet, Rfl: 1  •  betamethasone dipropionate (DIPROSONE) 0 05 % cream, Apply topically 2 (two) times a day, Disp: 45 g, Rfl: 6  •  carisoprodol (SOMA) 350 mg tablet, Take 1 tablet (350 mg total) by mouth 4 (four) times a day, Disp: 360 tablet, Rfl: 1  •  Cholecalciferol (VITAMIN D3) 1000 units CAPS, Take 1 tablet by mouth daily, Disp: , Rfl:   •  diclofenac sodium (VOLTAREN) 1 %, Apply 2 g topically 4 (four) times a day, Disp: 5 Tube, Rfl: 3  •  fluocinonide (LIDEX) 0 05 % external solution, Apply topically daily, Disp: 60 mL, Rfl: 6  •  gabapentin (NEURONTIN) 600 MG tablet, Take 1 tablet (600 mg total) by mouth 3 (three) times a day, Disp: 270 tablet, Rfl: 3  •  hydrOXYzine HCL (ATARAX) 25 mg tablet, Take 1 tablet (25 mg total) by mouth every 6 (six) hours as needed for anxiety, Disp: 15 tablet, Rfl: 1  •  ketoconazole (NIZORAL) 2 % cream, Apply topically 2 (two) times a day, Disp: 30 g, Rfl: 1  •  losartan (COZAAR) 50 mg tablet, TAKE 1 TABLET BY MOUTH EVERY DAY, Disp: 90 tablet, Rfl: 3  •  metoprolol succinate (TOPROL-XL) 50 mg 24 hr tablet, Take 1 tablet (50 mg total) by mouth daily, Disp: 30 tablet, Rfl: 5  •  naloxone (NARCAN) 4 mg/0 1 mL nasal spray, Administer 1 spray into a nostril  If no response after 2-3 minutes, give another dose in the other nostril using a new spray  , Disp: 1 each, Rfl: 1  •  omeprazole (PriLOSEC) 20 mg delayed release capsule, Take 1 capsule (20 mg total) by mouth daily, Disp: 30 capsule, Rfl: 2  •  ondansetron (ZOFRAN) 4 mg tablet, Take 1 tablet (4 mg total) by mouth every 8 (eight) hours as needed for nausea or vomiting, Disp: 20 tablet, Rfl: 0  •  oxyCODONE (ROXICODONE) 10 MG TABS, Take 1 tablet 4 times daily, Disp: 120 tablet, Rfl: 0  •  oxyCODONE-acetaminophen (PERCOCET)  mg per tablet, Take 1 tablet by mouth every 4 (four) hours as needed for moderate pain Max Daily Amount: 6 tablets, Disp: 120 tablet, Rfl: 0  •  prochlorperazine (COMPAZINE) 5 mg tablet, Take 1 tablet (5 mg total) by mouth every 6 (six) hours as needed for nausea or vomiting, Disp: 30 tablet, Rfl: 0  •  rivaroxaban (Xarelto) 20 mg tablet, Take 1 tablet (20 mg total) by mouth daily after dinner, Disp: 30 tablet, Rfl: 3  •  traZODone (DESYREL) 300 MG tablet, 2 tablets at bedtime, Disp: 180 tablet, Rfl: 1  •  Diclofenac Sodium (VOLTAREN) 1 %, Apply 2 g topically 4 (four) times a day, Disp: 100 g, Rfl: 6  •  ondansetron (ZOFRAN) 8 mg tablet, Take 1 tablet (8 mg total) by mouth every 8 (eight) hours as needed for nausea or vomiting, Disp: 60 tablet, Rfl: 1      Vitals:    02/08/23 1007   BP: 110/70   Pulse: 70     Weight (last 2 days)     Date/Time Weight    02/08/23 1007 71 2 (157)        Physical Exam  Constitutional:       General: He is not in acute distress  Appearance: He is not diaphoretic  HENT:      Head: Normocephalic and atraumatic  Eyes:      General: No scleral icterus  Conjunctiva/sclera: Conjunctivae normal    Neck:      Vascular: No JVD  Cardiovascular:      Rate and Rhythm: Normal rate and regular rhythm  Heart sounds: Normal heart sounds  No murmur heard  Pulmonary:      Effort: Pulmonary effort is normal  No respiratory distress  Breath sounds: Normal breath sounds  No decreased breath sounds, wheezing, rhonchi or rales     Musculoskeletal: Cervical back: Normal range of motion  Right lower leg: Normal  No edema  Left lower leg: Normal  No edema  Skin:     General: Skin is warm and dry  Neurological:      Mental Status: He is alert and oriented to person, place, and time  Laboratory Studies:  Lab Results   Component Value Date    HGBA1C 6 0 (H) 03/30/2022    HGBA1C 5 7 (H) 10/15/2020    HGBA1C 5 9 10/10/2017    K 4 3 12/22/2022    K 4 0 12/20/2022    K 3 9 12/19/2022    CL 99 12/22/2022    CL 99 12/20/2022     12/19/2022    CO2 29 12/22/2022    CO2 31 12/20/2022    CO2 27 12/19/2022    CO2 27 10/17/2017    CO2 27 10/17/2017    CO2 28 10/17/2017    GLUCOSE 125 10/17/2017    GLUCOSE 133 10/17/2017    GLUCOSE 163 (H) 10/17/2017    CREATININE 0 92 12/22/2022    CREATININE 0 94 12/20/2022    CREATININE 0 89 12/19/2022    BUN 10 12/22/2022    BUN 10 12/20/2022    BUN 11 12/19/2022    MG 2 0 12/20/2022    MG 1 8 (L) 12/19/2022    MG 1 8 (L) 12/18/2022    PHOS 2 5 12/18/2022    PHOS 2 8 12/17/2022    PHOS 2 9 12/17/2022     Lab Results   Component Value Date    WBC 13 82 (H) 12/23/2022    RBC 4 03 12/23/2022    HGB 11 4 (L) 12/23/2022    HCT 36 2 (L) 12/23/2022    MCV 90 12/23/2022    MCH 28 3 12/23/2022    RDW 13 6 12/23/2022     (H) 12/23/2022     Lipid Profile:   No results found for: CHOL  Lab Results   Component Value Date    HDL 46 03/30/2022    HDL 52 09/20/2018    HDL 50 09/29/2017     Lab Results   Component Value Date    LDLCALC 86 03/30/2022    LDLCALC 88 09/20/2018    LDLCALC 78 09/29/2017     Lab Results   Component Value Date    TRIG 95 03/30/2022    TRIG 122 09/20/2018    TRIG 84 09/29/2017         Cardiac testing:     EKG reviewed personally:     No results found for this visit on 02/08/23      Echocardiogram  12/14/2022-personally reviewed-EF 35%, global hypokinesis  12/19/2022-personally reviewed-EF 35%, global hypokinesis      Rey Damon MD    Portions of the record may have been created with voice recognition software   Occasional wrong word or "sound a like" substitutions may have occurred due to the inherent limitations of voice recognition software   Read the chart carefully and recognize, using context, where substitutions have occurred

## 2023-02-14 ENCOUNTER — OFFICE VISIT (OUTPATIENT)
Dept: SURGERY | Facility: CLINIC | Age: 78
End: 2023-02-14

## 2023-02-14 VITALS
RESPIRATION RATE: 18 BRPM | TEMPERATURE: 98.2 F | SYSTOLIC BLOOD PRESSURE: 110 MMHG | BODY MASS INDEX: 25.22 KG/M2 | WEIGHT: 161 LBS | HEART RATE: 66 BPM | OXYGEN SATURATION: 98 % | DIASTOLIC BLOOD PRESSURE: 60 MMHG

## 2023-02-14 DIAGNOSIS — K57.20 DIVERTICULITIS OF LARGE INTESTINE WITH PERFORATION AND ABSCESS WITHOUT BLEEDING: Primary | ICD-10-CM

## 2023-02-14 DIAGNOSIS — K57.32 DIVERTICULITIS OF COLON: ICD-10-CM

## 2023-02-14 PROBLEM — K56.609 SBO (SMALL BOWEL OBSTRUCTION) (HCC): Status: RESOLVED | Noted: 2022-08-20 | Resolved: 2023-02-14

## 2023-02-14 RX ORDER — SODIUM CHLORIDE, SODIUM LACTATE, POTASSIUM CHLORIDE, CALCIUM CHLORIDE 600; 310; 30; 20 MG/100ML; MG/100ML; MG/100ML; MG/100ML
125 INJECTION, SOLUTION INTRAVENOUS CONTINUOUS
OUTPATIENT
Start: 2023-02-14

## 2023-02-14 RX ORDER — LEVOFLOXACIN 5 MG/ML
500 INJECTION, SOLUTION INTRAVENOUS ONCE
OUTPATIENT
Start: 2023-02-14 | End: 2023-02-14

## 2023-02-14 NOTE — ASSESSMENT & PLAN NOTE
The patient is now 6 months out from his original Polo's procedure  His cardiologist is ordering an echo, and monitoring regarding his atrial fibrillation, and I am hopeful that he will be able to be cleared for reversal of his Polo's procedure on April 14  He will return after he receives cardiac clearance and will be consented for reversal of the Vane's procedure, and given preoperative instructions for mechanical and antibiotic bowel prep

## 2023-02-14 NOTE — PATIENT INSTRUCTIONS
I have you scheduled for Reversal of your colostomy on Friday April 14th  You will need Cardiac clearance prior to surgery    Please return after you are cleared by Cardiology to receive prep information and to sign the consent form      You can stop the Prilosec and take as needed

## 2023-02-14 NOTE — PROGRESS NOTES
Assessment/Plan:    Diverticulitis of colon  The patient is now 6 months out from his original Polo's procedure  His cardiologist is ordering an echo, and monitoring regarding his atrial fibrillation, and I am hopeful that he will be able to be cleared for reversal of his Polo's procedure on April 14  He will return after he receives cardiac clearance and will be consented for reversal of the Vane's procedure, and given preoperative instructions for mechanical and antibiotic bowel prep  Diagnoses and all orders for this visit:    Diverticulitis of large intestine with perforation and abscess without bleeding  -     Case request operating room: Flexible Sigmoidoscopy and  Reversal of Vane's Procedure; Standing  -     Case request operating room: Flexible Sigmoidoscopy and  Reversal of Vane's Procedure    Diverticulitis of colon    Other orders  -     Diet NPO; Sips with meds; Standing  -     Apply Sequential Compression Device; Standing  -     Place sequential compression device; Standing  -     lactated ringers infusion  -     levofloxacin (LEVAQUIN) IVPB (premix in dextrose) 500 mg 100 mL  -     metroNIDAZOLE (FLAGYL) (HIGH DOSE) IVPB 500 mg          Subjective:      Patient ID: Gilberto Gutierrez is a 78 y o  male  The patient is a 45-year-old white male who is 6 months out from Polo's procedure and segmental small bowel resection for perforated diverticulitis with abscess and bowel obstruction, and 2 months out from laparotomy with drainage of right lower quadrant abscess and lysis of adhesions for a new right lower quadrant abscess with peritonitis  The patient's hospitalization was complicated by atrial fibrillation, and he was subsequently discharged on oral anticoagulant      The patient returns to schedule reversal of his Polo's procedure, he states that his cardiologist believes that the atrial fibrillation has resolved, and that pending preoperative testing reversal of his colostomy should be feasible by April  The following portions of the patient's history were reviewed and updated as appropriate: allergies, current medications, past family history, past medical history, past social history, past surgical history and problem list     Review of Systems   Constitutional: Negative for chills and fever  Respiratory: Negative for cough and shortness of breath  Cardiovascular: Negative for palpitations  Gastrointestinal: Negative for abdominal distention  Genitourinary: Negative for difficulty urinating  Skin: Negative for wound  Neurological: Negative  Psychiatric/Behavioral: The patient is not nervous/anxious  Objective:      /60 (BP Location: Left arm, Patient Position: Sitting, Cuff Size: Standard)   Pulse 66   Temp 98 2 °F (36 8 °C) (Temporal)   Resp 18   Wt 73 kg (161 lb)   SpO2 98%   BMI 25 22 kg/m²          Physical Exam  Constitutional:       Appearance: Normal appearance  HENT:      Head: Normocephalic and atraumatic  Eyes:      Pupils: Pupils are equal, round, and reactive to light  Cardiovascular:      Rate and Rhythm: Normal rate and regular rhythm  Pulmonary:      Effort: Pulmonary effort is normal       Breath sounds: Normal breath sounds  Abdominal:       Genitourinary:     Comments: Scars from hernia repairs in distant past  Skin:     General: Skin is warm and dry     Psychiatric:         Mood and Affect: Mood normal          Behavior: Behavior normal

## 2023-03-02 DIAGNOSIS — M48.061 SPINAL STENOSIS OF LUMBAR REGION, UNSPECIFIED WHETHER NEUROGENIC CLAUDICATION PRESENT: ICD-10-CM

## 2023-03-02 RX ORDER — OXYCODONE AND ACETAMINOPHEN 10; 325 MG/1; MG/1
1 TABLET ORAL EVERY 4 HOURS PRN
Qty: 120 TABLET | Refills: 0 | Status: SHIPPED | OUTPATIENT
Start: 2023-03-02

## 2023-03-06 ENCOUNTER — HOSPITAL ENCOUNTER (OUTPATIENT)
Dept: NON INVASIVE DIAGNOSTICS | Facility: HOSPITAL | Age: 78
Discharge: HOME | End: 2023-03-06
Attending: INTERNAL MEDICINE

## 2023-03-06 VITALS
HEIGHT: 67 IN | DIASTOLIC BLOOD PRESSURE: 70 MMHG | BODY MASS INDEX: 25.27 KG/M2 | WEIGHT: 161 LBS | HEART RATE: 80 BPM | SYSTOLIC BLOOD PRESSURE: 110 MMHG

## 2023-03-06 DIAGNOSIS — I42.9 CARDIOMYOPATHY, UNSPECIFIED TYPE (HCC): ICD-10-CM

## 2023-03-06 DIAGNOSIS — G89.29 CHRONIC BACK PAIN, UNSPECIFIED BACK LOCATION, UNSPECIFIED BACK PAIN LATERALITY: ICD-10-CM

## 2023-03-06 DIAGNOSIS — M54.9 CHRONIC BACK PAIN, UNSPECIFIED BACK LOCATION, UNSPECIFIED BACK PAIN LATERALITY: ICD-10-CM

## 2023-03-06 LAB
AORTIC ROOT: 2.8 CM
FRACTIONAL SHORTENING: 27 % (ref 28–44)
INTERVENTRICULAR SEPTUM IN DIASTOLE (PARASTERNAL SHORT AXIS VIEW): 1.5 CM
INTERVENTRICULAR SEPTUM: 1.5 CM (ref 0.6–1.1)
LEFT ATRIUM AREA SYSTOLE SINGLE PLANE A4C: 17.4 CM2
LEFT ATRIUM SIZE: 4 CM
LEFT INTERNAL DIMENSION IN SYSTOLE: 3.6 CM (ref 2.1–4)
LEFT VENTRICLE DIASTOLIC VOLUME (MOD BIPLANE): 125 ML
LEFT VENTRICLE SYSTOLIC VOLUME (MOD BIPLANE): 66 ML
LEFT VENTRICULAR INTERNAL DIMENSION IN DIASTOLE: 4.9 CM (ref 3.5–6)
LEFT VENTRICULAR POSTERIOR WALL IN END DIASTOLE: 1.3 CM
LEFT VENTRICULAR STROKE VOLUME: 59 ML
LV EF: 47 %
LVSV (TEICH): 59 ML
RIGHT ATRIUM AREA SYSTOLE A4C: 24.4 CM2
RIGHT VENTRICLE ID DIMENSION: 3.7 CM
SL CV LV EF: 50
SL CV PED ECHO LEFT VENTRICLE DIASTOLIC VOLUME (MOD BIPLANE) 2D: 112 ML
SL CV PED ECHO LEFT VENTRICLE SYSTOLIC VOLUME (MOD BIPLANE) 2D: 54 ML
TR MAX PG: 38 MMHG
TR PEAK VELOCITY: 3.1 M/S
TRICUSPID VALVE PEAK REGURGITATION VELOCITY: 3.09 M/S

## 2023-03-06 RX ORDER — CARISOPRODOL 350 MG/1
350 TABLET ORAL 4 TIMES DAILY
Qty: 360 TABLET | Refills: 1 | Status: SHIPPED | OUTPATIENT
Start: 2023-03-06

## 2023-03-09 DIAGNOSIS — M48.061 SPINAL STENOSIS OF LUMBAR REGION, UNSPECIFIED WHETHER NEUROGENIC CLAUDICATION PRESENT: ICD-10-CM

## 2023-03-09 RX ORDER — OXYCODONE HYDROCHLORIDE 10 MG/1
TABLET ORAL
Qty: 120 TABLET | Refills: 0 | Status: SHIPPED | OUTPATIENT
Start: 2023-03-09

## 2023-03-13 ENCOUNTER — TELEPHONE (OUTPATIENT)
Dept: CARDIOLOGY CLINIC | Facility: CLINIC | Age: 78
End: 2023-03-13

## 2023-03-13 NOTE — TELEPHONE ENCOUNTER
Patient's wife called, he completed an echo on 3/6, now she would like to know if you want to order a zio patch or see him before his reversal of colostomy procedure on 4/14  At home he has mostly been in NSR on his Kardia device  This past Saturday he was bradycardic at 49 bpm  Hr was up to 58 by the evening  Usually his hr is 53-55  He is taking metprolol 50mg daily  His bp has been good  It was 99/60 last week so he held his losartan  If its <946 systolic he holds losartan  This morning bp was 108/59 hr 50  He denies feeling dizzy lightheaded or weak but also is not exerting himself much  Please advise

## 2023-03-14 RX ORDER — LOSARTAN POTASSIUM 25 MG/1
25 TABLET ORAL DAILY
COMMUNITY

## 2023-03-14 NOTE — TELEPHONE ENCOUNTER
Please have him cut the losartan down to 25 mg daily, can continue to withhold if blood pressure less than 515 systolic  No Zio patch for now, but I am definitely willing to clear him for his surgery  The surgery itself will be another potential stress which could cause A-fib recurrence and would really make doing a Zio patch pointless at this time    I would wait 3 months after recovering from surgery before next Zio patch

## 2023-03-16 ENCOUNTER — OFFICE VISIT (OUTPATIENT)
Dept: FAMILY MEDICINE CLINIC | Facility: CLINIC | Age: 78
End: 2023-03-16

## 2023-03-16 VITALS
TEMPERATURE: 96.1 F | WEIGHT: 157.4 LBS | SYSTOLIC BLOOD PRESSURE: 120 MMHG | RESPIRATION RATE: 18 BRPM | BODY MASS INDEX: 24.71 KG/M2 | DIASTOLIC BLOOD PRESSURE: 72 MMHG | OXYGEN SATURATION: 98 % | HEART RATE: 54 BPM | HEIGHT: 67 IN

## 2023-03-16 DIAGNOSIS — F11.20 CONTINUOUS OPIOID DEPENDENCE (HCC): ICD-10-CM

## 2023-03-16 DIAGNOSIS — I48.91 ATRIAL FIBRILLATION, UNSPECIFIED TYPE (HCC): ICD-10-CM

## 2023-03-16 DIAGNOSIS — I10 PRIMARY HYPERTENSION: Primary | ICD-10-CM

## 2023-03-16 DIAGNOSIS — G47.00 INSOMNIA, UNSPECIFIED TYPE: ICD-10-CM

## 2023-03-16 DIAGNOSIS — Z93.3 COLOSTOMY IN PLACE (HCC): ICD-10-CM

## 2023-03-16 RX ORDER — METOPROLOL SUCCINATE 25 MG/1
25 TABLET, EXTENDED RELEASE ORAL DAILY
Qty: 90 TABLET | Refills: 1 | Status: SHIPPED | OUTPATIENT
Start: 2023-03-16

## 2023-03-16 RX ORDER — HYDROXYZINE HYDROCHLORIDE 25 MG/1
25 TABLET, FILM COATED ORAL EVERY 6 HOURS PRN
Qty: 30 TABLET | Refills: 1 | Status: SHIPPED | OUTPATIENT
Start: 2023-03-16 | End: 2023-04-15

## 2023-03-16 NOTE — PROGRESS NOTES
Assessment/Plan:     Diagnoses and all orders for this visit:    Primary hypertension  -     metoprolol succinate (TOPROL-XL) 25 mg 24 hr tablet; Take 1 tablet (25 mg total) by mouth daily    Colostomy in place [facility])    Continuous opioid dependence (HCC)    Insomnia, unspecified type  -     hydrOXYzine HCL (ATARAX) 25 mg tablet; Take 1 tablet (25 mg total) by mouth every 6 (six) hours as needed for anxiety    Atrial fibrillation, unspecified type (HCC)        Will decrease metoprolol to 25 mg due to slow heart rate  Patient will follow-up with his surgeon  His pain medicine and chronic pain issues are stable  We will refill his hydroxyzine  Patient will follow-up with cardiology  Follow-up with me in 6 to 8 weeks after his surgery    Subjective:     Chief Complaint   Patient presents with   • Follow-up     Check up hypertension        Patient ID: Juan Reynolds is a 78 y o  male  Patient presents today for 3-month check of chronic additions  He is scheduled for having a his colostomy revised in a month  Otherwise he is doing well he is getting much stronger  He is still following with his specialist  He has no other acute complaints today      The following portions of the patient's history were reviewed and updated as appropriate: allergies, current medications, past family history, past medical history, past social history, past surgical history and problem list     Review of Systems   Constitutional: Negative  HENT: Negative  Eyes: Negative  Respiratory: Negative  Cardiovascular: Negative  Gastrointestinal: Negative  Endocrine: Negative  Genitourinary: Negative  Musculoskeletal: Negative  Skin: Negative  Allergic/Immunologic: Negative  Neurological: Negative  Hematological: Negative  Psychiatric/Behavioral: Negative  All other systems reviewed and are negative          Objective:    Vitals:    03/16/23 1003   BP: 120/72   BP Location: Left arm   Patient Position: Sitting   Cuff Size: Large   Pulse: (!) 54   Resp: 18   Temp: (!) 96 1 °F (35 6 °C)   TempSrc: Tympanic   SpO2: 98%   Weight: 71 4 kg (157 lb 6 4 oz)   Height: 5' 7" (1 702 m)          Physical Exam  Vitals and nursing note reviewed  Constitutional:       General: He is not in acute distress  Appearance: He is well-developed  HENT:      Head: Normocephalic  Right Ear: External ear normal       Left Ear: External ear normal       Nose: Nose normal    Eyes:      General:         Right eye: No discharge  Left eye: No discharge  Conjunctiva/sclera: Conjunctivae normal       Pupils: Pupils are equal, round, and reactive to light  Cardiovascular:      Rate and Rhythm: Normal rate and regular rhythm  Heart sounds: Normal heart sounds  Pulmonary:      Effort: Pulmonary effort is normal       Breath sounds: Normal breath sounds  Abdominal:      General: Bowel sounds are normal  There is no distension  Palpations: Abdomen is soft  Tenderness: There is no abdominal tenderness  Musculoskeletal:         General: Normal range of motion  Cervical back: Normal range of motion  Skin:     General: Skin is warm and dry  Findings: No rash  Neurological:      Mental Status: He is alert and oriented to person, place, and time  Cranial Nerves: No cranial nerve deficit  Psychiatric:         Behavior: Behavior normal          Thought Content:  Thought content normal          Judgment: Judgment normal

## 2023-04-03 ENCOUNTER — PREP FOR PROCEDURE (OUTPATIENT)
Dept: INTERVENTIONAL RADIOLOGY/VASCULAR | Facility: HOSPITAL | Age: 78
End: 2023-04-03

## 2023-04-03 DIAGNOSIS — Z87.898 HISTORY OF DIFFICULT VENOUS ACCESS: Primary | ICD-10-CM

## 2023-04-04 DIAGNOSIS — M48.061 SPINAL STENOSIS OF LUMBAR REGION, UNSPECIFIED WHETHER NEUROGENIC CLAUDICATION PRESENT: ICD-10-CM

## 2023-04-04 RX ORDER — OXYCODONE AND ACETAMINOPHEN 10; 325 MG/1; MG/1
1 TABLET ORAL EVERY 4 HOURS PRN
Qty: 120 TABLET | Refills: 0 | Status: SHIPPED | OUTPATIENT
Start: 2023-04-04

## 2023-04-06 ENCOUNTER — TELEPHONE (OUTPATIENT)
Dept: CARDIOLOGY CLINIC | Facility: CLINIC | Age: 78
End: 2023-04-06

## 2023-04-06 NOTE — TELEPHONE ENCOUNTER
José Soto, his wife  And if Nelly or whatever, whichever nurse is on today, could you please give me a call back? He's going in for surgery on the April 14th  And Doctor Danny's office just called and said that they didn't see anything in the chart saying that Doctor Bubba Dahl was clearing him for the surgery  So if he could either put something in the chart or, you know, or you could give me a call back  OK  My number is [phone]  Thank you so much   Bye, bye

## 2023-04-15 PROBLEM — Z98.890 STATUS POST COLOSTOMY TAKEDOWN: Status: ACTIVE | Noted: 2023-04-15

## 2023-04-24 ENCOUNTER — OFFICE VISIT (OUTPATIENT)
Dept: SURGERY | Facility: CLINIC | Age: 78
End: 2023-04-24

## 2023-04-24 ENCOUNTER — APPOINTMENT (OUTPATIENT)
Dept: RADIOLOGY | Facility: CLINIC | Age: 78
End: 2023-04-24

## 2023-04-24 ENCOUNTER — APPOINTMENT (OUTPATIENT)
Dept: LAB | Facility: CLINIC | Age: 78
End: 2023-04-24

## 2023-04-24 VITALS — TEMPERATURE: 96.5 F

## 2023-04-24 DIAGNOSIS — K57.32 DIVERTICULITIS OF COLON: ICD-10-CM

## 2023-04-24 DIAGNOSIS — K57.32 DIVERTICULITIS OF COLON: Primary | ICD-10-CM

## 2023-04-24 LAB
ANION GAP SERPL CALCULATED.3IONS-SCNC: 7 MMOL/L (ref 4–13)
BASOPHILS # BLD AUTO: 0.04 THOUSANDS/ΜL (ref 0–0.1)
BASOPHILS NFR BLD AUTO: 1 % (ref 0–1)
BUN SERPL-MCNC: 10 MG/DL (ref 5–25)
CALCIUM SERPL-MCNC: 9.6 MG/DL (ref 8.3–10.1)
CHLORIDE SERPL-SCNC: 93 MMOL/L (ref 96–108)
CO2 SERPL-SCNC: 26 MMOL/L (ref 21–32)
CREAT SERPL-MCNC: 0.95 MG/DL (ref 0.6–1.3)
EOSINOPHIL # BLD AUTO: 0.13 THOUSAND/ΜL (ref 0–0.61)
EOSINOPHIL NFR BLD AUTO: 2 % (ref 0–6)
ERYTHROCYTE [DISTWIDTH] IN BLOOD BY AUTOMATED COUNT: 12.5 % (ref 11.6–15.1)
GFR SERPL CREATININE-BSD FRML MDRD: 77 ML/MIN/1.73SQ M
GLUCOSE P FAST SERPL-MCNC: 110 MG/DL (ref 65–99)
HCT VFR BLD AUTO: 36.2 % (ref 36.5–49.3)
HGB BLD-MCNC: 11.4 G/DL (ref 12–17)
IMM GRANULOCYTES # BLD AUTO: 0.02 THOUSAND/UL (ref 0–0.2)
IMM GRANULOCYTES NFR BLD AUTO: 0 % (ref 0–2)
LYMPHOCYTES # BLD AUTO: 1.95 THOUSANDS/ΜL (ref 0.6–4.47)
LYMPHOCYTES NFR BLD AUTO: 24 % (ref 14–44)
MAGNESIUM SERPL-MCNC: 2.1 MG/DL (ref 1.6–2.6)
MCH RBC QN AUTO: 27.3 PG (ref 26.8–34.3)
MCHC RBC AUTO-ENTMCNC: 31.5 G/DL (ref 31.4–37.4)
MCV RBC AUTO: 87 FL (ref 82–98)
MONOCYTES # BLD AUTO: 0.61 THOUSAND/ΜL (ref 0.17–1.22)
MONOCYTES NFR BLD AUTO: 8 % (ref 4–12)
NEUTROPHILS # BLD AUTO: 5.24 THOUSANDS/ΜL (ref 1.85–7.62)
NEUTS SEG NFR BLD AUTO: 65 % (ref 43–75)
NRBC BLD AUTO-RTO: 0 /100 WBCS
PLATELET # BLD AUTO: 365 THOUSANDS/UL (ref 149–390)
PMV BLD AUTO: 9.1 FL (ref 8.9–12.7)
POTASSIUM SERPL-SCNC: 4.3 MMOL/L (ref 3.5–5.3)
RBC # BLD AUTO: 4.18 MILLION/UL (ref 3.88–5.62)
SODIUM SERPL-SCNC: 126 MMOL/L (ref 135–147)
WBC # BLD AUTO: 7.99 THOUSAND/UL (ref 4.31–10.16)

## 2023-04-24 NOTE — PROGRESS NOTES
Assessment/Plan:    Diverticulitis of colon  POD 10 from reversal of Vane's procedure  Developing post op constipation despite 3 doses of MirLax  To check KUB, CBC, BMP and Mg  Continue to dress colostomy site with gauze after showering  Follow up May 4th for wound check  Diagnoses and all orders for this visit:    Diverticulitis of colon  -     XR abdomen 1 view kub; Future  -     CBC and differential; Future  -     Magnesium; Future  -     Basic metabolic panel; Future          Subjective:      Patient ID: Sheryle Ready is a 78 y o  male  The patient presents in follow up POD 10 from flex sig followed by resection and closure of Sigmoid colostomy  The patient complains of incisional pain, particularly at the colostomy closure site, as well as continued bloody drainage  The dressing have drainage consistent with old hematoma fluid  His abdomen is soft, with normal bowel sounds, his midline wound is healing uneventfully  He is passing copious amounts of flatus, but hasn't had an effective BM in 7 days  A subsequent KUB reveals gas and stool in the rectum, with large fecal burden of the right colon  His WBC is normal, Hgb is rebounding since surgery, electrolytes were ordered but have not yet returned  Sutures were removed from the midline wound and steri-strips applied  The colostomy closure incision (also a peristomal hernia repair) was anesthestized with 0 25% marcaine using betadine prep and aseptic technique, old blood was irrigated away with wound cleanser, and it was redressed with moist saline gauze  The patient was given instructions to take Mineral oil + MOM tonight, and if no BM by the AM to take a colonic lavage of MirLax / Gonzales Cami  He will follow up  Thursday May 4th in [place] for a wound check        The following portions of the patient's history were reviewed and updated as appropriate: allergies, current medications, past family history, past medical history, past social history, past surgical history and problem list     Review of Systems   Constitutional: Negative for chills and fever  Respiratory: Negative for cough and shortness of breath  Gastrointestinal: Positive for abdominal pain and constipation  Negative for anal bleeding, nausea and vomiting  Musculoskeletal: Positive for back pain  Skin: Positive for wound  Objective:      Temp (!) 96 5 °F (35 8 °C) (Temporal)          Physical Exam  Abdominal:      General: Bowel sounds are normal       Palpations: Abdomen is soft  Skin:     General: Skin is warm and dry

## 2023-04-24 NOTE — ASSESSMENT & PLAN NOTE
POD 10 from reversal of Vane's procedure  Developing post op constipation despite 3 doses of MirLax  To check KUB, CBC, BMP and Mg  Continue to dress colostomy site with gauze after showering  Follow up May 4th for wound check

## 2023-04-24 NOTE — PATIENT INSTRUCTIONS
Please have KUB and labs done today, I will call with results  Take 30 cc of Mineral Oil and 30 cc of Milk of Magnesia today  I will call with the KUB reading before giving you instructions on taking further doses of MiraLax    As far as the colostomy site, there is a small hematoma which will continue to produce some bloody drainage, wash the wound with soap and water, irrigate with wound cleanser, and keep the tip of a 4 x 4 gauze in the wound  Follow up Thursday May 4th in [place]

## 2023-04-27 ENCOUNTER — OFFICE VISIT (OUTPATIENT)
Dept: FAMILY MEDICINE CLINIC | Facility: CLINIC | Age: 78
End: 2023-04-27

## 2023-04-27 VITALS
BODY MASS INDEX: 24.52 KG/M2 | HEIGHT: 67 IN | HEART RATE: 57 BPM | DIASTOLIC BLOOD PRESSURE: 62 MMHG | OXYGEN SATURATION: 96 % | SYSTOLIC BLOOD PRESSURE: 102 MMHG | RESPIRATION RATE: 16 BRPM | WEIGHT: 156.2 LBS | TEMPERATURE: 98 F

## 2023-04-27 DIAGNOSIS — K57.32 DIVERTICULITIS OF COLON: ICD-10-CM

## 2023-04-27 DIAGNOSIS — Z98.890 STATUS POST COLOSTOMY TAKEDOWN: ICD-10-CM

## 2023-04-27 DIAGNOSIS — E87.1 HYPONATREMIA: Primary | ICD-10-CM

## 2023-04-27 NOTE — PROGRESS NOTES
Assessment/Plan:       Diagnoses and all orders for this visit:    Hyponatremia  -     Cancel: Basic metabolic panel; Future  -     Cancel: Sodium, urine, random  -     Cancel: Osmolality, urine  -     Cancel: Sodium, urine, random  -     Cancel: Osmolality, urine  -     Sodium, urine, random  -     Osmolality, urine  -     Basic metabolic panel; Future    Status post colostomy takedown    Diverticulitis of colon      Urine sample ordered as well as a BMP to repeat for the sodium  Would fluid restrict to 2000 cc  We will stop his losartan as his blood pressure is running too low  Patient will follow-up in 2 to 3 months or sooner if needed  Patient will also follow-up with his cardiology and has an appointment upcoming soon      Subjective:     Chief Complaint   Patient presents with   • Transition of Care Management     Status post colostomy takedown        Patient ID: Sheryle Ready is a 78 y o  male  Patient presents today for follow-up of surgery patient had a   Reversal of colostomy  He is healing well with no signs of infection  His other chronic conditions are stable  He had some issues with bowel movements immediately afterwards but that has improved  The wound is still healing from secondary intention where his stoma was but it is healing well  The only other issue today was he had hyponatremia upon discharge  Needs follow-up for that      The following portions of the patient's history were reviewed and updated as appropriate: allergies, current medications, past family history, past medical history, past social history, past surgical history and problem list     Review of Systems   Constitutional: Negative  HENT: Negative  Eyes: Negative  Respiratory: Negative  Cardiovascular: Negative  Gastrointestinal: Negative  Endocrine: Negative  Genitourinary: Negative  Musculoskeletal: Negative  Skin: Negative  Allergic/Immunologic: Negative  Neurological: Negative  "  Hematological: Negative  Psychiatric/Behavioral: Negative  All other systems reviewed and are negative  Objective:    Vitals:    04/27/23 1113   BP: 102/62   BP Location: Right arm   Patient Position: Sitting   Cuff Size: Large   Pulse: 57   Resp: 16   Temp: 98 °F (36 7 °C)   SpO2: 96%   Weight: 70 9 kg (156 lb 3 2 oz)   Height: 5' 7\" (1 702 m)          Physical Exam  Vitals and nursing note reviewed  Constitutional:       General: He is not in acute distress  Appearance: He is well-developed  HENT:      Head: Normocephalic  Right Ear: External ear normal       Left Ear: External ear normal       Nose: Nose normal    Eyes:      General:         Right eye: No discharge  Left eye: No discharge  Conjunctiva/sclera: Conjunctivae normal       Pupils: Pupils are equal, round, and reactive to light  Cardiovascular:      Rate and Rhythm: Normal rate and regular rhythm  Heart sounds: Normal heart sounds  Pulmonary:      Effort: Pulmonary effort is normal       Breath sounds: Normal breath sounds  Abdominal:      General: Bowel sounds are normal  There is no distension  Palpations: Abdomen is soft  Tenderness: There is no abdominal tenderness  Musculoskeletal:         General: Normal range of motion  Cervical back: Normal range of motion  Skin:     General: Skin is warm and dry  Findings: No rash  Neurological:      Mental Status: He is alert and oriented to person, place, and time  Cranial Nerves: No cranial nerve deficit  Psychiatric:         Behavior: Behavior normal          Thought Content:  Thought content normal          Judgment: Judgment normal          "

## 2023-05-01 ENCOUNTER — RA CDI HCC (OUTPATIENT)
Dept: OTHER | Facility: HOSPITAL | Age: 78
End: 2023-05-01

## 2023-05-01 NOTE — PROGRESS NOTES
[facility]  coding opportunities        M05 [id number]  Chart Reviewed number of suggestions sent to Provider: 1     Patients Insurance     Medicare Insurance: [address] [place] [address]

## 2023-05-04 ENCOUNTER — OFFICE VISIT (OUTPATIENT)
Dept: SURGERY | Facility: CLINIC | Age: 78
End: 2023-05-04

## 2023-05-04 VITALS — TEMPERATURE: 97.8 F

## 2023-05-04 DIAGNOSIS — M19.011 PRIMARY LOCALIZED OSTEOARTHROSIS OF RIGHT SHOULDER REGION: ICD-10-CM

## 2023-05-04 DIAGNOSIS — M05.9 RHEUMATOID ARTHRITIS WITH RHEUMATOID FACTOR, UNSPECIFIED (HCC): Primary | ICD-10-CM

## 2023-05-04 NOTE — PATIENT INSTRUCTIONS
You may resume a regular diet  Please stay on Metamucil or an equivalent fiber supplement  You can leave the current dressing on till Saturday  When you remove the dressing, wash the wounds with soap and water, instill silver wound gel and cover with gauze dressing  Keep the wound covered until the drainage stops  You can be more active up to your level of comfort, but nothing strenuous  Please contact physical therapy for an evaluation    Please return in about 4 weeks for a final check before returning to unrestricted activity

## 2023-05-04 NOTE — PROGRESS NOTES
Assessment/Plan:    No problem-specific Assessment & Plan notes found for this encounter  Diagnoses and all orders for this visit:    Rheumatoid arthritis with rheumatoid factor, unspecified ([facility] )  -     Ambulatory Referral to Physical Therapy; Future    Primary localized osteoarthrosis of right shoulder region  -     Ambulatory Referral to Physical Therapy; Future          Subjective:      Patient ID: Salome Hook is a 78 y o  male  The patient is a 62-year-old white male who is now 3 weeks out from reversal of his Polo's procedure, and repair of parastomal hernia  The patient is almost 8 months out from his original surgery for complicated diverticulitis with sigmoid resection and segmental small bowel resection, and 4 months out from exploratory laparotomy with appendectomy for recurrent abdominal sepsis  The patient was seen 8 days ago and most of his sutures were removed, and he was treated for a small hematoma at the colostomy closure site  He was also given instructions for more robust bowel regime as he was having issues with constipation at that time  On returned today the patient complains of fatigue, progressively improving incisional pain at the parastomal hernia repair, but especially arthritic pain involving his shoulder and back  He is eating well, and his bowels are working  His parastomal hernia site is healing, and the colostomy site wound is down to a small granulating sinus  The patient was given a referral to physical therapy  The colostomy site was treated with wound gel, and he will follow up in 4 weeks with Dr Rainer Ruiz for clearance to return to unrestricted activity        The following portions of the patient's history were reviewed and updated as appropriate: allergies, current medications, past family history, past medical history, past social history, past surgical history and problem list     Review of Systems   Constitutional: Negative for chills and fever  Gastrointestinal: Negative for constipation and diarrhea  Abdominal pain: improving incisional pain  Musculoskeletal: Positive for arthralgias, back pain, gait problem and joint swelling  Skin: Positive for wound  Objective:      Temp 97 8 °F (36 6 °C) (Temporal)          Physical Exam  Abdominal:      General: Abdomen is flat  Bowel sounds are normal       Hernia: No hernia is present

## 2023-05-09 DIAGNOSIS — M48.061 SPINAL STENOSIS OF LUMBAR REGION, UNSPECIFIED WHETHER NEUROGENIC CLAUDICATION PRESENT: ICD-10-CM

## 2023-05-10 RX ORDER — OXYCODONE AND ACETAMINOPHEN 10; 325 MG/1; MG/1
1 TABLET ORAL EVERY 4 HOURS PRN
Qty: 120 TABLET | Refills: 0 | Status: SHIPPED | OUTPATIENT
Start: 2023-05-10

## 2023-05-15 ENCOUNTER — OFFICE VISIT (OUTPATIENT)
Dept: CARDIOLOGY CLINIC | Facility: CLINIC | Age: 78
End: 2023-05-15

## 2023-05-15 ENCOUNTER — APPOINTMENT (OUTPATIENT)
Dept: LAB | Facility: CLINIC | Age: 78
End: 2023-05-15

## 2023-05-15 VITALS
DIASTOLIC BLOOD PRESSURE: 60 MMHG | SYSTOLIC BLOOD PRESSURE: 108 MMHG | BODY MASS INDEX: 23.78 KG/M2 | OXYGEN SATURATION: 97 % | RESPIRATION RATE: 18 BRPM | HEIGHT: 67 IN | WEIGHT: 151.5 LBS | HEART RATE: 58 BPM

## 2023-05-15 DIAGNOSIS — E87.1 HYPONATREMIA: ICD-10-CM

## 2023-05-15 DIAGNOSIS — I42.9 CARDIOMYOPATHY, UNSPECIFIED TYPE (HCC): ICD-10-CM

## 2023-05-15 DIAGNOSIS — I10 PRIMARY HYPERTENSION: ICD-10-CM

## 2023-05-15 DIAGNOSIS — Z95.3 STATUS POST AORTIC VALVE REPLACEMENT WITH BIOPROSTHETIC VALVE: ICD-10-CM

## 2023-05-15 DIAGNOSIS — I48.91 ATRIAL FIBRILLATION, UNSPECIFIED TYPE (HCC): Primary | ICD-10-CM

## 2023-05-15 DIAGNOSIS — E78.2 MIXED HYPERLIPIDEMIA: ICD-10-CM

## 2023-05-15 DIAGNOSIS — I25.10 ATHEROSCLEROSIS OF NATIVE CORONARY ARTERY OF NATIVE HEART WITHOUT ANGINA PECTORIS: ICD-10-CM

## 2023-05-15 LAB
ANION GAP SERPL CALCULATED.3IONS-SCNC: 5 MMOL/L (ref 4–13)
BUN SERPL-MCNC: 17 MG/DL (ref 5–25)
CALCIUM SERPL-MCNC: 9.5 MG/DL (ref 8.4–10.2)
CHLORIDE SERPL-SCNC: 101 MMOL/L (ref 96–108)
CO2 SERPL-SCNC: 32 MMOL/L (ref 21–32)
CREAT SERPL-MCNC: 1.02 MG/DL (ref 0.6–1.3)
GFR SERPL CREATININE-BSD FRML MDRD: 71 ML/MIN/1.73SQ M
GLUCOSE P FAST SERPL-MCNC: 95 MG/DL (ref 65–99)
OSMOLALITY UR: 694 MMOL/KG
POTASSIUM SERPL-SCNC: 4.2 MMOL/L (ref 3.5–5.3)
SODIUM 24H UR-SCNC: 94 MOL/L
SODIUM SERPL-SCNC: 138 MMOL/L (ref 135–147)
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
## 2023-05-15 NOTE — PATIENT INSTRUCTIONS
Stop taking metoprolol, it may be causing your fatigue although there are probably other factors involved like recovering from surgery    Given at least 2 weeks before you put the Zio patch on, so do not place it until June 1  We will monitor your heart for 2 weeks, then send it in as usual   Once I get the results, I will inform you if there is any sign of atrial fibrillation, if none, discontinue Xarelto

## 2023-05-18 ENCOUNTER — TELEPHONE (OUTPATIENT)
Dept: FAMILY MEDICINE CLINIC | Facility: CLINIC | Age: 78
End: 2023-05-18

## 2023-05-18 DIAGNOSIS — M48.061 SPINAL STENOSIS OF LUMBAR REGION, UNSPECIFIED WHETHER NEUROGENIC CLAUDICATION PRESENT: ICD-10-CM

## 2023-05-18 DIAGNOSIS — M19.011 PRIMARY LOCALIZED OSTEOARTHROSIS OF RIGHT SHOULDER REGION: Primary | ICD-10-CM

## 2023-05-18 RX ORDER — OXYCODONE HYDROCHLORIDE 10 MG/1
TABLET ORAL
Qty: 120 TABLET | Refills: 0 | Status: SHIPPED | OUTPATIENT
Start: 2023-05-18

## 2023-05-18 NOTE — TELEPHONE ENCOUNTER
Patient said he just saw you not to long ago and you talked about him doing PT  He said he found a tessa and he just needs a script faxed over to him  He doesn't know the name of the place but that it is in [place] and the guys name is [place]    Are we able to fax a script to [phone]

## 2023-06-05 ENCOUNTER — OFFICE VISIT (OUTPATIENT)
Dept: SURGERY | Facility: CLINIC | Age: 78
End: 2023-06-05

## 2023-06-05 VITALS
DIASTOLIC BLOOD PRESSURE: 60 MMHG | OXYGEN SATURATION: 98 % | TEMPERATURE: 97.3 F | HEIGHT: 67 IN | HEART RATE: 61 BPM | RESPIRATION RATE: 18 BRPM | WEIGHT: 152 LBS | SYSTOLIC BLOOD PRESSURE: 120 MMHG | BODY MASS INDEX: 23.86 KG/M2

## 2023-06-05 DIAGNOSIS — K57.32 DIVERTICULITIS OF COLON: Primary | ICD-10-CM

## 2023-06-05 DIAGNOSIS — K43.2 INCISIONAL HERNIA, WITHOUT OBSTRUCTION OR GANGRENE: ICD-10-CM

## 2023-06-05 PROBLEM — Z93.3 COLOSTOMY IN PLACE (HCC): Status: RESOLVED | Noted: 2022-12-29 | Resolved: 2023-06-05

## 2023-06-05 PROCEDURE — 99024 POSTOP FOLLOW-UP VISIT: CPT | Performed by: SURGERY

## 2023-06-05 NOTE — LETTER
June 5, 2023     Dionisio Summers DO  [address]  [address]  [place] [address]    Patient: Michelle Aguilera   YOB: 1945   Date of Visit: 6/5/2023       Dear Dr Rosario Links: Thank you for referring Hany Hooker to me for evaluation  Below are my notes for this consultation  If you have questions, please do not hesitate to call me  I look forward to following your patient along with you  Sincerely,        Moy Arellano MD        CC: No Recipients    Moy Arellano MD  6/5/2023 11:14 AM  Incomplete  Assessment/Plan:    Incisional hernia, without obstruction or gangrene  Patient is a pleasant 79-year-old male returning to the hospital status post reversal of colostomy who on physical examination has an incisional hernia at the superior pole of his midline incision  The hernia is asymptomatic to the patient  The treatment options available to him were discussed  I look forward to seeing him back in 3 months time  I believe the risk of acute incarceration or strangulation is low  The patient does understand that should he develop any signs of this of acute incarceration that he present directly to the hospital     All questions answered to the satisfaction of the patient and his wife  Diverticulitis of colon  Patient returns the office for routine scheduled follow-up status post takedown of colostomy in April 2022  He voiced no specific complaints referable to his abdomen at the present time  On physical examination he is well-appearing  Pleasant competent reliable  He is accompanied by his wife in the exam room today  Inspection of his abdomen does reveal the presence of a new ventral incisional hernia in the superior pole of the wound  He is a left hip pressure ulceration that has intact epidermis and dermis      Patient appears clinically well with regards to his recent colostomy reversal   I look forward to seeing her back in 3 months time for routine "scheduled follow-up  He has a left hip pressure ulceration which will also be inspected at the time of his next exam       Diagnoses and all orders for this visit:    Diverticulitis of colon    Incisional hernia, without obstruction or gangrene         Subjective:     Patient ID: Geoff Harmon is a 70 y o  male  HPI    The following portions of the patient's history were reviewed and updated as appropriate: allergies, current medications, past family history, past medical history, past social history, past surgical history and problem list     Review of Systems   Constitutional: Negative for chills and fever  HENT: Negative for ear pain and sore throat  Eyes: Negative for pain and visual disturbance  Respiratory: Negative for cough and shortness of breath  Cardiovascular: Negative for chest pain and palpitations  Gastrointestinal: Negative for abdominal pain and vomiting  Genitourinary: Negative for dysuria and hematuria  Musculoskeletal: Negative for arthralgias and back pain  Skin: Negative for color change and rash  Neurological: Negative for seizures and syncope  All other systems reviewed and are negative  Objective:      /60 (BP Location: Left arm, Patient Position: Sitting, Cuff Size: Standard)   Pulse 61   Temp (!) 97 3 °F (36 3 °C) (Temporal)   Resp 18   Ht 5' 7\" (1 702 m)   Wt 68 9 kg (152 lb)   SpO2 98%   BMI 23 81 kg/m²         Physical Exam  Vitals and nursing note reviewed  Constitutional:       Appearance: He is well-developed  HENT:      Head: Normocephalic and atraumatic  Eyes:      Conjunctiva/sclera: Conjunctivae normal       Pupils: Pupils are equal, round, and reactive to light  Cardiovascular:      Rate and Rhythm: Normal rate and regular rhythm  Pulmonary:      Effort: Pulmonary effort is normal       Breath sounds: Normal breath sounds  Abdominal:      General: Bowel sounds are normal       Palpations: Abdomen is soft        Comments: " Abdominal examination as described above  Ventral incisional hernia present   Musculoskeletal:         General: Normal range of motion  Cervical back: Normal range of motion and neck supple  Skin:     General: Skin is warm and dry  Neurological:      Mental Status: He is alert and oriented to person, place, and time  Psychiatric:         Behavior: Behavior normal          Thought Content:  Thought content normal          Judgment: Judgment normal

## 2023-06-05 NOTE — PROGRESS NOTES
Assessment/Plan:    Incisional hernia, without obstruction or gangrene  Patient is a pleasant 62-year-old male returning to the hospital status post reversal of colostomy who on physical examination has an incisional hernia at the superior pole of his midline incision  The hernia is asymptomatic to the patient  The treatment options available to him were discussed  I look forward to seeing him back in 3 months time  I believe the risk of acute incarceration or strangulation is low  The patient does understand that should he develop any signs of this of acute incarceration that he present directly to the hospital     All questions answered to the satisfaction of the patient and his wife  Diverticulitis of colon  Patient returns the office for routine scheduled follow-up status post takedown of colostomy in April 2022  He voiced no specific complaints referable to his abdomen at the present time  On physical examination he is well-appearing  Pleasant competent reliable  He is accompanied by his wife in the exam room today  Inspection of his abdomen does reveal the presence of a new ventral incisional hernia in the superior pole of the wound  He is a left hip pressure ulceration that has intact epidermis and dermis  Patient appears clinically well with regards to his recent colostomy reversal   I look forward to seeing her back in 3 months time for routine scheduled follow-up  He has a left hip pressure ulceration which will also be inspected at the time of his next exam        Diagnoses and all orders for this visit:    Diverticulitis of colon    Incisional hernia, without obstruction or gangrene          Subjective:      Patient ID: Ganesh Hills is a 70 y o  male      HPI    The following portions of the patient's history were reviewed and updated as appropriate: allergies, current medications, past family history, past medical history, past social history, past surgical history "and problem list     Review of Systems   Constitutional: Negative for chills and fever  HENT: Negative for ear pain and sore throat  Eyes: Negative for pain and visual disturbance  Respiratory: Negative for cough and shortness of breath  Cardiovascular: Negative for chest pain and palpitations  Gastrointestinal: Negative for abdominal pain and vomiting  Genitourinary: Negative for dysuria and hematuria  Musculoskeletal: Negative for arthralgias and back pain  Skin: Negative for color change and rash  Neurological: Negative for seizures and syncope  All other systems reviewed and are negative  Objective:      /60 (BP Location: Left arm, Patient Position: Sitting, Cuff Size: Standard)   Pulse 61   Temp (!) 97 3 °F (36 3 °C) (Temporal)   Resp 18   Ht 5' 7\" (1 702 m)   Wt 68 9 kg (152 lb)   SpO2 98%   BMI 23 81 kg/m²          Physical Exam  Vitals and nursing note reviewed  Constitutional:       Appearance: He is well-developed  HENT:      Head: Normocephalic and atraumatic  Eyes:      Conjunctiva/sclera: Conjunctivae normal       Pupils: Pupils are equal, round, and reactive to light  Cardiovascular:      Rate and Rhythm: Normal rate and regular rhythm  Pulmonary:      Effort: Pulmonary effort is normal       Breath sounds: Normal breath sounds  Abdominal:      General: Bowel sounds are normal       Palpations: Abdomen is soft  Comments: Abdominal examination as described above  Ventral incisional hernia present   Musculoskeletal:         General: Normal range of motion  Cervical back: Normal range of motion and neck supple  Skin:     General: Skin is warm and dry  Neurological:      Mental Status: He is alert and oriented to person, place, and time  Psychiatric:         Behavior: Behavior normal          Thought Content:  Thought content normal          Judgment: Judgment normal          "

## 2023-06-05 NOTE — ASSESSMENT & PLAN NOTE
Patient is a pleasant 46-year-old male returning to the hospital status post reversal of colostomy who on physical examination has an incisional hernia at the superior pole of his midline incision  The hernia is asymptomatic to the patient  The treatment options available to him were discussed  I look forward to seeing him back in 3 months time  I believe the risk of acute incarceration or strangulation is low  The patient does understand that should he develop any signs of this of acute incarceration that he present directly to the hospital     All questions answered to the satisfaction of the patient and his wife

## 2023-06-05 NOTE — PROGRESS NOTES
Patient came in today for a wound check to return to full activity,  s/p po flex sigmoidoscopy, reversal of sigmod colostomy repair of peristomal hernia 4/14/2023  Patient states his wound on his left abd is slightly open and has a bulge on his left upper abd  Patient also mentioned that he is suffering from constipation  He has also develop a ulcer on his left thigh while he was in the hospital  Patient denies nausea/vomiting, diarrhea, trouble eating or fevers/chills  Mike BOURNE MA

## 2023-06-05 NOTE — ASSESSMENT & PLAN NOTE
Patient returns the office for routine scheduled follow-up status post takedown of colostomy in April 2022  He voiced no specific complaints referable to his abdomen at the present time  On physical examination he is well-appearing  Pleasant competent reliable  He is accompanied by his wife in the exam room today  Inspection of his abdomen does reveal the presence of a new ventral incisional hernia in the superior pole of the wound  He is a left hip pressure ulceration that has intact epidermis and dermis  Patient appears clinically well with regards to his recent colostomy reversal   I look forward to seeing her back in 3 months time for routine scheduled follow-up      He has a left hip pressure ulceration which will also be inspected at the time of his next exam

## 2023-06-06 DIAGNOSIS — G47.00 INSOMNIA, UNSPECIFIED TYPE: ICD-10-CM

## 2023-06-06 RX ORDER — HYDROXYZINE HYDROCHLORIDE 25 MG/1
25 TABLET, FILM COATED ORAL EVERY 6 HOURS PRN
Qty: 30 TABLET | Refills: 1 | Status: SHIPPED | OUTPATIENT
Start: 2023-06-06 | End: 2023-07-06

## 2023-06-20 ENCOUNTER — CLINICAL SUPPORT (OUTPATIENT)
Dept: CARDIOLOGY CLINIC | Facility: CLINIC | Age: 78
End: 2023-06-20
Payer: COMMERCIAL

## 2023-06-20 DIAGNOSIS — I48.91 ATRIAL FIBRILLATION, UNSPECIFIED TYPE (HCC): ICD-10-CM

## 2023-06-20 PROCEDURE — 93248 EXT ECG>7D<15D REV&INTERPJ: CPT | Performed by: INTERNAL MEDICINE

## 2023-06-21 DIAGNOSIS — M48.061 SPINAL STENOSIS OF LUMBAR REGION, UNSPECIFIED WHETHER NEUROGENIC CLAUDICATION PRESENT: ICD-10-CM

## 2023-06-21 RX ORDER — OXYCODONE AND ACETAMINOPHEN 10; 325 MG/1; MG/1
1 TABLET ORAL EVERY 4 HOURS PRN
Qty: 30 TABLET | Refills: 0 | Status: SHIPPED | OUTPATIENT
Start: 2023-06-21 | End: 2023-06-29 | Stop reason: SDUPTHER

## 2023-06-29 DIAGNOSIS — M48.061 SPINAL STENOSIS OF LUMBAR REGION, UNSPECIFIED WHETHER NEUROGENIC CLAUDICATION PRESENT: ICD-10-CM

## 2023-06-29 RX ORDER — OXYCODONE AND ACETAMINOPHEN 10; 325 MG/1; MG/1
1 TABLET ORAL EVERY 4 HOURS PRN
Qty: 120 TABLET | Refills: 0 | Status: SHIPPED | OUTPATIENT
Start: 2023-06-29

## 2023-07-05 ENCOUNTER — TELEPHONE (OUTPATIENT)
Dept: CARDIOLOGY CLINIC | Facility: CLINIC | Age: 78
End: 2023-07-05

## 2023-07-05 DIAGNOSIS — I48.91 NEW ONSET ATRIAL FIBRILLATION (HCC): ICD-10-CM

## 2023-07-05 DIAGNOSIS — M48.061 SPINAL STENOSIS OF LUMBAR REGION, UNSPECIFIED WHETHER NEUROGENIC CLAUDICATION PRESENT: ICD-10-CM

## 2023-07-05 RX ORDER — OXYCODONE HYDROCHLORIDE 10 MG/1
TABLET ORAL
Qty: 120 TABLET | Refills: 0 | Status: SHIPPED | OUTPATIENT
Start: 2023-07-05

## 2023-07-10 ENCOUNTER — OFFICE VISIT (OUTPATIENT)
Dept: FAMILY MEDICINE CLINIC | Facility: CLINIC | Age: 78
End: 2023-07-10
Payer: COMMERCIAL

## 2023-07-10 VITALS
HEART RATE: 71 BPM | DIASTOLIC BLOOD PRESSURE: 72 MMHG | TEMPERATURE: 97.5 F | SYSTOLIC BLOOD PRESSURE: 138 MMHG | RESPIRATION RATE: 16 BRPM | HEIGHT: 67 IN | OXYGEN SATURATION: 100 % | WEIGHT: 157.6 LBS | BODY MASS INDEX: 24.74 KG/M2

## 2023-07-10 DIAGNOSIS — M62.08 RECTUS DIASTASIS: Primary | ICD-10-CM

## 2023-07-10 DIAGNOSIS — I10 PRIMARY HYPERTENSION: ICD-10-CM

## 2023-07-10 DIAGNOSIS — I47.1 PAROXYSMAL SVT (SUPRAVENTRICULAR TACHYCARDIA) (HCC): ICD-10-CM

## 2023-07-10 DIAGNOSIS — F11.20 CONTINUOUS OPIOID DEPENDENCE (HCC): ICD-10-CM

## 2023-07-10 DIAGNOSIS — L89.151 PRESSURE INJURY OF SACRAL REGION, STAGE 1: ICD-10-CM

## 2023-07-10 PROBLEM — I47.10 PAROXYSMAL SVT (SUPRAVENTRICULAR TACHYCARDIA): Status: ACTIVE | Noted: 2023-07-10

## 2023-07-10 PROCEDURE — 99214 OFFICE O/P EST MOD 30 MIN: CPT | Performed by: FAMILY MEDICINE

## 2023-07-10 NOTE — PROGRESS NOTES
Assessment/Plan:       Diagnoses and all orders for this visit:    Rectus diastasis  No need to have this repaired at this time  Discussed symptoms  Pressure injury of sacral region, stage 1  Discussed possible wound care evaluation  Otherwise continue with pressure dressings and limiting as much pressure on it    Primary hypertension  This is currently stable  Continuous opioid dependence ([address])  This is currently stable patient is compliant with medications and he has been on this plan for many many years    Paroxysmal SVT (supraventricular tachycardia )(HCC) right  This is stable and per cardiology he is able to stop his metoprolol and Eliquis    Subjective:     Chief Complaint   Patient presents with   • Follow-up     Follow up on stomach and left hip. Patient ID: Roma Paulino is a 70 y.o. male. Patient presents today for a visit after abdominal surgery  He is concerned about hernia  Otherwise he is doing well he recently had a Zio patch and we reviewed the results and cardiology recommendations      The following portions of the patient's history were reviewed and updated as appropriate: allergies, current medications, past family history, past medical history, past social history, past surgical history and problem list.    Review of Systems   Constitutional: Negative. HENT: Negative. Eyes: Negative. Respiratory: Negative. Cardiovascular: Negative. Gastrointestinal: Negative. Endocrine: Negative. Genitourinary: Negative. Musculoskeletal: Negative. Skin: Negative. Allergic/Immunologic: Negative. Neurological: Negative. Hematological: Negative. Psychiatric/Behavioral: Negative. All other systems reviewed and are negative.         Objective:    Vitals:    07/10/23 1104   BP: 138/72   BP Location: Left arm   Patient Position: Sitting   Cuff Size: Standard   Pulse: 71   Resp: 16   Temp: 97.5 °F (36.4 °C)   TempSrc: Tympanic   SpO2: 100%   Weight: 71.5 kg (157 lb 9.6 oz)   Height: 5' 7" (1.702 m)          Physical Exam  Vitals and nursing note reviewed. Constitutional:       General: He is not in acute distress. Appearance: He is well-developed. HENT:      Head: Normocephalic. Right Ear: External ear normal.      Left Ear: External ear normal.      Nose: Nose normal.   Eyes:      General:         Right eye: No discharge. Left eye: No discharge. Conjunctiva/sclera: Conjunctivae normal.      Pupils: Pupils are equal, round, and reactive to light. Cardiovascular:      Rate and Rhythm: Normal rate and regular rhythm. Heart sounds: Normal heart sounds. Pulmonary:      Effort: Pulmonary effort is normal.      Breath sounds: Normal breath sounds. Abdominal:      General: Bowel sounds are normal. There is no distension. Palpations: Abdomen is soft. Tenderness: There is no abdominal tenderness. Comments: Large abdominal hernia   Musculoskeletal:         General: Normal range of motion. Cervical back: Normal range of motion. Skin:     General: Skin is warm and dry. Findings: No rash. Comments: Quarter sized wound on left hip consistent with a pressure wound   Neurological:      Mental Status: He is alert and oriented to person, place, and time. Cranial Nerves: No cranial nerve deficit. Psychiatric:         Behavior: Behavior normal.         Thought Content:  Thought content normal.         Judgment: Judgment normal.

## 2023-07-18 DIAGNOSIS — G47.00 INSOMNIA, UNSPECIFIED TYPE: ICD-10-CM

## 2023-07-18 RX ORDER — HYDROXYZINE HYDROCHLORIDE 25 MG/1
25 TABLET, FILM COATED ORAL EVERY 6 HOURS PRN
Qty: 30 TABLET | Refills: 1 | Status: SHIPPED | OUTPATIENT
Start: 2023-07-18 | End: 2023-08-17

## 2023-08-08 DIAGNOSIS — G47.00 INSOMNIA, UNSPECIFIED TYPE: ICD-10-CM

## 2023-08-08 DIAGNOSIS — M48.061 SPINAL STENOSIS OF LUMBAR REGION, UNSPECIFIED WHETHER NEUROGENIC CLAUDICATION PRESENT: ICD-10-CM

## 2023-08-08 RX ORDER — TRAZODONE HYDROCHLORIDE 300 MG/1
TABLET ORAL
Qty: 180 TABLET | Refills: 0 | Status: SHIPPED | OUTPATIENT
Start: 2023-08-08

## 2023-08-08 RX ORDER — OXYCODONE AND ACETAMINOPHEN 10; 325 MG/1; MG/1
1 TABLET ORAL EVERY 4 HOURS PRN
Qty: 120 TABLET | Refills: 0 | Status: SHIPPED | OUTPATIENT
Start: 2023-08-08

## 2023-08-14 DIAGNOSIS — M48.061 SPINAL STENOSIS OF LUMBAR REGION, UNSPECIFIED WHETHER NEUROGENIC CLAUDICATION PRESENT: ICD-10-CM

## 2023-08-14 RX ORDER — OXYCODONE HYDROCHLORIDE 10 MG/1
TABLET ORAL
Qty: 120 TABLET | Refills: 0 | Status: SHIPPED | OUTPATIENT
Start: 2023-08-14

## 2023-08-16 ENCOUNTER — OFFICE VISIT (OUTPATIENT)
Dept: RHEUMATOLOGY | Facility: CLINIC | Age: 78
End: 2023-08-16
Payer: COMMERCIAL

## 2023-08-16 ENCOUNTER — APPOINTMENT (OUTPATIENT)
Dept: LAB | Facility: CLINIC | Age: 78
End: 2023-08-16
Payer: COMMERCIAL

## 2023-08-16 VITALS — HEART RATE: 80 BPM | SYSTOLIC BLOOD PRESSURE: 142 MMHG | DIASTOLIC BLOOD PRESSURE: 84 MMHG

## 2023-08-16 DIAGNOSIS — Z79.899 HIGH RISK MEDICATION USE: ICD-10-CM

## 2023-08-16 DIAGNOSIS — L40.9 PSORIASIS: ICD-10-CM

## 2023-08-16 DIAGNOSIS — M19.011 OSTEOARTHRITIS OF RIGHT SHOULDER, UNSPECIFIED OSTEOARTHRITIS TYPE: ICD-10-CM

## 2023-08-16 DIAGNOSIS — L40.50 PSORIASIS WITH ARTHROPATHY (HCC): Primary | ICD-10-CM

## 2023-08-16 DIAGNOSIS — L21.9 SEBORRHEA: ICD-10-CM

## 2023-08-16 LAB
ALBUMIN SERPL BCP-MCNC: 4.8 G/DL (ref 3.5–5)
ALP SERPL-CCNC: 102 U/L (ref 34–104)
ALT SERPL W P-5'-P-CCNC: 19 U/L (ref 7–52)
ANION GAP SERPL CALCULATED.3IONS-SCNC: 5 MMOL/L
AST SERPL W P-5'-P-CCNC: 21 U/L (ref 13–39)
BASOPHILS # BLD AUTO: 0.03 THOUSANDS/ÂΜL (ref 0–0.1)
BASOPHILS NFR BLD AUTO: 0 % (ref 0–1)
BILIRUB SERPL-MCNC: 0.29 MG/DL (ref 0.2–1)
BUN SERPL-MCNC: 22 MG/DL (ref 5–25)
CALCIUM SERPL-MCNC: 9.9 MG/DL (ref 8.4–10.2)
CHLORIDE SERPL-SCNC: 99 MMOL/L (ref 96–108)
CO2 SERPL-SCNC: 33 MMOL/L (ref 21–32)
CREAT SERPL-MCNC: 1.08 MG/DL (ref 0.6–1.3)
CRP SERPL QL: 5.9 MG/L
EOSINOPHIL # BLD AUTO: 0.14 THOUSAND/ÂΜL (ref 0–0.61)
EOSINOPHIL NFR BLD AUTO: 2 % (ref 0–6)
ERYTHROCYTE [DISTWIDTH] IN BLOOD BY AUTOMATED COUNT: 13 % (ref 11.6–15.1)
ERYTHROCYTE [SEDIMENTATION RATE] IN BLOOD: 33 MM/HOUR (ref 0–19)
GFR SERPL CREATININE-BSD FRML MDRD: 66 ML/MIN/1.73SQ M
GLUCOSE SERPL-MCNC: 98 MG/DL (ref 65–140)
HBV CORE AB SER QL: NORMAL
HBV CORE IGM SER QL: NORMAL
HBV SURFACE AG SER QL: NORMAL
HCT VFR BLD AUTO: 42.2 % (ref 36.5–49.3)
HCV AB SER QL: NORMAL
HGB BLD-MCNC: 13.4 G/DL (ref 12–17)
IMM GRANULOCYTES # BLD AUTO: 0.02 THOUSAND/UL (ref 0–0.2)
IMM GRANULOCYTES NFR BLD AUTO: 0 % (ref 0–2)
LYMPHOCYTES # BLD AUTO: 2.59 THOUSANDS/ÂΜL (ref 0.6–4.47)
LYMPHOCYTES NFR BLD AUTO: 29 % (ref 14–44)
MCH RBC QN AUTO: 28.2 PG (ref 26.8–34.3)
MCHC RBC AUTO-ENTMCNC: 31.8 G/DL (ref 31.4–37.4)
MCV RBC AUTO: 89 FL (ref 82–98)
MONOCYTES # BLD AUTO: 0.72 THOUSAND/ÂΜL (ref 0.17–1.22)
MONOCYTES NFR BLD AUTO: 8 % (ref 4–12)
NEUTROPHILS # BLD AUTO: 5.37 THOUSANDS/ÂΜL (ref 1.85–7.62)
NEUTS SEG NFR BLD AUTO: 61 % (ref 43–75)
NRBC BLD AUTO-RTO: 0 /100 WBCS
PLATELET # BLD AUTO: 271 THOUSANDS/UL (ref 149–390)
PMV BLD AUTO: 8.7 FL (ref 8.9–12.7)
POTASSIUM SERPL-SCNC: 4.7 MMOL/L (ref 3.5–5.3)
PROT SERPL-MCNC: 8.1 G/DL (ref 6.4–8.4)
RBC # BLD AUTO: 4.75 MILLION/UL (ref 3.88–5.62)
SODIUM SERPL-SCNC: 137 MMOL/L (ref 135–147)
WBC # BLD AUTO: 8.87 THOUSAND/UL (ref 4.31–10.16)

## 2023-08-16 PROCEDURE — 86705 HEP B CORE ANTIBODY IGM: CPT | Performed by: INTERNAL MEDICINE

## 2023-08-16 PROCEDURE — 20610 DRAIN/INJ JOINT/BURSA W/O US: CPT | Performed by: INTERNAL MEDICINE

## 2023-08-16 PROCEDURE — 80053 COMPREHEN METABOLIC PANEL: CPT | Performed by: INTERNAL MEDICINE

## 2023-08-16 PROCEDURE — 86704 HEP B CORE ANTIBODY TOTAL: CPT | Performed by: INTERNAL MEDICINE

## 2023-08-16 PROCEDURE — 85652 RBC SED RATE AUTOMATED: CPT | Performed by: INTERNAL MEDICINE

## 2023-08-16 PROCEDURE — 86480 TB TEST CELL IMMUN MEASURE: CPT | Performed by: INTERNAL MEDICINE

## 2023-08-16 PROCEDURE — 87340 HEPATITIS B SURFACE AG IA: CPT | Performed by: INTERNAL MEDICINE

## 2023-08-16 PROCEDURE — 86803 HEPATITIS C AB TEST: CPT | Performed by: INTERNAL MEDICINE

## 2023-08-16 PROCEDURE — 85025 COMPLETE CBC W/AUTO DIFF WBC: CPT | Performed by: INTERNAL MEDICINE

## 2023-08-16 PROCEDURE — 86140 C-REACTIVE PROTEIN: CPT | Performed by: INTERNAL MEDICINE

## 2023-08-16 PROCEDURE — 99215 OFFICE O/P EST HI 40 MIN: CPT | Performed by: INTERNAL MEDICINE

## 2023-08-16 PROCEDURE — 36415 COLL VENOUS BLD VENIPUNCTURE: CPT | Performed by: INTERNAL MEDICINE

## 2023-08-16 RX ORDER — LIDOCAINE HYDROCHLORIDE 10 MG/ML
1 INJECTION, SOLUTION INFILTRATION; PERINEURAL ONCE
Status: COMPLETED | OUTPATIENT
Start: 2023-08-16 | End: 2023-08-16

## 2023-08-16 RX ORDER — FLUOCINONIDE TOPICAL SOLUTION USP, 0.05% 0.5 MG/ML
SOLUTION TOPICAL DAILY
Qty: 60 ML | Refills: 6 | Status: SHIPPED | OUTPATIENT
Start: 2023-08-16

## 2023-08-16 RX ORDER — TRIAMCINOLONE ACETONIDE 40 MG/ML
40 INJECTION, SUSPENSION INTRA-ARTICULAR; INTRAMUSCULAR ONCE
Status: COMPLETED | OUTPATIENT
Start: 2023-08-16 | End: 2023-08-16

## 2023-08-16 RX ADMIN — LIDOCAINE HYDROCHLORIDE 1 ML: 10 INJECTION, SOLUTION INFILTRATION; PERINEURAL at 16:14

## 2023-08-16 RX ADMIN — TRIAMCINOLONE ACETONIDE 40 MG: 40 INJECTION, SUSPENSION INTRA-ARTICULAR; INTRAMUSCULAR at 16:15

## 2023-08-16 NOTE — PATIENT INSTRUCTIONS
Do labs  Continue betamethasone and fluocinonide as needed for psoriasis  Continue gabapentin 3 times a day as needed for back pain  Continue diclofenac gel as needed for joint pain  Right shoulder steroid injection given in clinic  Will start Otezla starter pack and twice a day maintenance dose    Return to clinic in 4 months    Psoriatic Arthritis in Adults    What is psoriatic arthritis? -- Psoriatic arthritis is a condition that causes joint pain, swelling, and stiffness. It happens in people who have a long-term skin condition called psoriasis. People with psoriasis have patches of thick, red skin that are often covered by silver or white scales  Doctors don't know what causes psoriasis or psoriatic arthritis. What are the symptoms of psoriatic arthritis? -- Psoriatic arthritis causes pain, stiffness, and swelling in the affected joints. It can also affect the spine in some people. Because of the joint and spine problems, people can have trouble moving their body. Stiffness in the joints or low back is usually worse in the morning and lasts 30 minutes or longer. It usually gets better with exercise. Psoriatic arthritis can affect joints on one or both sides of the body. It usually affects more than one joint. In addition to joint symptoms (and the skin symptoms of psoriasis), people sometimes have other symptoms. These can include:  ? Swelling of a finger or toe, or the hands or feet  ? Swelling and pain in the back of the ankle or in the heel   ? Nail symptoms - The nails can look "pitted," as if they were pricked by a pin. The nail can also come up off the nail bed. ? Eye pain or redness  Is there a test for psoriatic arthritis? -- Yes. Your doctor or nurse will ask about your symptoms and do an exam. He or she will order X-rays of your painful joints. He or she might order an imaging test called an MRI. Imaging tests create pictures of the inside of the body.   To check that another condition isn't causing your symptoms, your doctor or nurse might also order:  ?Blood tests  ? Lab tests on a sample of fluid from a swollen joint - To get a sample of fluid, the doctor will put a thin needle in your joint. How is psoriatic arthritis treated? -- There is no cure for psoriatic arthritis, but different treatments can help ease and control symptoms. Treatment for joint symptoms usually involves one or more of the following:  ?Medicines called nonsteroidal antiinflammatory drugs, or "NSAIDs" for short - Examples of NSAIDs are aspirin, ibuprofen (sample brand names: Advil, Motrin), and naproxen (sample brand name: Aleve). ? Medicines that are usually used to treat other types of arthritis - Some of these include methotrexate and leflunomide. ? Medicines that block a substance called tumor necrosis factor, or "TNF" for short - TNF plays a role in psoriasis and psoriatic arthritis. Medicines that block TNF are called "anti-TNF" medicines. Examples include etanercept (brand name: Enbrel) and adalimumab (brand name: Humira). ?Other medicines - If the options above don't help, your doctor might suggest trying a different medicine. Examples include ustekinumab (brand name: Romeo Mendoza), secukinumab (brand name: Cosentyx), tofacitinib (brand name: Adrienne Burton), abatacept (brand name: Komal Chris), and apremilast (brand name: Monica Richey). ? Shots of medicines called steroids that go into the painful joint - These are not the same as the steroids some athletes take illegally. These steroids help reduce swelling and pain. ? Heat - Heat, especially in the morning, can help reduce pain and stiffness. Do not use heat for longer than 20 minutes at a time. Also, do not use anything too hot that could burn your skin. ? Physical and occupational therapy - This involves learning exercises, movements, and ways of doing everyday tasks. ? Special shoe inserts (called "orthotics") - These can help keep your feet, ankles, and knees in the proper position. ?Treatment for psoriatic arthritis is usually long term. That's because even after symptoms get better, they sometimes return later on. Is there anything I can do on my own to feel better? -- Yes. It is very important that you stay active. You might want to avoid being active because you are in pain. But this can make things worse. It can make your muscles weak and your joints stiffer than they already are. Your doctor, nurse, or physical therapist can help you figure out which activities and exercises are right for you.

## 2023-08-16 NOTE — PROGRESS NOTES
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 08/16/2023 -0.02  IU/ml Final   • QFT TB2-NIL 08/16/2023 -0.02  IU/ml Final   • QFT Mitogen-NIL 08/16/2023 3.99  IU/ml Final   • QFT Final Interpretation 08/16/2023 Negative  Negative Final    No Interferon-gamma response to M. tuberculosis antigens detected. Infection with M. tuberculosis is unlikely. A single negative result does not exclude infection with M. tuberculosis. In patients at high risk for M. tuberculosis infection, a second test should be considered in accordance with the 2017 ATS/IDSA/CDC Clinical Practice Guidelines for Diagnosis of Tuberculosis in Adults and Children. False negative results can be a result of incorrect blood sample collection or handling of the specimen affecting lymphocyte function.    • Hepatitis B Surface Ag 08/16/2023 Non-reactive  Non-Reactive Final   • Hepatitis C Ab 08/16/2023 Non-reactive  Non-Reactive Final   • Hep B C IgM 08/16/2023 Non-reactive  Non-Reactive Final   • Hep B Core Total Ab 08/16/2023 Non-reactive  Non-Reactive Final   Appointment on 05/15/2023   Component Date Value Ref Range Status   • Sodium 05/15/2023 138  135 - 147 mmol/L Final   • Potassium 05/15/2023 4.2  3.5 - 5.3 mmol/L Final   • Chloride 05/15/2023 101  96 - 108 mmol/L Final   • CO2 05/15/2023 32  21 - 32 mmol/L Final   • ANION GAP 05/15/2023 5  4 - 13 mmol/L Final   • BUN 05/15/2023 17  5 - 25 mg/dL Final   • Creatinine 05/15/2023 1.02  0.60 - 1.30 mg/dL Final    Standardized to IDMS reference method   • Glucose, Fasting 05/15/2023 95  65 - 99 mg/dL Final   • Calcium 05/15/2023 9.5  8.4 - 10.2 mg/dL Final   • eGFR 05/15/2023 71  ml/min/1.73sq m Final   Office Visit on 04/27/2023   Component Date Value Ref Range Status   • Sodium, Ur 05/15/2023 94   Final   • Osmolality, Ur 05/15/2023 694  250 - 900 mmol/KG Final   Appointment on 04/24/2023   Component Date Value Ref Range Status   • WBC 04/24/2023 7.99  4.31 - 10.16 Thousand/uL Final   • RBC 04/24/2023 4.18  3.88 - 5.62 Million/uL Final   • Hemoglobin 04/24/2023 11.4 (L)  12.0 - 17.0 g/dL Final   • Hematocrit 04/24/2023 36.2 (L)  36.5 - 49.3 % Final   • MCV 04/24/2023 87  82 - 98 fL Final   • MCH 04/24/2023 27.3  26.8 - 34.3 pg Final   • MCHC 04/24/2023 31.5  31.4 - 37.4 g/dL Final   • RDW 04/24/2023 12.5  11.6 - 15.1 % Final   • MPV 04/24/2023 9.1  8.9 - 12.7 fL Final   • Platelets 96/22/8293 365  149 - 390 Thousands/uL Final   • nRBC 04/24/2023 0  /100 WBCs Final   • Neutrophils Relative 04/24/2023 65  43 - 75 % Final   • Immat GRANS % 04/24/2023 0  0 - 2 % Final   • Lymphocytes Relative 04/24/2023 24  14 - 44 % Final   • Monocytes Relative 04/24/2023 8  4 - 12 % Final   • Eosinophils Relative 04/24/2023 2  0 - 6 % Final   • Basophils Relative 04/24/2023 1  0 - 1 % Final   • Neutrophils Absolute 04/24/2023 5.24  1.85 - 7.62 Thousands/µL Final   • Immature Grans Absolute 04/24/2023 0.02  0.00 - 0.20 Thousand/uL Final   • Lymphocytes Absolute 04/24/2023 1.95  0.60 - 4.47 Thousands/µL Final   • Monocytes Absolute 04/24/2023 0.61  0.17 - 1.22 Thousand/µL Final   • Eosinophils Absolute 04/24/2023 0.13  0.00 - 0.61 Thousand/µL Final   • Basophils Absolute 04/24/2023 0.04 0.00 - 0.10 Thousands/µL Final   • Magnesium 04/24/2023 2.1  1.6 - 2.6 mg/dL Final   • Sodium 04/24/2023 126 (L)  135 - 147 mmol/L Final   • Potassium 04/24/2023 4.3  3.5 - 5.3 mmol/L Final   • Chloride 04/24/2023 93 (L)  96 - 108 mmol/L Final   • CO2 04/24/2023 26  21 - 32 mmol/L Final   • ANION GAP 04/24/2023 7  4 - 13 mmol/L Final   • BUN 04/24/2023 10  5 - 25 mg/dL Final   • Creatinine 04/24/2023 0.95  0.60 - 1.30 mg/dL Final    Standardized to IDMS reference method   • Glucose, Fasting 04/24/2023 110 (H)  65 - 99 mg/dL Final    Specimen collection should occur prior to Sulfasalazine administration due to the potential for falsely depressed results. Specimen collection should occur prior to Sulfapyridine administration due to the potential for falsely elevated results. • Calcium 04/24/2023 9.6  8.3 - 10.1 mg/dL Final   • eGFR 04/24/2023 77  ml/min/1.73sq m Final   Admission on 04/14/2023, Discharged on 04/17/2023   Component Date Value Ref Range Status   • Sodium 04/14/2023 132 (L)  135 - 147 mmol/L Final   • Potassium 04/14/2023 3.6  3.5 - 5.3 mmol/L Final   • Chloride 04/14/2023 96  96 - 108 mmol/L Final   • CO2 04/14/2023 22  21 - 32 mmol/L Final   • ANION GAP 04/14/2023 14 (H)  4 - 13 mmol/L Final   • BUN 04/14/2023 11  5 - 25 mg/dL Final   • Creatinine 04/14/2023 0.91  0.60 - 1.30 mg/dL Final    Standardized to IDMS reference method   • Glucose 04/14/2023 130  65 - 140 mg/dL Final    If the patient is fasting, the ADA then defines impaired fasting glucose as > 100 mg/dL and diabetes as > or equal to 123 mg/dL.    • Glucose, Fasting 04/14/2023 130 (H)  65 - 99 mg/dL Final   • Calcium 04/14/2023 10.2  8.4 - 10.2 mg/dL Final   • eGFR 04/14/2023 82  ml/min/1.73sq m Final   • Case Report 04/14/2023    Final                    Value:Surgical Pathology Report                         Case: [id number]                                   Authorizing Provider:  Beena Green MD    Collected:           04/14/2023 [facility] Location:     [address] [facility] Received:            04/14/2023 1458                                     Operating Room                                                               Pathologist:           Michelle Smith DO                                                     Specimen:    Large Intestine, NOS, Colostomy Closure                                                   • Final Diagnosis 04/14/2023    Final                    Value: This result contains rich text formatting which cannot be displayed here. • Additional Information 04/14/2023    Final                    Value: This result contains rich text formatting which cannot be displayed here. • Gross Description 04/14/2023    Final                    Value: This result contains rich text formatting which cannot be displayed here. • POC Glucose 04/14/2023 134  65 - 140 mg/dl Final   • Magnesium 04/15/2023 1.4 (L)  1.9 - 2.7 mg/dL Final   • Sodium 04/15/2023 134 (L)  135 - 147 mmol/L Final   • Potassium 04/15/2023 4.1  3.5 - 5.3 mmol/L Final   • Chloride 04/15/2023 101  96 - 108 mmol/L Final   • CO2 04/15/2023 25  21 - 32 mmol/L Final   • ANION GAP 04/15/2023 8  4 - 13 mmol/L Final   • BUN 04/15/2023 10  5 - 25 mg/dL Final   • Creatinine 04/15/2023 0.76  0.60 - 1.30 mg/dL Final    Standardized to IDMS reference method   • Glucose 04/15/2023 135  65 - 140 mg/dL Final    If the patient is fasting, the ADA then defines impaired fasting glucose as > 100 mg/dL and diabetes as > or equal to 123 mg/dL.    • Calcium 04/15/2023 9.2  8.4 - 10.2 mg/dL Final   • eGFR 04/15/2023 89  ml/min/1.73sq m Final   • Phosphorus 04/15/2023 4.4 (H)  2.3 - 4.1 mg/dL Final   • WBC 04/15/2023 11.29 (H)  4.31 - 10.16 Thousand/uL Final   • RBC 04/15/2023 4.14  3.88 - 5.62 Million/uL Final   • Hemoglobin 04/15/2023 11.8 (L)  12.0 - 17.0 g/dL Final   • Hematocrit 04/15/2023 35.2 (L)  36.5 - 49.3 % Final   • MCV 04/15/2023 85  82 - 98 fL Final • MCH 04/15/2023 28.5  26.8 - 34.3 pg Final   • MCHC 04/15/2023 33.5  31.4 - 37.4 g/dL Final   • RDW 04/15/2023 12.7  11.6 - 15.1 % Final   • MPV 04/15/2023 9.1  8.9 - 12.7 fL Final   • Platelets 56/08/9678 208  149 - 390 Thousands/uL Final   • nRBC 04/15/2023 0  /100 WBCs Final   • Neutrophils Relative 04/15/2023 84 (H)  43 - 75 % Final   • Immat GRANS % 04/15/2023 0  0 - 2 % Final   • Lymphocytes Relative 04/15/2023 7 (L)  14 - 44 % Final   • Monocytes Relative 04/15/2023 9  4 - 12 % Final   • Eosinophils Relative 04/15/2023 0  0 - 6 % Final   • Basophils Relative 04/15/2023 0  0 - 1 % Final   • Neutrophils Absolute 04/15/2023 9.38 (H)  1.85 - 7.62 Thousands/µL Final   • Immature Grans Absolute 04/15/2023 0.04  0.00 - 0.20 Thousand/uL Final   • Lymphocytes Absolute 04/15/2023 0.84  0.60 - 4.47 Thousands/µL Final   • Monocytes Absolute 04/15/2023 1.02  0.17 - 1.22 Thousand/µL Final   • Eosinophils Absolute 04/15/2023 0.01  0.00 - 0.61 Thousand/µL Final   • Basophils Absolute 04/15/2023 0.00  0.00 - 0.10 Thousands/µL Final   • Sodium 04/16/2023 133 (L)  135 - 147 mmol/L Final   • Potassium 04/16/2023 3.9  3.5 - 5.3 mmol/L Final   • Chloride 04/16/2023 100  96 - 108 mmol/L Final   • CO2 04/16/2023 28  21 - 32 mmol/L Final   • ANION GAP 04/16/2023 5  4 - 13 mmol/L Final   • BUN 04/16/2023 10  5 - 25 mg/dL Final   • Creatinine 04/16/2023 0.87  0.60 - 1.30 mg/dL Final    Standardized to IDMS reference method   • Glucose 04/16/2023 96  65 - 140 mg/dL Final    If the patient is fasting, the ADA then defines impaired fasting glucose as > 100 mg/dL and diabetes as > or equal to 123 mg/dL.    • Calcium 04/16/2023 8.2 (L)  8.4 - 10.2 mg/dL Final   • eGFR 04/16/2023 84  ml/min/1.73sq m Final   • Magnesium 04/16/2023 1.9  1.9 - 2.7 mg/dL Final   • WBC 04/16/2023 10.29 (H)  4.31 - 10.16 Thousand/uL Final   • RBC 04/16/2023 3.79 (L)  3.88 - 5.62 Million/uL Final   • Hemoglobin 04/16/2023 10.8 (L)  12.0 - 17.0 g/dL Final   • Hematocrit 04/16/2023 33.0 (L)  36.5 - 49.3 % Final   • MCV 04/16/2023 87  82 - 98 fL Final   • MCH 04/16/2023 28.5  26.8 - 34.3 pg Final   • MCHC 04/16/2023 32.7  31.4 - 37.4 g/dL Final   • RDW 04/16/2023 13.0  11.6 - 15.1 % Final   • Platelets 40/[birth year]1962 181  149 - 390 Thousands/uL Final   • MPV 04/16/2023 9.1  8.9 - 12.7 fL Final   • WBC 04/17/2023 8.80  4.31 - 10.16 Thousand/uL Final   • RBC 04/17/2023 3.68 (L)  3.88 - 5.62 Million/uL Final   • Hemoglobin 04/17/2023 10.5 (L)  12.0 - 17.0 g/dL Final   • Hematocrit 04/17/2023 32.6 (L)  36.5 - 49.3 % Final   • MCV 04/17/2023 89  82 - 98 fL Final   • MCH 04/17/2023 28.5  26.8 - 34.3 pg Final   • MCHC 04/17/2023 32.2  31.4 - 37.4 g/dL Final   • RDW 04/17/2023 13.1  11.6 - 15.1 % Final   • MPV 04/17/2023 9.1  8.9 - 12.7 fL Final   • Platelets 68/25/6738 192  149 - 390 Thousands/uL Final   • nRBC 04/17/2023 0  /100 WBCs Final   • Neutrophils Relative 04/17/2023 74  43 - 75 % Final   • Immat GRANS % 04/17/2023 0  0 - 2 % Final   • Lymphocytes Relative 04/17/2023 12 (L)  14 - 44 % Final   • Monocytes Relative 04/17/2023 12  4 - 12 % Final   • Eosinophils Relative 04/17/2023 2  0 - 6 % Final   • Basophils Relative 04/17/2023 0  0 - 1 % Final   • Neutrophils Absolute 04/17/2023 6.46  1.85 - 7.62 Thousands/µL Final   • Immature Grans Absolute 04/17/2023 0.02  0.00 - 0.20 Thousand/uL Final   • Lymphocytes Absolute 04/17/2023 1.09  0.60 - 4.47 Thousands/µL Final   • Monocytes Absolute 04/17/2023 1.04  0.17 - 1.22 Thousand/µL Final   • Eosinophils Absolute 04/17/2023 0.17  0.00 - 0.61 Thousand/µL Final   • Basophils Absolute 04/17/2023 0.02  0.00 - 0.10 Thousands/µL Final   • Sodium 04/17/2023 136  135 - 147 mmol/L Final   • Potassium 04/17/2023 3.8  3.5 - 5.3 mmol/L Final   • Chloride 04/17/2023 101  96 - 108 mmol/L Final   • CO2 04/17/2023 28  21 - 32 mmol/L Final   • ANION GAP 04/17/2023 7  4 - 13 mmol/L Final   • BUN 04/17/2023 8  5 - 25 mg/dL Final   • Creatinine 04/17/2023 0.79  0.60 - 1.30 mg/dL Final    Standardized to IDMS reference method   • Glucose 04/17/2023 94  65 - 140 mg/dL Final    If the patient is fasting, the ADA then defines impaired fasting glucose as > 100 mg/dL and diabetes as > or equal to 123 mg/dL. • Calcium 04/17/2023 8.3 (L)  8.4 - 10.2 mg/dL Final   • eGFR 04/17/2023 87  ml/min/1.73sq m Final   Appointment on 04/10/2023   Component Date Value Ref Range Status   • WBC 04/10/2023 6.74  4.31 - 10.16 Thousand/uL Final   • RBC 04/10/2023 4.30  3.88 - 5.62 Million/uL Final   • Hemoglobin 04/10/2023 12.3  12.0 - 17.0 g/dL Final   • Hematocrit 04/10/2023 37.9  36.5 - 49.3 % Final   • MCV 04/10/2023 88  82 - 98 fL Final   • MCH 04/10/2023 28.6  26.8 - 34.3 pg Final   • MCHC 04/10/2023 32.5  31.4 - 37.4 g/dL Final   • RDW 04/10/2023 13.1  11.6 - 15.1 % Final   • MPV 04/10/2023 9.4  8.9 - 12.7 fL Final   • Platelets 62/77/8836 236  149 - 390 Thousands/uL Final   • nRBC 04/10/2023 0  /100 WBCs Final   • Neutrophils Relative 04/10/2023 61  43 - 75 % Final   • Immat GRANS % 04/10/2023 0  0 - 2 % Final   • Lymphocytes Relative 04/10/2023 28  14 - 44 % Final   • Monocytes Relative 04/10/2023 9  4 - 12 % Final   • Eosinophils Relative 04/10/2023 2  0 - 6 % Final   • Basophils Relative 04/10/2023 0  0 - 1 % Final   • Neutrophils Absolute 04/10/2023 4.08  1.85 - 7.62 Thousands/µL Final   • Immature Grans Absolute 04/10/2023 0.01  0.00 - 0.20 Thousand/uL Final   • Lymphocytes Absolute 04/10/2023 1.88  0.60 - 4.47 Thousands/µL Final   • Monocytes Absolute 04/10/2023 0.60  0.17 - 1.22 Thousand/µL Final   • Eosinophils Absolute 04/10/2023 0.15  0.00 - 0.61 Thousand/µL Final   • Basophils Absolute 04/10/2023 0.02  0.00 - 0.10 Thousands/µL Final   • ABO Grouping 04/10/2023 A   Final   • Rh Factor 04/10/2023 Negative   Final   • Antibody Screen 04/10/2023 Negative   Final   • Specimen Expiration Date 04/10/2023 [id number]   Corrected    This is a corrected result.  Previous result was [id number] on 4/10/2023 at [facility] Outpatient Visit on 03/06/2023   Component Date Value Ref Range Status   • LA size 03/06/2023 4  cm Final   • Triscuspid Valve Regurgitation Pea* 03/06/2023 38.0  mmHg Final   • Tricuspid valve peak regurgitation* 03/06/2023 3.09  m/s Final   • LVPWd 03/06/2023 1.30  cm Final   • TR Peak Anirudh 03/06/2023 3.1  m/s Final   • IVSd 03/06/2023 1.50  cm Final   • LV DIASTOLIC VOLUME (MOD BIPLANE) * 03/06/2023 112  mL Final   • LEFT VENTRICLE SYSTOLIC VOLUME (MO* 92/65/4577 54  mL Final   • Left ventricular stroke volume (2D) 03/06/2023 59.00  mL Final   • EF 03/06/2023 47  % Final   • LVIDd 03/06/2023 4.90  cm Final   • IVS 03/06/2023 1.5  cm Final   • LVIDS 03/06/2023 3.60  cm Final   • FS 03/06/2023 27  28 - 44 % Final   • Ao root 03/06/2023 2.80  cm Final   • RVID d 03/06/2023 3.7  cm Final   • LV Systolic Volume (BP) 66/79/0387 66  mL Final   • LV Diastolic Volume (BP) 12/35/0158 125  mL Final   • KIMI A4C 03/06/2023 17.4  cm2 Final   • RAA A4C 03/06/2023 24.4  cm2 Final   • LVSV, 2D 03/06/2023 59  mL Final   • LV EF 03/06/2023 50   Final

## 2023-08-18 LAB
GAMMA INTERFERON BACKGROUND BLD IA-ACNC: 0.04 IU/ML
M TB IFN-G BLD-IMP: NEGATIVE
M TB IFN-G CD4+ BCKGRND COR BLD-ACNC: -0.02 IU/ML
M TB IFN-G CD4+ BCKGRND COR BLD-ACNC: -0.02 IU/ML
MITOGEN IGNF BCKGRD COR BLD-ACNC: 3.99 IU/ML

## 2023-08-23 DIAGNOSIS — G47.00 INSOMNIA, UNSPECIFIED TYPE: ICD-10-CM

## 2023-08-23 RX ORDER — HYDROXYZINE HYDROCHLORIDE 25 MG/1
25 TABLET, FILM COATED ORAL EVERY 6 HOURS PRN
Qty: 30 TABLET | Refills: 1 | Status: SHIPPED | OUTPATIENT
Start: 2023-08-23 | End: 2023-09-22

## 2023-08-31 DIAGNOSIS — G89.29 CHRONIC BACK PAIN, UNSPECIFIED BACK LOCATION, UNSPECIFIED BACK PAIN LATERALITY: ICD-10-CM

## 2023-08-31 DIAGNOSIS — M54.9 CHRONIC BACK PAIN, UNSPECIFIED BACK LOCATION, UNSPECIFIED BACK PAIN LATERALITY: ICD-10-CM

## 2023-08-31 RX ORDER — CARISOPRODOL 350 MG/1
350 TABLET ORAL 4 TIMES DAILY
Qty: 360 TABLET | Refills: 1 | Status: SHIPPED | OUTPATIENT
Start: 2023-08-31

## 2023-09-01 NOTE — PROGRESS NOTES
Assessment/Plan:    Decubitus ulcer of trochanteric region of left hip, stage 2 ([address])  The patient's stage II left hip trochanteric ulcer continues to heal nicely. Incisional hernia, without obstruction or gangrene  Returns the office status post reversal of Polo's colostomy in April 2023 with an asymptomatic ventral incisional hernia. Patient denies all complaints referable to his abdomen. He reports a good quality of life. He is going on his first vacation in 7 years to [place] to fish. On physical examination he is well-nourished well-appearing male no acute distress. Pleasant competent reliable as a historian. He has a reducible ventral hernia in the upper midline of his abdominal incision. He appears clinically stable with regards to the ventral incisional hernia and given the absence of symptoms he has 2 equally reasonable options moving forward. These include watchful waiting with scheduled 6-month follow-up visit. Alternatively we did discuss incisional herniorrhaphy as a reasonable option. All questions answered to the satisfaction of the patient. The patient prefers to avoid additional surgeries at this time which I believe to be safe and reasonable. He was educated regarding signs of acute incarceration and strangulation to include hard a reducible lump, intractable pain nausea and vomiting that should prompt immediate medical evaluation at hospital.    All questions answered to the satisfaction of the patient. He and his wife voiced understanding. I look forward to seeing him back in 6 months time. Subjective:      Patient ID: Shelli Pantoja is a 70 y.o. male. Patient is here today for 3 mo f/u on wound check to return to full activity,  s/p po flex sigmoidoscopy, reversal of sigmod colostomy repair of peristomal hernia 4/14/2023/ inc hernia.  Patient states he sometimes suffers from pain on the left side of his abd because of his belt but the wounds like good. Patient denies nausea/vomiting, diarrhea/constipation, drainage, trouble eating/drinking, rectal pain/bleeding or fevers/chills. Michelle BOURNE MA      The following portions of the patient's history were reviewed and updated as appropriate: allergies, current medications, past family history, past medical history, past social history, past surgical history and problem list.    Review of Systems   Constitutional: Negative for chills and fever. HENT: Negative for ear pain and sore throat. Eyes: Negative for pain and visual disturbance. Respiratory: Negative for cough and shortness of breath. Cardiovascular: Negative for chest pain and palpitations. Gastrointestinal: Negative for abdominal pain and vomiting. Genitourinary: Negative for dysuria and hematuria. Musculoskeletal: Negative for arthralgias and back pain. Skin: Negative for color change and rash. Neurological: Negative for seizures and syncope. All other systems reviewed and are negative. Objective:      /70 (BP Location: Left arm, Patient Position: Sitting, Cuff Size: Standard)   Pulse 62   Temp 97.5 °F (36.4 °C) (Temporal)   Resp 18   Ht 5' 7" (1.702 m)   Wt 73.9 kg (163 lb)   SpO2 97%   BMI 25.53 kg/m²          Physical Exam  Vitals and nursing note reviewed. Constitutional:       Appearance: He is well-developed. HENT:      Head: Normocephalic and atraumatic. Eyes:      Conjunctiva/sclera: Conjunctivae normal.      Pupils: Pupils are equal, round, and reactive to light. Cardiovascular:      Rate and Rhythm: Normal rate and regular rhythm. Pulmonary:      Effort: Pulmonary effort is normal.      Breath sounds: Normal breath sounds. Abdominal:      General: Bowel sounds are normal.      Palpations: Abdomen is soft. Comments: Abdominal exam as described above   Musculoskeletal:         General: Normal range of motion. Cervical back: Normal range of motion and neck supple.    Skin: General: Skin is warm and dry. Neurological:      Mental Status: He is alert and oriented to person, place, and time. Psychiatric:         Behavior: Behavior normal.         Thought Content:  Thought content normal.         Judgment: Judgment normal.

## 2023-09-06 ENCOUNTER — OFFICE VISIT (OUTPATIENT)
Dept: SURGERY | Facility: CLINIC | Age: 78
End: 2023-09-06
Payer: COMMERCIAL

## 2023-09-06 VITALS
BODY MASS INDEX: 25.58 KG/M2 | WEIGHT: 163 LBS | HEART RATE: 62 BPM | HEIGHT: 67 IN | DIASTOLIC BLOOD PRESSURE: 70 MMHG | TEMPERATURE: 97.5 F | RESPIRATION RATE: 18 BRPM | OXYGEN SATURATION: 97 % | SYSTOLIC BLOOD PRESSURE: 110 MMHG

## 2023-09-06 DIAGNOSIS — K43.2 INCISIONAL HERNIA, WITHOUT OBSTRUCTION OR GANGRENE: Primary | ICD-10-CM

## 2023-09-06 DIAGNOSIS — L89.222: ICD-10-CM

## 2023-09-06 PROCEDURE — 99213 OFFICE O/P EST LOW 20 MIN: CPT | Performed by: SURGERY

## 2023-09-06 NOTE — ASSESSMENT & PLAN NOTE
Returns the office status post reversal of Polo's colostomy in April 2023 with an asymptomatic ventral incisional hernia. Patient denies all complaints referable to his abdomen. He reports a good quality of life. He is going on his first vacation in 7 years to [place] to fish. On physical examination he is well-nourished well-appearing male no acute distress. Pleasant competent reliable as a historian. He has a reducible ventral hernia in the upper midline of his abdominal incision. He appears clinically stable with regards to the ventral incisional hernia and given the absence of symptoms he has 2 equally reasonable options moving forward. These include watchful waiting with scheduled 6-month follow-up visit. Alternatively we did discuss incisional herniorrhaphy as a reasonable option. All questions answered to the satisfaction of the patient. The patient prefers to avoid additional surgeries at this time which I believe to be safe and reasonable. He was educated regarding signs of acute incarceration and strangulation to include hard a reducible lump, intractable pain nausea and vomiting that should prompt immediate medical evaluation at hospital.    All questions answered to the satisfaction of the patient. He and his wife voiced understanding. I look forward to seeing him back in 6 months time.

## 2023-09-11 DIAGNOSIS — G47.00 INSOMNIA, UNSPECIFIED TYPE: ICD-10-CM

## 2023-09-11 RX ORDER — TRAZODONE HYDROCHLORIDE 300 MG/1
TABLET ORAL
Qty: 180 TABLET | Refills: 0 | Status: SHIPPED | OUTPATIENT
Start: 2023-09-11 | End: 2023-09-12 | Stop reason: SDUPTHER

## 2023-09-12 DIAGNOSIS — G47.00 INSOMNIA, UNSPECIFIED TYPE: ICD-10-CM

## 2023-09-12 RX ORDER — TRAZODONE HYDROCHLORIDE 300 MG/1
TABLET ORAL
Qty: 14 TABLET | Refills: 0 | Status: SHIPPED | OUTPATIENT
Start: 2023-09-12

## 2023-09-12 NOTE — TELEPHONE ENCOUNTER
Patient called. He is in [address] and has all of his medications, but he forgot his trazodone. He asked if we could send like 10 pills to this [facility]. I told him I would leave a message for you.

## 2023-09-17 ENCOUNTER — TELEPHONE (OUTPATIENT)
Dept: RHEUMATOLOGY | Facility: CLINIC | Age: 78
End: 2023-09-17

## 2023-09-17 NOTE — TELEPHONE ENCOUNTER
Please pursue urgent prior authorization for Otezla starter pack and then 30 mg p.o. twice daily maintenance dose for patient's uncontrolled psoriatic arthritis. Methotrexate was ineffective. Was on Humira in 2015, which lost efficacy. Was on Izell Shouts in 2019 for very few months due to cardiac risk. 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 Wants to pursue medication option with the least infection risk. Miguel Keenan would be a good option for him. Has recent TB test and viral hepatitis panel. Let me know once prior Auth obtained and what specialty pharmacy to send to.

## 2023-09-19 DIAGNOSIS — M48.061 SPINAL STENOSIS OF LUMBAR REGION, UNSPECIFIED WHETHER NEUROGENIC CLAUDICATION PRESENT: ICD-10-CM

## 2023-09-19 RX ORDER — OXYCODONE AND ACETAMINOPHEN 10; 325 MG/1; MG/1
1 TABLET ORAL EVERY 4 HOURS PRN
Qty: 120 TABLET | Refills: 0 | Status: SHIPPED | OUTPATIENT
Start: 2023-09-19

## 2023-09-22 DIAGNOSIS — M48.061 SPINAL STENOSIS OF LUMBAR REGION, UNSPECIFIED WHETHER NEUROGENIC CLAUDICATION PRESENT: ICD-10-CM

## 2023-09-22 RX ORDER — OXYCODONE HYDROCHLORIDE 10 MG/1
TABLET ORAL
Qty: 120 TABLET | Refills: 0 | Status: SHIPPED | OUTPATIENT
Start: 2023-09-22

## 2023-09-25 DIAGNOSIS — G47.00 INSOMNIA, UNSPECIFIED TYPE: ICD-10-CM

## 2023-09-25 RX ORDER — HYDROXYZINE HYDROCHLORIDE 25 MG/1
25 TABLET, FILM COATED ORAL EVERY 6 HOURS PRN
Qty: 30 TABLET | Refills: 1 | Status: SHIPPED | OUTPATIENT
Start: 2023-09-25 | End: 2023-10-25

## 2023-09-26 ENCOUNTER — OFFICE VISIT (OUTPATIENT)
Dept: CARDIOLOGY CLINIC | Facility: CLINIC | Age: 78
End: 2023-09-26
Payer: COMMERCIAL

## 2023-09-26 VITALS
OXYGEN SATURATION: 97 % | BODY MASS INDEX: 26.04 KG/M2 | SYSTOLIC BLOOD PRESSURE: 132 MMHG | HEART RATE: 65 BPM | HEIGHT: 67 IN | WEIGHT: 165.9 LBS | DIASTOLIC BLOOD PRESSURE: 68 MMHG

## 2023-09-26 DIAGNOSIS — I48.91 ATRIAL FIBRILLATION, UNSPECIFIED TYPE (HCC): Primary | ICD-10-CM

## 2023-09-26 DIAGNOSIS — I42.9 CARDIOMYOPATHY, UNSPECIFIED TYPE (HCC): ICD-10-CM

## 2023-09-26 DIAGNOSIS — I10 PRIMARY HYPERTENSION: ICD-10-CM

## 2023-09-26 DIAGNOSIS — E78.2 MIXED HYPERLIPIDEMIA: ICD-10-CM

## 2023-09-26 DIAGNOSIS — Z95.3 STATUS POST AORTIC VALVE REPLACEMENT WITH BIOPROSTHETIC VALVE: ICD-10-CM

## 2023-09-26 DIAGNOSIS — I47.10 PAROXYSMAL SVT (SUPRAVENTRICULAR TACHYCARDIA): ICD-10-CM

## 2023-09-26 DIAGNOSIS — I25.10 ATHEROSCLEROSIS OF NATIVE CORONARY ARTERY OF NATIVE HEART WITHOUT ANGINA PECTORIS: ICD-10-CM

## 2023-09-26 PROCEDURE — 93000 ELECTROCARDIOGRAM COMPLETE: CPT | Performed by: INTERNAL MEDICINE

## 2023-09-26 PROCEDURE — 99214 OFFICE O/P EST MOD 30 MIN: CPT | Performed by: INTERNAL MEDICINE
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
## 2023-09-26 NOTE — TELEPHONE ENCOUNTER
Prior Authorization has been approved  for patients starter pack and is effective from 01/01/2023 until 09/05/2026. Can be sent to pharmacy.

## 2023-09-27 DIAGNOSIS — L40.50 PSORIASIS WITH ARTHROPATHY (HCC): Primary | ICD-10-CM

## 2023-09-27 DIAGNOSIS — L40.9 PSORIASIS: ICD-10-CM

## 2023-09-27 RX ORDER — APREMILAST 30 MG/1
30 TABLET, FILM COATED ORAL 2 TIMES DAILY
Qty: 60 TABLET | Refills: 11 | Status: SHIPPED | OUTPATIENT
Start: 2023-09-27

## 2023-09-27 RX ORDER — APREMILAST 10-20-30MG
KIT ORAL
Qty: 55 EACH | Refills: 0 | Status: SHIPPED | OUTPATIENT
Start: 2023-09-27

## 2023-09-27 NOTE — TELEPHONE ENCOUNTER
Great, sent it to [facility] specialty pharmacy. Please let patient know to call them at [phone] so that he can arrange delivery.

## 2023-09-28 ENCOUNTER — TELEPHONE (OUTPATIENT)
Age: 78
End: 2023-09-28

## 2023-09-28 NOTE — TELEPHONE ENCOUNTER
Caller: Patient     Doctor: Estrada Polo     Reason for call: Patient wanted to know if medication Apremilast was approved with insurance?      Call back#: [phone]

## 2023-10-03 NOTE — TELEPHONE ENCOUNTER
Caller: Patient     Doctor: Allie Stanley     Reason for call: Patient states his Lana Montemayor was approved and would like to know what his next steps are     Call back#: [phone]

## 2023-10-03 NOTE — TELEPHONE ENCOUNTER
Please let him know again that I sent it to [facility] specialty pharmacy, and he can call them at [phone] to arrange delivery

## 2023-10-04 NOTE — TELEPHONE ENCOUNTER
If patient is concerned about the cost of his co-pay, he can apply for an [employer] co-pay card online or the [employer] to get free drug, please let him know

## 2023-10-04 NOTE — TELEPHONE ENCOUNTER
Talked the patient and he stated that the pharmacy had told him to contact us and he had been worried about the cost of the medication due to being on a fixed income, please advise

## 2023-10-17 DIAGNOSIS — M48.061 SPINAL STENOSIS OF LUMBAR REGION, UNSPECIFIED WHETHER NEUROGENIC CLAUDICATION PRESENT: ICD-10-CM

## 2023-10-17 RX ORDER — OXYCODONE AND ACETAMINOPHEN 10; 325 MG/1; MG/1
1 TABLET ORAL EVERY 4 HOURS PRN
Qty: 120 TABLET | Refills: 0 | Status: SHIPPED | OUTPATIENT
Start: 2023-10-17

## 2023-10-26 DIAGNOSIS — M48.061 SPINAL STENOSIS OF LUMBAR REGION, UNSPECIFIED WHETHER NEUROGENIC CLAUDICATION PRESENT: ICD-10-CM

## 2023-10-26 RX ORDER — OXYCODONE HYDROCHLORIDE 10 MG/1
TABLET ORAL
Qty: 120 TABLET | Refills: 0 | Status: SHIPPED | OUTPATIENT
Start: 2023-10-26

## 2023-11-01 ENCOUNTER — RA CDI HCC (OUTPATIENT)
Dept: OTHER | Facility: HOSPITAL | Age: 78
End: 2023-11-01

## 2023-11-01 ENCOUNTER — TELEPHONE (OUTPATIENT)
Age: 78
End: 2023-11-01

## 2023-11-01 NOTE — PROGRESS NOTES
M05.9  [facility] coding opportunities          Chart Reviewed number of suggestions sent to Provider: 1     Patients Insurance     Medicare Insurance: [id number]

## 2023-11-01 NOTE — TELEPHONE ENCOUNTER
Caller: Gatito Juárez     Doctor: Aydin Cisse    Reason for call: He is unable to tolerate the Idania Fragmin it causes severe diarrhea     Call back#: [phone]

## 2023-11-02 DIAGNOSIS — G47.00 INSOMNIA, UNSPECIFIED TYPE: ICD-10-CM

## 2023-11-02 DIAGNOSIS — M48.061 SPINAL STENOSIS OF LUMBAR REGION, UNSPECIFIED WHETHER NEUROGENIC CLAUDICATION PRESENT: ICD-10-CM

## 2023-11-02 RX ORDER — TRAZODONE HYDROCHLORIDE 300 MG/1
600 TABLET ORAL
Qty: 180 TABLET | Refills: 1 | Status: SHIPPED | OUTPATIENT
Start: 2023-11-02

## 2023-11-02 RX ORDER — OXYCODONE AND ACETAMINOPHEN 10; 325 MG/1; MG/1
1 TABLET ORAL EVERY 4 HOURS PRN
Qty: 120 TABLET | Refills: 0 | Status: CANCELLED | OUTPATIENT
Start: 2023-11-02

## 2023-11-02 RX ORDER — TRAZODONE HYDROCHLORIDE 300 MG/1
TABLET ORAL
Qty: 180 TABLET | Refills: 0 | Status: SHIPPED | OUTPATIENT
Start: 2023-11-02 | End: 2023-11-02 | Stop reason: SDUPTHER

## 2023-11-03 NOTE — TELEPHONE ENCOUNTER
Spoke to pt. Stated he will reach out if he wants to try different medications when he is feeling better.

## 2023-11-03 NOTE — TELEPHONE ENCOUNTER
That's fine, he should contact us if he wants to try any other medication such as Orencia, sulfasalazine, or leflunomide.

## 2023-11-03 NOTE — TELEPHONE ENCOUNTER
Pt states he does not want to continue the [place], even once a day, he wants to give his stomach a rest at this time.

## 2023-11-03 NOTE — TELEPHONE ENCOUNTER
How long has been taking it? Does he think that he can tolerate it just once a day instead of twice a day?

## 2023-11-08 ENCOUNTER — OFFICE VISIT (OUTPATIENT)
Dept: FAMILY MEDICINE CLINIC | Facility: CLINIC | Age: 78
End: 2023-11-08
Payer: COMMERCIAL

## 2023-11-08 VITALS
HEIGHT: 68 IN | OXYGEN SATURATION: 96 % | DIASTOLIC BLOOD PRESSURE: 80 MMHG | BODY MASS INDEX: 26.07 KG/M2 | RESPIRATION RATE: 16 BRPM | WEIGHT: 172 LBS | HEART RATE: 60 BPM | TEMPERATURE: 97.5 F | SYSTOLIC BLOOD PRESSURE: 130 MMHG

## 2023-11-08 DIAGNOSIS — Z79.899 HIGH RISK MEDICATION USE: ICD-10-CM

## 2023-11-08 DIAGNOSIS — M54.9 CHRONIC BACK PAIN, UNSPECIFIED BACK LOCATION, UNSPECIFIED BACK PAIN LATERALITY: ICD-10-CM

## 2023-11-08 DIAGNOSIS — G89.29 CHRONIC BACK PAIN, UNSPECIFIED BACK LOCATION, UNSPECIFIED BACK PAIN LATERALITY: ICD-10-CM

## 2023-11-08 DIAGNOSIS — R73.9 HYPERGLYCEMIA: ICD-10-CM

## 2023-11-08 DIAGNOSIS — L89.222 PRESSURE INJURY OF LEFT HIP, STAGE 2 (HCC): ICD-10-CM

## 2023-11-08 DIAGNOSIS — Z00.00 MEDICARE ANNUAL WELLNESS VISIT, SUBSEQUENT: Primary | ICD-10-CM

## 2023-11-08 DIAGNOSIS — M48.061 SPINAL STENOSIS OF LUMBAR REGION, UNSPECIFIED WHETHER NEUROGENIC CLAUDICATION PRESENT: ICD-10-CM

## 2023-11-08 DIAGNOSIS — Z12.5 SCREENING FOR PROSTATE CANCER: ICD-10-CM

## 2023-11-08 DIAGNOSIS — E78.2 MIXED HYPERLIPIDEMIA: ICD-10-CM

## 2023-11-08 PROCEDURE — G0439 PPPS, SUBSEQ VISIT: HCPCS | Performed by: FAMILY MEDICINE

## 2023-11-08 RX ORDER — OXYCODONE AND ACETAMINOPHEN 10; 325 MG/1; MG/1
1 TABLET ORAL EVERY 4 HOURS PRN
Qty: 120 TABLET | Refills: 0 | Status: SHIPPED | OUTPATIENT
Start: 2023-11-08

## 2023-11-08 RX ORDER — CARISOPRODOL 350 MG/1
350 TABLET ORAL 4 TIMES DAILY
Qty: 360 TABLET | Refills: 1 | Status: SHIPPED | OUTPATIENT
Start: 2023-11-08
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
## 2023-11-08 NOTE — PATIENT INSTRUCTIONS
Medicare Preventive Visit Patient Instructions  Thank you for completing your Welcome to Medicare Visit or Medicare Annual Wellness Visit today. Your next wellness visit will be due in one year (11/8/2024). The screening/preventive services that you may require over the next 5-10 years are detailed below. Some tests may not apply to you based off risk factors and/or age. Screening tests ordered at today's visit but not completed yet may show as past due. Also, please note that scanned in results may not display below. Preventive Screenings:  Service Recommendations Previous Testing/Comments   Colorectal Cancer Screening  Colonoscopy    Fecal Occult Blood Test (FOBT)/Fecal Immunochemical Test (FIT)  Fecal DNA/Cologuard Test  Flexible Sigmoidoscopy Age: 43-73 years old   Colonoscopy: every 10 years (May be performed more frequently if at higher risk)  OR  FOBT/FIT: every 1 year  OR  Cologuard: every 3 years  OR  Sigmoidoscopy: every 5 years  Screening may be recommended earlier than age 39 if at higher risk for colorectal cancer. Also, an individualized decision between you and your healthcare provider will decide whether screening between the ages of 77-80 would be appropriate. Colonoscopy: 06/02/2020  FOBT/FIT: Not on file  Cologuard: Not on file  Sigmoidoscopy: 04/14/2023          Prostate Cancer Screening Individualized decision between patient and health care provider in men between ages of 53-66   Medicare will cover every 12 months beginning on the day after your 50th birthday PSA: 0.4 ng/mL           Hepatitis C Screening Once for adults born between 1945 and 1965  More frequently in patients at high risk for Hepatitis C Hep C Antibody: Not on file        Diabetes Screening 1-2 times per year if you're at risk for diabetes or have pre-diabetes Fasting glucose: 95 mg/dL (5/15/2023)  A1C: 6.0 % (3/30/2022)      Cholesterol Screening Once every 5 years if you don't have a lipid disorder.  May order more often based on risk factors. Lipid panel: 03/30/2022         Other Preventive Screenings Covered by Medicare:  Abdominal Aortic Aneurysm (AAA) Screening: covered once if your at risk. You're considered to be at risk if you have a family history of AAA or a male between the age of 70-76 who smoking at least 100 cigarettes in your lifetime. Lung Cancer Screening: covers low dose CT scan once per year if you meet all of the following conditions: (1) Age 48-67; (2) No signs or symptoms of lung cancer; (3) Current smoker or have quit smoking within the last 15 years; (4) You have a tobacco smoking history of at least 20 pack years (packs per day x number of years you smoked); (5) You get a written order from a healthcare provider. Glaucoma Screening: covered annually if you're considered high risk: (1) You have diabetes OR (2) Family history of glaucoma OR (3)  aged 48 and older OR (3)  American aged 72 and older  Osteoporosis Screening: covered every 2 years if you meet one of the following conditions: (1) Have a vertebral abnormality; (2) On glucocorticoid therapy for more than 3 months; (3) Have primary hyperparathyroidism; (4) On osteoporosis medications and need to assess response to drug therapy. HIV Screening: covered annually if you're between the age of 14-79. Also covered annually if you are younger than 13 and older than 72 with risk factors for HIV infection. For pregnant patients, it is covered up to 3 times per pregnancy.     Immunizations:  Immunization Recommendations   Influenza Vaccine Annual influenza vaccination during flu season is recommended for all persons aged >= 6 months who do not have contraindications   Pneumococcal Vaccine   * Pneumococcal conjugate vaccine = PCV13 (Prevnar 13), PCV15 (Vaxneuvance), PCV20 (Prevnar 20)  * Pneumococcal polysaccharide vaccine = PPSV23 (Pneumovax) Adults 82-28 yo with certain risk factors or if 69+ yo  If never received any pneumonia vaccine: recommend Prevnar 21 (PCV20)  Give PCV20 if previously received 1 dose of PCV13 or PPSV23   Hepatitis B Vaccine 3 dose series if at intermediate or high risk (ex: diabetes, end stage renal disease, liver disease)   Respiratory syncytial virus (RSV) Vaccine - COVERED BY MEDICARE PART D  * RSVPreF3 (Arexvy) CDC recommends that adults 61years of age and older may receive a single dose of RSV vaccine using shared clinical decision-making (SCDM)   Tetanus (Td) Vaccine - COST NOT COVERED BY MEDICARE PART B Following completion of primary series, a booster dose should be given every 10 years to maintain immunity against tetanus. Td may also be given as tetanus wound prophylaxis. Tdap Vaccine - COST NOT COVERED BY MEDICARE PART B Recommended at least once for all adults. For pregnant patients, recommended with each pregnancy. Shingles Vaccine (Shingrix) - COST NOT COVERED BY MEDICARE PART B  2 shot series recommended in those 19 years and older who have or will have weakened immune systems or those 50 years and older     Health Maintenance Due:      Topic Date Due   • Hepatitis C Screening  Completed   • Colorectal Cancer Screening  Discontinued     Immunizations Due:      Topic Date Due   • COVID-19 Vaccine (1) Never done   • Influenza Vaccine (1) 09/01/2023     Advance Directives   What are advance directives? Advance directives are legal documents that state your wishes and plans for medical care. These plans are made ahead of time in case you lose your ability to make decisions for yourself. Advance directives can apply to any medical decision, such as the treatments you want, and if you want to donate organs. What are the types of advance directives? There are many types of advance directives, and each state has rules about how to use them. You may choose a combination of any of the following:  Living will: This is a written record of the treatment you want.  You can also choose which treatments you do not want, which to limit, and which to stop at a certain time. This includes surgery, medicine, IV fluid, and tube feedings. Durable power of  for [facility]): This is a written record that states who you want to make healthcare choices for you when you are unable to make them for yourself. This person, called a proxy, is usually a family member or a friend. You may choose more than 1 proxy. Do not resuscitate (DNR) order:  A DNR order is used in case your heart stops beating or you stop breathing. It is a request not to have certain forms of treatment, such as CPR. A DNR order may be included in other types of advance directives. Medical directive: This covers the care that you want if you are in a coma, near death, or unable to make decisions for yourself. You can list the treatments you want for each condition. Treatment may include pain medicine, surgery, blood transfusions, dialysis, IV or tube feedings, and a ventilator (breathing machine). Values history: This document has questions about your views, beliefs, and how you feel and think about life. This information can help others choose the care that you would choose. Why are advance directives important? An advance directive helps you control your care. Although spoken wishes may be used, it is better to have your wishes written down. Spoken wishes can be misunderstood, or not followed. Treatments may be given even if you do not want them. An advance directive may make it easier for your family to make difficult choices about your care. Weight Management   Why it is important to manage your weight:  Being overweight increases your risk of health conditions such as heart disease, high blood pressure, type 2 diabetes, and certain types of cancer. It can also increase your risk for osteoarthritis, sleep apnea, and other respiratory problems. Aim for a slow, steady weight loss.  Even a small amount of weight loss can lower your risk of health problems. How to lose weight safely:  A safe and healthy way to lose weight is to eat fewer calories and get regular exercise. You can lose up about 1 pound a week by decreasing the number of calories you eat by 500 calories each day. Healthy meal plan for weight management:  A healthy meal plan includes a variety of foods, contains fewer calories, and helps you stay healthy. A healthy meal plan includes the following:  Eat whole-grain foods more often. A healthy meal plan should contain fiber. Fiber is the part of grains, fruits, and vegetables that is not broken down by your body. Whole-grain foods are healthy and provide extra fiber in your diet. Some examples of whole-grain foods are whole-wheat breads and pastas, oatmeal, brown rice, and bulgur. Eat a variety of vegetables every day. Include dark, leafy greens such as spinach, kale, marquez greens, and mustard greens. Eat yellow and orange vegetables such as carrots, sweet potatoes, and winter squash. Eat a variety of fruits every day. Choose fresh or canned fruit (canned in its own juice or light syrup) instead of juice. Fruit juice has very little or no fiber. Eat low-fat dairy foods. Drink fat-free (skim) milk or 1% milk. Eat fat-free yogurt and low-fat cottage cheese. Try low-fat cheeses such as mozzarella and other reduced-fat cheeses. Choose meat and other protein foods that are low in fat. Choose beans or other legumes such as split peas or lentils. Choose fish, skinless poultry (chicken or turkey), or lean cuts of red meat (beef or pork). Before you cook meat or poultry, cut off any visible fat. Use less fat and oil. Try baking foods instead of frying them. Add less fat, such as margarine, sour cream, regular salad dressing and mayonnaise to foods. Eat fewer high-fat foods. Some examples of high-fat foods include french fries, doughnuts, ice cream, and cakes. Eat fewer sweets. Limit foods and drinks that are high in sugar.  This includes candy, cookies, regular soda, and sweetened drinks. Exercise:  Exercise at least 30 minutes per day on most days of the week. Some examples of exercise include walking, biking, dancing, and swimming. You can also fit in more physical activity by taking the stairs instead of the elevator or parking farther away from stores. Ask your healthcare provider about the best exercise plan for you. © Copyright [employer] 2018 Information is for End User's use only and may not be sold, redistributed or otherwise used for commercial purposes.  All illustrations and images included in CareNotes® are the copyrighted property of [employer] or  [address]

## 2023-11-09 DIAGNOSIS — G47.00 INSOMNIA, UNSPECIFIED TYPE: ICD-10-CM

## 2023-11-09 RX ORDER — HYDROXYZINE HYDROCHLORIDE 25 MG/1
25 TABLET, FILM COATED ORAL EVERY 6 HOURS PRN
Qty: 30 TABLET | Refills: 1 | Status: SHIPPED | OUTPATIENT
Start: 2023-11-09 | End: 2023-12-09

## 2023-11-29 DIAGNOSIS — G47.00 INSOMNIA, UNSPECIFIED TYPE: ICD-10-CM

## 2023-11-29 DIAGNOSIS — M48.061 SPINAL STENOSIS OF LUMBAR REGION, UNSPECIFIED WHETHER NEUROGENIC CLAUDICATION PRESENT: ICD-10-CM

## 2023-11-29 RX ORDER — HYDROXYZINE HYDROCHLORIDE 25 MG/1
25 TABLET, FILM COATED ORAL EVERY 6 HOURS PRN
Qty: 30 TABLET | Refills: 1 | Status: SHIPPED | OUTPATIENT
Start: 2023-11-29 | End: 2023-12-29

## 2023-11-29 RX ORDER — OXYCODONE HYDROCHLORIDE 10 MG/1
TABLET ORAL
Qty: 120 TABLET | Refills: 0 | Status: SHIPPED | OUTPATIENT
Start: 2023-11-29

## 2023-12-04 ENCOUNTER — OFFICE VISIT (OUTPATIENT)
Dept: WOUND CARE | Facility: HOSPITAL | Age: 78
End: 2023-12-04
Payer: COMMERCIAL

## 2023-12-04 VITALS
HEART RATE: 82 BPM | DIASTOLIC BLOOD PRESSURE: 80 MMHG | TEMPERATURE: 97.7 F | RESPIRATION RATE: 20 BRPM | HEIGHT: 67 IN | SYSTOLIC BLOOD PRESSURE: 155 MMHG | WEIGHT: 170 LBS | BODY MASS INDEX: 26.68 KG/M2

## 2023-12-04 DIAGNOSIS — L89.223 PRESSURE INJURY OF LEFT HIP, STAGE 3 (HCC): Primary | ICD-10-CM

## 2023-12-04 PROCEDURE — 11042 DBRDMT SUBQ TIS 1ST 20SQCM/<: CPT | Performed by: SURGERY

## 2023-12-04 PROCEDURE — 11045 DBRDMT SUBQ TISS EACH ADDL: CPT | Performed by: SURGERY

## 2023-12-04 PROCEDURE — 99213 OFFICE O/P EST LOW 20 MIN: CPT | Performed by: SURGERY

## 2023-12-04 PROCEDURE — 99204 OFFICE O/P NEW MOD 45 MIN: CPT | Performed by: SURGERY

## 2023-12-04 RX ORDER — LIDOCAINE 40 MG/G
CREAM TOPICAL ONCE
Status: COMPLETED | OUTPATIENT
Start: 2023-12-04 | End: 2023-12-04

## 2023-12-04 RX ADMIN — LIDOCAINE: 40 CREAM TOPICAL at 10:17
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
## 2023-12-04 NOTE — PATIENT INSTRUCTIONS
Orders Placed This Encounter   Procedures    Wound cleansing and dressings     Wound location left hip   Change dressing 3x per week. You may remove the dressing and shower. Do not leave wound open to air, apply new dressing immediately. Cleanse the wound with normal saline or mild soap and water, pat dry. Apply Puracol Ag cut to fit wound bed. (Sent home with patient. )  Cover with bordered foam.  (ABD and tape applied today at [facility]). This was applied today at the [facility].      Standing Status:   Future     Standing Expiration Date:   12/4/2024

## 2023-12-13 DIAGNOSIS — M48.061 SPINAL STENOSIS OF LUMBAR REGION, UNSPECIFIED WHETHER NEUROGENIC CLAUDICATION PRESENT: ICD-10-CM

## 2023-12-14 RX ORDER — OXYCODONE AND ACETAMINOPHEN 10; 325 MG/1; MG/1
1 TABLET ORAL EVERY 4 HOURS PRN
Qty: 120 TABLET | Refills: 0 | Status: SHIPPED | OUTPATIENT
Start: 2023-12-14

## 2023-12-18 ENCOUNTER — OFFICE VISIT (OUTPATIENT)
Dept: WOUND CARE | Facility: HOSPITAL | Age: 78
End: 2023-12-18
Payer: COMMERCIAL

## 2023-12-18 VITALS
SYSTOLIC BLOOD PRESSURE: 138 MMHG | DIASTOLIC BLOOD PRESSURE: 80 MMHG | HEART RATE: 71 BPM | RESPIRATION RATE: 20 BRPM | TEMPERATURE: 97.6 F

## 2023-12-18 DIAGNOSIS — L89.223 PRESSURE INJURY OF LEFT HIP, STAGE 3 (HCC): Primary | ICD-10-CM

## 2023-12-18 PROCEDURE — 11042 DBRDMT SUBQ TIS 1ST 20SQCM/<: CPT | Performed by: SURGERY

## 2023-12-18 PROCEDURE — NC001 PR NO CHARGE: Performed by: SURGERY

## 2023-12-18 RX ORDER — LIDOCAINE 40 MG/G
CREAM TOPICAL ONCE
Status: COMPLETED | OUTPATIENT
Start: 2023-12-18 | End: 2023-12-18

## 2023-12-18 RX ADMIN — LIDOCAINE: 40 CREAM TOPICAL at 10:56

## 2023-12-18 NOTE — PATIENT INSTRUCTIONS
Orders Placed This Encounter   Procedures    Wound off loading Pressure Injury Left Hip     Off-loading Instructions:    Keep weight and pressure off wound at all times.   Turn every 2 hours. Avoid position directing pressure to Wound site. Try not to sleep on left side to keep pressure off of wound.    Limit side lying to 30 degree tilt.   Limit the head of bed elevation to 30 degrees.  Off Loading Mattress :  will be sending script to [employer] to see if Paras qualifies for a group 2 pressure reduction mattress.   (type)     Standing Status:   Future     Standing Expiration Date:   12/18/2024    Wound cleansing and dressings Pressure Injury Left Hip     Wound location left hip   Change dressing 3x per week.   You may remove the dressing and shower. Do not leave wound open to air, apply new dressing immediately.  Cleanse the wound with normal saline or mild soap and water, pat dry.   Apply Opticell AG cut to fit wound bed.  (Sent home with patient. )  Cover with bordered foam.          This was applied today at the [facility].  Supplies ordered from [facility] today.  Phone 6      Standing Status:   Future     Standing Expiration Date:   12/18/2024    Specialty Mattress

## 2023-12-18 NOTE — ASSESSMENT & PLAN NOTE
Will use [employer], needs pressure relief mattress of some sort as he is currently on regular mattress and difficult to avoid pressure to the affected hip secondary to shoulder and arthritic issues
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
## 2023-12-19 ENCOUNTER — TELEPHONE (OUTPATIENT)
Dept: WOUND CARE | Facility: HOSPITAL | Age: 78
End: 2023-12-19

## 2023-12-19 NOTE — TELEPHONE ENCOUNTER
Amelia from [facility] called regarding RX for specialty mattress, the RX needs to state height and weight, non powered overlay and the HCPCTs code of .

## 2023-12-26 DIAGNOSIS — M48.061 SPINAL STENOSIS OF LUMBAR REGION, UNSPECIFIED WHETHER NEUROGENIC CLAUDICATION PRESENT: ICD-10-CM

## 2023-12-26 RX ORDER — OXYCODONE HYDROCHLORIDE 10 MG/1
TABLET ORAL
Qty: 120 TABLET | Refills: 0 | Status: SHIPPED | OUTPATIENT
Start: 2023-12-26

## 2024-01-03 ENCOUNTER — OFFICE VISIT (OUTPATIENT)
Dept: WOUND CARE | Facility: HOSPITAL | Age: 79
End: 2024-01-03
Payer: COMMERCIAL

## 2024-01-03 VITALS
DIASTOLIC BLOOD PRESSURE: 60 MMHG | SYSTOLIC BLOOD PRESSURE: 96 MMHG | RESPIRATION RATE: 16 BRPM | HEART RATE: 72 BPM | TEMPERATURE: 97.9 F

## 2024-01-03 DIAGNOSIS — L89.223 PRESSURE INJURY OF LEFT HIP, STAGE 3 (HCC): Primary | ICD-10-CM

## 2024-01-03 PROCEDURE — 99204 OFFICE O/P NEW MOD 45 MIN: CPT | Performed by: FAMILY MEDICINE

## 2024-01-03 PROCEDURE — 11042 DBRDMT SUBQ TIS 1ST 20SQCM/<: CPT | Performed by: FAMILY MEDICINE

## 2024-01-03 RX ORDER — LIDOCAINE 40 MG/G
CREAM TOPICAL ONCE
Status: COMPLETED | OUTPATIENT
Start: 2024-01-03 | End: 2024-01-03

## 2024-01-03 RX ADMIN — LIDOCAINE: 40 CREAM TOPICAL at 10:31
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
## 2024-01-03 NOTE — PATIENT INSTRUCTIONS
Orders Placed This Encounter   Procedures    Wound cleansing and dressings Pressure Injury Left Hip     Wound location left hip   Change dressing 2x per week.   You may remove the dressing and shower. Do not leave wound open to air, apply new dressing immediately.  Cleanse the wound with normal saline or mild soap and water, pat dry.   Apply Opticell AG cut to fit wound bed.  (Sent home with patient. )  Cover with bordered foam.          This was applied today at the [facility].     Standing Status:   Future     Standing Expiration Date:   1/3/2025    Wound off loading Pressure Injury Left Hip     Off-loading Instructions:     Keep weight and pressure off wound at all times.   Turn every 2 hours. Avoid position directing pressure to Wound site. Try not to sleep on left side to keep pressure off of wound.    Limit side lying to 30 degree tilt.   Limit the head of bed elevation to 30 degrees.     Standing Status:   Future     Standing Expiration Date:   1/3/2025

## 2024-01-11 DIAGNOSIS — M48.061 SPINAL STENOSIS OF LUMBAR REGION, UNSPECIFIED WHETHER NEUROGENIC CLAUDICATION PRESENT: ICD-10-CM

## 2024-01-11 RX ORDER — OXYCODONE AND ACETAMINOPHEN 10; 325 MG/1; MG/1
1 TABLET ORAL EVERY 4 HOURS PRN
Qty: 120 TABLET | Refills: 0 | Status: SHIPPED | OUTPATIENT
Start: 2024-01-11

## 2024-01-22 ENCOUNTER — OFFICE VISIT (OUTPATIENT)
Dept: WOUND CARE | Facility: HOSPITAL | Age: 79
End: 2024-01-22
Payer: COMMERCIAL

## 2024-01-22 VITALS
RESPIRATION RATE: 18 BRPM | TEMPERATURE: 99 F | SYSTOLIC BLOOD PRESSURE: 127 MMHG | DIASTOLIC BLOOD PRESSURE: 69 MMHG | HEART RATE: 65 BPM

## 2024-01-22 DIAGNOSIS — L89.223 PRESSURE INJURY OF LEFT HIP, STAGE 3 (HCC): Primary | ICD-10-CM

## 2024-01-22 PROCEDURE — 17250 CHEM CAUT OF GRANLTJ TISSUE: CPT | Performed by: SURGERY
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
## 2024-01-22 NOTE — PATIENT INSTRUCTIONS
Orders Placed This Encounter   Procedures    Wound cleansing and dressings Pressure Injury Left Hip     Wound location left hip   Change dressing every other day  You may remove the dressing and shower. Do not leave wound open to air, apply new dressing immediately.  Cleanse the wound with normal saline or mild soap and water, pat dry.   Stop [employer]   Apply dermagran cut just to fit the wound (1 pc given)  Cover with bordered foam.       This was applied today at the [facility].     Standing Status:   Future     Standing Expiration Date:   1/22/2025    Wound off loading Pressure Injury Left Hip     Keep weight and pressure off wound at all times.   Turn every 2 hours. Avoid position directing pressure to Wound site. Try not to sleep on left side to keep pressure off of wound.    Limit side lying to 30 degree tilt.   Limit the head of bed elevation to 30 degrees.     Standing Status:   Future     Standing Expiration Date:   1/22/2025

## 2024-01-24 DIAGNOSIS — M48.061 SPINAL STENOSIS OF LUMBAR REGION, UNSPECIFIED WHETHER NEUROGENIC CLAUDICATION PRESENT: ICD-10-CM

## 2024-01-24 RX ORDER — OXYCODONE HYDROCHLORIDE 10 MG/1
TABLET ORAL
Qty: 120 TABLET | Refills: 0 | Status: SHIPPED | OUTPATIENT
Start: 2024-01-24

## 2024-01-26 ENCOUNTER — APPOINTMENT (OUTPATIENT)
Dept: LAB | Facility: HOSPITAL | Age: 79
End: 2024-01-26
Payer: COMMERCIAL

## 2024-01-26 DIAGNOSIS — E78.2 MIXED HYPERLIPIDEMIA: ICD-10-CM

## 2024-01-26 DIAGNOSIS — R73.9 HYPERGLYCEMIA: ICD-10-CM

## 2024-01-26 DIAGNOSIS — Z79.899 HIGH RISK MEDICATION USE: ICD-10-CM

## 2024-01-26 DIAGNOSIS — Z12.5 SCREENING FOR PROSTATE CANCER: ICD-10-CM

## 2024-01-26 LAB
ALBUMIN SERPL BCP-MCNC: 4.6 G/DL (ref 3.5–5)
ALP SERPL-CCNC: 70 U/L (ref 34–104)
ALT SERPL W P-5'-P-CCNC: 22 U/L (ref 7–52)
ANION GAP SERPL CALCULATED.3IONS-SCNC: 7 MMOL/L
AST SERPL W P-5'-P-CCNC: 23 U/L (ref 13–39)
BASOPHILS # BLD AUTO: 0.04 THOUSANDS/ÂΜL (ref 0–0.1)
BASOPHILS NFR BLD AUTO: 1 % (ref 0–1)
BILIRUB SERPL-MCNC: 0.32 MG/DL (ref 0.2–1)
BILIRUB UR QL STRIP: NEGATIVE
BUN SERPL-MCNC: 23 MG/DL (ref 5–25)
CALCIUM SERPL-MCNC: 9.5 MG/DL (ref 8.4–10.2)
CHLORIDE SERPL-SCNC: 100 MMOL/L (ref 96–108)
CHOLEST SERPL-MCNC: 194 MG/DL
CLARITY UR: CLEAR
CO2 SERPL-SCNC: 29 MMOL/L (ref 21–32)
COLOR UR: YELLOW
CREAT SERPL-MCNC: 0.98 MG/DL (ref 0.6–1.3)
EOSINOPHIL # BLD AUTO: 0.13 THOUSAND/ÂΜL (ref 0–0.61)
EOSINOPHIL NFR BLD AUTO: 2 % (ref 0–6)
ERYTHROCYTE [DISTWIDTH] IN BLOOD BY AUTOMATED COUNT: 13.9 % (ref 11.6–15.1)
EST. AVERAGE GLUCOSE BLD GHB EST-MCNC: 126 MG/DL
GFR SERPL CREATININE-BSD FRML MDRD: 74 ML/MIN/1.73SQ M
GLUCOSE P FAST SERPL-MCNC: 107 MG/DL (ref 65–99)
GLUCOSE UR STRIP-MCNC: NEGATIVE MG/DL
HBA1C MFR BLD: 6 %
HCT VFR BLD AUTO: 46.8 % (ref 36.5–49.3)
HDLC SERPL-MCNC: 54 MG/DL
HGB BLD-MCNC: 14.2 G/DL (ref 12–17)
HGB UR QL STRIP.AUTO: NEGATIVE
IMM GRANULOCYTES # BLD AUTO: 0.03 THOUSAND/UL (ref 0–0.2)
IMM GRANULOCYTES NFR BLD AUTO: 0 % (ref 0–2)
KETONES UR STRIP-MCNC: NEGATIVE MG/DL
LDLC SERPL CALC-MCNC: 122 MG/DL (ref 0–100)
LEUKOCYTE ESTERASE UR QL STRIP: NEGATIVE
LYMPHOCYTES # BLD AUTO: 2.07 THOUSANDS/ÂΜL (ref 0.6–4.47)
LYMPHOCYTES NFR BLD AUTO: 23 % (ref 14–44)
MCH RBC QN AUTO: 27.3 PG (ref 26.8–34.3)
MCHC RBC AUTO-ENTMCNC: 30.3 G/DL (ref 31.4–37.4)
MCV RBC AUTO: 90 FL (ref 82–98)
MONOCYTES # BLD AUTO: 0.71 THOUSAND/ÂΜL (ref 0.17–1.22)
MONOCYTES NFR BLD AUTO: 8 % (ref 4–12)
NEUTROPHILS # BLD AUTO: 5.85 THOUSANDS/ÂΜL (ref 1.85–7.62)
NEUTS SEG NFR BLD AUTO: 66 % (ref 43–75)
NITRITE UR QL STRIP: NEGATIVE
NONHDLC SERPL-MCNC: 140 MG/DL
NRBC BLD AUTO-RTO: 0 /100 WBCS
PH UR STRIP.AUTO: 6 [PH]
PLATELET # BLD AUTO: 272 THOUSANDS/UL (ref 149–390)
PMV BLD AUTO: 9.4 FL (ref 8.9–12.7)
POTASSIUM SERPL-SCNC: 4.1 MMOL/L (ref 3.5–5.3)
PROT SERPL-MCNC: 7.5 G/DL (ref 6.4–8.4)
PROT UR STRIP-MCNC: NEGATIVE MG/DL
PSA SERPL-MCNC: 0.45 NG/ML (ref 0–4)
RBC # BLD AUTO: 5.2 MILLION/UL (ref 3.88–5.62)
SODIUM SERPL-SCNC: 136 MMOL/L (ref 135–147)
SP GR UR STRIP.AUTO: 1.01 (ref 1–1.03)
TRIGL SERPL-MCNC: 91 MG/DL
UROBILINOGEN UR QL STRIP.AUTO: 0.2 E.U./DL
WBC # BLD AUTO: 8.83 THOUSAND/UL (ref 4.31–10.16)

## 2024-01-26 PROCEDURE — 80053 COMPREHEN METABOLIC PANEL: CPT

## 2024-01-26 PROCEDURE — 80061 LIPID PANEL: CPT

## 2024-01-26 PROCEDURE — G0103 PSA SCREENING: HCPCS

## 2024-01-26 PROCEDURE — 36415 COLL VENOUS BLD VENIPUNCTURE: CPT

## 2024-01-26 PROCEDURE — 85025 COMPLETE CBC W/AUTO DIFF WBC: CPT

## 2024-01-26 PROCEDURE — 83036 HEMOGLOBIN GLYCOSYLATED A1C: CPT

## 2024-01-29 DIAGNOSIS — G47.00 INSOMNIA, UNSPECIFIED TYPE: ICD-10-CM

## 2024-01-29 RX ORDER — HYDROXYZINE HYDROCHLORIDE 25 MG/1
25 TABLET, FILM COATED ORAL EVERY 6 HOURS PRN
Qty: 30 TABLET | Refills: 1 | Status: SHIPPED | OUTPATIENT
Start: 2024-01-29 | End: 2024-02-28

## 2024-02-02 ENCOUNTER — OFFICE VISIT (OUTPATIENT)
Dept: RHEUMATOLOGY | Facility: CLINIC | Age: 79
End: 2024-02-02
Payer: COMMERCIAL

## 2024-02-02 VITALS
BODY MASS INDEX: 28.93 KG/M2 | HEART RATE: 71 BPM | WEIGHT: 180 LBS | DIASTOLIC BLOOD PRESSURE: 68 MMHG | SYSTOLIC BLOOD PRESSURE: 110 MMHG | HEIGHT: 66 IN | OXYGEN SATURATION: 97 %

## 2024-02-02 DIAGNOSIS — M54.32 SCIATICA OF LEFT SIDE: ICD-10-CM

## 2024-02-02 DIAGNOSIS — L40.50 PSORIASIS WITH ARTHROPATHY (HCC): Primary | ICD-10-CM

## 2024-02-02 DIAGNOSIS — M19.90 ARTHRITIS: ICD-10-CM

## 2024-02-02 DIAGNOSIS — L40.9 PSORIASIS: ICD-10-CM

## 2024-02-02 PROCEDURE — 99214 OFFICE O/P EST MOD 30 MIN: CPT | Performed by: INTERNAL MEDICINE

## 2024-02-02 RX ORDER — GABAPENTIN 600 MG/1
TABLET ORAL
Qty: 315 TABLET | Refills: 3 | Status: SHIPPED | OUTPATIENT
Start: 2024-02-02

## 2024-02-02 RX ORDER — BETAMETHASONE DIPROPIONATE 0.5 MG/G
CREAM TOPICAL 2 TIMES DAILY
Qty: 45 G | Refills: 6 | Status: SHIPPED | OUTPATIENT
Start: 2024-02-02

## 2024-02-02 NOTE — ASSESSMENT & PLAN NOTE
He seems to be hesitant for another biologic medication. Discussed medical information regarding Orencia and oral sulfasalazine. Will provide further information in the visit summary.  Will repeat labs.  Continue diclofenac (Voltaren) gel.  Will increase his gabapentin and advised to take 600 mg in the morning, 600 mg at noon, and 900 mg in the evening.

## 2024-02-02 NOTE — PATIENT INSTRUCTIONS
"Do labs  Continue betamethasone and fluocinonide as needed for psoriasis  Continue gabapentin 3 times a day as needed for back pain  Continue diclofenac gel as needed for joint pain  Future medications to consider are Orencia and sulfasalazine if symptoms flare    Return to clinic in 6 months    Psoriatic Arthritis in Adults    What is psoriatic arthritis? -- Psoriatic arthritis is a condition that causes joint pain, swelling, and stiffness. It happens in people who have a long-term skin condition called psoriasis. People with psoriasis have patches of thick, red skin that are often covered by silver or white scales  Doctors don't know what causes psoriasis or psoriatic arthritis.  What are the symptoms of psoriatic arthritis? -- Psoriatic arthritis causes pain, stiffness, and swelling in the affected joints. It can also affect the spine in some people. Because of the joint and spine problems, people can have trouble moving their body. Stiffness in the joints or low back is usually worse in the morning and lasts 30 minutes or longer. It usually gets better with exercise.  Psoriatic arthritis can affect joints on one or both sides of the body. It usually affects more than one joint.  In addition to joint symptoms (and the skin symptoms of psoriasis), people sometimes have other symptoms. These can include:  ?Swelling of a finger or toe, or the hands or feet  ?Swelling and pain in the back of the ankle or in the heel   ?Nail symptoms - The nails can look \"pitted,\" as if they were pricked by a pin. The nail can also come up off the nail bed.  ?Eye pain or redness  Is there a test for psoriatic arthritis? -- Yes. Your doctor or nurse will ask about your symptoms and do an exam. He or she will order X-rays of your painful joints. He or she might order an imaging test called an MRI. Imaging tests create pictures of the inside of the body.  To check that another condition isn't causing your symptoms, your doctor or nurse " "might also order:  ?Blood tests  ?Lab tests on a sample of fluid from a swollen joint - To get a sample of fluid, the doctor will put a thin needle in your joint.  How is psoriatic arthritis treated? -- There is no cure for psoriatic arthritis, but different treatments can help ease and control symptoms. Treatment for joint symptoms usually involves one or more of the following:  ?Medicines called nonsteroidal antiinflammatory drugs, or \"NSAIDs\" for short - Examples of NSAIDs are aspirin, ibuprofen (sample brand names: Advil, Motrin), and naproxen (sample brand name: Aleve).  ?Medicines that are usually used to treat other types of arthritis - Some of these include methotrexate and leflunomide.  ?Medicines that block a substance called tumor necrosis factor, or \"TNF\" for short - TNF plays a role in psoriasis and psoriatic arthritis. Medicines that block TNF are called \"anti-TNF\" medicines. Examples include etanercept (brand name: Enbrel) and adalimumab (brand name: Humira).  ?Other medicines - If the options above don't help, your doctor might suggest trying a different medicine. Examples include ustekinumab (brand name: Stelara), secukinumab (brand name: Cosentyx), tofacitinib (brand name: Xeljanz), abatacept (brand name: Orencia), and apremilast (brand name: Otezla).     ?Shots of medicines called steroids that go into the painful joint - These are not the same as the steroids some athletes take illegally. These steroids help reduce swelling and pain.  ?Heat - Heat, especially in the morning, can help reduce pain and stiffness. Do not use heat for longer than 20 minutes at a time. Also, do not use anything too hot that could burn your skin.  ?Physical and occupational therapy - This involves learning exercises, movements, and ways of doing everyday tasks.  ?Special shoe inserts (called \"orthotics\") - These can help keep your feet, ankles, and knees in the proper position.  ?Treatment for psoriatic arthritis is " usually long term. That's because even after symptoms get better, they sometimes return later on.  Is there anything I can do on my own to feel better? -- Yes. It is very important that you stay active. You might want to avoid being active because you are in pain. But this can make things worse. It can make your muscles weak and your joints stiffer than they already are. Your doctor, nurse, or physical therapist can help you figure out which activities and exercises are right for you.

## 2024-02-02 NOTE — PROGRESS NOTES
Assessment and Plan:  Julio Saldana is a 78 y.o.  male who presents for follow-up of psoriatic arthritis. Had side effect of significant diarrhea on Otezla, and stopped it after 1 month. Is fine not being on any DMARDs/biologics at this time. Feels his psoriasis and arthritis symptoms are controlled.     History of abdominal surgery.  Discussed information regarding anti-inflammatory diet.    Do labs  Continue betamethasone and fluocinonide as needed for psoriasis  Can take gabapentin 600mg in the morning and afternoon and increase to 900 mg at bedtime  Continue diclofenac gel as needed for joint pain  Future medications to consider are Orencia and sulfasalazine if arthritis symptoms flare    Return to clinic in 6 months    Plan:  1. Psoriasis with arthropathy (HCC)  -     Sedimentation rate, automated  -     C-reactive protein    2. Psoriasis  Assessment & Plan:  Will continue betamethasone and fluocinonide as needed.    Orders:  -     betamethasone dipropionate (DIPROSONE) 0.05 % cream; Apply topically 2 (two) times a day    3. Sciatica of left side  -     gabapentin (NEURONTIN) 600 MG tablet; Take 1 tab  twice a day and 1 and a half tabs at bedtime    4. Arthritis  Assessment & Plan:  He seems to be hesitant for another biologic medication. Discussed medical information regarding Orencia and oral sulfasalazine. Will provide further information in the visit summary.  Will repeat labs.  Continue diclofenac (Voltaren) gel.  Will increase his gabapentin and advised to take 600 mg in the morning, 600 mg at noon, and 900 mg in the evening.    Orders:  -     Diclofenac Sodium (VOLTAREN) 1 %; Apply 2 g topically 4 (four) times a day    RTC in 6 months      Chief Complaint  Follow-up visit for his psoriatic arthritis.     Rheumatic Disease Summary  Last visit 8/16/23: Patient presents for follow-up of his psoriatic arthritis.  Has fully healed from his abdominal surgery and wants to pursue next steps in management of his  "psoriatic arthritis.  His arthritis and psoriasis is not fully controlled.  Has several hours of morning stiffness.  He wants to pursue an agent that has the least infection risk.  Do labs  Continue betamethasone and fluocinonide as needed for psoriasis  Continue gabapentin 3 times a day as needed for back pain  Continue diclofenac gel as needed for joint pain  Right shoulder steroid injection given in clinic  Will start Otezla starter pack and twice a day maintenance dose    HPI  Julio Saldana is a 78-year-old male who presents today for a follow-up visit for his psoriatic arthritis. He is accompanied by his wife. He consents to the use of GAEL.    The following portions of the patient's history were reviewed and updated as appropriate: allergies, current medications, past family history, past medical history, past social history, past surgical history, and problem list.    Interval History:    He has been taking gabapentin 600 mg 3 times a day since 08/2023, which \"works too much\" but still provided benefit. He denies any drowsiness from taking gabapentin. He was on Otezla for approximately 1 month without benefit and reports that he had diarrhea from it. He is anxious about anything that may affect her stomach in addition to all the operations he had. He consumes some \"gooey bears and sugar\" the other day, 01/31/2024 and reports that he had difficulty moving his hands and bilateral wrist pain, which is constantly worse when getting up in the morning. He occasionally has bilateral ankle pain and has been working it out along with walking. His female  believes that there may be a correlation between glucose intake and pain, thus, the patient is currently reducing glucose or carbohydrate consumption. He recalls that he had been walking around where he lives and picked up a stick to assist and prevent a fall incident but the next morning, his elbow was significantly sore that he was unable to \"open or close " "it\", which is currently resolved. He has joint pain that is significantly worse being at rest when he gets up in the morning and improved when in activity. He denies bothering joint pain until he wakes up. His joint is currently sore but not severe. He had blood work. He experiences soreness after 1 or 2 days of doing activities that he does not normally do. He confirms morning stiffness that lasts for 1 to 1.5 hours. His arthritis and psoriasis have improved since 08/2023. He was previously on methotrexate until he had severe nausea and was prescribed with Compazine. He denies any bone fractures. He had a DEXA scan in 03/2022.    He has psoriasis on his shoulder, head, and left hand. He previously had Cosentyx injections for years. He is still using betamethasone and fluocinonide. He confirms that betamethasone provided more benefit than fluocinonide. He had seen a dermatologist.    He reported a decubitus ulcer on his hip at his last office visit and is still going to wound care for that. He has to change bandage every 2 days, however, he is unable to lay on his right side due to his right shoulder that appears to be \"ball and socket\" and describes that \"the ball kind of looks like a crumbled mesh.\" He had seen orthopedics for his shoulder and was advised for a surgery unless he could manage his complaints. He went to physical therapy and was advised to initially gain weight, which he does not consider due to pain when driving the car. He declines a referral to physical therapy. He states that the shoulder surgery has not really been perfect. He had an injection in his right shoulder in 08/2023, which provided benefit but gradually wore off. He believes he can tolerate the pain without receiving additional steroid injection. He has been applying Voltaren gel.     He has been fully healed from the abdominal surgery. He has an incisional hernia. He currently weighs 179 pounds and wishes to attain 160 to 175 pounds. " He does light weightlifting and has been eating better.     He has back pain and takes Soma prescribed by Dr. Sharma, which provided benefit.    He denies being on a blood thinner medication.    He has been seeing his dentist.    Review of Systems:   Pertinent ROS positive for joint pain, joint swelling, back pain, neck pain, and muscle pain. Otherwise, the rest of 14-point ROS reviewed and were negative.    Home Medications:    Current Outpatient Medications:     betamethasone dipropionate (DIPROSONE) 0.05 % cream, Apply topically 2 (two) times a day, Disp: 45 g, Rfl: 6    carisoprodol (SOMA) 350 mg tablet, Take 1 tablet (350 mg total) by mouth 4 (four) times a day, Disp: 360 tablet, Rfl: 1    Cholecalciferol (VITAMIN D3) 1000 units CAPS, Take 1 tablet by mouth daily, Disp: , Rfl:     diclofenac sodium (VOLTAREN) 1 %, Apply 2 g topically 4 (four) times a day, Disp: 5 Tube, Rfl: 3    Diclofenac Sodium (VOLTAREN) 1 %, Apply 2 g topically 4 (four) times a day, Disp: 100 g, Rfl: 6    fluocinonide (LIDEX) 0.05 % external solution, Apply topically daily, Disp: 60 mL, Rfl: 6    gabapentin (NEURONTIN) 600 MG tablet, Take 1 tab  twice a day and 1 and a half tabs at bedtime, Disp: 315 tablet, Rfl: 3    hydrOXYzine HCL (ATARAX) 25 mg tablet, Take 1 tablet (25 mg total) by mouth every 6 (six) hours as needed for anxiety, Disp: 30 tablet, Rfl: 1    ketoconazole (NIZORAL) 2 % cream, Apply topically 2 (two) times a day, Disp: 30 g, Rfl: 1    mupirocin (BACTROBAN) 2 % ointment, APPLY TOPICALLY TO AFFECTED AREAS up to three times a day if needed, Disp: , Rfl:     naloxone (NARCAN) 4 mg/0.1 mL nasal spray, Administer 1 spray into a nostril. If no response after 2-3 minutes, give another dose in the other nostril using a new spray., Disp: 1 each, Rfl: 1    oxyCODONE (ROXICODONE) 10 MG TABS, take 1 tablet by mouth four times a day, Disp: 120 tablet, Rfl: 0    oxyCODONE-acetaminophen (PERCOCET)  mg per tablet, Take 1 tablet by  "mouth every 4 (four) hours as needed for moderate pain Max Daily Amount: 6 tablets, Disp: 120 tablet, Rfl: 0    traZODone (DESYREL) 300 MG tablet, Take 2 tablets (600 mg total) by mouth daily at bedtime, Disp: 180 tablet, Rfl: 1    acetaminophen (TYLENOL) 325 mg tablet, Take 2 tablets (650 mg total) by mouth every 6 (six) hours as needed for mild pain or fever (Patient not taking: Reported on 4/18/2023), Disp: , Rfl: 0    Apremilast (Otezla) 10 & 20 & 30 MG TBPK, Take as directed (Patient not taking: Reported on 11/8/2023), Disp: 55 each, Rfl: 0    Apremilast (Otezla) 30 MG TABS, Take 1 tablet by mouth 2 (two) times a day (Patient not taking: Reported on 11/8/2023), Disp: 60 tablet, Rfl: 11    atorvastatin (LIPITOR) 20 mg tablet, take 1 tablet by mouth once daily (Patient not taking: Reported on 12/4/2023), Disp: 90 tablet, Rfl: 1    prochlorperazine (COMPAZINE) 5 mg tablet, Take 1 tablet (5 mg total) by mouth every 6 (six) hours as needed for nausea or vomiting (Patient not taking: Reported on 9/26/2023), Disp: 30 tablet, Rfl: 0    Objective:    Vitals:    02/02/24 1002   BP: 110/68   Pulse: 71   SpO2: 97%   Weight: 81.6 kg (180 lb)   Height: 5' 6\" (1.676 m)       Physical Exam:  Constitutional:    General: He is not in acute distress.  HENT:   Head: Normocephalic and atraumatic.   Eyes:   Conjunctiva/sclera: Conjunctivae normal.   Cardiovascular:   Rate and Rhythm: Normal rate and regular rhythm.   Heart sounds: S1 normal and S2 normal.   No friction rub.   Pulmonary:   Effort: Pulmonary effort is normal. No respiratory distress.   Breath sounds: Normal breath sounds. No wheezing, rhonchi, or rales.   Musculoskeletal:   General: Right lateral elbow tenderness. Right shoulder tenderness. Right 3rd and 4th PIP tenderness. Left 3rd MCP tenderness. Shoulders are normal.  Cervical back: Neck supple.   Skin:  Coloration: Skin is not pale.   Neurological:   Mental Status: He is alert. Mental status is at baseline. "   Psychiatric:      Mood and Affect: Mood normal.      Behavior: Behavior normal.  Abdominal: Abdominal hernia.      I have personally reviewed results with the patient.    Inflammatory markers were elevated in 08/2023 compared to the 4 months prior to that.  DEXA scan from 03/2022 was reviewed and discussed with the patient that shows osteopenia on the left forearm. His hip appears to be normal.    Labs:   Appointment on 01/26/2024   Component Date Value Ref Range Status    WBC 01/26/2024 8.83  4.31 - 10.16 Thousand/uL Final    RBC 01/26/2024 5.20  3.88 - 5.62 Million/uL Final    Hemoglobin 01/26/2024 14.2  12.0 - 17.0 g/dL Final    Hematocrit 01/26/2024 46.8  36.5 - 49.3 % Final    MCV 01/26/2024 90  82 - 98 fL Final    MCH 01/26/2024 27.3  26.8 - 34.3 pg Final    MCHC 01/26/2024 30.3 (L)  31.4 - 37.4 g/dL Final    RDW 01/26/2024 13.9  11.6 - 15.1 % Final    MPV 01/26/2024 9.4  8.9 - 12.7 fL Final    Platelets 01/26/2024 272  149 - 390 Thousands/uL Final    nRBC 01/26/2024 0  /100 WBCs Final    Neutrophils Relative 01/26/2024 66  43 - 75 % Final    Immat GRANS % 01/26/2024 0  0 - 2 % Final    Lymphocytes Relative 01/26/2024 23  14 - 44 % Final    Monocytes Relative 01/26/2024 8  4 - 12 % Final    Eosinophils Relative 01/26/2024 2  0 - 6 % Final    Basophils Relative 01/26/2024 1  0 - 1 % Final    Neutrophils Absolute 01/26/2024 5.85  1.85 - 7.62 Thousands/µL Final    Immature Grans Absolute 01/26/2024 0.03  0.00 - 0.20 Thousand/uL Final    Lymphocytes Absolute 01/26/2024 2.07  0.60 - 4.47 Thousands/µL Final    Monocytes Absolute 01/26/2024 0.71  0.17 - 1.22 Thousand/µL Final    Eosinophils Absolute 01/26/2024 0.13  0.00 - 0.61 Thousand/µL Final    Basophils Absolute 01/26/2024 0.04  0.00 - 0.10 Thousands/µL Final    Sodium 01/26/2024 136  135 - 147 mmol/L Final    Potassium 01/26/2024 4.1  3.5 - 5.3 mmol/L Final    Chloride 01/26/2024 100  96 - 108 mmol/L Final    CO2 01/26/2024 29  21 - 32 mmol/L Final    ANION  GAP 01/26/2024 7  mmol/L Final    BUN 01/26/2024 23  5 - 25 mg/dL Final    Creatinine 01/26/2024 0.98  0.60 - 1.30 mg/dL Final    Standardized to IDMS reference method    Glucose, Fasting 01/26/2024 107 (H)  65 - 99 mg/dL Final    Calcium 01/26/2024 9.5  8.4 - 10.2 mg/dL Final    AST 01/26/2024 23  13 - 39 U/L Final    ALT 01/26/2024 22  7 - 52 U/L Final    Specimen collection should occur prior to Sulfasalazine administration due to the potential for falsely depressed results.     Alkaline Phosphatase 01/26/2024 70  34 - 104 U/L Final    Total Protein 01/26/2024 7.5  6.4 - 8.4 g/dL Final    Albumin 01/26/2024 4.6  3.5 - 5.0 g/dL Final    Total Bilirubin 01/26/2024 0.32  0.20 - 1.00 mg/dL Final    Use of this assay is not recommended for patients undergoing treatment with eltrombopag due to the potential for falsely elevated results.  N-acetyl-p-benzoquinone imine (metabolite of Acetaminophen) will generate erroneously low results in samples for patients that have taken an overdose of Acetaminophen.    eGFR 01/26/2024 74  ml/min/1.73sq m Final    Cholesterol 01/26/2024 194  See Comment mg/dL Final    Cholesterol:         Pediatric <18 Years        Desirable          <170 mg/dL      Borderline High    170-199 mg/dL      High               >=200 mg/dL        Adult >=18 Years            Desirable         <200 mg/dL      Borderline High   200-239 mg/dL      High              >239 mg/dL      Triglycerides 01/26/2024 91  See Comment mg/dL Final    Triglyceride:     0-9Y            <75mg/dL     10Y-17Y         <90 mg/dL       >=18Y     Normal          <150 mg/dL     Borderline High 150-199 mg/dL     High            200-499 mg/dL        Very High       >499 mg/dL    Specimen collection should occur prior to Metamizole administration due to the potential for falsely depressed results.    HDL, Direct 01/26/2024 54  >=40 mg/dL Final    LDL Calculated 01/26/2024 122 (H)  0 - 100 mg/dL Final    LDL Cholesterol:     Optimal            <100 mg/dl     Near Optimal      100-129 mg/dl     Above Optimal       Borderline High 130-159 mg/dl       High            160-189 mg/dl       Very High       >189 mg/dl         This screening LDL is a calculated result.   It does not have the accuracy of the Direct Measured LDL in the monitoring of patients with hyperlipidemia and/or statin therapy.   Direct Measure LDL ([id number]) must be ordered separately in these patients.    Non-HDL-Chol (CHOL-HDL) 01/26/2024 140  mg/dl Final    PSA 01/26/2024 0.45  0.00 - 4.00 ng/mL Final    [employer] Access chemiluminescent immunoassay. Confirm baseline values for patients being serially monitored.     Hemoglobin A1C 01/26/2024 6.0 (H)  Normal 4.0-5.6%; PreDiabetic 5.7-6.4%; Diabetic >=6.5%; Glycemic control for adults with diabetes <7.0% % Final    EAG 01/26/2024 126  mg/dl Final   Office Visit on 11/08/2023   Component Date Value Ref Range Status    Color, UA 01/26/2024 Yellow   Final    Clarity, UA 01/26/2024 Clear   Final    Specific Gravity, UA 01/26/2024 1.010  1.003 - 1.030 Final    pH, UA 01/26/2024 6.0  4.5, 5.0, 5.5, 6.0, 6.5, 7.0, 7.5, 8.0 Final    Leukocytes, UA 01/26/2024 Negative  Negative Final    Nitrite, UA 01/26/2024 Negative  Negative Final    Protein, UA 01/26/2024 Negative  Negative mg/dl Final    Glucose, UA 01/26/2024 Negative  Negative mg/dl Final    Ketones, UA 01/26/2024 Negative  Negative mg/dl Final    Urobilinogen, UA 01/26/2024 0.2  0.2, 1.0 E.U./dl E.U./dl Final    Bilirubin, UA 01/26/2024 Negative  Negative Final    Occult Blood, UA 01/26/2024 Negative  Negative Final   Office Visit on 08/16/2023   Component Date Value Ref Range Status    CRP 08/16/2023 5.9 (H)  <3.0 mg/L Final    Sed Rate 08/16/2023 33 (H)  0 - 19 mm/hour Final    WBC 08/16/2023 8.87  4.31 - 10.16 Thousand/uL Final    RBC 08/16/2023 4.75  3.88 - 5.62 Million/uL Final    Hemoglobin 08/16/2023 13.4  12.0 - 17.0 g/dL Final    Hematocrit 08/16/2023 42.2  36.5 - 49.3 %  Final    MCV 08/16/2023 89  82 - 98 fL Final    MCH 08/16/2023 28.2  26.8 - 34.3 pg Final    MCHC 08/16/2023 31.8  31.4 - 37.4 g/dL Final    RDW 08/16/2023 13.0  11.6 - 15.1 % Final    MPV 08/16/2023 8.7 (L)  8.9 - 12.7 fL Final    Platelets 08/16/2023 271  149 - 390 Thousands/uL Final    nRBC 08/16/2023 0  /100 WBCs Final    Neutrophils Relative 08/16/2023 61  43 - 75 % Final    Immat GRANS % 08/16/2023 0  0 - 2 % Final    Lymphocytes Relative 08/16/2023 29  14 - 44 % Final    Monocytes Relative 08/16/2023 8  4 - 12 % Final    Eosinophils Relative 08/16/2023 2  0 - 6 % Final    Basophils Relative 08/16/2023 0  0 - 1 % Final    Neutrophils Absolute 08/16/2023 5.37  1.85 - 7.62 Thousands/µL Final    Immature Grans Absolute 08/16/2023 0.02  0.00 - 0.20 Thousand/uL Final    Lymphocytes Absolute 08/16/2023 2.59  0.60 - 4.47 Thousands/µL Final    Monocytes Absolute 08/16/2023 0.72  0.17 - 1.22 Thousand/µL Final    Eosinophils Absolute 08/16/2023 0.14  0.00 - 0.61 Thousand/µL Final    Basophils Absolute 08/16/2023 0.03  0.00 - 0.10 Thousands/µL Final    Sodium 08/16/2023 137  135 - 147 mmol/L Final    Potassium 08/16/2023 4.7  3.5 - 5.3 mmol/L Final    Chloride 08/16/2023 99  96 - 108 mmol/L Final    CO2 08/16/2023 33 (H)  21 - 32 mmol/L Final    ANION GAP 08/16/2023 5  mmol/L Final    BUN 08/16/2023 22  5 - 25 mg/dL Final    Creatinine 08/16/2023 1.08  0.60 - 1.30 mg/dL Final    Standardized to IDMS reference method    Glucose 08/16/2023 98  65 - 140 mg/dL Final    If the patient is fasting, the ADA then defines impaired fasting glucose as > 100 mg/dL and diabetes as > or equal to 123 mg/dL.    Calcium 08/16/2023 9.9  8.4 - 10.2 mg/dL Final    AST 08/16/2023 21  13 - 39 U/L Final    ALT 08/16/2023 19  7 - 52 U/L Final    Specimen collection should occur prior to Sulfasalazine administration due to the potential for falsely depressed results.     Alkaline Phosphatase 08/16/2023 102  34 - 104 U/L Final    Total Protein  08/16/2023 8.1  6.4 - 8.4 g/dL Final    Albumin 08/16/2023 4.8  3.5 - 5.0 g/dL Final    Total Bilirubin 08/16/2023 0.29  0.20 - 1.00 mg/dL Final    Use of this assay is not recommended for patients undergoing treatment with eltrombopag due to the potential for falsely elevated results.  N-acetyl-p-benzoquinone imine (metabolite of Acetaminophen) will generate erroneously low results in samples for patients that have taken an overdose of Acetaminophen.    eGFR 08/16/2023 66  ml/min/1.73sq m Final    QFT Nil 08/16/2023 0.04  0 - 8.0 IU/ml Final    QFT TB1-NIL 08/16/2023 -0.02  IU/ml Final    QFT TB2-NIL 08/16/2023 -0.02  IU/ml Final    QFT Mitogen-NIL 08/16/2023 3.99  IU/ml Final    QFT Final Interpretation 08/16/2023 Negative  Negative Final    No Interferon-gamma response to M. tuberculosis antigens detected.  Infection with M. tuberculosis is unlikely.  A single negative result does not exclude infection with M. tuberculosis. In patients at high risk for M. tuberculosis infection, a second test should be considered in accordance with the 2017 ATS/IDSA/CDC Clinical Practice Guidelines for Diagnosis of Tuberculosis in Adults and Children. False negative results can be a result of incorrect blood sample collection or handling of the specimen affecting lymphocyte function.    Hepatitis B Surface Ag 08/16/2023 Non-reactive  Non-Reactive Final    Hepatitis C Ab 08/16/2023 Non-reactive  Non-Reactive Final    Hep B C IgM 08/16/2023 Non-reactive  Non-Reactive Final    Hep B Core Total Ab 08/16/2023 Non-reactive  Non-Reactive Final       Transcribed for Benny Sun MD, by Di Mejia on 02/07/24 at 1:19 PM. Powered by Dragon [employer].

## 2024-02-05 ENCOUNTER — OFFICE VISIT (OUTPATIENT)
Dept: WOUND CARE | Facility: HOSPITAL | Age: 79
End: 2024-02-05
Payer: COMMERCIAL

## 2024-02-05 VITALS — SYSTOLIC BLOOD PRESSURE: 121 MMHG | HEART RATE: 62 BPM | TEMPERATURE: 97.8 F | DIASTOLIC BLOOD PRESSURE: 62 MMHG

## 2024-02-05 DIAGNOSIS — L89.223 PRESSURE INJURY OF LEFT HIP, STAGE 3 (HCC): Primary | ICD-10-CM

## 2024-02-05 PROCEDURE — 99212 OFFICE O/P EST SF 10 MIN: CPT | Performed by: SURGERY

## 2024-02-05 PROCEDURE — 99213 OFFICE O/P EST LOW 20 MIN: CPT | Performed by: SURGERY

## 2024-02-05 RX ORDER — LIDOCAINE 40 MG/G
CREAM TOPICAL ONCE
Status: COMPLETED | OUTPATIENT
Start: 2024-02-05 | End: 2024-02-05

## 2024-02-05 RX ADMIN — LIDOCAINE: 40 CREAM TOPICAL at 10:10
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
## 2024-02-05 NOTE — PATIENT INSTRUCTIONS
Orders Placed This Encounter   Procedures    Wound cleansing and dressings Pressure Injury Left Hip     Wound location left hip   Change dressing 3x per week.  You may remove the dressing and shower. Do not leave wound open to air, apply new dressing immediately.  Cleanse the wound with normal saline or mild soap and water, pat dry.   Apply dermagran cut just to fit the wound (1 pc given).  Cover with bordered foam.       This was applied today at the [facility].    Wound was mechanically debrided with normal saline and gauze     Standing Status:   Future     Standing Expiration Date:   2/5/2025    Wound off loading Pressure Injury Left Hip     Keep weight and pressure off wound at all times.   Turn every 2 hours. Avoid position directing pressure to Wound site. Try not to sleep on left side to keep pressure off of wound.    Limit side lying to 30 degree tilt.     Standing Status:   Future     Standing Expiration Date:   2/5/2025

## 2024-02-08 DIAGNOSIS — M48.061 SPINAL STENOSIS OF LUMBAR REGION, UNSPECIFIED WHETHER NEUROGENIC CLAUDICATION PRESENT: ICD-10-CM

## 2024-02-08 RX ORDER — OXYCODONE AND ACETAMINOPHEN 10; 325 MG/1; MG/1
1 TABLET ORAL EVERY 4 HOURS PRN
Qty: 120 TABLET | Refills: 0 | Status: SHIPPED | OUTPATIENT
Start: 2024-02-08

## 2024-02-21 DIAGNOSIS — M48.061 SPINAL STENOSIS OF LUMBAR REGION, UNSPECIFIED WHETHER NEUROGENIC CLAUDICATION PRESENT: ICD-10-CM

## 2024-02-21 RX ORDER — OXYCODONE HYDROCHLORIDE 10 MG/1
TABLET ORAL
Qty: 120 TABLET | Refills: 0 | Status: SHIPPED | OUTPATIENT
Start: 2024-02-21

## 2024-02-26 ENCOUNTER — OFFICE VISIT (OUTPATIENT)
Dept: WOUND CARE | Facility: HOSPITAL | Age: 79
End: 2024-02-26
Payer: COMMERCIAL

## 2024-02-26 VITALS
HEART RATE: 84 BPM | DIASTOLIC BLOOD PRESSURE: 63 MMHG | RESPIRATION RATE: 12 BRPM | TEMPERATURE: 97 F | SYSTOLIC BLOOD PRESSURE: 131 MMHG

## 2024-02-26 DIAGNOSIS — L89.223 PRESSURE INJURY OF LEFT HIP, STAGE 3 (HCC): Primary | ICD-10-CM

## 2024-02-26 PROCEDURE — 99212 OFFICE O/P EST SF 10 MIN: CPT | Performed by: SURGERY
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
## 2024-02-26 NOTE — PATIENT INSTRUCTIONS
Orders Placed This Encounter   Procedures    Wound cleansing and dressings Pressure Injury Left Hip     Left hip wound is healed.  Apply Skin prep and then Silicone border foam to area for protection for one week.  Check the area for one week     You are discharged from wound care center    Call wound care center  when you have open wound issue     Standing Status:   Future     Standing Expiration Date:   2/26/2025

## 2024-03-04 ENCOUNTER — RA CDI HCC (OUTPATIENT)
Dept: OTHER | Facility: HOSPITAL | Age: 79
End: 2024-03-04

## 2024-03-04 NOTE — PROGRESS NOTES
M05.9  HCC coding opportunities          Chart Reviewed number of suggestions sent to Provider: 1     Patients Insurance     Medicare Insurance: [employer] Advantage

## 2024-03-05 NOTE — PROGRESS NOTES
Assessment/Plan:    Incisional hernia, without obstruction or gangrene  Patient is a 78-year-old male returning for routine scheduled follow-up regarding his ventral incisional hernia.    The patient maintains that he has no symptoms referable to the hernia and that it is not having any notable impact upon his quality of life.    On physical examination the patient is well-nourished well-appearing male.  Pleasant competent reliable as a historian.  He is accompanied by his wife of 51 years in the exam room today.  Inspection and palpation of the abdomen reveals a moderately large ventral incisional hernia in the midline.  It is soft and reducible.  It is broad-based.    The patient has expressed reluctance to the idea of undergoing additional surgeries at the present time.    Given the lack of symptoms I believe it safe and reasonable to continue with the nonsurgical approach.  I look forward to seeing him back in 6 months time.  He is once again educated regarding signs of an acute incarceration that would necessitate emergent surgical intervention.    All questions answered to the satisfaction of the patient.  He has been encouraged to follow-up sooner on an as-needed basis.       Diagnoses and all orders for this visit:    Incisional hernia, without obstruction or gangrene          Subjective:      Patient ID: Julio Saldana is a 78 y.o. male.    Patient is coming into the office for a 6 mo f/u follow up inc hernia. Patient denies any pain and no fever/chills.SEAN White          Review of Systems   Constitutional:  Negative for chills and fever.   HENT:  Negative for ear pain and sore throat.    Eyes:  Negative for pain and visual disturbance.   Respiratory:  Negative for cough and shortness of breath.    Cardiovascular:  Negative for chest pain and palpitations.   Gastrointestinal:  Negative for abdominal pain and vomiting.   Genitourinary:  Negative for dysuria and hematuria.   Musculoskeletal:  Negative for  arthralgias and back pain.   Skin:  Negative for color change and rash.   Neurological:  Negative for seizures and syncope.   All other systems reviewed and are negative.        Objective:      /80   Pulse 84   Temp 97.5 °F (36.4 °C) (Temporal)   Resp 18   Wt 82.6 kg (182 lb)   SpO2 95%   BMI 29.38 kg/m²          Physical Exam  Vitals and nursing note reviewed.   Constitutional:       Appearance: He is well-developed.   HENT:      Head: Normocephalic and atraumatic.   Eyes:      Conjunctiva/sclera: Conjunctivae normal.      Pupils: Pupils are equal, round, and reactive to light.   Cardiovascular:      Rate and Rhythm: Normal rate and regular rhythm.   Pulmonary:      Effort: Pulmonary effort is normal.      Breath sounds: Normal breath sounds.   Abdominal:      General: Bowel sounds are normal.      Palpations: Abdomen is soft.      Comments: Moderately large reducible ventral incisional hernia in the midline   Musculoskeletal:         General: Normal range of motion.      Cervical back: Normal range of motion and neck supple.   Skin:     General: Skin is warm and dry.   Neurological:      Mental Status: He is alert and oriented to person, place, and time.   Psychiatric:         Behavior: Behavior normal.         Thought Content: Thought content normal.         Judgment: Judgment normal.

## 2024-03-06 ENCOUNTER — OFFICE VISIT (OUTPATIENT)
Dept: SURGERY | Facility: CLINIC | Age: 79
End: 2024-03-06
Payer: COMMERCIAL

## 2024-03-06 VITALS
RESPIRATION RATE: 18 BRPM | OXYGEN SATURATION: 95 % | WEIGHT: 182 LBS | DIASTOLIC BLOOD PRESSURE: 80 MMHG | TEMPERATURE: 97.5 F | BODY MASS INDEX: 29.38 KG/M2 | SYSTOLIC BLOOD PRESSURE: 130 MMHG | HEART RATE: 84 BPM

## 2024-03-06 DIAGNOSIS — M48.061 SPINAL STENOSIS OF LUMBAR REGION, UNSPECIFIED WHETHER NEUROGENIC CLAUDICATION PRESENT: ICD-10-CM

## 2024-03-06 DIAGNOSIS — K43.2 INCISIONAL HERNIA, WITHOUT OBSTRUCTION OR GANGRENE: Primary | ICD-10-CM

## 2024-03-06 PROCEDURE — 99213 OFFICE O/P EST LOW 20 MIN: CPT | Performed by: SURGERY

## 2024-03-06 NOTE — ASSESSMENT & PLAN NOTE
Patient is a 78-year-old male returning for routine scheduled follow-up regarding his ventral incisional hernia.    The patient maintains that he has no symptoms referable to the hernia and that it is not having any notable impact upon his quality of life.    On physical examination the patient is well-nourished well-appearing male.  Pleasant competent reliable as a historian.  He is accompanied by his wife of 51 years in the exam room today.  Inspection and palpation of the abdomen reveals a moderately large ventral incisional hernia in the midline.  It is soft and reducible.  It is broad-based.    The patient has expressed reluctance to the idea of undergoing additional surgeries at the present time.    Given the lack of symptoms I believe it safe and reasonable to continue with the nonsurgical approach.  I look forward to seeing him back in 6 months time.  He is once again educated regarding signs of an acute incarceration that would necessitate emergent surgical intervention.    All questions answered to the satisfaction of the patient.  He has been encouraged to follow-up sooner on an as-needed basis.

## 2024-03-07 DIAGNOSIS — G47.00 INSOMNIA, UNSPECIFIED TYPE: ICD-10-CM

## 2024-03-07 RX ORDER — OXYCODONE AND ACETAMINOPHEN 10; 325 MG/1; MG/1
1 TABLET ORAL EVERY 4 HOURS PRN
Qty: 120 TABLET | Refills: 0 | Status: SHIPPED | OUTPATIENT
Start: 2024-03-07

## 2024-03-07 RX ORDER — HYDROXYZINE HYDROCHLORIDE 25 MG/1
25 TABLET, FILM COATED ORAL EVERY 6 HOURS PRN
Qty: 30 TABLET | Refills: 5 | Status: SHIPPED | OUTPATIENT
Start: 2024-03-07 | End: 2024-04-21

## 2024-03-12 ENCOUNTER — OFFICE VISIT (OUTPATIENT)
Dept: FAMILY MEDICINE CLINIC | Facility: CLINIC | Age: 79
End: 2024-03-12
Payer: COMMERCIAL

## 2024-03-12 VITALS
WEIGHT: 183 LBS | HEART RATE: 60 BPM | DIASTOLIC BLOOD PRESSURE: 72 MMHG | OXYGEN SATURATION: 100 % | RESPIRATION RATE: 17 BRPM | HEIGHT: 68 IN | TEMPERATURE: 97.6 F | SYSTOLIC BLOOD PRESSURE: 128 MMHG | BODY MASS INDEX: 27.74 KG/M2

## 2024-03-12 DIAGNOSIS — I42.9 CARDIOMYOPATHY, UNSPECIFIED TYPE (HCC): ICD-10-CM

## 2024-03-12 DIAGNOSIS — M05.9 RHEUMATOID ARTHRITIS WITH RHEUMATOID FACTOR, UNSPECIFIED (HCC): Primary | ICD-10-CM

## 2024-03-12 DIAGNOSIS — G89.4 CHRONIC PAIN SYNDROME: ICD-10-CM

## 2024-03-12 DIAGNOSIS — N18.30 STAGE 3 CHRONIC KIDNEY DISEASE, UNSPECIFIED WHETHER STAGE 3A OR 3B CKD (HCC): ICD-10-CM

## 2024-03-12 DIAGNOSIS — I48.91 ATRIAL FIBRILLATION, UNSPECIFIED TYPE (HCC): ICD-10-CM

## 2024-03-12 DIAGNOSIS — F11.20 CONTINUOUS OPIOID DEPENDENCE (HCC): ICD-10-CM

## 2024-03-12 DIAGNOSIS — K55.8 OTHER VASCULAR DISORDERS OF INTESTINE (HCC): ICD-10-CM

## 2024-03-12 PROCEDURE — 99214 OFFICE O/P EST MOD 30 MIN: CPT | Performed by: FAMILY MEDICINE

## 2024-03-12 PROCEDURE — G2211 COMPLEX E/M VISIT ADD ON: HCPCS | Performed by: FAMILY MEDICINE

## 2024-03-12 NOTE — PROGRESS NOTES
Assessment/Plan:     Diagnoses and all orders for this visit:    Rheumatoid arthritis with rheumatoid factor, unspecified (HCC)  This is stable patient follows with rheumatology  Chronic pain syndrome  This is stable patient's current regimen which has been stable for many years  Stage 3 chronic kidney disease, unspecified whether stage 3a or 3b CKD (HCC)  This is stable  Continuous opioid dependence (HCC)  This is stable patient is PDMP compliant  Atrial fibrillation, unspecified type (HCC)  Patient follows with cardiology  Other vascular disorders of intestine (HCC)  This is stable  Cardiomyopathy, unspecified type (HCC)  Patient follows with cardiology      Overall patient is doing well  He will continue his current medications  He is up-to-date on his preventative maintenance  He will follow-up with his rheumatologist and cardiologist  He can follow-up with me in 6 months or sooner if needed      Subjective:     Chief Complaint   Patient presents with    Follow-up     4 month routine follow up.        Patient ID: Julio Saldana is a 78 y.o. male.    Patient presents today for check of chronic conditions  He says he had his hernia evaluated by the surgeon  But not sure if he is going to proceed with surgery yet  Otherwise he has no other acute complaints today and he is stable at baseline        The following portions of the patient's history were reviewed and updated as appropriate: allergies, current medications, past family history, past medical history, past social history, past surgical history and problem list.    Review of Systems   Constitutional: Negative.    HENT: Negative.     Eyes: Negative.    Respiratory: Negative.     Cardiovascular: Negative.    Gastrointestinal: Negative.    Endocrine: Negative.    Genitourinary: Negative.    Musculoskeletal: Negative.    Skin: Negative.    Allergic/Immunologic: Negative.    Neurological: Negative.    Hematological: Negative.    Psychiatric/Behavioral:  "Negative.     All other systems reviewed and are negative.        Objective:    Vitals:    03/12/24 0940   BP: 128/72   BP Location: Left arm   Patient Position: Sitting   Cuff Size: Standard   Pulse: 60   Resp: 17   Temp: 97.6 °F (36.4 °C)   TempSrc: Tympanic   SpO2: 100%   Weight: 83 kg (183 lb)   Height: 5' 8\" (1.727 m)          Physical Exam  Vitals and nursing note reviewed.   Constitutional:       Appearance: Normal appearance.   HENT:      Head: Normocephalic.      Right Ear: Tympanic membrane, ear canal and external ear normal.      Left Ear: Tympanic membrane, ear canal and external ear normal.      Nose: Nose normal.      Mouth/Throat:      Mouth: Mucous membranes are moist.   Eyes:      Conjunctiva/sclera: Conjunctivae normal.      Pupils: Pupils are equal, round, and reactive to light.   Cardiovascular:      Rate and Rhythm: Normal rate and regular rhythm.   Pulmonary:      Effort: Pulmonary effort is normal.      Breath sounds: Normal breath sounds.   Abdominal:      General: Abdomen is flat. Bowel sounds are normal.      Palpations: Abdomen is soft.   Musculoskeletal:         General: Normal range of motion.   Skin:     General: Skin is warm and dry.   Neurological:      General: No focal deficit present.      Mental Status: He is alert and oriented to person, place, and time. Mental status is at baseline.   Psychiatric:         Mood and Affect: Mood normal.         Behavior: Behavior normal.         Thought Content: Thought content normal.         Judgment: Judgment normal.         "

## 2024-03-22 ENCOUNTER — OFFICE VISIT (OUTPATIENT)
Dept: CARDIOLOGY CLINIC | Facility: CLINIC | Age: 79
End: 2024-03-22
Payer: COMMERCIAL

## 2024-03-22 VITALS
DIASTOLIC BLOOD PRESSURE: 68 MMHG | BODY MASS INDEX: 28.07 KG/M2 | SYSTOLIC BLOOD PRESSURE: 128 MMHG | HEART RATE: 67 BPM | WEIGHT: 185.2 LBS | HEIGHT: 68 IN | OXYGEN SATURATION: 98 %

## 2024-03-22 DIAGNOSIS — E78.2 MIXED HYPERLIPIDEMIA: ICD-10-CM

## 2024-03-22 DIAGNOSIS — I10 PRIMARY HYPERTENSION: ICD-10-CM

## 2024-03-22 DIAGNOSIS — Z95.3 STATUS POST AORTIC VALVE REPLACEMENT WITH BIOPROSTHETIC VALVE: ICD-10-CM

## 2024-03-22 DIAGNOSIS — I48.91 ATRIAL FIBRILLATION, UNSPECIFIED TYPE (HCC): Primary | ICD-10-CM

## 2024-03-22 DIAGNOSIS — I47.10 PAROXYSMAL SVT (SUPRAVENTRICULAR TACHYCARDIA): ICD-10-CM

## 2024-03-22 DIAGNOSIS — I25.10 ATHEROSCLEROSIS OF NATIVE CORONARY ARTERY OF NATIVE HEART WITHOUT ANGINA PECTORIS: ICD-10-CM

## 2024-03-22 DIAGNOSIS — I42.9 CARDIOMYOPATHY, UNSPECIFIED TYPE (HCC): ICD-10-CM

## 2024-03-22 DIAGNOSIS — M48.061 SPINAL STENOSIS OF LUMBAR REGION, UNSPECIFIED WHETHER NEUROGENIC CLAUDICATION PRESENT: ICD-10-CM

## 2024-03-22 PROCEDURE — 99214 OFFICE O/P EST MOD 30 MIN: CPT | Performed by: INTERNAL MEDICINE

## 2024-03-22 PROCEDURE — 93000 ELECTROCARDIOGRAM COMPLETE: CPT | Performed by: INTERNAL MEDICINE

## 2024-03-22 RX ORDER — OXYCODONE HYDROCHLORIDE 10 MG/1
TABLET ORAL
Qty: 120 TABLET | Refills: 0 | Status: SHIPPED | OUTPATIENT
Start: 2024-03-22
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
## 2024-03-27 ENCOUNTER — TELEPHONE (OUTPATIENT)
Dept: FAMILY MEDICINE CLINIC | Facility: CLINIC | Age: 79
End: 2024-03-27

## 2024-04-04 DIAGNOSIS — M48.061 SPINAL STENOSIS OF LUMBAR REGION, UNSPECIFIED WHETHER NEUROGENIC CLAUDICATION PRESENT: ICD-10-CM

## 2024-04-04 RX ORDER — OXYCODONE AND ACETAMINOPHEN 10; 325 MG/1; MG/1
1 TABLET ORAL EVERY 4 HOURS PRN
Qty: 120 TABLET | Refills: 0 | Status: SHIPPED | OUTPATIENT
Start: 2024-04-04

## 2024-04-04 NOTE — ASSESSMENT & PLAN NOTE
· Setting of chronic pain due to RA/OA  · [place] PDMP reviewed  · Continue with home Soma 350 mg 3 times daily, Neurontin 600 mg 3 times daily, oxycodone 10 mg QID No Vaccines Administered.

## 2024-04-19 DIAGNOSIS — M48.061 SPINAL STENOSIS OF LUMBAR REGION, UNSPECIFIED WHETHER NEUROGENIC CLAUDICATION PRESENT: ICD-10-CM

## 2024-04-22 DIAGNOSIS — M48.061 SPINAL STENOSIS OF LUMBAR REGION, UNSPECIFIED WHETHER NEUROGENIC CLAUDICATION PRESENT: ICD-10-CM

## 2024-04-22 RX ORDER — OXYCODONE HYDROCHLORIDE 10 MG/1
TABLET ORAL
Qty: 120 TABLET | Refills: 0 | OUTPATIENT
Start: 2024-04-22

## 2024-04-22 RX ORDER — OXYCODONE HYDROCHLORIDE 10 MG/1
TABLET ORAL
Qty: 120 TABLET | Refills: 0 | Status: SHIPPED | OUTPATIENT
Start: 2024-04-22

## 2024-05-01 DIAGNOSIS — M48.061 SPINAL STENOSIS OF LUMBAR REGION, UNSPECIFIED WHETHER NEUROGENIC CLAUDICATION PRESENT: ICD-10-CM

## 2024-05-01 RX ORDER — OXYCODONE AND ACETAMINOPHEN 10; 325 MG/1; MG/1
1 TABLET ORAL EVERY 4 HOURS PRN
Qty: 120 TABLET | Refills: 0 | Status: SHIPPED | OUTPATIENT
Start: 2024-05-01

## 2024-05-01 NOTE — TELEPHONE ENCOUNTER
Reason for call:   [x] Refill   [] Prior Auth  [] Other:     Office:   [x] PCP/Provider -   [] Specialty/Provider -     Medication: Percocet     Dose/Frequency:  mg      Quantity: #120    Pharmacy: [facility] [address] [place] [address]    Does the patient have enough for 3 days?   [] Yes   [x] No - Send as HP to POD

## 2024-05-21 DIAGNOSIS — G89.29 CHRONIC BACK PAIN, UNSPECIFIED BACK LOCATION, UNSPECIFIED BACK PAIN LATERALITY: ICD-10-CM

## 2024-05-21 DIAGNOSIS — M54.9 CHRONIC BACK PAIN, UNSPECIFIED BACK LOCATION, UNSPECIFIED BACK PAIN LATERALITY: ICD-10-CM

## 2024-05-21 DIAGNOSIS — M48.061 SPINAL STENOSIS OF LUMBAR REGION, UNSPECIFIED WHETHER NEUROGENIC CLAUDICATION PRESENT: ICD-10-CM

## 2024-05-21 RX ORDER — OXYCODONE HYDROCHLORIDE 10 MG/1
TABLET ORAL
Qty: 120 TABLET | Refills: 0 | Status: SHIPPED | OUTPATIENT
Start: 2024-05-21

## 2024-05-21 NOTE — TELEPHONE ENCOUNTER
Reason for call:   [x] Refill   [] Prior Auth  [] Other:     Office:   [x] PCP/Provider -   [] Specialty/Provider -     Medication: Oxycodone     Dose/Frequency: 10 mg tablets taken by mouth 4x daily     Quantity: 120    Pharmacy: [id number] - [place] - [address] [facility] [phone]     Does the patient have enough for 3 days?   [] Yes   [x] No - Send as HP to POD

## 2024-05-22 RX ORDER — CARISOPRODOL 350 MG/1
350 TABLET ORAL 4 TIMES DAILY
Qty: 360 TABLET | Refills: 1 | Status: SHIPPED | OUTPATIENT
Start: 2024-05-22

## 2024-05-23 ENCOUNTER — TELEPHONE (OUTPATIENT)
Age: 79
End: 2024-05-23

## 2024-05-23 NOTE — TELEPHONE ENCOUNTER
PA for carisoprodol (SOMA)     Submitted via    []CMM-KEY    [x]The Personal Bee-Case ID #     [id number]     []Faxed to plan   []Other website    []Phone call Case ID #      Office notes sent, clinical questions answered. Awaiting determination    Turnaround time for your insurance to make a decision on your Prior Authorization can take 7-21 business days.

## 2024-05-24 NOTE — TELEPHONE ENCOUNTER
PA for CARISOPRODOL  Approved     Date(s) approved January 1, 2024 to August 21, 2024    Case ID: [id number]         Patient advised by          [] MyChart Message  [] Phone call   [x]LMOM  []L/M to call office as no active Communication consent on file  []Unable to leave detailed message as VM not approved on Communication consent       Pharmacy advised by    [x]Fax  []Phone call    Approval letter scanned into Media Yes

## 2024-05-30 DIAGNOSIS — M48.061 SPINAL STENOSIS OF LUMBAR REGION, UNSPECIFIED WHETHER NEUROGENIC CLAUDICATION PRESENT: ICD-10-CM

## 2024-05-30 RX ORDER — OXYCODONE AND ACETAMINOPHEN 10; 325 MG/1; MG/1
1 TABLET ORAL EVERY 4 HOURS PRN
Qty: 120 TABLET | Refills: 0 | Status: SHIPPED | OUTPATIENT
Start: 2024-05-30

## 2024-05-30 NOTE — TELEPHONE ENCOUNTER
Reason for call:   [x] Refill   [] Prior Auth  [] Other:     Office:   [x] PCP/Provider -   [] Specialty/Provider -     Medication:   Pharmacy: [id number] - [place] - [address] [place] [address]        Does the patient have enough for 3 days?   [] Yes   [x] No - Send as HP to POD

## 2024-06-21 DIAGNOSIS — G47.00 INSOMNIA, UNSPECIFIED TYPE: ICD-10-CM

## 2024-06-21 DIAGNOSIS — M48.061 SPINAL STENOSIS OF LUMBAR REGION, UNSPECIFIED WHETHER NEUROGENIC CLAUDICATION PRESENT: ICD-10-CM

## 2024-06-21 RX ORDER — OXYCODONE HYDROCHLORIDE 10 MG/1
TABLET ORAL
Qty: 120 TABLET | Refills: 0 | Status: SHIPPED | OUTPATIENT
Start: 2024-06-21

## 2024-06-21 RX ORDER — TRAZODONE HYDROCHLORIDE 300 MG/1
600 TABLET ORAL
Qty: 180 TABLET | Refills: 1 | Status: SHIPPED | OUTPATIENT
Start: 2024-06-21

## 2024-06-21 NOTE — TELEPHONE ENCOUNTER
Medication: oxyCODONE (ROXICODONE) 10 MG TABS     Dose/Frequency: take 1 tablet by mouth four times a day     Quantity: 120 tablet     Pharmacy:  [id number] [place] [address]     Office:   [x] PCP/Provider - Page   [] Speciality/Provider -     Does the patient have enough for 3 days?   [x] Yes   [] No - Send as HP to POD      Medication: traZODone (DESYREL) 300 MG tablet     Dose/Frequency: Take 2 tablets (600 mg total) by mouth daily at bedtime     Quantity: 180 tablet     Pharmacy: [id number] [place] [address]     Office:   [x] PCP/Provider - Page   [] Speciality/Provider -     Does the patient have enough for 3 days?   [x] Yes   [] No - Send as HP to POD

## 2024-06-25 ENCOUNTER — TELEPHONE (OUTPATIENT)
Dept: CARDIOLOGY CLINIC | Facility: CLINIC | Age: 79
End: 2024-06-25

## 2024-06-25 NOTE — TELEPHONE ENCOUNTER
Patient's wife called in to the refill line in error and wished to speak to one of Dr. Rachel' clinical staff at the [facility]. Made a warm transfer of the call to [facility].

## 2024-07-01 DIAGNOSIS — M48.061 SPINAL STENOSIS OF LUMBAR REGION, UNSPECIFIED WHETHER NEUROGENIC CLAUDICATION PRESENT: ICD-10-CM

## 2024-07-01 RX ORDER — OXYCODONE AND ACETAMINOPHEN 10; 325 MG/1; MG/1
1 TABLET ORAL EVERY 4 HOURS PRN
Qty: 120 TABLET | Refills: 0 | Status: SHIPPED | OUTPATIENT
Start: 2024-07-01

## 2024-07-01 NOTE — TELEPHONE ENCOUNTER
Reason for call:   [x] Refill   [] Prior Auth  [] Other:     Office:   [x] PCP/Provider - [facility]   [] Specialty/Provider -     Medication:   oxyCODONE-acetaminophen (PERCOCET)  mg per tablet - Take 1 tablet by mouth every 4 (four) hours as needed for moderate pain Max Daily Amount: 6 tablets     Pharmacy:   [id number] - [place] [address]     Does the patient have enough for 3 days?   [] Yes   [x] No - Send as HP to POD

## 2024-07-02 ENCOUNTER — TELEPHONE (OUTPATIENT)
Dept: RHEUMATOLOGY | Facility: CLINIC | Age: 79
End: 2024-07-02

## 2024-07-02 NOTE — TELEPHONE ENCOUNTER
Spoke to patient and explained that provider will going through a Schedule change, patient expressed understanding at this and chose to reschedule for October 8th at 1:00 and elected to not be put on the cancel list

## 2024-07-22 DIAGNOSIS — M48.061 SPINAL STENOSIS OF LUMBAR REGION, UNSPECIFIED WHETHER NEUROGENIC CLAUDICATION PRESENT: ICD-10-CM

## 2024-07-22 RX ORDER — OXYCODONE HYDROCHLORIDE 10 MG/1
TABLET ORAL
Qty: 120 TABLET | Refills: 0 | Status: SHIPPED | OUTPATIENT
Start: 2024-07-22

## 2024-07-22 NOTE — TELEPHONE ENCOUNTER
Reason for call:   [x] Refill   [] Prior Auth  [] Other:     Office:   [x] PCP/Provider -   [] Specialty/Provider -     Medication: oxyCODONE (ROXICODONE) 10 MG TABS     Dose/Frequency:  take 1 tablet by mouth four times a day,     Quantity: 120    Pharmacy: [id number] - [place] - [address] [place] [address]     Does the patient have enough for 3 days?   [] Yes   [x] No - Send as HP to POD

## 2024-07-30 DIAGNOSIS — G47.00 INSOMNIA, UNSPECIFIED TYPE: ICD-10-CM

## 2024-07-30 DIAGNOSIS — M48.061 SPINAL STENOSIS OF LUMBAR REGION, UNSPECIFIED WHETHER NEUROGENIC CLAUDICATION PRESENT: ICD-10-CM

## 2024-07-30 RX ORDER — OXYCODONE AND ACETAMINOPHEN 10; 325 MG/1; MG/1
1 TABLET ORAL EVERY 4 HOURS PRN
Qty: 120 TABLET | Refills: 0 | Status: SHIPPED | OUTPATIENT
Start: 2024-07-30

## 2024-07-30 RX ORDER — HYDROXYZINE HYDROCHLORIDE 25 MG/1
25 TABLET, FILM COATED ORAL EVERY 6 HOURS PRN
Qty: 90 TABLET | Refills: 1 | Status: SHIPPED | OUTPATIENT
Start: 2024-07-30 | End: 2024-09-13

## 2024-07-30 NOTE — TELEPHONE ENCOUNTER
Reason for call:   [x] Refill   [] Prior Auth  [] Other:     Office:   [x] PCP/Provider - [facility]   [] Specialty/Provider -     oxyCODONE-acetaminophen (PERCOCET)  mg per tablet   1 tablet, Every 4 hours PRN   120    hydrOXYzine HCL (ATARAX) 25 mg tablet    25 mg, Every 6 hours PRN   30    Pharmacy: [facility] #[id number]    Does the patient have enough for 3 days?   [] Yes   [x] No - Send as HP to POD

## 2024-07-31 ENCOUNTER — OFFICE VISIT (OUTPATIENT)
Dept: CARDIOLOGY CLINIC | Facility: CLINIC | Age: 79
End: 2024-07-31
Payer: COMMERCIAL

## 2024-07-31 VITALS
SYSTOLIC BLOOD PRESSURE: 124 MMHG | OXYGEN SATURATION: 97 % | DIASTOLIC BLOOD PRESSURE: 70 MMHG | HEIGHT: 68 IN | HEART RATE: 64 BPM | BODY MASS INDEX: 29.04 KG/M2 | WEIGHT: 191.6 LBS

## 2024-07-31 DIAGNOSIS — I10 PRIMARY HYPERTENSION: ICD-10-CM

## 2024-07-31 DIAGNOSIS — I42.9 CARDIOMYOPATHY, UNSPECIFIED TYPE (HCC): ICD-10-CM

## 2024-07-31 DIAGNOSIS — Z95.3 STATUS POST AORTIC VALVE REPLACEMENT WITH BIOPROSTHETIC VALVE: ICD-10-CM

## 2024-07-31 DIAGNOSIS — I48.91 ATRIAL FIBRILLATION, UNSPECIFIED TYPE (HCC): Primary | ICD-10-CM

## 2024-07-31 DIAGNOSIS — I47.10 PAROXYSMAL SVT (SUPRAVENTRICULAR TACHYCARDIA): ICD-10-CM

## 2024-07-31 DIAGNOSIS — I25.10 ATHEROSCLEROSIS OF NATIVE CORONARY ARTERY OF NATIVE HEART WITHOUT ANGINA PECTORIS: ICD-10-CM

## 2024-07-31 DIAGNOSIS — E78.2 MIXED HYPERLIPIDEMIA: ICD-10-CM

## 2024-07-31 PROCEDURE — 1159F MED LIST DOCD IN RCRD: CPT | Performed by: INTERNAL MEDICINE

## 2024-07-31 PROCEDURE — 3074F SYST BP LT 130 MM HG: CPT | Performed by: INTERNAL MEDICINE

## 2024-07-31 PROCEDURE — 1160F RVW MEDS BY RX/DR IN RCRD: CPT | Performed by: INTERNAL MEDICINE

## 2024-07-31 PROCEDURE — 99214 OFFICE O/P EST MOD 30 MIN: CPT | Performed by: INTERNAL MEDICINE

## 2024-07-31 PROCEDURE — 3078F DIAST BP <80 MM HG: CPT | Performed by: INTERNAL MEDICINE

## 2024-07-31 PROCEDURE — 93000 ELECTROCARDIOGRAM COMPLETE: CPT | Performed by: INTERNAL MEDICINE

## 2024-07-31 RX ORDER — ASPIRIN 81 MG/1
81 TABLET, CHEWABLE ORAL DAILY
Start: 2024-07-31
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
## 2024-08-22 DIAGNOSIS — M48.061 SPINAL STENOSIS OF LUMBAR REGION, UNSPECIFIED WHETHER NEUROGENIC CLAUDICATION PRESENT: ICD-10-CM

## 2024-08-22 RX ORDER — OXYCODONE HYDROCHLORIDE 10 MG/1
TABLET ORAL
Qty: 120 TABLET | Refills: 0 | Status: SHIPPED | OUTPATIENT
Start: 2024-08-22

## 2024-08-22 NOTE — TELEPHONE ENCOUNTER
Reason for call:   [x] Refill   [] Prior Auth  [] Other:     Office:   [x] PCP/Provider - Margarita García DO / jimmie  [] Specialty/Provider -     Medication: oxyCODONE (ROXICODONE) 10 MG TABS     Dose/Frequency: take 1 tablet by mouth four times a day,     Quantity: 120    Pharmacy:   [id number] - [place] [address]       Does the patient have enough for 3 days?   [] Yes   [x] No - Send as HP to POD

## 2024-08-28 DIAGNOSIS — M48.061 SPINAL STENOSIS OF LUMBAR REGION, UNSPECIFIED WHETHER NEUROGENIC CLAUDICATION PRESENT: ICD-10-CM

## 2024-08-28 RX ORDER — OXYCODONE AND ACETAMINOPHEN 10; 325 MG/1; MG/1
1 TABLET ORAL EVERY 4 HOURS PRN
Qty: 120 TABLET | Refills: 0 | Status: SHIPPED | OUTPATIENT
Start: 2024-08-28

## 2024-08-28 NOTE — TELEPHONE ENCOUNTER
Patient requesting a refill for:    Reason for call:   [x] Refill   [] Prior Auth  [] Other:     Office:   [x] PCP/Provider -   [] Specialty/Provider -     Medication: oxyCODONE-acetaminophen (PERCOCET)     Dose/Frequency:  mg per tablet Take 1 tablet by mouth every 4 (four) hours as needed for moderate pain     Quantity: 120 tablet    Pharmacy: [id number] - [place] [address] [phone]    Does the patient have enough for 3 days?   [x] Yes   [] No - Send as HP to POD

## 2024-09-06 NOTE — PROGRESS NOTES
Ambulatory Visit  Name: Julio Saldana      : 1945      MRN: [id number]  Encounter Provider: Demetris Cruz MD  Encounter Date: 2024   Encounter department: [facility] [place]    Assessment & Plan   1. Incisional hernia, without obstruction or gangrene  Assessment & Plan:  The patient is a very pleasant 79-year-old male returning for his routine scheduled follow-up regarding his asymptomatic ventral incisional hernia.  The patient denies all complaints referable to his abdomen.  He notes that the hernia is having no impact upon his quality of life.    The patient has gained weight and is exercising regularly.  He is lifting weights.    He is accompanied by his wife of over 50 years here in the exam room today.    On physical examination he is well-appearing pleasant competent reliable as a historian.  Inspection of his abdomen reveals a large epigastric ventral incisional hernia.  It is soft and reducible.  He has a broad base.  It is at low risk for acute incarceration or strangulation.    Patient has expressed interest in continued nonsurgical approach to the management of his ventral incisional hernia.    I believe this to be a safe and reasonable choice on the patient's part.  I look forward to seeing him back in a years time.  He has been encouraged to follow-up sooner on an as-needed basis.    Finally he was educated regarding signs of acute incarceration or strangulation that should prompt immediate presentation to the emergency department.      History of Present Illness     Julio Saldana is a 79 y.o. male who presents for  6 mo f/u on inc hernia. Pt states the hernias seems to have gotten bigger only because he has been gaining weight but he denies pain, nausea/vomiting, diarrhea/constipation, rectal pain/bleeding, issues with urination, trouble eating/drinking/swallowing or fevers/chills. Sylvesteria.O,MA    Review of Systems   Constitutional:  Negative for chills and fever.  "  HENT:  Negative for ear pain and sore throat.    Eyes:  Negative for pain and visual disturbance.   Respiratory:  Negative for cough and shortness of breath.    Cardiovascular:  Negative for chest pain and palpitations.   Gastrointestinal:  Negative for abdominal pain and vomiting.   Genitourinary:  Negative for dysuria and hematuria.   Musculoskeletal:  Negative for arthralgias and back pain.   Skin:  Negative for color change and rash.   Neurological:  Negative for seizures and syncope.   All other systems reviewed and are negative.      Objective     /60 (BP Location: Left arm, Patient Position: Sitting, Cuff Size: Standard)   Pulse 76   Temp 97.7 °F (36.5 °C) (Temporal)   Ht 5' 8\" (1.727 m)   Wt 88.9 kg (196 lb)   SpO2 95%   BMI 29.80 kg/m²     Physical Exam  Vitals and nursing note reviewed.   Constitutional:       General: He is not in acute distress.     Appearance: He is well-developed.   HENT:      Head: Normocephalic and atraumatic.   Eyes:      Conjunctiva/sclera: Conjunctivae normal.   Cardiovascular:      Rate and Rhythm: Normal rate and regular rhythm.      Heart sounds: No murmur heard.  Pulmonary:      Effort: Pulmonary effort is normal. No respiratory distress.      Breath sounds: Normal breath sounds.   Abdominal:      Palpations: Abdomen is soft.      Tenderness: There is no abdominal tenderness.   Musculoskeletal:         General: No swelling.      Cervical back: Neck supple.   Skin:     General: Skin is warm and dry.      Capillary Refill: Capillary refill takes less than 2 seconds.      Comments: Physical examination as described above   Neurological:      Mental Status: He is alert.   Psychiatric:         Mood and Affect: Mood normal.       Administrative Statements           "

## 2024-09-09 ENCOUNTER — OFFICE VISIT (OUTPATIENT)
Dept: SURGERY | Facility: CLINIC | Age: 79
End: 2024-09-09
Payer: COMMERCIAL

## 2024-09-09 VITALS
SYSTOLIC BLOOD PRESSURE: 110 MMHG | HEIGHT: 68 IN | BODY MASS INDEX: 29.7 KG/M2 | TEMPERATURE: 97.7 F | OXYGEN SATURATION: 95 % | WEIGHT: 196 LBS | HEART RATE: 76 BPM | DIASTOLIC BLOOD PRESSURE: 60 MMHG

## 2024-09-09 DIAGNOSIS — K43.2 INCISIONAL HERNIA, WITHOUT OBSTRUCTION OR GANGRENE: Primary | ICD-10-CM

## 2024-09-09 PROCEDURE — 99213 OFFICE O/P EST LOW 20 MIN: CPT | Performed by: SURGERY

## 2024-09-09 NOTE — ASSESSMENT & PLAN NOTE
The patient is a very pleasant 79-year-old male returning for his routine scheduled follow-up regarding his asymptomatic ventral incisional hernia.  The patient denies all complaints referable to his abdomen.  He notes that the hernia is having no impact upon his quality of life.    The patient has gained weight and is exercising regularly.  He is lifting weights.    He is accompanied by his wife of over 50 years here in the exam room today.    On physical examination he is well-appearing pleasant competent reliable as a historian.  Inspection of his abdomen reveals a large epigastric ventral incisional hernia.  It is soft and reducible.  He has a broad base.  It is at low risk for acute incarceration or strangulation.    Patient has expressed interest in continued nonsurgical approach to the management of his ventral incisional hernia.    I believe this to be a safe and reasonable choice on the patient's part.  I look forward to seeing him back in a years time.  He has been encouraged to follow-up sooner on an as-needed basis.    Finally he was educated regarding signs of acute incarceration or strangulation that should prompt immediate presentation to the emergency department.

## 2024-09-20 DIAGNOSIS — M48.061 SPINAL STENOSIS OF LUMBAR REGION, UNSPECIFIED WHETHER NEUROGENIC CLAUDICATION PRESENT: ICD-10-CM

## 2024-09-20 RX ORDER — OXYCODONE HYDROCHLORIDE 10 MG/1
10 TABLET ORAL EVERY 6 HOURS PRN
Qty: 120 TABLET | Refills: 0 | Status: SHIPPED | OUTPATIENT
Start: 2024-09-20

## 2024-09-20 NOTE — TELEPHONE ENCOUNTER
Reason for call:   [x] Refill   [] Prior Auth  [] Other:     Office:   [x] PCP/Provider -   [] Specialty/Provider -     Medication: oxyCODONE (ROXICODONE) 10 MG TABS     Dose/Frequency: take 1 tablet by mouth four times a day     Quantity: 120 tablet     Pharmacy: [id number] - [place] - [address] [place] [address]     Does the patient have enough for 3 days?   [x] Yes   [] No - Send as HP to POD

## 2024-09-24 ENCOUNTER — TELEPHONE (OUTPATIENT)
Age: 79
End: 2024-09-24

## 2024-09-24 DIAGNOSIS — G47.00 INSOMNIA, UNSPECIFIED TYPE: ICD-10-CM

## 2024-09-24 RX ORDER — HYDROXYZINE HYDROCHLORIDE 25 MG/1
25 TABLET, FILM COATED ORAL EVERY 6 HOURS PRN
Qty: 90 TABLET | Refills: 1 | Status: SHIPPED | OUTPATIENT
Start: 2024-09-24

## 2024-09-24 NOTE — TELEPHONE ENCOUNTER
PA for hydrOXYzine HCL (ATARAX) 25 mg tablet SUBMITTED     via    []CMM-KEY:   [x][employer] ([employer])-Case ID # [id number]   []Faxed to plan   []Other website   []Phone call Case ID #     Office notes sent, clinical questions answered. Awaiting determination    Turnaround time for your insurance to make a decision on your Prior Authorization can take 7-21 business days.

## 2024-09-25 NOTE — TELEPHONE ENCOUNTER
PA for hydrOXYzine HCL (ATARAX) 25 mg tablet DENIED    Reason:(Screenshot if applicable)        Message sent to office clinical pool Yes    Denial letter scanned into Media Yes    Appeal started No (Provider will need to decide if appeal is warranted and send clinical documentation to Prior Authorization Team for initiation.)    **Please follow up with your patient regarding denial and next steps**

## 2024-09-30 DIAGNOSIS — M48.061 SPINAL STENOSIS OF LUMBAR REGION, UNSPECIFIED WHETHER NEUROGENIC CLAUDICATION PRESENT: ICD-10-CM

## 2024-09-30 RX ORDER — OXYCODONE AND ACETAMINOPHEN 10; 325 MG/1; MG/1
1 TABLET ORAL EVERY 4 HOURS PRN
Qty: 120 TABLET | Refills: 0 | Status: SHIPPED | OUTPATIENT
Start: 2024-09-30

## 2024-09-30 NOTE — TELEPHONE ENCOUNTER
Reason for call:   [x] Refill   [] Prior Auth  [] Other:     Office:   [x] PCP/Provider - SMILEY DOW / Telly Page, DO   [] Specialty/Provider -     Medication: oxyCODONE-acetaminophen (PERCOCET)  mg per tablet     Dose/Frequency: Take 1 tablet by mouth every 4 (four) hours as needed for moderate pain Max Daily Amount: 6 tablets     Quantity: 120 tablet     Pharmacy: [id number] - [place] [address]     Does the patient have enough for 3 days?   [] Yes   [x] No - Send as HP to POD

## 2024-09-30 NOTE — PROGRESS NOTES
RE: Plan of Care    Osmar Antoine CNP    Thank you for referring Jessee Sargent. The following information reflects my assessment and plan of care.    Please review and sign the attached form to indicate your approval of the plan of care. Insurance compliance requires your approval be on this plan of care. After your review, please fax back all pages received. Should you have any questions, feel free to contact me.    Rodo Moncada PT  [facility]  [address] [place] [id number]  Phone: [phone]  Fax: [phone]           Plan of Care 24   Effective from: 2024  Effective to: 2024    Plan ID: [id number]            Participants as of Finalize on 2024    Name Type Comments Contact Info    Osmar Antoine CNP PCP - General  [phone]    Rodo Moncada PT Physical Therapist             GoveJessee arroyo MRN:[id number] (:1987 37 year old M)             Evaluation     Author: Rodo Moncada PT Status: Signed Last edited: 2024  3:15 PM       Physical Therapy Evaluation    Visit Type: Daily Treatment Note- Initial Evaluation  Visit: 1  Referring Provider: Osmar Antoine CNP  Medical Diagnosis (from order): R25.2 - Trismus  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    SUBJECTIVE                                                                                                               38 yo male was referred to Physical therapist due to c/o trismus.  He was previously here for therapy for the same issue and was able to achieve about 28-30 millimeters of opening afterwards.  His ROM slowly tightened back up again but needs it to be wider for a series of dental and operative procedures that are  Progress Note - General Surgery   Argenis Fierro 78 y o  male MRN: [id number]  Unit/Bed#: -01 Encounter: [id number]    Assessment:    Postop day 7  Status post exploratory laparotomy, Vane's procedure with segmental small-bowel resection and extensive lysis of adhesions by Dr Walter Paulson  High-grade small-bowel obstruction with intra-abdominal abscess    · WBC 15 30  · Cr 1 04, UO:  4 6L, Ambriz in place b/c pt presented with surrounding phlegmon abutting the dome of the bladder with potential colovesical fistula   · Last CMP was  showed low protein and albumin  · DAMASO drain x 1: 40 ml serosanguinous fluid  · Ostomy functioninml stool past 24 hours  · Vital signs stable, afebrile     PLAN:    Abdominal wound irrigated with normal saline dressing changed, also surgical drain dressing replaced at bedside today  Regular diet as tolerated  Discussed with Dr Angelo Cox  Will add nutrition supplements 3 times a day  Patient has been restarted back on home meds  Soma and Neurontin were restarted for his back pain  Out of bed to chair, ambulate  Encouraged use of incentive spirometry   Routine drain care and ostomy maintenance  DVT prophylaxis, SCDs and subQ Lovenox  CBC, CMP to evaluate protein and albumin in a m   DAMASO drain, monitor output daily  ABD to midline incision  PT/OT  Urology no reviewed, plan for cysto g on   If no abnormalities on that study then Ambriz to be removed  Disposition planning, PT OT recommending acute rehab services after hospital discharge  Dr Angelo Cox is the on-call general surgeon this weekend  Subjective/Objective      Chief Complaint:  No overnight events  Minimal abdominal pain  Ambriz draining copious amounts of clear yellow urine  Nausea improved, tolerating regular diet  He has complaints of intermittent back spasms relieved with repositioning       Subjective:  He has some complaints of back pain, history of chronic back planned(root canal, esophageal dilation, palatal prosthesis).  He denies pain in his jaw currently but reports that the left side is tighter vs the right side.  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            Function:   Prior Level of Function: declining function, therefore referred to therapy,    Patient Goals: increased motion.    Prior treatment  - outpatient PT  - Discharged from hospital, home health, or skilled nursing facility in last 30 days: no  Home Environment   - Patient lives with: significant other  - Feel safe at home / work / school: yes      OBJECTIVE                                                                                                                    Observation   Mandibular Dynamics:      Maximal opening with pain: 20 mm      Opening pattern with mandibular loading: straight  Excursion:      Left lateral (norm 10): 0 mm     Right lateral (norm 10): 4 mm  Symmetrical flexion rotation test  Symmetrical C2-3 sidebending  Limited head nod      Range of Motion (ROM)   (degrees unless noted; active unless noted; norms in ( ); negative=lacking to 0, positive=beyond 0)  Cervical:    - Flexion (45-50): 40°    - Extension (45-60): 45°    - Rotation (60-80):         Left: 58°         Right: 54°    - Side Bend (45):         Left: 17°         Right: 15°         Palpation  + fibrosis to left masseter, minimal fibrosis to left neck  Slightly hypomobile to hyoid cartilage            Biting/Loading/Mouth Opening  Joint Loading/Unloading   - Distraction:  Left: tenderness Right: tenderness  - Cranial Load:  Left: no tenderness Right: no tendernss  Joint/Mouth Opening Mobility   - Left: hypomobile  - Right: hypomobile  (-) crepitus bilateral jaws        Outcome/Assessments  OHIP-14:  30/56    Treatment     Therapeutic Exercise  Tongue blade/sonam exercise x 5-8 minutes*  Suboccipital distraction    Manual Therapy   TMJ distraction, medial and lateral glide to  problems and was on narcotic pain medicines prior to admission  Right now his pain is controlled  Patient's wife is present in the room with him  Apparently yesterday a large amount of soft brown stool and gas in the ostomy  Today very little present in the ostomy bag  He is tolerating regular diet  Denies any nausea or vomiting  Passing flatus today  Patient has indwelling Ambriz catheter, now 7 days postop  Urology follow the patient and plans for cystogram to be formed on Monday to determine if Ambriz catheter can be discontinued prior to hospital discharge  Scheduled Meds:  Current Facility-Administered Medications   Medication Dose Route Frequency Provider Last Rate    aspirin  81 mg Oral Daily Catherine Martinez PA-C      atorvastatin  20 mg Oral Daily VINI Parikh      carisoprodol  350 mg Oral 4x Daily VINI Parikh      enoxaparin  40 mg Subcutaneous Daily Alvia Hashimoto, PA-C      gabapentin  300 mg Oral TID VINI Parikh      HYDROmorphone  0 2 mg Intravenous Q1H PRN Ashely Garcia MD      LORazepam  1 mg Intravenous Q4H PRN Efren Byrd PA-C      losartan  50 mg Oral Daily VINI Parikh      metoclopramide  10 mg Intravenous Q6H PRN Ashely Garcia MD      ondansetron  4 mg Intravenous Q6H PRN Alvia Hashimoto, PA-C      oxyCODONE  10 mg Oral 4x Daily Catherine Zamora PA-C      oxyCODONE-acetaminophen  1 tablet Oral Q4H PRN Catherine Martinez PA-C      pantoprazole  40 mg Intravenous Q24H [address] Sabina KloapVINI      traZODone  300 mg Oral HS Catherine Martinez PA-C       Continuous Infusions:   PRN Meds:   HYDROmorphone    LORazepam    metoclopramide    ondansetron    oxyCODONE-acetaminophen    Objective:     Blood pressure 117/69, pulse 85, temperature 98 2 °F (36 8 °C), temperature source Temporal, resp  rate 18, height 5' 6" (1 676 m), weight 85 3 kg (188 lb), SpO2 96 %  ,Body mass index is 30 34 kg/m²      I/O       08/25 0701 08/26 0700 08/26 0701 08/27 0700 08/27 0701  08/28 0700    P  O   120 120    I V  (mL/kg)       NG/      IV Piggyback       Feedings 300      Total Intake(mL/kg) 600 (7) 120 (1 4) 120 (1 4)    Urine (mL/kg/hr) 2750 (1 3) 4600 (2 2)     Emesis/NG output       Drains 70 70 40    Stool 400 300     Total Output 3220 4970 40    Net -2620 -4850 +80                 Invasive Devices  Report    Peripheral Intravenous Line  Duration           Peripheral IV 08/27/22 Right;Ventral (anterior) Forearm <1 day          Drain  Duration           Closed/Suction Drain Right RLQ Bulb 19 Fr  6 days    Colostomy Descending/sigmoid LLQ 6 days    Urethral Catheter Latex 16 Fr  6 days                Physical Exam:     Awake alert  Chronically ill-appearing  Male balding pattern noted  Seems in good spirits  ENT unremarkable  Oral mucosa moist   Heart regular rate and rhythm  Lungs clear to auscultation  Back no flank tenderness to percussion  Abdomen appears protuberant but nondistended     Bowel sounds are present and active throughout  ABD dressing was removed  Midline incision closed with intermittent staples, wound was irrigated with normal saline  There is no drainage or erythema  Incision is minimally tender  Colostomy with pink stoma, no parastomal hernia  Appears viable  There is no stool or bowel sweat noted in colostomy, minimal gas present  Surgical drain with serosanguineous light pink fluid, approximately 30 mL in grenade  Extremities no calf tenderness, moves all 4 extremities  Ambulation could not be observed  Neurological exam shows CNS grossly intact  Mental status appropriate  No tremor noted  Lab, Imaging and other studies:  I have personally reviewed pertinent lab results    , CBC:   Lab Results   Component Value Date    WBC 15 30 (H) 08/27/2022    HGB 12 2 08/27/2022    HCT 38 5 08/27/2022    MCV 92 08/27/2022     (H) 08/27/2022    MCH 29 3 08/27/2022    MCHC 31 7 08/27/2022    RDW 12 5 08/27/2022    MPV 9 2 08/27/2022 condyles  Transverse glide to condyle bilateral sides    Skilled input: verbal instruction/cues and tactile instruction/cues  for therapist position for techniques as described above and how they are pertinent to the patient's plan of care.  Home Exercise Program  *above indicates provided as part of home exercise program      ASSESSMENT                                                                                                          37 year old patient has reported functional limitations listed above impacted by signs and symptoms consistent with treatment diagnosis below.  Treatment Diagnosis:   - Involved: cervical.  - Symptoms/impairments: impaired posture, impaired range of motion, impaired tissue/wound healing, impaired joint play/mobility, impaired mobility, impaired activity tolerance and impaired motor function/performance/coordination.    Physical therapist Impression: trismus post radiation.  Patient presents with decreased ROM/jaw opening which impedes his surgeon's ability to access his teeth and throat for his needed surgeries.  He has an infected left upper molar tooth that needs a root canal and has 2 other planned surgeries where having increased ajw ROM would be helpful.  He is planning to get a therabite to help with his jaw opening as well.  In the meantime, he is going to continue the tongue blade exercise which he has stopped performing(possibly leading to tightness in his jaw again).  After treatment today, he was able to open his jaw to 25 millimeters.      Prognosis: Patient will benefit from skilled therapy.  Rehabilitative potential is: good.  Predicted patient presentation: Moderate (evolving) - Patient comorbidities and complexities, as defined above, may have varying impact on steady progress for prescribed plan of care.    PLAN                                                                                                                         The following skilled  , CMP:   Lab Results   Component Value Date    SODIUM 141 08/27/2022    K 3 8 08/27/2022     08/27/2022    CO2 27 08/27/2022    BUN 9 08/27/2022    CREATININE 1 04 08/27/2022    CALCIUM 9 0 08/27/2022    EGFR 69 08/27/2022     VTE Pharmacologic Prophylaxis: Enoxaparin (Lovenox)  VTE Mechanical Prophylaxis: sequential compression device     Sharon VINI Owen interventions to be implemented to achieve goals listed below:  Neuromuscular Re-Education (75319)  Therapeutic Activity (21010)  Therapeutic Exercise (15958)  Manual Therapy (41931)  Dry Needling  Activities of Daily Living/Self Care (47107)    Frequency / Duration  1 times per week tapering as patient progresses for 10 weeks for an estimated total of 10 visits    Patient involved in and agreed to plan of care and goals.  Patient given attendance policy at time of initial evaluation.    Suggestions for next session as indicated: Progress per plan of care    Goals  Long Term Goals: to be met by end of plan of care  1. Independent with HEP  2. Improve jaw opening to 28-30 millimeters to facilitate throat and dental work  3. Improve OHIP-16 score by 10%      Therapy procedure time and total treatment time can be found documented on the Time Entry flowsheet           Current Participants as of 9/30/2024    Name Type Comments Contact Isael Antoine CNP PCP - General  [phone]    Signature pending    Rodo Moncada PT Physical Therapist      Electronically signed by Rodo Moncada PT at 9/30/2024 1720 CDT          RE: Plan of Care for Jessee Sargent, YOB: 1987     I certify the need for these services, furnished under this plan of treatment and while under my care.  I agree with the plan of care as stated and request that therapy proceed.        __________________________________________________________________________________  Provider Signature         Date

## 2024-10-08 ENCOUNTER — OFFICE VISIT (OUTPATIENT)
Dept: RHEUMATOLOGY | Facility: CLINIC | Age: 79
End: 2024-10-08
Payer: COMMERCIAL

## 2024-10-08 VITALS
DIASTOLIC BLOOD PRESSURE: 64 MMHG | BODY MASS INDEX: 30.46 KG/M2 | SYSTOLIC BLOOD PRESSURE: 128 MMHG | WEIGHT: 201 LBS | HEIGHT: 68 IN

## 2024-10-08 DIAGNOSIS — L40.50 PSORIASIS WITH ARTHROPATHY (HCC): Primary | ICD-10-CM

## 2024-10-08 DIAGNOSIS — M54.32 SCIATICA OF LEFT SIDE: ICD-10-CM

## 2024-10-08 DIAGNOSIS — L21.9 SEBORRHEA: ICD-10-CM

## 2024-10-08 DIAGNOSIS — L40.9 PSORIASIS: ICD-10-CM

## 2024-10-08 PROCEDURE — 99214 OFFICE O/P EST MOD 30 MIN: CPT | Performed by: INTERNAL MEDICINE

## 2024-10-08 PROCEDURE — G2211 COMPLEX E/M VISIT ADD ON: HCPCS | Performed by: INTERNAL MEDICINE

## 2024-10-08 RX ORDER — SULFASALAZINE 500 MG/1
TABLET ORAL
Qty: 120 TABLET | Refills: 6 | Status: SHIPPED | OUTPATIENT
Start: 2024-10-08 | End: 2024-11-07

## 2024-10-08 RX ORDER — GABAPENTIN 600 MG/1
TABLET ORAL
Qty: 315 TABLET | Refills: 3 | Status: SHIPPED | OUTPATIENT
Start: 2024-10-08

## 2024-10-08 RX ORDER — BETAMETHASONE DIPROPIONATE 0.5 MG/G
CREAM TOPICAL 2 TIMES DAILY
Qty: 45 G | Refills: 6 | Status: SHIPPED | OUTPATIENT
Start: 2024-10-08

## 2024-10-08 RX ORDER — FLUOCINONIDE TOPICAL SOLUTION USP, 0.05% 0.5 MG/ML
SOLUTION TOPICAL DAILY
Qty: 60 ML | Refills: 6 | Status: SHIPPED | OUTPATIENT
Start: 2024-10-08

## 2024-10-08 NOTE — PROGRESS NOTES
Assessment and Plan:   Julio Saldana is a 79 y.o.  male who presents as a follow-up for psoriatic arthritis. He currently has symptoms of psoriatic arthritis in his hands, wrist, back and knees with swollen puffy fingers. Previously tried Humira (Did not respond), Cosentyx (Bowel perforation complicated by surgery), Otezla (Severe diarrhea). He wants to be less aggressive with management of psoriatic arthritis concerning infection side effects risk with DMARDs. Patient's rheumatologic disease(s) threaten long-term function if not appropriately treated.    Take sulfasalazine 1 tab daily for a week, then 1 tab twice a day a day for a week, then 2 tabs twice a day  Continue betamethasone and fluocinonide as needed for psoriasis  Continue gabapentin 600mg in the morning and afternoon and 900 mg at bedtime  Continue diclofenac gel as needed for joint pain     Return to clinic in 6 months    Plan:  Diagnoses and all orders for this visit:    Psoriasis with arthropathy (HCC)  -     sulfaSALAzine (AZULFIDINE) 500 mg tablet; Take 1 tab daily for a week, then 1 tab twice a day for a week, then 2 tabs twice a day    Psoriasis  -     betamethasone dipropionate (DIPROSONE) 0.05 % cream; Apply topically 2 (two) times a day  -     sulfaSALAzine (AZULFIDINE) 500 mg tablet; Take 1 tab daily for a week, then 1 tab twice a day for a week, then 2 tabs twice a day    Seborrhea  -     fluocinonide (LIDEX) 0.05 % external solution; Apply topically daily    Sciatica of left side  -     gabapentin (NEURONTIN) 600 MG tablet; Take 1 tab  twice a day and 1 and a half tabs at bedtime        Follow-up plan: Return to clinic in 6 months        Rheumatic Disease Summary  Last visit: Presents for follow-up of psoriatic arthritis. Had side effect of significant diarrhea on Otezla, and stopped it after 1 month. Is fine not being on any DMARDs/biologics at this time. Feels his psoriasis and arthritis symptoms are controlled.   History of abdominal  surgery.  Discussed information regarding anti-inflammatory diet.  Do labs  Continue betamethasone and fluocinonide as needed for psoriasis  Can take gabapentin 600mg in the morning and afternoon and increase to 900 mg at bedtime  Continue diclofenac gel as needed for joint pain  Future medications to consider are Orencia and sulfasalazine if arthritis symptoms flare  Return to clinic in 6 months    Humira - Did not respond  Cosentyx - Bowel perforation complicated by surgery  Otezla - Severe diarrhea    Chief Complaint  Follow-up of psoriatic arthritis      HPI  Julio Saldana is a 79 y.o.  male who presents today for a follow-up visit for his psoriatic arthritis. He is accompanied by his wife. Since last visit, he reports more arthralgias in his hands, wrists, back and knees with associated stiffness and gets better as the day progresses. He also have small active psoriatic plaques on his scalp and extensor elbow surface.    The following portions of the patient's history were reviewed and updated as appropriate: allergies, current medications, past family history, past medical history, past social history, past surgical history and problem list.    Review of Systems:   Review of Systems  Pertinent ROS positive for joint pain, joint swelling, back pain. Otherwise, the rest of 14-point ROS reviewed and were negative.     Home Medications:    Current Outpatient Medications:     aspirin 81 mg chewable tablet, Chew 1 tablet (81 mg total) daily, Disp: , Rfl:     betamethasone dipropionate (DIPROSONE) 0.05 % cream, Apply topically 2 (two) times a day, Disp: 45 g, Rfl: 6    carisoprodol (SOMA) 350 mg tablet, Take 1 tablet (350 mg total) by mouth 4 (four) times a day, Disp: 360 tablet, Rfl: 1    Cholecalciferol (VITAMIN D3) 1000 units CAPS, Take 1 tablet by mouth daily, Disp: , Rfl:     fluocinonide (LIDEX) 0.05 % external solution, Apply topically daily, Disp: 60 mL, Rfl: 6    gabapentin (NEURONTIN) 600 MG tablet, Take 1  "tab  twice a day and 1 and a half tabs at bedtime, Disp: 315 tablet, Rfl: 3    hydrOXYzine HCL (ATARAX) 25 mg tablet, take 1 tablet by mouth every 6 hours if needed for anxiety, Disp: 90 tablet, Rfl: 1    ketoconazole (NIZORAL) 2 % cream, Apply topically 2 (two) times a day, Disp: 30 g, Rfl: 1    mupirocin (BACTROBAN) 2 % ointment, APPLY TOPICALLY TO AFFECTED AREAS up to three times a day if needed, Disp: , Rfl:     naloxone (NARCAN) 4 mg/0.1 mL nasal spray, Administer 1 spray into a nostril. If no response after 2-3 minutes, give another dose in the other nostril using a new spray., Disp: 1 each, Rfl: 1    sulfaSALAzine (AZULFIDINE) 500 mg tablet, Take 1 tab daily for a week, then 1 tab twice a day for a week, then 2 tabs twice a day, Disp: 120 tablet, Rfl: 6    traZODone (DESYREL) 300 MG tablet, Take 2 tablets (600 mg total) by mouth daily at bedtime, Disp: 180 tablet, Rfl: 1    oxyCODONE (ROXICODONE) 10 MG TABS, Take 1 tablet (10 mg total) by mouth every 6 (six) hours as needed for moderate pain take 1 tablet by mouth four times a day Max Daily Amount: 40 mg, Disp: 120 tablet, Rfl: 0    oxyCODONE-acetaminophen (PERCOCET)  mg per tablet, Take 1 tablet by mouth every 4 (four) hours as needed for moderate pain Max Daily Amount: 6 tablets, Disp: 120 tablet, Rfl: 0    Objective:    Vitals:    10/08/24 1303   BP: 128/64   Weight: 91.2 kg (201 lb)   Height: 5' 8\" (1.727 m)       Physical Exam  Constitutional:    General: He is not in acute distress.  HENT:   Head: Normocephalic and atraumatic.   Eyes:   Conjunctiva/sclera: Conjunctivae normal.   Cardiovascular:   Rate and Rhythm: Normal rate and regular rhythm.   Heart sounds: S1 normal and S2 normal.   No friction rub.   Pulmonary:   Effort: Pulmonary effort is normal. No respiratory distress.   Breath sounds: Normal breath sounds. No wheezing, rhonchi, or rales.   Musculoskeletal:   General: Hands are swollen with tenderness on palpation  Cervical back: Neck " supple.   Skin:  Coloration: Skin is not pale.   Neurological:   Mental Status: He is alert. Mental status is at baseline.   Psychiatric:      Mood and Affect: Mood normal.      Behavior: Behavior normal.  Abdominal: Abdominal hernia.  Reviewed labs and imaging.    Imaging:   No results found.    Labs:   No visits with results within 6 Month(s) from this visit.   Latest known visit with results is:   Appointment on 01/26/2024   Component Date Value Ref Range Status    WBC 01/26/2024 8.83  4.31 - 10.16 Thousand/uL Final    RBC 01/26/2024 5.20  3.88 - 5.62 Million/uL Final    Hemoglobin 01/26/2024 14.2  12.0 - 17.0 g/dL Final    Hematocrit 01/26/2024 46.8  36.5 - 49.3 % Final    MCV 01/26/2024 90  82 - 98 fL Final    MCH 01/26/2024 27.3  26.8 - 34.3 pg Final    MCHC 01/26/2024 30.3 (L)  31.4 - 37.4 g/dL Final    RDW 01/26/2024 13.9  11.6 - 15.1 % Final    MPV 01/26/2024 9.4  8.9 - 12.7 fL Final    Platelets 01/26/2024 272  149 - 390 Thousands/uL Final    nRBC 01/26/2024 0  /100 WBCs Final    Segmented % 01/26/2024 66  43 - 75 % Final    Immature Grans % 01/26/2024 0  0 - 2 % Final    Lymphocytes % 01/26/2024 23  14 - 44 % Final    Monocytes % 01/26/2024 8  4 - 12 % Final    Eosinophils Relative 01/26/2024 2  0 - 6 % Final    Basophils Relative 01/26/2024 1  0 - 1 % Final    Absolute Neutrophils 01/26/2024 5.85  1.85 - 7.62 Thousands/µL Final    Absolute Immature Grans 01/26/2024 0.03  0.00 - 0.20 Thousand/uL Final    Absolute Lymphocytes 01/26/2024 2.07  0.60 - 4.47 Thousands/µL Final    Absolute Monocytes 01/26/2024 0.71  0.17 - 1.22 Thousand/µL Final    Eosinophils Absolute 01/26/2024 0.13  0.00 - 0.61 Thousand/µL Final    Basophils Absolute 01/26/2024 0.04  0.00 - 0.10 Thousands/µL Final    Sodium 01/26/2024 136  135 - 147 mmol/L Final    Potassium 01/26/2024 4.1  3.5 - 5.3 mmol/L Final    Chloride 01/26/2024 100  96 - 108 mmol/L Final    CO2 01/26/2024 29  21 - 32 mmol/L Final    ANION GAP 01/26/2024 7  mmol/L  Final    BUN 01/26/2024 23  5 - 25 mg/dL Final    Creatinine 01/26/2024 0.98  0.60 - 1.30 mg/dL Final    Standardized to IDMS reference method    Glucose, Fasting 01/26/2024 107 (H)  65 - 99 mg/dL Final    Calcium 01/26/2024 9.5  8.4 - 10.2 mg/dL Final    AST 01/26/2024 23  13 - 39 U/L Final    ALT 01/26/2024 22  7 - 52 U/L Final    Specimen collection should occur prior to Sulfasalazine administration due to the potential for falsely depressed results.     Alkaline Phosphatase 01/26/2024 70  34 - 104 U/L Final    Total Protein 01/26/2024 7.5  6.4 - 8.4 g/dL Final    Albumin 01/26/2024 4.6  3.5 - 5.0 g/dL Final    Total Bilirubin 01/26/2024 0.32  0.20 - 1.00 mg/dL Final    Use of this assay is not recommended for patients undergoing treatment with eltrombopag due to the potential for falsely elevated results.  N-acetyl-p-benzoquinone imine (metabolite of Acetaminophen) will generate erroneously low results in samples for patients that have taken an overdose of Acetaminophen.    eGFR 01/26/2024 74  ml/min/1.73sq m Final    Cholesterol 01/26/2024 194  See Comment mg/dL Final    Cholesterol:         Pediatric <18 Years        Desirable          <170 mg/dL      Borderline High    170-199 mg/dL      High               >=200 mg/dL        Adult >=18 Years            Desirable         <200 mg/dL      Borderline High   200-239 mg/dL      High              >239 mg/dL      Triglycerides 01/26/2024 91  See Comment mg/dL Final    Triglyceride:     0-9Y            <75mg/dL     10Y-17Y         <90 mg/dL       >=18Y     Normal          <150 mg/dL     Borderline High 150-199 mg/dL     High            200-499 mg/dL        Very High       >499 mg/dL    Specimen collection should occur prior to Metamizole administration due to the potential for falsely depressed results.    HDL, Direct 01/26/2024 54  >=40 mg/dL Final    LDL Calculated 01/26/2024 122 (H)  0 - 100 mg/dL Final    LDL Cholesterol:     Optimal           <100 mg/dl     Near  Optimal      100-129 mg/dl     Above Optimal       Borderline High 130-159 mg/dl       High            160-189 mg/dl       Very High       >189 mg/dl         This screening LDL is a calculated result.   It does not have the accuracy of the Direct Measured LDL in the monitoring of patients with hyperlipidemia and/or statin therapy.   Direct Measure LDL (UBE649) must be ordered separately in these patients.    Non-HDL-Chol (CHOL-HDL) 01/26/2024 140  mg/dl Final    PSA 01/26/2024 0.45  0.00 - 4.00 ng/mL Final    Maddie [employer] Access chemiluminescent immunoassay. Confirm baseline values for patients being serially monitored.     Hemoglobin A1C 01/26/2024 6.0 (H)  Normal 4.0-5.6%; PreDiabetic 5.7-6.4%; Diabetic >=6.5%; Glycemic control for adults with diabetes <7.0% % Final    EAG 01/26/2024 126  mg/dl Final

## 2024-10-08 NOTE — PATIENT INSTRUCTIONS
Take sulfasalazine 1 tab daily for a week, then 1 tab twice a day a day for a week, then 2 tabs twice a day  Continue betamethasone and fluocinonide as needed for psoriasis  Continue gabapentin 600mg in the morning and afternoon and 900 mg at bedtime  Continue diclofenac gel as needed for joint pain     Return to clinic in 6 months

## 2024-10-21 DIAGNOSIS — M48.061 SPINAL STENOSIS OF LUMBAR REGION, UNSPECIFIED WHETHER NEUROGENIC CLAUDICATION PRESENT: ICD-10-CM

## 2024-10-21 RX ORDER — OXYCODONE HYDROCHLORIDE 10 MG/1
10 TABLET ORAL EVERY 6 HOURS PRN
Qty: 120 TABLET | Refills: 0 | Status: SHIPPED | OUTPATIENT
Start: 2024-10-21

## 2024-10-21 NOTE — TELEPHONE ENCOUNTER
Reason for call:   [x] Refill   [] Prior Auth  [] Other:     Office:   [x] PCP/Provider -   [] Specialty/Provider -     Medication: Oxycodone       Dose/Frequency: 10 mg    Quantity: 120 tablets    Pharmacy: [id number] - [place] [address]     Does the patient have enough for 3 days?   [] Yes   [x] No - Send as HP to POD

## 2024-10-30 DIAGNOSIS — M48.061 SPINAL STENOSIS OF LUMBAR REGION, UNSPECIFIED WHETHER NEUROGENIC CLAUDICATION PRESENT: ICD-10-CM

## 2024-10-30 RX ORDER — OXYCODONE AND ACETAMINOPHEN 10; 325 MG/1; MG/1
1 TABLET ORAL EVERY 4 HOURS PRN
Qty: 120 TABLET | Refills: 0 | Status: SHIPPED | OUTPATIENT
Start: 2024-10-30

## 2024-10-30 NOTE — TELEPHONE ENCOUNTER
Reason for call:   [x] Refill   [] Prior Auth  [] Other:     Office:   [x] PCP/Provider -  Telly Sharma,    [] Specialty/Provider -     Medication: oxyCODONE-acetaminophen (PERCOCET)  mg per tablet     Dose/Frequency: Take 1 tablet by mouth every 4 (four) hours as needed for moderate pain     Quantity: 120    Pharmacy: [id number] - [place] [address]     Does the patient have enough for 3 days?   [] Yes   [x] No - Send as HP to POD

## 2024-11-04 ENCOUNTER — RA CDI HCC (OUTPATIENT)
Dept: OTHER | Facility: HOSPITAL | Age: 79
End: 2024-11-04

## 2024-11-06 DIAGNOSIS — L40.50 PSORIASIS WITH ARTHROPATHY (HCC): ICD-10-CM

## 2024-11-06 DIAGNOSIS — L40.9 PSORIASIS: ICD-10-CM

## 2024-11-07 RX ORDER — SULFASALAZINE 500 MG/1
TABLET ORAL
Qty: 360 TABLET | Refills: 2 | Status: SHIPPED | OUTPATIENT
Start: 2024-11-07

## 2024-11-12 ENCOUNTER — OFFICE VISIT (OUTPATIENT)
Dept: FAMILY MEDICINE CLINIC | Facility: CLINIC | Age: 79
End: 2024-11-12
Payer: COMMERCIAL

## 2024-11-12 VITALS
BODY MASS INDEX: 31.52 KG/M2 | HEART RATE: 69 BPM | HEIGHT: 68 IN | SYSTOLIC BLOOD PRESSURE: 130 MMHG | RESPIRATION RATE: 17 BRPM | TEMPERATURE: 96.6 F | WEIGHT: 208 LBS | DIASTOLIC BLOOD PRESSURE: 70 MMHG | OXYGEN SATURATION: 96 %

## 2024-11-12 DIAGNOSIS — Z79.899 HIGH RISK MEDICATION USE: ICD-10-CM

## 2024-11-12 DIAGNOSIS — R73.01 IMPAIRED FASTING GLUCOSE: ICD-10-CM

## 2024-11-12 DIAGNOSIS — Z00.00 MEDICARE ANNUAL WELLNESS VISIT, SUBSEQUENT: Primary | ICD-10-CM

## 2024-11-12 DIAGNOSIS — E78.2 MIXED HYPERLIPIDEMIA: ICD-10-CM

## 2024-11-12 DIAGNOSIS — N18.30 STAGE 3 CHRONIC KIDNEY DISEASE, UNSPECIFIED WHETHER STAGE 3A OR 3B CKD (HCC): ICD-10-CM

## 2024-11-12 DIAGNOSIS — Z12.5 SCREENING FOR PROSTATE CANCER: ICD-10-CM

## 2024-11-12 PROCEDURE — G0439 PPPS, SUBSEQ VISIT: HCPCS | Performed by: FAMILY MEDICINE

## 2024-11-12 NOTE — PROGRESS NOTES
Ambulatory Visit  Name: Julio Sladana      : 1945      MRN: [id number]  Encounter Provider: Telly Sharma DO  Encounter Date: 2024   Encounter department: [facility]    Assessment & Plan  Medicare annual wellness visit, subsequent  Medicare wellness visit completed  Labs ordered  He will follow-up with a specialist he overall is doing well with no acute complaints  Pdmp reviewed and appropriate  Continue current meds       Stage 3 chronic kidney disease, unspecified whether stage 3a or 3b CKD (HCC)  Lab Results   Component Value Date    EGFR 74 2024    EGFR 66 2023    EGFR 71 05/15/2023    CREATININE 0.98 2024    CREATININE 1.08 2023    CREATININE 1.02 05/15/2023       Orders:    Comprehensive metabolic panel; Future    Mixed hyperlipidemia    Orders:    Lipid panel; Future    Impaired fasting glucose    Orders:    Hemoglobin A1C; Future    Screening for prostate cancer    Orders:    UA (URINE) with reflex to Scope; Future    PSA, Total Screen; Future    High risk medication use    Orders:    CBC and differential; Future    Comprehensive metabolic panel; Future    UA (URINE) with reflex to Scope; Future    TSH, 3rd generation with Free T4 reflex; Future      Depression Screening and Follow-up Plan: Patient was screened for depression during today's encounter. They screened negative with a PHQ-2 score of 0.    Preventive health issues were discussed with patient, and age appropriate screening tests were ordered as noted in patient's After Visit Summary. Personalized health advice and appropriate referrals for health education or preventive services given if needed, as noted in patient's After Visit Summary.    History of Present Illness     HPI   Patient Care Team:  Telly Sharma DO as PCP - General  MD Angelo Cornelius MD J Raymond Fitzpatrick, MD    Review of Systems   Constitutional: Negative.    HENT: Negative.     Eyes: Negative.     Respiratory: Negative.     Cardiovascular: Negative.    Gastrointestinal: Negative.    Endocrine: Negative.    Genitourinary: Negative.    Musculoskeletal: Negative.    Skin: Negative.    Allergic/Immunologic: Negative.    Neurological: Negative.    Hematological: Negative.    Psychiatric/Behavioral: Negative.     All other systems reviewed and are negative.    Medical History Reviewed by provider this encounter:       Annual Wellness Visit Questionnaire   Julio is here for his Subsequent Wellness visit. Last Medicare Wellness visit information reviewed, patient interviewed and updates made to the record today.      Health Risk Assessment:   Patient rates overall health as good. Patient feels that their physical health rating is same. Patient is satisfied with their life. Eyesight was rated as same. Hearing was rated as same. Patient feels that their emotional and mental health rating is same. Patients states they are never, rarely angry. Pain experienced in the last 7 days has been none. Patient states that he has experienced no weight loss or gain in last 6 months.     Depression Screening:   PHQ-2 Score: 0      Fall Risk Screening:   In the past year, patient has experienced: no history of falling in past year      Home Safety:  Patient does not have trouble with stairs inside or outside of their home. Patient has working smoke alarms and has working carbon monoxide detector. Home safety hazards include: none.     Nutrition:   Current diet is Regular.     Medications:   Patient is currently taking over-the-counter supplements. OTC medications include: see medication list. Patient is able to manage medications.     Activities of Daily Living (ADLs)/Instrumental Activities of Daily Living (IADLs):   Walk and transfer into and out of bed and chair?: Yes  Dress and groom yourself?: Yes    Bathe or shower yourself?: Yes    Feed yourself? Yes  Do your laundry/housekeeping?: Yes  Manage your money, pay your bills and  track your expenses?: Yes  Make your own meals?: Yes    Do your own shopping?: Yes    Previous Hospitalizations:   Any hospitalizations or ED visits within the last 12 months?: No      Advance Care Planning:   Living will: No    Durable POA for healthcare: No    Advanced directive: No    Advanced directive counseling given: Yes    End of Life Decisions reviewed with patient: Yes    Provider agrees with end of life decisions: Yes      Cognitive Screening:   Provider or family/friend/caregiver concerned regarding cognition?: No    PREVENTIVE SCREENINGS      Cardiovascular Screening:    General: History Lipid Disorder and Screening Current      Diabetes Screening:     General: Screening Current      Colorectal Cancer Screening:     General: Screening Current      Prostate Cancer Screening:    General: Screening Current      Osteoporosis Screening:    General: Screening Current      Abdominal Aortic Aneurysm (AAA) Screening:        General: Screening Not Indicated      Lung Cancer Screening:     General: Screening Not Indicated      Hepatitis C Screening:    General: Screening Current    Screening, Brief Intervention, and Referral to Treatment (SBIRT)    Screening    Typical number of drinks in a week: 0    Single Item Drug Screening:  How often have you used an illegal drug (including marijuana) or a prescription medication for non-medical reasons in the past year? never    Single Item Drug Screen Score: 0  Interpretation: Negative screen for possible drug use disorder    Brief Intervention  Alcohol & drug use screenings were reviewed. No concerns regarding substance use disorder identified.     Review of Current Opioid Use    Opioid Risk Tool (ORT) Interpretation: Complete Opioid Risk Tool (ORT)    Other Counseling Topics:   Car/seat belt/driving safety, skin self-exam, sunscreen and calcium and vitamin D intake and regular weightbearing exercise.     Social Determinants of Health     Financial Resource Strain: Low  "Risk  (11/8/2023)    Overall Financial Resource Strain (CARDIA)     Difficulty of Paying Living Expenses: Not hard at all   Food Insecurity: No Food Insecurity (11/12/2024)    Hunger Vital Sign     Worried About Running Out of Food in the Last Year: Never true     Ran Out of Food in the Last Year: Never true   Transportation Needs: No Transportation Needs (11/12/2024)    PRAPARE - Transportation     Lack of Transportation (Medical): No     Lack of Transportation (Non-Medical): No   Housing Stability: Low Risk  (4/15/2023)    Housing Stability Vital Sign     Unable to Pay for Housing in the Last Year: No     Number of Places Lived in the Last Year: 1     Unstable Housing in the Last Year: No   Utilities: Not At Risk (11/12/2024)    [facility] Utilities     Threatened with loss of utilities: No     Vision Screening    Right eye Left eye Both eyes   Without correction   20/30   With correction          Objective     /70 (BP Location: Left arm, Patient Position: Sitting, Cuff Size: Standard)   Pulse 69   Temp (!) 96.6 °F (35.9 °C) (Tympanic)   Resp 17   Ht 5' 8\" (1.727 m)   Wt 94.3 kg (208 lb)   SpO2 96%   BMI 31.63 kg/m²     Physical Exam  Vitals and nursing note reviewed.   Constitutional:       Appearance: Normal appearance. He is well-developed.   HENT:      Head: Normocephalic.      Right Ear: External ear normal.      Left Ear: External ear normal.      Nose: Nose normal.   Eyes:      Conjunctiva/sclera: Conjunctivae normal.      Pupils: Pupils are equal, round, and reactive to light.   Cardiovascular:      Rate and Rhythm: Normal rate and regular rhythm.      Heart sounds: Normal heart sounds.   Pulmonary:      Effort: Pulmonary effort is normal.      Breath sounds: Normal breath sounds.   Abdominal:      General: Bowel sounds are normal.      Palpations: Abdomen is soft.   Musculoskeletal:         General: Normal range of motion.      Cervical back: Normal range of motion and neck supple.   Skin:     " General: Skin is warm and dry.   Neurological:      General: No focal deficit present.      Mental Status: He is alert and oriented to person, place, and time.   Psychiatric:         Behavior: Behavior normal.         Thought Content: Thought content normal.         Judgment: Judgment normal.

## 2024-11-12 NOTE — PATIENT INSTRUCTIONS
Medicare Preventive Visit Patient Instructions  Thank you for completing your Welcome to Medicare Visit or Medicare Annual Wellness Visit today. Your next wellness visit will be due in one year (11/13/2025).  The screening/preventive services that you may require over the next 5-10 years are detailed below. Some tests may not apply to you based off risk factors and/or age. Screening tests ordered at today's visit but not completed yet may show as past due. Also, please note that scanned in results may not display below.  Preventive Screenings:  Service Recommendations Previous Testing/Comments   Colorectal Cancer Screening  Colonoscopy    Fecal Occult Blood Test (FOBT)/Fecal Immunochemical Test (FIT)  Fecal DNA/Cologuard Test  Flexible Sigmoidoscopy Age: 45-75 years old   Colonoscopy: every 10 years (May be performed more frequently if at higher risk)  OR  FOBT/FIT: every 1 year  OR  Cologuard: every 3 years  OR  Sigmoidoscopy: every 5 years  Screening may be recommended earlier than age 45 if at higher risk for colorectal cancer. Also, an individualized decision between you and your healthcare provider will decide whether screening between the ages of 76-85 would be appropriate. Colonoscopy: 06/02/2020  FOBT/FIT: Not on file  Cologuard: Not on file  Sigmoidoscopy: 04/14/2023          Prostate Cancer Screening Individualized decision between patient and health care provider in men between ages of 55-69   Medicare will cover every 12 months beginning on the day after your 50th birthday PSA: 0.45 ng/mL           Hepatitis C Screening Once for adults born between 1945 and 1965  More frequently in patients at high risk for Hepatitis C Hep C Antibody: Not on file        Diabetes Screening 1-2 times per year if you're at risk for diabetes or have pre-diabetes Fasting glucose: 107 mg/dL (1/26/2024)  A1C: 6.0 % (1/26/2024)      Cholesterol Screening Once every 5 years if you don't have a lipid disorder. May order more often  based on risk factors. Lipid panel: 01/26/2024         Other Preventive Screenings Covered by Medicare:  Abdominal Aortic Aneurysm (AAA) Screening: covered once if your at risk. You're considered to be at risk if you have a family history of AAA or a male between the age of 65-75 who smoking at least 100 cigarettes in your lifetime.  Lung Cancer Screening: covers low dose CT scan once per year if you meet all of the following conditions: (1) Age 55-77; (2) No signs or symptoms of lung cancer; (3) Current smoker or have quit smoking within the last 15 years; (4) You have a tobacco smoking history of at least 20 pack years (packs per day x number of years you smoked); (5) You get a written order from a healthcare provider.  Glaucoma Screening: covered annually if you're considered high risk: (1) You have diabetes OR (2) Family history of glaucoma OR (3)  aged 50 and older OR (4)  American aged 65 and older  Osteoporosis Screening: covered every 2 years if you meet one of the following conditions: (1) Have a vertebral abnormality; (2) On glucocorticoid therapy for more than 3 months; (3) Have primary hyperparathyroidism; (4) On osteoporosis medications and need to assess response to drug therapy.  HIV Screening: covered annually if you're between the age of 15-65. Also covered annually if you are younger than 15 and older than 65 with risk factors for HIV infection. For pregnant patients, it is covered up to 3 times per pregnancy.    Immunizations:  Immunization Recommendations   Influenza Vaccine Annual influenza vaccination during flu season is recommended for all persons aged >= 6 months who do not have contraindications   Pneumococcal Vaccine   * Pneumococcal conjugate vaccine = PCV13 (Prevnar 13), PCV15 (Vaxneuvance), PCV20 (Prevnar 20)  * Pneumococcal polysaccharide vaccine = PPSV23 (Pneumovax) Adults 19-65 yo with certain risk factors or if 65+ yo  If never received any pneumonia vaccine:  recommend Prevnar 20 (PCV20)  Give PCV20 if previously received 1 dose of PCV13 or PPSV23   Hepatitis B Vaccine 3 dose series if at intermediate or high risk (ex: diabetes, end stage renal disease, liver disease)   Respiratory syncytial virus (RSV) Vaccine - COVERED BY MEDICARE PART D  * RSVPreF3 (Arexvy) CDC recommends that adults 60 years of age and older may receive a single dose of RSV vaccine using shared clinical decision-making (SCDM)   Tetanus (Td) Vaccine - COST NOT COVERED BY MEDICARE PART B Following completion of primary series, a booster dose should be given every 10 years to maintain immunity against tetanus. Td may also be given as tetanus wound prophylaxis.   Tdap Vaccine - COST NOT COVERED BY MEDICARE PART B Recommended at least once for all adults. For pregnant patients, recommended with each pregnancy.   Shingles Vaccine (Shingrix) - COST NOT COVERED BY MEDICARE PART B  2 shot series recommended in those 19 years and older who have or will have weakened immune systems or those 50 years and older     Health Maintenance Due:      Topic Date Due   • Hepatitis C Screening  Completed   • Colorectal Cancer Screening  Discontinued     Immunizations Due:      Topic Date Due   • Influenza Vaccine (1) 09/01/2024   • COVID-19 Vaccine (1 - 2023-24 season) Never done     Advance Directives   What are advance directives?  Advance directives are legal documents that state your wishes and plans for medical care. These plans are made ahead of time in case you lose your ability to make decisions for yourself. Advance directives can apply to any medical decision, such as the treatments you want, and if you want to donate organs.   What are the types of advance directives?  There are many types of advance directives, and each state has rules about how to use them. You may choose a combination of any of the following:  Living will:  This is a written record of the treatment you want. You can also choose which  treatments you do not want, which to limit, and which to stop at a certain time. This includes surgery, medicine, IV fluid, and tube feedings.   Durable power of  for healthcare (DPAHC):  This is a written record that states who you want to make healthcare choices for you when you are unable to make them for yourself. This person, called a proxy, is usually a family member or a friend. You may choose more than 1 proxy.  Do not resuscitate (DNR) order:  A DNR order is used in case your heart stops beating or you stop breathing. It is a request not to have certain forms of treatment, such as CPR. A DNR order may be included in other types of advance directives.  Medical directive:  This covers the care that you want if you are in a coma, near death, or unable to make decisions for yourself. You can list the treatments you want for each condition. Treatment may include pain medicine, surgery, blood transfusions, dialysis, IV or tube feedings, and a ventilator (breathing machine).  Values history:  This document has questions about your views, beliefs, and how you feel and think about life. This information can help others choose the care that you would choose.  Why are advance directives important?  An advance directive helps you control your care. Although spoken wishes may be used, it is better to have your wishes written down. Spoken wishes can be misunderstood, or not followed. Treatments may be given even if you do not want them. An advance directive may make it easier for your family to make difficult choices about your care.   Weight Management   Why it is important to manage your weight:  Being overweight increases your risk of health conditions such as heart disease, high blood pressure, type 2 diabetes, and certain types of cancer. It can also increase your risk for osteoarthritis, sleep apnea, and other respiratory problems. Aim for a slow, steady weight loss. Even a small amount of weight loss can  lower your risk of health problems.  How to lose weight safely:  A safe and healthy way to lose weight is to eat fewer calories and get regular exercise. You can lose up about 1 pound a week by decreasing the number of calories you eat by 500 calories each day.   Healthy meal plan for weight management:  A healthy meal plan includes a variety of foods, contains fewer calories, and helps you stay healthy. A healthy meal plan includes the following:  Eat whole-grain foods more often.  A healthy meal plan should contain fiber. Fiber is the part of grains, fruits, and vegetables that is not broken down by your body. Whole-grain foods are healthy and provide extra fiber in your diet. Some examples of whole-grain foods are whole-wheat breads and pastas, oatmeal, brown rice, and bulgur.  Eat a variety of vegetables every day.  Include dark, leafy greens such as spinach, kale, marquez greens, and mustard greens. Eat yellow and orange vegetables such as carrots, sweet potatoes, and winter squash.   Eat a variety of fruits every day.  Choose fresh or canned fruit (canned in its own juice or light syrup) instead of juice. Fruit juice has very little or no fiber.  Eat low-fat dairy foods.  Drink fat-free (skim) milk or 1% milk. Eat fat-free yogurt and low-fat cottage cheese. Try low-fat cheeses such as mozzarella and other reduced-fat cheeses.  Choose meat and other protein foods that are low in fat.  Choose beans or other legumes such as split peas or lentils. Choose fish, skinless poultry (chicken or turkey), or lean cuts of red meat (beef or pork). Before you cook meat or poultry, cut off any visible fat.   Use less fat and oil.  Try baking foods instead of frying them. Add less fat, such as margarine, sour cream, regular salad dressing and mayonnaise to foods. Eat fewer high-fat foods. Some examples of high-fat foods include french fries, doughnuts, ice cream, and cakes.  Eat fewer sweets.  Limit foods and drinks that are  high in sugar. This includes candy, cookies, regular soda, and sweetened drinks.  Exercise:  Exercise at least 30 minutes per day on most days of the week. Some examples of exercise include walking, biking, dancing, and swimming. You can also fit in more physical activity by taking the stairs instead of the elevator or parking farther away from stores. Ask your healthcare provider about the best exercise plan for you.      © Copyright [employer] 2018 Information is for End User's use only and may not be sold, redistributed or otherwise used for commercial purposes. All illustrations and images included in CareNotes® are the copyrighted property of [employer] Inc[employer] or [employer]

## 2024-11-12 NOTE — ASSESSMENT & PLAN NOTE
Lab Results   Component Value Date    EGFR 74 01/26/2024    EGFR 66 08/16/2023    EGFR 71 05/15/2023    CREATININE 0.98 01/26/2024    CREATININE 1.08 08/16/2023    CREATININE 1.02 05/15/2023       Orders:    Comprehensive metabolic panel; Future

## 2024-11-12 NOTE — ASSESSMENT & PLAN NOTE
Orders:    CBC and differential; Future    Comprehensive metabolic panel; Future    UA (URINE) with reflex to Scope; Future    TSH, 3rd generation with Free T4 reflex; Future

## 2024-11-13 DIAGNOSIS — G47.00 INSOMNIA, UNSPECIFIED TYPE: ICD-10-CM

## 2024-11-13 RX ORDER — HYDROXYZINE HYDROCHLORIDE 25 MG/1
25 TABLET, FILM COATED ORAL EVERY 6 HOURS PRN
Qty: 90 TABLET | Refills: 1 | OUTPATIENT
Start: 2024-11-13

## 2024-11-13 NOTE — TELEPHONE ENCOUNTER
Reason for call:   [x] Refill   [] Prior Auth  [] Other:     Office: [facility]  [x] PCP/Provider - Telly Page   [] Specialty/Provider -     Medication: hydroxyzine     Dose/Frequency: 25 mg/ 1 tab every 6 hours PRN     Quantity: 90 tabs     Pharmacy: [facility] in [place]     Does the patient have enough for 3 days?   [] Yes   [x] No - Send as HP to POD- patient has been out since 11/01/2024

## 2024-11-14 RX ORDER — HYDROXYZINE HYDROCHLORIDE 25 MG/1
25 TABLET, FILM COATED ORAL EVERY 6 HOURS PRN
Qty: 90 TABLET | Refills: 1 | Status: SHIPPED | OUTPATIENT
Start: 2024-11-14

## 2024-11-14 NOTE — TELEPHONE ENCOUNTER
This was not a duplicate. Patient takes this medication every 6 hours as needed, so a 6 month supply was not sent to the pharmacy.

## 2024-11-14 NOTE — TELEPHONE ENCOUNTER
Reason for call:   [x] Refill   [] Prior Auth  [] Other:     Office:   [x] PCP/Provider -   [] Specialty/Provider -     Medication:   Hydroxyzine 25mg- take 1 tablet by mouth every 6 hours as needed for anxiety      Pharmacy: [facility] [place]    Does the patient have enough for 3 days?   [] Yes   [x] No - Send as HP to POD

## 2024-11-19 DIAGNOSIS — M48.061 SPINAL STENOSIS OF LUMBAR REGION, UNSPECIFIED WHETHER NEUROGENIC CLAUDICATION PRESENT: ICD-10-CM

## 2024-11-19 RX ORDER — OXYCODONE HYDROCHLORIDE 10 MG/1
10 TABLET ORAL EVERY 6 HOURS PRN
Qty: 120 TABLET | Refills: 0 | Status: SHIPPED | OUTPATIENT
Start: 2024-11-19

## 2024-11-19 NOTE — TELEPHONE ENCOUNTER
Reason for call:   [x] Refill   [] Prior Auth  [] Other:     Office:   [x] PCP/Provider -   [] Specialty/Provider -     oxyCODONE (ROXICODONE) 10 MG TABS 10 mg, Oral, Every 6 hours PRN          Edit  Cancel Reorder       Quantity: 30    [facility]    Does the patient have enough for 3 days?   [] Yes   [x] No - Send as HP to POD

## 2024-11-25 DIAGNOSIS — G89.29 CHRONIC BACK PAIN, UNSPECIFIED BACK LOCATION, UNSPECIFIED BACK PAIN LATERALITY: ICD-10-CM

## 2024-11-25 DIAGNOSIS — M54.9 CHRONIC BACK PAIN, UNSPECIFIED BACK LOCATION, UNSPECIFIED BACK PAIN LATERALITY: ICD-10-CM

## 2024-11-25 NOTE — TELEPHONE ENCOUNTER
Reason for call:   [x] Refill   [] Prior Auth  [] Other:     Office:   [x] PCP/Provider -   [] Specialty/Provider -     Medication: Carisoprodol     Dose/Frequency: 350 mg tablet taken by mouth 4x daily     Quantity: 360    Pharmacy: [id number] - [place] - [address] [facility] [phone]     Does the patient have enough for 3 days?   [] Yes   [x] No - Send as HP to POD

## 2024-11-26 ENCOUNTER — TELEPHONE (OUTPATIENT)
Age: 79
End: 2024-11-26

## 2024-11-26 RX ORDER — CARISOPRODOL 350 MG/1
350 TABLET ORAL 4 TIMES DAILY
Qty: 360 TABLET | Refills: 0 | Status: SHIPPED | OUTPATIENT
Start: 2024-11-26

## 2024-11-26 NOTE — TELEPHONE ENCOUNTER
PA for Carisoprodol (SOMA) 350 mg tablet SUBMITTED to [facility]    via    []CMM-KEY:   [x]Surescripts-Case ID #: [id number]   []Availity-Auth ID #   []Faxed to plan   []Other website   []Phone call Case ID #    [x]PA sent as URGENT    All office notes, labs and other pertaining documents and studies sent. Clinical questions answered. Awaiting determination from insurance company.     Turnaround time for your insurance to make a decision on your Prior Authorization can take 7-21 business days.

## 2024-11-27 NOTE — TELEPHONE ENCOUNTER
PA for Carisoprodol (SOMA) 350 mg tablet APPROVED     Date(s) approved January 1, 2024 to February 24, 2025     Case #: [id number]     Patient advised by          []MyChart Message  [x]Phone call- Patient verbalized understanding and will get in contact with the pharmacy.   []LMOM  []L/M to call office as no active Communication consent on file  []Unable to leave detailed message as VM not approved on Communication consent       Pharmacy advised by    [x]Fax  []Phone call    Approval letter scanned into [employer] Yes

## 2024-11-29 DIAGNOSIS — M48.061 SPINAL STENOSIS OF LUMBAR REGION, UNSPECIFIED WHETHER NEUROGENIC CLAUDICATION PRESENT: ICD-10-CM

## 2024-11-29 RX ORDER — OXYCODONE AND ACETAMINOPHEN 10; 325 MG/1; MG/1
1 TABLET ORAL EVERY 4 HOURS PRN
Qty: 120 TABLET | Refills: 0 | Status: SHIPPED | OUTPATIENT
Start: 2024-11-29

## 2024-11-29 NOTE — TELEPHONE ENCOUNTER
Reason for call:   [x] Refill   [] Prior Auth  [] Other:     Office:   [x] PCP/Provider - [facility]/ Page, DO  [] Specialty/Provider -     Medication: oxyCODONE-acetaminophen (PERCOCET)  mg per tablet     Dose/Frequency: Take 1 tablet by mouth every 4 (four) hours as needed for moderate pain Max Daily Amount: 6 tablets    Quantity: 120    Pharmacy: [id number] - [place] - [address] [place] [address] [phone]    Does the patient have enough for 3 days?   [] Yes   [x] No - Send as HP to POD

## 2024-12-11 DIAGNOSIS — G47.00 INSOMNIA, UNSPECIFIED TYPE: ICD-10-CM

## 2024-12-12 RX ORDER — TRAZODONE HYDROCHLORIDE 300 MG/1
600 TABLET ORAL
Qty: 180 TABLET | Refills: 1 | Status: SHIPPED | OUTPATIENT
Start: 2024-12-12

## 2024-12-17 DIAGNOSIS — M48.061 SPINAL STENOSIS OF LUMBAR REGION, UNSPECIFIED WHETHER NEUROGENIC CLAUDICATION PRESENT: ICD-10-CM

## 2024-12-17 RX ORDER — OXYCODONE HYDROCHLORIDE 10 MG/1
10 TABLET ORAL EVERY 6 HOURS PRN
Qty: 120 TABLET | Refills: 0 | Status: SHIPPED | OUTPATIENT
Start: 2024-12-17

## 2024-12-17 NOTE — TELEPHONE ENCOUNTER
Reason for call: : Telly Sharma, manage this medication!  Patient said he is running out!    [x] Refill   [] Prior Auth  [] Other:     Office:   [x] PCP/Provider -   [] Specialty/Provider -     Medication:     oxyCODONE (ROXICODONE) 10 MG TABS       Dose/Frequency:  Take 1 tablet (10 mg total) by mouth every 6 (six) hours as needed for moderate pain take 1 tablet by mouth four times a day     Quantity:  120 tablet     Pharmacy: [id number] - [place] [address] [phone]     Does the patient have enough for 3 days?   [] Yes   [x] No - Send as HP to POD

## 2024-12-19 DIAGNOSIS — G47.00 INSOMNIA, UNSPECIFIED TYPE: ICD-10-CM

## 2024-12-20 ENCOUNTER — TELEPHONE (OUTPATIENT)
Age: 79
End: 2024-12-20

## 2024-12-20 RX ORDER — HYDROXYZINE HYDROCHLORIDE 25 MG/1
25 TABLET, FILM COATED ORAL EVERY 6 HOURS PRN
Qty: 90 TABLET | Refills: 1 | Status: SHIPPED | OUTPATIENT
Start: 2024-12-20

## 2024-12-20 NOTE — TELEPHONE ENCOUNTER
PA for HydrOXYzine HCL (ATARAX) 25 mg tablet SUBMITTED to [facility]    via    []CMM-KEY:   [x]Surescripts-Case ID #: [id number]   []Availity-Auth ID #   []Faxed to plan   []Other website   []Phone call Case ID #     [x]PA sent as URGENT    All office notes, labs and other pertaining documents and studies sent. Clinical questions answered. Awaiting determination from insurance company.     Turnaround time for your insurance to make a decision on your Prior Authorization can take 7-21 business days.

## 2024-12-20 NOTE — TELEPHONE ENCOUNTER
Does he take this for something else.Insomnia unspecified didn't meet the requirement of medically accepted for insurance

## 2024-12-23 NOTE — TELEPHONE ENCOUNTER
Spoke with pt via telephone and informed of denial. Pt states sometimes insurance will cover it however he sometimes needs to use GoodRx. Pt will plan on using GoodRx for fills.

## 2025-01-06 DIAGNOSIS — M48.061 SPINAL STENOSIS OF LUMBAR REGION, UNSPECIFIED WHETHER NEUROGENIC CLAUDICATION PRESENT: ICD-10-CM

## 2025-01-06 RX ORDER — OXYCODONE AND ACETAMINOPHEN 10; 325 MG/1; MG/1
1 TABLET ORAL EVERY 4 HOURS PRN
Qty: 120 TABLET | Refills: 0 | Status: SHIPPED | OUTPATIENT
Start: 2025-01-06

## 2025-01-06 NOTE — TELEPHONE ENCOUNTER
Reason for call:   [x] Refill   [] Prior Auth  [] Other:     Office:   [x] PCP/Provider -  Telly Page  [] Specialty/Provider -     Medication: Percocet    Dose/Frequency:  mg     Quantity: #120    [facility] [address] [place] [address]    Does the patient have enough for 3 days?   [] Yes   [x] No - Send as HP to POD

## 2025-01-08 NOTE — TELEPHONE ENCOUNTER
Patient still having pain in his right shoulder  Please advise he wanted to know if he should have an xray done or what you advise he can be reached at [phone]  not applicable

## 2025-01-16 DIAGNOSIS — M48.061 SPINAL STENOSIS OF LUMBAR REGION, UNSPECIFIED WHETHER NEUROGENIC CLAUDICATION PRESENT: ICD-10-CM

## 2025-01-16 RX ORDER — OXYCODONE HYDROCHLORIDE 10 MG/1
10 TABLET ORAL EVERY 6 HOURS PRN
Qty: 120 TABLET | Refills: 0 | Status: SHIPPED | OUTPATIENT
Start: 2025-01-16

## 2025-01-16 NOTE — TELEPHONE ENCOUNTER
Reason for call:   [x] Refill   [] Prior Auth  [] Other:     Office:   [x] PCP/Provider - Telly Page, DO  [] Specialty/Provider -     Medication: oxyCODONE (ROXICODONE) 10 MG TABS     Dose/Frequency: 10 mg, Oral, Every 6 hours PRN     Quantity: 120    Pharmacy: [id number] - [place] - [address] [facility]     Does the patient have enough for 3 days?   [x] Yes   [] No - Send as HP to POD

## 2025-02-05 DIAGNOSIS — M48.061 SPINAL STENOSIS OF LUMBAR REGION, UNSPECIFIED WHETHER NEUROGENIC CLAUDICATION PRESENT: ICD-10-CM

## 2025-02-05 RX ORDER — OXYCODONE AND ACETAMINOPHEN 10; 325 MG/1; MG/1
1 TABLET ORAL EVERY 4 HOURS PRN
Qty: 120 TABLET | Refills: 0 | Status: SHIPPED | OUTPATIENT
Start: 2025-02-05

## 2025-02-05 NOTE — TELEPHONE ENCOUNTER
Reason for call:   [x] Refill   [] Prior Auth  [] Other:     Office:   [x] PCP/Provider -   Ordering Department: [facility]  Authorized By: Telly Sharma DO  [] Specialty/Provider -     Medication:  oxyCODONE-acetaminophen (PERCOCET)  mg per tablet    Dose/Frequency: Take 1 tablet by mouth every 4 (four) hours as needed for moderate pain Max Daily Amount: 6 tablets     Quantity: 120    Pharmacy: [id number] - [place] [address] [phone]    Does the patient have enough for 3 days?   [] Yes   [x] No - Send as HP to POD

## 2025-02-14 DIAGNOSIS — M48.061 SPINAL STENOSIS OF LUMBAR REGION, UNSPECIFIED WHETHER NEUROGENIC CLAUDICATION PRESENT: ICD-10-CM

## 2025-02-14 RX ORDER — OXYCODONE HYDROCHLORIDE 10 MG/1
10 TABLET ORAL EVERY 6 HOURS PRN
Qty: 120 TABLET | Refills: 0 | Status: SHIPPED | OUTPATIENT
Start: 2025-02-14

## 2025-02-14 NOTE — TELEPHONE ENCOUNTER
Reason for call: : Telly Sharma, manage this medication!    [x] Refill   [] Prior Auth  [] Other:     Office:   [x] PCP/Provider -   [] Specialty/Provider -     Medication:     oxyCODONE (ROXICODONE) 10 MG TABS       Dose/Frequency: : Take 1 tablet (10 mg total) by mouth every 6 (six) hours as needed for moderate pain take 1 tablet by mouth four times a day     Quantity:  120 tablet     Pharmacy: [id number] - [place] [address] [phone]     Does the patient have enough for 3 days?   [] Yes   [x] No - Send as HP to POD

## 2025-02-15 DIAGNOSIS — G47.00 INSOMNIA, UNSPECIFIED TYPE: ICD-10-CM

## 2025-02-16 RX ORDER — HYDROXYZINE HYDROCHLORIDE 25 MG/1
25 TABLET, FILM COATED ORAL EVERY 6 HOURS PRN
Qty: 90 TABLET | Refills: 1 | Status: SHIPPED | OUTPATIENT
Start: 2025-02-16

## 2025-02-25 DIAGNOSIS — G89.29 CHRONIC BACK PAIN, UNSPECIFIED BACK LOCATION, UNSPECIFIED BACK PAIN LATERALITY: ICD-10-CM

## 2025-02-25 DIAGNOSIS — M54.9 CHRONIC BACK PAIN, UNSPECIFIED BACK LOCATION, UNSPECIFIED BACK PAIN LATERALITY: ICD-10-CM

## 2025-02-25 RX ORDER — CARISOPRODOL 350 MG/1
350 TABLET ORAL 4 TIMES DAILY
Qty: 360 TABLET | Refills: 0 | Status: SHIPPED | OUTPATIENT
Start: 2025-02-25

## 2025-02-25 NOTE — TELEPHONE ENCOUNTER
Reason for call:   [x] Refill   [] Prior Auth  [] Other:     Office:   [x] PCP/Provider -   [] Specialty/Provider -     Medication: Carisoprodol    Dose/Frequency: 350 mg    Quantity: 360 tablets    Pharmacy: [id number] - [place] - [address] [place] [address]     Does the patient have enough for 3 days?   [] Yes   [x] No - Send as HP to POD

## 2025-02-26 ENCOUNTER — TELEPHONE (OUTPATIENT)
Age: 80
End: 2025-02-26

## 2025-02-26 NOTE — TELEPHONE ENCOUNTER
PA for Carisoprodol (SOMA) 350 mg tablet SUBMITTED to [facility]    via    []CMM-KEY:   [x]Surescripts-Case ID #: [id number]   []Availity-Auth ID #   []Faxed to plan   []Other website   []Phone call Case ID #     []PA sent as URGENT    All office notes, labs and other pertaining documents and studies sent. Clinical questions answered. Awaiting determination from insurance company.     Turnaround time for your insurance to make a decision on your Prior Authorization can take 7-21 business days.

## 2025-02-27 NOTE — TELEPHONE ENCOUNTER
PA for Carisoprodol (SOMA) 350 mg tablet APPROVED     Date(s) approved January 1, 2025 to May 27, 2025     Case #: [id number]     Patient advised by          []MyChart Message  [x]Phone call- Patient verbalized understanding  []LMOM  []L/M to call office as no active Communication consent on file  []Unable to leave detailed message as VM not approved on Communication consent       Pharmacy advised by    [x]Fax  []Phone call       Approval letter scanned into Media Yes

## 2025-03-06 DIAGNOSIS — M48.061 SPINAL STENOSIS OF LUMBAR REGION, UNSPECIFIED WHETHER NEUROGENIC CLAUDICATION PRESENT: ICD-10-CM

## 2025-03-06 RX ORDER — OXYCODONE AND ACETAMINOPHEN 10; 325 MG/1; MG/1
1 TABLET ORAL EVERY 4 HOURS PRN
Qty: 120 TABLET | Refills: 0 | Status: SHIPPED | OUTPATIENT
Start: 2025-03-06

## 2025-03-06 NOTE — TELEPHONE ENCOUNTER
Reason for call:   [x] Refill   [] Prior Auth  [] Other:     Office:   [x] PCP/Provider -   [] Specialty/Provider -       oxyCODONE-acetaminophen (PERCOCET)  mg per tablet 1 tablet, Oral, Every 4 hours PRN          Edit  Cancel Reorder       Quantity: 30 day    [facility]    [facility]   Does the patient have enough for 3 days?   [x] Yes   [] No - Send as HP to POD    [facility]   Does the patient have enough for 10 days?   [] Yes   [] No - Send as HP to POD

## 2025-03-13 DIAGNOSIS — M48.061 SPINAL STENOSIS OF LUMBAR REGION, UNSPECIFIED WHETHER NEUROGENIC CLAUDICATION PRESENT: ICD-10-CM

## 2025-03-13 RX ORDER — OXYCODONE HYDROCHLORIDE 10 MG/1
10 TABLET ORAL EVERY 6 HOURS PRN
Qty: 120 TABLET | Refills: 0 | Status: SHIPPED | OUTPATIENT
Start: 2025-03-13

## 2025-03-13 NOTE — TELEPHONE ENCOUNTER
Reason for call:   [x] Refill   [] Prior Auth  [] Other:     Office:   [x] PCP/Provider -   [] Specialty/Provider -     Medication:     oxyCODONE (ROXICODONE) 10 MG Take 1 tablet (10 mg total) by mouth every 6 (six) hours as needed for moderate pain          Pharmacy:  [id number] - [place] -     Local Pharmacy   Does the patient have enough for 3 days?   [] Yes   [x] No - Send as HP to POD    [facility] Pharm[facility]   Does the patient have enough for 10 days?   [] Yes   [] No - Send as HP to POD

## 2025-03-24 ENCOUNTER — TELEPHONE (OUTPATIENT)
Dept: FAMILY MEDICINE CLINIC | Facility: CLINIC | Age: 80
End: 2025-03-24

## 2025-03-24 NOTE — TELEPHONE ENCOUNTER
A Prior Authorization has been started for hydrOXYzine HCI 25 mg tabs. Thank you.    KEY:  [id number]

## 2025-03-24 NOTE — TELEPHONE ENCOUNTER
PA for hydrOXYzine HCL (ATARAX) 25 mg tablet SUBMITTED to [facility]    via    []CMM-KEY:   [x]Surescripts-Case ID # [id number]   []Availity-Auth ID # NDC #   []Faxed to plan   []Other website   []Phone call Case ID #     [x]PA sent as URGENT    All office notes, labs and other pertaining documents and studies sent. Clinical questions answered. Awaiting determination from insurance company.     Turnaround time for your insurance to make a decision on your Prior Authorization can take 7-21 business days.

## 2025-04-07 DIAGNOSIS — M48.061 SPINAL STENOSIS OF LUMBAR REGION, UNSPECIFIED WHETHER NEUROGENIC CLAUDICATION PRESENT: ICD-10-CM

## 2025-04-07 RX ORDER — OXYCODONE AND ACETAMINOPHEN 10; 325 MG/1; MG/1
1 TABLET ORAL EVERY 4 HOURS PRN
Qty: 120 TABLET | Refills: 0 | Status: SHIPPED | OUTPATIENT
Start: 2025-04-07

## 2025-04-07 NOTE — TELEPHONE ENCOUNTER
Reason for call:   [x] Refill   [] Prior Auth  [] Other:     Office: [facility]  [x] PCP/Provider - Telly Page  [] Specialty/Provider -     Medication: oxyCODONE-acetaminophen (PERCOCET)     Dose/Frequency:  mg/ 1 tab every 4 hours PRN     Quantity: 120 tabs     [facility]   Does the patient have enough for 3 days?   [] Yes   [x] No - Send as HP to POD- out completely.     [facility] Pharmacy   Does the patient have enough for 10 days?   [] Yes   [] No - Send as HP to POD

## 2025-04-10 ENCOUNTER — APPOINTMENT (OUTPATIENT)
Dept: LAB | Facility: HOSPITAL | Age: 80
End: 2025-04-10
Payer: COMMERCIAL

## 2025-04-10 DIAGNOSIS — Z12.5 SCREENING FOR PROSTATE CANCER: ICD-10-CM

## 2025-04-10 DIAGNOSIS — R73.01 IMPAIRED FASTING GLUCOSE: ICD-10-CM

## 2025-04-10 DIAGNOSIS — Z79.899 HIGH RISK MEDICATION USE: ICD-10-CM

## 2025-04-10 DIAGNOSIS — E78.2 MIXED HYPERLIPIDEMIA: ICD-10-CM

## 2025-04-10 DIAGNOSIS — N18.30 STAGE 3 CHRONIC KIDNEY DISEASE, UNSPECIFIED WHETHER STAGE 3A OR 3B CKD (HCC): ICD-10-CM

## 2025-04-10 LAB
ALBUMIN SERPL BCG-MCNC: 4.8 G/DL (ref 3.5–5)
ALP SERPL-CCNC: 57 U/L (ref 34–104)
ALT SERPL W P-5'-P-CCNC: 21 U/L (ref 7–52)
ANION GAP SERPL CALCULATED.3IONS-SCNC: 8 MMOL/L (ref 4–13)
AST SERPL W P-5'-P-CCNC: 22 U/L (ref 13–39)
BACTERIA UR QL AUTO: NORMAL /HPF
BASOPHILS # BLD AUTO: 0.03 THOUSANDS/ÂΜL (ref 0–0.1)
BASOPHILS NFR BLD AUTO: 0 % (ref 0–1)
BILIRUB SERPL-MCNC: 0.56 MG/DL (ref 0.2–1)
BILIRUB UR QL STRIP: NEGATIVE
BUN SERPL-MCNC: 21 MG/DL (ref 5–25)
CALCIUM SERPL-MCNC: 9.4 MG/DL (ref 8.4–10.2)
CHLORIDE SERPL-SCNC: 95 MMOL/L (ref 96–108)
CHOLEST SERPL-MCNC: 182 MG/DL (ref ?–200)
CLARITY UR: CLEAR
CO2 SERPL-SCNC: 27 MMOL/L (ref 21–32)
COLOR UR: YELLOW
CREAT SERPL-MCNC: 1.13 MG/DL (ref 0.6–1.3)
EOSINOPHIL # BLD AUTO: 0.16 THOUSAND/ÂΜL (ref 0–0.61)
EOSINOPHIL NFR BLD AUTO: 2 % (ref 0–6)
ERYTHROCYTE [DISTWIDTH] IN BLOOD BY AUTOMATED COUNT: 12.9 % (ref 11.6–15.1)
EST. AVERAGE GLUCOSE BLD GHB EST-MCNC: 126 MG/DL
GFR SERPL CREATININE-BSD FRML MDRD: 62 ML/MIN/1.73SQ M
GLUCOSE P FAST SERPL-MCNC: 107 MG/DL (ref 65–99)
GLUCOSE UR STRIP-MCNC: NEGATIVE MG/DL
HBA1C MFR BLD: 6 %
HCT VFR BLD AUTO: 45.6 % (ref 36.5–49.3)
HDLC SERPL-MCNC: 46 MG/DL
HGB BLD-MCNC: 14.5 G/DL (ref 12–17)
HGB UR QL STRIP.AUTO: NEGATIVE
IMM GRANULOCYTES # BLD AUTO: 0.03 THOUSAND/UL (ref 0–0.2)
IMM GRANULOCYTES NFR BLD AUTO: 0 % (ref 0–2)
KETONES UR STRIP-MCNC: NEGATIVE MG/DL
LDLC SERPL CALC-MCNC: 113 MG/DL (ref 0–100)
LEUKOCYTE ESTERASE UR QL STRIP: NEGATIVE
LYMPHOCYTES # BLD AUTO: 1.5 THOUSANDS/ÂΜL (ref 0.6–4.47)
LYMPHOCYTES NFR BLD AUTO: 19 % (ref 14–44)
MCH RBC QN AUTO: 29.1 PG (ref 26.8–34.3)
MCHC RBC AUTO-ENTMCNC: 31.8 G/DL (ref 31.4–37.4)
MCV RBC AUTO: 92 FL (ref 82–98)
MONOCYTES # BLD AUTO: 0.75 THOUSAND/ÂΜL (ref 0.17–1.22)
MONOCYTES NFR BLD AUTO: 10 % (ref 4–12)
NEUTROPHILS # BLD AUTO: 5.26 THOUSANDS/ÂΜL (ref 1.85–7.62)
NEUTS SEG NFR BLD AUTO: 69 % (ref 43–75)
NITRITE UR QL STRIP: NEGATIVE
NON-SQ EPI CELLS URNS QL MICRO: NORMAL /HPF
NONHDLC SERPL-MCNC: 136 MG/DL
NRBC BLD AUTO-RTO: 0 /100 WBCS
PH UR STRIP.AUTO: 5.5 [PH]
PLATELET # BLD AUTO: 212 THOUSANDS/UL (ref 149–390)
PMV BLD AUTO: 8.8 FL (ref 8.9–12.7)
POTASSIUM SERPL-SCNC: 4.6 MMOL/L (ref 3.5–5.3)
PROT SERPL-MCNC: 7.6 G/DL (ref 6.4–8.4)
PROT UR STRIP-MCNC: ABNORMAL MG/DL
PSA SERPL-MCNC: 0.74 NG/ML (ref 0–4)
RBC # BLD AUTO: 4.98 MILLION/UL (ref 3.88–5.62)
RBC #/AREA URNS AUTO: NORMAL /HPF
SODIUM SERPL-SCNC: 130 MMOL/L (ref 135–147)
SP GR UR STRIP.AUTO: 1.02 (ref 1–1.03)
TRIGL SERPL-MCNC: 114 MG/DL (ref ?–150)
TSH SERPL DL<=0.05 MIU/L-ACNC: 3.02 UIU/ML (ref 0.45–4.5)
UROBILINOGEN UR STRIP-ACNC: <2 MG/DL
WBC # BLD AUTO: 7.73 THOUSAND/UL (ref 4.31–10.16)
WBC #/AREA URNS AUTO: NORMAL /HPF

## 2025-04-10 PROCEDURE — 80061 LIPID PANEL: CPT

## 2025-04-10 PROCEDURE — 36415 COLL VENOUS BLD VENIPUNCTURE: CPT

## 2025-04-10 PROCEDURE — 84443 ASSAY THYROID STIM HORMONE: CPT

## 2025-04-10 PROCEDURE — 80053 COMPREHEN METABOLIC PANEL: CPT

## 2025-04-10 PROCEDURE — 83036 HEMOGLOBIN GLYCOSYLATED A1C: CPT

## 2025-04-10 PROCEDURE — 81001 URINALYSIS AUTO W/SCOPE: CPT

## 2025-04-10 PROCEDURE — 85025 COMPLETE CBC W/AUTO DIFF WBC: CPT

## 2025-04-10 PROCEDURE — G0103 PSA SCREENING: HCPCS

## 2025-04-11 ENCOUNTER — RESULTS FOLLOW-UP (OUTPATIENT)
Dept: FAMILY MEDICINE CLINIC | Facility: CLINIC | Age: 80
End: 2025-04-11

## 2025-04-15 ENCOUNTER — OFFICE VISIT (OUTPATIENT)
Dept: CARDIOLOGY CLINIC | Facility: CLINIC | Age: 80
End: 2025-04-15
Payer: COMMERCIAL

## 2025-04-15 VITALS
WEIGHT: 202 LBS | OXYGEN SATURATION: 97 % | HEART RATE: 71 BPM | BODY MASS INDEX: 30.71 KG/M2 | DIASTOLIC BLOOD PRESSURE: 70 MMHG | SYSTOLIC BLOOD PRESSURE: 120 MMHG

## 2025-04-15 DIAGNOSIS — I25.10 ATHEROSCLEROSIS OF NATIVE CORONARY ARTERY OF NATIVE HEART WITHOUT ANGINA PECTORIS: ICD-10-CM

## 2025-04-15 DIAGNOSIS — I48.91 ATRIAL FIBRILLATION, UNSPECIFIED TYPE (HCC): ICD-10-CM

## 2025-04-15 DIAGNOSIS — I10 PRIMARY HYPERTENSION: ICD-10-CM

## 2025-04-15 DIAGNOSIS — I47.10 PAROXYSMAL SVT (SUPRAVENTRICULAR TACHYCARDIA) (HCC): ICD-10-CM

## 2025-04-15 DIAGNOSIS — G89.4 CHRONIC PAIN SYNDROME: Primary | ICD-10-CM

## 2025-04-15 DIAGNOSIS — E78.2 MIXED HYPERLIPIDEMIA: ICD-10-CM

## 2025-04-15 DIAGNOSIS — Z95.3 STATUS POST AORTIC VALVE REPLACEMENT WITH BIOPROSTHETIC VALVE: ICD-10-CM

## 2025-04-15 DIAGNOSIS — I42.9 CARDIOMYOPATHY, UNSPECIFIED TYPE (HCC): ICD-10-CM

## 2025-04-15 DIAGNOSIS — N18.30 STAGE 3 CHRONIC KIDNEY DISEASE, UNSPECIFIED WHETHER STAGE 3A OR 3B CKD (HCC): ICD-10-CM

## 2025-04-15 PROCEDURE — G2211 COMPLEX E/M VISIT ADD ON: HCPCS | Performed by: INTERNAL MEDICINE

## 2025-04-15 PROCEDURE — 93000 ELECTROCARDIOGRAM COMPLETE: CPT | Performed by: INTERNAL MEDICINE

## 2025-04-15 PROCEDURE — 99214 OFFICE O/P EST MOD 30 MIN: CPT | Performed by: INTERNAL MEDICINE
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
## 2025-04-21 DIAGNOSIS — M48.061 SPINAL STENOSIS OF LUMBAR REGION, UNSPECIFIED WHETHER NEUROGENIC CLAUDICATION PRESENT: ICD-10-CM

## 2025-04-21 RX ORDER — OXYCODONE HYDROCHLORIDE 10 MG/1
10 TABLET ORAL EVERY 6 HOURS PRN
Qty: 120 TABLET | Refills: 0 | Status: SHIPPED | OUTPATIENT
Start: 2025-04-21

## 2025-04-21 NOTE — TELEPHONE ENCOUNTER
Reason for call:   [x] Refill   [] Prior Auth  [] Other:     Office:   [x] PCP/Provider -  Page   [] Specialty/Provider -       Medication: oxycodone   Dose/Frequency: 10 mg take 1 tablet every 6  hours as needed     Quantity: 120    [facility]   Does the patient have enough for 3 days?   [] Yes   [x] No - Send as HP to POD- he ran out over the weekend    [facility] Away Pharmacy   Does the patient have enough for 10 days?   [] Yes   [] No - Send as HP to POD

## 2025-04-23 NOTE — TELEPHONE ENCOUNTER
OB FOLLOW UP  CC- Here for care of pregnancy        Brittny Hill is a 31 y.o.  38w6d patient being seen today for her obstetrical follow up visit. Patient reports question about taking magnesium supplement while pregnant.     Her prenatal care is complicated by (and status) : see below.  Patient Active Problem List   Diagnosis    Retroverted uterus    Pregnancy    Nausea/vomiting in pregnancy    Mother positive for group B Streptococcus colonization    Refuses tetanus, diphtheria, and acellular pertussis (Tdap) vaccination    History of precipitous delivery       GBS Status:   Group B Strep Culture   Date Value Ref Range Status   2025 Streptococcus agalactiae (Group B) (A)  Final     Comment:       This organism is considered to be universally susceptible to penicillin.  No further antibiotic testing will be performed. If Clindamycin or Erythromycin is the drug of choice, notify the laboratory within 7 days to request susceptibility testing.         Allergies   Allergen Reactions    Latex Rash          Flu Status: Declines  Her Delivery Plan is: Undecided    US today: no  Non Stress Test: No.    ROS -   Patient Denies: Loss of Fluid, Vaginal Spotting, Vision Changes, Headaches, Nausea , Vomiting , Contractions, Epigastric pain, and skin itching  Fetal Movement : normal  Other than what is documented in the HPI, all other systems reviewed and are negative.       The additional following portions of the patient's history were reviewed and updated as appropriate: allergies, current medications, past family history, past medical history, past social history, past surgical history, and problem list.    I have reviewed and agree with the HPI, ROS, and historical information as entered above.   Brandon Holm MD        EXAM:     Prenatal Vitals  BP: 110/72  Weight: 76.4 kg (168 lb 6.4 oz)                  Urine Glucose Read-only: Negative  Urine Protein Read-only: Negative           Assessment  Patient called stating his BP is increasingly getting higher and he is feeling tired  Denies headache/chest pain/SOB or any other symptoms just states he is tired and feels as though he is getting a cold  Patient was seen in [facility] office on  2/27/18 at 11:00am   He is taking Metoprolol Tartrate 12 5 BID and in encounter notes there are instructions: if BP starts to elevate to call office and we will initiate losartan 25 mg daily        3/5/18: BP - 182/90  3/6/18: BP - 190/98    Please advise and Plan    Problem List Items Addressed This Visit          Gynecologic and Obstetric Problems    Mother positive for group B Streptococcus colonization    Overview   Prenatal urine culture was positive for group B strep.  Rx for amoxicillin 500 mg 3 times daily for 7 days sent to pharmacy.  Will need group B strep prophylaxis in labor.    3/31/2025; Group B strep screen was positive.            Other    History of precipitous delivery    Overview   Did not have short labor.  Just short second stage labor.  Discussed option of induction at 39 weeks.         Pregnancy - Primary    Overview   30-year-old   Dates consistent with 8-week 5-day ultrasound.  cfDNA- Negative, Male  Required progesterone supplementation with previous pregnancy  History of precipitous delivery.  Declines IOL.  U/s 36 wk EFW 49% AC 77%.         Relevant Orders    POC Urinalysis Dipstick (Completed)    Refuses tetanus, diphtheria, and acellular pertussis (Tdap) vaccination       Pregnancy at 38w6d Group B strep screen was positive.  Normal growth on ultrasound at 36 wk; EFW 49% AC 77%.  Fetal status reassuring.  Declined cervical exam today.  Reviewed Pre-eclampsia signs/symptoms  Discussed options for IOL. Patient desires spontaneous labor. Would like to postpone an IOL unless medically indicated.   Delivery options reviewed with patient; declines induction of labor at this time.  Growth ultrasound and biophysical profile next week if not delivered.  Signs of labor reviewed  Kick counts reviewed  Activity and Exercise discussed.  Return in about 1 week (around 2025).    Brandon Holm MD  2025

## 2025-04-28 DIAGNOSIS — G47.00 INSOMNIA, UNSPECIFIED TYPE: ICD-10-CM

## 2025-04-28 RX ORDER — HYDROXYZINE HYDROCHLORIDE 25 MG/1
25 TABLET, FILM COATED ORAL EVERY 6 HOURS PRN
Qty: 90 TABLET | Refills: 1 | Status: SHIPPED | OUTPATIENT
Start: 2025-04-28

## 2025-04-28 NOTE — TELEPHONE ENCOUNTER
Reason for call:   [x] Refill   [] Prior Auth  [] Other:     Office:   [x] PCP/Provider -  Page   [] Specialty/Provider -     Medication: hydroxyzine     Dose/Frequency: 25 mg take 1 tablet every 6  hours as needed    Quantity: 90    [facility]   Does the patient have enough for 3 days?   [] Yes   [x] No - Send as HP to POD    [facility] Pharmacy   Does the patient have enough for 10 days?   [] Yes   [] No - Send as HP to POD

## 2025-05-04 ENCOUNTER — RA CDI HCC (OUTPATIENT)
Dept: OTHER | Facility: HOSPITAL | Age: 80
End: 2025-05-04

## 2025-05-06 DIAGNOSIS — M48.061 SPINAL STENOSIS OF LUMBAR REGION, UNSPECIFIED WHETHER NEUROGENIC CLAUDICATION PRESENT: ICD-10-CM

## 2025-05-06 RX ORDER — OXYCODONE AND ACETAMINOPHEN 10; 325 MG/1; MG/1
1 TABLET ORAL EVERY 4 HOURS PRN
Qty: 120 TABLET | Refills: 0 | Status: SHIPPED | OUTPATIENT
Start: 2025-05-06

## 2025-05-06 NOTE — TELEPHONE ENCOUNTER
Reason for call:   [x] Refill   [] Prior Auth  [x] Other: patient will be out of medication May 6th    Office:   [x] PCP/Provider - Telly Sharma / [place]   [] Specialty/Provider -     Medication:     oxyCODONE-acetaminophen (PERCOCET)  mg per tablet       Dose/Frequency: Take 1 tablet by mouth every 4 (four) hours as needed for moderate pain Max Daily Amount: 6 tablets     Quantity: 120    [id number] [place] - [address] [facility]   Does the patient have enough for 3 days?   [] Yes   [x] No - Send as HP to POD

## 2025-05-12 ENCOUNTER — OFFICE VISIT (OUTPATIENT)
Dept: FAMILY MEDICINE CLINIC | Facility: CLINIC | Age: 80
End: 2025-05-12
Payer: COMMERCIAL

## 2025-05-12 VITALS
HEART RATE: 72 BPM | TEMPERATURE: 97.2 F | BODY MASS INDEX: 30.04 KG/M2 | SYSTOLIC BLOOD PRESSURE: 128 MMHG | RESPIRATION RATE: 16 BRPM | OXYGEN SATURATION: 97 % | DIASTOLIC BLOOD PRESSURE: 70 MMHG | HEIGHT: 68 IN | WEIGHT: 198.2 LBS

## 2025-05-12 DIAGNOSIS — I25.10 ATHEROSCLEROSIS OF NATIVE CORONARY ARTERY OF NATIVE HEART WITHOUT ANGINA PECTORIS: ICD-10-CM

## 2025-05-12 DIAGNOSIS — I73.9 CLAUDICATION (HCC): ICD-10-CM

## 2025-05-12 DIAGNOSIS — L40.50 PSORIASIS WITH ARTHROPATHY (HCC): ICD-10-CM

## 2025-05-12 DIAGNOSIS — F11.20 CONTINUOUS OPIOID DEPENDENCE (HCC): ICD-10-CM

## 2025-05-12 DIAGNOSIS — I73.9 PAD (PERIPHERAL ARTERY DISEASE) (HCC): Primary | ICD-10-CM

## 2025-05-12 DIAGNOSIS — M05.9 RHEUMATOID ARTHRITIS WITH RHEUMATOID FACTOR, UNSPECIFIED (HCC): ICD-10-CM

## 2025-05-12 PROBLEM — L89.223 PRESSURE INJURY OF LEFT HIP, STAGE 3 (HCC): Status: RESOLVED | Noted: 2023-12-04 | Resolved: 2025-05-12

## 2025-05-12 PROCEDURE — 99214 OFFICE O/P EST MOD 30 MIN: CPT | Performed by: FAMILY MEDICINE

## 2025-05-12 PROCEDURE — G2211 COMPLEX E/M VISIT ADD ON: HCPCS | Performed by: FAMILY MEDICINE

## 2025-05-12 NOTE — PROGRESS NOTES
Name: Julio Saldana      : 1945      MRN: [id number]  Encounter Provider: Telly Sharma DO  Encounter Date: 2025   Encounter department: [facility]  :  Assessment & Plan  PAD (peripheral artery disease) (HCC)    Orders:    VAS ARTERIAL DUPLEX- LOWER LIMB BILATERAL; Future  patient does have known diffuse disease from ultrasound a few years ago  Will check a new ultrasound for follow-up     Claudication (HCC)    Orders:    VAS ARTERIAL DUPLEX- LOWER LIMB BILATERAL; Future    Psoriasis with arthropathy (HCC)  Patient follows with rheumatology       Continuous opioid dependence (HCC)  This is stable and patient is compliant with PDMP has been on these medications for 30+ years with no complications   Atherosclerosis of native coronary artery of native heart without angina pectoris  Patient follows closely with cardiology  Had another discussion about statins and patient is declining   Rheumatoid arthritis with rheumatoid factor, unspecified (HCC)  this stable patient follows with rheumatology          Overall patient stable  Ultrasound ordered of his legs to look for worsening PAD  Patient will continue his care medications can follow-up in 6 months or sooner if needed      Depression Screening and Follow-up Plan: Patient was screened for depression during today's encounter. They screened negative with a PHQ-2 score of 0.        History of Present Illness   Patient was today for 6-month checkup  Overall he is doing well  He has no acute complaints today other than some cramping in his legs      Review of Systems   Constitutional: Negative.    HENT: Negative.     Eyes: Negative.    Respiratory: Negative.     Cardiovascular: Negative.    Gastrointestinal: Negative.    Endocrine: Negative.    Genitourinary: Negative.    Musculoskeletal: Negative.    Skin: Negative.    Allergic/Immunologic: Negative.    Neurological: Negative.    Hematological: Negative.    Psychiatric/Behavioral:  "Negative.     All other systems reviewed and are negative.      Objective   /70 (BP Location: Left arm, Patient Position: Sitting, Cuff Size: Large)   Pulse 72   Temp (!) 97.2 °F (36.2 °C) (Tympanic)   Resp 16   Ht 5' 8\" (1.727 m)   Wt 89.9 kg (198 lb 3.2 oz)   SpO2 97%   BMI 30.14 kg/m²      Physical Exam  Vitals and nursing note reviewed.   Constitutional:       Appearance: Normal appearance. He is well-developed.   HENT:      Head: Normocephalic.      Right Ear: External ear normal.      Left Ear: External ear normal.      Nose: Nose normal.   Eyes:      Conjunctiva/sclera: Conjunctivae normal.      Pupils: Pupils are equal, round, and reactive to light.   Cardiovascular:      Rate and Rhythm: Normal rate and regular rhythm.      Heart sounds: Normal heart sounds.   Pulmonary:      Effort: Pulmonary effort is normal.      Breath sounds: Normal breath sounds.   Abdominal:      General: Bowel sounds are normal.      Palpations: Abdomen is soft.   Musculoskeletal:         General: Normal range of motion.      Cervical back: Normal range of motion and neck supple.   Skin:     General: Skin is warm and dry.   Neurological:      General: No focal deficit present.      Mental Status: He is alert and oriented to person, place, and time.   Psychiatric:         Behavior: Behavior normal.         Thought Content: Thought content normal.         Judgment: Judgment normal.         "

## 2025-05-12 NOTE — ASSESSMENT & PLAN NOTE
This is stable and patient is compliant with PDMP has been on these medications for 30+ years with no complications

## 2025-05-12 NOTE — ASSESSMENT & PLAN NOTE
Patient follows closely with cardiology  Had another discussion about statins and patient is declining

## 2025-05-20 DIAGNOSIS — M48.061 SPINAL STENOSIS OF LUMBAR REGION, UNSPECIFIED WHETHER NEUROGENIC CLAUDICATION PRESENT: ICD-10-CM

## 2025-05-20 RX ORDER — OXYCODONE HYDROCHLORIDE 10 MG/1
10 TABLET ORAL EVERY 6 HOURS PRN
Qty: 120 TABLET | Refills: 0 | Status: SHIPPED | OUTPATIENT
Start: 2025-05-20

## 2025-05-20 NOTE — TELEPHONE ENCOUNTER
Reason for call:   [x] Refill   [] Prior Auth  [] Other:     Office:   [x] PCP/Provider - JASSON DOW  Authorized By: Telly Sharma, DO  [] Specialty/Provider -     Medication:   oxyCODONE (ROXICODONE) 10 MG TABS 10 mg, Every 6 hours PRN         Pharmacy: [id number] - [place] - [address] [facility] [phone]     Local Pharmacy   Does the patient have enough for 3 days?   [] Yes   [x] No - Send as HP to POD    Mail Away Pharmacy   Does the patient have enough for 10 days?   [] Yes   [] No - Send as HP to POD

## 2025-05-22 DIAGNOSIS — M54.9 CHRONIC BACK PAIN, UNSPECIFIED BACK LOCATION, UNSPECIFIED BACK PAIN LATERALITY: ICD-10-CM

## 2025-05-22 DIAGNOSIS — G89.29 CHRONIC BACK PAIN, UNSPECIFIED BACK LOCATION, UNSPECIFIED BACK PAIN LATERALITY: ICD-10-CM

## 2025-05-22 RX ORDER — CARISOPRODOL 350 MG/1
350 TABLET ORAL 4 TIMES DAILY
Qty: 360 TABLET | Refills: 1 | Status: SHIPPED | OUTPATIENT
Start: 2025-05-22

## 2025-05-22 NOTE — TELEPHONE ENCOUNTER
Reason for call:   [x] Refill   [] Prior Auth  [] Other:     Office:   [x] PCP/Provider - Telly Sharma  [] Specialty/Provider -     Medication: Carisoprodol     Dose/Frequency: 350 mg QID    Quantity: 360    Pharmacy: [facility] [place] [facility]   Does the patient have enough for 3 days?   [] Yes   [x] No - Send as HP to POD    Mail Away Pharmacy   Does the patient have enough for 10 days?   [] Yes   [] No - Send as HP to POD

## 2025-05-27 DIAGNOSIS — G89.29 CHRONIC BACK PAIN, UNSPECIFIED BACK LOCATION, UNSPECIFIED BACK PAIN LATERALITY: ICD-10-CM

## 2025-05-27 DIAGNOSIS — M54.9 CHRONIC BACK PAIN, UNSPECIFIED BACK LOCATION, UNSPECIFIED BACK PAIN LATERALITY: ICD-10-CM

## 2025-05-27 RX ORDER — CARISOPRODOL 350 MG/1
350 TABLET ORAL 4 TIMES DAILY
Qty: 360 TABLET | Refills: 0 | Status: SHIPPED | OUTPATIENT
Start: 2025-05-27

## 2025-05-27 NOTE — TELEPHONE ENCOUNTER
Pharmacy states they never received prescription    Reason for call:   [x] Refill   [] Prior Auth  [] Other:     Office:   [x] PCP/Provider -   [] Specialty/Provider -     Medication: carisoprodol 350 mg, take 1 tablet by mouth 4 times a day       Pharmacy: [facility] [place]     Local Pharmacy   Does the patient have enough for 3 days?   [] Yes   [x] No - Send as HP to POD

## 2025-06-03 DIAGNOSIS — G47.00 INSOMNIA, UNSPECIFIED TYPE: ICD-10-CM

## 2025-06-03 DIAGNOSIS — M48.061 SPINAL STENOSIS OF LUMBAR REGION, UNSPECIFIED WHETHER NEUROGENIC CLAUDICATION PRESENT: ICD-10-CM

## 2025-06-03 NOTE — TELEPHONE ENCOUNTER
Reason for call:   [x] Refill   [] Prior Auth  [] Other:     Office:   [x] PCP/Provider -   [] Specialty/Provider -     Medication:   oxyCODONE-acetaminophen (PERCOCET)  mg       Dose/Frequency:  Take 1 tablet by mouth every 4 (four) hours as needed for moderate pain Max Daily Amount: 6 tablets     Quantity: 120    Pharmacy: [id number] - [place] - [address] [place] [address] Pharmacy   Does the patient have enough for 3 days?   [] Yes   [x] No - Send as HP to POD    Mail Away Pharmacy   Does the patient have enough for 10 days?   [] Yes   [] No - Send as HP to POD

## 2025-06-04 RX ORDER — OXYCODONE AND ACETAMINOPHEN 10; 325 MG/1; MG/1
1 TABLET ORAL EVERY 4 HOURS PRN
Qty: 120 TABLET | Refills: 0 | Status: SHIPPED | OUTPATIENT
Start: 2025-06-04

## 2025-06-04 RX ORDER — TRAZODONE HYDROCHLORIDE 300 MG/1
600 TABLET ORAL
Qty: 180 TABLET | Refills: 1 | Status: SHIPPED | OUTPATIENT
Start: 2025-06-04

## 2025-06-23 DIAGNOSIS — L40.50 PSORIASIS WITH ARTHROPATHY (HCC): ICD-10-CM

## 2025-06-23 DIAGNOSIS — L40.9 PSORIASIS: ICD-10-CM

## 2025-06-23 DIAGNOSIS — M48.061 SPINAL STENOSIS OF LUMBAR REGION, UNSPECIFIED WHETHER NEUROGENIC CLAUDICATION PRESENT: ICD-10-CM

## 2025-06-23 RX ORDER — OXYCODONE HYDROCHLORIDE 10 MG/1
10 TABLET ORAL EVERY 6 HOURS PRN
Qty: 120 TABLET | Refills: 0 | Status: SHIPPED | OUTPATIENT
Start: 2025-06-23

## 2025-06-23 RX ORDER — SULFASALAZINE 500 MG/1
TABLET ORAL
Qty: 360 TABLET | Refills: 2 | Status: SHIPPED | OUTPATIENT
Start: 2025-06-23

## 2025-06-23 NOTE — TELEPHONE ENCOUNTER
Reason for call:   [x] Refill   [] Prior Auth  [] Other:     Office:   [x] PCP/Provider - Dyana FP / Page    Medication: oxycodone    Dose/Frequency: 10mg q6 prn    Quantity: 120    Pharmacy: [id number] - [place] - [address] [facility]   Does the patient have enough for 3 days?   [] Yes   [x] No - Send as HP to POD

## 2025-06-23 NOTE — TELEPHONE ENCOUNTER
Reason for call:   [x] Refill   [] Prior Auth  [] Other:     Office:   [] PCP/Provider -   [x] Specialty/Provider -     Medication:   sulfaSALAzine (AZULFIDINE) 500 mg tablet     Dose/Frequency: Sig: Take 2 tabs twice a day      Quantity: 360    Pharmacy:   [id number] - [place] - [address] [place] [address]  [address] [facility] [place] [address]  Phone: [phone]  Fax: [phone]  [place] #: --    Local Pharmacy   Does the patient have enough for 3 days?   [] Yes   [x] No - Send as HP to POD    Mail Away Pharmacy   Does the patient have enough for 10 days?   [] Yes   [] No - Send as HP to POD

## 2025-06-24 ENCOUNTER — TELEPHONE (OUTPATIENT)
Dept: FAMILY MEDICINE CLINIC | Facility: CLINIC | Age: 80
End: 2025-06-24

## 2025-06-24 DIAGNOSIS — G47.00 INSOMNIA, UNSPECIFIED TYPE: ICD-10-CM

## 2025-06-24 RX ORDER — HYDROXYZINE HYDROCHLORIDE 25 MG/1
25 TABLET, FILM COATED ORAL EVERY 6 HOURS PRN
Qty: 90 TABLET | Refills: 5 | Status: SHIPPED | OUTPATIENT
Start: 2025-06-24

## 2025-06-24 NOTE — TELEPHONE ENCOUNTER
Reason for call:   [x] Refill   [] Prior Auth  [] Other:     Office:   [x] PCP/Provider -   [] Specialty/Provider -     Medication: Hydroxyzine    Dose/Frequency: 25 mg    Quantity:  90 tablets    Pharmacy: [id number] - [place] - [address] [facility] [phone]    Local Pharmacy   Does the patient have enough for 3 days?   [] Yes   [x] No - Send as HP to POD    Mail Away Pharmacy   Does the patient have enough for 10 days?   [] Yes   [] No - Send as HP to POD

## 2025-06-25 NOTE — TELEPHONE ENCOUNTER
PA for HydrOXYzine HCL (ATARAX) 25 mg tablet SUBMITTED to [facility]    via    []CMM-KEY:   [x]Surescripts-Case ID #: [id number]   []Availity-Auth ID #  []Faxed to plan   []Other website  []Phone call Case ID #     [x]PA sent as URGENT    All office notes, labs and other pertaining documents and studies sent. Clinical questions answered. Awaiting determination from insurance company.     Turnaround time for your insurance to make a decision on your Prior Authorization can take 7-21 business days.

## 2025-06-25 NOTE — TELEPHONE ENCOUNTER
PA for HydrOXYzine HCL (ATARAX) 25 mg tablet DENIED    Reason:(Screenshot if applicable)        Message sent to office clinical pool Yes    Denial letter scanned into Media Yes    We can gladly do an appeal but the process can take about 30-60 days to provide determination. Please have the office staff schedule a Peer to Peer at phone [phone]. If an appeal is truly warranted please have Provider send clinical documentation to the PA department to support the appeal.     **Please follow up with your patient regarding denial and next steps**

## 2025-07-07 DIAGNOSIS — M48.061 SPINAL STENOSIS OF LUMBAR REGION, UNSPECIFIED WHETHER NEUROGENIC CLAUDICATION PRESENT: ICD-10-CM

## 2025-07-07 RX ORDER — OXYCODONE AND ACETAMINOPHEN 10; 325 MG/1; MG/1
1 TABLET ORAL EVERY 4 HOURS PRN
Qty: 120 TABLET | Refills: 0 | Status: SHIPPED | OUTPATIENT
Start: 2025-07-07

## 2025-07-07 NOTE — TELEPHONE ENCOUNTER
Reason for call:   [x] Refill   [] Prior Auth  [x] Other: Pharmacy Change    Office:   [x] PCP/Provider - SMILEY Varner Page, DO   [] Specialty/Provider -     Medication:  oxyCODONE-acetaminophen (PERCOCET)  mg per tablet    Dose/Frequency: Take 1 tablet by mouth every 4 (four) hours as needed for moderate pain Max Daily Amount: 6 tablets     Quantity: 120 tablet    Pharmacy: [facility]/pharmacy #[id number] - [place] - [address] [phone]    Local Pharmacy   Does the patient have enough for 3 days?   [] Yes   [x] No - Send as HP to POD    Mail Away Pharmacy   Does the patient have enough for 10 days?   [] Yes   [] No - Send as HP to POD

## 2025-07-23 ENCOUNTER — TELEPHONE (OUTPATIENT)
Age: 80
End: 2025-07-23

## 2025-07-23 DIAGNOSIS — M48.061 SPINAL STENOSIS OF LUMBAR REGION, UNSPECIFIED WHETHER NEUROGENIC CLAUDICATION PRESENT: ICD-10-CM

## 2025-07-23 RX ORDER — OXYCODONE HYDROCHLORIDE 10 MG/1
10 TABLET ORAL EVERY 6 HOURS PRN
Qty: 120 TABLET | Refills: 0 | Status: SHIPPED | OUTPATIENT
Start: 2025-07-23

## 2025-07-23 NOTE — TELEPHONE ENCOUNTER
[facility] pharmacy called. Has acquired Pt's prescriptions since [facility] has closed. Controlled medication policy differs. Pharmacist needs to clarify the following medications to be filled as per PCP. Concern with the following medications prescribed, prescribed dose, and risk of respiratory suppression especially with advanced age.     oxyCODONE (ROXICODONE) 10 MG TABS     oxyCODONE-acetaminophen (PERCOCET)  mg per tablet     carisoprodol (SOMA) 350 mg tablet       Please review and return call to [facility] pharmacy at [phone].

## 2025-07-23 NOTE — TELEPHONE ENCOUNTER
Reason for call:   [x] Refill   [] Prior Auth  [] Other:     Office:   [x] PCP/Provider -   [] Specialty/Provider -     Medication:      oxyCODONE (ROXICODONE) 10 MG Take 1 tablet (10 mg total) by mouth every 6 (six) hours as needed for moderate pain take 1 tablet by mouth four time          Pharmacy: [facility]   Does the patient have enough for 3 days?   [] Yes   [x] No - Send as HP to POD    Mail Away Pharmacy   Does the patient have enough for 10 days?   [] Yes   [] No - Send as HP to POD

## 2025-08-01 NOTE — TELEPHONE ENCOUNTER
Spoke to Lizabeth, Pharmacist, was not sure why she called office.  Advised if she has any other questions call office.

## 2025-08-05 DIAGNOSIS — M48.061 SPINAL STENOSIS OF LUMBAR REGION, UNSPECIFIED WHETHER NEUROGENIC CLAUDICATION PRESENT: ICD-10-CM

## 2025-08-05 RX ORDER — OXYCODONE AND ACETAMINOPHEN 10; 325 MG/1; MG/1
1 TABLET ORAL EVERY 4 HOURS PRN
Qty: 120 TABLET | Refills: 0 | Status: SHIPPED | OUTPATIENT
Start: 2025-08-05

## 2025-08-21 ENCOUNTER — HOSPITAL ENCOUNTER (OUTPATIENT)
Dept: NON INVASIVE DIAGNOSTICS | Facility: HOSPITAL | Age: 80
Discharge: HOME | End: 2025-08-21
Attending: FAMILY MEDICINE
Payer: COMMERCIAL

## 2025-08-21 DIAGNOSIS — I73.9 PAD (PERIPHERAL ARTERY DISEASE) (HCC): ICD-10-CM

## 2025-08-21 DIAGNOSIS — I73.9 CLAUDICATION (HCC): ICD-10-CM

## 2025-08-21 PROCEDURE — 93922 UPR/L XTREMITY ART 2 LEVELS: CPT | Performed by: SURGERY

## 2025-08-21 PROCEDURE — 93923 UPR/LXTR ART STDY 3+ LVLS: CPT

## 2025-08-21 PROCEDURE — 93925 LOWER EXTREMITY STUDY: CPT | Performed by: SURGERY

## 2025-08-21 PROCEDURE — 93925 LOWER EXTREMITY STUDY: CPT

## (undated) DEVICE — OSTO KIT COLOS 2-3/4 FL

## (undated) DEVICE — BLANKET HYPOTHERMIA ADULT GAYMAR

## (undated) DEVICE — POOLE SUCTION HANDLE W/TUBING: Brand: CARDINAL HEALTH

## (undated) DEVICE — GLOVE SRG BIOGEL ECLIPSE 8

## (undated) DEVICE — STERNAL WIRE

## (undated) DEVICE — CHLORAPREP HI-LITE 26ML ORANGE

## (undated) DEVICE — DRAIN CHANNEL RND FULL FLUTED HUBLESS 19FR

## (undated) DEVICE — 32 FR RIGHT ANGLE – SOFT PVC CATHETER: Brand: PVC THORACIC CATHETERS

## (undated) DEVICE — 4-PORT MANIFOLD: Brand: NEPTUNE 2

## (undated) DEVICE — GARMENT,MEDLINE,DVT,INT,CALF,FOAM,MED: Brand: MEDLINE

## (undated) DEVICE — SUT PROLENE 4-0 BB 36 IN 8581H

## (undated) DEVICE — ANTIBACTERIAL UNDYED BRAIDED (POLYGLACTIN 910), SYNTHETIC ABSORBABLE SUTURE: Brand: COATED VICRYL

## (undated) DEVICE — TELFA NON-ADHERENT ABSORBENT DRESSING: Brand: TELFA

## (undated) DEVICE — ASTOUND IMPERVIOUS SURGICAL GOWN: Brand: CONVERTORS

## (undated) DEVICE — SUT ETHIBOND 2-0 SH-1/SH-1 30 IN X763H

## (undated) DEVICE — TOWEL SET X-RAY

## (undated) DEVICE — PDS II VLT 0 107CM AG ST3: Brand: ENDOLOOP

## (undated) DEVICE — 32 FR STRAIGHT – SOFT PVC CATHETER: Brand: PVC THORACIC CATHETERS

## (undated) DEVICE — SUT PDS II 0 TP-1 60 IN Z991G

## (undated) DEVICE — GAUZE SPONGES,16 PLY: Brand: CURITY

## (undated) DEVICE — SUT ETHILON 3-0 PS-1 18 IN 1663H

## (undated) DEVICE — TIBURON LAPAROTOMY DRAPE: Brand: CONVERTORS

## (undated) DEVICE — PLUMEPEN PRO 10FT

## (undated) DEVICE — 3M™ IOBAN™ 2 ANTIMICROBIAL INCISE DRAPE 6650EZ: Brand: IOBAN™ 2

## (undated) DEVICE — SUT ETHIBOND 2-0 V-5/V-5 30 IN PXX52

## (undated) DEVICE — OSTO POUCH W/POST OP KIT 22 X 33MM FLANGE 57MM

## (undated) DEVICE — CULTURE TUBE ANAEROBIC

## (undated) DEVICE — SUT VICRYL 0 REEL 54 IN J287G

## (undated) DEVICE — WOUND RETRACTOR AND PROTECTOR: Brand: ALEXIS O WOUND PROTECTOR-RETRACTOR

## (undated) DEVICE — SUT VICRYL PLUS 1 CTB-1 36 IN VCPB947H

## (undated) DEVICE — SUT VICRYL 2-0 REEL 54 IN J286G

## (undated) DEVICE — TOWEL SURG XR DETECT GREEN STRL RFD

## (undated) DEVICE — POOLE SUCTION HANDLE: Brand: CARDINAL HEALTH

## (undated) DEVICE — SUCTION CATH 18 FR

## (undated) DEVICE — INTENDED FOR TISSUE SEPARATION, AND OTHER PROCEDURES THAT REQUIRE A SHARP SURGICAL BLADE TO PUNCTURE OR CUT.: Brand: BARD-PARKER ® CARBON RIB-BACK BLADES

## (undated) DEVICE — JACKSON-PRATT 100CC BULB RESERVOIR: Brand: CARDINAL HEALTH

## (undated) DEVICE — CULTURE TUBE AEROBIC

## (undated) DEVICE — SPONGE LAP 18 X 18 IN STRL RFD

## (undated) DEVICE — SUCTION SCINTILLANT LIGHT

## (undated) DEVICE — ABDOMINAL PAD: Brand: DERMACEA

## (undated) DEVICE — GLOVE SRG BIOGEL 8

## (undated) DEVICE — SUT CHROMIC 3-0 SH 27 IN G122H

## (undated) DEVICE — EVERGRIP INSERT SET 86MM: Brand: FOGARTY EVERGRIP

## (undated) DEVICE — ECHELON CONTOUR W/ GREEN RELOAD: Brand: ECHELON

## (undated) DEVICE — GLOVE INDICATOR PI UNDERGLOVE SZ 8 BLUE

## (undated) DEVICE — STAPLER SKIN 35 WIDE ULC APPOSE

## (undated) DEVICE — 3000CC GUARDIAN II: Brand: GUARDIAN

## (undated) DEVICE — HEAVY DUTY TABLE COVER: Brand: CONVERTORS

## (undated) DEVICE — SUT ETHIBOND 2-0 SH/SH 36 IN X523H

## (undated) DEVICE — CATH STRAIGHT RED RUBBER 20 FR

## (undated) DEVICE — Device

## (undated) DEVICE — SMITH & NEPHEW CEM MIX BOWL                                    W/SPATULA DISPOSABLE

## (undated) DEVICE — SUT SILK 2-0 18 IN A185H

## (undated) DEVICE — MAJOR LAPAROTOMY PACK: Brand: MEDLINE INDUSTRIES, INC.

## (undated) DEVICE — THERMOFLECT BLANKET, L, 25EA                               TS THERMOFLECT BLANKET, 48" X 84", SILVER, 5/BG, 5 BG/CS NW: Brand: THERMOFLECT

## (undated) DEVICE — 1840 FOAM BLOCK NEEDLE COUNTER: Brand: DEVON

## (undated) DEVICE — RECIP.STERNUM SAW BLADE 34/7.5/0.7MM: Brand: AESCULAP

## (undated) DEVICE — SUT PROLENE 3-0 SH 30 IN 8832H

## (undated) DEVICE — TRAY FOLEY 16FR SURESTEP TEMP SENS URIMETER STAT LOK

## (undated) DEVICE — EVACUATOR BULB 100CC SILICONE

## (undated) DEVICE — POV-IOD SOLUTION 4OZ BT

## (undated) DEVICE — SUT VICRYL 2-0 CP-1 27 IN J266H

## (undated) DEVICE — SILVER-COATED ANTIBACTERIAL BARRIER DRESSING: Brand: ACTICOAT SURGIC 10X25CM 5PK US

## (undated) DEVICE — SUT MONOCRYL 3-0 PS-2 18 IN Y497G

## (undated) DEVICE — ENSEAL 20 CM SHAFT, LARGE JAW: Brand: ENSEAL X1

## (undated) DEVICE — SUT PDS PLUS 1 CTB 36 IN PDPB359T

## (undated) DEVICE — SUT ETHILON 2-0 FS 18 IN 664H

## (undated) DEVICE — SUT SILK 2 60 IN SA8H

## (undated) DEVICE — CONTOUR CURVED CUTTER STAPLER RELOAD: Brand: CONTOUR

## (undated) DEVICE — SPONGE LAP 18 X 18 IN

## (undated) DEVICE — OASIS DRAIN, SINGLE, INLINE & ATS COMPATIBLE: Brand: OASIS

## (undated) DEVICE — PACK VALVE PBDS

## (undated) DEVICE — SUT SILK 0 CT-1 30 IN 424H

## (undated) DEVICE — PLEDGET CARDIO PTFE 9.5 X 4.8 SOFT LF (6EA/PK)

## (undated) DEVICE — SUT SILK 2-0 SH CR/8 18 IN C012D

## (undated) DEVICE — BONE WAX WHITE: Brand: BONE WAX WHITE